# Patient Record
Sex: FEMALE | Race: WHITE | NOT HISPANIC OR LATINO | Employment: OTHER | URBAN - METROPOLITAN AREA
[De-identification: names, ages, dates, MRNs, and addresses within clinical notes are randomized per-mention and may not be internally consistent; named-entity substitution may affect disease eponyms.]

---

## 2017-10-15 ENCOUNTER — HOSPITAL ENCOUNTER (EMERGENCY)
Facility: HOSPITAL | Age: 77
Discharge: HOME | End: 2017-10-15
Attending: EMERGENCY MEDICINE | Admitting: EMERGENCY MEDICINE

## 2017-10-15 VITALS
WEIGHT: 140 LBS | BODY MASS INDEX: 25.76 KG/M2 | DIASTOLIC BLOOD PRESSURE: 65 MMHG | OXYGEN SATURATION: 91 % | HEIGHT: 62 IN | TEMPERATURE: 97.9 F | SYSTOLIC BLOOD PRESSURE: 97 MMHG | HEART RATE: 108 BPM | RESPIRATION RATE: 16 BRPM

## 2017-10-15 DIAGNOSIS — N39.0 URINARY TRACT INFECTION IN FEMALE: Primary | ICD-10-CM

## 2017-10-15 LAB
ALBUMIN SERPL-MCNC: 4.5 G/DL (ref 3.5–5.2)
ALBUMIN/GLOB SERPL: 1.5 G/DL
ALP SERPL-CCNC: 101 U/L (ref 39–117)
ALT SERPL W P-5'-P-CCNC: 16 U/L (ref 1–33)
ANION GAP SERPL CALCULATED.3IONS-SCNC: 17.4 MMOL/L
APTT PPP: 27.3 SECONDS (ref 22.7–35.4)
AST SERPL-CCNC: 18 U/L (ref 1–32)
BACTERIA UR QL AUTO: ABNORMAL /HPF
BASOPHILS # BLD AUTO: 0.04 10*3/MM3 (ref 0–0.2)
BASOPHILS NFR BLD AUTO: 0.3 % (ref 0–1.5)
BILIRUB SERPL-MCNC: <0.2 MG/DL (ref 0.1–1.2)
BILIRUB UR QL STRIP: NEGATIVE
BUN BLD-MCNC: 15 MG/DL (ref 8–23)
BUN/CREAT SERPL: 20.8 (ref 7–25)
CALCIUM SPEC-SCNC: 10 MG/DL (ref 8.6–10.5)
CHLORIDE SERPL-SCNC: 100 MMOL/L (ref 98–107)
CLARITY UR: ABNORMAL
CO2 SERPL-SCNC: 22.6 MMOL/L (ref 22–29)
COLOR UR: ABNORMAL
CREAT BLD-MCNC: 0.72 MG/DL (ref 0.57–1)
DEPRECATED RDW RBC AUTO: 44.7 FL (ref 37–54)
EOSINOPHIL # BLD AUTO: 0.07 10*3/MM3 (ref 0–0.7)
EOSINOPHIL NFR BLD AUTO: 0.6 % (ref 0.3–6.2)
ERYTHROCYTE [DISTWIDTH] IN BLOOD BY AUTOMATED COUNT: 13 % (ref 11.7–13)
GFR SERPL CREATININE-BSD FRML MDRD: 78 ML/MIN/1.73
GLOBULIN UR ELPH-MCNC: 3 GM/DL
GLUCOSE BLD-MCNC: 183 MG/DL (ref 65–99)
GLUCOSE UR STRIP-MCNC: ABNORMAL MG/DL
HCT VFR BLD AUTO: 44.8 % (ref 35.6–45.5)
HGB BLD-MCNC: 14.1 G/DL (ref 11.9–15.5)
HGB UR QL STRIP.AUTO: ABNORMAL
HYALINE CASTS UR QL AUTO: ABNORMAL /LPF
IMM GRANULOCYTES # BLD: 0.05 10*3/MM3 (ref 0–0.03)
IMM GRANULOCYTES NFR BLD: 0.4 % (ref 0–0.5)
INR PPP: 1 (ref 0.9–1.1)
KETONES UR QL STRIP: ABNORMAL
LEUKOCYTE ESTERASE UR QL STRIP.AUTO: ABNORMAL
LYMPHOCYTES # BLD AUTO: 2.6 10*3/MM3 (ref 0.9–4.8)
LYMPHOCYTES NFR BLD AUTO: 21.8 % (ref 19.6–45.3)
MCH RBC QN AUTO: 29.6 PG (ref 26.9–32)
MCHC RBC AUTO-ENTMCNC: 31.5 G/DL (ref 32.4–36.3)
MCV RBC AUTO: 94.1 FL (ref 80.5–98.2)
MONOCYTES # BLD AUTO: 1.08 10*3/MM3 (ref 0.2–1.2)
MONOCYTES NFR BLD AUTO: 9.1 % (ref 5–12)
NEUTROPHILS # BLD AUTO: 8.09 10*3/MM3 (ref 1.9–8.1)
NEUTROPHILS NFR BLD AUTO: 67.8 % (ref 42.7–76)
NITRITE UR QL STRIP: POSITIVE
PH UR STRIP.AUTO: 5.5 [PH] (ref 5–8)
PLATELET # BLD AUTO: 335 10*3/MM3 (ref 140–500)
PMV BLD AUTO: 10.9 FL (ref 6–12)
POTASSIUM BLD-SCNC: 3.8 MMOL/L (ref 3.5–5.2)
PROT SERPL-MCNC: 7.5 G/DL (ref 6–8.5)
PROT UR QL STRIP: ABNORMAL
PROTHROMBIN TIME: 12.8 SECONDS (ref 11.7–14.2)
RBC # BLD AUTO: 4.76 10*6/MM3 (ref 3.9–5.2)
RBC # UR: ABNORMAL /HPF
REF LAB TEST METHOD: ABNORMAL
SODIUM BLD-SCNC: 140 MMOL/L (ref 136–145)
SP GR UR STRIP: 1.01 (ref 1–1.03)
SQUAMOUS #/AREA URNS HPF: ABNORMAL /HPF
UROBILINOGEN UR QL STRIP: ABNORMAL
WBC NRBC COR # BLD: 11.93 10*3/MM3 (ref 4.5–10.7)
WBC UR QL AUTO: ABNORMAL /HPF

## 2017-10-15 PROCEDURE — 87186 SC STD MICRODIL/AGAR DIL: CPT | Performed by: EMERGENCY MEDICINE

## 2017-10-15 PROCEDURE — 85730 THROMBOPLASTIN TIME PARTIAL: CPT | Performed by: EMERGENCY MEDICINE

## 2017-10-15 PROCEDURE — 81001 URINALYSIS AUTO W/SCOPE: CPT | Performed by: EMERGENCY MEDICINE

## 2017-10-15 PROCEDURE — 96365 THER/PROPH/DIAG IV INF INIT: CPT

## 2017-10-15 PROCEDURE — 25010000002 CEFTRIAXONE PER 250 MG: Performed by: EMERGENCY MEDICINE

## 2017-10-15 PROCEDURE — 99284 EMERGENCY DEPT VISIT MOD MDM: CPT

## 2017-10-15 PROCEDURE — 87086 URINE CULTURE/COLONY COUNT: CPT | Performed by: EMERGENCY MEDICINE

## 2017-10-15 PROCEDURE — 85610 PROTHROMBIN TIME: CPT | Performed by: EMERGENCY MEDICINE

## 2017-10-15 PROCEDURE — 80053 COMPREHEN METABOLIC PANEL: CPT | Performed by: EMERGENCY MEDICINE

## 2017-10-15 PROCEDURE — 85025 COMPLETE CBC W/AUTO DIFF WBC: CPT | Performed by: EMERGENCY MEDICINE

## 2017-10-15 RX ORDER — SULFAMETHOXAZOLE AND TRIMETHOPRIM 800; 160 MG/1; MG/1
1 TABLET ORAL 2 TIMES DAILY
Qty: 10 TABLET | Refills: 0 | Status: SHIPPED | OUTPATIENT
Start: 2017-10-15 | End: 2018-05-04 | Stop reason: ALTCHOICE

## 2017-10-15 RX ORDER — SODIUM CHLORIDE 0.9 % (FLUSH) 0.9 %
10 SYRINGE (ML) INJECTION AS NEEDED
Status: DISCONTINUED | OUTPATIENT
Start: 2017-10-15 | End: 2017-10-15 | Stop reason: HOSPADM

## 2017-10-15 RX ORDER — ASPIRIN 81 MG/1
81 TABLET, CHEWABLE ORAL DAILY
COMMUNITY

## 2017-10-15 RX ORDER — PAROXETINE HYDROCHLORIDE 40 MG/1
40 TABLET, FILM COATED ORAL EVERY MORNING
COMMUNITY
End: 2020-02-25 | Stop reason: SDUPTHER

## 2017-10-15 RX ORDER — CEFTRIAXONE SODIUM 1 G/50ML
1 INJECTION, SOLUTION INTRAVENOUS ONCE
Status: COMPLETED | OUTPATIENT
Start: 2017-10-15 | End: 2017-10-15

## 2017-10-15 RX ORDER — CLONIDINE HYDROCHLORIDE 0.2 MG/1
0.2 TABLET ORAL 2 TIMES DAILY
COMMUNITY
End: 2020-02-25 | Stop reason: SDUPTHER

## 2017-10-15 RX ORDER — CELECOXIB 200 MG/1
200 CAPSULE ORAL DAILY
COMMUNITY
End: 2019-05-13

## 2017-10-15 RX ADMIN — SODIUM CHLORIDE 1000 ML: 9 INJECTION, SOLUTION INTRAVENOUS at 02:33

## 2017-10-15 RX ADMIN — CEFTRIAXONE SODIUM 1 G: 1 INJECTION, SOLUTION INTRAVENOUS at 02:36

## 2017-10-15 NOTE — ED NOTES
"Patient states \"When I urinate it's bright red. It started around 4pm.\"     Marya Taylor RN  10/15/17 0019    "

## 2017-10-15 NOTE — ED PROVIDER NOTES
EMERGENCY DEPARTMENT ENCOUNTER    CHIEF COMPLAINT  Chief Complaint: Hematuria  History given by: Patient  History limited by:   Room Number: NATHAN/NATHAN  PMD: Brendan Hagen MD      HPI:  Pt is a 77 y.o. female who presents complaining of hematuria that onset yesterday at 1600. Pt reports after taking a shower, she urinated and noted that her urine was bright red. She states that she has had urinary urgency for the past few days. Pt has a hx of urinary incontinence. Pt reports some back pain, dizziness, and suprapubic abd pain.     Duration:  8 hours  Onset: sudden  Timing: intermittent  Quality: bright red urine  Intensity/Severity: moderate  Progression: unchanged  Associated Symptoms: back pain, dizziness, abd pain  Aggravating Factors: none  Alleviating Factors: none  Previous Episodes: none  Treatment before arrival: none    PAST MEDICAL HISTORY  Active Ambulatory Problems     Diagnosis Date Noted   • No Active Ambulatory Problems     Resolved Ambulatory Problems     Diagnosis Date Noted   • No Resolved Ambulatory Problems     Past Medical History:   Diagnosis Date   • Anxiety    • Arthritis    • Hypertension    • IBS (irritable bowel syndrome)        PAST SURGICAL HISTORY  Past Surgical History:   Procedure Laterality Date   • APPENDECTOMY     • HYSTERECTOMY     • TONSILLECTOMY         FAMILY HISTORY  History reviewed. No pertinent family history.    SOCIAL HISTORY  Social History     Social History   • Marital status:      Spouse name: N/A   • Number of children: N/A   • Years of education: N/A     Occupational History   • Not on file.     Social History Main Topics   • Smoking status: Never Smoker   • Smokeless tobacco: Not on file   • Alcohol use No   • Drug use: No   • Sexual activity: Not on file     Other Topics Concern   • Not on file     Social History Narrative   • No narrative on file       ALLERGIES  Penicillins and Statins    REVIEW OF SYSTEMS  Review of Systems   Constitutional:  Negative for fever.   HENT: Negative for sore throat.    Eyes: Negative.    Respiratory: Negative for cough and shortness of breath.    Cardiovascular: Negative for chest pain.   Gastrointestinal: Positive for abdominal pain. Negative for diarrhea and vomiting.   Genitourinary: Positive for hematuria. Negative for dysuria.   Musculoskeletal: Positive for back pain. Negative for neck pain.   Skin: Negative for rash.   Allergic/Immunologic: Negative.    Neurological: Positive for dizziness. Negative for weakness, numbness and headaches.   Hematological: Negative.    Psychiatric/Behavioral: Negative.    All other systems reviewed and are negative.      PHYSICAL EXAM  ED Triage Vitals   Temp Heart Rate Resp BP SpO2   10/15/17 0020 10/15/17 0020 10/15/17 0020 10/15/17 0026 10/15/17 0020   97.9 °F (36.6 °C) 108 16 170/106 96 %      Temp src Heart Rate Source Patient Position BP Location FiO2 (%)   -- -- 10/15/17 0026 10/15/17 0026 --     Sitting Right arm        Physical Exam   Constitutional: She is oriented to person, place, and time and well-developed, well-nourished, and in no distress. No distress.   HENT:   Head: Normocephalic and atraumatic.   Eyes: EOM are normal. Pupils are equal, round, and reactive to light.   Neck: Normal range of motion. Neck supple.   Cardiovascular: Normal rate, regular rhythm and normal heart sounds.    Pulmonary/Chest: Effort normal and breath sounds normal. No respiratory distress.   Abdominal: Soft. There is no tenderness. There is no rebound and no guarding.   Genitourinary:   Genitourinary Comments: Tea-colored urine at bedside   Musculoskeletal: Normal range of motion. She exhibits no edema.   Neurological: She is alert and oriented to person, place, and time. She has normal sensation and normal strength.   Skin: Skin is warm and dry. No rash noted.   Psychiatric: Mood and affect normal.   Nursing note and vitals reviewed.      LAB RESULTS  Lab Results (last 24 hours)     Procedure  Component Value Units Date/Time    Urinalysis With / Culture If Indicated - Urine, Clean Catch [[id number]]  (Abnormal) Collected:  10/15/17 0054    Specimen:  Urine from Urine, Clean Catch Updated:  10/15/17 0133     Color, UA Dark Yellow (A)     Appearance, UA Cloudy (A)     pH, UA 5.5     Specific Gravity, UA 1.014     Glucose,  mg/dL (Trace) (A)     Ketones, UA Trace (A)     Bilirubin, UA Negative     Blood, UA Large (3+) (A)     Protein,  mg/dL (2+) (A)     Leuk Esterase, UA Large (3+) (A)     Nitrite, UA Positive (A)     Urobilinogen, UA 1.0 E.U./dL    Urinalysis, Microscopic Only - Urine, Clean Catch [[id number]]  (Abnormal) Collected:  10/15/17 0054    Specimen:  Urine from Urine, Clean Catch Updated:  10/15/17 0133     RBC, UA Too Numerous to Count (A) /HPF      WBC, UA Too Numerous to Count (A) /HPF      Bacteria, UA 4+ (A) /HPF      Squamous Epithelial Cells, UA 0-2 /HPF      Hyaline Casts, UA 0-2 /LPF      Methodology Automated Microscopy    Urine Culture - Urine, Urine, Clean Catch [[id number]]  (Normal) Collected:  10/15/17 0054    Specimen:  Urine from Urine, Clean Catch Updated:  10/15/17 0651     Urine Culture Culture in progress    CBC & Differential [[id number]] Collected:  10/15/17 0103    Specimen:  Blood Updated:  10/15/17 0148    Narrative:       The following orders were created for panel order CBC & Differential.  Procedure                               Abnormality         Status                     ---------                               -----------         ------                     CBC Auto Differential[[id number]]        Abnormal            Final result                 Please view results for these tests on the individual orders.    Comprehensive Metabolic Panel [[id number]]  (Abnormal) Collected:  10/15/17 0103    Specimen:  Blood Updated:  10/15/17 0217     Glucose 183 (H) mg/dL      BUN 15 mg/dL      Creatinine 0.72 mg/dL      Sodium 140 mmol/L      Potassium 3.8 mmol/L       Chloride 100 mmol/L      CO2 22.6 mmol/L      Calcium 10.0 mg/dL      Total Protein 7.5 g/dL      Albumin 4.50 g/dL      ALT (SGPT) 16 U/L      AST (SGOT) 18 U/L      Alkaline Phosphatase 101 U/L      Total Bilirubin <0.2 mg/dL      eGFR Non African Amer 78 mL/min/1.73      Globulin 3.0 gm/dL      A/G Ratio 1.5 g/dL      BUN/Creatinine Ratio 20.8     Anion Gap 17.4 mmol/L     Narrative:       The MDRD GFR formula is only valid for adults with stable renal function between ages 18 and 70.    Protime-INR [[id number]]  (Normal) Collected:  10/15/17 0103    Specimen:  Blood Updated:  10/15/17 0156     Protime 12.8 Seconds      INR 1.00    aPTT [[id number]]  (Normal) Collected:  10/15/17 0103    Specimen:  Blood Updated:  10/15/17 0156     PTT 27.3 seconds     CBC Auto Differential [[id number]]  (Abnormal) Collected:  10/15/17 0103    Specimen:  Blood Updated:  10/15/17 0148     WBC 11.93 (H) 10*3/mm3      RBC 4.76 10*6/mm3      Hemoglobin 14.1 g/dL      Hematocrit 44.8 %      MCV 94.1 fL      MCH 29.6 pg      MCHC 31.5 (L) g/dL      RDW 13.0 %      RDW-SD 44.7 fl      MPV 10.9 fL      Platelets 335 10*3/mm3      Neutrophil % 67.8 %      Lymphocyte % 21.8 %      Monocyte % 9.1 %      Eosinophil % 0.6 %      Basophil % 0.3 %      Immature Grans % 0.4 %      Neutrophils, Absolute 8.09 10*3/mm3      Lymphocytes, Absolute 2.60 10*3/mm3      Monocytes, Absolute 1.08 10*3/mm3      Eosinophils, Absolute 0.07 10*3/mm3      Basophils, Absolute 0.04 10*3/mm3      Immature Grans, Absolute 0.05 (H) 10*3/mm3           I ordered the above labs and reviewed the results    PROCEDURES  Procedures      PROGRESS AND CONSULTS  ED Course   1:00 AM  Ordered blood work and UA for further evaluation.   2:47 AM  Rechecked with pt. Informed pt of her UA result showing UTI and plan to give IV Rocephin. Will discharge with abx. Pt understands and agrees with plan. All concerns addressed.         MEDICAL DECISION MAKING    MDM  Number of Diagnoses or  Management Options  Urinary tract infection in female:      Amount and/or Complexity of Data Reviewed  Clinical lab tests: ordered and reviewed (UA: 4+ Bacteria)           DIAGNOSIS  Final diagnoses:   Urinary tract infection in female       DISPOSITION  DISCHARGE    Patient discharged in stable condition.    Reviewed implications of results, diagnosis, meds, responsibility to follow up, warning signs and symptoms of possible worsening, potential complications and reasons to return to ER.    Patient/Family voiced understanding of above instructions.    Discussed plan for discharge, as there is no emergent indication for admission.  Pt/family is agreeable and understands need for follow up and repeat testing.  Pt is aware that discharge does not mean that nothing is wrong but it indicates no emergency is present that requires admission and they must continue care with follow-up as given below or physician of their choice.     FOLLOW-UP  Brendan Hagen MD  [address] [address]  [phone]    Call in 1 week  If symptoms worsen, For Primary Physician follow-up         Medication List      New Prescriptions          sulfamethoxazole-trimethoprim 800-160 MG per tablet   Commonly known as:  BACTRIM DS   Take 1 tablet by mouth 2 (Two) Times a Day.               Latest Documented Vital Signs:  As of 7:02 AM  BP- 97/65 HR- 108 Temp- 97.9 °F (36.6 °C) O2 sat- 91%    --  Documentation assistance provided by jorge a Benoit for Dr. Flynn.  Information recorded by the scribe was done at my direction and has been verified and validated by me.     Freddie Benoit  10/15/17 0311       Freddie Benoit  10/15/17 0338       Ryan Flynn MD  10/15/17 0702

## 2017-10-17 LAB
BACTERIA SPEC AEROBE CULT: ABNORMAL
BACTERIA SPEC AEROBE CULT: ABNORMAL

## 2018-05-04 ENCOUNTER — OFFICE VISIT (OUTPATIENT)
Dept: CARDIOLOGY | Facility: CLINIC | Age: 78
End: 2018-05-04

## 2018-05-04 VITALS — DIASTOLIC BLOOD PRESSURE: 90 MMHG | SYSTOLIC BLOOD PRESSURE: 140 MMHG | HEART RATE: 68 BPM

## 2018-05-04 DIAGNOSIS — I10 ESSENTIAL HYPERTENSION: ICD-10-CM

## 2018-05-04 DIAGNOSIS — E78.2 MIXED HYPERLIPIDEMIA: ICD-10-CM

## 2018-05-04 DIAGNOSIS — I48.0 PAROXYSMAL ATRIAL FIBRILLATION (HCC): Primary | ICD-10-CM

## 2018-05-04 DIAGNOSIS — G47.33 OBSTRUCTIVE SLEEP APNEA SYNDROME: ICD-10-CM

## 2018-05-04 PROCEDURE — 93000 ELECTROCARDIOGRAM COMPLETE: CPT | Performed by: INTERNAL MEDICINE

## 2018-05-04 PROCEDURE — 99204 OFFICE O/P NEW MOD 45 MIN: CPT | Performed by: INTERNAL MEDICINE

## 2018-05-04 RX ORDER — ERGOCALCIFEROL 1.25 MG/1
CAPSULE ORAL
Refills: 2 | COMMUNITY
Start: 2018-04-15 | End: 2020-02-25 | Stop reason: SDUPTHER

## 2018-05-04 RX ORDER — HYDROCHLOROTHIAZIDE 12.5 MG/1
CAPSULE, GELATIN COATED ORAL
Refills: 0 | COMMUNITY
Start: 2018-04-15 | End: 2020-02-25 | Stop reason: SDUPTHER

## 2018-05-04 NOTE — PROGRESS NOTES
Date of Office Visit: 18  Encounter Provider: Jayson Pinto MD  Place of Service: [facility]  Patient Name: Kandace Andrade  :1940  Referral Provider:Jayson Pinto MD      No chief complaint on file.    History of Present Illness   The patient is a pleasant 75-year-old white female who presented to the hospital in 2010 and had back surgery.  She had a lumbar laminectomy.  Postoperatively, she became very hypoxemic with aspiration pneumonia and went into atrial fibrillation.  She converted back to sinus rhythm with IV amiodarone.  We then saw her in 2011 with her blood pressure being elevated and labile.  Her medication had to be adjusted.   In , she was having shortness of breath.  We did a perfusion stress test on her which was  negative.  She had an echocardiogram which was unremarkable.     When she was last here in , she was having problems with the clonidine. It just made her fatigued, tired, and sleepy. We tried her on lisinopril and apparently that did not work and she felt worse on that than on the clonidine so that was discontinued. She was put back on the clonidine. She comes in for follow up. It has been 2 1/2 years since she was last seen.  She comes in now because she's had some fluttering lasting a few seconds 3-4 times in the past 6 months no associated near-syncope or syncope or chest pain or dyspnea.  She has chronic dizziness she says is from her blood pressure medicine which she's had forever.  She fell about 6-8 weeks ago broke a bone her foot thinks she may have lost consciousness but doesn't know for sure no symptoms prior to that.  She's also had a dual with his chronic diarrhea but apparently that's been worked up also.  She denies any orthopnea or PND denies any chest pain and pressure.  Overall she feels like she's doing reasonably well.  She lives with her  they take care of each other she has 2  daughters in town in case he needed help.          Past Medical History:   Diagnosis Date   • Anxiety    • Arthritis    • Bronchitis    • CKD (chronic kidney disease)    • Depression    • Herpes zoster    • Hyperglycemia    • Hypertension    • IBS (irritable bowel syndrome)          Past Surgical History:   Procedure Laterality Date   • APPENDECTOMY     • HYSTERECTOMY     • TONSILLECTOMY           Current Outpatient Prescriptions on File Prior to Visit   Medication Sig Dispense Refill   • aspirin 81 MG chewable tablet Chew 81 mg Daily.     • celecoxib (CeleBREX) 200 MG capsule Take 200 mg by mouth Daily.     • CloNIDine (CATAPRES) 0.2 MG tablet Take 0.2 mg by mouth 2 (Two) Times a Day.     • MethylPREDNISolone (MEDROL, RAF,) 4 MG tablet Take as directed on package instructions. 21 tablet 0   • PARoxetine (PAXIL) 20 MG tablet Take 40 mg by mouth Every Morning.     • sulfamethoxazole-trimethoprim (BACTRIM DS) 800-160 MG per tablet Take 1 tablet by mouth 2 (Two) Times a Day. 10 tablet 0     No current facility-administered medications on file prior to visit.          Social History     Social History   • Marital status:      Spouse name: N/A   • Number of children: N/A   • Years of education: N/A     Occupational History   • Not on file.     Social History Main Topics   • Smoking status: Never Smoker   • Smokeless tobacco: Not on file   • Alcohol use No   • Drug use: No   • Sexual activity: Not on file     Other Topics Concern   • Not on file     Social History Narrative   • No narrative on file       History reviewed. No pertinent family history.      Review of Systems   Constitution: Negative for decreased appetite, diaphoresis, fever, weakness, malaise/fatigue, weight gain and weight loss.   HENT: Negative for congestion, hearing loss, nosebleeds and tinnitus.    Eyes: Negative for blurred vision, double vision, vision loss in left eye, vision loss in right eye and visual disturbance.   Cardiovascular:         As noted in HPI   Respiratory:        As noted HPI   Endocrine: Negative for cold intolerance and heat intolerance.   Hematologic/Lymphatic: Negative for bleeding problem. Does not bruise/bleed easily.   Skin: Negative for color change, flushing, itching and rash.   Musculoskeletal: Positive for back pain and joint pain. Negative for arthritis, joint swelling, muscle weakness and myalgias.   Gastrointestinal: Positive for diarrhea. Negative for bloating, abdominal pain, constipation, dysphagia, heartburn, hematemesis, hematochezia, melena, nausea and vomiting.   Genitourinary: Negative for bladder incontinence, dysuria, frequency, nocturia and urgency.   Neurological: Positive for dizziness and light-headedness. Negative for focal weakness, headaches, loss of balance, numbness, paresthesias and vertigo.   Psychiatric/Behavioral: Negative for depression, memory loss and substance abuse.       Procedures      ECG 12 Lead  Date/Time: 5/4/2018 2:39 PM  Performed by: MICHAEL GLYNN  Authorized by: MICHAEL GLYNN   Comparison: compared with previous ECG   Similar to previous ECG  Rhythm: sinus rhythm  Rate: normal  QRS axis: normal                  Objective:    There were no vitals taken for this visit.       Physical Exam  Physical Exam   Constitutional: She is oriented to person, place, and time. She appears well-developed and well-nourished. No distress.   HENT:   Head: Normocephalic.   Eyes: Conjunctivae are normal. Pupils are equal, round, and reactive to light. No scleral icterus.   Neck: Normal carotid pulses, no hepatojugular reflux and no JVD present. Carotid bruit is not present. No tracheal deviation, no edema and no erythema present. No thyromegaly present.   Cardiovascular: Normal rate, regular rhythm, S1 normal, S2 normal, normal heart sounds and intact distal pulses.   No extrasystoles are present. PMI is not displaced.  Exam reveals no gallop, no distant heart sounds and no friction rub.    No  murmur heard.  Pulses:       Carotid pulses are 2+ on the right side, and 2+ on the left side.       Radial pulses are 2+ on the right side, and 2+ on the left side.        Femoral pulses are 2+ on the right side, and 2+ on the left side.       Dorsalis pedis pulses are 2+ on the right side, and 2+ on the left side.        Posterior tibial pulses are 2+ on the right side, and 2+ on the left side.   Pulmonary/Chest: Effort normal and breath sounds normal. No respiratory distress. She has no decreased breath sounds. She has no wheezes. She has no rhonchi. She has no rales. She exhibits no tenderness.   Abdominal: Soft. Bowel sounds are normal. She exhibits no distension and no mass. There is no hepatosplenomegaly. There is no tenderness. There is no rebound and no guarding.   Musculoskeletal: She exhibits no edema, tenderness or deformity.   Neurological: She is alert and oriented to person, place, and time.   Skin: Skin is warm and dry. No rash noted. She is not diaphoretic. No cyanosis or erythema. No pallor. Nails show no clubbing.   Psychiatric: She has a normal mood and affect. Her speech is normal and behavior is normal. Judgment and thought content normal.           Assessment:    1.  78-year-old female with paroxysmal atrial fibrillation.  Atrial Fibrillation and Atrial Flutter  Assessment  • The patient has paroxysmal atrial fibrillation  • This is non-valvular in etiology  • The patient's CHADS2-VASc score is 4  • A [id number] score of 2 or more is considered a high risk for a thromboembolic event  • Aspirin prescribed    Plan  • Attempt to maintain sinus rhythm  • Continue aspirin for antithrombotic therapy, bleeding issues discussed  She is having brief episodes of fluttering lasting just a few seconds 3-4 episodes and 6 months she doesn't want to do anything more about these I think that that certainly reasonable fleeting and not sure that we've catch any of them.    2.  Hypertension pressure borderline  today would continue though her clonidine.  3.  History of hyperlipidemia currently on no therapy.  Per PCP.  4.  History obstructive sleep apnea but cannot tolerate the mask.  5.  Chronic diarrhea.         Plan:

## 2019-05-13 ENCOUNTER — OFFICE VISIT (OUTPATIENT)
Dept: CARDIOLOGY | Facility: CLINIC | Age: 79
End: 2019-05-13

## 2019-05-13 VITALS
DIASTOLIC BLOOD PRESSURE: 110 MMHG | HEART RATE: 59 BPM | SYSTOLIC BLOOD PRESSURE: 190 MMHG | BODY MASS INDEX: 22.36 KG/M2 | WEIGHT: 131 LBS | HEIGHT: 64 IN

## 2019-05-13 DIAGNOSIS — I48.0 PAROXYSMAL ATRIAL FIBRILLATION (HCC): Primary | ICD-10-CM

## 2019-05-13 DIAGNOSIS — E78.2 MIXED HYPERLIPIDEMIA: ICD-10-CM

## 2019-05-13 DIAGNOSIS — G47.33 OBSTRUCTIVE SLEEP APNEA SYNDROME: ICD-10-CM

## 2019-05-13 DIAGNOSIS — I10 ESSENTIAL HYPERTENSION: ICD-10-CM

## 2019-05-13 PROCEDURE — 93000 ELECTROCARDIOGRAM COMPLETE: CPT | Performed by: NURSE PRACTITIONER

## 2019-05-13 PROCEDURE — 99214 OFFICE O/P EST MOD 30 MIN: CPT | Performed by: NURSE PRACTITIONER

## 2019-05-13 NOTE — PROGRESS NOTES
Date of Office Visit: 2019  Encounter Provider: ANTOINETTE Ni  Place of Service: [facility] CARDIOLOGY  Patient Name: Kandace Andrade  :1940    Chief Complaint   Patient presents with   • Atrial Fibrillation   • Follow-up   • Hypertension   :     HPI: Kandace Andrade is a 79 y.o. female is a patient of Dr. Pinto. I will be seeing her for the first time today and have reviewed her record.     Her past medical history is significant of paroxysmal atrial fibrillation, hypertension, hyperlipidemia, and obstructive sleep apnea.    In 2012 she had a lumbar laminectomy.  Postoperatively, she became very hypoxemic aspiration pneumonia and went into atrial fibrillation.  She converted back to sinus rhythm with IV amiodarone.  She had issues with labile blood pressure in 2011 and her medications were adjusted.  In , she had shortness of breath.  Perfusion stress test was negative for ischemia.  Echocardiogram was unremarkable.  In , she had issues with clonidine with fatigue and sleepiness.  We tried on lisinopril but she felt worse on that than she did with clonidine.  Lisinopril was discontinued and we put her back on clonidine.  May 2018 she complained of some fluttering which lasted a few seconds.  This occurred 3-4 times in the last 6 months.  We decided not to do anything more but to follow these clinically.    Patient presents today for annual reassessment.  She denies palpitations and does not recall the last time she had any fluttering sensation.  She denies shortness of breath, chest pain tightness pressure.  She has chronic edema in the left ankle and foot which is unchanged.  She has intermittent lightheadedness secondary to her antihypertensive.  She denies near syncope, or syncope.  Her  has been in and out of the hospital with recurrent stroke and she has been ambulating more frequently than normal.  She pulled a muscle in her right  "leg but was checked for bilateral lower extremity DVT which was negative.  She has not checked her blood pressure in a while but reports it is normally better than it is today.  She has not yet had her morning medication.      Allergies   Allergen Reactions   • Penicillins Hives and Other (See Comments)     Elevated BP   • Statins Other (See Comments)     Headache, elevated liver enzymes, blurred vision       Past Medical History:   Diagnosis Date   • Anxiety    • Arthritis    • Atrial fibrillation (CMS/HCC)    • Bronchitis    • CKD (chronic kidney disease)    • Depression    • Herpes zoster    • Hyperglycemia    • Hyperlipidemia    • Hypertension    • IBS (irritable bowel syndrome)    • Sleep apnea     not treated, unable to tolerate mask       Past Surgical History:   Procedure Laterality Date   • APPENDECTOMY     • BACK SURGERY      lower x2   • HYSTERECTOMY     • SHOULDER SURGERY Right    • TONSILLECTOMY           Family and social history reviewed.     Review of Systems   Constitution: Positive for malaise/fatigue.   Cardiovascular: Positive for leg swelling.   Neurological: Positive for light-headedness.     All other systems were reviewed and are negative          Objective:     Vitals:    05/13/19 1034   BP: (!) 190/110   BP Location: Left arm   Patient Position: Sitting   Pulse: 59   Weight: 59.4 kg (131 lb)   Height: 162.6 cm (64\")     Body mass index is 22.49 kg/m².    PHYSICAL EXAM:  Physical Exam   Constitutional: She is oriented to person, place, and time. She appears well-developed and well-nourished. No distress.   HENT:   Head: Normocephalic.   Eyes: Conjunctivae are normal.   Neck: Normal range of motion. No JVD present.   Cardiovascular: Normal rate, regular rhythm, normal heart sounds and intact distal pulses.   No murmur heard.  Pulses:       Carotid pulses are 2+ on the right side, and 2+ on the left side.       Radial pulses are 2+ on the right side, and 2+ on the left side.        Posterior " tibial pulses are 2+ on the right side, and 2+ on the left side.   Pulmonary/Chest: Effort normal and breath sounds normal. No respiratory distress. She has no wheezes. She has no rhonchi. She has no rales. She exhibits no tenderness.   Abdominal: Soft. Bowel sounds are normal. She exhibits no distension.   Musculoskeletal: Normal range of motion. She exhibits no edema.   Neurological: She is alert and oriented to person, place, and time.   Skin: Skin is warm, dry and intact. No rash noted. She is not diaphoretic. No cyanosis.   Psychiatric: She has a normal mood and affect. Her behavior is normal. Judgment and thought content normal.         ECG 12 Lead  Date/Time: 5/13/2019 10:44 AM  Performed by: Laura Dsouza APRN  Authorized by: Laura Dsouza APRN   Comparison: compared with previous ECG from 5/4/2018  Similar to previous ECG  Rhythm: sinus rhythm  Rate: normal  QRS axis: normal    Clinical impression: normal ECG            Current Outpatient Medications   Medication Sig Dispense Refill   • hydrochlorothiazide (MICROZIDE) 12.5 MG capsule TK ONE C PO QD PRN  0   • MELOXICAM PO Take  by mouth Daily.     • aspirin 81 MG chewable tablet Chew 81 mg Daily.     • CloNIDine (CATAPRES) 0.2 MG tablet Take 0.2 mg by mouth 2 (Two) Times a Day.     • PARoxetine (PAXIL) 40 MG tablet Take 40 mg by mouth Every Morning.     • vitamin D (ERGOCALCIFEROL) 71272 units capsule capsule TK ONE C PO Q WEEK  2     No current facility-administered medications for this visit.      Assessment:       Diagnosis Plan   1. Paroxysmal atrial fibrillation (CMS/HCC)  ECG 12 Lead   2. Mixed hyperlipidemia     3. Obstructive sleep apnea syndrome     4. Essential hypertension          Orders Placed This Encounter   Procedures   • ECG 12 Lead     This order was created via procedure documentation         Plan:     1.  Paroxysmal atrial fibrillation in the postoperative setting with aspiration pneumonia converted with IV amiodarone maintained on  aspirin with no recurrent a fib.  No issues with palpitations.  We discussed if these recur or become more frequent to notify our office and we can consider Zio patch or event monitor.  Atrial Fibrillation and Atrial Flutter  Assessment  • The patient has paroxysmal atrial fibrillation  • This is non-valvular in etiology  • The patient's CHADS2-VASc score is 4  • A [id number] score of 2 or more is considered a high risk for a thromboembolic event  • Aspirin prescribed    Plan  • Attempt to maintain sinus rhythm  • Continue aspirin for antithrombotic therapy, bleeding issues discussed        2.  Hypertension blood pressure is markedly elevated today.  Patient has not checked in a while at home but reports it is normally 130 or less systolic.  She did not have a medication this morning.  I have given her a log so that she can start to follow more frequently at home and call our office if she has issues.  3.  History of hyperlipidemia currently on no therapy.  Per PCP.  4.  History obstructive sleep apnea but cannot tolerate the mask.  5.  Anxiety on therapy      Follow up in one year call with issues.       It has been a pleasure to participate in this patient's care.      Thank you,  ANTOINETTE Ni      **Gia Disclaimer:**  Much of this encounter note is an electronic transcription/translation of spoken language to printed text. The electronic translation of spoken language may permit erroneous, or at times, nonsensical words or phrases to be inadvertently transcribed. Although I have reviewed the note for such errors, some may still exist.

## 2019-08-30 ENCOUNTER — OFFICE VISIT (OUTPATIENT)
Dept: FAMILY MEDICINE CLINIC | Facility: CLINIC | Age: 79
End: 2019-08-30

## 2019-08-30 VITALS
BODY MASS INDEX: 23.11 KG/M2 | HEIGHT: 63 IN | OXYGEN SATURATION: 91 % | TEMPERATURE: 97.4 F | DIASTOLIC BLOOD PRESSURE: 82 MMHG | HEART RATE: 76 BPM | WEIGHT: 130.4 LBS | SYSTOLIC BLOOD PRESSURE: 130 MMHG

## 2019-08-30 DIAGNOSIS — Z23 NEED FOR PNEUMOCOCCAL VACCINE: ICD-10-CM

## 2019-08-30 DIAGNOSIS — R73.9 HYPERGLYCEMIA: ICD-10-CM

## 2019-08-30 DIAGNOSIS — J30.1 ACUTE SEASONAL ALLERGIC RHINITIS DUE TO POLLEN: ICD-10-CM

## 2019-08-30 DIAGNOSIS — E55.9 VITAMIN D DEFICIENCY: ICD-10-CM

## 2019-08-30 DIAGNOSIS — J43.8 OTHER EMPHYSEMA (HCC): ICD-10-CM

## 2019-08-30 DIAGNOSIS — E78.2 MIXED HYPERLIPIDEMIA: ICD-10-CM

## 2019-08-30 DIAGNOSIS — I10 ESSENTIAL HYPERTENSION: Primary | ICD-10-CM

## 2019-08-30 DIAGNOSIS — M19.90 ARTHRITIS: ICD-10-CM

## 2019-08-30 DIAGNOSIS — I48.0 PAROXYSMAL ATRIAL FIBRILLATION (HCC): ICD-10-CM

## 2019-08-30 PROCEDURE — 90670 PCV13 VACCINE IM: CPT | Performed by: FAMILY MEDICINE

## 2019-08-30 PROCEDURE — G0009 ADMIN PNEUMOCOCCAL VACCINE: HCPCS | Performed by: FAMILY MEDICINE

## 2019-08-30 PROCEDURE — 99214 OFFICE O/P EST MOD 30 MIN: CPT | Performed by: FAMILY MEDICINE

## 2019-08-30 RX ORDER — BUDESONIDE AND FORMOTEROL FUMARATE DIHYDRATE 80; 4.5 UG/1; UG/1
2 AEROSOL RESPIRATORY (INHALATION)
Qty: 1 INHALER | Refills: 12 | Status: SHIPPED | OUTPATIENT
Start: 2019-08-30 | End: 2019-09-16 | Stop reason: HOSPADM

## 2019-08-30 NOTE — PROGRESS NOTES
Chief Complaint   Patient presents with   • Med Refill     Pt needs refills on her hypertension medications    • Dizziness     C/o feeling light headed, dizzy and fatigue. Unsure if it's blood pressure related        Subjective   Kandace Andrade is a 79 y.o. female.     History of Present Illness   PT is here to get reestablished and was seeing Dr Holden Hagen prior to here.    Hypertension-no chest pain, blurry vision, headaches or palpitations. Some lightheadedness and dizziness and fatigue  Hyperlipidemia- No myalgia.  No Complaints.  Stable. She is not on any meds bc allergic to statins.  She is trying to eat healthy.  Arthritis - multiple joints.  mobic not really helping. She may switch to aleve.  Vit d defic- stable and needs labs  A fib- she sees Dr. Frias   allergies- cough, congestion and sinus pain;  She does not really use nose spray too much for this. Some drainage and ear fullness  Hyperglycemia- need blood work for this.  COPD- needs a refill on maintenance inhaler      The following portions of the patient's history were reviewed and updated as appropriate: allergies, current medications, past family history, past medical history, past social history, past surgical history and problem list.    Review of Systems   Constitutional: Negative for fatigue.   Eyes: Negative for blurred vision.   Respiratory: Negative for cough, chest tightness and shortness of breath.    Cardiovascular: Negative for chest pain, palpitations and leg swelling.   Neurological: Positive for dizziness and light-headedness. Negative for headache.       Patient Active Problem List   Diagnosis   • Vitamin D deficiency   • KINDRA (obstructive sleep apnea)   • IBS (irritable bowel syndrome)   • Hypertension   • Hyperglycemia   • Hyperlipidemia   • Herpes zoster   • CKD (chronic kidney disease)   • Atrial fibrillation (CMS/HCC)   • Arthritis   • Anxiety and depression   • Other emphysema (CMS/[facility])   • Acute seasonal allergic rhinitis  "due to pollen       Allergies   Allergen Reactions   • Penicillins Hives and Other (See Comments)     Elevated BP   • Statins Other (See Comments)     Headache, elevated liver enzymes, blurred vision         Current Outpatient Medications:   •  aspirin 81 MG chewable tablet, Chew 81 mg Daily., Disp: , Rfl:   •  CloNIDine (CATAPRES) 0.2 MG tablet, Take 0.2 mg by mouth 2 (Two) Times a Day., Disp: , Rfl:   •  hydrochlorothiazide (MICROZIDE) 12.5 MG capsule, TK ONE C PO QD PRN, Disp: , Rfl: 0  •  MELOXICAM PO, Take 15 mg by mouth Daily., Disp: , Rfl:   •  PARoxetine (PAXIL) 40 MG tablet, Take 40 mg by mouth Every Morning., Disp: , Rfl:   •  vitamin D (ERGOCALCIFEROL) 91440 units capsule capsule, TK ONE C PO Q WEEK, Disp: , Rfl: 2    Past Medical History:   Diagnosis Date   • Anxiety and depression    • Arthritis    • Atrial fibrillation (CMS/HCC)    • CKD (chronic kidney disease)    • Herpes zoster    • Hyperglycemia    • Hyperlipidemia    • Hypertension    • IBS (irritable bowel syndrome)    • KINDRA (obstructive sleep apnea)    • Vitamin D deficiency        Past Surgical History:   Procedure Laterality Date   • APPENDECTOMY     • BACK SURGERY      lower x2   • HYSTERECTOMY     • SHOULDER SURGERY Right    • TONSILLECTOMY         Family History   Problem Relation Age of Onset   • Heart disease Mother    • Lung cancer Mother    • Diabetes Father    • Heart disease Paternal Uncle        Social History     Tobacco Use   • Smoking status: Former Smoker     Packs/day: 1.00     Years: 25.00     Pack years: 25.00     Last attempt to quit: 2008     Years since quittin.3   • Smokeless tobacco: Never Used   Substance Use Topics   • Alcohol use: No            Objective     Visit Vitals  /82   Pulse 76   Temp 97.4 °F (36.3 °C)   Ht 160 cm (63\")   Wt 59.1 kg (130 lb 6.4 oz)   SpO2 91%   BMI 23.10 kg/m²       Physical Exam   Constitutional: She appears well-developed. No distress.   HENT:   Effusion both ears.    Post " nasal drip   Eyes: Conjunctivae and lids are normal.   Neck: Trachea normal. Carotid bruit is not present. No thyroid mass and no thyromegaly present.   Cardiovascular: Normal rate, regular rhythm and normal heart sounds.   Pulmonary/Chest: Effort normal and breath sounds normal.   Lymphadenopathy:     She has no cervical adenopathy.   Neurological: She is alert. She is not disoriented.   Skin: Skin is warm and dry.   Psychiatric: She has a normal mood and affect. Her speech is normal and behavior is normal. She is attentive.       Lab Results   Component Value Date    GLUCOSE 183 (H) 10/15/2017    BUN 15 10/15/2017    CREATININE 0.72 10/15/2017    EGFRIFNONA 78 10/15/2017    BCR 20.8 10/15/2017    K 3.8 10/15/2017    CO2 22.6 10/15/2017    CALCIUM 10.0 10/15/2017    ALBUMIN 4.50 10/15/2017    AST 18 10/15/2017    ALT 16 10/15/2017       WBC   Date Value Ref Range Status   10/15/2017 11.93 (H) 4.50 - 10.70 10*3/mm3 Final     RBC   Date Value Ref Range Status   10/15/2017 4.76 3.90 - 5.20 10*6/mm3 Final     Hemoglobin   Date Value Ref Range Status   10/15/2017 14.1 11.9 - 15.5 g/dL Final     Hematocrit   Date Value Ref Range Status   10/15/2017 44.8 35.6 - 45.5 % Final     MCV   Date Value Ref Range Status   10/15/2017 94.1 80.5 - 98.2 fL Final     MCH   Date Value Ref Range Status   10/15/2017 29.6 26.9 - 32.0 pg Final     MCHC   Date Value Ref Range Status   10/15/2017 31.5 (L) 32.4 - 36.3 g/dL Final     RDW   Date Value Ref Range Status   10/15/2017 13.0 11.7 - 13.0 % Final     RDW-SD   Date Value Ref Range Status   10/15/2017 44.7 37.0 - 54.0 fl Final     MPV   Date Value Ref Range Status   10/15/2017 10.9 6.0 - 12.0 fL Final     Platelets   Date Value Ref Range Status   10/15/2017 335 140 - 500 10*3/mm3 Final     Neutrophil %   Date Value Ref Range Status   10/15/2017 67.8 42.7 - 76.0 % Final     Lymphocyte %   Date Value Ref Range Status   10/15/2017 21.8 19.6 - 45.3 % Final     Monocyte %   Date Value Ref  Range Status   10/15/2017 9.1 5.0 - 12.0 % Final     Eosinophil %   Date Value Ref Range Status   10/15/2017 0.6 0.3 - 6.2 % Final     Basophil %   Date Value Ref Range Status   10/15/2017 0.3 0.0 - 1.5 % Final     Immature Grans %   Date Value Ref Range Status   10/15/2017 0.4 0.0 - 0.5 % Final     Neutrophils, Absolute   Date Value Ref Range Status   10/15/2017 8.09 1.90 - 8.10 10*3/mm3 Final     Lymphocytes, Absolute   Date Value Ref Range Status   10/15/2017 2.60 0.90 - 4.80 10*3/mm3 Final     Monocytes, Absolute   Date Value Ref Range Status   10/15/2017 1.08 0.20 - 1.20 10*3/mm3 Final     Eosinophils, Absolute   Date Value Ref Range Status   10/15/2017 0.07 0.00 - 0.70 10*3/mm3 Final     Basophils, Absolute   Date Value Ref Range Status   10/15/2017 0.04 0.00 - 0.20 10*3/mm3 Final     Immature Grans, Absolute   Date Value Ref Range Status   10/15/2017 0.05 (H) 0.00 - 0.03 10*3/mm3 Final       No results found for: HGBA1C    No results found for: HKHJJMCP59    No results found for: TSH    No results found for: CHOL  No results found for: TRIG  No results found for: HDL  No results found for: LDL  No results found for: VLDL  No results found for: LDLHDL      Procedures    Assessment/Plan   Problems Addressed this Visit        Cardiovascular and Mediastinum    Hypertension - Primary    Hyperlipidemia    Atrial fibrillation (CMS/HCC)       Respiratory    Other emphysema (CMS/HCC)    Acute seasonal allergic rhinitis due to pollen       Digestive    Vitamin D deficiency       Musculoskeletal and Integument    Arthritis       Other    Hyperglycemia          No orders of the defined types were placed in this encounter.      Current Outpatient Medications   Medication Sig Dispense Refill   • aspirin 81 MG chewable tablet Chew 81 mg Daily.     • CloNIDine (CATAPRES) 0.2 MG tablet Take 0.2 mg by mouth 2 (Two) Times a Day.     • hydrochlorothiazide (MICROZIDE) 12.5 MG capsule TK ONE C PO QD PRN  0   • MELOXICAM PO Take 15  mg by mouth Daily.     • PARoxetine (PAXIL) 40 MG tablet Take 40 mg by mouth Every Morning.     • vitamin D (ERGOCALCIFEROL) 98090 units capsule capsule TK ONE C PO Q WEEK  2     No current facility-administered medications for this visit.        No Follow-up on file.    There are no Patient Instructions on file for this visit.

## 2019-08-31 LAB
25(OH)D3+25(OH)D2 SERPL-MCNC: 26.4 NG/ML (ref 30–100)
ALBUMIN SERPL-MCNC: 4.3 G/DL (ref 3.5–5.2)
ALBUMIN/GLOB SERPL: 1.8 G/DL
ALP SERPL-CCNC: 107 U/L (ref 39–117)
ALT SERPL-CCNC: 14 U/L (ref 1–33)
AST SERPL-CCNC: 22 U/L (ref 1–32)
BILIRUB SERPL-MCNC: 0.5 MG/DL (ref 0.2–1.2)
BUN SERPL-MCNC: 18 MG/DL (ref 8–23)
BUN/CREAT SERPL: 23.7 (ref 7–25)
CALCIUM SERPL-MCNC: 9.8 MG/DL (ref 8.6–10.5)
CHLORIDE SERPL-SCNC: 99 MMOL/L (ref 98–107)
CHOLEST SERPL-MCNC: 264 MG/DL (ref 0–200)
CO2 SERPL-SCNC: 25.5 MMOL/L (ref 22–29)
CREAT SERPL-MCNC: 0.76 MG/DL (ref 0.57–1)
GLOBULIN SER CALC-MCNC: 2.4 GM/DL
GLUCOSE SERPL-MCNC: 103 MG/DL (ref 65–99)
HDLC SERPL-MCNC: 66 MG/DL (ref 40–60)
LDLC SERPL CALC-MCNC: 175 MG/DL (ref 0–100)
POTASSIUM SERPL-SCNC: 4.7 MMOL/L (ref 3.5–5.2)
PROT SERPL-MCNC: 6.7 G/DL (ref 6–8.5)
SODIUM SERPL-SCNC: 137 MMOL/L (ref 136–145)
TRIGL SERPL-MCNC: 116 MG/DL (ref 0–150)
VLDLC SERPL CALC-MCNC: 23.2 MG/DL

## 2019-09-16 ENCOUNTER — OFFICE VISIT (OUTPATIENT)
Dept: CARDIOLOGY | Facility: CLINIC | Age: 79
End: 2019-09-16

## 2019-09-16 VITALS
HEIGHT: 63 IN | SYSTOLIC BLOOD PRESSURE: 134 MMHG | DIASTOLIC BLOOD PRESSURE: 80 MMHG | HEART RATE: 77 BPM | BODY MASS INDEX: 23.25 KG/M2 | WEIGHT: 131.2 LBS

## 2019-09-16 DIAGNOSIS — I10 ESSENTIAL HYPERTENSION: ICD-10-CM

## 2019-09-16 DIAGNOSIS — E78.2 MIXED HYPERLIPIDEMIA: ICD-10-CM

## 2019-09-16 DIAGNOSIS — I48.0 PAROXYSMAL ATRIAL FIBRILLATION (HCC): Primary | ICD-10-CM

## 2019-09-16 DIAGNOSIS — G47.33 OBSTRUCTIVE SLEEP APNEA SYNDROME: ICD-10-CM

## 2019-09-16 PROCEDURE — 99214 OFFICE O/P EST MOD 30 MIN: CPT | Performed by: INTERNAL MEDICINE

## 2019-09-16 PROCEDURE — 93000 ELECTROCARDIOGRAM COMPLETE: CPT | Performed by: INTERNAL MEDICINE

## 2019-09-16 NOTE — PROGRESS NOTES
Date of Office Visit: 19  Encounter Provider: Jayson Pinto MD  Place of Service: [facility] CARDIOLOGY  Patient Name: Kandace Andrade  :1940  Referral Provider:No ref. provider found      Chief Complaint   Patient presents with   • Shortness of Breath   • Dizziness     History of Present Illness   The patient is a pleasant 79-year-old white female who presented to the hospital in 2010 and had back surgery.  She had a lumbar laminectomy.  Postoperatively, she became very hypoxemic with aspiration pneumonia and went into atrial fibrillation.  She converted back to sinus rhythm with IV amiodarone.  We then saw her in 2011 with her blood pressure being elevated and labile.  Her medication had to be adjusted.   In , she was having shortness of breath.  We did a perfusion stress test on her which was  negative.  She had an echocardiogram which was unremarkable.     When she was last here in , she was having problems with the clonidine. It just made her fatigued, tired, and sleepy. We tried her on lisinopril and apparently that did not work and she felt worse on that than on the clonidine so that was discontinued. She was put back on the clonidine. She comes in for follow up.  She is been doing reasonably well her biggest complaint is her osteoarthritis and just hurts and aches in her joints.  She denies chest pain or pressure.  Has some shortness of breath with increased activity but no orthopnea or PND she has some mild lower extremity edema.  No palpitations near syncope or syncope.  Things at home are little stressful her  has had strokes and she has to care for him but fortunately she does have 2 daughters that help.          Past Medical History:   Diagnosis Date   • Anxiety and depression    • Arthritis    • Atrial fibrillation (CMS/HCC)    • CKD (chronic kidney disease)    • Herpes zoster    • Hyperglycemia    • Hyperlipidemia    •  Hypertension    • IBS (irritable bowel syndrome)    • KINDRA (obstructive sleep apnea)    • Vitamin D deficiency          Past Surgical History:   Procedure Laterality Date   • APPENDECTOMY     • BACK SURGERY      lower x2   • HYSTERECTOMY     • SHOULDER SURGERY Right    • TONSILLECTOMY           Current Outpatient Medications on File Prior to Visit   Medication Sig Dispense Refill   • aspirin 81 MG chewable tablet Chew 81 mg Daily.     • CloNIDine (CATAPRES) 0.2 MG tablet Take 0.2 mg by mouth 2 (Two) Times a Day.     • hydrochlorothiazide (MICROZIDE) 12.5 MG capsule TK ONE C PO QD PRN  0   • PARoxetine (PAXIL) 40 MG tablet Take 40 mg by mouth Every Morning.     • vitamin D (ERGOCALCIFEROL) 32195 units capsule capsule TK ONE C PO Q WEEK  2   • [DISCONTINUED] budesonide-formoterol (SYMBICORT) 80-4.5 MCG/ACT inhaler Inhale 2 puffs 2 (Two) Times a Day. 1 inhaler 12   • [DISCONTINUED] MELOXICAM PO Take 15 mg by mouth Daily.       No current facility-administered medications on file prior to visit.          Social History     Socioeconomic History   • Marital status:      Spouse name: Not on file   • Number of children: Not on file   • Years of education: Not on file   • Highest education level: Not on file   Tobacco Use   • Smoking status: Former Smoker     Packs/day: 1.00     Years: 25.00     Pack years: 25.00     Last attempt to quit: 2008     Years since quittin.3   • Smokeless tobacco: Never Used   Substance and Sexual Activity   • Alcohol use: No   • Drug use: No   Lifestyle   • Physical activity:     Days per week: Patient refused     Minutes per session: Patient refused   • Stress: Not on file       Family History   Problem Relation Age of Onset   • Heart disease Mother    • Lung cancer Mother    • Diabetes Father    • Heart disease Paternal Uncle          Review of Systems   Constitution: Negative for decreased appetite, diaphoresis, fever, weakness, malaise/fatigue, weight gain and weight loss.  "  HENT: Negative for congestion, hearing loss, nosebleeds and tinnitus.    Eyes: Negative for blurred vision, double vision, vision loss in left eye, vision loss in right eye and visual disturbance.   Cardiovascular:        As noted in HPI   Respiratory:        As noted HPI   Endocrine: Negative for cold intolerance and heat intolerance.   Hematologic/Lymphatic: Negative for bleeding problem. Does not bruise/bleed easily.   Skin: Negative for color change, flushing, itching and rash.   Musculoskeletal: Positive for joint pain and myalgias. Negative for arthritis, back pain, joint swelling and muscle weakness.   Gastrointestinal: Negative for bloating, abdominal pain, constipation, diarrhea, dysphagia, heartburn, hematemesis, hematochezia, melena, nausea and vomiting.   Genitourinary: Negative for bladder incontinence, dysuria, frequency, nocturia and urgency.   Neurological: Negative for dizziness, focal weakness, headaches, light-headedness, loss of balance, numbness, paresthesias and vertigo.   Psychiatric/Behavioral: Negative for depression, memory loss and substance abuse. The patient is nervous/anxious.        Procedures      ECG 12 Lead  Date/Time: 9/16/2019 10:56 AM  Performed by: Jayson Pinto MD  Authorized by: Jayson Pinto MD   Comparison: compared with previous ECG   Similar to previous ECG  Rhythm: sinus rhythm  Rate: normal  QRS axis: normal  Other findings: non-specific ST-T wave changes                Objective:    /80 (BP Location: Right arm)   Pulse 77   Ht 160 cm (63\")   Wt 59.5 kg (131 lb 3.2 oz)   BMI 23.24 kg/m²        Physical Exam  Physical Exam   Constitutional: She is oriented to person, place, and time. She appears well-developed and well-nourished. No distress.   HENT:   Head: Normocephalic.   Eyes: Conjunctivae are normal. Pupils are equal, round, and reactive to light. No scleral icterus.   Neck: Normal carotid pulses, no hepatojugular reflux and no JVD present. " Carotid bruit is not present. No tracheal deviation, no edema and no erythema present. No thyromegaly present.   Cardiovascular: Normal rate, regular rhythm, S1 normal, S2 normal, normal heart sounds and intact distal pulses.  No extrasystoles are present. PMI is not displaced. Exam reveals no gallop, no distant heart sounds and no friction rub.   No murmur heard.  Pulses:       Carotid pulses are 2+ on the right side, and 2+ on the left side.       Radial pulses are 2+ on the right side, and 2+ on the left side.        Femoral pulses are 2+ on the right side, and 2+ on the left side.       Dorsalis pedis pulses are 2+ on the right side, and 2+ on the left side.        Posterior tibial pulses are 2+ on the right side, and 2+ on the left side.   Pulmonary/Chest: Effort normal and breath sounds normal. No respiratory distress. She has no decreased breath sounds. She has no wheezes. She has no rhonchi. She has no rales. She exhibits no tenderness.   Abdominal: Soft. Bowel sounds are normal. She exhibits no distension and no mass. There is no hepatosplenomegaly. There is no tenderness. There is no rebound and no guarding.   Musculoskeletal: She exhibits no edema, tenderness or deformity.   Neurological: She is alert and oriented to person, place, and time.   Skin: Skin is warm and dry. No rash noted. She is not diaphoretic. No cyanosis or erythema. No pallor. Nails show no clubbing.   Psychiatric: She has a normal mood and affect. Her speech is normal and behavior is normal. Judgment and thought content normal.           Assessment:     1.  80 yo female with Paroxysmal atrial fibrillation in the postoperative setting with aspiration pneumonia converted with IV amiodarone.  Atrial Fibrillation and Atrial Flutter  Assessment  • The patient has paroxysmal atrial fibrillation  • This is non-valvular in etiology  • The patient's CHADS2-VASc score is 4  • A [id number]-ARJi score of 2 or more is considered a high risk for a  thromboembolic event  • Aspirin prescribed     Plan  • Attempt to maintain sinus rhythm  • Continue aspirin for antithrombotic therapy, bleeding issues discussed  No recurrence we will continue the same see us again in follow-up in a year.      2.  Hypertension.  Blood pressure at goal.  She did run out of her HCTZ she will touch base with her PCP about that.  3.  History of hyperlipidemia currently on no therapy.  She is concerned about her cholesterol but there is no evidence that treating that has primary prevention at her age is beneficial.  4.  History obstructive sleep apnea but cannot tolerate the mask.  5.  Anxiety on therapy          Plan:

## 2019-11-22 ENCOUNTER — OFFICE VISIT (OUTPATIENT)
Dept: FAMILY MEDICINE CLINIC | Facility: CLINIC | Age: 79
End: 2019-11-22

## 2019-11-22 VITALS
TEMPERATURE: 98.3 F | DIASTOLIC BLOOD PRESSURE: 78 MMHG | SYSTOLIC BLOOD PRESSURE: 110 MMHG | WEIGHT: 133.6 LBS | HEART RATE: 80 BPM | HEIGHT: 63 IN | BODY MASS INDEX: 23.67 KG/M2 | OXYGEN SATURATION: 91 %

## 2019-11-22 DIAGNOSIS — J44.9 CHRONIC OBSTRUCTIVE PULMONARY DISEASE, UNSPECIFIED COPD TYPE (HCC): ICD-10-CM

## 2019-11-22 DIAGNOSIS — J01.00 ACUTE NON-RECURRENT MAXILLARY SINUSITIS: ICD-10-CM

## 2019-11-22 DIAGNOSIS — I10 ESSENTIAL HYPERTENSION: ICD-10-CM

## 2019-11-22 DIAGNOSIS — Z00.00 ENCOUNTER FOR MEDICARE ANNUAL WELLNESS EXAM: Primary | ICD-10-CM

## 2019-11-22 PROCEDURE — 96160 PT-FOCUSED HLTH RISK ASSMT: CPT | Performed by: FAMILY MEDICINE

## 2019-11-22 PROCEDURE — G0439 PPPS, SUBSEQ VISIT: HCPCS | Performed by: FAMILY MEDICINE

## 2019-11-22 PROCEDURE — 99214 OFFICE O/P EST MOD 30 MIN: CPT | Performed by: FAMILY MEDICINE

## 2019-11-22 RX ORDER — AZITHROMYCIN 250 MG/1
TABLET, FILM COATED ORAL
Qty: 6 TABLET | Refills: 0 | Status: SHIPPED | OUTPATIENT
Start: 2019-11-22 | End: 2020-10-19 | Stop reason: HOSPADM

## 2019-11-22 NOTE — PROGRESS NOTES
The ABCs of the Annual Wellness Visit  Subsequent Medicare Wellness Visit    Chief Complaint   Patient presents with   • Med Refill     Pt needs refills on all meds   CC; fatigue and MWE    Subjective   History of Present Illness:  Kandace Andrade is a 79 y.o. female who presents for a Subsequent Medicare Wellness Visit and she has been having issues with her COPD.    She feels worn out as well.  Some congestion and sinus pain.  No fever or chills.  She does have some sinus issues  And cough and some runny nose.  Occasional wheezing.  Former smoker.  She has been on inhaler before but was a while ago.  No chest pains or palpitations.  Hypertension-no chest pain, blurry vision, headaches or palpitations..      HEALTH RISK ASSESSMENT    Recent Hospitalizations:  No hospitalization(s) within the last year.    Current Medical Providers:  Patient Care Team:  Cassi Bella MD as PCP - General (Family Medicine)    Smoking Status:  Social History     Tobacco Use   Smoking Status Former Smoker   • Packs/day: 1.00   • Years: 25.00   • Pack years: 25.00   • Last attempt to quit: 2008   • Years since quittin.5   Smokeless Tobacco Never Used       Alcohol Consumption:  Social History     Substance and Sexual Activity   Alcohol Use No       Depression Screen:   No flowsheet data found.    Fall Risk Screen:  GIOVANNI Fall Risk Assessment has not been completed.    Health Habits and Functional and Cognitive Screening:  Functional & Cognitive Status 2019   Do you have difficulty preparing food and eating? No   Do you have difficulty bathing yourself, getting dressed or grooming yourself? No   Do you have difficulty using the toilet? No   Do you have difficulty moving around from place to place? No   Do you have trouble with steps or getting out of a bed or a chair? No   Current Diet Well Balanced Diet   Dental Exam Up to date   Eye Exam Not up to date   Exercise (times per week) 0 times per week   Current Exercise  Activities Include None   Do you need help using the phone?  No   Are you deaf or do you have serious difficulty hearing?  No   Do you need help with transportation? No   Do you need help shopping? No   Do you need help preparing meals?  No   Do you need help with housework?  No   Do you need help with laundry? No   Do you need help taking your medications? No   Do you need help managing money? No   Do you ever drive or ride in a car without wearing a seat belt? No   Have you felt unusual stress, anger or loneliness in the last month? No   Who do you live with? Spouse   If you need help, do you have trouble finding someone available to you? No   Have you been bothered in the last four weeks by sexual problems? No   Do you have difficulty concentrating, remembering or making decisions? No         Does the patient have evidence of cognitive impairment? No    Asprin use counseling:Taking ASA appropriately as indicated    Age-appropriate Screening Schedule:  Refer to the list below for future screening recommendations based on patient's age, sex and/or medical conditions. Orders for these recommended tests are listed in the plan section. The patient has been provided with a written plan.    Health Maintenance   Topic Date Due   • TDAP/TD VACCINES (1 - Tdap) [birth year]/27/1958   • ZOSTER VACCINE (1 of 2) 03/27/1989   • INFLUENZA VACCINE  08/01/2019   • DXA SCAN  11/18/2019   • PNEUMOCOCCAL VACCINES (65+ LOW/MEDIUM RISK) (2 of 2 - PPSV23) 08/30/2020   • LIPID PANEL  08/30/2020          The following portions of the patient's history were reviewed and updated as appropriate: allergies, current medications, past family history, past medical history, past social history, past surgical history and problem list.    Outpatient Medications Prior to Visit   Medication Sig Dispense Refill   • aspirin 81 MG chewable tablet Chew 81 mg Daily.     • CloNIDine (CATAPRES) 0.2 MG tablet Take 0.2 mg by mouth 2 (Two) Times a Day.     •  "hydrochlorothiazide (MICROZIDE) 12.5 MG capsule TK ONE C PO QD PRN  0   • PARoxetine (PAXIL) 40 MG tablet Take 40 mg by mouth Every Morning.     • vitamin D (ERGOCALCIFEROL) 14754 units capsule capsule TK ONE C PO Q WEEK  2     No facility-administered medications prior to visit.        Patient Active Problem List   Diagnosis   • Vitamin D deficiency   • KINDRA (obstructive sleep apnea)   • IBS (irritable bowel syndrome)   • Essential hypertension   • Hyperglycemia   • Herpes zoster   • Stage 3 chronic kidney disease (CMS/[facility])   • Arthritis   • Anxiety and depression   • Other emphysema (CMS/[facility])   • Acute seasonal allergic rhinitis due to pollen   • Paroxysmal atrial fibrillation (CMS/[facility])   • Osteoporosis   • Mixed hyperlipidemia   • Encounter for Medicare annual wellness exam   • Acute non-recurrent maxillary sinusitis   • COPD (chronic obstructive pulmonary disease) (CMS/[facility])       Advanced Care Planning:  Patient has an advance directive - a copy has not been provided. Have asked the patient to send this to us to add to record    Review of Systems   All other systems reviewed and are negative.      Compared to one year ago, the patient feels her physical health is the same.  Compared to one year ago, the patient feels her mental health is the same.    Reviewed chart for potential of high risk medication in the elderly: yes  Reviewed chart for potential of harmful drug interactions in the elderly:yes    Objective         Vitals:    11/22/19 1301   BP: 110/78   Pulse: 80   Temp: 98.3 °F (36.8 °C)   SpO2: 91%   Weight: 60.6 kg (133 lb 9.6 oz)   Height: 160 cm (63\")       Body mass index is 23.67 kg/m².  Discussed the patient's BMI with her. The BMI is above average; BMI management plan is completed.    Physical Exam   Constitutional: She is oriented to person, place, and time. She appears well-developed and well-nourished.   HENT:   Head: Normocephalic.   Right Ear: External ear normal.   Mouth/Throat: No " oropharyngeal exudate.   Sinus pain   Neck: Normal range of motion. Neck supple. No JVD present. No tracheal deviation present. Thyromegaly present.   Cardiovascular: Normal rate, regular rhythm and normal heart sounds. Exam reveals no friction rub.   No murmur heard.  Pulmonary/Chest: Effort normal and breath sounds normal. No respiratory distress.   Neurological: She is alert and oriented to person, place, and time.   Nursing note and vitals reviewed.      Lab Results   Component Value Date     (H) 08/30/2019    CHLPL 264 (H) 08/30/2019    TRIG 116 08/30/2019    HDL 66 (H) 08/30/2019     (H) 08/30/2019    VLDL 23.2 08/30/2019        Assessment/Plan   Medicare Risks and Personalized Health Plan  CMS Preventative Services Quick Reference  Cardiovascular risk    The above risks/problems have been discussed with the patient.  Pertinent information has been shared with the patient in the After Visit Summary.  Follow up plans and orders are seen below in the Assessment/Plan Section.    Diagnoses and all orders for this visit:    1. Encounter for Medicare annual wellness exam (Primary)    2. Acute non-recurrent maxillary sinusitis  -     azithromycin (ZITHROMAX) 250 MG tablet; Take 2 tablets the first day, then 1 tablet daily for 4 days.  Dispense: 6 tablet; Refill: 0    3. Chronic obstructive pulmonary disease, unspecified COPD type (CMS/[facility])    4. Essential hypertension      Follow Up:  No Follow-up on file.   MWE done and will work on exercise.  Refills and   An After Visit Summary and PPPS were given to the patient.  stialto samples given.  Sx tx and start meds and fluids and rest.

## 2020-01-27 ENCOUNTER — TELEPHONE (OUTPATIENT)
Dept: FAMILY MEDICINE CLINIC | Facility: CLINIC | Age: 80
End: 2020-01-27

## 2020-02-25 ENCOUNTER — OFFICE VISIT (OUTPATIENT)
Dept: FAMILY MEDICINE CLINIC | Facility: CLINIC | Age: 80
End: 2020-02-25

## 2020-02-25 VITALS
SYSTOLIC BLOOD PRESSURE: 138 MMHG | DIASTOLIC BLOOD PRESSURE: 88 MMHG | OXYGEN SATURATION: 87 % | HEIGHT: 63 IN | HEART RATE: 92 BPM | TEMPERATURE: 98.6 F | WEIGHT: 128.2 LBS | BODY MASS INDEX: 22.71 KG/M2

## 2020-02-25 DIAGNOSIS — N18.30 STAGE 3 CHRONIC KIDNEY DISEASE (HCC): ICD-10-CM

## 2020-02-25 DIAGNOSIS — I10 ESSENTIAL HYPERTENSION: Primary | ICD-10-CM

## 2020-02-25 DIAGNOSIS — E55.9 VITAMIN D DEFICIENCY: ICD-10-CM

## 2020-02-25 DIAGNOSIS — E78.2 MIXED HYPERLIPIDEMIA: ICD-10-CM

## 2020-02-25 DIAGNOSIS — N39.46 MIXED STRESS AND URGE URINARY INCONTINENCE: ICD-10-CM

## 2020-02-25 DIAGNOSIS — R73.9 HYPERGLYCEMIA: ICD-10-CM

## 2020-02-25 DIAGNOSIS — M25.552 PAIN OF BOTH HIP JOINTS: ICD-10-CM

## 2020-02-25 DIAGNOSIS — M25.551 PAIN OF BOTH HIP JOINTS: ICD-10-CM

## 2020-02-25 DIAGNOSIS — F32.1 CURRENT MODERATE EPISODE OF MAJOR DEPRESSIVE DISORDER WITHOUT PRIOR EPISODE (HCC): ICD-10-CM

## 2020-02-25 PROCEDURE — 99214 OFFICE O/P EST MOD 30 MIN: CPT | Performed by: FAMILY MEDICINE

## 2020-02-25 PROCEDURE — 73502 X-RAY EXAM HIP UNI 2-3 VIEWS: CPT | Performed by: FAMILY MEDICINE

## 2020-02-25 RX ORDER — CELECOXIB 200 MG/1
200 CAPSULE ORAL DAILY
Qty: 90 CAPSULE | Refills: 1 | Status: SHIPPED | OUTPATIENT
Start: 2020-02-25 | End: 2020-08-14

## 2020-02-25 RX ORDER — CELECOXIB 200 MG/1
200 CAPSULE ORAL DAILY
Qty: 30 CAPSULE | Refills: 1 | Status: SHIPPED | OUTPATIENT
Start: 2020-02-25 | End: 2020-02-25

## 2020-02-25 RX ORDER — ERGOCALCIFEROL 1.25 MG/1
50000 CAPSULE ORAL
Qty: 5 CAPSULE | Refills: 5 | Status: SHIPPED | OUTPATIENT
Start: 2020-02-25 | End: 2020-10-14

## 2020-02-25 RX ORDER — PAROXETINE HYDROCHLORIDE 40 MG/1
40 TABLET, FILM COATED ORAL EVERY MORNING
Qty: 30 TABLET | Refills: 5 | Status: SHIPPED | OUTPATIENT
Start: 2020-02-25 | End: 2020-12-03 | Stop reason: SDUPTHER

## 2020-02-25 RX ORDER — HYDROCHLOROTHIAZIDE 12.5 MG/1
12.5 CAPSULE, GELATIN COATED ORAL EVERY MORNING
Qty: 30 CAPSULE | Refills: 5 | Status: SHIPPED | OUTPATIENT
Start: 2020-02-25 | End: 2020-10-19 | Stop reason: HOSPADM

## 2020-02-25 RX ORDER — CLONIDINE HYDROCHLORIDE 0.2 MG/1
0.2 TABLET ORAL 2 TIMES DAILY
Qty: 60 TABLET | Refills: 5 | Status: SHIPPED | OUTPATIENT
Start: 2020-02-25 | End: 2020-08-14

## 2020-02-25 NOTE — PROGRESS NOTES
Chief Complaint   Patient presents with   • Med Refill     Pt is needing refills on blood pressure, vitamine d, and anxiety meds.        Subjective   Kandace Andrade is a 79 y.o. female.     History of Present Illness   PT is here for refills and labs and   Hypertension-no chest pain, slight blurry vision, headaches or palpitations.  Vit d defic- need labs.  Hyperlipidemia- No myalgia.  No Complaints.  Stable and no meds.  Renal insuff - no edema  Very thirsty all the time lately  Hip pain bilaterally  Wants to see urologist bc of voiding issues and incontinence.  The following portions of the patient's history were reviewed and updated as appropriate: allergies, current medications, past family history, past medical history, past social history, past surgical history and problem list.    Review of Systems   Constitutional: Negative for fatigue.   Respiratory: Negative for cough, chest tightness and shortness of breath.    Cardiovascular: Negative for chest pain, palpitations and leg swelling.   Neurological: Negative for dizziness, light-headedness and headache.       Patient Active Problem List   Diagnosis   • Vitamin D deficiency   • KINDRA (obstructive sleep apnea)   • IBS (irritable bowel syndrome)   • Essential hypertension   • Hyperglycemia   • Herpes zoster   • Stage 3 chronic kidney disease (CMS/HCC)   • Arthritis   • Anxiety and depression   • Other emphysema (CMS/[facility])   • Acute seasonal allergic rhinitis due to pollen   • Paroxysmal atrial fibrillation (CMS/[facility])   • Osteoporosis   • Mixed hyperlipidemia   • Encounter for Medicare annual wellness exam   • COPD (chronic obstructive pulmonary disease) (CMS/[facility])   • Mixed stress and urge urinary incontinence   • Type 2 diabetes mellitus without complication, without long-term current use of insulin (CMS/[facility])   • Arthritis of both hips       Allergies   Allergen Reactions   • Penicillins Hives and Other (See Comments)     Elevated BP   • Statins Other (See  Comments)     Headache, elevated liver enzymes, blurred vision         Current Outpatient Medications:   •  aspirin 81 MG chewable tablet, Chew 81 mg Daily., Disp: , Rfl:   •  cloNIDine (CATAPRES) 0.2 MG tablet, Take 1 tablet by mouth 2 (Two) Times a Day., Disp: 60 tablet, Rfl: 5  •  hydroCHLOROthiazide (MICROZIDE) 12.5 MG capsule, Take 1 capsule by mouth Every Morning., Disp: 30 capsule, Rfl: 5  •  PARoxetine (PAXIL) 40 MG tablet, Take 1 tablet by mouth Every Morning., Disp: 30 tablet, Rfl: 5  •  vitamin D (ERGOCALCIFEROL) 1.25 MG (32519 UT) capsule capsule, Take 1 capsule by mouth Every 7 (Seven) Days., Disp: 5 capsule, Rfl: 5  •  azithromycin (ZITHROMAX) 250 MG tablet, Take 2 tablets the first day, then 1 tablet daily for 4 days., Disp: 6 tablet, Rfl: 0  •  celecoxib (CeleBREX) 200 MG capsule, TAKE 1 CAPSULE BY MOUTH DAILY, Disp: 90 capsule, Rfl: 1    Past Medical History:   Diagnosis Date   • Acute seasonal allergic rhinitis due to pollen    • Anxiety    • Anxiety and depression    • Arthritis    • Asthma    • Cerumen impaction    • Claudication, intermittent (CMS/HCC)    • COPD (chronic obstructive pulmonary disease) (CMS/HCC)    • Edema    • Fall, accidental    • Falls frequently    • Fatigue    • Fibula fracture    • Herpes zoster    • History of cataract    • History of lumbosacral spine surgery    • History of migraine headaches    • Hyperglycemia    • Hypersomnia with sleep apnea    • IBS (irritable bowel syndrome)    • Mixed hyperlipidemia    • KINDRA (obstructive sleep apnea)    • Osteoporosis    • Paroxysmal atrial fibrillation (CMS/HCC)    • Right ankle injury    • Right hip pain    • Right knee pain    • Right shoulder pain    • Vitamin D deficiency        Past Surgical History:   Procedure Laterality Date   • APPENDECTOMY     • BACK SURGERY      lower x2   • CATARACT EXTRACTION     • HYSTERECTOMY      partial   • SHOULDER SURGERY Right    • TONSILLECTOMY         Family History   Problem Relation Age of  "Onset   • Heart disease Mother    • Lung cancer Mother    • Diabetes Father    • Heart disease Paternal Uncle        Social History     Tobacco Use   • Smoking status: Former Smoker     Packs/day: 1.00     Years: 25.00     Pack years: 25.00     Last attempt to quit: 2008     Years since quittin.8   • Smokeless tobacco: Never Used   Substance Use Topics   • Alcohol use: No            Objective     Visit Vitals  /88   Pulse 92   Temp 98.6 °F (37 °C)   Ht 160 cm (63\")   Wt 58.2 kg (128 lb 3.2 oz)   SpO2 (!) 87%   BMI 22.71 kg/m²       Physical Exam   Constitutional: She appears well-developed. No distress.   Eyes: Conjunctivae and lids are normal.   Neck: Trachea normal. Carotid bruit is not present. No thyroid mass and no thyromegaly present.   Cardiovascular: Normal rate, regular rhythm and normal heart sounds. Exam reveals no friction rub.   No murmur heard.  Pulmonary/Chest: Effort normal and breath sounds normal. No stridor. No respiratory distress. She has no wheezes. She has no rales.   Lymphadenopathy:     She has no cervical adenopathy.   Neurological: She is alert. She is not disoriented.   Skin: Skin is warm and dry.   Psychiatric: She has a normal mood and affect. Her speech is normal and behavior is normal. She is attentive.   Nursing note and vitals reviewed.      Lab Results   Component Value Date    GLUCOSE 183 (H) 10/15/2017    BUN 12 2020    CREATININE 0.66 2020    EGFRIFNONA 86 2020    EGFRIFAFRI 104 2020    BCR 18.2 2020    K 4.7 2020    CO2 27.8 2020    CALCIUM 9.8 2020    PROTENTOTREF 6.9 2020    ALBUMIN 4.50 2020    LABIL2 1.9 2020    AST 14 2020    ALT 9 2020       WBC   Date Value Ref Range Status   10/15/2017 11.93 (H) 4.50 - 10.70 10*3/mm3 Final     RBC   Date Value Ref Range Status   10/15/2017 4.76 3.90 - 5.20 10*6/mm3 Final     Hemoglobin   Date Value Ref Range Status   10/15/2017 14.1 11.9 - 15.5 " g/dL Final     Hematocrit   Date Value Ref Range Status   10/15/2017 44.8 35.6 - 45.5 % Final     MCV   Date Value Ref Range Status   10/15/2017 94.1 80.5 - 98.2 fL Final     MCH   Date Value Ref Range Status   10/15/2017 29.6 26.9 - 32.0 pg Final     MCHC   Date Value Ref Range Status   10/15/2017 31.5 (L) 32.4 - 36.3 g/dL Final     RDW   Date Value Ref Range Status   10/15/2017 13.0 11.7 - 13.0 % Final     RDW-SD   Date Value Ref Range Status   10/15/2017 44.7 37.0 - 54.0 fl Final     MPV   Date Value Ref Range Status   10/15/2017 10.9 6.0 - 12.0 fL Final     Platelets   Date Value Ref Range Status   10/15/2017 335 140 - 500 10*3/mm3 Final     Neutrophil %   Date Value Ref Range Status   10/15/2017 67.8 42.7 - 76.0 % Final     Lymphocyte %   Date Value Ref Range Status   10/15/2017 21.8 19.6 - 45.3 % Final     Monocyte %   Date Value Ref Range Status   10/15/2017 9.1 5.0 - 12.0 % Final     Eosinophil %   Date Value Ref Range Status   10/15/2017 0.6 0.3 - 6.2 % Final     Basophil %   Date Value Ref Range Status   10/15/2017 0.3 0.0 - 1.5 % Final     Immature Grans %   Date Value Ref Range Status   10/15/2017 0.4 0.0 - 0.5 % Final     Neutrophils, Absolute   Date Value Ref Range Status   10/15/2017 8.09 1.90 - 8.10 10*3/mm3 Final     Lymphocytes, Absolute   Date Value Ref Range Status   10/15/2017 2.60 0.90 - 4.80 10*3/mm3 Final     Monocytes, Absolute   Date Value Ref Range Status   10/15/2017 1.08 0.20 - 1.20 10*3/mm3 Final     Eosinophils, Absolute   Date Value Ref Range Status   10/15/2017 0.07 0.00 - 0.70 10*3/mm3 Final     Basophils, Absolute   Date Value Ref Range Status   10/15/2017 0.04 0.00 - 0.20 10*3/mm3 Final     Immature Grans, Absolute   Date Value Ref Range Status   10/15/2017 0.05 (H) 0.00 - 0.03 10*3/mm3 Final       Lab Results   Component Value Date    HGBA1C 6.50 (H) 02/25/2020       No results found for: DAGOHOVV61    No results found for: TSH    No results found for: CHOL  Lab Results    Component Value Date    TRIG 174 (H) 02/25/2020     Lab Results   Component Value Date    HDL 63 (H) 02/25/2020     Lab Results   Component Value Date     (H) 02/25/2020     Lab Results   Component Value Date    VLDL 34.8 02/25/2020     No results found for: LDLHDL      Procedures    Assessment/Plan   Problems Addressed this Visit        Cardiovascular and Mediastinum    Essential hypertension - Primary    Relevant Medications    cloNIDine (CATAPRES) 0.2 MG tablet    hydroCHLOROthiazide (MICROZIDE) 12.5 MG capsule    Other Relevant Orders    Comprehensive Metabolic Panel (Completed)    Mixed hyperlipidemia    Relevant Orders    Lipid Panel (Completed)       Digestive    Vitamin D deficiency    Relevant Medications    vitamin D (ERGOCALCIFEROL) 1.25 MG (67178 UT) capsule capsule    Other Relevant Orders    Vitamin D 25 Hydroxy (Completed)       Genitourinary    Stage 3 chronic kidney disease (CMS/HCC)    Relevant Medications    hydroCHLOROthiazide (MICROZIDE) 12.5 MG capsule    Mixed stress and urge urinary incontinence    Relevant Orders    Ambulatory Referral to Urology       Other    Hyperglycemia    Relevant Orders    Hemoglobin A1c (Completed)      Other Visit Diagnoses     Pain of both hip joints        Relevant Orders    XR Hip With or Without Pelvis 2 - 3 View Right (Completed)    Current moderate episode of major depressive disorder without prior episode (CMS/HCC)        Relevant Medications    PARoxetine (PAXIL) 40 MG tablet          Orders Placed This Encounter   Procedures   • XR Hip With or Without Pelvis 2 - 3 View Right     Scheduling Instructions:      Chronic pain     Order Specific Question:   Reason for Exam:     Answer:   pain   • Lipid Panel     Order Specific Question:   [facility] Has the patient fasted?     Answer:   No   • Comprehensive Metabolic Panel     Order Specific Question:   [facility] Has the patient fasted?     Answer:   No   • Vitamin D 25 Hydroxy     Order Specific Question:    [facility] Has the patient fasted?     Answer:   No   • Hemoglobin A1c     Order Specific Question:   [facility] Has the patient fasted?     Answer:   No   • Ambulatory Referral to Urology     Referral Priority:   Routine     Referral Type:   Consultation     Referral Reason:   Specialty Services Required     Requested Specialty:   Urology     Number of Visits Requested:   1       Current Outpatient Medications   Medication Sig Dispense Refill   • aspirin 81 MG chewable tablet Chew 81 mg Daily.     • cloNIDine (CATAPRES) 0.2 MG tablet Take 1 tablet by mouth 2 (Two) Times a Day. 60 tablet 5   • hydroCHLOROthiazide (MICROZIDE) 12.5 MG capsule Take 1 capsule by mouth Every Morning. 30 capsule 5   • PARoxetine (PAXIL) 40 MG tablet Take 1 tablet by mouth Every Morning. 30 tablet 5   • vitamin D (ERGOCALCIFEROL) 1.25 MG (34260 UT) capsule capsule Take 1 capsule by mouth Every 7 (Seven) Days. 5 capsule 5   • azithromycin (ZITHROMAX) 250 MG tablet Take 2 tablets the first day, then 1 tablet daily for 4 days. 6 tablet 0   • celecoxib (CeleBREX) 200 MG capsule TAKE 1 CAPSULE BY MOUTH DAILY 90 capsule 1     No current facility-administered medications for this visit.      Refills and labs today and will get xray.    Possible arthritis on xray.  Will be over read by a radiologist for final and official reading.    Return in about 3 months (around 5/25/2020).    There are no Patient Instructions on file for this visit.

## 2020-02-26 PROBLEM — E11.9 TYPE 2 DIABETES MELLITUS WITHOUT COMPLICATION, WITHOUT LONG-TERM CURRENT USE OF INSULIN: Status: ACTIVE | Noted: 2020-02-26

## 2020-02-26 LAB
25(OH)D3+25(OH)D2 SERPL-MCNC: 34.7 NG/ML (ref 30–100)
ALBUMIN SERPL-MCNC: 4.5 G/DL (ref 3.5–5.2)
ALBUMIN/GLOB SERPL: 1.9 G/DL
ALP SERPL-CCNC: 89 U/L (ref 39–117)
ALT SERPL-CCNC: 9 U/L (ref 1–33)
AST SERPL-CCNC: 14 U/L (ref 1–32)
BILIRUB SERPL-MCNC: 0.4 MG/DL (ref 0.2–1.2)
BUN SERPL-MCNC: 12 MG/DL (ref 8–23)
BUN/CREAT SERPL: 18.2 (ref 7–25)
CALCIUM SERPL-MCNC: 9.8 MG/DL (ref 8.6–10.5)
CHLORIDE SERPL-SCNC: 100 MMOL/L (ref 98–107)
CHOLEST SERPL-MCNC: 292 MG/DL (ref 0–200)
CO2 SERPL-SCNC: 27.8 MMOL/L (ref 22–29)
CREAT SERPL-MCNC: 0.66 MG/DL (ref 0.57–1)
GLOBULIN SER CALC-MCNC: 2.4 GM/DL
GLUCOSE SERPL-MCNC: 89 MG/DL (ref 65–99)
HBA1C MFR BLD: 6.5 % (ref 4.8–5.6)
HDLC SERPL-MCNC: 63 MG/DL (ref 40–60)
LDLC SERPL CALC-MCNC: 194 MG/DL (ref 0–100)
POTASSIUM SERPL-SCNC: 4.7 MMOL/L (ref 3.5–5.2)
PROT SERPL-MCNC: 6.9 G/DL (ref 6–8.5)
SODIUM SERPL-SCNC: 137 MMOL/L (ref 136–145)
TRIGL SERPL-MCNC: 174 MG/DL (ref 0–150)
VLDLC SERPL CALC-MCNC: 34.8 MG/DL

## 2020-02-27 ENCOUNTER — TELEPHONE (OUTPATIENT)
Dept: FAMILY MEDICINE CLINIC | Facility: CLINIC | Age: 80
End: 2020-02-27

## 2020-02-27 PROBLEM — M16.0 ARTHRITIS OF BOTH HIPS: Status: ACTIVE | Noted: 2020-02-27

## 2020-02-27 NOTE — PROGRESS NOTES
Let pt know that she has arthritis in both hips.  Use tylenol arthritis and if that doesn't work then use ibuprofen.

## 2020-07-17 ENCOUNTER — OFFICE VISIT (OUTPATIENT)
Dept: FAMILY MEDICINE CLINIC | Facility: CLINIC | Age: 80
End: 2020-07-17

## 2020-07-17 VITALS
WEIGHT: 123 LBS | TEMPERATURE: 96.9 F | HEIGHT: 63 IN | OXYGEN SATURATION: 94 % | HEART RATE: 101 BPM | SYSTOLIC BLOOD PRESSURE: 112 MMHG | BODY MASS INDEX: 21.79 KG/M2 | DIASTOLIC BLOOD PRESSURE: 74 MMHG

## 2020-07-17 DIAGNOSIS — E78.2 MIXED HYPERLIPIDEMIA: ICD-10-CM

## 2020-07-17 DIAGNOSIS — I10 ESSENTIAL HYPERTENSION: ICD-10-CM

## 2020-07-17 DIAGNOSIS — N18.30 STAGE 3 CHRONIC KIDNEY DISEASE (HCC): ICD-10-CM

## 2020-07-17 DIAGNOSIS — M19.90 ARTHRITIS: ICD-10-CM

## 2020-07-17 DIAGNOSIS — E11.9 TYPE 2 DIABETES MELLITUS WITHOUT COMPLICATION, WITHOUT LONG-TERM CURRENT USE OF INSULIN (HCC): Primary | ICD-10-CM

## 2020-07-17 DIAGNOSIS — E55.9 VITAMIN D DEFICIENCY: ICD-10-CM

## 2020-07-17 PROCEDURE — 99214 OFFICE O/P EST MOD 30 MIN: CPT | Performed by: FAMILY MEDICINE

## 2020-07-17 NOTE — PROGRESS NOTES
Chief Complaint   Patient presents with   • Diabetes     Pt is here to follow up on diabetes        Subjective   Kandace Andrade is a 80 y.o. female.     History of Present Illness   Pt is here for refills, labs and   Diabetes mellitus- stable no neuropathy, no polydipsia, no polyuria, no foot ulcers, no blurry vision.  Blood sugars - not checking.  She is diet controlled.  Hypertension-no chest pain, blurry vision, headaches or palpitations.  RI- no edema.  Vit d deficiency  Arthritis - had to stop the celebrex bc of bleeding issues.  The following portions of the patient's history were reviewed and updated as appropriate: allergies, current medications, past family history, past medical history, past social history, past surgical history and problem list.    Review of Systems    Patient Active Problem List   Diagnosis   • Vitamin D deficiency   • KINDRA (obstructive sleep apnea)   • IBS (irritable bowel syndrome)   • Essential hypertension   • Herpes zoster   • Stage 3 chronic kidney disease (CMS/[facility])   • Arthritis   • Anxiety and depression   • Other emphysema (CMS/[facility])   • Acute seasonal allergic rhinitis due to pollen   • Paroxysmal atrial fibrillation (CMS/[facility])   • Osteoporosis   • Mixed hyperlipidemia   • Encounter for Medicare annual wellness exam   • COPD (chronic obstructive pulmonary disease) (CMS/[facility])   • Mixed stress and urge urinary incontinence   • Type 2 diabetes mellitus without complication, without long-term current use of insulin (CMS/[facility])   • Arthritis of both hips       Allergies   Allergen Reactions   • Penicillins Hives and Other (See Comments)     Elevated BP   • Statins Other (See Comments)     Headache, elevated liver enzymes, blurred vision         Current Outpatient Medications:   •  aspirin 81 MG chewable tablet, Chew 81 mg Daily., Disp: , Rfl:   •  cloNIDine (CATAPRES) 0.2 MG tablet, Take 1 tablet by mouth 2 (Two) Times a Day., Disp: 60 tablet, Rfl: 5  •  hydroCHLOROthiazide (MICROZIDE)  12.5 MG capsule, Take 1 capsule by mouth Every Morning., Disp: 30 capsule, Rfl: 5  •  PARoxetine (PAXIL) 40 MG tablet, Take 1 tablet by mouth Every Morning., Disp: 30 tablet, Rfl: 5  •  vitamin D (ERGOCALCIFEROL) 1.25 MG (53055 UT) capsule capsule, Take 1 capsule by mouth Every 7 (Seven) Days., Disp: 5 capsule, Rfl: 5  •  azithromycin (ZITHROMAX) 250 MG tablet, Take 2 tablets the first day, then 1 tablet daily for 4 days., Disp: 6 tablet, Rfl: 0  •  celecoxib (CeleBREX) 200 MG capsule, TAKE 1 CAPSULE BY MOUTH DAILY, Disp: 90 capsule, Rfl: 1    Past Medical History:   Diagnosis Date   • Acute seasonal allergic rhinitis due to pollen    • Anxiety    • Anxiety and depression    • Arthritis    • Asthma    • Claudication, intermittent (CMS/HCC)    • COPD (chronic obstructive pulmonary disease) (CMS/HCC)    • Edema    • Fall, accidental    • Falls frequently    • Fatigue    • Fibula fracture    • Herpes zoster    • History of cataract    • History of lumbosacral spine surgery    • History of migraine headaches    • Hyperglycemia    • Hypersomnia with sleep apnea    • IBS (irritable bowel syndrome)    • Mixed hyperlipidemia    • KINDRA (obstructive sleep apnea)    • Osteoporosis    • Paroxysmal atrial fibrillation (CMS/HCC)    • Right ankle injury    • Right hip pain    • Right knee pain    • Right shoulder pain    • Vitamin D deficiency        Past Surgical History:   Procedure Laterality Date   • APPENDECTOMY     • BACK SURGERY      lower x2   • CATARACT EXTRACTION     • HYSTERECTOMY      partial   • SHOULDER SURGERY Right    • TONSILLECTOMY         Family History   Problem Relation Age of Onset   • Heart disease Mother    • Lung cancer Mother    • Diabetes Father    • Heart disease Paternal Uncle        Social History     Tobacco Use   • Smoking status: Former Smoker     Packs/day: 1.00     Years: 25.00     Pack years: 25.00     Last attempt to quit: 2008     Years since quittin.2   • Smokeless tobacco: Never Used  "  Substance Use Topics   • Alcohol use: No            Objective     Visit Vitals  /74   Pulse 101   Temp 96.9 °F (36.1 °C)   Ht 160 cm (63\")   Wt 55.8 kg (123 lb)   SpO2 94%   BMI 21.79 kg/m²       Physical Exam    Lab Results   Component Value Date    GLUCOSE 183 (H) 10/15/2017    BUN 12 02/25/2020    CREATININE 0.66 02/25/2020    EGFRIFNONA 86 02/25/2020    EGFRIFAFRI 104 02/25/2020    BCR 18.2 02/25/2020    K 4.7 02/25/2020    CO2 27.8 02/25/2020    CALCIUM 9.8 02/25/2020    PROTENTOTREF 6.9 02/25/2020    ALBUMIN 4.50 02/25/2020    LABIL2 1.9 02/25/2020    AST 14 02/25/2020    ALT 9 02/25/2020       WBC   Date Value Ref Range Status   10/15/2017 11.93 (H) 4.50 - 10.70 10*3/mm3 Final     RBC   Date Value Ref Range Status   10/15/2017 4.76 3.90 - 5.20 10*6/mm3 Final     Hemoglobin   Date Value Ref Range Status   10/15/2017 14.1 11.9 - 15.5 g/dL Final     Hematocrit   Date Value Ref Range Status   10/15/2017 44.8 35.6 - 45.5 % Final     MCV   Date Value Ref Range Status   10/15/2017 94.1 80.5 - 98.2 fL Final     MCH   Date Value Ref Range Status   10/15/2017 29.6 26.9 - 32.0 pg Final     MCHC   Date Value Ref Range Status   10/15/2017 31.5 (L) 32.4 - 36.3 g/dL Final     RDW   Date Value Ref Range Status   10/15/2017 13.0 11.7 - 13.0 % Final     RDW-SD   Date Value Ref Range Status   10/15/2017 44.7 37.0 - 54.0 fl Final     MPV   Date Value Ref Range Status   10/15/2017 10.9 6.0 - 12.0 fL Final     Platelets   Date Value Ref Range Status   10/15/2017 335 140 - 500 10*3/mm3 Final     Neutrophil %   Date Value Ref Range Status   10/15/2017 67.8 42.7 - 76.0 % Final     Lymphocyte %   Date Value Ref Range Status   10/15/2017 21.8 19.6 - 45.3 % Final     Monocyte %   Date Value Ref Range Status   10/15/2017 9.1 5.0 - 12.0 % Final     Eosinophil %   Date Value Ref Range Status   10/15/2017 0.6 0.3 - 6.2 % Final     Basophil %   Date Value Ref Range Status   10/15/2017 0.3 0.0 - 1.5 % Final     Immature Grans %   Date " Value Ref Range Status   10/15/2017 0.4 0.0 - 0.5 % Final     Neutrophils, Absolute   Date Value Ref Range Status   10/15/2017 8.09 1.90 - 8.10 10*3/mm3 Final     Lymphocytes, Absolute   Date Value Ref Range Status   10/15/2017 2.60 0.90 - 4.80 10*3/mm3 Final     Monocytes, Absolute   Date Value Ref Range Status   10/15/2017 1.08 0.20 - 1.20 10*3/mm3 Final     Eosinophils, Absolute   Date Value Ref Range Status   10/15/2017 0.07 0.00 - 0.70 10*3/mm3 Final     Basophils, Absolute   Date Value Ref Range Status   10/15/2017 0.04 0.00 - 0.20 10*3/mm3 Final     Immature Grans, Absolute   Date Value Ref Range Status   10/15/2017 0.05 (H) 0.00 - 0.03 10*3/mm3 Final       Lab Results   Component Value Date    HGBA1C 6.50 (H) 02/25/2020       No results found for: [id number]    No results found for: TSH    No results found for: CHOL  Lab Results   Component Value Date    TRIG 174 (H) 02/25/2020     Lab Results   Component Value Date    HDL 63 (H) 02/25/2020     Lab Results   Component Value Date     (H) 02/25/2020     Lab Results   Component Value Date    VLDL 34.8 02/25/2020     No results found for: LDLHDL      Procedures    Assessment/Plan   Problems Addressed this Visit        Cardiovascular and Mediastinum    Essential hypertension    Relevant Orders    Comprehensive Metabolic Panel    Mixed hyperlipidemia    Relevant Orders    Lipid Panel       Digestive    Vitamin D deficiency       Endocrine    Type 2 diabetes mellitus without complication, without long-term current use of insulin (CMS/[facility]) - Primary    Relevant Orders    Hemoglobin A1c       Musculoskeletal and Integument    Arthritis       Genitourinary    Stage 3 chronic kidney disease (CMS/[facility])    Relevant Orders    Comprehensive Metabolic Panel          Orders Placed This Encounter   Procedures   • Hemoglobin A1c   • Comprehensive Metabolic Panel   • Lipid Panel       Current Outpatient Medications   Medication Sig Dispense Refill   • aspirin 81 MG  chewable tablet Chew 81 mg Daily.     • cloNIDine (CATAPRES) 0.2 MG tablet Take 1 tablet by mouth 2 (Two) Times a Day. 60 tablet 5   • hydroCHLOROthiazide (MICROZIDE) 12.5 MG capsule Take 1 capsule by mouth Every Morning. 30 capsule 5   • PARoxetine (PAXIL) 40 MG tablet Take 1 tablet by mouth Every Morning. 30 tablet 5   • vitamin D (ERGOCALCIFEROL) 1.25 MG (31504 UT) capsule capsule Take 1 capsule by mouth Every 7 (Seven) Days. 5 capsule 5   • azithromycin (ZITHROMAX) 250 MG tablet Take 2 tablets the first day, then 1 tablet daily for 4 days. 6 tablet 0   • celecoxib (CeleBREX) 200 MG capsule TAKE 1 CAPSULE BY MOUTH DAILY 90 capsule 1     No current facility-administered medications for this visit.      Refills, labs and start compound cream.  Return in about 3 months (around 10/17/2020) for diabetes, hypertension, hyperlipidema.    Patient Instructions   Work on diet and exercise.

## 2020-07-18 LAB
ALBUMIN SERPL-MCNC: 4.7 G/DL (ref 3.5–5.2)
ALBUMIN/GLOB SERPL: 2.1 G/DL
ALP SERPL-CCNC: 95 U/L (ref 39–117)
ALT SERPL-CCNC: 31 U/L (ref 1–33)
AST SERPL-CCNC: 21 U/L (ref 1–32)
BILIRUB SERPL-MCNC: 0.4 MG/DL (ref 0–1.2)
BUN SERPL-MCNC: 19 MG/DL (ref 8–23)
BUN/CREAT SERPL: 26.8 (ref 7–25)
CALCIUM SERPL-MCNC: 9.7 MG/DL (ref 8.6–10.5)
CHLORIDE SERPL-SCNC: 101 MMOL/L (ref 98–107)
CHOLEST SERPL-MCNC: 255 MG/DL (ref 0–200)
CO2 SERPL-SCNC: 26.5 MMOL/L (ref 22–29)
CREAT SERPL-MCNC: 0.71 MG/DL (ref 0.57–1)
GLOBULIN SER CALC-MCNC: 2.2 GM/DL
GLUCOSE SERPL-MCNC: 93 MG/DL (ref 65–99)
HBA1C MFR BLD: 5.8 % (ref 4.8–5.6)
HDLC SERPL-MCNC: 58 MG/DL (ref 40–60)
LDLC SERPL CALC-MCNC: 166 MG/DL (ref 0–100)
POTASSIUM SERPL-SCNC: 4.4 MMOL/L (ref 3.5–5.2)
PROT SERPL-MCNC: 6.9 G/DL (ref 6–8.5)
SODIUM SERPL-SCNC: 138 MMOL/L (ref 136–145)
TRIGL SERPL-MCNC: 156 MG/DL (ref 0–150)
VLDLC SERPL CALC-MCNC: 31.2 MG/DL

## 2020-07-22 ENCOUNTER — TELEPHONE (OUTPATIENT)
Dept: FAMILY MEDICINE CLINIC | Facility: CLINIC | Age: 80
End: 2020-07-22

## 2020-07-22 NOTE — TELEPHONE ENCOUNTER
This was faxed over on 7/17/20. I called and left phone number with  Don. Patient can call to follow up on rx.

## 2020-07-23 NOTE — TELEPHONE ENCOUNTER
Dr Bella pt calling back regarding previous message on compound cream, said pharmacy still has not received anything from our office. Also pt wants to get her blood test results from [facility] regarding diabetes. Please call pt  to advise

## 2020-08-13 DIAGNOSIS — I10 ESSENTIAL HYPERTENSION: ICD-10-CM

## 2020-08-14 RX ORDER — CELECOXIB 200 MG/1
200 CAPSULE ORAL DAILY
Qty: 90 CAPSULE | Refills: 1 | Status: SHIPPED | OUTPATIENT
Start: 2020-08-14 | End: 2020-10-19 | Stop reason: HOSPADM

## 2020-08-14 RX ORDER — CLONIDINE HYDROCHLORIDE 0.2 MG/1
TABLET ORAL
Qty: 60 TABLET | Refills: 5 | Status: SHIPPED | OUTPATIENT
Start: 2020-08-14 | End: 2020-09-21

## 2020-09-20 DIAGNOSIS — I10 ESSENTIAL HYPERTENSION: ICD-10-CM

## 2020-09-21 ENCOUNTER — TELEPHONE (OUTPATIENT)
Dept: FAMILY MEDICINE CLINIC | Facility: CLINIC | Age: 80
End: 2020-09-21

## 2020-09-21 RX ORDER — CLONIDINE HYDROCHLORIDE 0.2 MG/1
TABLET ORAL
Qty: 60 TABLET | Refills: 5 | Status: SHIPPED | OUTPATIENT
Start: 2020-09-21 | End: 2020-10-19 | Stop reason: HOSPADM

## 2020-09-21 NOTE — TELEPHONE ENCOUNTER
Patient is requesting a refill on her clonidine please.  Pharmacy says they have sent a request over, but hasn't heard anything.  Pharmacy is [facility] on file.  Her phone number is [phone].

## 2020-09-23 ENCOUNTER — TELEPHONE (OUTPATIENT)
Dept: FAMILY MEDICINE CLINIC | Facility: CLINIC | Age: 80
End: 2020-09-23

## 2020-09-23 NOTE — TELEPHONE ENCOUNTER
Patient's daughter (Tracey) called in stating that the patient has a boil on her hind end that is around 2 inches in diameter. She would like something called into the pharmacy for this issue. Please advise    Daughter's phone # [phone]    Pharmacy:[id number] [place], [place] - [address] AT [facility] [address] [facility] - [phone]  - [phone]   [phone]

## 2020-09-24 RX ORDER — SULFAMETHOXAZOLE AND TRIMETHOPRIM 800; 160 MG/1; MG/1
1 TABLET ORAL 2 TIMES DAILY
Qty: 20 TABLET | Refills: 0 | Status: ON HOLD | OUTPATIENT
Start: 2020-09-24 | End: 2020-10-19

## 2020-10-13 DIAGNOSIS — E55.9 VITAMIN D DEFICIENCY: ICD-10-CM

## 2020-10-14 RX ORDER — ERGOCALCIFEROL 1.25 MG/1
CAPSULE ORAL
Qty: 5 CAPSULE | Refills: 5 | Status: SHIPPED | OUTPATIENT
Start: 2020-10-14 | End: 2021-05-17

## 2020-10-19 ENCOUNTER — HOSPITAL ENCOUNTER (OUTPATIENT)
Facility: HOSPITAL | Age: 80
Setting detail: OBSERVATION
Discharge: HOME | End: 2020-10-21
Attending: EMERGENCY MEDICINE | Admitting: INTERNAL MEDICINE

## 2020-10-19 ENCOUNTER — TELEPHONE (OUTPATIENT)
Dept: CARDIOLOGY | Facility: CLINIC | Age: 80
End: 2020-10-19

## 2020-10-19 ENCOUNTER — TELEPHONE (OUTPATIENT)
Dept: FAMILY MEDICINE CLINIC | Facility: CLINIC | Age: 80
End: 2020-10-19

## 2020-10-19 ENCOUNTER — APPOINTMENT (OUTPATIENT)
Dept: GENERAL RADIOLOGY | Facility: HOSPITAL | Age: 80
End: 2020-10-19

## 2020-10-19 DIAGNOSIS — I95.9 HYPOTENSION, UNSPECIFIED HYPOTENSION TYPE: ICD-10-CM

## 2020-10-19 DIAGNOSIS — I95.9 SYMPTOMATIC HYPOTENSION: ICD-10-CM

## 2020-10-19 DIAGNOSIS — E11.9 TYPE 2 DIABETES MELLITUS WITHOUT COMPLICATION, WITHOUT LONG-TERM CURRENT USE OF INSULIN (HCC): ICD-10-CM

## 2020-10-19 DIAGNOSIS — N39.0 ACUTE UTI: Primary | ICD-10-CM

## 2020-10-19 PROBLEM — G90.9 AUTONOMIC NEUROPATHY: Status: ACTIVE | Noted: 2020-10-19

## 2020-10-19 LAB
ALBUMIN SERPL-MCNC: 4.5 G/DL (ref 3.5–5.2)
ALBUMIN/GLOB SERPL: 1.8 G/DL
ALP SERPL-CCNC: 96 U/L (ref 39–117)
ALT SERPL W P-5'-P-CCNC: 23 U/L (ref 1–33)
ANION GAP SERPL CALCULATED.3IONS-SCNC: 7.9 MMOL/L (ref 5–15)
AST SERPL-CCNC: 21 U/L (ref 1–32)
B PARAPERT DNA SPEC QL NAA+PROBE: NOT DETECTED
B PERT DNA SPEC QL NAA+PROBE: NOT DETECTED
BACTERIA UR QL AUTO: ABNORMAL /HPF
BASOPHILS # BLD AUTO: 0.08 10*3/MM3 (ref 0–0.2)
BASOPHILS NFR BLD AUTO: 0.8 % (ref 0–1.5)
BILIRUB SERPL-MCNC: 0.2 MG/DL (ref 0–1.2)
BILIRUB UR QL STRIP: NEGATIVE
BUN SERPL-MCNC: 16 MG/DL (ref 8–23)
BUN/CREAT SERPL: 22.2 (ref 7–25)
C PNEUM DNA NPH QL NAA+NON-PROBE: NOT DETECTED
CALCIUM SPEC-SCNC: 9.6 MG/DL (ref 8.6–10.5)
CHLORIDE SERPL-SCNC: 101 MMOL/L (ref 98–107)
CLARITY UR: ABNORMAL
CO2 SERPL-SCNC: 27.1 MMOL/L (ref 22–29)
COLOR UR: YELLOW
CREAT SERPL-MCNC: 0.72 MG/DL (ref 0.57–1)
D-LACTATE SERPL-SCNC: 1.2 MMOL/L (ref 0.5–2)
DEPRECATED RDW RBC AUTO: 40 FL (ref 37–54)
EOSINOPHIL # BLD AUTO: 0.13 10*3/MM3 (ref 0–0.4)
EOSINOPHIL NFR BLD AUTO: 1.4 % (ref 0.3–6.2)
ERYTHROCYTE [DISTWIDTH] IN BLOOD BY AUTOMATED COUNT: 12.1 % (ref 12.3–15.4)
FLUAV H1 2009 PAND RNA NPH QL NAA+PROBE: NOT DETECTED
FLUAV H1 HA GENE NPH QL NAA+PROBE: NOT DETECTED
FLUAV H3 RNA NPH QL NAA+PROBE: NOT DETECTED
FLUAV SUBTYP SPEC NAA+PROBE: NOT DETECTED
FLUBV RNA ISLT QL NAA+PROBE: NOT DETECTED
GFR SERPL CREATININE-BSD FRML MDRD: 78 ML/MIN/1.73
GLOBULIN UR ELPH-MCNC: 2.5 GM/DL
GLUCOSE BLDC GLUCOMTR-MCNC: 158 MG/DL (ref 70–130)
GLUCOSE SERPL-MCNC: 82 MG/DL (ref 65–99)
GLUCOSE UR STRIP-MCNC: NEGATIVE MG/DL
HADV DNA SPEC NAA+PROBE: NOT DETECTED
HCOV 229E RNA SPEC QL NAA+PROBE: NOT DETECTED
HCOV HKU1 RNA SPEC QL NAA+PROBE: NOT DETECTED
HCOV NL63 RNA SPEC QL NAA+PROBE: NOT DETECTED
HCOV OC43 RNA SPEC QL NAA+PROBE: NOT DETECTED
HCT VFR BLD AUTO: 42.2 % (ref 34–46.6)
HGB BLD-MCNC: 14.4 G/DL (ref 12–15.9)
HGB UR QL STRIP.AUTO: ABNORMAL
HMPV RNA NPH QL NAA+NON-PROBE: NOT DETECTED
HPIV1 RNA SPEC QL NAA+PROBE: NOT DETECTED
HPIV2 RNA SPEC QL NAA+PROBE: NOT DETECTED
HPIV3 RNA NPH QL NAA+PROBE: NOT DETECTED
HPIV4 P GENE NPH QL NAA+PROBE: NOT DETECTED
HYALINE CASTS UR QL AUTO: ABNORMAL /LPF
IMM GRANULOCYTES # BLD AUTO: 0.07 10*3/MM3 (ref 0–0.05)
IMM GRANULOCYTES NFR BLD AUTO: 0.7 % (ref 0–0.5)
KETONES UR QL STRIP: NEGATIVE
LEUKOCYTE ESTERASE UR QL STRIP.AUTO: ABNORMAL
LYMPHOCYTES # BLD AUTO: 2.85 10*3/MM3 (ref 0.7–3.1)
LYMPHOCYTES NFR BLD AUTO: 29.9 % (ref 19.6–45.3)
M PNEUMO IGG SER IA-ACNC: NOT DETECTED
MCH RBC QN AUTO: 31.1 PG (ref 26.6–33)
MCHC RBC AUTO-ENTMCNC: 34.1 G/DL (ref 31.5–35.7)
MCV RBC AUTO: 91.1 FL (ref 79–97)
MONOCYTES # BLD AUTO: 0.8 10*3/MM3 (ref 0.1–0.9)
MONOCYTES NFR BLD AUTO: 8.4 % (ref 5–12)
NEUTROPHILS NFR BLD AUTO: 5.6 10*3/MM3 (ref 1.7–7)
NEUTROPHILS NFR BLD AUTO: 58.8 % (ref 42.7–76)
NITRITE UR QL STRIP: POSITIVE
NRBC BLD AUTO-RTO: 0 /100 WBC (ref 0–0.2)
PH UR STRIP.AUTO: 7.5 [PH] (ref 5–8)
PLATELET # BLD AUTO: 266 10*3/MM3 (ref 140–450)
PMV BLD AUTO: 10 FL (ref 6–12)
POTASSIUM SERPL-SCNC: 4.3 MMOL/L (ref 3.5–5.2)
PROCALCITONIN SERPL-MCNC: 0.04 NG/ML (ref 0–0.25)
PROT SERPL-MCNC: 7 G/DL (ref 6–8.5)
PROT UR QL STRIP: NEGATIVE
RBC # BLD AUTO: 4.63 10*6/MM3 (ref 3.77–5.28)
RBC # UR: ABNORMAL /HPF
REF LAB TEST METHOD: ABNORMAL
RHINOVIRUS RNA SPEC NAA+PROBE: NOT DETECTED
RSV RNA NPH QL NAA+NON-PROBE: NOT DETECTED
SARS-COV-2 RNA NPH QL NAA+NON-PROBE: NOT DETECTED
SODIUM SERPL-SCNC: 136 MMOL/L (ref 136–145)
SP GR UR STRIP: 1.01 (ref 1–1.03)
SQUAMOUS #/AREA URNS HPF: ABNORMAL /HPF
TROPONIN T SERPL-MCNC: <0.01 NG/ML (ref 0–0.03)
UROBILINOGEN UR QL STRIP: ABNORMAL
WBC # BLD AUTO: 9.53 10*3/MM3 (ref 3.4–10.8)
WBC UR QL AUTO: ABNORMAL /HPF

## 2020-10-19 PROCEDURE — 80053 COMPREHEN METABOLIC PANEL: CPT | Performed by: PHYSICIAN ASSISTANT

## 2020-10-19 PROCEDURE — 81001 URINALYSIS AUTO W/SCOPE: CPT | Performed by: PHYSICIAN ASSISTANT

## 2020-10-19 PROCEDURE — 99285 EMERGENCY DEPT VISIT HI MDM: CPT

## 2020-10-19 PROCEDURE — G0378 HOSPITAL OBSERVATION PER HR: HCPCS

## 2020-10-19 PROCEDURE — 93010 ELECTROCARDIOGRAM REPORT: CPT | Performed by: INTERNAL MEDICINE

## 2020-10-19 PROCEDURE — 25010000003 CEFTRIAXONE PER 250 MG: Performed by: PHYSICIAN ASSISTANT

## 2020-10-19 PROCEDURE — 83605 ASSAY OF LACTIC ACID: CPT | Performed by: PHYSICIAN ASSISTANT

## 2020-10-19 PROCEDURE — 87186 SC STD MICRODIL/AGAR DIL: CPT | Performed by: PHYSICIAN ASSISTANT

## 2020-10-19 PROCEDURE — 71045 X-RAY EXAM CHEST 1 VIEW: CPT

## 2020-10-19 PROCEDURE — 87086 URINE CULTURE/COLONY COUNT: CPT | Performed by: PHYSICIAN ASSISTANT

## 2020-10-19 PROCEDURE — P9612 CATHETERIZE FOR URINE SPEC: HCPCS

## 2020-10-19 PROCEDURE — 82962 GLUCOSE BLOOD TEST: CPT

## 2020-10-19 PROCEDURE — 96365 THER/PROPH/DIAG IV INF INIT: CPT

## 2020-10-19 PROCEDURE — 96361 HYDRATE IV INFUSION ADD-ON: CPT

## 2020-10-19 PROCEDURE — 85025 COMPLETE CBC W/AUTO DIFF WBC: CPT | Performed by: PHYSICIAN ASSISTANT

## 2020-10-19 PROCEDURE — 0202U NFCT DS 22 TRGT SARS-COV-2: CPT | Performed by: PHYSICIAN ASSISTANT

## 2020-10-19 PROCEDURE — 87040 BLOOD CULTURE FOR BACTERIA: CPT | Performed by: PHYSICIAN ASSISTANT

## 2020-10-19 PROCEDURE — 84484 ASSAY OF TROPONIN QUANT: CPT | Performed by: PHYSICIAN ASSISTANT

## 2020-10-19 PROCEDURE — 87077 CULTURE AEROBIC IDENTIFY: CPT | Performed by: PHYSICIAN ASSISTANT

## 2020-10-19 PROCEDURE — 84145 PROCALCITONIN (PCT): CPT | Performed by: PHYSICIAN ASSISTANT

## 2020-10-19 PROCEDURE — 93005 ELECTROCARDIOGRAM TRACING: CPT | Performed by: PHYSICIAN ASSISTANT

## 2020-10-19 RX ORDER — NICOTINE POLACRILEX 4 MG
15 LOZENGE BUCCAL
Status: DISCONTINUED | OUTPATIENT
Start: 2020-10-19 | End: 2020-10-21 | Stop reason: HOSPADM

## 2020-10-19 RX ORDER — NITROGLYCERIN 0.4 MG/1
0.4 TABLET SUBLINGUAL
Status: DISCONTINUED | OUTPATIENT
Start: 2020-10-19 | End: 2020-10-21 | Stop reason: HOSPADM

## 2020-10-19 RX ORDER — SODIUM CHLORIDE 9 MG/ML
125 INJECTION, SOLUTION INTRAVENOUS CONTINUOUS
Status: DISCONTINUED | OUTPATIENT
Start: 2020-10-19 | End: 2020-10-19

## 2020-10-19 RX ORDER — SODIUM CHLORIDE 0.9 % (FLUSH) 0.9 %
10 SYRINGE (ML) INJECTION AS NEEDED
Status: DISCONTINUED | OUTPATIENT
Start: 2020-10-19 | End: 2020-10-21 | Stop reason: HOSPADM

## 2020-10-19 RX ORDER — ACETAMINOPHEN 500 MG
1000 TABLET ORAL EVERY 6 HOURS PRN
Status: DISCONTINUED | OUTPATIENT
Start: 2020-10-19 | End: 2020-10-21 | Stop reason: HOSPADM

## 2020-10-19 RX ORDER — BUDESONIDE AND FORMOTEROL FUMARATE DIHYDRATE 80; 4.5 UG/1; UG/1
2 AEROSOL RESPIRATORY (INHALATION) 2 TIMES DAILY PRN
Status: ON HOLD | COMMUNITY
End: 2020-11-24

## 2020-10-19 RX ORDER — FLUTICASONE PROPIONATE 50 MCG
2 SPRAY, SUSPENSION (ML) NASAL DAILY PRN
COMMUNITY
End: 2023-01-10 | Stop reason: SDUPTHER

## 2020-10-19 RX ORDER — ACETAMINOPHEN 500 MG
1000 TABLET ORAL EVERY 6 HOURS PRN
COMMUNITY

## 2020-10-19 RX ORDER — SODIUM CHLORIDE 9 MG/ML
125 INJECTION, SOLUTION INTRAVENOUS CONTINUOUS
Status: DISCONTINUED | OUTPATIENT
Start: 2020-10-19 | End: 2020-10-20

## 2020-10-19 RX ORDER — DEXTROSE MONOHYDRATE 25 G/50ML
25 INJECTION, SOLUTION INTRAVENOUS
Status: DISCONTINUED | OUTPATIENT
Start: 2020-10-19 | End: 2020-10-21 | Stop reason: HOSPADM

## 2020-10-19 RX ORDER — CEPHALEXIN 500 MG/1
500 CAPSULE ORAL 3 TIMES DAILY
Qty: 21 CAPSULE | Refills: 0 | Status: SHIPPED | OUTPATIENT
Start: 2020-10-19 | End: 2020-10-21 | Stop reason: HOSPADM

## 2020-10-19 RX ORDER — CEFTRIAXONE SODIUM 1 G/50ML
1 INJECTION, SOLUTION INTRAVENOUS EVERY 24 HOURS
Status: DISCONTINUED | OUTPATIENT
Start: 2020-10-20 | End: 2020-10-21 | Stop reason: HOSPADM

## 2020-10-19 RX ORDER — ASPIRIN 81 MG/1
81 TABLET, CHEWABLE ORAL DAILY
Status: DISCONTINUED | OUTPATIENT
Start: 2020-10-20 | End: 2020-10-21 | Stop reason: HOSPADM

## 2020-10-19 RX ORDER — CLONIDINE HYDROCHLORIDE 0.2 MG/1
0.2 TABLET ORAL 2 TIMES DAILY
COMMUNITY
End: 2020-10-21 | Stop reason: HOSPADM

## 2020-10-19 RX ORDER — CEFTRIAXONE SODIUM 2 G/50ML
2 INJECTION, SOLUTION INTRAVENOUS ONCE
Status: COMPLETED | OUTPATIENT
Start: 2020-10-19 | End: 2020-10-19

## 2020-10-19 RX ORDER — SODIUM CHLORIDE 0.9 % (FLUSH) 0.9 %
10 SYRINGE (ML) INJECTION EVERY 12 HOURS SCHEDULED
Status: DISCONTINUED | OUTPATIENT
Start: 2020-10-19 | End: 2020-10-21 | Stop reason: HOSPADM

## 2020-10-19 RX ORDER — ONDANSETRON 2 MG/ML
4 INJECTION INTRAMUSCULAR; INTRAVENOUS EVERY 6 HOURS PRN
Status: DISCONTINUED | OUTPATIENT
Start: 2020-10-19 | End: 2020-10-21 | Stop reason: HOSPADM

## 2020-10-19 RX ADMIN — SODIUM CHLORIDE 125 ML/HR: 9 INJECTION, SOLUTION INTRAVENOUS at 19:33

## 2020-10-19 RX ADMIN — SODIUM CHLORIDE 1000 ML: 9 INJECTION, SOLUTION INTRAVENOUS at 14:23

## 2020-10-19 RX ADMIN — SODIUM CHLORIDE, PRESERVATIVE FREE 10 ML: 5 INJECTION INTRAVENOUS at 21:31

## 2020-10-19 RX ADMIN — CEFTRIAXONE SODIUM 2 G: 2 INJECTION, SOLUTION INTRAVENOUS at 16:19

## 2020-10-19 NOTE — ED PROVIDER NOTES
EMERGENCY DEPARTMENT ENCOUNTER    Room Number:  06/06  Date of encounter:  10/19/2020  PCP: Cassi Bella MD  Historian: Patient      I used full protective equipment while examining this patient.  This includes face mask, gloves and protective eyewear.  I washed my hands before entering the room and immediately upon leaving the room      HPI:  Chief Complaint: Weakness, hypotension  A complete HPI/ROS/PMH/PSH/SH/FH are unobtainable due to: Nothing    Context: Kandace Andrade is a 80 y.o. female who presents to the ED c/o 3-day history of gradual onset, progressive generalized weakness, orthostatic lightheadedness, and hypotension.  Patient denies any fever, cough, chest pain, shortness of breath.  Patient checked her blood pressure today and found to be 80s/50s.  Patient states she becomes lightheaded when she stands.  She denies any recent vomiting, diarrhea.  She does not take any regular diuretics.  Patient does states she has had some mild dysuria over the past couple of days.  Patient denies any previous history of renal disease.    Review of Medical Records  I reviewed office visit with her family doctor from 7/17/2020.  Patient being treated for diabetes, hypertension, hyperlipidemia.    PAST MEDICAL HISTORY  Active Ambulatory Problems     Diagnosis Date Noted   • Vitamin D deficiency    • KINDRA (obstructive sleep apnea)    • IBS (irritable bowel syndrome)    • Essential hypertension    • Herpes zoster    • Stage 3 chronic kidney disease    • Arthritis    • Anxiety and depression    • Other emphysema (CMS/[facility]) 08/30/2019   • Acute seasonal allergic rhinitis due to pollen 08/30/2019   • Paroxysmal atrial fibrillation (CMS/[facility])    • Osteoporosis    • Mixed hyperlipidemia    • Encounter for Medicare annual wellness exam 11/22/2019   • COPD (chronic obstructive pulmonary disease) (CMS/[facility]) 11/22/2019   • Mixed stress and urge urinary incontinence 02/25/2020   • Type 2 diabetes mellitus without complication,  without long-term current use of insulin (CMS/[facility]) 2020   • Arthritis of both hips 2020     Resolved Ambulatory Problems     Diagnosis Date Noted   • No Resolved Ambulatory Problems     Past Medical History:   Diagnosis Date   • Anxiety    • Asthma    • Claudication, intermittent (CMS/[facility])    • Edema    • Fall, accidental    • Falls frequently    • Fatigue    • Fibula fracture    • History of cataract    • History of lumbosacral spine surgery    • History of migraine headaches    • Hyperglycemia    • Hypersomnia with sleep apnea    • Right ankle injury    • Right hip pain    • Right knee pain    • Right shoulder pain          PAST SURGICAL HISTORY  Past Surgical History:   Procedure Laterality Date   • APPENDECTOMY     • BACK SURGERY      lower x2   • CATARACT EXTRACTION     • HYSTERECTOMY      partial   • SHOULDER SURGERY Right    • TONSILLECTOMY           FAMILY HISTORY  Family History   Problem Relation Age of Onset   • Heart disease Mother    • Lung cancer Mother    • Diabetes Father    • Heart disease Paternal Uncle          SOCIAL HISTORY  Social History     Socioeconomic History   • Marital status:      Spouse name: Not on file   • Number of children: Not on file   • Years of education: Not on file   • Highest education level: Not on file   Tobacco Use   • Smoking status: Former Smoker     Packs/day: 1.00     Years: 25.00     Pack years: 25.00     Quit date: 2008     Years since quittin.4   • Smokeless tobacco: Never Used   Substance and Sexual Activity   • Alcohol use: No   • Drug use: No   Lifestyle   • Physical activity     Days per week: Patient refused     Minutes per session: Patient refused   • Stress: Not on file         ALLERGIES  Penicillins and Statins        REVIEW OF SYSTEMS  All systems reviewed and negative except for those discussed in HPI.       PHYSICAL EXAM    I have reviewed the triage vital signs and nursing notes.    ED Triage Vitals   Temp Heart Rate Resp BP  SpO2   10/19/20 1249 10/19/20 1249 10/19/20 1249 10/19/20 1257 10/19/20 1249   97.9 °F (36.6 °C) 84 18 (!) 86/61 95 %      Temp src Heart Rate Source Patient Position BP Location FiO2 (%)   10/19/20 1249 10/19/20 1249 10/19/20 1257 10/19/20 1257 --   Tympanic Monitor Sitting Left arm        Physical Exam  GENERAL: Alert, oriented, not distressed  HENT: head atraumatic, no nuchal rigidity  EYES: no scleral icterus, EOMI  CV: regular rhythm, regular rate, no murmur  RESPIRATORY: normal effort, CTA  ABDOMEN: soft, nontender  MUSCULOSKELETAL: no deformity, FROM, no calf swelling or tenderness  NEURO: alert, moves all extremities, follows commands  SKIN: warm, dry        LAB RESULTS  Recent Results (from the past 24 hour(s))   Comprehensive Metabolic Panel    Collection Time: 10/19/20  2:22 PM    Specimen: Blood   Result Value Ref Range    Glucose 82 65 - 99 mg/dL    BUN 16 8 - 23 mg/dL    Creatinine 0.72 0.57 - 1.00 mg/dL    Sodium 136 136 - 145 mmol/L    Potassium 4.3 3.5 - 5.2 mmol/L    Chloride 101 98 - 107 mmol/L    CO2 27.1 22.0 - 29.0 mmol/L    Calcium 9.6 8.6 - 10.5 mg/dL    Total Protein 7.0 6.0 - 8.5 g/dL    Albumin 4.50 3.50 - 5.20 g/dL    ALT (SGPT) 23 1 - 33 U/L    AST (SGOT) 21 1 - 32 U/L    Alkaline Phosphatase 96 39 - 117 U/L    Total Bilirubin 0.2 0.0 - 1.2 mg/dL    eGFR Non African Amer 78 >60 mL/min/1.73    Globulin 2.5 gm/dL    A/G Ratio 1.8 g/dL    BUN/Creatinine Ratio 22.2 7.0 - 25.0    Anion Gap 7.9 5.0 - 15.0 mmol/L   Troponin    Collection Time: 10/19/20  2:22 PM    Specimen: Blood   Result Value Ref Range    Troponin T <0.010 0.000 - 0.030 ng/mL   Lactic Acid, Plasma    Collection Time: 10/19/20  2:22 PM    Specimen: Blood   Result Value Ref Range    Lactate 1.2 0.5 - 2.0 mmol/L   Procalcitonin    Collection Time: 10/19/20  2:22 PM    Specimen: Blood   Result Value Ref Range    Procalcitonin 0.04 0.00 - 0.25 ng/mL   CBC Auto Differential    Collection Time: 10/19/20  2:22 PM    Specimen: Blood    Result Value Ref Range    WBC 9.53 3.40 - 10.80 10*3/mm3    RBC 4.63 3.77 - 5.28 10*6/mm3    Hemoglobin 14.4 12.0 - 15.9 g/dL    Hematocrit 42.2 34.0 - 46.6 %    MCV 91.1 79.0 - 97.0 fL    MCH 31.1 26.6 - 33.0 pg    MCHC 34.1 31.5 - 35.7 g/dL    RDW 12.1 (L) 12.3 - 15.4 %    RDW-SD 40.0 37.0 - 54.0 fl    MPV 10.0 6.0 - 12.0 fL    Platelets 266 140 - 450 10*3/mm3    Neutrophil % 58.8 42.7 - 76.0 %    Lymphocyte % 29.9 19.6 - 45.3 %    Monocyte % 8.4 5.0 - 12.0 %    Eosinophil % 1.4 0.3 - 6.2 %    Basophil % 0.8 0.0 - 1.5 %    Immature Grans % 0.7 (H) 0.0 - 0.5 %    Neutrophils, Absolute 5.60 1.70 - 7.00 10*3/mm3    Lymphocytes, Absolute 2.85 0.70 - 3.10 10*3/mm3    Monocytes, Absolute 0.80 0.10 - 0.90 10*3/mm3    Eosinophils, Absolute 0.13 0.00 - 0.40 10*3/mm3    Basophils, Absolute 0.08 0.00 - 0.20 10*3/mm3    Immature Grans, Absolute 0.07 (H) 0.00 - 0.05 10*3/mm3    nRBC 0.0 0.0 - 0.2 /100 WBC   Urinalysis With Microscopic If Indicated (No Culture) - Urine, Catheter    Collection Time: 10/19/20  2:39 PM    Specimen: Urine, Catheter   Result Value Ref Range    Color, UA Yellow Yellow, Straw    Appearance, UA Turbid (A) Clear    pH, UA 7.5 5.0 - 8.0    Specific Gravity, UA 1.012 1.005 - 1.030    Glucose, UA Negative Negative    Ketones, UA Negative Negative    Bilirubin, UA Negative Negative    Blood, UA Small (1+) (A) Negative    Protein, UA Negative Negative    Leuk Esterase, UA Large (3+) (A) Negative    Nitrite, UA Positive (A) Negative    Urobilinogen, UA 0.2 E.U./dL 0.2 - 1.0 E.U./dL   Urinalysis, Microscopic Only - Urine, Catheter    Collection Time: 10/19/20  2:39 PM    Specimen: Urine, Catheter   Result Value Ref Range    RBC, UA 0-2 None Seen, 0-2 /HPF    WBC, UA Too Numerous to Count (A) None Seen, 0-2 /HPF    Bacteria, UA 4+ (A) None Seen /HPF    Squamous Epithelial Cells, UA 0-2 None Seen, 0-2 /HPF    Hyaline Casts, UA 3-6 None Seen /LPF    Methodology Automated Microscopy        Ordered the above labs  and independently reviewed the results.        RADIOLOGY  Xr Chest 1 View    Result Date: 10/19/2020  XR CHEST 1 VW-  HISTORY: Female who is 80 years-old,  hypotension  TECHNIQUE: Frontal view of the chest  COMPARISON: None available  FINDINGS: The heart size is normal. Aorta is tortuous, calcified. Pulmonary vasculature is unremarkable. Minimal likely atelectasis at the bases. No pleural effusion, or pneumothorax. No acute osseous process.      Minimal likely atelectasis at the bases. Tortuous aorta  This report was finalized on 10/19/2020 2:35 PM by Dr. Matthew Her M.D.        I ordered the above noted radiological studies. Reviewed by me and discussed with radiologist.  See dictation for official radiology interpretation.      MEDICATIONS GIVEN IN ER    Medications   sodium chloride 0.9 % flush 10 mL (has no administration in time range)   sodium chloride 0.9 % infusion (has no administration in time range)   sodium chloride 0.9 % bolus 1,000 mL (0 mL Intravenous Stopped 10/19/20 1624)   cefTRIAXone (ROCEPHIN) IVPB 2 g (0 g Intravenous Stopped 10/19/20 1708)         PROGRESS, DATA ANALYSIS, CONSULTS, AND MEDICAL DECISION MAKING    All labs have been independently reviewed by me.  All radiology studies have been reviewed by me and discussed with radiologist dictating the report.   EKG's independently viewed and interpreted by me.  Discussion below represents my analysis of pertinent findings related to patient's condition, differential diagnosis, treatment plan and final disposition.    I have discussed case with Dr. Mendieta, emergency room physician.  He has performed his own bedside examination and agrees with treatment plan.    ED Course as of Oct 27 1913   Mon Oct 19, 2020   1431 Patient presents with 3-day history of generalized weakness and hypotension.  Differential diagnoses include but not limited to renal failure, dehydration, arrhythmia.    [EE]   1437 Chest x-ray interpreted by myself shows no  acute effusion or infiltrate.  I will await final radiologist interpretation.    [EE]   1438 EKG interpreted by myself.  Time 1419.  Sinus bradycardia, rate 56 bpm with multiple PAC's.  Normal P/CHINTAN.  QRS normal with normal axis.  No significant ST abnormalities.  No previous for comparison    [EE]   1458 WBC: 9.53 [EE]   1458 Hemoglobin: 14.4 [EE]   1500 Bacteria, UA(!): 4+ [EE]   1618 Patient is no longer hypotensive.  She is not orthostatic.  I had shared decision-making with patient and daughter.  She has no evidence of sepsis, renal failure, pyelonephritis.  Patient states she has lost some weight recently and I believe she may be overtreated for her hypertension.  She understands to stop her clonidine and HCTZ.  She understands to start checking her blood pressure at home and to follow-up with her family doctor.    [EE]   1707 Recheck of patient.  She has been 80s over 40s the past 2 readings.  Patient is uncomfortable going home.  Plan to admit for further evaluation.    [EE]   1725 I discussed evaluation of this patient with Dr. Bullock who will admit for further evaluation treatment.    [DB]      ED Course User Index  [DB] Jerardo Mendieta MD  [EE] Ruben Miller PA       AS OF 17:09 EDT VITALS:    BP - (!) 88/48  HR - (!) 49  TEMP - 97.9 °F (36.6 °C) (Tympanic)  O2 SATS - 91%        DIAGNOSIS  Final diagnoses:   Acute UTI   Hypotension, unspecified hypotension type         DISPOSITION  Admitted           Ruben Miller PA  10/19/20 1640       Ruben Miller PA  10/19/20 1709       Ruben Miller PA  10/27/20 1913

## 2020-10-19 NOTE — ED NOTES
Tracey Holland, [phone], daughter can be contacted for questions or concerns.      Waqas Kimball, RN  10/19/20 9834

## 2020-10-19 NOTE — ED NOTES
Pt presents to ED with complains of hypotension by home health. Home Health states pt had a blood pressure of 90's/60's. Pt states she is dizzy, tried, headache, and UTI symptoms for the last 2-3 days.      Waqas Kimball, RN  10/19/20 4562

## 2020-10-19 NOTE — TELEPHONE ENCOUNTER
[facility] Home health nurse called to report,   Kandace Andrade doesn't feel well & the home health nurse is really there 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, took her b/p & the reading was 90'/40's & then it was lower when she stood up. Patient has headache, feels weak & trouble catching her breath along with fatigue. I advised home health that patient should be seen in ER.

## 2020-10-19 NOTE — DISCHARGE INSTRUCTIONS
Stop taking your clonidine and HCTZ.  Check your blood pressure twice a day at home.  Only start your clonidine if blood pressure is greater than 160/90.      Drink plenty of fluids at home.  Return to ER if any worsening symptoms.

## 2020-10-19 NOTE — TELEPHONE ENCOUNTER
JUST FYI: Pt called and could not remember who she saw here but wanted to let you know she was going to the ER after talking with Dr Bella.  Hypotension, weak, SOA.  Home Health was there to help.  Advised her I would send you a FYI.  (done)

## 2020-10-19 NOTE — ED PROVIDER NOTES
MD ATTESTATION NOTE    The KAROLINA and I have discussed this patient's history, physical exam, and treatment plan.  I have reviewed the documentation and personally had a face to face interaction with the patient. I affirm the documentation and agree with the treatment and plan.  The attached note describes my personal findings.      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  Patient reports some dizziness and generalized fatigue.  She has had some urinary symptoms as well.    Physical Exam  Vital Signs reviewed  Alert, Well Appearing in NAD  Respirations unlabored  Heart regular    Disposition  I discussed evaluation and treatment of this patient with MICHAEL Miller.  Patient had initial hypotension but is improved after IV fluids with the last systolic of 120.  Lab review is unremarkable without apparent serious infection and no evidence of acute renal failure.  Patient does have apparent urinary tract infection which may be able to be treated as an outpatient.  We will get this patient up and stand her and see how her pressure does upon standing.  If significantly hypotensive may admit for further evaluation but of her pressure holds while standing will DC home on oral antibiotics for     Jerardo Mendieta MD  10/19/20 1545    ED Course as of Oct 19 1725   Mon Oct 19, 2020   1431 Patient presents with 3-day history of generalized weakness and hypotension.  Differential diagnoses include but not limited to renal failure, dehydration, arrhythmia.    [EE]   1437 Chest x-ray interpreted by myself shows no acute effusion or infiltrate.  I will await final radiologist interpretation.    [EE]   1438 EKG interpreted by myself.  Time 1419.  Sinus bradycardia, rate 56 bpm with multiple PAC's.  Normal P/CHINTAN.  QRS normal with normal axis.  No significant ST abnormalities.  No previous for comparison    [EE]   1458 WBC: 9.53 [EE]   1458 Hemoglobin: 14.4 [EE]   1500 Bacteria, UA(!): 4+  [EE]   1618 Patient is no longer hypotensive.  She is not orthostatic.  I had shared decision-making with patient and daughter.  She has no evidence of sepsis, renal failure, pyelonephritis.  Patient states she has lost some weight recently and I believe she may be overtreated for her hypertension.  She understands to stop her clonidine and HCTZ.  She understands to start checking her blood pressure at home and to follow-up with her family doctor.    [EE]   1707 Recheck of patient.  She has been 80s over 40s the past 2 readings.  Patient is uncomfortable going home.  Plan to admit for further evaluation.    [EE]   1725 I discussed evaluation of this patient with Dr. Bullock who will admit for further evaluation treatment.    [DB]      ED Course User Index  [DB] Jerardo Mendieta MD  [EE] Ruben Miller PA       Final diagnoses:   Acute UTI   Hypotension, unspecified hypotension type       ADMISSION       Jerardo Mendieta MD  10/19/20 1726

## 2020-10-20 LAB
ALBUMIN SERPL-MCNC: 3.4 G/DL (ref 3.5–5.2)
ALBUMIN/GLOB SERPL: 1.4 G/DL
ALP SERPL-CCNC: 81 U/L (ref 39–117)
ALT SERPL W P-5'-P-CCNC: 17 U/L (ref 1–33)
ANION GAP SERPL CALCULATED.3IONS-SCNC: 6.8 MMOL/L (ref 5–15)
AST SERPL-CCNC: 16 U/L (ref 1–32)
BASOPHILS # BLD AUTO: 0.06 10*3/MM3 (ref 0–0.2)
BASOPHILS NFR BLD AUTO: 0.7 % (ref 0–1.5)
BILIRUB SERPL-MCNC: 0.2 MG/DL (ref 0–1.2)
BUN SERPL-MCNC: 13 MG/DL (ref 8–23)
BUN/CREAT SERPL: 17.1 (ref 7–25)
CALCIUM SPEC-SCNC: 8.7 MG/DL (ref 8.6–10.5)
CHLORIDE SERPL-SCNC: 108 MMOL/L (ref 98–107)
CO2 SERPL-SCNC: 24.2 MMOL/L (ref 22–29)
CREAT SERPL-MCNC: 0.76 MG/DL (ref 0.57–1)
DEPRECATED RDW RBC AUTO: 39.5 FL (ref 37–54)
EOSINOPHIL # BLD AUTO: 0.19 10*3/MM3 (ref 0–0.4)
EOSINOPHIL NFR BLD AUTO: 2.2 % (ref 0.3–6.2)
ERYTHROCYTE [DISTWIDTH] IN BLOOD BY AUTOMATED COUNT: 11.9 % (ref 12.3–15.4)
GFR SERPL CREATININE-BSD FRML MDRD: 73 ML/MIN/1.73
GLOBULIN UR ELPH-MCNC: 2.5 GM/DL
GLUCOSE BLDC GLUCOMTR-MCNC: 83 MG/DL (ref 70–130)
GLUCOSE BLDC GLUCOMTR-MCNC: 87 MG/DL (ref 70–130)
GLUCOSE BLDC GLUCOMTR-MCNC: 93 MG/DL (ref 70–130)
GLUCOSE SERPL-MCNC: 90 MG/DL (ref 65–99)
HBA1C MFR BLD: 6.25 % (ref 4.8–5.6)
HCT VFR BLD AUTO: 36.5 % (ref 34–46.6)
HGB BLD-MCNC: 12.6 G/DL (ref 12–15.9)
IMM GRANULOCYTES # BLD AUTO: 0.07 10*3/MM3 (ref 0–0.05)
IMM GRANULOCYTES NFR BLD AUTO: 0.8 % (ref 0–0.5)
LYMPHOCYTES # BLD AUTO: 2.16 10*3/MM3 (ref 0.7–3.1)
LYMPHOCYTES NFR BLD AUTO: 24.9 % (ref 19.6–45.3)
MCH RBC QN AUTO: 31.4 PG (ref 26.6–33)
MCHC RBC AUTO-ENTMCNC: 34.5 G/DL (ref 31.5–35.7)
MCV RBC AUTO: 91 FL (ref 79–97)
MONOCYTES # BLD AUTO: 0.83 10*3/MM3 (ref 0.1–0.9)
MONOCYTES NFR BLD AUTO: 9.6 % (ref 5–12)
NEUTROPHILS NFR BLD AUTO: 5.36 10*3/MM3 (ref 1.7–7)
NEUTROPHILS NFR BLD AUTO: 61.8 % (ref 42.7–76)
NRBC BLD AUTO-RTO: 0 /100 WBC (ref 0–0.2)
PLATELET # BLD AUTO: 227 10*3/MM3 (ref 140–450)
PMV BLD AUTO: 9.9 FL (ref 6–12)
POTASSIUM SERPL-SCNC: 4.8 MMOL/L (ref 3.5–5.2)
PROT SERPL-MCNC: 5.9 G/DL (ref 6–8.5)
RBC # BLD AUTO: 4.01 10*6/MM3 (ref 3.77–5.28)
SODIUM SERPL-SCNC: 139 MMOL/L (ref 136–145)
WBC # BLD AUTO: 8.67 10*3/MM3 (ref 3.4–10.8)

## 2020-10-20 PROCEDURE — 85025 COMPLETE CBC W/AUTO DIFF WBC: CPT | Performed by: INTERNAL MEDICINE

## 2020-10-20 PROCEDURE — 96361 HYDRATE IV INFUSION ADD-ON: CPT

## 2020-10-20 PROCEDURE — 83036 HEMOGLOBIN GLYCOSYLATED A1C: CPT | Performed by: INTERNAL MEDICINE

## 2020-10-20 PROCEDURE — 25010000002 CEFTRIAXONE PER 250 MG: Performed by: INTERNAL MEDICINE

## 2020-10-20 PROCEDURE — 25010000002 ENOXAPARIN PER 10 MG: Performed by: INTERNAL MEDICINE

## 2020-10-20 PROCEDURE — G0378 HOSPITAL OBSERVATION PER HR: HCPCS

## 2020-10-20 PROCEDURE — 96366 THER/PROPH/DIAG IV INF ADDON: CPT

## 2020-10-20 PROCEDURE — 99213 OFFICE O/P EST LOW 20 MIN: CPT | Performed by: NURSE PRACTITIONER

## 2020-10-20 PROCEDURE — 97162 PT EVAL MOD COMPLEX 30 MIN: CPT

## 2020-10-20 PROCEDURE — 82962 GLUCOSE BLOOD TEST: CPT

## 2020-10-20 PROCEDURE — 97110 THERAPEUTIC EXERCISES: CPT

## 2020-10-20 PROCEDURE — 80053 COMPREHEN METABOLIC PANEL: CPT | Performed by: INTERNAL MEDICINE

## 2020-10-20 RX ADMIN — SODIUM CHLORIDE 125 ML/HR: 9 INJECTION, SOLUTION INTRAVENOUS at 00:56

## 2020-10-20 RX ADMIN — ACETAMINOPHEN 1000 MG: 500 TABLET, FILM COATED ORAL at 12:09

## 2020-10-20 RX ADMIN — ASPIRIN 81 MG: 81 TABLET, CHEWABLE ORAL at 08:48

## 2020-10-20 RX ADMIN — SODIUM CHLORIDE 125 ML/HR: 9 INJECTION, SOLUTION INTRAVENOUS at 08:48

## 2020-10-20 RX ADMIN — CEFTRIAXONE SODIUM 1 G: 1 INJECTION, SOLUTION INTRAVENOUS at 15:54

## 2020-10-20 RX ADMIN — ACETAMINOPHEN 1000 MG: 500 TABLET, FILM COATED ORAL at 06:30

## 2020-10-20 RX ADMIN — SODIUM CHLORIDE, PRESERVATIVE FREE 10 ML: 5 INJECTION INTRAVENOUS at 20:51

## 2020-10-20 RX ADMIN — ACETAMINOPHEN 1000 MG: 500 TABLET, FILM COATED ORAL at 20:51

## 2020-10-20 RX ADMIN — SODIUM CHLORIDE, PRESERVATIVE FREE 10 ML: 5 INJECTION INTRAVENOUS at 08:48

## 2020-10-20 RX ADMIN — ENOXAPARIN SODIUM 40 MG: 40 INJECTION SUBCUTANEOUS at 08:47

## 2020-10-20 NOTE — PLAN OF CARE
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 pt did fairly well this afternoon, able to walk 150 ft with CGA, mild unsteadiness, mild SOA, anticipate that pt will be able to return home with spouse at discharge   Patient was intermittently wearing a face mask during this therapy encounter. Therapist used appropriate personal protective equipment including eye protection, mask, and gloves.  Mask used was standard procedure mask. Appropriate PPE was worn during the entire therapy session. Hand hygiene was completed before and after therapy session. Patient is not in enhanced droplet precautions.   PT eunice Azevedo was present

## 2020-10-20 NOTE — THERAPY EVALUATION
Patient Name: Kandace Andrade  : 1940    MRN: [id number]                              Today's Date: 10/20/2020       Admit Date: 10/19/2020    Visit Dx:     ICD-10-CM ICD-9-CM   1. Acute UTI  N39.0 599.0   2. Hypotension, unspecified hypotension type  I95.9 458.9     Patient Active Problem List   Diagnosis   • Vitamin D deficiency   • KINDRA (obstructive sleep apnea)   • IBS (irritable bowel syndrome)   • Essential hypertension   • Herpes zoster   • Stage 3 chronic kidney disease   • Arthritis   • Anxiety and depression   • Other emphysema (CMS/[facility])   • Acute seasonal allergic rhinitis due to pollen   • Paroxysmal atrial fibrillation (CMS/[facility])   • Osteoporosis   • Mixed hyperlipidemia   • Encounter for Medicare annual wellness exam   • COPD (chronic obstructive pulmonary disease) (CMS/[facility])   • Mixed stress and urge urinary incontinence   • Type 2 diabetes mellitus without complication, without long-term current use of insulin (CMS/[facility])   • Arthritis of both hips   • Acute UTI   • Symptomatic hypotension   • Autonomic neuropathy     Past Medical History:   Diagnosis Date   • A-fib (CMS/[facility])    • Acute seasonal allergic rhinitis due to pollen    • Anxiety    • Anxiety and depression    • Arthritis    • Asthma    • Claudication, intermittent (CMS/[facility])    • COPD (chronic obstructive pulmonary disease) (CMS/[facility])    • Edema    • Fall, accidental    • Falls frequently    • Fatigue    • Fibula fracture    • Herpes zoster    • History of cataract    • History of lumbosacral spine surgery    • History of migraine headaches    • Hyperglycemia    • Hypersomnia with sleep apnea    • IBS (irritable bowel syndrome)    • Mixed hyperlipidemia    • KINDRA (obstructive sleep apnea)    • Osteoporosis    • Paroxysmal atrial fibrillation (CMS/[facility])    • Right ankle injury    • Right hip pain    • Right knee pain    • Right shoulder pain    • Vitamin D deficiency      Past Surgical History:   Procedure Laterality Date   • APPENDECTOMY     •  BACK SURGERY      lower x2   • CATARACT EXTRACTION     • HYSTERECTOMY      partial   • SHOULDER SURGERY Right    • SHOULDER SURGERY     • TONSILLECTOMY       General Information     Row Name 10/20/20 1541          Physical Therapy Time and Intention    Document Type  evaluation  -PC     Mode of Treatment  physical therapy  -PC     Row Name 10/20/20 1541          General Information    Patient Profile Reviewed  yes  -PC     Prior Level of Function  independent:  -PC     Existing Precautions/Restrictions  fall  -PC     Barriers to Rehab  none identified  -PC     Row Name 10/20/20 1541          Living Environment    Lives With  spouse pt is a caregiver 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  -PC     Row Name 10/20/20 1541          Home Main Entrance    Number of Stairs, Main Entrance  four  -PC     Row Name 10/20/20 1541          Cognition    Orientation Status (Cognition)  oriented x 4  -PC       User Key  (r) = Recorded By, (t) = Taken By, (c) = Cosigned By    Initials Name Provider Type    PC Mari Taylor PT Physical Therapist        Mobility     Row Name 10/20/20 1542          Bed Mobility    Bed Mobility  supine-sit;sit-supine  -PC     Supine-Sit [place] (Bed Mobility)  supervision  -PC     Row Name 10/20/20 1542          Sit-Stand Transfer    Sit-Stand [place] (Transfers)  contact guard  -PC     Row Name 10/20/20 1542          Gait/Stairs (Locomotion)    [place] Level (Gait)  contact guard  -PC     Distance in Feet (Gait)  150 ft, initially used HHA, but then able to be just CGA with gait belt  -PC     Deviations/Abnormal Patterns (Gait)  mikala decreased;stride length decreased  -PC     Comment (Gait/Stairs)  mild unsteadiness  -PC       User Key  (r) = Recorded By, (t) = Taken By, (c) = Cosigned By    Initials Name Provider Type    PC Mari Taylor PT Physical Therapist        Obj/Interventions     Row Name 10/20/20 1545          Range of Motion Comprehensive    General Range of Motion  no range of motion  deficits identified  -PC     Row Name 10/20/20 1549          Strength Comprehensive (MMT)    Comment, General Manual Muscle Testing (MMT) Assessment  no focal deficits, general weakness present  -PC     Row Name 10/20/20 154          Motor Skills    Therapeutic Exercise  -- seated AP, LAQ, 10 reps  -PC     Row Name 10/20/20 1542          Balance    Balance Assessment  sitting static balance;sitting dynamic balance;standing static balance;standing dynamic balance  -PC     Static Sitting Balance  WNL  -PC     Dynamic Sitting Balance  WNL  -PC     Static Standing Balance  WFL  -PC     Dynamic Standing Balance  mild impairment  -PC       User Key  (r) = Recorded By, (t) = Taken By, (c) = Cosigned By    Initials Name Provider Type    PC Mari Taylor, PT Physical Therapist        Goals/Plan     Row Name 10/20/20 1540          Gait Training Goal 1 (PT)    Activity/Assistive Device (Gait Training Goal 1, PT)  gait (walking locomotion)  -PC     [place] Level (Gait Training Goal 1, PT)  standby assist  -PC     Distance (Gait Training Goal 1, PT)  200 ft  -PC     Time Frame (Gait Training Goal 1, PT)  1 week  -PC       User Key  (r) = Recorded By, (t) = Taken By, (c) = Cosigned By    Initials Name Provider Type    PC Mari Taylor, PT Physical Therapist        Clinical Impression     Row Name 10/20/20 9141          Pain    Additional Documentation  Pain Scale: Numbers Pre/Post-Treatment (Group)  -     Row Name 10/20/20 5001          Pain Scale: Numbers Pre/Post-Treatment    Pretreatment Pain Rating  0/10 - no pain  -PC     Row Name 10/20/20 8699          Plan of Care Review    Plan of Care Reviewed With  patient  -PC     Progress  improving  -PC     Outcome Summary  pt presents with impaired functional mobility from baseline with general weakness from illness, inactivity, mild unsteadiness with gait, she will benefit from PT to address, pt lives at home with spouse, 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 pt  did fairly well this afternoon, able to walk 150 ft with CGA, mild unsteadiness, mild SOA, anticipate that pt will be able to return home with spouse at discharge  -PC     Row Name 10/20/20 1543          Therapy Assessment/Plan (PT)    Rehab Potential (PT)  good, to achieve stated therapy goals  -PC     Criteria for Skilled Interventions Met (PT)  yes;meets criteria  -PC     Row Name 10/20/20 1546          Positioning and Restraints    Pre-Treatment Position  in bed  -PC     Post Treatment Position  chair  -PC     In Chair  sitting;call light within reach;encouraged to call for assist;exit alarm on  -PC       User Key  (r) = Recorded By, (t) = Taken By, (c) = Cosigned By    Initials Name Provider Type    PC Mari Taylor, PT Physical Therapist        Outcome Measures     Row Name 10/20/20 1544          How much help from another person do you currently need...    Turning from your back to your side while in flat bed without using bedrails?  3  -PC     Moving from lying on back to sitting on the side of a flat bed without bedrails?  3  -PC     Moving to and from a bed to a chair (including a wheelchair)?  3  -PC     Standing up from a chair using your arms (e.g., wheelchair, bedside chair)?  3  -PC     Climbing 3-5 steps with a railing?  3  -PC     To walk in hospital room?  3  -PC     AM-PAC 6 Clicks Score (PT)  18  -PC     Row Name 10/20/20 1545          Functional Assessment    Outcome Measure Options  AM-PAC 6 Clicks Basic Mobility (PT)  -PC       User Key  (r) = Recorded By, (t) = Taken By, (c) = Cosigned By    Initials Name Provider Type    PC Mari Taylor, PT Physical Therapist        Physical Therapy Education                 Title: PT OT SLP Therapies (Done)     Topic: Physical Therapy (Done)     Point: Mobility training (Done)     Learning Progress Summary           Patient Acceptance, E,D, DU by PC at 10/20/2020 6109                   Point: Home exercise program (Done)     Learning Progress Summary            Patient Acceptance, E,D, DU by PC at 10/20/2020 1549                   Point: Body mechanics (Done)     Learning Progress Summary           Patient Acceptance, E,D, DU by PC at 10/20/2020 1549                   Point: Precautions (Done)     Learning Progress Summary           Patient Acceptance, E,D, DU by PC at 10/20/2020 1549                               User Key     Initials Effective Dates Name Provider Type Discipline     04/03/18 -  Mari Taylor PT Physical Therapist PT              PT Recommendation and Plan  Planned Therapy Interventions (PT): gait training, strengthening  Plan of Care Reviewed With: patient  Progress: improving  Outcome Summary: pt presents with impaired functional mobility from baseline with general weakness from illness, inactivity, mild unsteadiness with gait, she will benefit from PT to address, pt lives at home with spouse, 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 pt did fairly well this afternoon, able to walk 150 ft with CGA, mild unsteadiness, mild SOA, anticipate that pt will be able to return home with spouse at discharge     Time Calculation:   PT Charges     Row Name 10/20/20 1549             Time Calculation    Start Time  1516  -PC      Stop Time  1540  -PC      Time Calculation (min)  24 min  -PC      PT Received On  10/20/20  -PC      PT - Next Appointment  10/21/20  -      PT Goal Re-Cert Due Date  10/27/20  -PC         Time Calculation- PT    Total Timed Code Minutes- PT  15 minute(s)  -PC        User Key  (r) = Recorded By, (t) = Taken By, (c) = Cosigned By    Initials Name Provider Type    PC Mari Taylor, PT Physical Therapist        Therapy Charges for Today     Code Description Service Date Service Provider Modifiers Qty    28916947719 HC PT EVAL MOD COMPLEXITY 2 10/20/2020 Mari Taylor, PT GP 1    99336642005 HC PT THER PROC EA 15 MIN 10/20/2020 Mari Taylor, PT GP 1    83478830363 HC PT THER SUPP EA 15 MIN 10/20/2020 Mari Taylor, PT  GP 1          PT G-Codes  Outcome Measure Options: AM-PAC 6 Clicks Basic Mobility (PT)  AM-PAC 6 Clicks Score (PT): 18    Mari Taylor, PT  10/20/2020

## 2020-10-20 NOTE — PROGRESS NOTES
Name: Kandace Andrade ADMIT: 10/19/2020   : 1940  PCP: Cassi Bella MD    MRN: [id number] LOS: 1 days   AGE/SEX: 80 y.o. female  ROOM: [facility]     Subjective   Subjective   Saw the patient this afternoon.  She was feeling better.  She asked about having her cardiologist come by.  Dysuria improving    Review of Systems     Objective   Objective   Vital Signs  Temp:  [98 °F (36.7 °C)-98.6 °F (37 °C)] 98 °F (36.7 °C)  Heart Rate:  [49-72] 65  Resp:  [16] 16  BP: (103-143)/(63-89) 134/75  SpO2:  [90 %-96 %] 90 %  on   ;   Device (Oxygen Therapy): room air  Body mass index is 21.79 kg/m².  Physical Exam  Vitals signs and nursing note reviewed.   Constitutional:       General: She is not in acute distress.     Appearance: She is not ill-appearing, toxic-appearing or diaphoretic.      Comments: Thin.  Pleasant and cooperative   HENT:      Head: Normocephalic.   Neck:      Musculoskeletal: Neck supple.      Comments: No JVD  Cardiovascular:      Rate and Rhythm: Normal rate and regular rhythm.      Heart sounds: No murmur.   Pulmonary:      Effort: No respiratory distress.      Breath sounds: No stridor. No wheezing, rhonchi or rales.      Comments: Diminished but clear  Abdominal:      General: Bowel sounds are normal. There is no distension.      Palpations: Abdomen is soft.      Tenderness: There is no abdominal tenderness.      Comments: No hepatosplenomegaly   Musculoskeletal:      Right lower leg: No edema.      Left lower leg: No edema.   Skin:     General: Skin is warm and dry.      Findings: No rash.   Neurological:      Mental Status: She is alert.         Results Review:       I reviewed the patient's new clinical results.  Results from last 7 days   Lab Units 10/20/20  0513 10/19/20  1422   WBC 10*3/mm3 8.67 9.53   HEMOGLOBIN g/dL 12.6 14.4   PLATELETS 10*3/mm3 227 266     Results from last 7 days   Lab Units 10/20/20  0513 10/19/20  1422   SODIUM mmol/L 139 136   POTASSIUM mmol/L 4.8 4.3    CHLORIDE mmol/L 108* 101   CO2 mmol/L 24.2 27.1   BUN mg/dL 13 16   CREATININE mg/dL 0.76 0.72   GLUCOSE mg/dL 90 82   Estimated Creatinine Clearance: 48.6 mL/min (by C-G formula based on SCr of 0.76 mg/dL).  Results from last 7 days   Lab Units 10/20/20  0513 10/19/20  1422   ALBUMIN g/dL 3.40* 4.50   BILIRUBIN mg/dL 0.2 0.2   ALK PHOS U/L 81 96   AST (SGOT) U/L 16 21   ALT (SGPT) U/L 17 23     Results from last 7 days   Lab Units 10/20/20  0513 10/19/20  1422   CALCIUM mg/dL 8.7 9.6   ALBUMIN g/dL 3.40* 4.50     Results from last 7 days   Lab Units 10/19/20  1422   PROCALCITONIN ng/mL 0.04   LACTATE mmol/L 1.2     Hemoglobin A1C   Date/Time Value Ref Range Status   10/20/2020 0513 6.25 (H) 4.80 - 5.60 % Final     Glucose   Date/Time Value Ref Range Status   10/20/2020 1722 87 70 - 130 mg/dL Final   10/20/2020 1113 93 70 - 130 mg/dL Final   10/20/2020 0609 83 70 - 130 mg/dL Final   10/19/2020 2120 158 (H) 70 - 130 mg/dL Final       aspirin, 81 mg, Oral, Daily  cefTRIAXone, 1 g, Intravenous, Q24H  enoxaparin, 40 mg, Subcutaneous, Q24H  influenza vaccine, 0.5 mL, Intramuscular, Once  insulin lispro, 0-9 Units, Subcutaneous, TID AC  sodium chloride, 10 mL, Intravenous, Q12H       Diet Regular; Consistent Carbohydrate       Assessment/Plan     Active Hospital Problems    Diagnosis  POA   • **Acute UTI [N39.0]  Yes   • Symptomatic hypotension [I95.9]  Unknown   • Autonomic neuropathy [G90.9]  Unknown   • Type 2 diabetes mellitus without complication, without long-term current use of insulin (CMS/[facility]) [E11.9]  Yes   • COPD (chronic obstructive pulmonary disease) (CMS/[facility]) [J44.9]  Yes   • Paroxysmal atrial fibrillation (CMS/[facility]) [I48.0]  Yes   • KINDRA (obstructive sleep apnea) [G47.33]  Yes   • Essential hypertension [I10]  Yes      Resolved Hospital Problems   No resolved problems to display.       · Hypotension secondary to clonidine.  That has been discontinued.  No rebound.  Continue to follow  · UTI, probable  cystitis.  Continue Rocephin.  Await culture and sensitivity  · Diabetes mellitus type 2.  A1c is low.  Sugars here are low.  Not on medicine at home.  DC sliding scale insulin.  Decrease frequency of Accu-Cheks  · PAF.  Patient due for her cardiology appointment.  Cardiology consulted per her request.  Plans for echocardiogram noted  · Anticipate discharge home tomorrow.      Oriana Downs MD  [facility]  10/20/20  18:06 EDT

## 2020-10-20 NOTE — PLAN OF CARE
Problem: Adult Inpatient Plan of Care  Goal: Plan of Care Review  Outcome: Ongoing, Progressing   Goal Outcome Evaluation: Pt admitted with hypotension and bradycardia. Pt has no complaints of pain. Vitals stable. HR in 50's. Blood pressure WDL. Pure wick in use. Pt urine output diminished. NS at 125/hr. Falls risk. Pt safety maintained. Continue to monitor.

## 2020-10-20 NOTE — DISCHARGE PLACEMENT REQUEST
"Kandace Andrade (80 y.o. Female)     Date of Birth Social Security Number Address Home Phone MRN    1940  [address] [id number] [phone] [id number]    [place] Marital Status          [place]        Admission Date Admission Type Admitting Provider Attending Provider Department, Room/Bed    10/19/20 Emergency Gino Thompson MD Hayden, Juliana, MD [address], [address]    Discharge Date Discharge Disposition Discharge Destination                       Attending Provider: Oriana Downs MD    Allergies: Penicillins, Statins    Isolation: None   Infection: None   Code Status: CPR    Ht: 160 cm (63\")   Wt: 55.8 kg (123 lb)    Admission Cmt: None   Principal Problem: Acute UTI [N39.0]                 Active Insurance as of 10/19/2020     Primary Coverage     Payor Plan Insurance Group Employer/Plan Group    HUMANA MEDICARE REPLACEMENT HU[employer]NA MEDICARE REPLACEMENT [id number]     Payor Plan Address Payor Plan Phone Number Payor Plan Fax Number Effective Dates    [address] [address] [phone]  1/1/2018 - None Entered    [facility] [address]       Subscriber Name Subscriber Birth Date Member ID       KANDACE ANDRADE 1940 [id number]                 Emergency Contacts      (Rel.) Home Phone Work Phone Mobile Phone    Lupe,Don (Spouse) -- -- [phone]    millicent hernandez -- -- [phone]               "

## 2020-10-20 NOTE — PROGRESS NOTES
Continued Stay Note  [facility]     Patient Name: Kandace Andrade  MRN: [id number]  Today's Date: 10/20/2020    Admit Date: 10/19/2020    Discharge Plan     Row Name 10/20/20 1251       Plan    Plan  Return home with spouse and assist of daughters. [facility]  to follow up for nursing at D/C.Family to transport at D/C    Patient/Family in Agreement with Plan  yes    Plan Comments  Met with pt. at bedside. Explained roll of . Face sheet and pharmacy verified. Pt lives with her spouse. She is the primary caregiver for her  since his CVA. There are four steps to enter home.  Pt denies the use of any DME.  Pt is independent with ADLs. Pt has never been to Rehab or used HH. [facility]  sees her  on Mondays and Thursday. She requests referral for [facility]  to follow up with her after D/C for vital signs check.  Pt’s PCP is Dr. Bella. Uses [facility] on [address]. Pt states she still drives but does not like too. At discharge, family will transport. Explained that CCP would follow to assess for discharge needs.  Tip Villegas RN-BC        Discharge Codes    No documentation.             Tip Villegas, RN

## 2020-10-20 NOTE — PLAN OF CARE
Goal Outcome Evaluation:  Plan of Care Reviewed With: patient  Progress: improving  Outcome Summary: Pt c/o back pain and headache, relieved with PRN tylenol. Ambulated and up to chair with PT, pt states she did have some dizziness, but no drop in BP. Cardiology consulted. Continues on IV rocephin. IVf discontinued.

## 2020-10-20 NOTE — CONSULTS
"[facility]        Patient Name: Kandace Andrade  Age/Sex: 80 y.o. female  : 1940  MRN: [id number]    Date of Admission: 10/19/2020  Date of Encounter Visit: 10/20/20  Encounter Provider: Vivi Downs, VERO, APRN  Referring Provider: Sammy Bullock MD  Place of Service: [facility] CARDIOLOG[facility]  Patient Care Team:  Cassi Bella MD as PCP - General (Family Medicine)    Subjective:     Chief Complaint: Hypotension, dizziness    Reason for consult: History of atrial fibrillation       History of Present Illness:  Kandace Andrade is a 80 y.o. femalewho follows Dr. Pinto in our office.  Patient was admitted with hypotension, dizziness, UTI, \"early sepsis\".  We have been asked to be seen for history of a fib.     Patient has a past medical history significant for paroxysmal atrial fibrillation, COPD/asthma, hypertension, hyperlipidemia, obstructive sleep apnea intolerant of CPAP, anxiety, arthritis.      In  she had back surgery and postoperatively became hypoxic with aspiration pneumonia and went into atrial fibrillation.  She converted to sinus rhythm with IV amiodarone.  2011 blood pressure is labile and medications were adjusted.  In  she was having shortness of breath and stress perfusion study was negative.  Echo was unremarkable.  She was last seen in the office  by Dr. Pinto at which point she had some shortness of breath with heavier exertion but denied any chest pain or pressure.  EKG at that point showed sinus rhythm with heart rate of 77 bpm and nonspecific ST-T wave changes.  She canceled her 2020 office follow-up.  She called the office 2020 reporting that she was going to the ER due to hypotension, shortness of breath, weakness.      She presented to the ER at which point she reported a 3-day history of gradual onset of progressive weakness, orthostatic lightheadedness, and hypotension.  She denied cough, fever, " chest pain, or shortness of breath.  Blood pressure was found to be 80s over 50s.  She was lightheaded with standing.  She denied any recent vomiting or diarrhea.  She also had some dysuria.  Blood pressure upon presentation was 88/61.  Chest x-ray showed normal heart size, tortuous and calcified aorta, unremarkable pulmonary vasculature, minimal atelectasis at the bases, no pleural effusion or pneumothorax.  EKG showed sinus bradycardia with heart rate of 56 bpm and multiple PACs.  No significant ST-T wave changes were noted.  CBC was unremarkable.  CMP was normal with creatinine of 0.72 and GFR of 78.  Troponin was negative.  Serum lactate and pro calcitonin were within normal limits.  Respiratory panel is negative, including negative for COVID-19.  Patient was found to have urinary tract infection.  Patient was to be discharged however continued to have low blood pressure readings with significant lightheadedness and was not comfortable going home so she was admitted for further evaluation.  Orthostatics did not show significant drop in blood pressure or rise in heart rate with postural stress.  Blood cultures to this point have shown no growth.    Patient reports that she requested this consult as she had cancelled a recent office visit.  She has not had atrial fibrillation since she has been here, staying in sinus.  She has a history of mild BROWN which is chronic, not new or worsening, and she attributes this to COPD.  She denies chest pain or discomfort, palpitations, syncope, falls, abnormal bleeding.  She reports that a few days prior to admission she had some brief ankle swelling, now resolved, no recurrence.  She reports that usually (prior to around the time of ER visit when it was found to be low) BP would run around 120s-140s about 30 minutes after meds but does not sit before checking.           Prior Cardiac Testin. EKG 2020        2. EKG 2019        Past Medical History:  Past Medical  History:   Diagnosis Date   • A-fib (CMS/[facility])    • Acute seasonal allergic rhinitis due to pollen    • Anxiety    • Anxiety and depression    • Arthritis    • Asthma    • Claudication, intermittent (CMS/[facility])    • COPD (chronic obstructive pulmonary disease) (CMS/[facility])    • Edema    • Fall, accidental    • Falls frequently    • Fatigue    • Fibula fracture    • Herpes zoster    • History of cataract    • History of lumbosacral spine surgery    • History of migraine headaches    • Hyperglycemia    • Hypersomnia with sleep apnea    • IBS (irritable bowel syndrome)    • Mixed hyperlipidemia    • KINDRA (obstructive sleep apnea)    • Osteoporosis    • Paroxysmal atrial fibrillation (CMS/[facility])    • Right ankle injury    • Right hip pain    • Right knee pain    • Right shoulder pain    • Vitamin D deficiency        Past Surgical History:   Procedure Laterality Date   • APPENDECTOMY     • BACK SURGERY      lower x2   • CATARACT EXTRACTION     • HYSTERECTOMY      partial   • SHOULDER SURGERY Right    • SHOULDER SURGERY     • TONSILLECTOMY         Home Medications:   Medications Prior to Admission   Medication Sig Dispense Refill Last Dose   • acetaminophen (TYLENOL) 500 MG tablet Take 1,000 mg by mouth Every 6 (Six) Hours As Needed for Mild Pain .   10/19/2020 at Unknown time   • aspirin 81 MG chewable tablet Chew 81 mg Daily.   10/19/2020 at Unknown time   • budesonide-formoterol (SYMBICORT) 80-4.5 MCG/ACT inhaler Inhale 2 puffs 2 (Two) Times a Day As Needed.      • cloNIDine (CATAPRES) 0.2 MG tablet Take 0.2 mg by mouth 2 (Two) Times a Day.   10/19/2020 at Unknown time   • fluticasone (FLONASE) 50 MCG/ACT nasal spray 2 sprays into the nostril(s) as directed by provider Daily As Needed for Rhinitis.      • PARoxetine (PAXIL) 40 MG tablet Take 1 tablet by mouth Every Morning. 30 tablet 5 10/19/2020 at Unknown time   • vitamin D (ERGOCALCIFEROL) 1.25 MG (00223 UT) capsule capsule TAKE 1 CAPSULE BY MOUTH EVERY 7 DAYS 5 capsule 5  10/19/2020 at Unknown time       Allergies:  Allergies   Allergen Reactions   • Penicillins Hives and Other (See Comments)     Elevated BP   • Statins Other (See Comments)     Headache, elevated liver enzymes, blurred vision       Past Social History:  Social History     Socioeconomic History   • Marital status:      Spouse name: Not on file   • Number of children: Not on file   • Years of education: Not on file   • Highest education level: Not on file   Tobacco Use   • Smoking status: Former Smoker     Packs/day: 1.00     Years: 25.00     Pack years: 25.00     Quit date: 2008     Years since quittin.4   • Smokeless tobacco: Never Used   Substance and Sexual Activity   • Alcohol use: No   • Drug use: No   Lifestyle   • Physical activity     Days per week: Patient refused     Minutes per session: Patient refused   • Stress: Not on file       Past Family History:  Family History   Problem Relation Age of Onset   • Heart disease Mother    • Lung cancer Mother    • Diabetes Father    • Heart disease Paternal Uncle        Review of Systems   Constitution: Negative for malaise/fatigue.   Eyes: Positive for blurred vision.   Cardiovascular: Positive for dyspnea on exertion (Chronic, not new or worsening, attributes to hx COPD). Negative for chest pain, leg swelling, palpitations and syncope.        SEE HPI   Respiratory:        COPD   Hematologic/Lymphatic: Negative for bleeding problem.   Musculoskeletal: Negative for falls.   Gastrointestinal: Negative for melena.   Genitourinary: Positive for dysuria.        UTI   Neurological: Positive for dizziness (Improved ).   Psychiatric/Behavioral: Negative for altered mental status.         Objective:   Temp:  [98 °F (36.7 °C)-98.6 °F (37 °C)] 98 °F (36.7 °C)  Heart Rate:  [46-84] 57  Resp:  [16-18] 16  BP: ()/(48-89) 135/75     Intake/Output Summary (Last 24 hours) at 10/20/2020 1512  Last data filed at 10/20/2020 1321  Gross per 24 hour   Intake 2830.5 ml    Output 1100 ml   Net 1730.5 ml     Body mass index is 21.79 kg/m².      10/19/20  1748   Weight: 55.8 kg (123 lb)       Constitutional:       General: Not in acute distress.     Appearance: Well-developed. Not diaphoretic.   Eyes:      Pupils: Pupils are equal, round, and reactive to light.   HENT:      Head: Normocephalic and atraumatic.   Neck:      Musculoskeletal: Neck supple.      Vascular: No carotid bruit or JVD.   Pulmonary:      Effort: Pulmonary effort is normal. No respiratory distress.      Breath sounds: Normal breath sounds. No wheezing. No rales.   Cardiovascular:      Normal rate. Regular rhythm.      Murmurs: There is no murmur.      No gallop. No click. No rub.   Pulses:     Intact distal pulses.   Edema:     Peripheral edema absent.   Abdominal:      General: Bowel sounds are normal. There is no distension.      Palpations: Abdomen is soft.   Musculoskeletal:         General: No tenderness or deformity.   Skin:     General: Skin is warm and dry.      Findings: No erythema or rash.   Neurological:      Mental Status: Alert and oriented to person, place, and time.   Psychiatric:         Behavior: Behavior normal.         Judgment: Judgment normal.           Lab Review:   Results from last 7 days   Lab Units 10/20/20  0513 10/19/20  1422   SODIUM mmol/L 139 136   POTASSIUM mmol/L 4.8 4.3   CHLORIDE mmol/L 108* 101   CO2 mmol/L 24.2 27.1   BUN mg/dL 13 16   CREATININE mg/dL 0.76 0.72   GLUCOSE mg/dL 90 82   CALCIUM mg/dL 8.7 9.6   AST (SGOT) U/L 16 21   ALT (SGPT) U/L 17 23     Results from last 7 days   Lab Units 10/19/20  1422   TROPONIN T ng/mL <0.010     Results from last 7 days   Lab Units 10/20/20  0513 10/19/20  1422   WBC 10*3/mm3 8.67 9.53   HEMOGLOBIN g/dL 12.6 14.4   HEMATOCRIT % 36.5 42.2   PLATELETS 10*3/mm3 227 266                   Invalid input(s): LDLCALC            Echo EF Estimated  No results found for: ECHOEFEST    EKG:   I personally viewed and interpreted the patient's  EKG    Imaging:  Imaging Results (Most Recent)     Procedure Component Value Units Date/Time    XR Chest 1 View [[id number]] Collected: 10/19/20 1434     Updated: 10/19/20 1438    Narrative:      XR CHEST 1 VW-     HISTORY: Female who is 80 years-old,  hypotension     TECHNIQUE: Frontal view of the chest     COMPARISON: None available     FINDINGS: The heart size is normal. Aorta is tortuous, calcified.  Pulmonary vasculature is unremarkable. Minimal likely atelectasis at the  bases. No pleural effusion, or pneumothorax. No acute osseous process.       Impression:      Minimal likely atelectasis at the bases. Tortuous aorta     This report was finalized on 10/19/2020 2:35 PM by Dr. Matthew Her M.D.                 Assessment:       Acute UTI    KINDRA (obstructive sleep apnea)    Essential hypertension    Stage 3 chronic kidney disease    Paroxysmal atrial fibrillation (CMS/[facility])    COPD (chronic obstructive pulmonary disease) (CMS/[facility])    Type 2 diabetes mellitus without complication, without long-term current use of insulin (CMS/[facility])    Symptomatic hypotension    Autonomic neuropathy        Plan:     1. Dizziness: Felt to possibly have underlying autonomic dysfunction. Not exertional.  Dizziness improved with improvement in BP but still present. Will check echo, also with some recent lower extremity edema.   2. History of hypertension with recent hypotension: in the setting of UTI and low-grade sepsis.  Continue to hold BP medications for now.  May need to restart or consider alternate therapy if BP elevates. She feels clonidine may have been contributing to lightheadedness though also reports that she failed therapy with multiple other agents in the past.   3. Acute UTI with low-grade sepsis: Admitting team managing, on antibiotics  4. History of atrial fibrillation: maintaining sinus since admission.  Continue to monitor.   5. Tortuous aorta: noted on chest x-ray.   6. Prediabetes  7. History of kidney  disease: With normal renal function this admission  8. History of COPD and obstructive sleep apnea        Thank you for allowing me to participate in the care of Kandace Andrade. Feel free to contact me directly with any further questions or concerns.    Vivi Downs DNP, ANTOINETTE  [facility]  10/20/20  15:12 EDT      EMR Dragon/Transcription disclaimer:   Much of this encounter note is an electronic transcription/translation of spoken language to printed text. The electronic translation of spoken language may permit erroneous, or at times, nonsensical words or phrases to be inadvertently transcribed; Although I have reviewed the note for such errors, some may still exist.

## 2020-10-20 NOTE — H&P
Internal medicine history and physical  INTERNAL MEDICINE   [facility]       Patient Identification:  Name: Kandace Andrade  Age: 80 y.o.  Sex: female  :  1940  MRN: [id number]                   Primary Care Physician: Cassi Bella MD                               Date of admission:10/19/2020    Chief Complaint: Sent to the emergency room by the home health nurse for low blood pressure.    History of Present Illness:   Patient is 80-year-old female who has history of atrial fibrillation, COPD/asthma and high blood pressure and has been on Catapres for quite some period of time has been noticing that for the last couple years she becomes very dizzy when she stands up and walk after taking her blood pressure medications in the morning.  Until last week the symptoms of dizziness upon standing up and walking around after taking her blood pressure medicines usually improved by the middle of the day and she could carry on.  Since last week or so she has been dizzy and the symptoms are not getting any better as the used to and on the top of it she has been having some frequency and burning in urination.  Patient lives with her  who has multiple medical problems and both of them require help from visiting nurse who checks on them on  and .  For some reason last Thursday visit by the visiting nurse did not happen and since then patient has been getting very dizzy and weak.  Today home health nurse found her to be hypotensive and dizzy along with weakness and sent her to the ER for evaluation in the emergency room patient was noted to be bradycardic with systolic blood pressure in 80s which improved slightly with IV fluids.  She was also noted to have abnormal urinalysis consistent with urinary tract infection.  After fluid resuscitation it was decided that patient was stable and could be discharged but in doing so patient developed significant dizziness and resulting in  request for admission for further care.  Patient orthostatics did show significant drop in blood pressure but at the same time did not show any rise in heart rate due to postural stress.  Patient recalls being diagnosed with diabetes and elevated blood sugar for the last couple of months.      Past Medical History:  Past Medical History:   Diagnosis Date   • A-fib (CMS/[facility])    • Acute seasonal allergic rhinitis due to pollen    • Anxiety    • Anxiety and depression    • Arthritis    • Asthma    • Claudication, intermittent (CMS/[facility])    • COPD (chronic obstructive pulmonary disease) (CMS/[facility])    • Edema    • Fall, accidental    • Falls frequently    • Fatigue    • Fibula fracture    • Herpes zoster    • History of cataract    • History of lumbosacral spine surgery    • History of migraine headaches    • Hyperglycemia    • Hypersomnia with sleep apnea    • IBS (irritable bowel syndrome)    • Mixed hyperlipidemia    • KINDRA (obstructive sleep apnea)    • Osteoporosis    • Paroxysmal atrial fibrillation (CMS/[facility])    • Right ankle injury    • Right hip pain    • Right knee pain    • Right shoulder pain    • Vitamin D deficiency      Past Surgical History:  Past Surgical History:   Procedure Laterality Date   • APPENDECTOMY     • BACK SURGERY      lower x2   • CATARACT EXTRACTION     • HYSTERECTOMY      partial   • SHOULDER SURGERY Right    • SHOULDER SURGERY     • TONSILLECTOMY        Home Meds:  Medications Prior to Admission   Medication Sig Dispense Refill Last Dose   • acetaminophen (TYLENOL) 500 MG tablet Take 1,000 mg by mouth Every 6 (Six) Hours As Needed for Mild Pain .      • aspirin 81 MG chewable tablet Chew 81 mg Daily.      • cloNIDine (CATAPRES) 0.2 MG tablet Take 0.2 mg by mouth 2 (Two) Times a Day.      • PARoxetine (PAXIL) 40 MG tablet Take 1 tablet by mouth Every Morning. 30 tablet 5    • vitamin D (ERGOCALCIFEROL) 1.25 MG (68426 UT) capsule capsule TAKE 1 CAPSULE BY MOUTH EVERY 7 DAYS 5 capsule 5    •  sulfamethoxazole-trimethoprim (Bactrim DS) 800-160 MG per tablet Take 1 tablet by mouth 2 (Two) Times a Day. 20 tablet 0      Current Meds:     Current Facility-Administered Medications:   •  [START ON 10/20/2020] influenza vac split quad (FLUZONE,FLUARIX,AFLURIA,FLULAVAL) injection 0.5 mL, 0.5 mL, Intramuscular, Once, Sammy Bullock MD  •  [COMPLETED] Insert peripheral IV, , , Once **AND** sodium chloride 0.9 % flush 10 mL, 10 mL, Intravenous, PRN, Ruben Miller PA  •  sodium chloride 0.9 % infusion, 125 mL/hr, Intravenous, Continuous, Sammy Bullock MD, Last Rate: 125 mL/hr at 10/19/20 1933, 125 mL/hr at 10/19/20 1933  Allergies:  Allergies   Allergen Reactions   • Penicillins Hives and Other (See Comments)     Elevated BP   • Statins Other (See Comments)     Headache, elevated liver enzymes, blurred vision     Social History:   Social History     Tobacco Use   • Smoking status: Former Smoker     Packs/day: 1.00     Years: 25.00     Pack years: 25.00     Quit date: 2008     Years since quittin.4   • Smokeless tobacco: Never Used   Substance Use Topics   • Alcohol use: No      Family History:  Family History   Problem Relation Age of Onset   • Heart disease Mother    • Lung cancer Mother    • Diabetes Father    • Heart disease Paternal Uncle           Review of Systems  See history of present illness and past medical history.    Constitutional: Remarkable for no fever or chills but generalized weakness  Cardiovascular: Remarkable for no chest pain or shortness of breath or palpitation  Respiratory: Remarkable for no nausea vomiting or diarrhea but does have decreased appetite  : Remarkable for frequency and burning in urination but no flank pain  Musculoskeletal: Remarkable for no new joint aches and pain  Neurological: Remarkable for dizziness when she gets up and stands up but denies any focal weakness of arm or legs or confusion.      Vitals:   /63 (BP Location: Right arm, Patient Position:  "Lying)   Pulse (!) 49   Temp 98 °F (36.7 °C) (Oral)   Resp 16   Ht 160 cm (63\")   Wt 55.8 kg (123 lb)   SpO2 96%   BMI 21.79 kg/m²   I/O:     Intake/Output Summary (Last 24 hours) at 10/19/2020 2003  Last data filed at 10/19/2020 1933  Gross per 24 hour   Intake 240 ml   Output --   Net 240 ml     Exam:  Patient is examined using the personal protective equipment as per guidelines from infection control for this particular patient as enacted.  Hand washing was performed before and after patient interaction.  General Appearance:    Alert, cooperative, no distress, appears stated age   Head:    Normocephalic, without obvious abnormality, atraumatic   Eyes:    PERRL, conjunctiva/corneas clear, EOM's intact, both eyes   Ears:    Normal external ear canals, both ears   Nose:   Nares normal, septum midline, mucosa normal, no drainage    or sinus tenderness   Throat:   Lips, tongue, gums normal; oral mucosa pink and moist   Neck:   Supple, symmetrical, trachea midline, no adenopathy;     thyroid:  no enlargement/tenderness/nodules; no carotid    bruit or JVD   Back:     Symmetric, no curvature, ROM normal, no CVA tenderness   Lungs:     Clear to auscultation bilaterally, respirations unlabored   Chest Wall:    No tenderness or deformity    Heart:   S1-S2 bradycardia   Abdomen:    Soft nontender   Extremities:   Extremities normal, atraumatic, no cyanosis or edema   Pulses:   Pulses palpable in all extremities; symmetric all extremities   Skin:   Skin color normal, Skin is warm and dry,  no rashes or palpable lesions   Neurologic:  Grossly nonfocal       Data Review:      I reviewed the patient's new clinical results.  Results from last 7 days   Lab Units 10/19/20  1422   WBC 10*3/mm3 9.53   HEMOGLOBIN g/dL 14.4   PLATELETS 10*3/mm3 266     Results from last 7 days   Lab Units 10/19/20  1422   SODIUM mmol/L 136   POTASSIUM mmol/L 4.3   CHLORIDE mmol/L 101   CO2 mmol/L 27.1   BUN mg/dL 16   CREATININE mg/dL 0.72 "   CALCIUM mg/dL 9.6   GLUCOSE mg/dL 82     Brief Urine Lab Results  (Last result in the past 365 days)      Color   Clarity   Blood   Leuk Est   Nitrite   Protein   CREAT   Urine HCG        10/19/20 1439 Yellow Turbid Small (1+) Large (3+) Positive Negative             ECG 12 Lead   Final Result   HEART RATE= 56  bpm   RR Interval= 1204  ms   ID Interval= 164  ms   P Horizontal Axis= 0  deg   P Front Axis= -3  deg   QRSD Interval= 74  ms   QT Interval= 413  ms   QRS Axis= 27  deg   T Wave Axis= 31  deg   - ABNORMAL ECG -   Sinus rhythm   Atrial premature complexes   Low voltage, precordial leads   NO PRIOR TRACING AVAILABLE FOR COMPARISON   Electronically Signed By: Kala Lynn ([facility]) 19-Oct-2020 16:50:02   Date and Time of Study: 2020-10-19 14:19:04        Xr Chest 1 View    Result Date: 10/19/2020  Minimal likely atelectasis at the bases. Tortuous aorta  This report was finalized on 10/19/2020 2:35 PM by Dr. Matthew Her M.D.      Microbiology Results (last 10 days)     Procedure Component Value - Date/Time    Respiratory Panel PCR w/COVID-19(SARS-CoV-2) NANI/NICK/ESMER/PAD/COR/MAD/SANTINO In-House, NP Swab in UTM/VTM, 3-4 HR TAT - Swab, Nasopharynx [[id number]]  (Normal) Collected: 10/19/20 1748    Lab Status: Final result Specimen: Swab from Nasopharynx Updated: 10/19/20 1908     ADENOVIRUS, PCR Not Detected     Coronavirus 229E Not Detected     Coronavirus HKU1 Not Detected     Coronavirus NL63 Not Detected     Coronavirus OC43 Not Detected     COVID19 Not Detected     Human Metapneumovirus Not Detected     Human Rhinovirus/Enterovirus Not Detected     Influenza A PCR Not Detected     Influenza A H1 Not Detected     Influenza A H1 2009 PCR Not Detected     Influenza A H3 Not Detected     Influenza B PCR Not Detected     Parainfluenza Virus 1 Not Detected     Parainfluenza Virus 2 Not Detected     Parainfluenza Virus 3 Not Detected     Parainfluenza Virus 4 Not Detected     RSV, PCR Not Detected     Bordetella  pertussis pcr Not Detected     Bordetella parapertussis PCR Not Detected     Chlamydophila pneumoniae PCR Not Detected     Mycoplasma pneumo by PCR Not Detected    Narrative:      Fact sheet for providers: https://docs.[employer]/wp-content/uploads/FYO9263-4025-UH5.1-EUA-Provider-Fact-Sheet-3.pdf    Fact sheet for patients: https://docs.[employer]/wp-content/uploads/MMQ5457-8407-SL6.1-EUA-Patient-Fact-Sheet-1.pdf              Assessment:  Active Hospital Problems    Diagnosis POA   • **Acute UTI [N39.0] Yes   • Symptomatic hypotension [I95.9] Unknown   • Autonomic neuropathy [G90.9] Unknown   • Type 2 diabetes mellitus without complication, without long-term current use of insulin (CMS/[facility]) [E11.9] Yes   • COPD (chronic obstructive pulmonary disease) (CMS/[facility]) [J44.9] Yes   • Paroxysmal atrial fibrillation (CMS/[facility]) [I48.0] Yes   • KINDRA (obstructive sleep apnea) [G47.33] Yes   • Stage 3 chronic kidney disease [N18.30] Yes   • Essential hypertension [I10] Yes       Medical decision making:  Orthostatic dizziness with generalized weakness with inappropriate chronotropic response to postural stress in the context of ongoing use of Catapres and underlying diabetes.  This likely represent underlying autonomic neuropathy versus muted autonomic response to postural stress due to ongoing use of Catapres.  Superimposed low-grade sepsis due to UTI and associated dehydration further compounding the issue.  Plan is to admit the patient provided with IV fluids discontinue her Catapres and aggressively treat her urinary tract infection.  Check orthostatics and consider using compression stockings or pharmacological agents such as midodrine or Florinef.  Acute UTI-started on Rocephin follow-up on urine culture results and adjust antibiotics accordingly.  Chronic kidney disease with normal creatinine plan is to monitor renal function and avoid hypotensive episodes and nephrotoxic agents.  Diabetes mellitus-continue with  Accu-Cheks and sliding scale coverage and watch for hypoglycemia.  COPD with obstructive sleep apnea-provided with pulse ox monitoring and monitor mental status and level of wakefulness.  History of paroxysmal atrial fibrillation-monitor.  Sammy Bullock MD   10/19/2020  20:03 EDT  Much of this encounter note is an electronic transcription/translation of spoken language to printed text. The electronic translation of spoken language may permit erroneous, or at times, nonsensical words or phrases to be inadvertently transcribed; Although I have reviewed the note for such errors, some may still exist

## 2020-10-20 NOTE — PROGRESS NOTES
Discharge Planning Assessment  [facility]     Patient Name: Kandace Andrade  MRN: [id number]  Today's Date: 10/20/2020    Admit Date: 10/19/2020    Discharge Needs Assessment     Row Name 10/20/20 1248       Living Environment    Lives With  spouse    Name(s) of Who Lives With Patient  Pt lives with her spouse and is the primary caregiver for him.    Current Living Arrangements  home/apartment/condo    Primary Care Provided by  self    Provides Primary Care For  spouse    Family Caregiver if Needed  child(chriss), adult    Family Caregiver Names  2 daughters    Quality of Family Relationships  involved    Able to Return to Prior Arrangements  yes       Resource/Environmental Concerns    Resource/Environmental Concerns  home accessibility    Home Accessibility Concerns  stairs to enter home 4 steps to enter home       Transition Planning    Patient/Family Anticipates Transition to  home with family    Patient/Family Anticipated Services at Transition  home health care    Transportation Anticipated  family or friend will provide       Discharge Needs Assessment    Readmission Within the Last 30 Days  no previous admission in last 30 days    Equipment Currently Used at Home  none    Concerns to be Addressed  care coordination/care conferences;discharge planning    Anticipated Changes Related to Illness  none    Equipment Needed After Discharge  none    Discharge Facility/Level of Care Needs  home with home health    Provided Post Acute Provider List?  Refused    Refused Provider List Comment  Nahun NAVARRO sees her spouse twice a week. She would like Nahun NAVARRO to follow up on her after D/C.    Provided Post Acute Provider Quality & Resource List?  Refused    Current Discharge Risk  lack of support system/caregiver        Discharge Plan     Row Name 10/20/20 1258       Plan    Plan  Return home with spouse and assist of daughters. Nahun NAVARRO to follow up for nursing at D/C.Family to transport at D/C    Patient/Family in  Agreement with Plan  yes    Plan Comments  Met with pt. at bedside. Explained roll of . Face sheet and pharmacy verified. Pt lives with her spouse. She is the primary caregiver for her  since his CVA. There are four steps to enter home.  Pt denies the use of any DME.  Pt is independent with ADLs. Pt has never been to Rehab or used HH. [facility]  sees her  on Mondays and Thursday. She requests referral for [facility]  to follow up with her after D/C for vital signs check.  Pt’s PCP is Dr. Bella. Uses [facility] [address] Pt states she still drives but does not like too. At discharge, family will transport. Explained that CCP would follow to assess for discharge needs.  Tip Villegas RN-BC        Continued Care and Services - Admitted Since 10/19/2020     Home Medical Care     Service Provider Request Status Selected Services Address Phone Fax    [facility]  Pending - Request Sent N/A [address] [address] [phone]       Internal Comment last updated by Tip Villegas RN 10/20/2020 1201    Current pt                           Demographic Summary     Row Name 10/20/20 1248       General Information    Admission Type  inpatient    Arrived From  emergency department    Required Notices Provided  Important Message from Medicare    Reason for Consult  discharge planning;care coordination/care conference;decision-making    Preferred Language  English     Used During This Interaction  no        Functional Status     Row Name 10/20/20 1248       Functional Status    Usual Activity Tolerance  good    Current Activity Tolerance  moderate       Functional Status, IADL    Medications  independent    Meal Preparation  independent    Housekeeping  independent    Laundry  independent    Shopping  independent       Mental Status    General Appearance WDL  WDL       Mental Status Summary    Recent  Changes in Mental Status/Cognitive Functioning  no changes                Tip Villegas, RN

## 2020-10-21 ENCOUNTER — READMISSION MANAGEMENT (OUTPATIENT)
Dept: CALL CENTER | Facility: HOSPITAL | Age: 80
End: 2020-10-21

## 2020-10-21 VITALS
HEIGHT: 63 IN | TEMPERATURE: 97.7 F | HEART RATE: 101 BPM | OXYGEN SATURATION: 94 % | WEIGHT: 123 LBS | DIASTOLIC BLOOD PRESSURE: 95 MMHG | SYSTOLIC BLOOD PRESSURE: 150 MMHG | BODY MASS INDEX: 21.79 KG/M2 | RESPIRATION RATE: 16 BRPM

## 2020-10-21 PROBLEM — I95.9 SYMPTOMATIC HYPOTENSION: Status: RESOLVED | Noted: 2020-10-19 | Resolved: 2020-10-21

## 2020-10-21 LAB
BACTERIA SPEC AEROBE CULT: ABNORMAL
GLUCOSE BLDC GLUCOMTR-MCNC: 105 MG/DL (ref 70–130)
GLUCOSE BLDC GLUCOMTR-MCNC: 99 MG/DL (ref 70–130)

## 2020-10-21 PROCEDURE — G0378 HOSPITAL OBSERVATION PER HR: HCPCS

## 2020-10-21 PROCEDURE — 25010000002 CEFTRIAXONE PER 250 MG: Performed by: INTERNAL MEDICINE

## 2020-10-21 PROCEDURE — 25010000002 ENOXAPARIN PER 10 MG: Performed by: INTERNAL MEDICINE

## 2020-10-21 PROCEDURE — 99213 OFFICE O/P EST LOW 20 MIN: CPT | Performed by: INTERNAL MEDICINE

## 2020-10-21 PROCEDURE — 82962 GLUCOSE BLOOD TEST: CPT

## 2020-10-21 RX ORDER — CEPHALEXIN 500 MG/1
500 CAPSULE ORAL 3 TIMES DAILY
Qty: 6 CAPSULE | Refills: 0 | Status: ON HOLD | OUTPATIENT
Start: 2020-10-22 | End: 2020-11-24

## 2020-10-21 RX ADMIN — ACETAMINOPHEN 1000 MG: 500 TABLET, FILM COATED ORAL at 12:20

## 2020-10-21 RX ADMIN — CEFTRIAXONE SODIUM 1 G: 1 INJECTION, SOLUTION INTRAVENOUS at 12:20

## 2020-10-21 RX ADMIN — ASPIRIN 81 MG: 81 TABLET, CHEWABLE ORAL at 08:58

## 2020-10-21 RX ADMIN — ENOXAPARIN SODIUM 40 MG: 40 INJECTION SUBCUTANEOUS at 08:57

## 2020-10-21 RX ADMIN — ACETAMINOPHEN 1000 MG: 500 TABLET, FILM COATED ORAL at 03:59

## 2020-10-21 RX ADMIN — SODIUM CHLORIDE, PRESERVATIVE FREE 10 ML: 5 INJECTION INTRAVENOUS at 08:57

## 2020-10-21 NOTE — DISCHARGE SUMMARY
Patient Name: Kandace Andrade  : 1940  MRN: [id number]    Date of Admission: 10/19/2020  Date of Discharge:  10/21/2020  Primary Care Physician: Cassi Bella MD      Chief Complaint:   Hypotension and Shortness of Breath      Discharge Diagnoses     Active Hospital Problems    Diagnosis  POA   • **Acute UTI [N39.0]  Yes   • Autonomic neuropathy [G90.9]  Unknown   • Type 2 diabetes mellitus without complication, without long-term current use of insulin (CMS/[facility]) [E11.9]  Yes   • COPD (chronic obstructive pulmonary disease) (CMS/[facility]) [J44.9]  Yes   • Paroxysmal atrial fibrillation (CMS/[facility]) [I48.0]  Yes   • KINDRA (obstructive sleep apnea) [G47.33]  Yes   • Essential hypertension [I10]  Yes      Resolved Hospital Problems    Diagnosis Date Resolved POA   • Symptomatic hypotension [I95.9] 10/21/2020 Unknown        Hospital Course     Ms. Andrade is a 80 y.o. woman admitted for symptomatic hypotension.  She was very weak and dizzy.  Systolic blood pressure was only in the 80s.  She was also found to have a urinary tract infection.  Clonidine has been discontinued.  Blood pressures are improved.  She is feeling better.  Rocephin will be changed to Keflex, and she will complete antibiotic course at home.  Physical therapy has worked with her and they do believe she will be able to manage at home with her .  Home health will follow her.  She is improved and ready for discharge home.     Patient was due to see her cardiologist and requested she be seen while here.  This was done.  She will follow-up as recommended.    Stable condition; good prognosis    Day of Discharge     Feels better but has not gotten much sleep while here.    Physical Exam:  Temp:  [97.7 °F (36.5 °C)-98.4 °F (36.9 °C)] 97.7 °F (36.5 °C)  Heart Rate:  [] 101  Resp:  [16] 16  BP: (133-161)/(75-95) 150/95  Body mass index is 21.79 kg/m².  Physical Exam  Vitals signs and nursing note reviewed.   Neck:      Musculoskeletal: Neck  supple.   Cardiovascular:      Rate and Rhythm: Normal rate and regular rhythm.      Heart sounds: No murmur.   Pulmonary:      Effort: No respiratory distress.      Breath sounds: No stridor. No wheezing, rhonchi or rales.      Comments: Diminished but clear  Musculoskeletal:      Right lower leg: No edema.      Left lower leg: No edema.   Neurological:      Mental Status: She is alert.         Consultants     Consult Orders (all) (From admission, onward)     Start     Ordered    10/20/20 1354  Inpatient Cardiology Consult  Once     Specialty:  Cardiology  Provider:  Jayson Pinto MD    10/20/20 1353    10/19/20 1708  [facility] (on-call MD unless specified) Details  Once     Specialty:  Hospitalist  Provider:  (Not yet assigned)    10/19/20 1707              Procedures     * Surgery not found *      Imaging Results (All)     Procedure Component Value Units Date/Time    XR Chest 1 View [[id number]] Collected: 10/19/20 1434     Updated: 10/19/20 1438    Narrative:      XR CHEST 1 VW-     HISTORY: Female who is 80 years-old,  hypotension     TECHNIQUE: Frontal view of the chest     COMPARISON: None available     FINDINGS: The heart size is normal. Aorta is tortuous, calcified.  Pulmonary vasculature is unremarkable. Minimal likely atelectasis at the  bases. No pleural effusion, or pneumothorax. No acute osseous process.       Impression:      Minimal likely atelectasis at the bases. Tortuous aorta     This report was finalized on 10/19/2020 2:35 PM by Dr. Matthew Her M.D.                 Pertinent Labs     Results from last 7 days   Lab Units 10/20/20  0513 10/19/20  1422   WBC 10*3/mm3 8.67 9.53   HEMOGLOBIN g/dL 12.6 14.4   PLATELETS 10*3/mm3 227 266     Results from last 7 days   Lab Units 10/20/20  0513 10/19/20  1422   SODIUM mmol/L 139 136   POTASSIUM mmol/L 4.8 4.3   CHLORIDE mmol/L 108* 101   CO2 mmol/L 24.2 27.1   BUN mg/dL 13 16   CREATININE mg/dL 0.76 0.72   GLUCOSE mg/dL 90 82   Estimated Creatinine  Clearance: 48.6 mL/min (by C-G formula based on SCr of 0.76 mg/dL).  Results from last 7 days   Lab Units 10/20/20  0513 10/19/20  1422   ALBUMIN g/dL 3.40* 4.50   BILIRUBIN mg/dL 0.2 0.2   ALK PHOS U/L 81 96   AST (SGOT) U/L 16 21   ALT (SGPT) U/L 17 23     Results from last 7 days   Lab Units 10/20/20  0513 10/19/20  1422   CALCIUM mg/dL 8.7 9.6   ALBUMIN g/dL 3.40* 4.50       Results from last 7 days   Lab Units 10/19/20  1422   TROPONIN T ng/mL <0.010           Invalid input(s): LDLCALC     A1c 6.3  Respiratory viral panel including COVID-19 negative on 10/19/2020    Results from last 7 days   Lab Units 10/19/20  1439 10/19/20  1422   BLOODCX   --  No growth at 24 hours   URINECX  >100,000 CFU/mL Proteus mirabilis*  --    Resistant to nitrofurantoin, tetracycline and Bactrim    Test Results Pending at Discharge     Pending Labs     Order Current Status    Blood Culture - Blood, Arm, Right Preliminary result          Discharge Details        Discharge Medications      New Medications      Instructions Start Date   cephalexin 500 MG capsule  Commonly known as: Keflex   500 mg, Oral, 3 Times Daily   Start Date: October 22, 2020        Continue These Medications      Instructions Start Date   acetaminophen 500 MG tablet  Commonly known as: TYLENOL   1,000 mg, Oral, Every 6 Hours PRN      aspirin 81 MG chewable tablet   81 mg, Oral, Daily      budesonide-formoterol 80-4.5 MCG/ACT inhaler  Commonly known as: SYMBICORT   2 puffs, Inhalation, 2 Times Daily PRN      fluticasone 50 MCG/ACT nasal spray  Commonly known as: FLONASE   2 sprays, Nasal, Daily PRN      PARoxetine 40 MG tablet  Commonly known as: PAXIL   40 mg, Oral, Every Morning      vitamin D 1.25 MG (56647 UT) capsule capsule  Commonly known as: ERGOCALCIFEROL   TAKE 1 CAPSULE BY MOUTH EVERY 7 DAYS         Stop These Medications    azithromycin 250 MG tablet  Commonly known as: Zithromax     celecoxib 200 MG capsule  Commonly known as: CeleBREX     cloNIDine  0.2 MG tablet  Commonly known as: CATAPRES     hydroCHLOROthiazide 12.5 MG capsule  Commonly known as: MICROZIDE            Allergies   Allergen Reactions   • Penicillins Hives and Other (See Comments)     Elevated BP... tolerated ceftriaxone 10/2020 admission   • Statins Other (See Comments)     Headache, elevated liver enzymes, blurred vision         Discharge Disposition:  Home or Self Care    Discharge Diet:  Diet Order   Procedures   • Diet Regular; Consistent Carbohydrate       Discharge Activity:   Activity Instructions     Activity as Tolerated            CODE STATUS:    Code Status and Medical Interventions:   Ordered at: 10/19/20 2007     Code Status:    CPR     Medical Interventions (Level of Support Prior to Arrest):    Full       No future appointments.  Additional Instructions for the Follow-ups that You Need to Schedule     Referral to Home Health   As directed      Follow blood pressures and discuss with PCP    Order Comments: Follow blood pressures and discuss with PCP     Face to Face Visit Date: 10/21/2020    Follow-up provider for Plan of Care?: I treated the patient in an acute care facility and will not continue treatment after discharge.    Follow-up provider: LAKSHMI PEDERSEN [[id number]]    Reason/Clinical Findings: uti; hypotension    Describe mobility limitations that make leaving home difficult: elderly, frail with UTI and recent hypotension    Nursing/Therapeutic Services Requested: Physical Therapy Skilled Nursing    Skilled nursing orders: Cardiopulmonary assessments    PT orders: Strengthening    Frequency: 1 Week 1            Contact information for follow-up providers     Lakshmi Pedersen MD. Schedule an appointment as soon as possible for a visit in 2 week(s).    Specialties: Family Medicine, Emergency Medicine, Urgent Care  Why: Follow-up BP, UTI and diabetes   Contact information:  [address]  [facility] [address]  [phone]             Jayson Pinto MD  Follow up.    Specialty: Cardiology  Why: Follow-up as he recommends  Contact information:  [address]  [facility] [id number] [phone]                   Contact information for after-discharge care     Home Medical Care     [facility] .    Service: Home Health Services  Contact information:  [address]  [facility] [address] [phone]                             Additional Instructions for the Follow-ups that You Need to Schedule     Referral to Home Health   As directed      Follow blood pressures and discuss with PCP    Order Comments: Follow blood pressures and discuss with PCP     Face to Face Visit Date: 10/21/2020    Follow-up provider for Plan of Care?: I treated the patient in an acute care facility and will not continue treatment after discharge.    Follow-up provider: LAKSHMI PEDERSEN [[id number]]    Reason/Clinical Findings: uti; hypotension    Describe mobility limitations that make leaving home difficult: elderly, frail with UTI and recent hypotension    Nursing/Therapeutic Services Requested: Physical Therapy Skilled Nursing    Skilled nursing orders: Cardiopulmonary assessments    PT orders: Strengthening    Frequency: 1 Week 1           Time Spent on Discharge:  Greater than 35 minutes      Oriana Downs MD  [facility]  10/21/20  11:47 EDT

## 2020-10-21 NOTE — PLAN OF CARE
Goal Outcome Evaluation:  Plan of Care Reviewed With: patient  Progress: improving  Outcome Summary: complaints of back pain somewhat controlled with tylenol. purewich remains in place. possible discharge today if transitions to oral antibiotics

## 2020-10-21 NOTE — PROGRESS NOTES
"CC: Paroxysmal atrial fibrillation    Interval History: No acute events overnight and patient reports feeling better      Vital Signs  Temp:  [98 °F (36.7 °C)-98.6 °F (37 °C)] 98 °F (36.7 °C)  Heart Rate:  [57-72] 58  Resp:  [16] 16  BP: (133-161)/(75-94) 161/94    Intake/Output Summary (Last 24 hours) at 10/21/2020 0743  Last data filed at 10/21/2020 0529  Gross per 24 hour   Intake 1088 ml   Output 1700 ml   Net -612 ml     Flowsheet Rows      First Filed Value   Admission Height  160 cm (63\") Documented at 10/19/2020 1748   Admission Weight  55.8 kg (123 lb) Documented at 10/19/2020 1748          PHYSICAL EXAM:  General: No acute distress  Resp:NL Rate, symmetric chest expansion,unlabored, clear  CV:NL rate and rhythm, NL PMI, NL S1 and S2, no Murmur, no gallop, no rub, No JVD.   ABD:Nl sounds, no masses or tenderness, nondistended, no guarding or rebound  Neuro: alert,cooperative and oriented  Extr:Normal pedal pulses, No edema or cyanosis, moves all extremities      Results Review:    Results from last 7 days   Lab Units 10/20/20  0513   SODIUM mmol/L 139   POTASSIUM mmol/L 4.8   CHLORIDE mmol/L 108*   CO2 mmol/L 24.2   BUN mg/dL 13   CREATININE mg/dL 0.76   GLUCOSE mg/dL 90   CALCIUM mg/dL 8.7     Results from last 7 days   Lab Units 10/19/20  1422   TROPONIN T ng/mL <0.010     Results from last 7 days   Lab Units 10/20/20  0513   WBC 10*3/mm3 8.67   HEMOGLOBIN g/dL 12.6   HEMATOCRIT % 36.5   PLATELETS 10*3/mm3 227                     I reviewed the patient's new clinical results.  I personally viewed and interpreted the patient's EKG/Telemetry data        Medication Review:   Meds reviewed         Assessment/Plan    Paroxysmal atrial fibrillation  Urosepsis  Type 2 diabetes  Hypotension with past history of hypertension    Patient is currently hemodynamically stable.  No recurrence of A. Fib.  Blood pressure running on the higher side    Home blood pressure regimen shows clonidine, hydrochlorothiazide that " may  need to be resumed as needed( prefer starting bb/coreg instead of clonidine if needed)     Cardiology will sign off for now.  Please call with any questions.  Thank you for consulting with cardiology.    Alexander Hammer MD  10/21/20  07:43 EDT

## 2020-10-21 NOTE — OUTREACH NOTE
Prep Survey      Responses   [facility] patient discharged from?  [place]   Is LACE score < 7 ?  Yes   Eligibility  [facility]   Date of Admission  10/19/20   Date of Discharge  10/21/20   Discharge diagnosis  Acute UTI Hypotension and Shortness of Breath   Does the patient have one of the following disease processes/diagnoses(primary or secondary)?  Other   Does the patient have Home health ordered?  Yes   What is the Home health agency?   [facility]   Is there a DME ordered?  No   Prep survey completed?  Yes          Linh Ham RN

## 2020-10-21 NOTE — PROGRESS NOTES
Case Management Discharge Note      Final Note: Return home with spouse and assist of daughters. Nahun NAVARRO to follow up for nursing at D/C.Family to transport  Notified Brandie/Nahun NAVARRO of D/C.    Provided Post Acute Provider List?: Refused  Refused Provider List Comment: [facility] sees her spouse twice a week. She would like [facility] to follow up on her after D/C.  Provided Post Acute Provider Quality & Resource List?: Refused    Selected Continued Care - Admitted Since 10/19/2020     Destination    No services have been selected for the patient.              Durable Medical Equipment    No services have been selected for the patient.              Dialysis/Infusion    No services have been selected for the patient.              Home Medical Care Coordination complete    Service Provider Selected Services Address Phone Fax    [facility]  Home Health Services [address] [address] [phone]       Internal Comment last updated by Tip Villegas RN 10/20/2020 1206    Current pt                     Therapy    No services have been selected for the patient.              Community Resources    No services have been selected for the patient.                  Transportation Services  Private: Car    Final Discharge Disposition Code: 06 - home with home health care

## 2020-10-22 ENCOUNTER — TRANSITIONAL CARE MANAGEMENT TELEPHONE ENCOUNTER (OUTPATIENT)
Dept: CALL CENTER | Facility: HOSPITAL | Age: 80
End: 2020-10-22

## 2020-10-22 NOTE — OUTREACH NOTE
"Call Center TCM Note      Responses   [facility] patient discharged from?  [place]   Does the patient have one of the following disease processes/diagnoses(primary or secondary)?  Other   TCM attempt successful?  Yes   Call start time  1205   Call end time  1207   Discharge diagnosis  Acute UTI Hypotension and Shortness of Breath   Meds reviewed with patient/caregiver?  Yes   Is the patient having any side effects they believe may be caused by any medication additions or changes?  No   Does the patient have all medications ordered at discharge?  Yes   Is the patient taking all medications as directed (includes completed medication regime)?  Yes   Does the patient have a primary care provider?   Yes   Does the patient have an appointment with their PCP within 7 days of discharge?  No   What is preventing the patient from scheduling follow up appointments within 7 days of discharge?  -- [Pt reports her  has \"taken a fall\" so she needs to see how that goes. She will call the office at a later time to schedule hospital d/c f/u appt. ]   Nursing Interventions  Advised patient to make appointment [Routed to pcp office]   Has the patient kept scheduled appointments due by today?  N/A   What is the Home health agency?   [facility]   Has home health visited the patient within 72 hours of discharge?  Yes   Psychosocial issues?  No   Did the patient receive a copy of their discharge instructions?  Yes   Nursing interventions  Reviewed instructions with patient   What is the patient's perception of their health status since discharge?  Same   Is the patient/caregiver able to teach back signs and symptoms related to disease process for when to call PCP?  Yes   Is the patient/caregiver able to teach back signs and symptoms related to disease process for when to call 911?  Yes   Is the patient/caregiver able to teach back the hierarchy of who to call/visit for symptoms/problems? PCP, Specialist, Home health nurse, Urgent " [facility], ED, 911  Yes   If the patient is a current smoker, are they able to teach back resources for cessation?  Not a smoker   TCM call completed?  Yes          Loretta Looney RN    10/22/2020, 12:07 EDT

## 2020-10-24 ENCOUNTER — DOCUMENTATION (OUTPATIENT)
Dept: FAMILY MEDICINE CLINIC | Facility: CLINIC | Age: 80
End: 2020-10-24

## 2020-10-24 DIAGNOSIS — R11.0 NAUSEA: Primary | ICD-10-CM

## 2020-10-24 LAB — BACTERIA SPEC AEROBE CULT: NORMAL

## 2020-10-24 RX ORDER — ONDANSETRON 4 MG/1
4 TABLET, ORALLY DISINTEGRATING ORAL EVERY 8 HOURS PRN
Qty: 20 TABLET | Refills: 0 | Status: SHIPPED | OUTPATIENT
Start: 2020-10-24 | End: 2021-01-12

## 2020-10-26 ENCOUNTER — LAB REQUISITION (OUTPATIENT)
Dept: LAB | Facility: HOSPITAL | Age: 80
End: 2020-10-26

## 2020-10-26 DIAGNOSIS — R35.0 FREQUENCY OF MICTURITION: ICD-10-CM

## 2020-10-26 LAB
BACTERIA UR QL AUTO: NORMAL /HPF
BILIRUB UR QL STRIP: NEGATIVE
CLARITY UR: CLEAR
COLOR UR: YELLOW
GLUCOSE UR STRIP-MCNC: NEGATIVE MG/DL
HGB UR QL STRIP.AUTO: ABNORMAL
HYALINE CASTS UR QL AUTO: NORMAL /LPF
KETONES UR QL STRIP: NEGATIVE
LEUKOCYTE ESTERASE UR QL STRIP.AUTO: NEGATIVE
NITRITE UR QL STRIP: NEGATIVE
PH UR STRIP.AUTO: 6 [PH] (ref 5–8)
PROT UR QL STRIP: ABNORMAL
RBC # UR: NORMAL /HPF
REF LAB TEST METHOD: NORMAL
SP GR UR STRIP: 1.02 (ref 1–1.03)
SQUAMOUS #/AREA URNS HPF: NORMAL /HPF
UROBILINOGEN UR QL STRIP: ABNORMAL
WBC UR QL AUTO: NORMAL /HPF

## 2020-10-26 PROCEDURE — 87186 SC STD MICRODIL/AGAR DIL: CPT | Performed by: FAMILY MEDICINE

## 2020-10-26 PROCEDURE — 81001 URINALYSIS AUTO W/SCOPE: CPT | Performed by: FAMILY MEDICINE

## 2020-10-26 PROCEDURE — 87077 CULTURE AEROBIC IDENTIFY: CPT | Performed by: FAMILY MEDICINE

## 2020-10-26 PROCEDURE — 87086 URINE CULTURE/COLONY COUNT: CPT | Performed by: FAMILY MEDICINE

## 2020-10-28 ENCOUNTER — TELEPHONE (OUTPATIENT)
Dept: FAMILY MEDICINE CLINIC | Facility: CLINIC | Age: 80
End: 2020-10-28

## 2020-10-28 LAB — BACTERIA SPEC AEROBE CULT: ABNORMAL

## 2020-10-28 NOTE — TELEPHONE ENCOUNTER
Rosamaria from [facility] called and states they did a urine culture on Monday and today it's grown VRE.  Her phone number is [phone]

## 2020-10-29 NOTE — TELEPHONE ENCOUNTER
I left message for nurse stating that this is more than likely just colonization and not a UTI since [place] [phone].

## 2020-11-03 PROCEDURE — G0180 MD CERTIFICATION HHA PATIENT: HCPCS | Performed by: FAMILY MEDICINE

## 2020-11-05 ENCOUNTER — LAB REQUISITION (OUTPATIENT)
Dept: LAB | Facility: HOSPITAL | Age: 80
End: 2020-11-05

## 2020-11-05 DIAGNOSIS — N39.0 URINARY TRACT INFECTION, SITE NOT SPECIFIED: ICD-10-CM

## 2020-11-05 LAB
BACTERIA UR QL AUTO: ABNORMAL /HPF
BILIRUB UR QL STRIP: NEGATIVE
CLARITY UR: CLEAR
COLOR UR: ABNORMAL
GLUCOSE UR STRIP-MCNC: ABNORMAL MG/DL
HGB UR QL STRIP.AUTO: ABNORMAL
HYALINE CASTS UR QL AUTO: ABNORMAL /LPF
KETONES UR QL STRIP: NEGATIVE
LEUKOCYTE ESTERASE UR QL STRIP.AUTO: ABNORMAL
NITRITE UR QL STRIP: NEGATIVE
PH UR STRIP.AUTO: 5.5 [PH] (ref 5–8)
PROT UR QL STRIP: ABNORMAL
RBC # UR: ABNORMAL /HPF
REF LAB TEST METHOD: ABNORMAL
SP GR UR STRIP: 1.02 (ref 1–1.03)
SQUAMOUS #/AREA URNS HPF: ABNORMAL /HPF
UROBILINOGEN UR QL STRIP: ABNORMAL
WBC UR QL AUTO: ABNORMAL /HPF

## 2020-11-05 PROCEDURE — 81001 URINALYSIS AUTO W/SCOPE: CPT | Performed by: FAMILY MEDICINE

## 2020-11-05 PROCEDURE — 87086 URINE CULTURE/COLONY COUNT: CPT | Performed by: FAMILY MEDICINE

## 2020-11-07 LAB — BACTERIA SPEC AEROBE CULT: NORMAL

## 2020-11-09 ENCOUNTER — TELEPHONE (OUTPATIENT)
Dept: FAMILY MEDICINE CLINIC | Facility: CLINIC | Age: 80
End: 2020-11-09

## 2020-11-09 DIAGNOSIS — N30.01 ACUTE CYSTITIS WITH HEMATURIA: Primary | ICD-10-CM

## 2020-11-09 RX ORDER — SULFAMETHOXAZOLE AND TRIMETHOPRIM 800; 160 MG/1; MG/1
1 TABLET ORAL 2 TIMES DAILY
Qty: 8 TABLET | Refills: 0 | Status: ON HOLD | OUTPATIENT
Start: 2020-11-09 | End: 2020-11-24

## 2020-11-10 ENCOUNTER — OFFICE VISIT (OUTPATIENT)
Dept: FAMILY MEDICINE CLINIC | Facility: CLINIC | Age: 80
End: 2020-11-10

## 2020-11-10 VITALS
HEART RATE: 75 BPM | BODY MASS INDEX: 22.47 KG/M2 | DIASTOLIC BLOOD PRESSURE: 82 MMHG | WEIGHT: 126.8 LBS | HEIGHT: 63 IN | TEMPERATURE: 97.5 F | OXYGEN SATURATION: 97 % | SYSTOLIC BLOOD PRESSURE: 126 MMHG

## 2020-11-10 DIAGNOSIS — K21.9 GERD WITHOUT ESOPHAGITIS: ICD-10-CM

## 2020-11-10 DIAGNOSIS — Z23 IMMUNIZATION DUE: ICD-10-CM

## 2020-11-10 DIAGNOSIS — I10 ESSENTIAL HYPERTENSION: Primary | ICD-10-CM

## 2020-11-10 DIAGNOSIS — H61.22 IMPACTED CERUMEN OF LEFT EAR: ICD-10-CM

## 2020-11-10 PROCEDURE — 90732 PPSV23 VACC 2 YRS+ SUBQ/IM: CPT | Performed by: FAMILY MEDICINE

## 2020-11-10 PROCEDURE — 90694 VACC AIIV4 NO PRSRV 0.5ML IM: CPT | Performed by: FAMILY MEDICINE

## 2020-11-10 PROCEDURE — 69210 REMOVE IMPACTED EAR WAX UNI: CPT | Performed by: FAMILY MEDICINE

## 2020-11-10 PROCEDURE — 99214 OFFICE O/P EST MOD 30 MIN: CPT | Performed by: FAMILY MEDICINE

## 2020-11-10 PROCEDURE — G0009 ADMIN PNEUMOCOCCAL VACCINE: HCPCS | Performed by: FAMILY MEDICINE

## 2020-11-10 PROCEDURE — G0008 ADMIN INFLUENZA VIRUS VAC: HCPCS | Performed by: FAMILY MEDICINE

## 2020-11-10 RX ORDER — OMEPRAZOLE 40 MG/1
40 CAPSULE, DELAYED RELEASE ORAL DAILY
Qty: 30 CAPSULE | Refills: 2 | Status: SHIPPED | OUTPATIENT
Start: 2020-11-10 | End: 2021-01-28

## 2020-11-10 NOTE — PROGRESS NOTES
Chief Complaint   Patient presents with   • Urinary Tract Infection     Urine frequency, incontinence, burning and itching    • Hearing Problem     Trouble heading out of left ear.    • GI Problem     Burning in her stomach.    • Hypertension     Blood pressure readings have been all over the place.        Subjective   Kandace Andrade is a 80 y.o. female.     History of Present Illness   PT is here for for follow up and   UTI for few days; she has dysuria, urgency and fx.  I sent in abx yesterday but she has not picked it up yet.   She has been having trouble with hearing out of her rt ear for several months.  She has some issues with heartburn and stomach burning.  No dysphagia  Her bp has been all over the place :  100-150/60-90  The following portions of the patient's history were reviewed and updated as appropriate: allergies, current medications, past family history, past medical history, past social history, past surgical history and problem list.    Review of Systems    Patient Active Problem List   Diagnosis   • Vitamin D deficiency   • KINDRA (obstructive sleep apnea)   • IBS (irritable bowel syndrome)   • Essential hypertension   • Herpes zoster   • Arthritis   • Anxiety and depression   • Other emphysema (CMS/[facility])   • Acute seasonal allergic rhinitis due to pollen   • Paroxysmal atrial fibrillation (CMS/[facility])   • Osteoporosis   • Mixed hyperlipidemia   • Encounter for Medicare annual wellness exam   • COPD (chronic obstructive pulmonary disease) (CMS/[facility])   • Mixed stress and urge urinary incontinence   • Type 2 diabetes mellitus without complication, without long-term current use of insulin (CMS/[facility])   • Arthritis of both hips   • Acute UTI   • Autonomic neuropathy   • GERD without esophagitis   • Impacted cerumen of left ear       Allergies   Allergen Reactions   • Penicillins Hives and Other (See Comments)     Elevated BP... tolerated ceftriaxone 10/2020 admission   • Statins Other (See Comments)      Headache, elevated liver enzymes, blurred vision         Current Outpatient Medications:   •  acetaminophen (TYLENOL) 500 MG tablet, Take 1,000 mg by mouth Every 6 (Six) Hours As Needed for Mild Pain ., Disp: , Rfl:   •  aspirin 81 MG chewable tablet, Chew 81 mg Daily., Disp: , Rfl:   •  budesonide-formoterol (SYMBICORT) 80-4.5 MCG/ACT inhaler, Inhale 2 puffs 2 (Two) Times a Day As Needed., Disp: , Rfl:   •  fluticasone (FLONASE) 50 MCG/ACT nasal spray, 2 sprays into the nostril(s) as directed by provider Daily As Needed for Rhinitis., Disp: , Rfl:   •  ondansetron ODT (ZOFRAN-ODT) 4 MG disintegrating tablet, Place 1 tablet on the tongue Every 8 (Eight) Hours As Needed for Nausea or Vomiting., Disp: 20 tablet, Rfl: 0  •  PARoxetine (PAXIL) 40 MG tablet, Take 1 tablet by mouth Every Morning., Disp: 30 tablet, Rfl: 5  •  vitamin D (ERGOCALCIFEROL) 1.25 MG (04077 UT) capsule capsule, TAKE 1 CAPSULE BY MOUTH EVERY 7 DAYS, Disp: 5 capsule, Rfl: 5  •  cephalexin (Keflex) 500 MG capsule, Take 1 capsule by mouth 3 (Three) Times a Day., Disp: 6 capsule, Rfl: 0  •  sulfamethoxazole-trimethoprim (Bactrim DS) 800-160 MG per tablet, Take 1 tablet by mouth 2 (Two) Times a Day., Disp: 8 tablet, Rfl: 0    Past Medical History:   Diagnosis Date   • A-fib (CMS/[facility])    • Acute seasonal allergic rhinitis due to pollen    • Anxiety    • Anxiety and depression    • Arthritis    • Asthma    • Claudication, intermittent (CMS/[facility])    • COPD (chronic obstructive pulmonary disease) (CMS/[facility])    • Edema    • Fall, accidental    • Falls frequently    • Fatigue    • Fibula fracture    • Herpes zoster    • History of cataract    • History of lumbosacral spine surgery    • History of migraine headaches    • Hyperglycemia    • Hypersomnia with sleep apnea    • IBS (irritable bowel syndrome)    • Mixed hyperlipidemia    • KINDRA (obstructive sleep apnea)    • Osteoporosis    • Paroxysmal atrial fibrillation (CMS/[facility])    • Right ankle injury    • Right  "hip pain    • Right knee pain    • Right shoulder pain    • Vitamin D deficiency        Past Surgical History:   Procedure Laterality Date   • APPENDECTOMY     • BACK SURGERY      lower x2   • CATARACT EXTRACTION     • HYSTERECTOMY      partial   • SHOULDER SURGERY Right    • SHOULDER SURGERY     • TONSILLECTOMY         Family History   Problem Relation Age of Onset   • Heart disease Mother    • Lung cancer Mother    • Diabetes Father    • Heart disease Paternal Uncle        Social History     Tobacco Use   • Smoking status: Former Smoker     Packs/day: 1.00     Years: 25.00     Pack years: 25.00     Quit date: 2008     Years since quittin.5   • Smokeless tobacco: Never Used   Substance Use Topics   • Alcohol use: No            Objective     Visit Vitals  /82   Pulse 75   Temp 97.5 °F (36.4 °C)   Ht 160 cm (63\")   Wt 57.5 kg (126 lb 12.8 oz)   SpO2 97%   BMI 22.46 kg/m²       Physical Exam  Constitutional:       Appearance: Normal appearance.   HENT:      Right Ear: Tympanic membrane, ear canal and external ear normal. There is no impacted cerumen.      Left Ear: External ear normal. There is impacted cerumen.   Cardiovascular:      Rate and Rhythm: Normal rate and regular rhythm.      Heart sounds: Normal heart sounds. No murmur. No friction rub.   Neurological:      Mental Status: She is alert.         Lab Results   Component Value Date    GLUCOSE 90 10/20/2020    BUN 13 10/20/2020    CREATININE 0.76 10/20/2020    EGFRIFNONA 73 10/20/2020    EGFRIFAFRI 96 2020    BCR 17.1 10/20/2020    K 4.8 10/20/2020    CO2 24.2 10/20/2020    CALCIUM 8.7 10/20/2020    PROTENTOTREF 6.9 2020    ALBUMIN 3.40 (L) 10/20/2020    LABIL2 2.1 2020    AST 16 10/20/2020    ALT 17 10/20/2020       WBC   Date Value Ref Range Status   10/20/2020 8.67 3.40 - 10.80 10*3/mm3 Final     RBC   Date Value Ref Range Status   10/20/2020 4.01 3.77 - 5.28 10*6/mm3 Final     Hemoglobin   Date Value Ref Range Status "   10/20/2020 12.6 12.0 - 15.9 g/dL Final     Hematocrit   Date Value Ref Range Status   10/20/2020 36.5 34.0 - 46.6 % Final     MCV   Date Value Ref Range Status   10/20/2020 91.0 79.0 - 97.0 fL Final     MCH   Date Value Ref Range Status   10/20/2020 31.4 26.6 - 33.0 pg Final     MCHC   Date Value Ref Range Status   10/20/2020 34.5 31.5 - 35.7 g/dL Final     RDW   Date Value Ref Range Status   10/20/2020 11.9 (L) 12.3 - 15.4 % Final     RDW-SD   Date Value Ref Range Status   10/20/2020 39.5 37.0 - 54.0 fl Final     MPV   Date Value Ref Range Status   10/20/2020 9.9 6.0 - 12.0 fL Final     Platelets   Date Value Ref Range Status   10/20/2020 227 140 - 450 10*3/mm3 Final     Neutrophil %   Date Value Ref Range Status   10/20/2020 61.8 42.7 - 76.0 % Final     Lymphocyte %   Date Value Ref Range Status   10/20/2020 24.9 19.6 - 45.3 % Final     Monocyte %   Date Value Ref Range Status   10/20/2020 9.6 5.0 - 12.0 % Final     Eosinophil %   Date Value Ref Range Status   10/20/2020 2.2 0.3 - 6.2 % Final     Basophil %   Date Value Ref Range Status   10/20/2020 0.7 0.0 - 1.5 % Final     Immature Grans %   Date Value Ref Range Status   10/20/2020 0.8 (H) 0.0 - 0.5 % Final     Neutrophils, Absolute   Date Value Ref Range Status   10/20/2020 5.36 1.70 - 7.00 10*3/mm3 Final     Lymphocytes, Absolute   Date Value Ref Range Status   10/20/2020 2.16 0.70 - 3.10 10*3/mm3 Final     Monocytes, Absolute   Date Value Ref Range Status   10/20/2020 0.83 0.10 - 0.90 10*3/mm3 Final     Eosinophils, Absolute   Date Value Ref Range Status   10/20/2020 0.19 0.00 - 0.40 10*3/mm3 Final     Basophils, Absolute   Date Value Ref Range Status   10/20/2020 0.06 0.00 - 0.20 10*3/mm3 Final     Immature Grans, Absolute   Date Value Ref Range Status   10/20/2020 0.07 (H) 0.00 - 0.05 10*3/mm3 Final     nRBC   Date Value Ref Range Status   10/20/2020 0.0 0.0 - 0.2 /100 WBC Final       Lab Results   Component Value Date    HGBA1C 6.25 (H) 10/20/2020        No results found for: BUGIHUEZ92    No results found for: TSH    No results found for: CHOL  Lab Results   Component Value Date    TRIG 156 (H) 07/17/2020     Lab Results   Component Value Date    HDL 58 07/17/2020     Lab Results   Component Value Date     (H) 07/17/2020     Lab Results   Component Value Date    VLDL 31.2 07/17/2020     No results found for: LDLHDL      Ear Cerumen Removal    Date/Time: 11/10/2020 2:13 PM  Performed by: Cassi Bella MD  Authorized by: Cassi Bella MD     Anesthesia:  Local Anesthetic: none  Location details: left ear  Patient tolerance: patient tolerated the procedure well with no immediate complications  Procedure type: instrumentation and irrigation   Sedation:  Patient sedated: no            Assessment/Plan   Problems Addressed this Visit        Cardiovascular and Mediastinum    Essential hypertension - Primary       Digestive    GERD without esophagitis       Nervous and Auditory    Impacted cerumen of left ear      Other Visit Diagnoses     Immunization due        Relevant Orders    Pneumococcal Polysaccharide Vaccine 23-Valent Greater Than or Equal To 1yo Subcutaneous / IM      Diagnoses       Codes Comments    Essential hypertension    -  Primary ICD-10-CM: I10  ICD-9-CM: 401.9     Immunization due     ICD-10-CM: Z23  ICD-9-CM: V05.9     GERD without esophagitis     ICD-10-CM: K21.9  ICD-9-CM: 530.81     Impacted cerumen of left ear     ICD-10-CM: H61.22  ICD-9-CM: 380.4           Orders Placed This Encounter   Procedures   • Ear Cerumen Removal     This order was created via procedure documentation   • Pneumococcal Polysaccharide Vaccine 23-Valent Greater Than or Equal To 1yo Subcutaneous / IM       Current Outpatient Medications   Medication Sig Dispense Refill   • acetaminophen (TYLENOL) 500 MG tablet Take 1,000 mg by mouth Every 6 (Six) Hours As Needed for Mild Pain .     • aspirin 81 MG chewable tablet Chew 81 mg Daily.     •  budesonide-formoterol (SYMBICORT) 80-4.5 MCG/ACT inhaler Inhale 2 puffs 2 (Two) Times a Day As Needed.     • fluticasone (FLONASE) 50 MCG/ACT nasal spray 2 sprays into the nostril(s) as directed by provider Daily As Needed for Rhinitis.     • ondansetron ODT (ZOFRAN-ODT) 4 MG disintegrating tablet Place 1 tablet on the tongue Every 8 (Eight) Hours As Needed for Nausea or Vomiting. 20 tablet 0   • PARoxetine (PAXIL) 40 MG tablet Take 1 tablet by mouth Every Morning. 30 tablet 5   • vitamin D (ERGOCALCIFEROL) 1.25 MG (54291 UT) capsule capsule TAKE 1 CAPSULE BY MOUTH EVERY 7 DAYS 5 capsule 5   • cephalexin (Keflex) 500 MG capsule Take 1 capsule by mouth 3 (Three) Times a Day. 6 capsule 0   • sulfamethoxazole-trimethoprim (Bactrim DS) 800-160 MG per tablet Take 1 tablet by mouth 2 (Two) Times a Day. 8 tablet 0     No current facility-administered medications for this visit.      Push fluids and start abx.  Follow up if no better.  Will continue to monitor bp since most of the time it is in normal range.    Start reflux med- SE discussed.  Ear wash    No follow-ups on file.    There are no Patient Instructions on file for this visit.

## 2020-11-11 ENCOUNTER — TELEPHONE (OUTPATIENT)
Dept: FAMILY MEDICINE CLINIC | Facility: CLINIC | Age: 80
End: 2020-11-11

## 2020-11-11 NOTE — TELEPHONE ENCOUNTER
ZHENG . . . Patient's daughter called and states she was having Tremors, nausa, vomiting, diathermia, and was very lethargic.  She was worried it might be an allergic reaction to the medication given or to the flu shot or pneumonia shot given and was wondering if she should take her to the hospital.    I advised her to go ahead and take her.

## 2020-11-17 ENCOUNTER — TELEPHONE (OUTPATIENT)
Dept: CARDIOLOGY | Facility: CLINIC | Age: 80
End: 2020-11-17

## 2020-11-17 NOTE — TELEPHONE ENCOUNTER
Called Rosamaria with [facility] @ [phone].      BP has been running all over the place.  10/23 104/62 68 (sitting)  10/26 100/60 90 (sitting)  11/5 104/68 86 (sitting) 102/62 (standing)  11/12 124/66 67 (sitting)  11/16 128/72 90 (sitting) 100/64 (standing)    She is going back on Thursday and double check to home BP machine.  Info also to the pt's daughter.      JUST ZHENG LUZ and Saskia/Misa.  I will be out of town and back Monday.

## 2020-11-17 NOTE — TELEPHONE ENCOUNTER
Pt's daughter (Tracey) at [phone] re: Hypotension.    BP is still running low.  Pt is drinking plenty of fluids.    Current Meds:  No current BP meds.    Pt has been dealing with nausea and diarrhea since getting out of the hospital.      BP was 85/65 (standing) this AM.  She c/o feeling dizzy and fell twice.      [facility] has been coming along with [employer] to her house.       I can call [facility] if you need me too.

## 2020-11-17 NOTE — TELEPHONE ENCOUNTER
Clonidine and HCTZ were stopped at discharge. Please ensure she is not taking those and drinking more than 40 oz of water daily. Ideally more than 60 ounces. Have them keep twice daily BP log including heart rate.  I agree with getting more info from home health and verifying the next time she will be seen at home.

## 2020-11-19 ENCOUNTER — TELEPHONE (OUTPATIENT)
Dept: FAMILY MEDICINE CLINIC | Facility: CLINIC | Age: 80
End: 2020-11-19

## 2020-11-19 NOTE — TELEPHONE ENCOUNTER
Caller: Kandace Andrade    Relationship: Self    Best call back number: [phone]    What medication are you requesting: SOMETHING FOR THE FOLLOWING SYMPTOMS    What are your current symptoms: DIARRHEA, SORE PELVIC AREA     How long have you been experiencing symptoms: ABOUT A WEEK        If a prescription is needed, what is your preferred pharmacy and phone number: [facility][id number] [address] AT [facility] [place] [place] OF [facility] & [facility] - [phone] [facility] [phone] FX

## 2020-11-23 ENCOUNTER — TELEPHONE (OUTPATIENT)
Dept: FAMILY MEDICINE CLINIC | Facility: CLINIC | Age: 80
End: 2020-11-23

## 2020-11-23 NOTE — TELEPHONE ENCOUNTER
NURSE REPORTED THAT THE PATIENT HAS HAD DIRRHEA FOR OVER A WEEK.  PATIENT IS WEAK, NO FEVERS\. THREW UP ONCE THIS MORNING,  POOR APPETITE.      DO YOU NEED NURSE TO COLLECT STOOL SPECIMEN?  PLEASE ADVISE    CALL BACK #: [phone]    PHARMACY: [facility] #[id number] [place], [place] - [address] AT [facility] OF [facility] & [facility] - [phone] [facility] [phone] FX

## 2020-11-24 ENCOUNTER — TELEPHONE (OUTPATIENT)
Dept: FAMILY MEDICINE CLINIC | Facility: CLINIC | Age: 80
End: 2020-11-24

## 2020-11-24 ENCOUNTER — APPOINTMENT (OUTPATIENT)
Dept: CT IMAGING | Facility: HOSPITAL | Age: 80
End: 2020-11-24

## 2020-11-24 ENCOUNTER — HOSPITAL ENCOUNTER (INPATIENT)
Facility: HOSPITAL | Age: 80
LOS: 2 days | Discharge: HOME HEALTH | End: 2020-11-26
Attending: EMERGENCY MEDICINE | Admitting: INTERNAL MEDICINE

## 2020-11-24 ENCOUNTER — APPOINTMENT (OUTPATIENT)
Dept: GENERAL RADIOLOGY | Facility: HOSPITAL | Age: 80
End: 2020-11-24

## 2020-11-24 DIAGNOSIS — E87.6 ACUTE HYPOKALEMIA: ICD-10-CM

## 2020-11-24 DIAGNOSIS — K51.00 PANCOLITIS (HCC): Primary | ICD-10-CM

## 2020-11-24 DIAGNOSIS — R19.7 DIARRHEA, UNSPECIFIED TYPE: Primary | ICD-10-CM

## 2020-11-24 DIAGNOSIS — S20.212A CONTUSION OF LEFT CHEST WALL, INITIAL ENCOUNTER: ICD-10-CM

## 2020-11-24 LAB
ALBUMIN SERPL-MCNC: 3.8 G/DL (ref 3.5–5.2)
ALBUMIN/GLOB SERPL: 1.4 G/DL
ALP SERPL-CCNC: 87 U/L (ref 39–117)
ALT SERPL W P-5'-P-CCNC: 7 U/L (ref 1–33)
ANION GAP SERPL CALCULATED.3IONS-SCNC: 10.3 MMOL/L (ref 5–15)
AST SERPL-CCNC: 9 U/L (ref 1–32)
B PARAPERT DNA SPEC QL NAA+PROBE: NOT DETECTED
B PERT DNA SPEC QL NAA+PROBE: NOT DETECTED
BACTERIA UR QL AUTO: ABNORMAL /HPF
BASOPHILS # BLD AUTO: 0.07 10*3/MM3 (ref 0–0.2)
BASOPHILS NFR BLD AUTO: 0.7 % (ref 0–1.5)
BILIRUB SERPL-MCNC: 0.2 MG/DL (ref 0–1.2)
BILIRUB UR QL STRIP: NEGATIVE
BUN SERPL-MCNC: 12 MG/DL (ref 8–23)
BUN/CREAT SERPL: 21.4 (ref 7–25)
C PNEUM DNA NPH QL NAA+NON-PROBE: NOT DETECTED
CALCIUM SPEC-SCNC: 9.3 MG/DL (ref 8.6–10.5)
CHLORIDE SERPL-SCNC: 99 MMOL/L (ref 98–107)
CLARITY UR: CLEAR
CO2 SERPL-SCNC: 29.7 MMOL/L (ref 22–29)
COLOR UR: YELLOW
CREAT SERPL-MCNC: 0.56 MG/DL (ref 0.57–1)
DEPRECATED RDW RBC AUTO: 38.3 FL (ref 37–54)
EOSINOPHIL # BLD AUTO: 0.04 10*3/MM3 (ref 0–0.4)
EOSINOPHIL NFR BLD AUTO: 0.4 % (ref 0.3–6.2)
ERYTHROCYTE [DISTWIDTH] IN BLOOD BY AUTOMATED COUNT: 12.1 % (ref 12.3–15.4)
FLUAV SUBTYP SPEC NAA+PROBE: NOT DETECTED
FLUBV RNA ISLT QL NAA+PROBE: NOT DETECTED
GFR SERPL CREATININE-BSD FRML MDRD: 104 ML/MIN/1.73
GLOBULIN UR ELPH-MCNC: 2.8 GM/DL
GLUCOSE SERPL-MCNC: 91 MG/DL (ref 65–99)
GLUCOSE UR STRIP-MCNC: NEGATIVE MG/DL
HADV DNA SPEC NAA+PROBE: NOT DETECTED
HCOV 229E RNA SPEC QL NAA+PROBE: NOT DETECTED
HCOV HKU1 RNA SPEC QL NAA+PROBE: NOT DETECTED
HCOV NL63 RNA SPEC QL NAA+PROBE: NOT DETECTED
HCOV OC43 RNA SPEC QL NAA+PROBE: NOT DETECTED
HCT VFR BLD AUTO: 36.8 % (ref 34–46.6)
HGB BLD-MCNC: 12.6 G/DL (ref 12–15.9)
HGB UR QL STRIP.AUTO: ABNORMAL
HMPV RNA NPH QL NAA+NON-PROBE: NOT DETECTED
HPIV1 RNA SPEC QL NAA+PROBE: NOT DETECTED
HPIV2 RNA SPEC QL NAA+PROBE: NOT DETECTED
HPIV3 RNA NPH QL NAA+PROBE: NOT DETECTED
HPIV4 P GENE NPH QL NAA+PROBE: NOT DETECTED
HYALINE CASTS UR QL AUTO: ABNORMAL /LPF
IMM GRANULOCYTES # BLD AUTO: 0.05 10*3/MM3 (ref 0–0.05)
IMM GRANULOCYTES NFR BLD AUTO: 0.5 % (ref 0–0.5)
KETONES UR QL STRIP: NEGATIVE
LEUKOCYTE ESTERASE UR QL STRIP.AUTO: ABNORMAL
LIPASE SERPL-CCNC: 20 U/L (ref 13–60)
LYMPHOCYTES # BLD AUTO: 2.3 10*3/MM3 (ref 0.7–3.1)
LYMPHOCYTES NFR BLD AUTO: 23 % (ref 19.6–45.3)
M PNEUMO IGG SER IA-ACNC: NOT DETECTED
MCH RBC QN AUTO: 29.9 PG (ref 26.6–33)
MCHC RBC AUTO-ENTMCNC: 34.2 G/DL (ref 31.5–35.7)
MCV RBC AUTO: 87.2 FL (ref 79–97)
MONOCYTES # BLD AUTO: 0.75 10*3/MM3 (ref 0.1–0.9)
MONOCYTES NFR BLD AUTO: 7.5 % (ref 5–12)
NEUTROPHILS NFR BLD AUTO: 6.78 10*3/MM3 (ref 1.7–7)
NEUTROPHILS NFR BLD AUTO: 67.9 % (ref 42.7–76)
NITRITE UR QL STRIP: NEGATIVE
NRBC BLD AUTO-RTO: 0 /100 WBC (ref 0–0.2)
PH UR STRIP.AUTO: 6 [PH] (ref 5–8)
PLATELET # BLD AUTO: 350 10*3/MM3 (ref 140–450)
PMV BLD AUTO: 10.1 FL (ref 6–12)
POTASSIUM SERPL-SCNC: 2.9 MMOL/L (ref 3.5–5.2)
PROT SERPL-MCNC: 6.6 G/DL (ref 6–8.5)
PROT UR QL STRIP: NEGATIVE
RBC # BLD AUTO: 4.22 10*6/MM3 (ref 3.77–5.28)
RBC # UR: ABNORMAL /HPF
REF LAB TEST METHOD: ABNORMAL
RHINOVIRUS RNA SPEC NAA+PROBE: NOT DETECTED
RSV RNA NPH QL NAA+NON-PROBE: NOT DETECTED
SARS-COV-2 RNA NPH QL NAA+NON-PROBE: NOT DETECTED
SODIUM SERPL-SCNC: 139 MMOL/L (ref 136–145)
SP GR UR STRIP: 1.01 (ref 1–1.03)
SQUAMOUS #/AREA URNS HPF: ABNORMAL /HPF
UROBILINOGEN UR QL STRIP: ABNORMAL
WBC # BLD AUTO: 9.99 10*3/MM3 (ref 3.4–10.8)
WBC UR QL AUTO: ABNORMAL /HPF

## 2020-11-24 PROCEDURE — 74177 CT ABD & PELVIS W/CONTRAST: CPT

## 2020-11-24 PROCEDURE — 25810000003 SODIUM CHLORIDE 0.9 % WITH KCL 20 MEQ 20-0.9 MEQ/L-% SOLUTION: Performed by: INTERNAL MEDICINE

## 2020-11-24 PROCEDURE — 99284 EMERGENCY DEPT VISIT MOD MDM: CPT

## 2020-11-24 PROCEDURE — 94799 UNLISTED PULMONARY SVC/PX: CPT

## 2020-11-24 PROCEDURE — 0202U NFCT DS 22 TRGT SARS-COV-2: CPT | Performed by: EMERGENCY MEDICINE

## 2020-11-24 PROCEDURE — 87493 C DIFF AMPLIFIED PROBE: CPT | Performed by: EMERGENCY MEDICINE

## 2020-11-24 PROCEDURE — 85025 COMPLETE CBC W/AUTO DIFF WBC: CPT | Performed by: EMERGENCY MEDICINE

## 2020-11-24 PROCEDURE — 83690 ASSAY OF LIPASE: CPT | Performed by: EMERGENCY MEDICINE

## 2020-11-24 PROCEDURE — 71101 X-RAY EXAM UNILAT RIBS/CHEST: CPT

## 2020-11-24 PROCEDURE — 80053 COMPREHEN METABOLIC PANEL: CPT | Performed by: EMERGENCY MEDICINE

## 2020-11-24 PROCEDURE — 25010000002 IOPAMIDOL 61 % SOLUTION: Performed by: EMERGENCY MEDICINE

## 2020-11-24 PROCEDURE — 81001 URINALYSIS AUTO W/SCOPE: CPT | Performed by: EMERGENCY MEDICINE

## 2020-11-24 RX ORDER — PAROXETINE HYDROCHLORIDE 20 MG/1
40 TABLET, FILM COATED ORAL EVERY MORNING
Status: DISCONTINUED | OUTPATIENT
Start: 2020-11-25 | End: 2020-11-26 | Stop reason: HOSPADM

## 2020-11-24 RX ORDER — SODIUM CHLORIDE 0.9 % (FLUSH) 0.9 %
10 SYRINGE (ML) INJECTION AS NEEDED
Status: DISCONTINUED | OUTPATIENT
Start: 2020-11-24 | End: 2020-11-26 | Stop reason: HOSPADM

## 2020-11-24 RX ORDER — BUDESONIDE AND FORMOTEROL FUMARATE DIHYDRATE 80; 4.5 UG/1; UG/1
2 AEROSOL RESPIRATORY (INHALATION) 2 TIMES DAILY PRN
COMMUNITY
End: 2021-05-19 | Stop reason: SDUPTHER

## 2020-11-24 RX ORDER — ACETAMINOPHEN 160 MG/5ML
650 SOLUTION ORAL EVERY 4 HOURS PRN
Status: DISCONTINUED | OUTPATIENT
Start: 2020-11-24 | End: 2020-11-26 | Stop reason: HOSPADM

## 2020-11-24 RX ORDER — POTASSIUM CHLORIDE 750 MG/1
40 TABLET, FILM COATED, EXTENDED RELEASE ORAL AS NEEDED
Status: DISCONTINUED | OUTPATIENT
Start: 2020-11-24 | End: 2020-11-26 | Stop reason: HOSPADM

## 2020-11-24 RX ORDER — POTASSIUM CHLORIDE 750 MG/1
40 TABLET, FILM COATED, EXTENDED RELEASE ORAL ONCE
Status: COMPLETED | OUTPATIENT
Start: 2020-11-24 | End: 2020-11-24

## 2020-11-24 RX ORDER — ONDANSETRON 2 MG/ML
4 INJECTION INTRAMUSCULAR; INTRAVENOUS EVERY 6 HOURS PRN
Status: DISCONTINUED | OUTPATIENT
Start: 2020-11-24 | End: 2020-11-26 | Stop reason: HOSPADM

## 2020-11-24 RX ORDER — POTASSIUM CHLORIDE 7.45 MG/ML
10 INJECTION INTRAVENOUS
Status: DISCONTINUED | OUTPATIENT
Start: 2020-11-24 | End: 2020-11-26 | Stop reason: HOSPADM

## 2020-11-24 RX ORDER — CEFTRIAXONE SODIUM 1 G/50ML
1 INJECTION, SOLUTION INTRAVENOUS ONCE
Status: DISCONTINUED | OUTPATIENT
Start: 2020-11-24 | End: 2020-11-24

## 2020-11-24 RX ORDER — POTASSIUM CHLORIDE 1.5 G/1.77G
40 POWDER, FOR SOLUTION ORAL AS NEEDED
Status: DISCONTINUED | OUTPATIENT
Start: 2020-11-24 | End: 2020-11-26 | Stop reason: HOSPADM

## 2020-11-24 RX ORDER — FLUTICASONE PROPIONATE 50 MCG
2 SPRAY, SUSPENSION (ML) NASAL DAILY PRN
Status: DISCONTINUED | OUTPATIENT
Start: 2020-11-24 | End: 2020-11-26 | Stop reason: HOSPADM

## 2020-11-24 RX ORDER — BUDESONIDE AND FORMOTEROL FUMARATE DIHYDRATE 80; 4.5 UG/1; UG/1
2 AEROSOL RESPIRATORY (INHALATION)
Status: DISCONTINUED | OUTPATIENT
Start: 2020-11-24 | End: 2020-11-26 | Stop reason: HOSPADM

## 2020-11-24 RX ORDER — SODIUM CHLORIDE 0.9 % (FLUSH) 0.9 %
10 SYRINGE (ML) INJECTION EVERY 12 HOURS SCHEDULED
Status: DISCONTINUED | OUTPATIENT
Start: 2020-11-24 | End: 2020-11-26 | Stop reason: HOSPADM

## 2020-11-24 RX ORDER — ASPIRIN 81 MG/1
81 TABLET, CHEWABLE ORAL DAILY
Status: DISCONTINUED | OUTPATIENT
Start: 2020-11-25 | End: 2020-11-26 | Stop reason: HOSPADM

## 2020-11-24 RX ORDER — ACETAMINOPHEN 650 MG/1
650 SUPPOSITORY RECTAL EVERY 4 HOURS PRN
Status: DISCONTINUED | OUTPATIENT
Start: 2020-11-24 | End: 2020-11-26 | Stop reason: HOSPADM

## 2020-11-24 RX ORDER — ACETAMINOPHEN 325 MG/1
650 TABLET ORAL EVERY 4 HOURS PRN
Status: DISCONTINUED | OUTPATIENT
Start: 2020-11-24 | End: 2020-11-26 | Stop reason: HOSPADM

## 2020-11-24 RX ORDER — SODIUM CHLORIDE AND POTASSIUM CHLORIDE 150; 900 MG/100ML; MG/100ML
100 INJECTION, SOLUTION INTRAVENOUS CONTINUOUS
Status: DISCONTINUED | OUTPATIENT
Start: 2020-11-24 | End: 2020-11-25

## 2020-11-24 RX ADMIN — POTASSIUM CHLORIDE 40 MEQ: 750 TABLET, EXTENDED RELEASE ORAL at 15:52

## 2020-11-24 RX ADMIN — IOPAMIDOL 85 ML: 612 INJECTION, SOLUTION INTRAVENOUS at 14:48

## 2020-11-24 RX ADMIN — SODIUM CHLORIDE, PRESERVATIVE FREE 10 ML: 5 INJECTION INTRAVENOUS at 22:49

## 2020-11-24 RX ADMIN — POTASSIUM CHLORIDE AND SODIUM CHLORIDE 100 ML/HR: 900; 150 INJECTION, SOLUTION INTRAVENOUS at 22:48

## 2020-11-24 RX ADMIN — METRONIDAZOLE 500 MG: 500 INJECTION, SOLUTION INTRAVENOUS at 15:52

## 2020-11-24 RX ADMIN — SODIUM CHLORIDE 1000 ML: 9 INJECTION, SOLUTION INTRAVENOUS at 13:40

## 2020-11-24 NOTE — TELEPHONE ENCOUNTER
Irregular heart beat, Low blood pressure, Fatigue, Weakness, Sleeping, Diarrhea  Pt instructed to go to ER for higher level of care.

## 2020-11-24 NOTE — TELEPHONE ENCOUNTER
We spoke to the patient's daughter and since she has other symptoms which could be due to dehydration, we recommended she go to the ER for fluids.

## 2020-11-25 ENCOUNTER — TELEPHONE (OUTPATIENT)
Dept: FAMILY MEDICINE CLINIC | Facility: CLINIC | Age: 80
End: 2020-11-25

## 2020-11-25 PROBLEM — A04.72 C. DIFFICILE COLITIS: Status: ACTIVE | Noted: 2020-11-25

## 2020-11-25 PROBLEM — E87.6 HYPOKALEMIA: Status: ACTIVE | Noted: 2020-11-25

## 2020-11-25 LAB
ANION GAP SERPL CALCULATED.3IONS-SCNC: 8.1 MMOL/L (ref 5–15)
BASOPHILS # BLD AUTO: 0.05 10*3/MM3 (ref 0–0.2)
BASOPHILS NFR BLD AUTO: 0.6 % (ref 0–1.5)
BUN SERPL-MCNC: 7 MG/DL (ref 8–23)
BUN/CREAT SERPL: 14 (ref 7–25)
C DIFF TOX GENS STL QL NAA+PROBE: POSITIVE
CALCIUM SPEC-SCNC: 8.3 MG/DL (ref 8.6–10.5)
CHLORIDE SERPL-SCNC: 104 MMOL/L (ref 98–107)
CO2 SERPL-SCNC: 25.9 MMOL/L (ref 22–29)
CREAT SERPL-MCNC: 0.5 MG/DL (ref 0.57–1)
DEPRECATED RDW RBC AUTO: 40 FL (ref 37–54)
EOSINOPHIL # BLD AUTO: 0.13 10*3/MM3 (ref 0–0.4)
EOSINOPHIL NFR BLD AUTO: 1.5 % (ref 0.3–6.2)
ERYTHROCYTE [DISTWIDTH] IN BLOOD BY AUTOMATED COUNT: 12.3 % (ref 12.3–15.4)
GFR SERPL CREATININE-BSD FRML MDRD: 118 ML/MIN/1.73
GLUCOSE SERPL-MCNC: 80 MG/DL (ref 65–99)
HCT VFR BLD AUTO: 35.3 % (ref 34–46.6)
HGB BLD-MCNC: 12 G/DL (ref 12–15.9)
IMM GRANULOCYTES # BLD AUTO: 0.05 10*3/MM3 (ref 0–0.05)
IMM GRANULOCYTES NFR BLD AUTO: 0.6 % (ref 0–0.5)
LYMPHOCYTES # BLD AUTO: 2.47 10*3/MM3 (ref 0.7–3.1)
LYMPHOCYTES NFR BLD AUTO: 27.7 % (ref 19.6–45.3)
MAGNESIUM SERPL-MCNC: 1.8 MG/DL (ref 1.6–2.4)
MCH RBC QN AUTO: 30.5 PG (ref 26.6–33)
MCHC RBC AUTO-ENTMCNC: 34 G/DL (ref 31.5–35.7)
MCV RBC AUTO: 89.8 FL (ref 79–97)
MONOCYTES # BLD AUTO: 0.87 10*3/MM3 (ref 0.1–0.9)
MONOCYTES NFR BLD AUTO: 9.8 % (ref 5–12)
NEUTROPHILS NFR BLD AUTO: 5.34 10*3/MM3 (ref 1.7–7)
NEUTROPHILS NFR BLD AUTO: 59.8 % (ref 42.7–76)
NRBC BLD AUTO-RTO: 0 /100 WBC (ref 0–0.2)
PLATELET # BLD AUTO: 307 10*3/MM3 (ref 140–450)
PMV BLD AUTO: 10 FL (ref 6–12)
POTASSIUM SERPL-SCNC: 2.8 MMOL/L (ref 3.5–5.2)
RBC # BLD AUTO: 3.93 10*6/MM3 (ref 3.77–5.28)
SODIUM SERPL-SCNC: 138 MMOL/L (ref 136–145)
WBC # BLD AUTO: 8.91 10*3/MM3 (ref 3.4–10.8)

## 2020-11-25 PROCEDURE — 85025 COMPLETE CBC W/AUTO DIFF WBC: CPT | Performed by: INTERNAL MEDICINE

## 2020-11-25 PROCEDURE — 97162 PT EVAL MOD COMPLEX 30 MIN: CPT

## 2020-11-25 PROCEDURE — 83735 ASSAY OF MAGNESIUM: CPT | Performed by: INTERNAL MEDICINE

## 2020-11-25 PROCEDURE — 97166 OT EVAL MOD COMPLEX 45 MIN: CPT

## 2020-11-25 PROCEDURE — 97110 THERAPEUTIC EXERCISES: CPT

## 2020-11-25 PROCEDURE — 80048 BASIC METABOLIC PNL TOTAL CA: CPT | Performed by: INTERNAL MEDICINE

## 2020-11-25 PROCEDURE — 25010000002 ENOXAPARIN PER 10 MG: Performed by: INTERNAL MEDICINE

## 2020-11-25 PROCEDURE — 25810000003 SODIUM CHLORIDE 0.9 % WITH KCL 20 MEQ 20-0.9 MEQ/L-% SOLUTION: Performed by: INTERNAL MEDICINE

## 2020-11-25 PROCEDURE — 97535 SELF CARE MNGMENT TRAINING: CPT

## 2020-11-25 PROCEDURE — 94799 UNLISTED PULMONARY SVC/PX: CPT

## 2020-11-25 RX ADMIN — POTASSIUM CHLORIDE AND SODIUM CHLORIDE 100 ML/HR: 900; 150 INJECTION, SOLUTION INTRAVENOUS at 11:12

## 2020-11-25 RX ADMIN — ACETAMINOPHEN 650 MG: 325 TABLET, FILM COATED ORAL at 23:23

## 2020-11-25 RX ADMIN — POTASSIUM CHLORIDE 40 MEQ: 750 TABLET, EXTENDED RELEASE ORAL at 20:54

## 2020-11-25 RX ADMIN — ASPIRIN 81 MG: 81 TABLET, CHEWABLE ORAL at 09:16

## 2020-11-25 RX ADMIN — ENOXAPARIN SODIUM 40 MG: 40 INJECTION SUBCUTANEOUS at 17:10

## 2020-11-25 RX ADMIN — VANCOMYCIN 125 MG: KIT at 01:48

## 2020-11-25 RX ADMIN — POTASSIUM CHLORIDE 40 MEQ: 750 TABLET, EXTENDED RELEASE ORAL at 06:23

## 2020-11-25 RX ADMIN — SODIUM CHLORIDE, PRESERVATIVE FREE 10 ML: 5 INJECTION INTRAVENOUS at 09:16

## 2020-11-25 RX ADMIN — VANCOMYCIN 125 MG: KIT at 17:10

## 2020-11-25 RX ADMIN — POTASSIUM CHLORIDE 40 MEQ: 750 TABLET, EXTENDED RELEASE ORAL at 11:12

## 2020-11-25 RX ADMIN — PAROXETINE HYDROCHLORIDE HEMIHYDRATE 40 MG: 20 TABLET, FILM COATED ORAL at 06:24

## 2020-11-25 RX ADMIN — VANCOMYCIN 125 MG: KIT at 11:12

## 2020-11-25 RX ADMIN — VANCOMYCIN 125 MG: KIT at 23:23

## 2020-11-25 RX ADMIN — SODIUM CHLORIDE, PRESERVATIVE FREE 10 ML: 5 INJECTION INTRAVENOUS at 20:54

## 2020-11-25 RX ADMIN — VANCOMYCIN 125 MG: KIT at 06:23

## 2020-11-25 NOTE — TELEPHONE ENCOUNTER
Caity warren/ [facility] called and was given auth to resume NSG & PT. They will fax order to be signed.

## 2020-11-26 ENCOUNTER — READMISSION MANAGEMENT (OUTPATIENT)
Dept: CALL CENTER | Facility: HOSPITAL | Age: 80
End: 2020-11-26

## 2020-11-26 VITALS
OXYGEN SATURATION: 90 % | DIASTOLIC BLOOD PRESSURE: 87 MMHG | WEIGHT: 125 LBS | SYSTOLIC BLOOD PRESSURE: 144 MMHG | HEART RATE: 60 BPM | TEMPERATURE: 98.4 F | RESPIRATION RATE: 18 BRPM | HEIGHT: 63 IN | BODY MASS INDEX: 22.15 KG/M2

## 2020-11-26 LAB
ANION GAP SERPL CALCULATED.3IONS-SCNC: 9.2 MMOL/L (ref 5–15)
BUN SERPL-MCNC: 7 MG/DL (ref 8–23)
BUN/CREAT SERPL: 14.6 (ref 7–25)
CALCIUM SPEC-SCNC: 9 MG/DL (ref 8.6–10.5)
CHLORIDE SERPL-SCNC: 105 MMOL/L (ref 98–107)
CO2 SERPL-SCNC: 24.8 MMOL/L (ref 22–29)
CREAT SERPL-MCNC: 0.48 MG/DL (ref 0.57–1)
DEPRECATED RDW RBC AUTO: 39.3 FL (ref 37–54)
ERYTHROCYTE [DISTWIDTH] IN BLOOD BY AUTOMATED COUNT: 12.2 % (ref 12.3–15.4)
GFR SERPL CREATININE-BSD FRML MDRD: 124 ML/MIN/1.73
GLUCOSE SERPL-MCNC: 92 MG/DL (ref 65–99)
HCT VFR BLD AUTO: 39.2 % (ref 34–46.6)
HGB BLD-MCNC: 13.3 G/DL (ref 12–15.9)
MAGNESIUM SERPL-MCNC: 2 MG/DL (ref 1.6–2.4)
MCH RBC QN AUTO: 29.8 PG (ref 26.6–33)
MCHC RBC AUTO-ENTMCNC: 33.9 G/DL (ref 31.5–35.7)
MCV RBC AUTO: 87.9 FL (ref 79–97)
PLATELET # BLD AUTO: 362 10*3/MM3 (ref 140–450)
PMV BLD AUTO: 10 FL (ref 6–12)
POTASSIUM SERPL-SCNC: 3.7 MMOL/L (ref 3.5–5.2)
RBC # BLD AUTO: 4.46 10*6/MM3 (ref 3.77–5.28)
SODIUM SERPL-SCNC: 139 MMOL/L (ref 136–145)
WBC # BLD AUTO: 7.46 10*3/MM3 (ref 3.4–10.8)

## 2020-11-26 PROCEDURE — 85027 COMPLETE CBC AUTOMATED: CPT | Performed by: INTERNAL MEDICINE

## 2020-11-26 PROCEDURE — 80048 BASIC METABOLIC PNL TOTAL CA: CPT | Performed by: INTERNAL MEDICINE

## 2020-11-26 PROCEDURE — 83735 ASSAY OF MAGNESIUM: CPT | Performed by: INTERNAL MEDICINE

## 2020-11-26 RX ADMIN — SODIUM CHLORIDE, PRESERVATIVE FREE 10 ML: 5 INJECTION INTRAVENOUS at 09:04

## 2020-11-26 RX ADMIN — PAROXETINE HYDROCHLORIDE HEMIHYDRATE 40 MG: 20 TABLET, FILM COATED ORAL at 06:06

## 2020-11-26 RX ADMIN — VANCOMYCIN 125 MG: KIT at 11:10

## 2020-11-26 RX ADMIN — VANCOMYCIN 125 MG: KIT at 06:06

## 2020-11-26 RX ADMIN — ASPIRIN 81 MG: 81 TABLET, CHEWABLE ORAL at 09:04

## 2020-11-26 NOTE — OUTREACH NOTE
Prep Survey      Responses   [facility] facility patient discharged from?  [place]   Is LACE score < 7 ?  No   Eligibility  [facility]   Date of Admission  11/24/20   Date of Discharge  11/26/20   Discharge Disposition  Home or Self Care   Discharge diagnosis  C. difficile colitis   Does the patient have one of the following disease processes/diagnoses(primary or secondary)?  Other   Does the patient have Home health ordered?  Yes   What is the Home health agency?   [facility]   Is there a DME ordered?  No   Prep survey completed?  Yes          Viviana Corrigan RN

## 2020-11-30 ENCOUNTER — TRANSITIONAL CARE MANAGEMENT TELEPHONE ENCOUNTER (OUTPATIENT)
Dept: CALL CENTER | Facility: HOSPITAL | Age: 80
End: 2020-11-30

## 2020-11-30 NOTE — OUTREACH NOTE
Call Center TCM Note      Responses   [facility] patient discharged from?  [place]   Does the patient have one of the following disease processes/diagnoses(primary or secondary)?  Other   TCM attempt successful?  Yes   Call start time  1613   Call end time  1618   Discharge diagnosis  C. difficile colitis   Meds reviewed with patient/caregiver?  Yes   Is the patient having any side effects they believe may be caused by any medication additions or changes?  No   Does the patient have all medications ordered at discharge?  Yes   Is the patient taking all medications as directed (includes completed medication regime)?  Yes   Does the patient have a primary care provider?   Yes   Does the patient have an appointment with their PCP within 7 days of discharge?  No   Nursing Interventions  Advised patient to make appointment   Has the patient kept scheduled appointments due by today?  N/A   Comments  States she will call later to make one because they have to work around her 's appts.   What is the Home health agency?   [facility]   Has home health visited the patient within 72 hours of discharge?  Yes   Psychosocial issues?  No   Did the patient receive a copy of their discharge instructions?  Yes   Nursing interventions  Reviewed instructions with patient   What is the patient's perception of their health status since discharge?  Same   Is the patient/caregiver able to teach back signs and symptoms related to disease process for when to call PCP?  Yes   Is the patient/caregiver able to teach back signs and symptoms related to disease process for when to call 911?  Yes   Is the patient/caregiver able to teach back the hierarchy of who to call/visit for symptoms/problems? PCP, Specialist, Home health nurse, Urgent Care, ED, 911  Yes   If the patient is a current smoker, are they able to teach back resources for cessation?  Not a smoker   Additional teach back comments  States she is doing better.  States the  diarrhea has improved but has not gone away.  She is drinking plenty of fluids and eating.     TCM call completed?  Yes   Wrap up additional comments  Patient to call PCP to make appt after she sees what days her 's are.          Freda Dalton LPN    11/30/2020, 16:18 EST

## 2020-11-30 NOTE — OUTREACH NOTE
Call Center TCM Note      Responses   [facility] patient discharged from?  [place]   Does the patient have one of the following disease processes/diagnoses(primary or secondary)?  Other   TCM attempt successful?  No   Unsuccessful attempts  Attempt 1          Freda Dalton LPN    11/30/2020, 11:25 EST

## 2020-12-03 DIAGNOSIS — F32.1 CURRENT MODERATE EPISODE OF MAJOR DEPRESSIVE DISORDER WITHOUT PRIOR EPISODE (HCC): ICD-10-CM

## 2020-12-03 RX ORDER — PAROXETINE HYDROCHLORIDE 40 MG/1
40 TABLET, FILM COATED ORAL EVERY MORNING
Qty: 30 TABLET | Refills: 5 | Status: SHIPPED | OUTPATIENT
Start: 2020-12-03 | End: 2020-12-07 | Stop reason: SDUPTHER

## 2020-12-07 ENCOUNTER — OFFICE VISIT (OUTPATIENT)
Dept: FAMILY MEDICINE CLINIC | Facility: CLINIC | Age: 80
End: 2020-12-07

## 2020-12-07 VITALS
SYSTOLIC BLOOD PRESSURE: 140 MMHG | HEART RATE: 80 BPM | WEIGHT: 125.6 LBS | OXYGEN SATURATION: 95 % | HEIGHT: 63 IN | DIASTOLIC BLOOD PRESSURE: 90 MMHG | BODY MASS INDEX: 22.25 KG/M2 | TEMPERATURE: 97.5 F

## 2020-12-07 DIAGNOSIS — E87.6 HYPOKALEMIA: ICD-10-CM

## 2020-12-07 DIAGNOSIS — A04.72 C. DIFFICILE COLITIS: ICD-10-CM

## 2020-12-07 DIAGNOSIS — Z09 HOSPITAL DISCHARGE FOLLOW-UP: Primary | ICD-10-CM

## 2020-12-07 DIAGNOSIS — F32.1 CURRENT MODERATE EPISODE OF MAJOR DEPRESSIVE DISORDER WITHOUT PRIOR EPISODE (HCC): ICD-10-CM

## 2020-12-07 DIAGNOSIS — K51.00 PANCOLITIS (HCC): ICD-10-CM

## 2020-12-07 PROCEDURE — 99495 TRANSJ CARE MGMT MOD F2F 14D: CPT | Performed by: FAMILY MEDICINE

## 2020-12-07 RX ORDER — PAROXETINE HYDROCHLORIDE 40 MG/1
40 TABLET, FILM COATED ORAL EVERY MORNING
Qty: 30 TABLET | Refills: 5 | Status: SHIPPED | OUTPATIENT
Start: 2020-12-07 | End: 2021-06-07

## 2020-12-07 NOTE — PROGRESS NOTES
Transitional Care Follow Up Visit  Subjective     Kandace Andrade is a 80 y.o. female who presents for a transitional care management visit.    Within 48 business hours after discharge our office contacted her via telephone to coordinate her care and needs.      I reviewed and discussed the details of that call along with the discharge summary, hospital problems, inpatient lab results, inpatient diagnostic studies, and consultation reports with Kandace.     Current outpatient and discharge medications have been reconciled for the patient.  Reviewed by: Cassi Bella MD      Date of TCM Phone Call 11/26/2020   [facility]   Date of Admission 11/24/2020   Date of Discharge 11/26/2020   Discharge Disposition Home or Self Care     Risk for Readmission (LACE) Score: 9 (11/26/2020  6:00 AM)      Discharge Dx:  **C. difficile colitis [A04.72]   Yes    • Hypokalemia [E87.6]   Unknown   • Pancolitis (CMS/[facility]) [K51.00]   Yes   • Type 2 diabetes mellitus without complication, without long-term current use of insulin (CMS/[facility]) [E11.9]   Yes   • COPD (chronic obstructive pulmonary disease) (CMS/[facility]) [J44.9]   Yes   • Paroxysmal atrial fibrillation (CMS/[facility]) [I48.0]   Yes   • Other emphysema (CMS/[facility]) [J43.8]   Yes   • KINDRA (obstructive sleep apnea) [G47.33]   Yes   • Essential hypertension [I10]           History of Present Illness   PT is here for hospital follow up.  She states she still feels really weak and stool in now formed but soft.  She has no diarrhea.  She does feel a little woozy.  She has no chest pains or palpitations or HA or blurred vision or SOB.  Course During Hospital Stay:  Ms. Andrade is a 80 y.o. female with a history of DM 2, PAF, KINDRA, hypertension and COPD who presented to [facility] initially complaining of diarrhea.  Please see the admitting history and physical for further details.  She was found to have c difficile colitis and was admitted to the hospital for further evaluation  and treatment.  She was initiated on oral vancomycin and will need to complete a 10 day course. She was also found to be hypokalemic likely secondary to GI losses and potassium was replaced. Her diarrhea has greatly improved, labs are stable and she has remained afebrile. She will be discharged home today in stable condition and should follow up with her PCP in 1-2 weeks.        The following portions of the patient's history were reviewed and updated as appropriate: allergies, current medications, past family history, past medical history, past social history, past surgical history and problem list.    Review of Systems   All other systems reviewed and are negative.      Objective   Physical Exam  Constitutional:       Appearance: She is normal weight.   HENT:      Head: Normocephalic and atraumatic.   Neck:      Musculoskeletal: Normal range of motion and neck supple.   Cardiovascular:      Rate and Rhythm: Normal rate and regular rhythm.      Heart sounds: Normal heart sounds.   Pulmonary:      Effort: Pulmonary effort is normal. No respiratory distress.      Breath sounds: Normal breath sounds. No stridor. No wheezing or rhonchi.   Abdominal:      General: Abdomen is flat. Bowel sounds are normal. There is no distension.      Palpations: Abdomen is soft.   Neurological:      Mental Status: She is alert.         Assessment/Plan   Diagnoses and all orders for this visit:    1. Hospital discharge follow-up (Primary)    2. C. difficile colitis    3. Pancolitis (CMS/HCC)    4. Current moderate episode of major depressive disorder without prior episode (CMS/HCC)  -     PARoxetine (PAXIL) 40 MG tablet; Take 1 tablet by mouth Every Morning.  Dispense: 30 tablet; Refill: 5    5. Hypokalemia  -     Basic Metabolic Panel            I requested and reviewed all records, labs, and diagnostics from the hospital with patient and family.  Patient is to follow-up with specialists as discussed and directed.  Refills and follow up  with cardio as scheduled.    Continue to monitor BP.

## 2020-12-08 LAB
BUN SERPL-MCNC: 14 MG/DL (ref 8–23)
BUN/CREAT SERPL: 20.6 (ref 7–25)
CALCIUM SERPL-MCNC: 10 MG/DL (ref 8.6–10.5)
CHLORIDE SERPL-SCNC: 100 MMOL/L (ref 98–107)
CO2 SERPL-SCNC: 29.9 MMOL/L (ref 22–29)
CREAT SERPL-MCNC: 0.68 MG/DL (ref 0.57–1)
GLUCOSE SERPL-MCNC: 90 MG/DL (ref 65–99)
POTASSIUM SERPL-SCNC: 4.6 MMOL/L (ref 3.5–5.2)
SODIUM SERPL-SCNC: 140 MMOL/L (ref 136–145)

## 2020-12-10 ENCOUNTER — OFFICE VISIT (OUTPATIENT)
Dept: FAMILY MEDICINE CLINIC | Facility: CLINIC | Age: 80
End: 2020-12-10

## 2020-12-10 DIAGNOSIS — Z00.00 ENCOUNTER FOR MEDICARE ANNUAL WELLNESS EXAM: Primary | ICD-10-CM

## 2020-12-10 PROCEDURE — 96160 PT-FOCUSED HLTH RISK ASSMT: CPT | Performed by: FAMILY MEDICINE

## 2020-12-10 PROCEDURE — 1159F MED LIST DOCD IN RCRD: CPT | Performed by: FAMILY MEDICINE

## 2020-12-10 PROCEDURE — G0439 PPPS, SUBSEQ VISIT: HCPCS | Performed by: FAMILY MEDICINE

## 2020-12-10 PROCEDURE — 1170F FXNL STATUS ASSESSED: CPT | Performed by: FAMILY MEDICINE

## 2020-12-10 PROCEDURE — 1126F AMNT PAIN NOTED NONE PRSNT: CPT | Performed by: FAMILY MEDICINE

## 2020-12-10 NOTE — PROGRESS NOTES
The ABCs of the Annual Wellness Visit  Subsequent Medicare Wellness Visit  Patient consented to a televisit due to Covid    CC: BEATRICE    Subjective   History of Present Illness:  Kandace Andrade is a 80 y.o. female who presents for a Subsequent Medicare Wellness Visit.    HEALTH RISK ASSESSMENT    Recent Hospitalizations:  Recently treated at the following:  [facility]    Current Medical Providers:  Patient Care Team:  Cassi Bella MD as PCP - General (Family Medicine)    Smoking Status:  Social History     Tobacco Use   Smoking Status Former Smoker   • Packs/day: 1.00   • Years: 25.00   • Pack years: 25.00   • Quit date: 2008   • Years since quittin.6   Smokeless Tobacco Never Used       Alcohol Consumption:  Social History     Substance and Sexual Activity   Alcohol Use No       Depression Screen:   PHQ-2/PHQ-9 Depression Screening 2020   Little interest or pleasure in doing things 0   Feeling down, depressed, or hopeless 0   Total Score 0       Fall Risk Screen:  GIOVANNI Fall Risk Assessment was completed, and patient is at LOW risk for falls.Assessment completed on:2020    Health Habits and Functional and Cognitive Screening:  Functional & Cognitive Status 12/10/2020   Do you have difficulty preparing food and eating? No   Do you have difficulty bathing yourself, getting dressed or grooming yourself? No   Do you have difficulty using the toilet? No   Do you have difficulty moving around from place to place? No   Do you have trouble with steps or getting out of a bed or a chair? No   Current Diet Well Balanced Diet   Dental Exam Not up to date   Eye Exam Not up to date   Exercise (times per week) 2 times per week   Current Exercises Include Walking   Current Exercise Activities Include -   Do you need help using the phone?  No   Are you deaf or do you have serious difficulty hearing?  No   Do you need help with transportation? Yes   Do you need help shopping? No   Do you need  help preparing meals?  Yes   Do you need help with housework?  Yes   Do you need help with laundry? Yes   Do you need help taking your medications? No   Do you need help managing money? No   Do you ever drive or ride in a car without wearing a seat belt? No   Have you felt unusual stress, anger or loneliness in the last month? Yes   Who do you live with? Spouse   If you need help, do you have trouble finding someone available to you? No   Have you been bothered in the last four weeks by sexual problems? No   Do you have difficulty concentrating, remembering or making decisions? No         Does the patient have evidence of cognitive impairment? No    Asprin use counseling:Taking ASA appropriately as indicated    Age-appropriate Screening Schedule:  Refer to the list below for future screening recommendations based on patient's age, sex and/or medical conditions. Orders for these recommended tests are listed in the plan section. The patient has been provided with a written plan.    Health Maintenance   Topic Date Due   • URINE MICROALBUMIN  1940   • TDAP/TD VACCINES (1 - Tdap) 03/27/1958   • ZOSTER VACCINE (1 of 2) 03/27/1989   • DIABETIC EYE EXAM  05/03/2018   • DXA SCAN  07/17/2020   • HEMOGLOBIN A1C  04/20/2021   • LIPID PANEL  07/17/2021   • INFLUENZA VACCINE  Completed          The following portions of the patient's history were reviewed and updated as appropriate: allergies, current medications, past family history, past medical history, past social history, past surgical history and problem list.    Outpatient Medications Prior to Visit   Medication Sig Dispense Refill   • acetaminophen (TYLENOL) 500 MG tablet Take 1,000 mg by mouth Every 6 (Six) Hours As Needed for Mild Pain .     • aspirin 81 MG chewable tablet Chew 81 mg Daily.     • budesonide-formoterol (SYMBICORT) 80-4.5 MCG/ACT inhaler Inhale 2 puffs 2 (Two) Times a Day As Needed.     • fluticasone (FLONASE) 50 MCG/ACT nasal spray 2 sprays into  the nostril(s) as directed by provider Daily As Needed for Rhinitis.     • omeprazole (priLOSEC) 40 MG capsule Take 1 capsule by mouth Daily. 30 capsule 2   • ondansetron ODT (ZOFRAN-ODT) 4 MG disintegrating tablet Place 1 tablet on the tongue Every 8 (Eight) Hours As Needed for Nausea or Vomiting. 20 tablet 0   • PARoxetine (PAXIL) 40 MG tablet Take 1 tablet by mouth Every Morning. 30 tablet 5   • vitamin D (ERGOCALCIFEROL) 1.25 MG (75394 UT) capsule capsule TAKE 1 CAPSULE BY MOUTH EVERY 7 DAYS 5 capsule 5     No facility-administered medications prior to visit.        Patient Active Problem List   Diagnosis   • Vitamin D deficiency   • KINDRA (obstructive sleep apnea)   • IBS (irritable bowel syndrome)   • Essential hypertension   • Herpes zoster   • Arthritis   • Anxiety and depression   • Other emphysema (CMS/[facility])   • Acute seasonal allergic rhinitis due to pollen   • Paroxysmal atrial fibrillation (CMS/[facility])   • Osteoporosis   • Mixed hyperlipidemia   • Encounter for Medicare annual wellness exam   • COPD (chronic obstructive pulmonary disease) (CMS/[facility])   • Mixed stress and urge urinary incontinence   • Type 2 diabetes mellitus without complication, without long-term current use of insulin (CMS/[facility])   • Arthritis of both hips   • Autonomic neuropathy   • GERD without esophagitis   • Impacted cerumen of left ear   • Pancolitis (CMS/[facility])   • C. difficile colitis   • Hypokalemia   • Hospital discharge follow-up       Advanced Care Planning:  ACP discussion was held with the patient during this visit. Patient has an advance directive (not in EMR), copy requested.    Review of Systems   All other systems reviewed and are negative.      Compared to one year ago, the patient feels her physical health is the same.  Compared to one year ago, the patient feels her mental health is the same.    Reviewed chart for potential of high risk medication in the elderly: yes  Reviewed chart for potential of harmful drug interactions  in the elderly:yes    Objective       There were no vitals filed for this visit.    There is no height or weight on file to calculate BMI.  Discussed the patient's BMI with her. The BMI is above average; BMI management plan is completed.    Physical Exam  NAD  AAOx3  No labored breathing.    Lab Results   Component Value Date    GLU 90 12/07/2020    HGBA1C 6.25 (H) 10/20/2020        Assessment/Plan   Medicare Risks and Personalized Health Plan  CMS Preventative Services Quick Reference  Cardiovascular risk    The above risks/problems have been discussed with the patient.  Pertinent information has been shared with the patient in the After Visit Summary.  Follow up plans and orders are seen below in the Assessment/Plan Section.    Diagnoses and all orders for this visit:    1. Encounter for Medicare annual wellness exam (Primary)      Follow Up:  Return in about 3 months (around 3/10/2021).     An After Visit Summary and PPPS were given to the patient.    MWE done and patient to work on diet and exercise for Preventive Counseling.     Continue PT for strength and balance.  She uses a walker.  She has kids help with ADLs  20 min spent in this visit.

## 2020-12-15 ENCOUNTER — READMISSION MANAGEMENT (OUTPATIENT)
Dept: CALL CENTER | Facility: HOSPITAL | Age: 80
End: 2020-12-15

## 2020-12-15 NOTE — OUTREACH NOTE
Medical Week 3 Survey      Responses   [facility] patient discharged from?  [facility]   Does the patient have one of the following disease processes/diagnoses(primary or secondary)?  Other   Week 3 attempt successful?  Yes   Call start time  1405   Call end time  1407   Medication alerts for this patient  no meds are changed   Meds reviewed with patient/caregiver?  Yes   Is the patient taking all medications as directed (includes completed medication regime)?  Yes   Comments regarding appointments  has kept appointment as schedule   Has the patient kept scheduled appointments due by today?  Yes   What is the patient's perception of their health status since discharge?  Same [has some weakness and dizziness]   Week 3 Call Completed?  Yes   Wrap up additional comments  weakness and dizziness, has bm about 3-4 times a day and no diarrrhea, having a bad  day  at the time          Alva Johnson RN

## 2020-12-16 ENCOUNTER — TELEPHONE (OUTPATIENT)
Dept: FAMILY MEDICINE CLINIC | Facility: CLINIC | Age: 80
End: 2020-12-16

## 2020-12-16 DIAGNOSIS — E11.9 TYPE 2 DIABETES MELLITUS WITHOUT COMPLICATION, WITHOUT LONG-TERM CURRENT USE OF INSULIN (HCC): Primary | ICD-10-CM

## 2020-12-16 NOTE — TELEPHONE ENCOUNTER
CHAIM, A HOME TODD NURSE AT [facility] IS CALLING ON BEHALF OF THE PATIENT.  SHE STATES THAT THE PATIENT IS A DIABETIC THAT WAS RECENTLY RELEASED FROM THE HOSPITAL WITH C DIFF.  SHE STATES THAT THE PATIENT ISNT EATING WELL AND SHE WANTS HER TO START MONITORING HER BLOOD SUGAR.    CHAIM IS REQUESTING THAT A BLOOS SUGAR MONITOR BE SENT IN TO HER PHARMACY.    CHAIM CAN BE REACHED AT  [phone]    PATIENTS PHARMACY  [facility] #[id number] [facility] [address] AT [facility] [address] - [phone]  - [phone]   [phone]

## 2020-12-17 RX ORDER — LANCETS
EACH MISCELLANEOUS
Qty: 200 EACH | Refills: 11 | Status: CANCELLED | OUTPATIENT
Start: 2020-12-17

## 2020-12-17 RX ORDER — DIPHENHYDRAMINE HYDROCHLORIDE 25 MG/1
CAPSULE, LIQUID FILLED ORAL
Qty: 1 EACH | Refills: 0 | Status: CANCELLED | OUTPATIENT
Start: 2020-12-17

## 2020-12-28 ENCOUNTER — TELEPHONE (OUTPATIENT)
Dept: FAMILY MEDICINE CLINIC | Facility: CLINIC | Age: 80
End: 2020-12-28

## 2020-12-28 NOTE — TELEPHONE ENCOUNTER
Patient needs a script for liquid  Vanco 2.5ml Q 6 hours for 36 doses    Rosamaria [phone] with home health called & states the C-Dif has returned

## 2020-12-29 DIAGNOSIS — A04.72 C. DIFFICILE COLITIS: Primary | ICD-10-CM

## 2020-12-30 ENCOUNTER — TELEPHONE (OUTPATIENT)
Dept: FAMILY MEDICINE CLINIC | Facility: CLINIC | Age: 80
End: 2020-12-30

## 2020-12-30 NOTE — TELEPHONE ENCOUNTER
PT'S DAUGHTER LAZARO CALLED TO REQUEST A CALL FROM [facility] REGARDING A PA THAT IS NEEDED FOR PT'S C-DIFF MEDICATION.    SHE ALSO STATES THEY CAN SAVE MONEY IF THEY GET IT THROUGH THE [facility] [place].    SHE CAN BE REACHED [phone]

## 2020-12-30 NOTE — TELEPHONE ENCOUNTER
Spoke to daughter Sindy. She said the rx is being switched to [facility] because it is cheaper. She will notify us if it still needs a rx.

## 2021-01-12 ENCOUNTER — OFFICE VISIT (OUTPATIENT)
Dept: CARDIOLOGY | Facility: CLINIC | Age: 81
End: 2021-01-12

## 2021-01-12 VITALS
HEIGHT: 63 IN | SYSTOLIC BLOOD PRESSURE: 120 MMHG | BODY MASS INDEX: 22.5 KG/M2 | DIASTOLIC BLOOD PRESSURE: 70 MMHG | WEIGHT: 127 LBS | HEART RATE: 66 BPM

## 2021-01-12 DIAGNOSIS — I10 ESSENTIAL HYPERTENSION: ICD-10-CM

## 2021-01-12 DIAGNOSIS — I48.0 PAROXYSMAL ATRIAL FIBRILLATION (HCC): Primary | ICD-10-CM

## 2021-01-12 DIAGNOSIS — E78.2 MIXED HYPERLIPIDEMIA: ICD-10-CM

## 2021-01-12 DIAGNOSIS — R09.89 LABILE HYPERTENSION: ICD-10-CM

## 2021-01-12 DIAGNOSIS — G47.33 OBSTRUCTIVE SLEEP APNEA SYNDROME: ICD-10-CM

## 2021-01-12 PROCEDURE — 93000 ELECTROCARDIOGRAM COMPLETE: CPT | Performed by: NURSE PRACTITIONER

## 2021-01-12 PROCEDURE — 99214 OFFICE O/P EST MOD 30 MIN: CPT | Performed by: NURSE PRACTITIONER

## 2021-01-12 NOTE — PROGRESS NOTES
"Date of Office Visit: 21  Encounter Provider: ANTOINETTE Ni  Place of Service: [facility] CARDIOLOGY  Patient Name: Kandace Andrade  :1940    Chief Complaint   Patient presents with   • Paroxysmal atrial fibrillation (   • Shortness of Breath   • Palpitations   • Follow-up   :     HPI: Kandace Andrade is a 80 y.o. female  with history of paroxysmal atrial fibrillation, anxiety/depression, COPD, obstructive sleep apnea, arthritis, and C. difficile.      In late 2020 she was hospitalized with pancolitis and C. difficile colitis.  She presented with diarrhea and was initiated on oral vancomycin.  She was hypokalemic and potassium was placed. She reported having a second infection and second round of antibiotics.  She had some low blood pressure and HCTZ and clonidine was stopped.   She visits today in follow up. SBP at home has ranged . She has some intermittent lightheadedness but no near syncope or syncope. She's had balance issues and is now ambulating with a walker. She has had chest heaviness intermittently for 3 weeks lasting less than 3 minutes resolving spontaneously and occurring at sporadic times. She had renal function and electrolytes checked post discharged, I reviewed BMP 2020. Cr 0.68 K 4.6.    Allergies   Allergen Reactions   • Penicillins Hives and Other (See Comments)     Elevated BP... tolerated ceftriaxone 10/2020 admission   • Statins Other (See Comments)     Headache, elevated liver enzymes, blurred vision           Family and social history reviewed.     ROS  All other systems were reviewed and are negative          Objective:     Vitals:    21 1154   BP: 120/70   BP Location: Left arm   Patient Position: Sitting   Pulse: 66   Weight: 57.6 kg (127 lb)   Height: 160 cm (63\")     Body mass index is 22.5 kg/m².    PHYSICAL EXAM:  Constitutional:       General: Not in acute distress.     Appearance: Well-developed. Not diaphoretic. "   HENT:      Head: Normocephalic.   Pulmonary:      Effort: Pulmonary effort is normal. No respiratory distress.      Breath sounds: Normal breath sounds. No wheezing. No rhonchi. No rales.   Cardiovascular:      Normal rate. Regular rhythm.   Pulses:     Radial: 2+ bilaterally.  Skin:     General: Skin is warm and dry. There is no cyanosis.      Findings: No rash.   Neurological:      Mental Status: Alert and oriented to person, place, and time.   Psychiatric:         Behavior: Behavior normal.         Thought Content: Thought content normal.         Judgment: Judgment normal.           ECG 12 Lead    Date/Time: 1/12/2021 2:57 PM  Performed by: Laura Dsouza APRN  Authorized by: Laura Dsouza APRN   Comparison: compared with previous ECG   Similar to previous ECG  Rhythm: sinus rhythm  Rate: normal  Other findings: non-specific ST-T wave changes  Comments: No ischemic changes              Current Outpatient Medications   Medication Sig Dispense Refill   • acetaminophen (TYLENOL) 500 MG tablet Take 1,000 mg by mouth Every 6 (Six) Hours As Needed for Mild Pain .     • aspirin 81 MG chewable tablet Chew 81 mg Daily.     • budesonide-formoterol (SYMBICORT) 80-4.5 MCG/ACT inhaler Inhale 2 puffs 2 (Two) Times a Day As Needed.     • fluticasone (FLONASE) 50 MCG/ACT nasal spray 2 sprays into the nostril(s) as directed by provider Daily As Needed for Rhinitis.     • omeprazole (priLOSEC) 40 MG capsule Take 1 capsule by mouth Daily. 30 capsule 2   • PARoxetine (PAXIL) 40 MG tablet Take 1 tablet by mouth Every Morning. 30 tablet 5   • vancomycin 250 mg/5 mL, 50 mg/mL, 50 MG/ML reconstituted solution oral solution reconstituted Take 2.5 mL by mouth Every 6 (Six) Hours FOR 34 DOSES. 150 mL 0   • vitamin D (ERGOCALCIFEROL) 1.25 MG (66390 UT) capsule capsule TAKE 1 CAPSULE BY MOUTH EVERY 7 DAYS 5 capsule 5     No current facility-administered medications for this visit.      Assessment:       Diagnosis Plan   1. Paroxysmal  atrial fibrillation (CMS/HCC)     2. Mixed hyperlipidemia     3. Obstructive sleep apnea syndrome     4. Essential hypertension          Orders Placed This Encounter   Procedures   • ECG 12 Lead     This order was created via procedure documentation         Plan:       1.  80-year-old female with history of paroxysmal atrial fibrillation. In NSR today. On aspirin  2.  COPD  3.  Obstructive sleep apnea  4.  C. difficile pancolitis November 2020-she had some diarrhea with GI loss and potassium needed replacement. Last potassium was normal  5. Labile blood pressure her systolic loki is  she is not on antihypertensive agents. Discussed continued water intake of at least three 16 ounce bottles of water daily. She is to keep following BP and call with any concerns.   6. Atypical chest pain, ECG without ischemic changes. She is to call if this worsens.     Follow up in 3 months with Dr. Hammer.           It has been a pleasure to participate in this patient's care.      Thank you,  ANTOINETTE Ni      **I used Dragon to dictate this note:**

## 2021-01-19 ENCOUNTER — TELEPHONE (OUTPATIENT)
Dept: FAMILY MEDICINE CLINIC | Facility: CLINIC | Age: 81
End: 2021-01-19

## 2021-01-19 DIAGNOSIS — A04.72 C. DIFFICILE COLITIS: Primary | ICD-10-CM

## 2021-01-19 NOTE — TELEPHONE ENCOUNTER
Caller: jesus    Relationship to patient: Other    Best call back number: [phone]    Patient is needing: Jesus from [facility] called in advising that patient may possibly still have c-diff. Stool is still runny like water. Stated that if another stool specimen was needed, she could collect it and bring it in, or wanted to know if Dr. Bella just wanted to call in another round of antibiotics. Please advise.

## 2021-01-26 ENCOUNTER — TELEPHONE (OUTPATIENT)
Dept: FAMILY MEDICINE CLINIC | Facility: CLINIC | Age: 81
End: 2021-01-26

## 2021-01-26 NOTE — TELEPHONE ENCOUNTER
I put in an order for this on 1- when Rosamaria(Home health Nurse?) asked me to put order in.    Thanks.

## 2021-01-26 NOTE — TELEPHONE ENCOUNTER
Caller: Kandace Andrade    Relationship to patient: Self    Best call back number: [phone]    Patient is needing: Patient called in and states she believes she has Cdiff again and would like a test order. She said they have a home health nurse that helps them. She said she can not eat without going to the restroom directly afterwards. Please call patient and advise.

## 2021-01-27 DIAGNOSIS — K21.9 GERD WITHOUT ESOPHAGITIS: ICD-10-CM

## 2021-01-28 RX ORDER — OMEPRAZOLE 40 MG/1
40 CAPSULE, DELAYED RELEASE ORAL DAILY
Qty: 30 CAPSULE | Refills: 2 | Status: SHIPPED | OUTPATIENT
Start: 2021-01-28 | End: 2023-01-10

## 2021-01-29 DIAGNOSIS — A04.72 C. DIFFICILE COLITIS: Primary | ICD-10-CM

## 2021-01-29 LAB — C DIFF TOX GENS STL QL NAA+PROBE: POSITIVE

## 2021-01-29 RX ORDER — METRONIDAZOLE 500 MG/1
500 TABLET ORAL 3 TIMES DAILY
Qty: 30 TABLET | Refills: 0 | Status: SHIPPED | OUTPATIENT
Start: 2021-01-29 | End: 2021-04-01

## 2021-02-01 ENCOUNTER — TELEPHONE (OUTPATIENT)
Dept: FAMILY MEDICINE CLINIC | Facility: CLINIC | Age: 81
End: 2021-02-01

## 2021-02-01 ENCOUNTER — TELEPHONE (OUTPATIENT)
Dept: CARDIOLOGY | Facility: CLINIC | Age: 81
End: 2021-02-01

## 2021-02-01 DIAGNOSIS — A04.72 C. DIFFICILE COLITIS: Primary | ICD-10-CM

## 2021-02-01 NOTE — TELEPHONE ENCOUNTER
PATIENT STATES THAT HER metroNIDAZOLE (Flagyl) 500 MG tablet    IS NOT AGREEING WITH HER.  STATES THAT SHE CANT SLEEP, HAS BEEN DIZZY AND FELL (INTO HUSBANDS LAP). PATIENT STATES THAT SHE HAS BEEN EXPERIENCING LOW BLOOD PRESSURE 135/100.    PLEASE ADVISE    PATIENT CAN BE REACHED AT  [phone]    [facility]  [facility] #[id number] - [place], [place] - [address] AT [facility] [address] - [phone]  - [phone]   [phone]

## 2021-02-01 NOTE — TELEPHONE ENCOUNTER
NURSE FROM [facility] CALLED.  PATIENT ON THE LINE.  PATIENT IS HAVING SIDE AFFECTS TAKING THE:   metroNIDAZOLE (Flagyl) 500 MG   REQUESTING ANOTHER MEDICATION.    PATIENT CALL BACK: [phone]    PHARMACY:  [facility] #[id number] [place], [place] - [address] AT [facility] - [phone] [facility] [phone] FX

## 2021-02-01 NOTE — TELEPHONE ENCOUNTER
Alyssa called to report her BP has been running around 105-109/ 81-87,  It was 84/69 last night and this am 140/100.  Patient states she has been taking Flagyl for Cdiff and has not slept for two nights. She had also eaten salty pretzels but has drank a lot of water.  Since then her cuff has broken and she will ask her son to see if he can pick one up tonight.  She will call tomorrow with new reading if able to get a cuff. / TAVO     Patient's phone number is [phone]

## 2021-02-01 NOTE — TELEPHONE ENCOUNTER
PATIENT CALLED IN AND STATED SHE WOULD LIKE A CALL BACK TODAY AND DOESN'T WANT TO END UP THE HOSPITAL. SHE IS AFRAID OF HOW THIS MEDICATION IS MAKING HER FEEL.    SEE PREVIOUS ENCOUNTER    CALL BACK  [phone]

## 2021-02-01 NOTE — TELEPHONE ENCOUNTER
I spoke to pt and had her stop the metronidazole and start vanc.  SE discussed.    She is going to get hold of her cardiologist for her BP.

## 2021-02-02 NOTE — TELEPHONE ENCOUNTER
I called and spoke with patient's daughter.  I informed her of the systolic BP above 100 is considered stable.  She asked what range to be concerned with diastolic pressure.  I informed her 60- 70 is OK if below 60 then they should call.  Is this correct? / TAVO

## 2021-02-02 NOTE — TELEPHONE ENCOUNTER
If she is not dizzy or lightheaded then systolic above 100 is considered stable. Continue to drink plenty water as c diff infection could have her dehydrated.

## 2021-02-02 NOTE — TELEPHONE ENCOUNTER
Im ok with diastolic 50-80 with her.  Providing diastolic does not stay 90s or above then no major concern.

## 2021-02-08 RX ORDER — CYCLOBENZAPRINE HCL 10 MG
TABLET ORAL
Qty: 20 TABLET | Refills: 0 | Status: SHIPPED | OUTPATIENT
Start: 2021-02-08 | End: 2021-04-12 | Stop reason: SDUPTHER

## 2021-02-08 NOTE — TELEPHONE ENCOUNTER
Caller:     Relationship:     Best call back number:     Medication needed: [phone]       When do you need the refill by: 02/08/21    What details did the patient provide when requesting the medication: Patient's daughter is calling to request a muscle relaxing medication.  She states the patient is having low back spasms really bad.  She states Dr. Bella has prescribed a muscle relaxer in the past.    Does the patient have less than a 3 day supply:  [x] Yes  [] No    What is the patient's preferred pharmacy:    [facility] #[id number] [facility] [address] AT [facility] OF [facility] - [phone]  - [phone]   [phone]      Please advise.  
Please advise   
Rx sent in. Daughter is aware  
Send in flexaril 10 mg  1/2 to one po bid #20  Let her know to be careful bc it can make her drowsy and increase risk of falls.  
Hide Additional Anticipated Plan?: No
Size Of Lesion In Cm: 0
Electrodesiccation Text: The wound bed was treated with electrodesiccation after the biopsy was performed.
Notification Instructions: Patient will be notified of biopsy results. However, patient instructed to call the office if not contacted within 2 weeks.
Depth Of Biopsy: dermis
Consent: Written consent was obtained and risks were reviewed including but not limited to scarring, infection, bleeding, scabbing, incomplete removal, nerve damage and allergy to anesthesia.
Type Of Destruction Used: Curettage
Biopsy Type: H and E
Electrodesiccation And Curettage Text: The wound bed was treated with electrodesiccation and curettage after the biopsy was performed.
Anesthesia Volume In Cc (Will Not Render If 0): 0.5
Dressing: bandage
Render Post-Care Instructions In Note?: yes
Silver Nitrate Text: The wound bed was treated with silver nitrate after the biopsy was performed.
Hemostasis: Aluminum Chloride
Curettage Text: The wound bed was treated with curettage after the biopsy was performed.
Detail Level: Detailed
Billing Type: Third-Party Bill
Biopsy Method: double edge Personna blade
Lab: 6
Post-Care Instructions: I reviewed with the patient in detail post-care instructions. Patient is to keep the biopsy site dry overnight, and then apply vasoline twice daily until healed.
Cryotherapy Text: The wound bed was treated with cryotherapy after the biopsy was performed.
Anesthesia Type: 1% lidocaine with epinephrine
Wound Care: Petrolatum
Lab Facility: 3

## 2021-03-03 ENCOUNTER — OFFICE VISIT (OUTPATIENT)
Dept: GASTROENTEROLOGY | Facility: CLINIC | Age: 81
End: 2021-03-03

## 2021-03-03 VITALS
WEIGHT: 129.8 LBS | DIASTOLIC BLOOD PRESSURE: 72 MMHG | TEMPERATURE: 97.5 F | BODY MASS INDEX: 23 KG/M2 | HEIGHT: 63 IN | SYSTOLIC BLOOD PRESSURE: 118 MMHG

## 2021-03-03 DIAGNOSIS — A04.72 COLITIS, CLOSTRIDIUM DIFFICILE: ICD-10-CM

## 2021-03-03 DIAGNOSIS — R19.7 DIARRHEA, UNSPECIFIED TYPE: Primary | ICD-10-CM

## 2021-03-03 DIAGNOSIS — Z23 IMMUNIZATION DUE: ICD-10-CM

## 2021-03-03 PROCEDURE — 99204 OFFICE O/P NEW MOD 45 MIN: CPT | Performed by: INTERNAL MEDICINE

## 2021-03-03 NOTE — PROGRESS NOTES
Chief Complaint   Patient presents with   • Diarrhea   • Nausea       History of Present Illness:   80 y.o. female with a history of DM 2, PAF, KINDRA, hypertension and COPD who is here for recurrent clostridium dificile diarrhea. She had a UTI 10/20 and her first dose of clostridium dificile in 11/20 when she was treated with a ten day course of Vancomycin 125 mg q 6 hrs. Dr. Bella has treated her for recurrent c. Dif. Each time she finishes the vanco 3 days later she will get diarrhea. She has been treated 3 times each time wiith Vanco x 10 days. The last two times she was treated her stool was not tested. Her last stool for c. Dif that was + was 1/27/21. She is having diarrhea now. Now having 5-6 BM/day. No recctal bleeding or melena. No abdominal pain. Some diarrhea. No fevers, chills. She has lost a few pounds. I am prediabetic. Allergic to flagyl. Off the vanco for 2 weeks. Last c/s. She saw me 20 yrs ago, done for diarrhea. She has had diarrhea for years. Her daughter has microscopic colitis. Nonsmoker. No ETOH. Takes yogurt.    Past Medical History:   Diagnosis Date   • A-fib (CMS/[facility])    • Acute seasonal allergic rhinitis due to pollen    • Anxiety and depression    • Arthritis    • Asthma    • C. difficile colitis    • Claudication, intermittent (CMS/HCC)    • COPD (chronic obstructive pulmonary disease) (CMS/[facility])    • Fibula fracture    • Herpes zoster    • History of cataract    • History of lumbosacral spine surgery    • History of migraine headaches    • IBS (irritable bowel syndrome)    • Mixed hyperlipidemia    • KINDRA (obstructive sleep apnea)    • Osteoporosis    • Paroxysmal atrial fibrillation (CMS/[facility])    • Urinary tract infection    • Vitamin D deficiency        Past Surgical History:   Procedure Laterality Date   • APPENDECTOMY     • BACK SURGERY      lower x2   • CATARACT EXTRACTION     • COLONOSCOPY      patient doesn't recall    • HYSTERECTOMY      partial   • SHOULDER SURGERY Right    •  SHOULDER SURGERY     • TONSILLECTOMY     • UPPER GASTROINTESTINAL ENDOSCOPY      patient doesn't recall          Current Outpatient Medications:   •  acetaminophen (TYLENOL) 500 MG tablet, Take 1,000 mg by mouth Every 6 (Six) Hours As Needed for Mild Pain ., Disp: , Rfl:   •  aspirin 81 MG chewable tablet, Chew 81 mg Daily., Disp: , Rfl:   •  budesonide-formoterol (SYMBICORT) 80-4.5 MCG/ACT inhaler, Inhale 2 puffs 2 (Two) Times a Day As Needed., Disp: , Rfl:   •  cyclobenzaprine (FLEXERIL) 10 MG tablet, Take 1/2 to one tablet by mouth twice daily, Disp: 20 tablet, Rfl: 0  •  fluticasone (FLONASE) 50 MCG/ACT nasal spray, 2 sprays into the nostril(s) as directed by provider Daily As Needed for Rhinitis., Disp: , Rfl:   •  PARoxetine (PAXIL) 40 MG tablet, Take 1 tablet by mouth Every Morning., Disp: 30 tablet, Rfl: 5  •  vitamin D (ERGOCALCIFEROL) 1.25 MG (17605 UT) capsule capsule, TAKE 1 CAPSULE BY MOUTH EVERY 7 DAYS, Disp: 5 capsule, Rfl: 5  •  metroNIDAZOLE (Flagyl) 500 MG tablet, Take 1 tablet by mouth 3 (Three) Times a Day., Disp: 30 tablet, Rfl: 0  •  omeprazole (priLOSEC) 40 MG capsule, TAKE 1 CAPSULE BY MOUTH DAILY, Disp: 30 capsule, Rfl: 2  •  vancomycin 250 mg/5 mL, 50 mg/mL, 50 MG/ML reconstituted solution oral solution reconstituted, Take 2.5 mL by mouth Every 6 (Six) Hours. For 10 days, Disp: 105 mL, Rfl: 0    Allergies   Allergen Reactions   • Penicillins Hives and Other (See Comments)     Elevated BP... tolerated ceftriaxone 10/2020 admission   • Statins Other (See Comments)     Headache, elevated liver enzymes, blurred vision       Family History   Problem Relation Age of Onset   • Heart disease Mother    • Lung cancer Mother    • Diabetes Father    • Liver disease Father    • Heart disease Paternal Uncle        Social History     Socioeconomic History   • Marital status:      Spouse name: Not on file   • Number of children: Not on file   • Years of education: Not on file   • Highest education  level: Not on file   Tobacco Use   • Smoking status: Former Smoker     Packs/day: 1.00     Years: 25.00     Pack years: 25.00     Quit date: 2008     Years since quittin.8   • Smokeless tobacco: Never Used   • Tobacco comment: CAFFEINE USE - 1 CUP COFFEE/ DIET COKE   Substance and Sexual Activity   • Alcohol use: No   • Drug use: No   Lifestyle   • Physical activity     Days per week: Patient refused     Minutes per session: Patient refused   • Stress: Not on file       Review of Systems   Gastrointestinal: Positive for diarrhea. Negative for abdominal pain.     Pertinent positives and negatives documented in the HPI and all other systems reviewed and were found to be negative.  Vitals:    21 1301   BP: 118/72   Temp: 97.5 °F (36.4 °C)       Physical Exam  Vitals signs reviewed.   Constitutional:       General: She is not in acute distress.     Appearance: Normal appearance. She is well-developed. She is not diaphoretic.   HENT:      Head: Normocephalic and atraumatic. Hair is normal.      Right Ear: Hearing, tympanic membrane, ear canal and external ear normal. No decreased hearing noted. No drainage.      Left Ear: Hearing, tympanic membrane, ear canal and external ear normal. No decreased hearing noted.      Nose: Nose normal. No nasal deformity.      Mouth/Throat:      Mouth: Mucous membranes are moist.   Eyes:      General: Lids are normal.         Right eye: No discharge.         Left eye: No discharge.      Extraocular Movements: Extraocular movements intact.      Conjunctiva/sclera: Conjunctivae normal.      Pupils: Pupils are equal, round, and reactive to light.   Neck:      Musculoskeletal: Normal range of motion and neck supple.      Thyroid: No thyromegaly.      Vascular: No JVD.      Trachea: No tracheal deviation.   Cardiovascular:      Rate and Rhythm: Normal rate and regular rhythm.      Pulses: Normal pulses.      Heart sounds: Normal heart sounds. No murmur. No friction rub. No  gallop.    Pulmonary:      Effort: Pulmonary effort is normal. No respiratory distress.      Breath sounds: Normal breath sounds. No wheezing or rales.   Chest:      Chest wall: No tenderness.   Abdominal:      General: Bowel sounds are normal. There is no distension.      Palpations: Abdomen is soft. There is no mass.      Tenderness: There is no abdominal tenderness. There is no guarding or rebound.      Hernia: No hernia is present.   Genitourinary:     Rectum: Normal. Guaiac result negative.   Musculoskeletal: Normal range of motion.         General: No tenderness or deformity.   Lymphadenopathy:      Cervical: No cervical adenopathy.   Skin:     General: Skin is warm and dry.      Findings: No erythema or rash.   Neurological:      Mental Status: She is alert and oriented to person, place, and time.      Cranial Nerves: No cranial nerve deficit.      Motor: No abnormal muscle tone.      Coordination: Coordination normal.      Deep Tendon Reflexes: Reflexes are normal and symmetric. Reflexes normal.   Psychiatric:         Mood and Affect: Mood normal.         Behavior: Behavior normal.         Thought Content: Thought content normal.         Judgment: Judgment normal.         Diagnoses and all orders for this visit:    1. Diarrhea, unspecified type (Primary)  -     CBC & Differential  -     Celiac Ab tTG DGP TIgA  -     Comprehensive Metabolic Panel  -     TSH  -     Clostridium Difficile EIA - Stool, Per Rectum  -     Fecal Fat, Qualitative - Stool, Per Rectum  -     Fecal Leukocytes - Stool, Per Rectum  -     Giardia Antigen - Stool, Per Rectum  -     Stool Culture (Reference Lab) - Stool, Per Rectum  -     Ova & Parasite Examination - Stool, Per Rectum    2. Colitis, Clostridium difficile  -     CBC & Differential  -     Celiac Ab tTG DGP TIgA  -     Comprehensive Metabolic Panel  -     TSH  -     Clostridium Difficile EIA - Stool, Per Rectum  -     Fecal Fat, Qualitative - Stool, Per Rectum  -     Fecal  Leukocytes - Stool, Per Rectum  -     Giardia Antigen - Stool, Per Rectum  -     Stool Culture (Reference Lab) - Stool, Per Rectum  -     Ova & Parasite Examination - Stool, Per Rectum      Assessment:  1. Diarrhea  2. H/o clostridium dificile  3.     Recommendations:  1. Stool studies  2. Labs:   3. F/u 4 weeks.   4. Trial of lactose free diet.   5. Take fiber daily.     No follow-ups on file.    Jarrod Duggan MD  3/3/2021

## 2021-03-04 LAB
ALBUMIN SERPL-MCNC: 4.3 G/DL (ref 3.5–5.2)
ALBUMIN/GLOB SERPL: 1.8 G/DL
ALP SERPL-CCNC: 95 U/L (ref 39–117)
ALT SERPL-CCNC: 17 U/L (ref 1–33)
AST SERPL-CCNC: 19 U/L (ref 1–32)
BASOPHILS # BLD AUTO: 0.07 10*3/MM3 (ref 0–0.2)
BASOPHILS NFR BLD AUTO: 0.8 % (ref 0–1.5)
BILIRUB SERPL-MCNC: 0.2 MG/DL (ref 0–1.2)
BUN SERPL-MCNC: 19 MG/DL (ref 8–23)
BUN/CREAT SERPL: 33.3 (ref 7–25)
CALCIUM SERPL-MCNC: 10 MG/DL (ref 8.6–10.5)
CHLORIDE SERPL-SCNC: 104 MMOL/L (ref 98–107)
CO2 SERPL-SCNC: 25 MMOL/L (ref 22–29)
CREAT SERPL-MCNC: 0.57 MG/DL (ref 0.57–1)
EOSINOPHIL # BLD AUTO: 0.16 10*3/MM3 (ref 0–0.4)
EOSINOPHIL NFR BLD AUTO: 1.9 % (ref 0.3–6.2)
ERYTHROCYTE [DISTWIDTH] IN BLOOD BY AUTOMATED COUNT: 12.3 % (ref 12.3–15.4)
GLIADIN PEPTIDE IGA SER-ACNC: 5 UNITS (ref 0–19)
GLIADIN PEPTIDE IGG SER-ACNC: 2 UNITS (ref 0–19)
GLOBULIN SER CALC-MCNC: 2.4 GM/DL
GLUCOSE SERPL-MCNC: 111 MG/DL (ref 65–99)
HCT VFR BLD AUTO: 41.8 % (ref 34–46.6)
HGB BLD-MCNC: 14 G/DL (ref 12–15.9)
IGA SERPL-MCNC: 118 MG/DL (ref 64–422)
IMM GRANULOCYTES # BLD AUTO: 0.03 10*3/MM3 (ref 0–0.05)
IMM GRANULOCYTES NFR BLD AUTO: 0.4 % (ref 0–0.5)
LYMPHOCYTES # BLD AUTO: 2.3 10*3/MM3 (ref 0.7–3.1)
LYMPHOCYTES NFR BLD AUTO: 27.1 % (ref 19.6–45.3)
MCH RBC QN AUTO: 30.8 PG (ref 26.6–33)
MCHC RBC AUTO-ENTMCNC: 33.5 G/DL (ref 31.5–35.7)
MCV RBC AUTO: 91.9 FL (ref 79–97)
MONOCYTES # BLD AUTO: 0.61 10*3/MM3 (ref 0.1–0.9)
MONOCYTES NFR BLD AUTO: 7.2 % (ref 5–12)
NEUTROPHILS # BLD AUTO: 5.33 10*3/MM3 (ref 1.7–7)
NEUTROPHILS NFR BLD AUTO: 62.6 % (ref 42.7–76)
NRBC BLD AUTO-RTO: 0.1 /100 WBC (ref 0–0.2)
PLATELET # BLD AUTO: 331 10*3/MM3 (ref 140–450)
POTASSIUM SERPL-SCNC: 4 MMOL/L (ref 3.5–5.2)
PROT SERPL-MCNC: 6.7 G/DL (ref 6–8.5)
RBC # BLD AUTO: 4.55 10*6/MM3 (ref 3.77–5.28)
SODIUM SERPL-SCNC: 140 MMOL/L (ref 136–145)
TSH SERPL DL<=0.005 MIU/L-ACNC: 1.58 UIU/ML (ref 0.27–4.2)
TTG IGA SER-ACNC: <2 U/ML (ref 0–3)
TTG IGG SER-ACNC: 7 U/ML (ref 0–5)
WBC # BLD AUTO: 8.5 10*3/MM3 (ref 3.4–10.8)

## 2021-03-08 ENCOUNTER — TELEPHONE (OUTPATIENT)
Dept: GASTROENTEROLOGY | Facility: CLINIC | Age: 81
End: 2021-03-08

## 2021-03-08 NOTE — TELEPHONE ENCOUNTER
----- Message from Rodney Allred Rep sent at 3/8/2021 11:54 AM EST -----  Regarding: Christine Ricardo from [employer] ([phone]) is needing order for the stool sample for pt ([phone] fax)

## 2021-03-08 NOTE — PROGRESS NOTES
03/08/21       Tell her that the celiac sprue antibody panel, CBC, thyroid-stimulating hormone, and most of the comprehensive metabolic profile came back normal.  Her serum glucose is 111 which is minimally elevated.       We will see what the stool studies show.       Please fax a copy of this report to her PCP.  Dominique lee

## 2021-03-09 LAB
G LAMBLIA AG STL QL IA: NEGATIVE
SPECIMEN STATUS: NORMAL

## 2021-03-10 NOTE — PROGRESS NOTES
03/10/21  Tell her that the stool study for C. difficile toxin came back negative, which is good.  We will see what the other stool studies show?  Please fax a copy of this report to her PCP.  Dominique lee

## 2021-03-11 ENCOUNTER — TELEPHONE (OUTPATIENT)
Dept: GASTROENTEROLOGY | Facility: CLINIC | Age: 81
End: 2021-03-11

## 2021-03-11 LAB
O+P SPEC MICRO: NORMAL
O+P STL CONC: NORMAL
WBC STL QL MICRO: NORMAL
WBC STL QL MICRO: NORMAL

## 2021-03-11 NOTE — TELEPHONE ENCOUNTER
----- Message from Rodney Mcmanus sent at 3/11/2021  8:10 AM EST -----  Regarding: diarrhea lethargic  Contact: [phone]  Pt is having diarrhea and is lethargic. Please call to advise. Thank you

## 2021-03-11 NOTE — TELEPHONE ENCOUNTER
----- Message from Jarrod Duggan MD sent at 3/8/2021  7:51 AM EST -----  03/08/21       Tell her that the celiac sprue antibody panel, CBC, thyroid-stimulating hormone, and most of the comprehensive metabolic profile came back normal.  Her serum glucose is 111 which is minimally elevated.       We will see what the stool studies show.       Please fax a copy of this report to her PCP.  Isaias. [email or web address]

## 2021-03-11 NOTE — TELEPHONE ENCOUNTER
----- Message from Jarrod Duggan MD sent at 3/10/2021  7:38 AM EST -----  03/10/21  Tell her that the stool study for C. difficile toxin came back negative, which is good.  We will see what the other stool studies show?  Please fax a copy of this report to her PCP.  Rupalx. kjh

## 2021-03-11 NOTE — TELEPHONE ENCOUNTER
Called pt and spoke with pt's daughter.  Advised of DR Duggan's notes.     Pt's daughter verb understanding. She states that her mother is not any better. She is still having 6-8 watery stools with urgency per day.  She does not have a fever and chills.  She states that her mother is fatigued and has no energy.  Her mother is not eating due to everything runs thru her and her mother is only drinking water.  He mother is up all night with diarrhea and is sleeping during the day and does not have the strength to cook.. Pt is urinating but daughter did not know if urine dark.  Pt's daughter states we told all of this to Dr Duggan at her visit.       Advised will send message to Dr Duggan and in the meantime advised to get gatorade , pedialyte or powerade for her mother to drink.  Also advised if symptoms worsen to go to ER.  Verb understanding.

## 2021-03-12 LAB
BACTERIA SPEC CULT: NORMAL
BACTERIA SPEC CULT: NORMAL
C DIFF TOX A+B STL QL IA: NEGATIVE
CAMPYLOBACTER STL CULT: NORMAL
E COLI SXT STL QL IA: NEGATIVE
FAT STL QL: NORMAL
NEUTRAL FAT STL QL: NORMAL
SALM + SHIG STL CULT: NORMAL

## 2021-03-12 NOTE — TELEPHONE ENCOUNTER
Please see if her stool for qualitative fecal fat is completed by [facility] yet? That is the last of the stool studies that I ordered. Rubin

## 2021-03-14 NOTE — TELEPHONE ENCOUNTER
Tell the daughter that all of her stool studies have come back normal. So the lactose free diet and the fiber supplement have not helped her diarrhea? If that is true then we could do another colonoscopy to help define why she has persistent diarrhea. Is the daughter interested in us doing a colonoscopy given that her mom is 80 yrs of age? If so then please schedule it. Make sure that the patient is not on any anticoagulants (warfarin, Xarelto, Plavix, etc). If the daughter wants us to do another colonsocopy then please schedule it in the next 2 weeks. Thx.kjh

## 2021-03-15 NOTE — TELEPHONE ENCOUNTER
Called pt's daughter and advised of Dr Duggan's note. She verb understanding. And requested we call her mother.     Called pt and advised of Dr Duggan'd note.  Pt verb understanding and reports that her diarrhea has improved and for now she wants to hold off on having a c/s. Advised will update Dr Duggan.

## 2021-03-24 ENCOUNTER — TELEPHONE (OUTPATIENT)
Dept: GASTROENTEROLOGY | Facility: CLINIC | Age: 81
End: 2021-03-24

## 2021-03-24 NOTE — TELEPHONE ENCOUNTER
----- Message from Rodney Allred Rep sent at 3/24/2021  1:40 PM EDT -----  Regarding: Orders for Colonoscopy  Contact: [phone]  Pt daughter Tracey ([phone]) is calling , mother has decided to get procedure, please put in a order and then contact daughter

## 2021-03-25 NOTE — TELEPHONE ENCOUNTER
Isatu Kimball RegSched Rep  [facility] [address]  Phone Number: [phone]   PT daughter millicent is upset that Scope order has not been submitted. She does not want to wait until appt on 03/31 to speak with aric she wants to have scope done immediately. Please advise     **Message to DR Duggan.  Marielos Johnson RN.

## 2021-03-25 NOTE — TELEPHONE ENCOUNTER
Please call the daughter. The patient is to f/u with Ms. Fraser in one week. Have her mom think about what she wants, is she better. She and the daughter can then discuss with MsNeyda Bria in one week. Rubin

## 2021-03-26 NOTE — TELEPHONE ENCOUNTER
[facility] [address]  Phone Number: [phone]   Patient daughter asking for an call back today..     **Message to Dr Duggan.  Marielos Zavala RN.

## 2021-03-27 ENCOUNTER — PREP FOR SURGERY (OUTPATIENT)
Dept: OTHER | Facility: HOSPITAL | Age: 81
End: 2021-03-27

## 2021-03-27 DIAGNOSIS — R19.7 DIARRHEA: Primary | ICD-10-CM

## 2021-03-27 NOTE — TELEPHONE ENCOUNTER
3/27/21 -I called the patient and discussed her diarrhea with her.  Her lab work and stool studies did not show anything revealing.  The lactose-free diet did not help.  Taking a fiber supplement daily also did not help.  She had 3 nonbloody bowel movements yesterday.  She does have nocturnal diarrhea.  Discussed the pros and cons of doing a colonoscopy on an 81-year-old.  The patient understands the potential complications and wants to proceed with a colonoscopy ASAP.  I tried to call the daughter, Tracey, but could only leave a message.    J[facility] -please schedule this patient for a colonoscopy to be done in the next 2 weeks.    MT/SA -please call the daughter, Tracey, to let her know that we are going to schedule her mom for a colonoscopy.  We can cancel the appointment with Ms. Fraser that was to be done this coming week. Thx.kjh

## 2021-03-29 ENCOUNTER — TRANSCRIBE ORDERS (OUTPATIENT)
Dept: SLEEP MEDICINE | Facility: HOSPITAL | Age: 81
End: 2021-03-29

## 2021-03-29 DIAGNOSIS — Z01.818 OTHER SPECIFIED PRE-OPERATIVE EXAMINATION: Primary | ICD-10-CM

## 2021-03-29 PROBLEM — R19.7 DIARRHEA: Status: ACTIVE | Noted: 2021-03-29

## 2021-03-29 NOTE — TELEPHONE ENCOUNTER
Called pt's daughter and advised of Dr Duggan's note. She verb understanding and wanted Dr Duggan to know how much they appreciated him reaching out to them on Saturday. Advised I will let Dr Duggan know.

## 2021-03-31 ENCOUNTER — LAB (OUTPATIENT)
Dept: LAB | Facility: HOSPITAL | Age: 81
End: 2021-03-31

## 2021-03-31 DIAGNOSIS — Z01.818 OTHER SPECIFIED PRE-OPERATIVE EXAMINATION: ICD-10-CM

## 2021-03-31 PROCEDURE — U0004 COV-19 TEST NON-CDC HGH THRU: HCPCS

## 2021-03-31 PROCEDURE — C9803 HOPD COVID-19 SPEC COLLECT: HCPCS

## 2021-04-01 LAB — SARS-COV-2 RNA RESP QL NAA+PROBE: NOT DETECTED

## 2021-04-02 ENCOUNTER — ANESTHESIA EVENT (OUTPATIENT)
Dept: GASTROENTEROLOGY | Facility: HOSPITAL | Age: 81
End: 2021-04-02

## 2021-04-02 ENCOUNTER — ANESTHESIA (OUTPATIENT)
Dept: GASTROENTEROLOGY | Facility: HOSPITAL | Age: 81
End: 2021-04-02

## 2021-04-02 ENCOUNTER — HOSPITAL ENCOUNTER (OUTPATIENT)
Facility: HOSPITAL | Age: 81
Setting detail: HOSPITAL OUTPATIENT SURGERY
Discharge: HOME | End: 2021-04-02
Attending: INTERNAL MEDICINE | Admitting: INTERNAL MEDICINE

## 2021-04-02 VITALS
RESPIRATION RATE: 16 BRPM | HEART RATE: 66 BPM | SYSTOLIC BLOOD PRESSURE: 104 MMHG | OXYGEN SATURATION: 94 % | DIASTOLIC BLOOD PRESSURE: 76 MMHG | TEMPERATURE: 97.9 F

## 2021-04-02 DIAGNOSIS — R19.7 DIARRHEA: ICD-10-CM

## 2021-04-02 PROCEDURE — 25010000002 PROPOFOL 10 MG/ML EMULSION: Performed by: NURSE ANESTHETIST, CERTIFIED REGISTERED

## 2021-04-02 PROCEDURE — 88305 TISSUE EXAM BY PATHOLOGIST: CPT | Performed by: INTERNAL MEDICINE

## 2021-04-02 PROCEDURE — 45380 COLONOSCOPY AND BIOPSY: CPT | Performed by: INTERNAL MEDICINE

## 2021-04-02 RX ORDER — SODIUM CHLORIDE, SODIUM LACTATE, POTASSIUM CHLORIDE, CALCIUM CHLORIDE 600; 310; 30; 20 MG/100ML; MG/100ML; MG/100ML; MG/100ML
1000 INJECTION, SOLUTION INTRAVENOUS CONTINUOUS
Status: DISCONTINUED | OUTPATIENT
Start: 2021-04-02 | End: 2021-04-02 | Stop reason: HOSPADM

## 2021-04-02 RX ORDER — PROPOFOL 10 MG/ML
VIAL (ML) INTRAVENOUS AS NEEDED
Status: DISCONTINUED | OUTPATIENT
Start: 2021-04-02 | End: 2021-04-02 | Stop reason: SURG

## 2021-04-02 RX ORDER — LIDOCAINE HYDROCHLORIDE 20 MG/ML
INJECTION, SOLUTION INFILTRATION; PERINEURAL AS NEEDED
Status: DISCONTINUED | OUTPATIENT
Start: 2021-04-02 | End: 2021-04-02 | Stop reason: SURG

## 2021-04-02 RX ADMIN — SODIUM CHLORIDE, POTASSIUM CHLORIDE, SODIUM LACTATE AND CALCIUM CHLORIDE 1000 ML: 600; 310; 30; 20 INJECTION, SOLUTION INTRAVENOUS at 08:25

## 2021-04-02 RX ADMIN — PROPOFOL 50 MG: 10 INJECTION, EMULSION INTRAVENOUS at 09:14

## 2021-04-02 RX ADMIN — PROPOFOL 140 MCG/KG/MIN: 10 INJECTION, EMULSION INTRAVENOUS at 09:13

## 2021-04-02 RX ADMIN — LIDOCAINE HYDROCHLORIDE 60 MG: 20 INJECTION, SOLUTION INFILTRATION; PERINEURAL at 09:13

## 2021-04-02 NOTE — H&P
Chief Complaint   Patient presents with   • Diarrhea   • Nausea         History of Present Illness:   80 y.o. female with a history of DM 2, PAF, KINDRA, hypertension and COPD who is here for recurrent clostridium dificile diarrhea. She had a UTI 10/20 and her first dose of clostridium dificile in 11/20 when she was treated with a ten day course of Vancomycin 125 mg q 6 hrs. Dr. Bella has treated her for recurrent c. Dif. Each time she finishes the vanco 3 days later she will get diarrhea. She has been treated 3 times each time wiith Vanco x 10 days. The last two times she was treated her stool was not tested. Her last stool for c. Dif that was + was 1/27/21. She is having diarrhea now. Now having 5-6 BM/day. No recctal bleeding or melena. No abdominal pain. Some diarrhea. No fevers, chills. She has lost a few pounds. I am prediabetic. Allergic to flagyl. Off the vanco for 2 weeks. Last c/s. She saw me 20 yrs ago, done for diarrhea. She has had diarrhea for years. Her daughter has microscopic colitis. Nonsmoker. No ETOH. Takes yogurt.     Medical History        Past Medical History:   Diagnosis Date   • A-fib (CMS/[facility])     • Acute seasonal allergic rhinitis due to pollen     • Anxiety and depression     • Arthritis     • Asthma     • C. difficile colitis     • Claudication, intermittent (CMS/HCC)     • COPD (chronic obstructive pulmonary disease) (CMS/[facility])     • Fibula fracture     • Herpes zoster     • History of cataract     • History of lumbosacral spine surgery     • History of migraine headaches     • IBS (irritable bowel syndrome)     • Mixed hyperlipidemia     • KINDRA (obstructive sleep apnea)     • Osteoporosis     • Paroxysmal atrial fibrillation (CMS/[facility])     • Urinary tract infection     • Vitamin D deficiency              Surgical History         Past Surgical History:   Procedure Laterality Date   • APPENDECTOMY       • BACK SURGERY         lower x2   • CATARACT EXTRACTION       • COLONOSCOPY         patient  doesn't recall    • HYSTERECTOMY         partial   • SHOULDER SURGERY Right     • SHOULDER SURGERY       • TONSILLECTOMY       • UPPER GASTROINTESTINAL ENDOSCOPY         patient doesn't recall                Current Outpatient Medications:   •  acetaminophen (TYLENOL) 500 MG tablet, Take 1,000 mg by mouth Every 6 (Six) Hours As Needed for Mild Pain ., Disp: , Rfl:   •  aspirin 81 MG chewable tablet, Chew 81 mg Daily., Disp: , Rfl:   •  budesonide-formoterol (SYMBICORT) 80-4.5 MCG/ACT inhaler, Inhale 2 puffs 2 (Two) Times a Day As Needed., Disp: , Rfl:   •  cyclobenzaprine (FLEXERIL) 10 MG tablet, Take 1/2 to one tablet by mouth twice daily, Disp: 20 tablet, Rfl: 0  •  fluticasone (FLONASE) 50 MCG/ACT nasal spray, 2 sprays into the nostril(s) as directed by provider Daily As Needed for Rhinitis., Disp: , Rfl:   •  PARoxetine (PAXIL) 40 MG tablet, Take 1 tablet by mouth Every Morning., Disp: 30 tablet, Rfl: 5  •  vitamin D (ERGOCALCIFEROL) 1.25 MG (90588 UT) capsule capsule, TAKE 1 CAPSULE BY MOUTH EVERY 7 DAYS, Disp: 5 capsule, Rfl: 5  •  metroNIDAZOLE (Flagyl) 500 MG tablet, Take 1 tablet by mouth 3 (Three) Times a Day., Disp: 30 tablet, Rfl: 0  •  omeprazole (priLOSEC) 40 MG capsule, TAKE 1 CAPSULE BY MOUTH DAILY, Disp: 30 capsule, Rfl: 2  •  vancomycin 250 mg/5 mL, 50 mg/mL, 50 MG/ML reconstituted solution oral solution reconstituted, Take 2.5 mL by mouth Every 6 (Six) Hours. For 10 days, Disp: 105 mL, Rfl: 0           Allergies   Allergen Reactions   • Penicillins Hives and Other (See Comments)       Elevated BP... tolerated ceftriaxone 10/2020 admission   • Statins Other (See Comments)       Headache, elevated liver enzymes, blurred vision         Family History   Problem Relation Age of Onset   • Heart disease Mother     • Lung cancer Mother     • Diabetes Father     • Liver disease Father     • Heart disease Paternal Uncle           Social History   Social History            Socioeconomic History   • Marital  status:        Spouse name: Not on file   • Number of children: Not on file   • Years of education: Not on file   • Highest education level: Not on file   Tobacco Use   • Smoking status: Former Smoker       Packs/day: 1.00       Years: 25.00       Pack years: 25.00       Quit date: 2008       Years since quittin.8   • Smokeless tobacco: Never Used   • Tobacco comment: CAFFEINE USE - 1 CUP COFFEE/ DIET COKE   Substance and Sexual Activity   • Alcohol use: No   • Drug use: No   Lifestyle   • Physical activity       Days per week: Patient refused       Minutes per session: Patient refused   • Stress: Not on file            Review of Systems   Gastrointestinal: Positive for diarrhea. Negative for abdominal pain.      Pertinent positives and negatives documented in the HPI and all other systems reviewed and were found to be negative.      Vitals:     21 1301   BP: 118/72   Temp: 97.5 °F (36.4 °C)         Physical Exam  Vitals signs reviewed.   Constitutional:       General: She is not in acute distress.     Appearance: Normal appearance. She is well-developed. She is not diaphoretic.   HENT:      Head: Normocephalic and atraumatic. Hair is normal.      Right Ear: Hearing, tympanic membrane, ear canal and external ear normal. No decreased hearing noted. No drainage.      Left Ear: Hearing, tympanic membrane, ear canal and external ear normal. No decreased hearing noted.      Nose: Nose normal. No nasal deformity.      Mouth/Throat:      Mouth: Mucous membranes are moist.   Eyes:      General: Lids are normal.         Right eye: No discharge.         Left eye: No discharge.      Extraocular Movements: Extraocular movements intact.      Conjunctiva/sclera: Conjunctivae normal.      Pupils: Pupils are equal, round, and reactive to light.   Neck:      Musculoskeletal: Normal range of motion and neck supple.      Thyroid: No thyromegaly.      Vascular: No JVD.      Trachea: No tracheal deviation.    Cardiovascular:      Rate and Rhythm: Normal rate and regular rhythm.      Pulses: Normal pulses.      Heart sounds: Normal heart sounds. No murmur. No friction rub. No gallop.    Pulmonary:      Effort: Pulmonary effort is normal. No respiratory distress.      Breath sounds: Normal breath sounds. No wheezing or rales.   Chest:      Chest wall: No tenderness.   Abdominal:      General: Bowel sounds are normal. There is no distension.      Palpations: Abdomen is soft. There is no mass.      Tenderness: There is no abdominal tenderness. There is no guarding or rebound.      Hernia: No hernia is present.   Genitourinary:     Rectum: Normal. Guaiac result negative.   Musculoskeletal: Normal range of motion.         General: No tenderness or deformity.   Lymphadenopathy:      Cervical: No cervical adenopathy.   Skin:     General: Skin is warm and dry.      Findings: No erythema or rash.   Neurological:      Mental Status: She is alert and oriented to person, place, and time.      Cranial Nerves: No cranial nerve deficit.      Motor: No abnormal muscle tone.      Coordination: Coordination normal.      Deep Tendon Reflexes: Reflexes are normal and symmetric. Reflexes normal.   Psychiatric:         Mood and Affect: Mood normal.         Behavior: Behavior normal.         Thought Content: Thought content normal.         Judgment: Judgment normal.            Diagnoses and all orders for this visit:     1. Diarrhea, unspecified type (Primary)  -     CBC & Differential  -     Celiac Ab tTG [facility] TIgA  -     Comprehensive Metabolic Panel  -     TSH  -     Clostridium Difficile EIA - Stool, Per Rectum  -     Fecal Fat, Qualitative - Stool, Per Rectum  -     Fecal Leukocytes - Stool, Per Rectum  -     Giardia Antigen - Stool, Per Rectum  -     Stool Culture (Reference Lab) - Stool, Per Rectum  -     Ova & Parasite Examination - Stool, Per Rectum     2. Colitis, Clostridium difficile  -     CBC & Differential  -     Celiac Ab tTG  DGP TIgA  -     Comprehensive Metabolic Panel  -     TSH  -     Clostridium Difficile EIA - Stool, Per Rectum  -     Fecal Fat, Qualitative - Stool, Per Rectum  -     Fecal Leukocytes - Stool, Per Rectum  -     Giardia Antigen - Stool, Per Rectum  -     Stool Culture (Reference Lab) - Stool, Per Rectum  -     Ova & Parasite Examination - Stool, Per Rectum        Assessment:  1. Diarrhea  2. H/o clostridium dificile though her most recent stool studies were normal.      Recommendations:  1. Stool studies  2. Labs:   3. F/u 4 weeks.   4. Trial of lactose free diet.   5. Take fiber daily.      No follow-ups on file.     4/2/21 - No change from the above H and P.   Jarrod Duggan MD

## 2021-04-02 NOTE — ANESTHESIA PREPROCEDURE EVALUATION
Anesthesia Evaluation                  Airway   Mallampati: II  TM distance: >3 FB  Neck ROM: full  No difficulty expected  Dental      Comment: Gold posts only  Dentures out    Pulmonary - normal exam   (+) COPD, asthma,sleep apnea,   Cardiovascular - normal exam    ECG reviewed    (+) hypertension, dysrhythmias Paroxysmal Atrial Fib, hyperlipidemia,       Neuro/Psych  (+) psychiatric history,     GI/Hepatic/Renal/Endo    (+)  GERD,  diabetes mellitus type 2,     Musculoskeletal     Abdominal    Substance History      OB/GYN          Other   arthritis,                      Anesthesia Plan    ASA 3     MAC     intravenous induction     Anesthetic plan, all risks, benefits, and alternatives have been provided, discussed and informed consent has been obtained with: patient.

## 2021-04-02 NOTE — DISCHARGE INSTRUCTIONS
For the next 24 hours patient needs to be with a responsible adult.    For 24 hours DO NOT drive, operate machinery, appliances, drink alcohol, make important decisions or sign legal documents.    Start with a light or bland diet if you are feeling sick to your stomach otherwise advance to regular diet as tolerated.    Follow recommendations on procedure report if provided by your doctor.    Call Dr Duggan for problems [phone]    Problems may include but not limited to: large amounts of bleeding, trouble breathing, repeated vomiting, severe unrelieved pain, fever or chills.                                                                                              WHAT ARE DIVERTICULOSIS AND DIVERTICULITIS?  Many people have small pouches in their colons that bulge outward through weak spots, like an inner tube that pokes through weak places in a tire.  Each pouch is called a diverticulum.  The condition of having diverticula is DIVERTICULOSIS.  The condition becomes more common as people age.  About half of all people over the age of 60 have diverticulosis    When pouches become infected or inflamed, the condition is called DIVERTICULITIS.  This happens in 10% to 25% of people with diverticulosis.  Diverticulosis and diverticulitis are also called DIVERTICULAR DISEASE.     WHAT ARE THE SYMPTOMS?  Diverticulosis - Most people do not have any discomfort or symptoms.  However, symptoms may include mild cramps, bloating, and constipation.  Other diseases such as irritable bowel syndrome (IBS) and stomach ulcers cause similar problems, so these symptoms do not always mean a person has diverticulosis.  You should visit your doctor if you have these troubling symptoms.    Diverticulitis - The most common symptom is abdominal pain.  The most common sign is tenderness around the left side of the lower abdomen.  If infection is the cause, fever, nausea, vomiting, chills, cramping, and constipation may occur as well.   The severity depends on the extent of the infection and complications.    WHAT ARE THE COMPLICATIONS?  Diverticulitis can lead to bleeding, infections,perforations or tears, or blockages.  These complications always require treatment to prevent them from proggressing and causing serous illness.    Bleeding from a diverticula is a rare complication.  When this occurs, blood may appear in the toilet or in your stool.  Bleeding can be severe, but it may stop by itself and not require treatment.  Doctors believe bleeding diverticula are caused by a small blood vessel in a diverticulum that weakens and finally bursts.  If you have bleeding from the rectum, you should see your doctor.  If the bleeding does not stop you may need surgery.    Abscess, Perforation, and Peritonitis - The infection causing diverticulitis often clears up after a few days of treatment with antibiotics.  If the condition gets worse, an abscess may form in the colon.  An abscess is an infected area with pus that may cause swelling and destroy tissue.  Sometimes the infected diverticula may develop small holes, called perforations.  These perforations allow pus to leak out of the colon into the abdominal area.  If the abscess is small and remains in the colon, it may clear up after treatment with antibiotics.  If not, the doctor may need to drain it.  A large abscess can become a serious problem if the infection leaks out and contaminates areas outside the colon.  Infection that spreads into the abdominal cavity is called peritonitis.  Peritonitis requires immediate surgery toclean the abdominal cavity and remove the damaged part of the colon.  Without surgery, peritonitis can be fatal.    FISTULA  A fistula is an abnormal connection of tissue between two organs or between an organ and the skin.  When damaged tissues come into contact with each other during infection, they sometimes stick together.  If they heal that way, a fistula forms.  When  diverticulitis-related infection spreads through out the colon, the colon's tissue may stick to nearby tissues.  The organs usually involved are the bladder, small intestine, and skin.  The problem can be corrected with surgery to remove the fistula and affected part of the colon.    INTESTINAL OBSTRUCTION  The scarring caused by infection may cause partial or total blockage of the large intestine.  When this happens, the colon is unable to move bowel contents normally.  When the obstruction totally blocks the intestine, emergency surgery is necessary.  Partial blockage is not an emergency, so the surgery to correct it can be planned.    WHAT CAUSES DIVERTICULAR DISEASE  Although not proven, the dominant theory is that a low-fiber diet is the main cause of diverticular disease.  The disease was first noticed in the [place] in the early 1900s.  At about the same time, processed foods were introduced into the American diet.  Many processed foods contain refined, low-fiber flour.  Unlike whole-wheat flour, refined flour has no wheat bran.    Diverticular disease is common in developed or industrialized countries-particularly the [place], [place], and [place]-where low-fiber diets are common.  The disease is rare in countries of [place] and [place], where people eat high-fiber vegetable diets.    Fiber is the part of fruits, vegetables, and whole grains that the body cannot digest.  Some fiber dissolves easily in water (soluble fiber).  It takes on a soft, jelly-like texture in the intestines.  Some fiber passes almost unchanged through the intestines (insoluble fiber).  Both kinds of fiber help make stools soft and easy to pass.  Fiber also prevents constipation.    Constipation makes the muscles strain to move stool that is too hard.  It is the main cause of increased pressure in the colon.  This excess pressure might cause the weak spots in the colon to bulge out and become diverticula.  Diverticulitis  occurs when diverticula become infected or inflamed.  Doctors are not certain what causes the infection.  It may begin when stool or bacteria are caught in the diverticula.  An attack of diverticulitis can develop suddenly and without warning.    HOW DOES THE DOCTOR DIAGNOSE DIVERTICULAR DISEASE  The doctor asks about medical history, does a physical exam, and may perform one or more diagnostic tests.  Because most people do not have symptoms, diverticulosis is often found through tests ordered for another ailment.    When taking a medical history, the doctor may ask about bowel habits, symptoms, pain, diet, and medications.  The physical exam usually involves a digital rectal exam.  To preform this test. The doctor inserts a gloved, lubricated finger into the rectum to detect tenderness, blockage, or blood.  The doctor may check stool for signs of bleeding and test blood for signs of infection.  The doctor may also order x-rays or other tests.    WHAT IS THE TREATMENT FOR DIVERTICULAR DISEASE  Increasing the amount of fiber in the diet may reduce symptoms of diverticulosis and prevent complications such as diverticulitis.  Fiber keeps stool soft and lowers pressure inside the colon so that bowel contents can move through easily.  The American Dietetic Association. Recommends 20 to 35 grams of fiber each day.  The doctor may also recommend taking a fiber product such as Citrucel or Metamucil once a dya.  These products are mixed water and provide about 2 to 3.5 grams of fiber per  Tablespoon, mixed with 8 ounces of water.    Avoidance of nuts, popcorn, and sunflower, pumpkin, leeann, and sesame seeds has been recommended by physicians out of fear that food particles could enter, block, or irritate the diverticula.  However, no scientific data support this treatment measure.  Eating a high-fiber diet is the only requirement highly emphasized across the medical literature.  Eliminating specific foods is not  necessary.  The seeds in tomatoes, zucchini, cucumbers, strawberries, and raspberries, as well as poppy seeds, are generally considered harmless.  People differ in amounts and types of foods the can eat.  Decisions about diet should be made based on what works best for each person.  Keeping a food diary may help identify what foods may cause symptoms.    If cramps, bloating, and constipation are problems, the doctor may prescribe  Short course of pain medication.  However, many medications affect emptying of the colon, an undesirable side effect for people with diverticulosis.    DIVERTICULITIS  Treatment focuses on clearing up the infection and inflammation, resting the colon, and preventing or minimizing complications.  An attack of diverticulitis without complications may respond to antibiotics within a few days if treated early.  To help the colon rest, the doctor may recommend bed rest and a liquid diet, along with a pain reliever.    An acute attack with severe pain or sever infection may require a hospital stay.  Most acute cases of diverticulitis are treated with antibiotics and a liquid diet.  The antibiotics are given by injection into a vein.  In some cases, however, surgery may be necessary.    WHEN IS SURGERY NECESSARY  If attacks are severe or frequent, the doctor may advise surgery.  The surgeon removes the affected part of the colon and joins the remaining sections.  This typed of surgery, called colon resection, aims to keep attacks from coming back and to prevent complications.  The doctor may also recommend surgery for complications of a fistula or intestinal obstruction.    If antibiotics do not correct an attack, emergency surgery may be required.  Other reasons for emergency surgery include a large abscess, perforation, peritonitis, or continued bleeding.    Emergency surgery usually involves 2 operations.  The first will clear the infected abdominal cavity and remove part of the colon.   Because infection and sometimes obstruction, it is not safe to rejoin the colon during the first operation.  Instead, the surgeon creates a temporary hole, or stoma, in the abdomen.  The end of the colon is connected to the hole, a procedure called a colostomy, to allow normal eating and bowel movements.  The stool goes into a bag attached to the opening in the abdomen.  In the second operation, the surgeon rejoins the ends of the colon.

## 2021-04-02 NOTE — ANESTHESIA POSTPROCEDURE EVALUATION
Patient: Kandace Andrade    Procedure Summary     Date: 04/02/21 Room / Location: [facility] ENDOSCOPY 6 /  [facility]    Anesthesia Start: 0908 Anesthesia Stop: 0940    Procedure: COLONOSCOPY to cecum with cecal biopsies (N/A ) Diagnosis:       Diarrhea      (Diarrhea [R19.7])    Surgeons: Jarrod Duggan MD Provider: Mine Wolfe MD    Anesthesia Type: MAC ASA Status: 3          Anesthesia Type: MAC    Vitals  Vitals Value Taken Time   /76 04/02/21 0958   Temp     Pulse 66 04/02/21 0958   Resp 16 04/02/21 0958   SpO2 94 % 04/02/21 0958           Post Anesthesia Care and Evaluation    Patient location during evaluation: bedside  Patient participation: complete - patient participated  Level of consciousness: awake  Pain management: adequate  Airway patency: patent  Anesthetic complications: No anesthetic complications    Cardiovascular status: acceptable  Respiratory status: acceptable  Hydration status: acceptable

## 2021-04-05 LAB
CYTO UR: NORMAL
LAB AP CASE REPORT: NORMAL
LAB AP DIAGNOSIS COMMENT: NORMAL
PATH REPORT.FINAL DX SPEC: NORMAL
PATH REPORT.GROSS SPEC: NORMAL

## 2021-04-06 ENCOUNTER — TELEPHONE (OUTPATIENT)
Dept: GASTROENTEROLOGY | Facility: CLINIC | Age: 81
End: 2021-04-06

## 2021-04-06 NOTE — TELEPHONE ENCOUNTER
----- Message from Jade Rascon sent at 4/6/2021  9:53 AM EDT -----  Regarding: not feeeling well  Contact: [phone]  Patient daughter called stated she's having a lot dizziness and n/v since scope..

## 2021-04-06 NOTE — TELEPHONE ENCOUNTER
VM to Tracey (see hipaa) with request to contact office.     Call to pt. States every time eats, feels nauseated and dizzy.  Having diarrhea 2-3x/day.  Denies blood, fever.      Advise pt will send message to DR Duggan, and in the meantime - if symptoms persist/worsent - go to ER.  Verb understanding.

## 2021-04-07 NOTE — PROGRESS NOTES
04/07/21  I called the patient's daughter and discussed the pathology report from the colonoscopy.  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  The daughter is complaining that the mom has nausea since the colonoscopy was done (on 4/2/21).  She has no abdominal pain and no fever.  I had recommended that the patient go to the emergency room but the daughter says the mom is reluctant to do that.  She could also follow-up with her PCP.  I had told the daughter for mom to try taking Pepto-Bismol chewable tablets 2 p.o. twice daily to see if that would get her diarrhea to resolve?  The mom is not on any nonsteroidal anti-inflammatory drugs.  I told the daughter to call me next week to tell me how the Pepto-Bismol works as far as her chronic diarrhea is concerned.  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  I told the daughter that we could try budesonide but I would prefer trying these other medicines that I think would be tolerated better by an 81-year-old.       Please fax a copy of this report to her PCP.  Thx. kjh

## 2021-04-09 ENCOUNTER — TELEPHONE (OUTPATIENT)
Dept: GASTROENTEROLOGY | Facility: CLINIC | Age: 81
End: 2021-04-09

## 2021-04-09 NOTE — TELEPHONE ENCOUNTER
----- Message from Jarrod Duggan MD sent at 4/7/2021  8:38 AM EDT -----  04/07/21  I called the patient's daughter and discussed the pathology report from the colonoscopy.  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  The daughter is complaining that the mom has nausea since the colonoscopy was done (on 4/2/21).  She has no abdominal pain and no fever.  I had recommended that the patient go to the emergency room but the daughter says the mom is reluctant to do that.  She could also follow-up with her PCP.  I had told the daughter for mom to try taking Pepto-Bismol chewable tablets 2 p.o. twice daily to see if that would get her diarrhea to resolve?  The mom is not on any nonsteroidal anti-inflammatory drugs.  I told the daughter to call me next week to tell me how the Pepto-Bismol works as far as her chronic diarrhea is concerned.  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  I told the daughter that we could try budesonide but I would prefer trying these other medicines that I think would be tolerated better by an 81-year-old.       Please fax a copy of this report to her PCP.  Thx. kjh

## 2021-04-12 ENCOUNTER — OFFICE VISIT (OUTPATIENT)
Dept: FAMILY MEDICINE CLINIC | Facility: CLINIC | Age: 81
End: 2021-04-12

## 2021-04-12 VITALS
HEIGHT: 63 IN | DIASTOLIC BLOOD PRESSURE: 78 MMHG | SYSTOLIC BLOOD PRESSURE: 130 MMHG | HEART RATE: 75 BPM | TEMPERATURE: 97.8 F | WEIGHT: 124 LBS | BODY MASS INDEX: 21.97 KG/M2 | OXYGEN SATURATION: 98 %

## 2021-04-12 DIAGNOSIS — M62.830 BACK SPASM: ICD-10-CM

## 2021-04-12 DIAGNOSIS — R11.0 NAUSEA: ICD-10-CM

## 2021-04-12 DIAGNOSIS — H91.93 DECREASED HEARING OF BOTH EARS: Primary | ICD-10-CM

## 2021-04-12 DIAGNOSIS — R19.7 DIARRHEA, UNSPECIFIED TYPE: ICD-10-CM

## 2021-04-12 DIAGNOSIS — R42 DIZZINESS: ICD-10-CM

## 2021-04-12 PROCEDURE — 99214 OFFICE O/P EST MOD 30 MIN: CPT | Performed by: FAMILY MEDICINE

## 2021-04-12 RX ORDER — BISMUTH SUBSALICYLATE 262 MG/1
524 TABLET, CHEWABLE ORAL 2 TIMES DAILY
COMMUNITY
End: 2021-05-19

## 2021-04-12 RX ORDER — CYCLOBENZAPRINE HCL 10 MG
TABLET ORAL
Qty: 20 TABLET | Refills: 0 | Status: SHIPPED | OUTPATIENT
Start: 2021-04-12 | End: 2022-07-22 | Stop reason: HOSPADM

## 2021-04-12 NOTE — PROGRESS NOTES
"Chief Complaint  Hospital Follow Up Visit    Subjective          Kandace Andrade presents to [facility]  History of Present Illness  Pt had a cscope 2 weeks ago and was told she may have microscopic colitis.  She has been having some diarrhea and has been taking Pepto bid and helped some for a while and then came back.  She is waiting on GI phone call.  She has been having nausea and dizziness since the scope and not getting much better.  She needs some blood work done.    Objective   Vital Signs:   /78 (BP Location: Left arm, Patient Position: Sitting)   Pulse 75   Temp 97.8 °F (36.6 °C) (Temporal)   Ht 160 cm (63\")   Wt 56.2 kg (124 lb)   SpO2 98%   BMI 21.97 kg/m²     Physical Exam  Vitals and nursing note reviewed.   Constitutional:       Appearance: Normal appearance.   HENT:      Head: Normocephalic and atraumatic.   Cardiovascular:      Rate and Rhythm: Normal rate and regular rhythm.      Heart sounds: Normal heart sounds. No murmur heard.   No friction rub.   Pulmonary:      Effort: Pulmonary effort is normal. No respiratory distress.      Breath sounds: Normal breath sounds. No stridor.   Neurological:      Mental Status: She is alert.        Result Review :                 Assessment and Plan    Diagnoses and all orders for this visit:    1. Decreased hearing of both ears (Primary)  -     Ambulatory Referral to ENT (Otolaryngology)    2. Dizziness  -     Comprehensive Metabolic Panel    3. Nausea  -     CBC & Differential    4. Back spasm  -     cyclobenzaprine (FLEXERIL) 10 MG tablet; Take 1/2 to one tablet by mouth twice daily  Dispense: 20 tablet; Refill: 0    5. Diarrhea, unspecified type  -     Comprehensive Metabolic Panel        Follow Up   Return in about 3 months (around 7/12/2021).  Patient was given instructions and counseling regarding her condition or for health maintenance advice. Please see specific information pulled into the AVS if appropriate. "

## 2021-04-13 LAB
ALBUMIN SERPL-MCNC: 4.7 G/DL (ref 3.5–5.2)
ALBUMIN/GLOB SERPL: 2 G/DL
ALP SERPL-CCNC: 88 U/L (ref 39–117)
ALT SERPL-CCNC: 13 U/L (ref 1–33)
AST SERPL-CCNC: 21 U/L (ref 1–32)
BASOPHILS # BLD AUTO: 0.08 10*3/MM3 (ref 0–0.2)
BASOPHILS NFR BLD AUTO: 0.9 % (ref 0–1.5)
BILIRUB SERPL-MCNC: 0.3 MG/DL (ref 0–1.2)
BUN SERPL-MCNC: 12 MG/DL (ref 8–23)
BUN/CREAT SERPL: 20.7 (ref 7–25)
CALCIUM SERPL-MCNC: 10.6 MG/DL (ref 8.6–10.5)
CHLORIDE SERPL-SCNC: 100 MMOL/L (ref 98–107)
CO2 SERPL-SCNC: 26.9 MMOL/L (ref 22–29)
CREAT SERPL-MCNC: 0.58 MG/DL (ref 0.57–1)
EOSINOPHIL # BLD AUTO: 0.24 10*3/MM3 (ref 0–0.4)
EOSINOPHIL NFR BLD AUTO: 2.7 % (ref 0.3–6.2)
ERYTHROCYTE [DISTWIDTH] IN BLOOD BY AUTOMATED COUNT: 12.2 % (ref 12.3–15.4)
GLOBULIN SER CALC-MCNC: 2.3 GM/DL
GLUCOSE SERPL-MCNC: 85 MG/DL (ref 65–99)
HCT VFR BLD AUTO: 43.2 % (ref 34–46.6)
HGB BLD-MCNC: 15.1 G/DL (ref 12–15.9)
IMM GRANULOCYTES # BLD AUTO: 0.03 10*3/MM3 (ref 0–0.05)
IMM GRANULOCYTES NFR BLD AUTO: 0.3 % (ref 0–0.5)
LYMPHOCYTES # BLD AUTO: 2.99 10*3/MM3 (ref 0.7–3.1)
LYMPHOCYTES NFR BLD AUTO: 33.8 % (ref 19.6–45.3)
MCH RBC QN AUTO: 31.7 PG (ref 26.6–33)
MCHC RBC AUTO-ENTMCNC: 35 G/DL (ref 31.5–35.7)
MCV RBC AUTO: 90.6 FL (ref 79–97)
MONOCYTES # BLD AUTO: 0.56 10*3/MM3 (ref 0.1–0.9)
MONOCYTES NFR BLD AUTO: 6.3 % (ref 5–12)
NEUTROPHILS # BLD AUTO: 4.95 10*3/MM3 (ref 1.7–7)
NEUTROPHILS NFR BLD AUTO: 56 % (ref 42.7–76)
NRBC BLD AUTO-RTO: 0 /100 WBC (ref 0–0.2)
PLATELET # BLD AUTO: 343 10*3/MM3 (ref 140–450)
POTASSIUM SERPL-SCNC: 4.4 MMOL/L (ref 3.5–5.2)
PROT SERPL-MCNC: 7 G/DL (ref 6–8.5)
RBC # BLD AUTO: 4.77 10*6/MM3 (ref 3.77–5.28)
SODIUM SERPL-SCNC: 137 MMOL/L (ref 136–145)
WBC # BLD AUTO: 8.85 10*3/MM3 (ref 3.4–10.8)

## 2021-04-15 ENCOUNTER — TELEPHONE (OUTPATIENT)
Dept: GASTROENTEROLOGY | Facility: CLINIC | Age: 81
End: 2021-04-15

## 2021-04-15 NOTE — TELEPHONE ENCOUNTER
----- Message from Rodney Joyner sent at 4/15/2021 10:58 AM EDT -----  Contact: [phone]  Pt called back several days ago per Dr. Duggan's instructions and has not received a callback yet.

## 2021-04-15 NOTE — TELEPHONE ENCOUNTER
Called pt and spoke with pt's daughter . She reports that her mother was diagnosed with microscopic colitis and has been taking pepto bismol 2 tabs bid. She reports that this really has not helped. Her mother is still having 4 very loose stools per day and the diarrhea is waking her up at night.   She is asking what should they do. Advised will send message to Dr Duggan.   Vivian understanding.

## 2021-04-16 ENCOUNTER — TELEPHONE (OUTPATIENT)
Dept: GASTROENTEROLOGY | Facility: CLINIC | Age: 81
End: 2021-04-16

## 2021-04-16 RX ORDER — BUDESONIDE 3 MG/1
9 CAPSULE, COATED PELLETS ORAL DAILY
Qty: 90 CAPSULE | Refills: 6 | Status: SHIPPED | OUTPATIENT
Start: 2021-04-16 | End: 2021-07-15

## 2021-04-16 NOTE — TELEPHONE ENCOUNTER
Called patients daughter, left voicemail to call the office back to make a 4-6 week follow up with Luann Fraser.

## 2021-04-16 NOTE — TELEPHONE ENCOUNTER
I called the patient's daughter.  The patient feels bad the Pepto-Bismol works some but just is not working enough.  She still has nocturnal diarrhea.  I sent in a prescription for Entocort 3 mg 3 p.o. daily.  I told the daughter to call me if this is not helping in 10 days.  I also discussed the potential complications of the Entocort.  I would like for the patient to be seen back in the office in 4 to 6 weeks.  She could follow-up with Ms. Fraser.     -please schedule her to see Ms. Fraser in the office in 4 to 6 weeks.  You might schedule this with the patient's daughter.  The daughter seems to do lots of the talking for her mom.       Please fax a copy of this note to her PCP.  Dominique lee

## 2021-04-16 NOTE — TELEPHONE ENCOUNTER
Call to daughter, Tracey (see hipaa).  F/u appt scheduled with ROCKY Fraser for 5/18 @ 11am.     Tracey states PA required for Budesonide prescribed today.  Message to Rossana COPELAND

## 2021-04-16 NOTE — TELEPHONE ENCOUNTER
PA for Budesonide approved through [employer]. Patient daughter Tracey on HIPAA has been notified.     Approved today  PA Case: [id number], Status: Approved, Coverage Starts on: 1/1/2021 12:00:00 AM, Coverage Ends on: 12/31/2021 12:00:00 AM. Questions? Contact [phone].

## 2021-04-29 ENCOUNTER — TELEPHONE (OUTPATIENT)
Dept: GASTROENTEROLOGY | Facility: CLINIC | Age: 81
End: 2021-04-29

## 2021-04-29 NOTE — TELEPHONE ENCOUNTER
----- Message from Rodney Mcmanus sent at 4/29/2021 10:39 AM EDT -----  Regarding: busesonide  Contact: [phone]  Pt update with med. Pt diarrhea has stopped, continued with fiber, experiencing some dizziness. Please call pt to advise.    Budesonide (ENTOCORT EC) 3 MG 24 hr capsule 90 capsule 6 4/16/2021 7/15/2021

## 2021-04-29 NOTE — TELEPHONE ENCOUNTER
I called and discussed this with the patient's daughter.  The patient's daughter feels that the dizziness was going on prior to starting the Entocort.  I told the daughter to have her mom decrease the Entocort from 3 pills a day to 2 pills a day and to have her go see her PCP to have her checked to make sure that the dizziness is not something else such as high blood pressure or low blood pressure, maybe check lab work, etc.  Please fax a copy of this report to her PCP.       Dr. TARIQ CALZADA. thx.kjh

## 2021-04-29 NOTE — TELEPHONE ENCOUNTER
Called pt and spoke with pt's daughter on hipaa . She reports that her mother has been taking 3 budesonide a day since 04/16/2021.  She states that her mother has also been taking her fiber but has not been taking pepto.  Pt is no longer having diarrhea, but does report some constipation. Stools are normal.  She does reports dizziness . Pt states she had this prior to starting budesonide but feels like the budesonide is making this worse.  Her daughter states she is not so sure of this.    She is asking does her mother continue with this dose of budesonide. Advised will send message to Dr Duggan.

## 2021-05-10 ENCOUNTER — TELEPHONE (OUTPATIENT)
Dept: FAMILY MEDICINE CLINIC | Facility: CLINIC | Age: 81
End: 2021-05-10

## 2021-05-10 DIAGNOSIS — R42 DIZZINESS: Primary | ICD-10-CM

## 2021-05-10 RX ORDER — MECLIZINE HYDROCHLORIDE 25 MG/1
25 TABLET ORAL 3 TIMES DAILY PRN
Qty: 30 TABLET | Refills: 0 | Status: SHIPPED | OUTPATIENT
Start: 2021-05-10 | End: 2021-09-02

## 2021-05-10 NOTE — TELEPHONE ENCOUNTER
Caller: mary ann    Relationship: Emergency Contact    Best call back number: [phone]    What is the best time to reach you: ANY    Who are you requesting to speak with (clinical staff, provider,  specific staff member): DR. PEDERSEN    Do you require a callback: PATIENT'S DAUGHTER ANN CALLED AND STATES THAT HER MOTHER CALLED HER AND IS HAVING A DIZZY SPELL AGAIN, SHE WOULD LIKE TO KNOW IF SHE SHOULD BRING HER IN OR GO TO THE EMERGENCY ROOM?      PLEASE ADVISE

## 2021-05-10 NOTE — TELEPHONE ENCOUNTER
Did they mention the dizziness to the ENT?  Send in meclizine 25 mg q8 prn  #30 for the dizziness.

## 2021-05-16 DIAGNOSIS — E55.9 VITAMIN D DEFICIENCY: ICD-10-CM

## 2021-05-17 RX ORDER — ERGOCALCIFEROL 1.25 MG/1
CAPSULE ORAL
Qty: 5 CAPSULE | Refills: 5 | Status: SHIPPED | OUTPATIENT
Start: 2021-05-17 | End: 2021-09-02 | Stop reason: SDUPTHER

## 2021-05-19 ENCOUNTER — OFFICE VISIT (OUTPATIENT)
Dept: GASTROENTEROLOGY | Facility: CLINIC | Age: 81
End: 2021-05-19

## 2021-05-19 VITALS — BODY MASS INDEX: 23.14 KG/M2 | HEIGHT: 63 IN | TEMPERATURE: 98 F | WEIGHT: 130.6 LBS

## 2021-05-19 DIAGNOSIS — R42 DIZZINESS: ICD-10-CM

## 2021-05-19 DIAGNOSIS — K52.839 MICROSCOPIC COLITIS, UNSPECIFIED MICROSCOPIC COLITIS TYPE: ICD-10-CM

## 2021-05-19 DIAGNOSIS — R19.7 DIARRHEA, UNSPECIFIED TYPE: Primary | ICD-10-CM

## 2021-05-19 PROCEDURE — 99214 OFFICE O/P EST MOD 30 MIN: CPT | Performed by: NURSE PRACTITIONER

## 2021-05-19 RX ORDER — PHENOL 1.4 %
AEROSOL, SPRAY (ML) MUCOUS MEMBRANE NIGHTLY
COMMUNITY

## 2021-05-19 NOTE — PROGRESS NOTES
"Chief Complaint   Patient presents with   • Follow-up       Kandace Andrade is a  81 y.o. female here for a follow up visit for diarrhea.    HPI  81-year-old female presents today accompanied by her daughter for follow-up visit for diarrhea.  She is a patient of Dr. Duggan.  She was last seen in the office on 3/3/2021.  She is new to me today.  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  She had a colonoscopy on 4/2/2021 which was positive for \"microscopic colitis.  Patient has been doing really well on budesonide she originally started out with 3 tabs a day and is down to 2 tabs a day.  Unfortunately she is really been struggling this whole time and even before she started the budesonide with dizziness.  She does feel like the dizziness has been worse since she started the budesonide.  She does feel like the medicine has helped her GI symptoms and she is no longer having any diarrhea now.  She is also taking daily fiber supplementation.  But she would like to get off the budesonide ASAP as she does feel like it is making her dizziness worse.  Her daughter tells me she did go to an ear nose and throat doctor and had her ears cleaned out but that did not seem to make the dizziness any better or any worse.  The patient does have a consult with neurology coming up for the dizziness.  Patient denies any diarrhea, dysphagia, reflux, abdominal pain, nausea and vomiting, constipation, rectal bleeding or melena.  She was appetite is okay and her weight is up 6 pounds since April.  She does have a previous history of C. difficile in the past.  Past Medical History:   Diagnosis Date   • A-fib (CMS/[facility])     h/o   • Acute seasonal allergic rhinitis due to pollen    • Anesthesia     went into a-fib post-op 7 years ago   • Anxiety and depression    • Arthritis    • Asthma    • C. difficile colitis    • Claudication, intermittent (CMS/[facility])    • COPD (chronic obstructive pulmonary disease) (CMS/[facility])    • Diarrhea  "   • Diverticulosis    • Fibula fracture    • Herpes zoster    • History of cataract    • History of lumbosacral spine surgery    • History of migraine headaches    • IBS (irritable bowel syndrome)    • Mixed hyperlipidemia    • KINDRA (obstructive sleep apnea)     NO MACHINE AT THIS TIME   • Osteoporosis    • Paroxysmal atrial fibrillation (CMS/[facility])    • Urinary incontinence    • Urinary tract infection    • Vitamin D deficiency        Past Surgical History:   Procedure Laterality Date   • APPENDECTOMY     • BACK SURGERY      lower x2   • CATARACT EXTRACTION     • COLONOSCOPY      patient doesn't recall    • COLONOSCOPY N/A 2021    Procedure: COLONOSCOPY to cecum with cecal biopsies;  Surgeon: Jarrod Duggan MD;  Location: [facility] ENDOSCOPY;  Service: Gastroenterology;  Laterality: N/A;  pre: diarhhea  post: diverticulosis, hemorrhoids   • HYSTERECTOMY      partial   • SHOULDER SURGERY Right    • SHOULDER SURGERY     • TONSILLECTOMY     • UPPER GASTROINTESTINAL ENDOSCOPY      patient doesn't recall        Scheduled Meds:    Continuous Infusions:No current facility-administered medications for this visit.      PRN Meds:.    Allergies   Allergen Reactions   • Penicillins Hives and Other (See Comments)     Elevated BP... tolerated ceftriaxone 10/2020 admission   • Flagyl [Metronidazole] Other (See Comments)     HYPERACTIVITY.. INSOMNIA    • Statins Other (See Comments)     Headache, elevated liver enzymes, blurred vision       Social History     Socioeconomic History   • Marital status:      Spouse name: Not on file   • Number of children: Not on file   • Years of education: Not on file   • Highest education level: Not on file   Tobacco Use   • Smoking status: Former Smoker     Packs/day: 1.00     Years: 25.00     Pack years: 25.00     Quit date: 2008     Years since quittin.0   • Smokeless tobacco: Never Used   • Tobacco comment: CAFFEINE USE - 1 CUP COFFEE/ DIET COKE   Vaping Use   • Vaping Use: Never  used   Substance and Sexual Activity   • Alcohol use: No   • Drug use: No       Family History   Problem Relation Age of Onset   • Heart disease Mother    • Lung cancer Mother    • Diabetes Father    • Liver disease Father    • Heart disease Paternal Uncle    • Malig Hyperthermia Neg Hx        Review of Systems   Constitutional: Negative for appetite change, chills, diaphoresis, fatigue, fever and unexpected weight change.   HENT: Negative for nosebleeds, postnasal drip, sore throat, trouble swallowing and voice change.    Respiratory: Negative for cough, choking, chest tightness, shortness of breath and wheezing.    Cardiovascular: Negative for chest pain, palpitations and leg swelling.   Gastrointestinal: Negative for abdominal distention, abdominal pain, anal bleeding, blood in stool, constipation, diarrhea, nausea, rectal pain and vomiting.   Endocrine: Negative for polydipsia, polyphagia and polyuria.   Musculoskeletal: Negative for gait problem.   Skin: Negative for rash and wound.   Allergic/Immunologic: Negative for food allergies.   Neurological: Positive for dizziness. Negative for speech difficulty and light-headedness.   Psychiatric/Behavioral: Negative for confusion, self-injury, sleep disturbance and suicidal ideas.       Vitals:    05/19/21 1106   Temp: 98 °F (36.7 °C)       Physical Exam  Constitutional:       General: She is not in acute distress.     Appearance: She is well-developed. She is not ill-appearing.   HENT:      Head: Normocephalic.   Eyes:      Pupils: Pupils are equal, round, and reactive to light.   Cardiovascular:      Rate and Rhythm: Normal rate and regular rhythm.      Heart sounds: Normal heart sounds.   Pulmonary:      Effort: Pulmonary effort is normal.      Breath sounds: Normal breath sounds.   Abdominal:      General: Bowel sounds are normal. There is no distension.      Palpations: Abdomen is soft. There is no mass.      Tenderness: There is no abdominal tenderness. There  is no guarding or rebound.      Hernia: No hernia is present.   Musculoskeletal:         General: Normal range of motion.   Skin:     General: Skin is warm and dry.   Neurological:      Mental Status: She is alert and oriented to person, place, and time.   Psychiatric:         Speech: Speech normal.         Behavior: Behavior normal.         Judgment: Judgment normal.         No radiology results for the last 7 days     Assessment and plan    1. Diarrhea, unspecified type    2. Microscopic colitis, unspecified microscopic colitis type    3. Dizziness    Reviewed colonoscopy results with the patient and her daughter today.  Diarrhea has completely resolved on the budesonide 2 tabs daily.  I think given her worsening dizziness we will go ahead and taper her down this week to 1 budesonide a day and after this week she can stop.  Continue daily fiber supplementation.  Patient to call the office next week with an update.  Patient to follow-up with me in 1 month.  Patient to follow-up with neurology as planned.  Patient to go ahead make follow-up with Dr. Duggan in 3 to 4 months.  Patient and daughter are agreeable to the plan.

## 2021-05-19 NOTE — TELEPHONE ENCOUNTER
Caller: millicent hernandez    Relationship: Emergency Contact    Best call back number: [phone]    Medication needed:   Requested Prescriptions     Pending Prescriptions Disp Refills   • budesonide-formoterol (SYMBICORT) 80-4.5 MCG/ACT inhaler       Sig: Inhale 2 puffs 2 (Two) Times a Day As Needed.       When do you need the refill by: ASAP        Does the patient have less than a 3 day supply:  [x] Yes  [] No    What is the patient's preferred pharmacy: [id number] [id number] [facility] - [phone] [facility] [phone] FX

## 2021-05-20 RX ORDER — BUDESONIDE AND FORMOTEROL FUMARATE DIHYDRATE 80; 4.5 UG/1; UG/1
2 AEROSOL RESPIRATORY (INHALATION) 2 TIMES DAILY PRN
Qty: 10.2 G | Refills: 11 | Status: SHIPPED | OUTPATIENT
Start: 2021-05-20 | End: 2022-06-15

## 2021-06-06 DIAGNOSIS — F32.1 CURRENT MODERATE EPISODE OF MAJOR DEPRESSIVE DISORDER WITHOUT PRIOR EPISODE (HCC): ICD-10-CM

## 2021-06-07 RX ORDER — CELECOXIB 200 MG/1
CAPSULE ORAL
COMMUNITY
Start: 2021-02-28 | End: 2021-08-19

## 2021-06-07 RX ORDER — PAROXETINE HYDROCHLORIDE 40 MG/1
40 TABLET, FILM COATED ORAL EVERY MORNING
Qty: 30 TABLET | Refills: 5 | Status: SHIPPED | OUTPATIENT
Start: 2021-06-07 | End: 2021-09-02 | Stop reason: SDUPTHER

## 2021-06-21 ENCOUNTER — TELEPHONE (OUTPATIENT)
Dept: GASTROENTEROLOGY | Facility: CLINIC | Age: 81
End: 2021-06-21

## 2021-06-21 NOTE — TELEPHONE ENCOUNTER
Pt left message advising she cancelled her appt due diarrhea.  She reports that 3 days ago she started with diarrhea.  She is having 3-4 very loose to watery stools a day. Does report urgency. Pt reports that she had a refill on her budesonide and she started taking 3 per day . She began this yesterday. Also doing metamucil and 2 fiber capsules.  Pt's  is currently in hospital very ill.  Pt denies a fever and chills. Pt states that this feels like her other microscopic colitis flares.  Advised will send message to Dr Duggan and in the meantime if symptoms worsen advised to go to ER.  Also advised that in the meantime to make sure she is drinking plenty of fluids.  Pt verb understanding.

## 2021-06-22 NOTE — TELEPHONE ENCOUNTER
Please call her and see how her diarrhea is doing? If she is still having diarrhea then have her come by the office to have the following stool studies done:  - stool for clostridium dificile EIA, O and P, culture, fecal leukocytes, giardia antigen, fecal fat.   Isaias. kj

## 2021-06-22 NOTE — TELEPHONE ENCOUNTER
Called pt 's daughter and she reports that her mother just started the budesonide yesterday and they feel like it is a flare of the of the microscopic colitis .  She states that they would like to give the budesonide a couple of days and if this does not help they will call us and will do the stool studies.  Advised will update Dr Duggan. Verb understanding.

## 2021-07-26 ENCOUNTER — TELEPHONE (OUTPATIENT)
Dept: CARDIOLOGY | Facility: CLINIC | Age: 81
End: 2021-07-26

## 2021-07-26 NOTE — TELEPHONE ENCOUNTER
Have her send a list of twice daily BP values by the end of the week. However, Not sure there is much else to add since this is chronic and intermittent unless we find BP is low most times.

## 2021-07-26 NOTE — TELEPHONE ENCOUNTER
I spoke with patient and she will keep a twice daily BP log this week and call them in to me on Friday morning./ TAVO

## 2021-07-26 NOTE — TELEPHONE ENCOUNTER
Patient called to report she is dizzy and hears a swishing noise in her ears.  Her BP readings are   Today 134/92 HR 76   7/25/21 102/75 - pt states she eat watermelon with a lot of salt to bring it up.  7/24/21  131/96     She is not taking any BP medication.  She has a history of dizziness and I see she has Meclizine listed on the medication list. She states she is too sleepy with this medication.      Please advise. / TAVO     Patient can be reached at [phone]

## 2021-08-19 ENCOUNTER — OFFICE VISIT (OUTPATIENT)
Dept: GASTROENTEROLOGY | Facility: CLINIC | Age: 81
End: 2021-08-19

## 2021-08-19 VITALS
DIASTOLIC BLOOD PRESSURE: 70 MMHG | SYSTOLIC BLOOD PRESSURE: 138 MMHG | HEIGHT: 63 IN | WEIGHT: 133.4 LBS | BODY MASS INDEX: 23.64 KG/M2 | TEMPERATURE: 98.2 F

## 2021-08-19 DIAGNOSIS — R10.84 GENERALIZED ABDOMINAL PAIN: ICD-10-CM

## 2021-08-19 DIAGNOSIS — R93.5 ABNORMAL CT OF THE ABDOMEN: ICD-10-CM

## 2021-08-19 DIAGNOSIS — R19.7 DIARRHEA, UNSPECIFIED TYPE: ICD-10-CM

## 2021-08-19 DIAGNOSIS — K52.839 MICROSCOPIC COLITIS, UNSPECIFIED MICROSCOPIC COLITIS TYPE: Primary | ICD-10-CM

## 2021-08-19 PROCEDURE — 99214 OFFICE O/P EST MOD 30 MIN: CPT | Performed by: INTERNAL MEDICINE

## 2021-08-19 NOTE — PROGRESS NOTES
"Chief Complaint   Patient presents with   • Diarrhea   • Abdominal Pain   • Dizziness       History of Present Illness:   81 y.o. female NO diarrhea, on Budesonide 3 mg 2/day. She is on a probiotic and 2 metamucil/day. On Tums or PeptoBismol tablets. She has about 2 BM/day, she had no BM yesterday. C/o RLQ abdominal pain x few mos.        She had a colonoscopy on 4/2/2021 which was positive for \"microscopic colitis\".  Patient has been doing really well on budesonide.     Past Medical History:   Diagnosis Date   • A-fib (CMS/[facility])     h/o   • Acute seasonal allergic rhinitis due to pollen    • Anesthesia     went into a-fib post-op 7 years ago   • Anxiety and depression    • Arthritis    • Asthma    • C. difficile colitis    • Claudication, intermittent (CMS/[facility])    • COPD (chronic obstructive pulmonary disease) (CMS/[facility])    • Diarrhea    • Diverticulosis    • Fibula fracture    • Herpes zoster    • History of cataract    • History of lumbosacral spine surgery    • History of migraine headaches    • IBS (irritable bowel syndrome)    • Mixed hyperlipidemia    • KINDRA (obstructive sleep apnea)     NO MACHINE AT THIS TIME   • Osteoporosis    • Paroxysmal atrial fibrillation (CMS/[facility])    • Urinary incontinence    • Urinary tract infection    • Vitamin D deficiency        Past Surgical History:   Procedure Laterality Date   • APPENDECTOMY     • BACK SURGERY      lower x2   • CATARACT EXTRACTION     • COLONOSCOPY      patient doesn't recall    • COLONOSCOPY N/A 4/2/2021    Procedure: COLONOSCOPY to cecum with cecal biopsies;  Surgeon: Jarrod Duggan MD;  Location: [facility] ENDOSCOPY;  Service: Gastroenterology;  Laterality: N/A;  pre: diarhhea  post: diverticulosis, hemorrhoids   • HYSTERECTOMY      partial   • SHOULDER SURGERY Right    • SHOULDER SURGERY     • TONSILLECTOMY     • UPPER GASTROINTESTINAL ENDOSCOPY      patient doesn't recall          Current Outpatient Medications:   •  acetaminophen (TYLENOL) 500 MG tablet, Take " 1,000 mg by mouth Every 6 (Six) Hours As Needed for Mild Pain ., Disp: , Rfl:   •  aspirin 81 MG chewable tablet, Chew 81 mg Daily., Disp: , Rfl:   •  budesonide-formoterol (SYMBICORT) 80-4.5 MCG/ACT inhaler, Inhale 2 puffs 2 (Two) Times a Day As Needed (For Wheezing)., Disp: 10.2 g, Rfl: 11  •  FIBER COMPLETE PO, Take 500 mg by mouth Daily., Disp: , Rfl:   •  fluticasone (FLONASE) 50 MCG/ACT nasal spray, 2 sprays into the nostril(s) as directed by provider Daily As Needed for Rhinitis., Disp: , Rfl:   •  Melatonin 10 MG tablet, Take  by mouth Every Night., Disp: , Rfl:   •  PARoxetine (PAXIL) 40 MG tablet, TAKE 1 TABLET BY MOUTH EVERY MORNING, Disp: 30 tablet, Rfl: 5  •  vitamin D (ERGOCALCIFEROL) 1.25 MG (25098 UT) capsule capsule, TAKE 1 CAPSULE BY MOUTH EVERY 7 DAYS, Disp: 5 capsule, Rfl: 5  •  cyclobenzaprine (FLEXERIL) 10 MG tablet, Take 1/2 to one tablet by mouth twice daily, Disp: 20 tablet, Rfl: 0  •  meclizine (ANTIVERT) 25 MG tablet, Take 1 tablet by mouth 3 (Three) Times a Day As Needed for Dizziness., Disp: 30 tablet, Rfl: 0  •  omeprazole (priLOSEC) 40 MG capsule, TAKE 1 CAPSULE BY MOUTH DAILY, Disp: 30 capsule, Rfl: 2    Allergies   Allergen Reactions   • Penicillins Hives and Other (See Comments)     Elevated BP... tolerated ceftriaxone 10/2020 admission   • Flagyl [Metronidazole] Other (See Comments)     HYPERACTIVITY.. INSOMNIA    • Statins Other (See Comments)     Headache, elevated liver enzymes, blurred vision       Family History   Problem Relation Age of Onset   • Heart disease Mother    • Lung cancer Mother    • Diabetes Father    • Liver disease Father    • Heart disease Paternal Uncle    • Malig Hyperthermia Neg Hx        Social History     Socioeconomic History   • Marital status:      Spouse name: Not on file   • Number of children: Not on file   • Years of education: Not on file   • Highest education level: Not on file   Tobacco Use   • Smoking status: Former Smoker     Packs/day:  1.00     Years: 25.00     Pack years: 25.00     Quit date: 2008     Years since quittin.3   • Smokeless tobacco: Never Used   • Tobacco comment: CAFFEINE USE - 1 CUP COFFEE/ DIET COKE   Vaping Use   • Vaping Use: Never used   Substance and Sexual Activity   • Alcohol use: No   • Drug use: No       Review of Systems   Gastrointestinal: Positive for abdominal pain.   All other systems reviewed and are negative.    Pertinent positives and negatives documented in the HPI and all other systems reviewed and were found to be negative.  Vitals:    21 1145   BP: 138/70   Temp: 98.2 °F (36.8 °C)       Physical Exam  Vitals reviewed.   Constitutional:       General: She is not in acute distress.     Appearance: Normal appearance. She is well-developed. She is not diaphoretic.   HENT:      Head: Normocephalic and atraumatic. Hair is normal.      Right Ear: Hearing, tympanic membrane, ear canal and external ear normal. No decreased hearing noted. No drainage.      Left Ear: Hearing, tympanic membrane, ear canal and external ear normal. No decreased hearing noted.      Nose: Nose normal. No nasal deformity.      Mouth/Throat:      Mouth: Mucous membranes are moist.   Eyes:      General: Lids are normal.         Right eye: No discharge.         Left eye: No discharge.      Extraocular Movements: Extraocular movements intact.      Conjunctiva/sclera: Conjunctivae normal.      Pupils: Pupils are equal, round, and reactive to light.   Neck:      Thyroid: No thyromegaly.      Vascular: No JVD.      Trachea: No tracheal deviation.   Cardiovascular:      Rate and Rhythm: Normal rate and regular rhythm.      Pulses: Normal pulses.      Heart sounds: Normal heart sounds. No murmur heard.   No friction rub. No gallop.    Pulmonary:      Effort: Pulmonary effort is normal. No respiratory distress.      Breath sounds: Normal breath sounds. No wheezing or rales.   Chest:      Chest wall: No tenderness.   Abdominal:       General: Bowel sounds are normal. There is no distension.      Palpations: Abdomen is soft. There is no mass.      Tenderness: There is no guarding or rebound.      Hernia: No hernia is present.   Musculoskeletal:         General: No tenderness or deformity. Normal range of motion.      Cervical back: Normal range of motion and neck supple.   Lymphadenopathy:      Cervical: No cervical adenopathy.   Skin:     General: Skin is warm and dry.      Findings: No erythema or rash.   Neurological:      Mental Status: She is alert and oriented to person, place, and time.      Cranial Nerves: No cranial nerve deficit.      Motor: No abnormal muscle tone.      Coordination: Coordination normal.      Deep Tendon Reflexes: Reflexes are normal and symmetric. Reflexes normal.   Psychiatric:         Mood and Affect: Mood normal.         Behavior: Behavior normal.         Thought Content: Thought content normal.         Judgment: Judgment normal.         Diagnoses and all orders for this visit:    1. Microscopic colitis, unspecified microscopic colitis type (Primary)  -     Amylase  -     Lipase  -     CBC & Differential  -     Comprehensive Metabolic Panel  -     Celiac Ab tTG DGP TIgA  -     TSH    2. Diarrhea, unspecified type  -     Amylase  -     Lipase  -     CBC & Differential  -     Comprehensive Metabolic Panel  -     Celiac Ab tTG DGP TIgA  -     TSH    3. Generalized abdominal pain  -     CT Abdomen Pelvis With Contrast; Future    4. Abnormal CT of the abdomen  -     Ambulatory Referral to Vascular Surgery      Assessment:  1.  History of microscopic colitis. The budesonide works.   2. Abdominal pain.   3.     Recommendations:  1. I think she should see a Vascular Surgeon because of the abnormal CT abd showing desc thoracic aortic aneurysm with ulcerated plaque in the aorta.  2. Stop pepto Bismol,  Probiotics,   3. Continue the Budesonide 2/day.  4. F/u 4 weeks.   5. Labs:  6. CT abd/Pelvis    No follow-ups on  file.    Jarrod Duggan MD  8/19/2021

## 2021-08-23 ENCOUNTER — LAB (OUTPATIENT)
Dept: GASTROENTEROLOGY | Facility: CLINIC | Age: 81
End: 2021-08-23

## 2021-08-24 ENCOUNTER — TELEPHONE (OUTPATIENT)
Dept: GASTROENTEROLOGY | Facility: CLINIC | Age: 81
End: 2021-08-24

## 2021-08-24 LAB
ALBUMIN SERPL-MCNC: 4.5 G/DL (ref 3.5–5.2)
ALBUMIN/GLOB SERPL: 1.9 G/DL
ALP SERPL-CCNC: 86 U/L (ref 39–117)
ALT SERPL-CCNC: 17 U/L (ref 1–33)
AMYLASE SERPL-CCNC: 67 U/L (ref 28–100)
AST SERPL-CCNC: 20 U/L (ref 1–32)
BASOPHILS # BLD AUTO: 0.09 10*3/MM3 (ref 0–0.2)
BASOPHILS NFR BLD AUTO: 0.7 % (ref 0–1.5)
BILIRUB SERPL-MCNC: 0.2 MG/DL (ref 0–1.2)
BUN SERPL-MCNC: 17 MG/DL (ref 8–23)
BUN/CREAT SERPL: 25.8 (ref 7–25)
CALCIUM SERPL-MCNC: 10.5 MG/DL (ref 8.6–10.5)
CHLORIDE SERPL-SCNC: 102 MMOL/L (ref 98–107)
CO2 SERPL-SCNC: 25.3 MMOL/L (ref 22–29)
CREAT SERPL-MCNC: 0.66 MG/DL (ref 0.57–1)
EOSINOPHIL # BLD AUTO: 0.34 10*3/MM3 (ref 0–0.4)
EOSINOPHIL NFR BLD AUTO: 2.6 % (ref 0.3–6.2)
ERYTHROCYTE [DISTWIDTH] IN BLOOD BY AUTOMATED COUNT: 12.7 % (ref 12.3–15.4)
GLIADIN PEPTIDE IGA SER-ACNC: 9 UNITS (ref 0–19)
GLIADIN PEPTIDE IGG SER-ACNC: 2 UNITS (ref 0–19)
GLOBULIN SER CALC-MCNC: 2.4 GM/DL
GLUCOSE SERPL-MCNC: 103 MG/DL (ref 65–99)
HCT VFR BLD AUTO: 44.9 % (ref 34–46.6)
HGB BLD-MCNC: 14.3 G/DL (ref 12–15.9)
IGA SERPL-MCNC: 112 MG/DL (ref 64–422)
IMM GRANULOCYTES # BLD AUTO: 0.15 10*3/MM3 (ref 0–0.05)
IMM GRANULOCYTES NFR BLD AUTO: 1.2 % (ref 0–0.5)
LIPASE SERPL-CCNC: 37 U/L (ref 13–60)
LYMPHOCYTES # BLD AUTO: 3.21 10*3/MM3 (ref 0.7–3.1)
LYMPHOCYTES NFR BLD AUTO: 24.9 % (ref 19.6–45.3)
MCH RBC QN AUTO: 30.5 PG (ref 26.6–33)
MCHC RBC AUTO-ENTMCNC: 31.8 G/DL (ref 31.5–35.7)
MCV RBC AUTO: 95.7 FL (ref 79–97)
MONOCYTES # BLD AUTO: 0.88 10*3/MM3 (ref 0.1–0.9)
MONOCYTES NFR BLD AUTO: 6.8 % (ref 5–12)
NEUTROPHILS # BLD AUTO: 8.22 10*3/MM3 (ref 1.7–7)
NEUTROPHILS NFR BLD AUTO: 63.8 % (ref 42.7–76)
NRBC BLD AUTO-RTO: 0 /100 WBC (ref 0–0.2)
PLATELET # BLD AUTO: 365 10*3/MM3 (ref 140–450)
POTASSIUM SERPL-SCNC: 5 MMOL/L (ref 3.5–5.2)
PROT SERPL-MCNC: 6.9 G/DL (ref 6–8.5)
RBC # BLD AUTO: 4.69 10*6/MM3 (ref 3.77–5.28)
SODIUM SERPL-SCNC: 139 MMOL/L (ref 136–145)
TSH SERPL DL<=0.005 MIU/L-ACNC: 1.89 UIU/ML (ref 0.27–4.2)
TTG IGA SER-ACNC: <2 U/ML (ref 0–3)
TTG IGG SER-ACNC: 3 U/ML (ref 0–5)
WBC # BLD AUTO: 12.89 10*3/MM3 (ref 3.4–10.8)

## 2021-08-24 NOTE — TELEPHONE ENCOUNTER
----- Message from Jarrod Duggan MD sent at 8/24/2021  2:56 PM EDT -----  08/24/21       Tell her that the following lab tests were normal: Amylase, lipase, TSH, and her CBC was normal except her white count was mildly elevated at 12.89.  Her glucose level is minimally elevated at 103.       We will see what the CAT scan of the abdomen and pelvis shows?       Has she been scheduled to see a vascular surgeon yet?  If not could we try to facilitate getting her into see one of the vascular surgeons.  See my note from her most recent office visit.       Please fax a copy of this report to her PCP.  Isaias. kjzenobia

## 2021-08-24 NOTE — PROGRESS NOTES
08/24/21       Tell her that the following lab tests were normal: Amylase, lipase, TSH, and her CBC was normal except her white count was mildly elevated at 12.89.  Her glucose level is minimally elevated at 103.       We will see what the CAT scan of the abdomen and pelvis shows?       Has she been scheduled to see a vascular surgeon yet?  If not could we try to facilitate getting her into see one of the vascular surgeons.  See my note from her most recent office visit.       Please fax a copy of this report to her PCP.  Dominique lee

## 2021-08-24 NOTE — TELEPHONE ENCOUNTER
Called pt and spoke with pt's daughter ( with pt's permission) and advised of Dr Duggan's note.  Verb understanding but reports that she has not heard from vascular yet. Advised will send message to Scheduling to send referral.      Results sent to Dr Bella thru [facility].

## 2021-08-25 ENCOUNTER — TELEPHONE (OUTPATIENT)
Dept: GASTROENTEROLOGY | Facility: CLINIC | Age: 81
End: 2021-08-25

## 2021-08-25 NOTE — TELEPHONE ENCOUNTER
----- Message from Jarrod Duggan MD sent at 8/25/2021  7:31 AM EDT -----  08/25/21  Tell her that the celiac sprue antibody panel came back normal.  Has the CT of the abdomen and pelvis been done yet?  Has she been referred to a vascular surgeon yet?  Please fax a copy of this report to her PCP.  Thx. kjh

## 2021-08-25 NOTE — PROGRESS NOTES
08/25/21  Tell her that the celiac sprue antibody panel came back normal.  Has the CT of the abdomen and pelvis been done yet?  Has she been referred to a vascular surgeon yet?  Please fax a copy of this report to her PCP.  Dominique lee

## 2021-08-25 NOTE — TELEPHONE ENCOUNTER
CT scheduled for 9/16.  Referral has been placed to DR Alex Field.     Call to pt.  Advise per DR Duggan note.  Verb understandng.     Update to DR Duggan.     Lab result faxed via [employer] to DR Shayne Ye.

## 2021-08-30 NOTE — TELEPHONE ENCOUNTER
PT is schedule with Dr. Johnson at Vascular Surgery on 09/20 @930am.     Called Pt to advise of WOLF melendez PT daughter verb understand.

## 2021-09-02 ENCOUNTER — OFFICE VISIT (OUTPATIENT)
Dept: FAMILY MEDICINE CLINIC | Facility: CLINIC | Age: 81
End: 2021-09-02

## 2021-09-02 VITALS
DIASTOLIC BLOOD PRESSURE: 86 MMHG | TEMPERATURE: 97.3 F | HEIGHT: 63 IN | WEIGHT: 133 LBS | HEART RATE: 75 BPM | OXYGEN SATURATION: 95 % | SYSTOLIC BLOOD PRESSURE: 130 MMHG | BODY MASS INDEX: 23.57 KG/M2

## 2021-09-02 DIAGNOSIS — E55.9 VITAMIN D DEFICIENCY: ICD-10-CM

## 2021-09-02 DIAGNOSIS — R42 DIZZINESS: ICD-10-CM

## 2021-09-02 DIAGNOSIS — F32.1 CURRENT MODERATE EPISODE OF MAJOR DEPRESSIVE DISORDER WITHOUT PRIOR EPISODE (HCC): ICD-10-CM

## 2021-09-02 DIAGNOSIS — E11.9 TYPE 2 DIABETES MELLITUS WITHOUT COMPLICATION, WITHOUT LONG-TERM CURRENT USE OF INSULIN (HCC): Primary | ICD-10-CM

## 2021-09-02 DIAGNOSIS — E78.2 MIXED HYPERLIPIDEMIA: ICD-10-CM

## 2021-09-02 DIAGNOSIS — I10 ESSENTIAL HYPERTENSION: ICD-10-CM

## 2021-09-02 PROCEDURE — 99214 OFFICE O/P EST MOD 30 MIN: CPT | Performed by: FAMILY MEDICINE

## 2021-09-02 RX ORDER — CALCIUM CARBONATE 200(500)MG
1 TABLET,CHEWABLE ORAL DAILY
COMMUNITY
End: 2022-09-12 | Stop reason: ALTCHOICE

## 2021-09-02 RX ORDER — ERGOCALCIFEROL 1.25 MG/1
50000 CAPSULE ORAL
Qty: 5 CAPSULE | Refills: 5 | Status: SHIPPED | OUTPATIENT
Start: 2021-09-02 | End: 2021-11-09

## 2021-09-02 RX ORDER — PAROXETINE HYDROCHLORIDE 40 MG/1
40 TABLET, FILM COATED ORAL EVERY MORNING
Qty: 30 TABLET | Refills: 5 | Status: SHIPPED | OUTPATIENT
Start: 2021-09-02 | End: 2022-03-10

## 2021-09-02 RX ORDER — BUDESONIDE 9 MG/1
9 TABLET, FILM COATED, EXTENDED RELEASE ORAL 2 TIMES DAILY
COMMUNITY
End: 2021-09-02

## 2021-09-02 RX ORDER — BUDESONIDE 3 MG/1
CAPSULE, COATED PELLETS ORAL
COMMUNITY
Start: 2021-08-14 | End: 2022-02-03 | Stop reason: SDUPTHER

## 2021-09-02 RX ORDER — BUDESONIDE 3 MG/1
CAPSULE, COATED PELLETS ORAL
COMMUNITY
Start: 2021-07-28 | End: 2021-09-02

## 2021-09-02 NOTE — PROGRESS NOTES
"Chief Complaint  Dizziness (neuro appt in Oct) and Follow-up (3 month - not sure why she's here)    Subjective          Kandace Andrade presents to [facility]  History of Present Illness  Pt is here for dizziness and doesn't have an appointment till October.  She gets dizzy when sitting or standing or in any position.  She is unable to tolerate meclizine so cant use it.    HTN- no chest pains  She will be going to vascular for plaque and she will be getting GB scanned soon as well.    Objective   Vital Signs:   /86   Pulse 75   Temp 97.3 °F (36.3 °C) (Infrared)   Ht 160 cm (63\")   Wt 60.3 kg (133 lb)   SpO2 95%   BMI 23.56 kg/m²     Physical Exam  Vitals and nursing note reviewed.   Constitutional:       Appearance: Normal appearance.   Neck:      Vascular: No carotid bruit.   Cardiovascular:      Rate and Rhythm: Normal rate and regular rhythm.      Heart sounds: Normal heart sounds. No murmur heard.     Pulmonary:      Effort: Pulmonary effort is normal. No respiratory distress.      Breath sounds: Normal breath sounds. No stridor. No wheezing or rhonchi.   Neurological:      General: No focal deficit present.      Mental Status: She is alert and oriented to person, place, and time.   Psychiatric:         Mood and Affect: Mood normal.         Behavior: Behavior normal.        Result Review :                 Assessment and Plan    Diagnoses and all orders for this visit:    1. Type 2 diabetes mellitus without complication, without long-term current use of insulin (CMS/[facility]) (Primary)  -     Hemoglobin A1c    2. Mixed hyperlipidemia  -     Lipid Panel    3. Essential hypertension    4. Vitamin D deficiency  -     Vitamin D 25 Hydroxy  -     vitamin D (ERGOCALCIFEROL) 1.25 MG (81123 UT) capsule capsule; Take 1 capsule by mouth Every 7 (Seven) Days.  Dispense: 5 capsule; Refill: 5    5. Dizziness    6. Current moderate episode of major depressive disorder without prior episode " (CMS/[facility])  -     PARoxetine (PAXIL) 40 MG tablet; Take 1 tablet by mouth Every Morning.  Dispense: 30 tablet; Refill: 5        Follow Up   Return on or after Dec 10th for MWE and check up.  pt prefers televisit for this..  Patient was given instructions and counseling regarding her condition or for health maintenance advice. Please see specific information pulled into the AVS if appropriate.     Refills and labs and will follow up with cardiology and neurology.

## 2021-09-03 LAB
25(OH)D3+25(OH)D2 SERPL-MCNC: 44.2 NG/ML (ref 30–100)
CHOLEST SERPL-MCNC: 326 MG/DL (ref 100–199)
HBA1C MFR BLD: 6.1 % (ref 4.8–5.6)
HDLC SERPL-MCNC: 97 MG/DL
LDLC SERPL CALC-MCNC: 199 MG/DL (ref 0–99)
TRIGL SERPL-MCNC: 168 MG/DL (ref 0–149)
VLDLC SERPL CALC-MCNC: 30 MG/DL (ref 5–40)

## 2021-10-05 ENCOUNTER — CLINICAL SUPPORT (OUTPATIENT)
Dept: FAMILY MEDICINE CLINIC | Facility: CLINIC | Age: 81
End: 2021-10-05

## 2021-10-05 PROCEDURE — G0008 ADMIN INFLUENZA VIRUS VAC: HCPCS | Performed by: FAMILY MEDICINE

## 2021-10-05 PROCEDURE — 90662 IIV NO PRSV INCREASED AG IM: CPT | Performed by: FAMILY MEDICINE

## 2021-11-02 DIAGNOSIS — E55.9 VITAMIN D DEFICIENCY: ICD-10-CM

## 2021-11-02 RX ORDER — ERGOCALCIFEROL 1.25 MG/1
CAPSULE ORAL
Qty: 5 CAPSULE | Refills: 5 | Status: CANCELLED | OUTPATIENT
Start: 2021-11-02

## 2021-11-09 ENCOUNTER — TELEPHONE (OUTPATIENT)
Dept: NEUROLOGY | Facility: CLINIC | Age: 81
End: 2021-11-09

## 2021-11-09 RX ORDER — ERGOCALCIFEROL 1.25 MG/1
CAPSULE ORAL
Qty: 5 CAPSULE | Refills: 5 | Status: SHIPPED | OUTPATIENT
Start: 2021-11-09 | End: 2022-05-06

## 2021-11-09 NOTE — TELEPHONE ENCOUNTER
Rx Refill Note  Requested Prescriptions     Pending Prescriptions Disp Refills   • vitamin D (ERGOCALCIFEROL) 1.25 MG (18735 UT) capsule capsule [Pharmacy Med Name: VITAMIN D2 50,000IU (ERGO) CAP RX] 5 capsule 5     Sig: TAKE 1 CAPSULE BY MOUTH EVERY 7 DAYS      Last office visit with prescribing clinician: 9/2/2021      Next office visit with prescribing clinician: 12/14/2021            Scotty Mathews  11/09/21, 16:55 EST

## 2021-11-09 NOTE — TELEPHONE ENCOUNTER
PT'S DAUGHTER ANN CALLED BACK. PER MOHIT, SCHEDULED FOR NEXT AVAILABLE THAT I COULD SEE WHICH WAS 1-10. APPT STILL ON CANCELLATION LIST. WANTED TO MAKE OFFICE AWARE THAT DIZZINESS HAS GOTTEN WORSE AND ALSO NOW HAS tremor in both legs that comes and goes

## 2021-11-09 NOTE — TELEPHONE ENCOUNTER
LVM FOR PT TO CALL OFFICE. DR. WOOD WILL BE OUT THE OFFICE ON 12/30/2021 SO WE NEED TO GET HER RESCHEDULED.

## 2021-11-11 ENCOUNTER — OFFICE VISIT (OUTPATIENT)
Dept: NEUROLOGY | Facility: CLINIC | Age: 81
End: 2021-11-11

## 2021-11-11 VITALS
HEART RATE: 77 BPM | SYSTOLIC BLOOD PRESSURE: 136 MMHG | HEIGHT: 63 IN | WEIGHT: 132.6 LBS | BODY MASS INDEX: 23.5 KG/M2 | DIASTOLIC BLOOD PRESSURE: 77 MMHG | OXYGEN SATURATION: 98 %

## 2021-11-11 DIAGNOSIS — G44.89 OTHER HEADACHE SYNDROME: ICD-10-CM

## 2021-11-11 DIAGNOSIS — R42 DIZZINESS: Primary | ICD-10-CM

## 2021-11-11 PROCEDURE — 99205 OFFICE O/P NEW HI 60 MIN: CPT | Performed by: PSYCHIATRY & NEUROLOGY

## 2021-11-11 NOTE — PROGRESS NOTES
Subjective:     Patient ID: Kandace Andrade is a 81 y.o. female.    The patient is an 81-year-old right-handed female with a history of anxiety, osteoarthritis, cervical and lumbar degenerative disc disease status post surgery, colitis, COPD, depression, diverticulitis, headaches, left greater than right hearing loss, and untreated sleep apnea who presents to the neurology clinic today as a new patient for evaluation of dizziness.  The patient was seen by ENT on April 20 of 2021.  Reports that the patient was having dizziness in the upright position with walking.  It tends to not present when she lays down.  She had negative [place]-Hallpike bilaterally.    Dizziness started a year ago.  They are getting worse.  Walks into walls and almost fall and furniture hops to get through the house.  Has lightheadedness and spinning.  Has it when she gets up.  Usually upon standing.  Symptoms last a few minutes.  Not sure if sitting down makes the symptoms better.  Nothing really helps the symptoms.  Meclizine was too sedating.  Patient is the caretaker for her .  Gets the symptoms several times per day.  Only with position changes.  Nothing makes the symptoms worse.  No associated syncope.  Will have some diplopia, not often will get nausea.  No associated numbness/tingling.  Not sure of associated weakness.  No associated dysarthria.  Has been experiencing headaches for the past 3-4 months.  Getting them daily.  They last several hours.  Notice them mainly when she gets up in the morning.  On the right side.  Is dull.  Rates it a 7-8/10.  Takes tylenol.  Helps and makes it tolerable, but does not resolve.  Had migraines starting when she was a teenager.        The following portions of the patient's history were reviewed and updated as appropriate: allergies, current medications, past family history, past medical history, past social history, past surgical history and problem list.    Review of Systems      Objective:    Neurologic Exam    Physical Exam   **The patient is wearing a mask**  Constitutional:  Vital signs reviewed.  No apparent distress.  Well groomed.  Eyes:  No injection, no icterus.    Respiratory:  Normal effort.  Clear to auscultation bilaterally.  Cardiovascular:  Regular rate and rhythm.  No murmurs.  No carotid bruits. Symmetric radial pulses.  Musculoskeletal: The patient utilizes a walker for ambulation.  Muscle tone and bulk normal in the bilateral upper and lower extremities.  Strength is 5/5 in the bilateral upper and lower extremities proximally and distally unless otherwise specified in the neurological exam.  Skin:  No rashes.  Warm, dry, and intact.  Psychiatric:  Good mood.  Normal affect.    Neurologic:  Mental status-  The patient is alert and oriented to person, place and time. Attention/concentration is within normal limits.  Speech is fluent without dysarthria.  The patient is able to name, repeat and follow complex commands without difficulty.  Immediate memory intact.  The patient recalled 2 out of 3 words after 4 minutes.  Fund of knowledge normal.  Cranial nerves- Pupils equally round and reactive to light with intact accomodation.  Visual fields intact.  Extraocular movements intact.  Facial sensation intact.   Hearing decreased to finger rub on the left.  SCM and trapezius are 5/5 bilaterally.    Motor-  See musculoskeletal above.  No tremor.  Reflexes- 2+ in the bilateral biceps, brachioradialis, patellar and achilles.  Toes down-going bilaterally.  Sensation- Intact to pinprick and vibration in bilateral upper and lower extremities symmetrically.  Coordination- Intact to finger tapping and heel knee shin bilaterally.   Gait- See musculoskeletal exam above.     The patient had positive orthostatics today.  Lying down her blood pressure was 128/78 with a pulse of 80 and after standing for 3 minutes it dropped to 122/68 with a pulse of 98.  The patient became symptomatic  during testing.      Assessment/Plan:    The patient is an 81-year-old right-handed female with history of anxiety, osteoarthritis, cervical lumbar degenerative disc disease status post surgery, colitis, COPD, depression, diverticulitis, headaches, hearing loss, and untreated sleep apnea who presents today for the valuation of dizziness.    1.  Dizziness-the patient has a significant positional component to her dizziness upon history.  Orthostatic hypotension seems the most likely cause.  Her orthostatics were positive with a decrease in the diastolic pressure by 10.  I recommend following up with her primary care physician and cardiologist.  She may want a consider compression stockings, increasing her water intake, salt intake, and lying with her head of bed over 30 degrees.  She may also need some medication adjustments.    2. Headaches-the etiology is unknown. The rest of her neurological exam is essentially normal. Temporal arteritis is on the differential. I would like to check an ESR and CRP.    A total of 60 minutes of time was spent on this encounter today. This includes reviewing the patient's records, face-to-face time, and documentation.       Problems Addressed this Visit        Symptoms and Signs    Dizziness - Primary      Other Visit Diagnoses     Other headache syndrome        Relevant Orders    Sedimentation Rate    C-reactive Protein      Diagnoses       Codes Comments    Dizziness    -  Primary ICD-10-CM: R42  ICD-9-CM: 780.4     Other headache syndrome     ICD-10-CM: G44.89  ICD-9-CM: 339.89

## 2021-11-11 NOTE — PATIENT INSTRUCTIONS
**Avoid triggers    **Increase fluid intake to at least 2 liters (64 oz) per day.  Drinking a water bolus of 12-16 oz of very cold plain water quickly and prior to getting up may help even more.  Consider this up to three times per day.  Liquids that do not have caffeine rerecommended.      **Recognize prodrome and sit/lay down    **Increase salt to 10 grams daily.  High sodium foods include pickles, soy sauce, canned soups, deli meats, salted nuts or pumpkin seeds.  Caution is advised if the patient has high blood pressure or heart failure.      **Avoid medications causing low blood pressure    **Shift weight from side to side, leg crossing, heel raises, marching in place, squatting, bending over, sitting with head between the knees, lay down with legs raised    **Compression stockings (thigh high or waist high).  Should have at least 30-40 mmgHg of strength.  Garments that cover the abdomen and thighs are recommended.    **Cardiovascular exercise is recommended of at least 30 minutes on most days of the week.  Activities such as recumbent bicycling, rowing, or swimming are recommended.  Weight training should also be done at least 2-3 times per week and should concentrate on lower extremity exercises such as leg presses, toe presses and other exercises to improve leg strength.  Yoga and Pilates may also help.      **Sleep with head of bed at 30 degrees.

## 2021-11-16 ENCOUNTER — LAB (OUTPATIENT)
Dept: LAB | Facility: HOSPITAL | Age: 81
End: 2021-11-16

## 2021-11-16 LAB
CRP SERPL-MCNC: <0.3 MG/DL (ref 0–0.5)
ERYTHROCYTE [SEDIMENTATION RATE] IN BLOOD: 14 MM/HR (ref 0–30)

## 2021-11-16 PROCEDURE — 86140 C-REACTIVE PROTEIN: CPT | Performed by: PSYCHIATRY & NEUROLOGY

## 2021-11-16 PROCEDURE — 36415 COLL VENOUS BLD VENIPUNCTURE: CPT | Performed by: PSYCHIATRY & NEUROLOGY

## 2021-11-16 PROCEDURE — 85652 RBC SED RATE AUTOMATED: CPT | Performed by: PSYCHIATRY & NEUROLOGY

## 2021-11-23 ENCOUNTER — OFFICE VISIT (OUTPATIENT)
Dept: CARDIOLOGY | Facility: CLINIC | Age: 81
End: 2021-11-23

## 2021-11-23 VITALS
SYSTOLIC BLOOD PRESSURE: 114 MMHG | BODY MASS INDEX: 24.1 KG/M2 | WEIGHT: 136 LBS | OXYGEN SATURATION: 96 % | HEIGHT: 63 IN | DIASTOLIC BLOOD PRESSURE: 82 MMHG | HEART RATE: 102 BPM

## 2021-11-23 DIAGNOSIS — R06.02 EXERTIONAL SHORTNESS OF BREATH: ICD-10-CM

## 2021-11-23 DIAGNOSIS — R42 DIZZINESS: ICD-10-CM

## 2021-11-23 DIAGNOSIS — E78.00 HYPERCHOLESTEROLEMIA: ICD-10-CM

## 2021-11-23 DIAGNOSIS — I48.0 PAROXYSMAL ATRIAL FIBRILLATION (HCC): Primary | ICD-10-CM

## 2021-11-23 PROCEDURE — 99214 OFFICE O/P EST MOD 30 MIN: CPT | Performed by: INTERNAL MEDICINE

## 2021-11-23 PROCEDURE — 93000 ELECTROCARDIOGRAM COMPLETE: CPT | Performed by: INTERNAL MEDICINE

## 2021-11-23 RX ORDER — DILTIAZEM HYDROCHLORIDE 60 MG/1
TABLET, FILM COATED ORAL
COMMUNITY
Start: 2021-11-04 | End: 2022-12-21 | Stop reason: HOSPADM

## 2021-11-23 NOTE — PROGRESS NOTES
PATIENTINFORMATION    Date of Office Visit: 2021  Encounter Provider: Alexander Hammer MD  Place of Service: [facility] CARDIOLOGY  Patient Name: Kandace Andrade  : 1940    Subjective:     Encounter Date:2021      Patient ID: Kandace Andrade is a 81 y.o. female.    Chief Complaint   Patient presents with   • Atrial Fibrillation     Follow-up,     • Dizziness   • Edema   • Shortness of Breath     HPI  Ms. Andrade is a pleasant 81  years old female patient with past medical history of paroxysmal atrial fibrillation, hypertension, hypercholesterolemia came to cardiology clinic for follow-up visit.  Her daughter was with her today.  Normally she ambulates using a walker.  She reports being under increased stress taking care of her  who is currently hospitalized with pneumonia.  She reports this intermittent dizziness that she describes as room spinning associated with balance problems and almost gets it every day.  She has seen a neurologist and ENT without any definite diagnosis.  She was tried on meclizine she could not tolerate treatment as it made her very sleepy and tired.  She denies any other focal neurologic deficits.  She had symptomatic atrial fibrillation when she was acutely sick with hypotension several years ago without any recurrence.  She has some vague chest discomfort from time to time with some exertional shortness of breath.  She was tried on different types of statin with poor tolerance including liver enzyme elevation and severe muscle pain.  She was diagnosed with microscopic colitis and was started on budesonide about 6 months ago since when diarrhea seems to have improved    ROS   All systems reviewed and negative except as noted in HPI.    Past Medical History:   Diagnosis Date   • A-fib ([facility])     h/o   • Acute seasonal allergic rhinitis due to pollen    • Anesthesia     went into a-fib post-op 7 years ago   • Anxiety and depression    • Arthritis     • Asthma    • C. difficile colitis    • Claudication, intermittent (HCC)    • COPD (chronic obstructive pulmonary disease) (HCC)    • Diarrhea    • Diverticulosis    • Fibula fracture    • Herpes zoster    • History of cataract    • History of lumbosacral spine surgery    • History of migraine headaches    • IBS (irritable bowel syndrome)    • Mixed hyperlipidemia    • KINDRA (obstructive sleep apnea)     NO MACHINE AT THIS TIME   • Osteoporosis    • Paroxysmal atrial fibrillation (HCC)    • Urinary incontinence    • Urinary tract infection    • Vitamin D deficiency        Past Surgical History:   Procedure Laterality Date   • APPENDECTOMY     • BACK SURGERY      lower x2   • CATARACT EXTRACTION     • COLONOSCOPY      patient doesn't recall    • COLONOSCOPY N/A 2021    Procedure: COLONOSCOPY to cecum with cecal biopsies;  Surgeon: Jarrod Duggan MD;  Location: [facility] ENDOSCOPY;  Service: Gastroenterology;  Laterality: N/A;  pre: diarhhea  post: diverticulosis, hemorrhoids   • HYSTERECTOMY      partial   • SHOULDER SURGERY Right    • SHOULDER SURGERY     • TONSILLECTOMY     • UPPER GASTROINTESTINAL ENDOSCOPY      patient doesn't recall        Social History     Socioeconomic History   • Marital status:    Tobacco Use   • Smoking status: Former Smoker     Packs/day: 1.00     Years: 25.00     Pack years: 25.00     Quit date: 2008     Years since quittin.5   • Smokeless tobacco: Never Used   • Tobacco comment: CAFFEINE USE - 1 CUP COFFEE/ DIET COKE   Vaping Use   • Vaping Use: Never used   Substance and Sexual Activity   • Alcohol use: No   • Drug use: No       Family History   Problem Relation Age of Onset   • Heart disease Mother    • Lung cancer Mother    • Diabetes Father    • Liver disease Father    • Heart disease Paternal Uncle    • Malig Hyperthermia Neg Hx            ECG 12 Lead    Date/Time: 2021 12:35 PM  Performed by: Alexander Hammer MD  Authorized by: Alexander Hammer MD  "  Comparison: compared with previous ECG from 1/12/2021  Similar to previous ECG  Comparison to previous ECG: Sinus tachycardia replaces sinus rhythm on this tracing  Rhythm: sinus rhythm  Rate: normal  Conduction: conduction normal  ST Segments: ST segments normal  T Waves: T waves normal  QRS axis: normal  Other findings: non-specific ST-T wave changes    Clinical impression: abnormal EKG               Objective:     /82 (BP Location: Right arm)   Pulse 102   Ht 160 cm (62.99\")   Wt 61.7 kg (136 lb)   SpO2 96%   BMI 24.10 kg/m²  Body mass index is 24.1 kg/m².     Constitutional:       General: Not in acute distress.     Appearance: Well-developed. Not diaphoretic.   Eyes:      Pupils: Pupils are equal, round, and reactive to light.   HENT:      Head: Normocephalic and atraumatic.   Neck:      Thyroid: No thyromegaly.   Pulmonary:      Effort: Pulmonary effort is normal. No respiratory distress.      Breath sounds: Normal breath sounds. No wheezing. No rales.   Chest:      Chest wall: Not tender to palpatation.   Cardiovascular:      Normal rate. Regular rhythm.      No gallop.   Pulses:     Intact distal pulses.   Edema:     Peripheral edema absent.   Abdominal:      General: Bowel sounds are normal. There is no distension.      Palpations: Abdomen is soft.      Tenderness: There is no guarding.   Musculoskeletal: Normal range of motion.         General: No deformity.      Cervical back: Normal range of motion and neck supple. Skin:     General: Skin is warm and dry.      Findings: No rash.   Neurological:      Mental Status: Alert and oriented to person, place, and time.      Cranial Nerves: No cranial nerve deficit.      Deep Tendon Reflexes: Reflexes are normal and symmetric.   Psychiatric:         Judgment: Judgment normal.         Review Of Data:  Results for GLORIA BAY (MRN [id number]) as of 11/23/2021 12:36   Ref. Range 9/2/2021 11:44   Total Cholesterol Latest Ref Range: 100 - 199 mg/dL 326 " (H)   HDL Cholesterol Latest Ref Range: >39 mg/dL 97   LDL Cholesterol  Latest Ref Range: 0 - 99 mg/dL 199 (H)   Triglycerides Latest Ref Range: 0 - 149 mg/dL 168 (H)   VLDL Cholesterol Todd Latest Ref Range: 5 - 40 mg/dL 30         Assessment/Plan:       1.  Paroxysmal atrial fibrillation diagnosed when she was being treated for hypertension-no recurrence  2.  Atypical chest pain with risk factors for CAD including untreated hypercholesterolemia  -Get myocardial perfusion study  3.  Chronic intermittent vertigo-get carotid Doppler  4.  Labile hypertension-I have checked orthostatic vital signs as well today: Supine blood pressure is 150/90, standing blood pressure is 140/80 mmHg   5.  Hypercholesterolemia with significant intolerance to statins-May consider PCSK9 inhibitor if she has any significant coronary artery disease or carotid artery disease.      Diagnosis and plan of care discussed with patient and verbalized understanding.           Alexander Hammer MD  11/23/21  13:10 EST

## 2021-12-14 ENCOUNTER — TELEMEDICINE (OUTPATIENT)
Dept: FAMILY MEDICINE CLINIC | Facility: CLINIC | Age: 81
End: 2021-12-14

## 2021-12-14 ENCOUNTER — TELEPHONE (OUTPATIENT)
Dept: FAMILY MEDICINE CLINIC | Facility: CLINIC | Age: 81
End: 2021-12-14

## 2021-12-14 DIAGNOSIS — Z00.00 ENCOUNTER FOR MEDICARE ANNUAL WELLNESS EXAM: Primary | ICD-10-CM

## 2021-12-14 DIAGNOSIS — E11.9 TYPE 2 DIABETES MELLITUS WITHOUT COMPLICATION, WITHOUT LONG-TERM CURRENT USE OF INSULIN (HCC): ICD-10-CM

## 2021-12-14 DIAGNOSIS — I10 ESSENTIAL HYPERTENSION: ICD-10-CM

## 2021-12-14 PROCEDURE — 99214 OFFICE O/P EST MOD 30 MIN: CPT | Performed by: FAMILY MEDICINE

## 2021-12-14 PROCEDURE — 1159F MED LIST DOCD IN RCRD: CPT | Performed by: FAMILY MEDICINE

## 2021-12-14 PROCEDURE — 96160 PT-FOCUSED HLTH RISK ASSMT: CPT | Performed by: FAMILY MEDICINE

## 2021-12-14 PROCEDURE — G0439 PPPS, SUBSEQ VISIT: HCPCS | Performed by: FAMILY MEDICINE

## 2021-12-14 PROCEDURE — 1170F FXNL STATUS ASSESSED: CPT | Performed by: FAMILY MEDICINE

## 2021-12-14 NOTE — PROGRESS NOTES
The ABCs of the Annual Wellness Visit  Subsequent Medicare Wellness Visit    CC: Medicare wellness exam  Subjective    History of Present Illness: Patient consented to a televisit due to COVID-19.  Kandace Andrade is a 81 y.o. female who presents for a Subsequent Medicare Wellness Visit.  HTN- no CP or HA  DM- not checking sugars.  She recently lost her .  The following portions of the patient's history were reviewed and   updated as appropriate: allergies, current medications, past family history, past medical history, past social history, past surgical history and problem list.    Compared to one year ago, the patient feels her physical   health is the same.    Compared to one year ago, the patient feels her mental   health is the same.    Recent Hospitalizations:  She was not admitted to the hospital during the last year.       Current Medical Providers:  Patient Care Team:  Cassi Bella MD as PCP - General (Family Medicine)    Outpatient Medications Prior to Visit   Medication Sig Dispense Refill   • acetaminophen (TYLENOL) 500 MG tablet Take 1,000 mg by mouth Every 6 (Six) Hours As Needed for Mild Pain .     • aspirin 81 MG chewable tablet Chew 81 mg Daily.     • Budesonide (ENTOCORT EC) 3 MG 24 hr capsule      • budesonide-formoterol (SYMBICORT) 80-4.5 MCG/ACT inhaler Inhale 2 puffs 2 (Two) Times a Day As Needed (For Wheezing). 10.2 g 11   • calcium carbonate (TUMS) 500 MG chewable tablet Chew 1 tablet Daily.     • cyclobenzaprine (FLEXERIL) 10 MG tablet Take 1/2 to one tablet by mouth twice daily 20 tablet 0   • FIBER COMPLETE PO Take 500 mg by mouth Daily.     • fluticasone (FLONASE) 50 MCG/ACT nasal spray 2 sprays into the nostril(s) as directed by provider Daily As Needed for Rhinitis.     • Melatonin 10 MG tablet Take  by mouth Every Night.     • omeprazole (priLOSEC) 40 MG capsule TAKE 1 CAPSULE BY MOUTH DAILY 30 capsule 2   • PARoxetine (PAXIL) 40 MG tablet Take 1 tablet by mouth Every  Morning. 30 tablet 5   • Symbicort 80-4.5 MCG/ACT inhaler      • vitamin D (ERGOCALCIFEROL) 1.25 MG (64073 UT) capsule capsule TAKE 1 CAPSULE BY MOUTH EVERY 7 DAYS 5 capsule 5     No facility-administered medications prior to visit.       No opioid medication identified on active medication list. I have reviewed chart for other potential  high risk medication/s and harmful drug interactions in the elderly.          Aspirin is on active medication list. Aspirin use is indicated based on review of current medical condition/s. Pros and cons of this therapy have been discussed today. Benefits of this medication outweigh potential harm.  Patient has been encouraged to continue taking this medication.  .      Patient Active Problem List   Diagnosis   • Vitamin D deficiency   • KINDRA (obstructive sleep apnea)   • IBS (irritable bowel syndrome)   • Essential hypertension   • Herpes zoster   • Arthritis   • Anxiety and depression   • Other emphysema ([facility])   • Acute seasonal allergic rhinitis due to pollen   • Paroxysmal atrial fibrillation ([facility])   • Osteoporosis   • Mixed hyperlipidemia   • Encounter for Medicare annual wellness exam   • COPD (chronic obstructive pulmonary disease) ([facility])   • Mixed stress and urge urinary incontinence   • Type 2 diabetes mellitus without complication, without long-term current use of insulin ([facility])   • Arthritis of both hips   • Autonomic neuropathy   • GERD without esophagitis   • C. difficile colitis   • Hypokalemia   • Hospital discharge follow-up   • Diarrhea   • Decreased hearing of both ears   • Dizziness   • Nausea   • Hypercholesterolemia     Advance Care Planning  Advance Directive is not on file.  ACP discussion was held with the patient during this visit. Patient has an advance directive (not in EMR), copy requested.          Objective    There were no vitals filed for this visit.  BMI Readings from Last 1 Encounters:   11/23/21 24.10 kg/m²   BMI is within normal parameters. No  follow-up required.    Does the patient have evidence of cognitive impairment? No    Physical Exam   Awake alert and oriented x3  No labored breathing  No acute distress         HEALTH RISK ASSESSMENT    Smoking Status:  Social History     Tobacco Use   Smoking Status Former Smoker   • Packs/day: 1.00   • Years: 25.00   • Pack years: 25.00   • Quit date: 2008   • Years since quittin.6   Smokeless Tobacco Never Used   Tobacco Comment    CAFFEINE USE - 1 CUP COFFEE/ DIET COKE     Alcohol Consumption:  Social History     Substance and Sexual Activity   Alcohol Use No     Fall Risk Screen:    STEADI Fall Risk Assessment has not been completed.    Depression Screening:  PHQ-2/PHQ-9 Depression Screening 2020   Little interest or pleasure in doing things 0   Feeling down, depressed, or hopeless 0   Total Score 0       Health Habits and Functional and Cognitive Screening:  Functional & Cognitive Status 2021   Do you have difficulty preparing food and eating? No   Do you have difficulty bathing yourself, getting dressed or grooming yourself? No   Do you have difficulty using the toilet? No   Do you have difficulty moving around from place to place? No   Do you have trouble with steps or getting out of a bed or a chair? No   Current Diet Well Balanced Diet   Dental Exam Not up to date   Eye Exam Not up to date   Exercise (times per week) 0 times per week   Current Exercises Include No Regular Exercise   Current Exercise Activities Include -   Do you need help using the phone?  No   Are you deaf or do you have serious difficulty hearing?  No   Do you need help with transportation? Yes   Do you need help shopping? No   Do you need help preparing meals?  Yes   Do you need help with housework?  No   Do you need help with laundry? Yes   Do you need help taking your medications? No   Do you need help managing money? No   Do you ever drive or ride in a car without wearing a seat belt? No   Have you felt unusual  stress, anger or loneliness in the last month? Yes   Who do you live with? Alone   If you need help, do you have trouble finding someone available to you? No   Have you been bothered in the last four weeks by sexual problems? No   Do you have difficulty concentrating, remembering or making decisions? No       Age-appropriate Screening Schedule:  Refer to the list below for future screening recommendations based on patient's age, sex and/or medical conditions. Orders for these recommended tests are listed in the plan section. The patient has been provided with a written plan.    Health Maintenance   Topic Date Due   • URINE MICROALBUMIN  Never done   • ZOSTER VACCINE (1 of 2) Never done   • DIABETIC EYE EXAM  Never done   • DXA SCAN  07/17/2020   • HEMOGLOBIN A1C  03/02/2022   • LIPID PANEL  09/02/2022   • TDAP/TD VACCINES (2 - Td or Tdap) 02/08/2030   • INFLUENZA VACCINE  Completed              Assessment/Plan   CMS Preventative Services Quick Reference  Risk Factors Identified During Encounter  Cardiovascular Disease  The above risks/problems have been discussed with the patient.  Follow up actions/plans if indicated are seen below in the Assessment/Plan Section.  Pertinent information has been shared with the patient in the After Visit Summary.    Diagnoses and all orders for this visit:    1. Encounter for Medicare annual wellness exam (Primary)    2. Essential hypertension    3. Type 2 diabetes mellitus without complication, without long-term current use of insulin (HCC)        Follow Up:   Return in about 3 months (around 3/14/2022) for diabetes.     An After Visit Summary and PPPS were made available to the patient.              BEATRICE done and patient to work on diet and exercise for Preventive Counseling.    Refill on med and continue to work on diet and exercise   Patient states she will be making an eye appointment at the dental appointment in the near future.  We will get her blood work next time in 3  months.  15 minutes spent on this note and visit.

## 2021-12-14 NOTE — TELEPHONE ENCOUNTER
PATIENT'S DAUGHTER WANTED TO KNOW IF YOU COULD SWITCH THE UPCOMING APPT TO A PHONE CALL INSTEAD OF VIDEO      PATIENT HAS LOST HER  LAST WEEK AND IS BUSY WITH A LOT OF ARRANGEMENTS/ ETC.       ADVISED THAT INSURANCE MAY NOT COVER IT, AND SHE ASKED IF YOU COULD VERIFY THAT FOR HER.     IF IT'S NOT COVERED, THEN THEY WILL HAVE TO RESCHEDULE APPT             PLEASE CALL   millicent hernandez () [phone] (H)     CAN LEAVE MESSAGE

## 2021-12-28 ENCOUNTER — TELEPHONE (OUTPATIENT)
Dept: FAMILY MEDICINE CLINIC | Facility: CLINIC | Age: 81
End: 2021-12-28

## 2021-12-28 DIAGNOSIS — F51.01 PRIMARY INSOMNIA: Primary | ICD-10-CM

## 2021-12-28 RX ORDER — TRAZODONE HYDROCHLORIDE 50 MG/1
50 TABLET ORAL NIGHTLY
Qty: 30 TABLET | Refills: 1 | Status: SHIPPED | OUTPATIENT
Start: 2021-12-28 | End: 2022-05-19 | Stop reason: SDUPTHER

## 2021-12-28 NOTE — TELEPHONE ENCOUNTER
Let patient's daughter know I sent in a medicine called trazodone 50 mg.  She will take a half a pill about an hour before bedtime and see how she does with this.  Letter no side effects include dizziness and confusion.  These are rare but if they occur they usually occur in elderly.  Unfortunately, all sleep aids may have this issue.  This medication is not addicting and it is not a controlled substance.

## 2021-12-28 NOTE — TELEPHONE ENCOUNTER
Caller: ann hernandez    Relationship: Emergency Contact    Best call back number: [phone]    What medication are you requesting: SOMETHING TO HELP SLEEP    What are your current symptoms: NOT SLEEPING AT ALL    How long have you been experiencing symptoms: DEC 7    Have you had these symptoms before:    [] Yes  [x] No    Have you been treated for these symptoms before:   [] Yes  [x] No    If a prescription is needed, what is your preferred pharmacy and phone number: [id number] [place], [place] - [address] AT [facility] - [phone] [facility] [phone] FX     Additional notes: PATIENT IS UNABLE TO FALL ASLEEP OR STAY ASLEEP SINCE THE PASSING OF HER  ON DEC 7.    PATIENTS DAUGHTER ANN HAS CALLED TO REQUEST TO SEE IF SOMETHING CAN BE GIVEN TO HELP PATIENT FALL ASLEEP AND STAY ASLEEP.

## 2022-02-02 ENCOUNTER — TELEPHONE (OUTPATIENT)
Dept: GASTROENTEROLOGY | Facility: CLINIC | Age: 82
End: 2022-02-02

## 2022-02-02 NOTE — TELEPHONE ENCOUNTER
Called pt and pt reports that she take 3 budesonide a day and she needs a refill. Advised will send message to Dr Duggan. Verb understanding.

## 2022-02-02 NOTE — TELEPHONE ENCOUNTER
----- Message from Rodney Prajapati sent at 2/2/2022 11:02 AM EST -----  Regarding: Budesonide (ENTOCORT EC) 3 MG 24 hr capsule  Contact: [phone]  Pt would like a prescription called in.     [id number] - [place], [place] - [address] AT [facility] - [phone]  - [phone]    [address], [facility] [address]   Phone:  [phone]  Fax:  [phone]

## 2022-02-03 RX ORDER — BUDESONIDE 3 MG/1
9 CAPSULE, COATED PELLETS ORAL EVERY MORNING
Qty: 90 CAPSULE | Refills: 1 | Status: SHIPPED | OUTPATIENT
Start: 2022-02-03 | End: 2022-03-15

## 2022-02-03 NOTE — TELEPHONE ENCOUNTER
Tell her that we will give her enough of a prescription for Budesonide 3 mg 3 po daily, till she follows up in our office with me or Ms. Fraser. She hasn't been seen since 8/21 and was supposed to follow up 4 weeks later. Rubin

## 2022-02-03 NOTE — TELEPHONE ENCOUNTER
Call to pt.  Advise per Dr Duggan note.  Verb understanding.     F/u appt scheduled with MURRAY Mir on 3/15 @ 11am.     Sukhi completed for budesonide 3 mg - take 3 tabs by mouth daily, #90, R1.

## 2022-02-18 ENCOUNTER — OFFICE VISIT (OUTPATIENT)
Dept: FAMILY MEDICINE CLINIC | Facility: CLINIC | Age: 82
End: 2022-02-18

## 2022-02-18 VITALS
OXYGEN SATURATION: 94 % | BODY MASS INDEX: 24.27 KG/M2 | RESPIRATION RATE: 16 BRPM | DIASTOLIC BLOOD PRESSURE: 108 MMHG | TEMPERATURE: 98 F | SYSTOLIC BLOOD PRESSURE: 162 MMHG | WEIGHT: 137 LBS | HEART RATE: 76 BPM | HEIGHT: 63 IN

## 2022-02-18 DIAGNOSIS — R10.9 FLANK PAIN: ICD-10-CM

## 2022-02-18 DIAGNOSIS — R31.29 OTHER MICROSCOPIC HEMATURIA: ICD-10-CM

## 2022-02-18 DIAGNOSIS — R53.83 OTHER FATIGUE: ICD-10-CM

## 2022-02-18 DIAGNOSIS — E11.9 TYPE 2 DIABETES MELLITUS WITHOUT COMPLICATION, WITHOUT LONG-TERM CURRENT USE OF INSULIN: Primary | ICD-10-CM

## 2022-02-18 DIAGNOSIS — R42 DIZZINESS: ICD-10-CM

## 2022-02-18 DIAGNOSIS — H91.93 DECREASED HEARING OF BOTH EARS: ICD-10-CM

## 2022-02-18 DIAGNOSIS — E78.2 MIXED HYPERLIPIDEMIA: ICD-10-CM

## 2022-02-18 DIAGNOSIS — I10 ESSENTIAL HYPERTENSION: ICD-10-CM

## 2022-02-18 LAB
BILIRUB BLD-MCNC: NEGATIVE MG/DL
CLARITY, POC: CLEAR
COLOR UR: YELLOW
EXPIRATION DATE: ABNORMAL
GLUCOSE UR STRIP-MCNC: NEGATIVE MG/DL
KETONES UR QL: NEGATIVE
LEUKOCYTE EST, POC: NEGATIVE
Lab: ABNORMAL
NITRITE UR-MCNC: NEGATIVE MG/ML
PH UR: 6 [PH] (ref 5–8)
PROT UR STRIP-MCNC: NEGATIVE MG/DL
RBC # UR STRIP: ABNORMAL /UL
SP GR UR: 1.01 (ref 1–1.03)
UROBILINOGEN UR QL: NORMAL

## 2022-02-18 PROCEDURE — 81003 URINALYSIS AUTO W/O SCOPE: CPT | Performed by: FAMILY MEDICINE

## 2022-02-18 PROCEDURE — 99214 OFFICE O/P EST MOD 30 MIN: CPT | Performed by: FAMILY MEDICINE

## 2022-02-18 RX ORDER — LISINOPRIL 10 MG/1
10 TABLET ORAL DAILY
Qty: 30 TABLET | Refills: 3 | Status: SHIPPED | OUTPATIENT
Start: 2022-02-18 | End: 2022-05-19 | Stop reason: SDUPTHER

## 2022-02-18 NOTE — PROGRESS NOTES
"Chief Complaint  Diabetes    Subjective          Kandace Andrade presents to [facility]  History of Present Illness pt presents with daughter Tracey    Pt is here for refills, labs and  DM- not checking sugars.    HTN- her bp has been a little high.  And causing some dizziness;  No meds  She is having some rt flank pain but no dysuria and it is clear colored.  Difficulty hearing and wants to see a diff ent doctor.    Objective   Vital Signs:   BP (!) 162/108 (BP Location: Left arm, Patient Position: Sitting, Cuff Size: Adult)   Pulse 76   Temp 98 °F (36.7 °C) (Temporal)   Resp 16   Ht 160 cm (62.99\")   Wt 62.1 kg (137 lb)   SpO2 94%   BMI 24.28 kg/m²     Physical Exam  Vitals and nursing note reviewed.   Constitutional:       Appearance: Normal appearance. She is well-developed.   HENT:      Head: Normocephalic and atraumatic.   Cardiovascular:      Rate and Rhythm: Normal rate and regular rhythm.      Heart sounds: Normal heart sounds. No murmur heard.      Pulmonary:      Effort: Pulmonary effort is normal. No respiratory distress.      Breath sounds: Normal breath sounds. No stridor. No wheezing or rhonchi.   Abdominal:      Tenderness: There is no right CVA tenderness.   Neurological:      Mental Status: She is alert and oriented to person, place, and time.        Result Review :                 Assessment and Plan    Diagnoses and all orders for this visit:    1. Type 2 diabetes mellitus without complication, without long-term current use of insulin (HCC) (Primary)  -     Microalbumin / Creatinine Urine Ratio - Urine, Clean Catch  -     Hemoglobin A1c    2. Mixed hyperlipidemia  -     Lipid Panel    3. Essential hypertension  -     Comprehensive Metabolic Panel  -     lisinopril (PRINIVIL,ZESTRIL) 10 MG tablet; Take 1 tablet by mouth Daily.  Dispense: 30 tablet; Refill: 3    4. Flank pain  -     POC Urinalysis Dipstick, Automated  -     Urine Culture - Urine, Urine, Clean " Catch    5. Other fatigue  -     CBC & Differential    6. Dizziness  -     Ambulatory Referral to ENT (Otolaryngology)    7. Decreased hearing of both ears  -     Ambulatory Referral to ENT (Otolaryngology)    8. Other microscopic hematuria  -     Urine Culture - Urine, Urine, Clean Catch        Follow Up   Return in about 3 months (around 5/18/2022) for diabetes, hypertension, hyperlipidema.  Patient was given instructions and counseling regarding her condition or for health maintenance advice. Please see specific information pulled into the AVS if appropriate.     Labs, refills and Given warning signs for stroke and MI.    Patient to monitor BP over next week and call me with readings at that time and we can make adjustments accordingly if needed.  Pt not interested in PT at this point.    Will get culture since there is blood in urine.    Start lisinopril 10 mg  Work on exercise

## 2022-02-19 LAB
ALBUMIN SERPL-MCNC: 4.5 G/DL (ref 3.6–4.6)
ALBUMIN/CREAT UR: 29 MG/G CREAT (ref 0–29)
ALBUMIN/GLOB SERPL: 1.9 {RATIO} (ref 1.2–2.2)
ALP SERPL-CCNC: 77 IU/L (ref 44–121)
ALT SERPL-CCNC: 19 IU/L (ref 0–32)
AST SERPL-CCNC: 21 IU/L (ref 0–40)
BASOPHILS # BLD AUTO: 0.1 X10E3/UL (ref 0–0.2)
BASOPHILS NFR BLD AUTO: 1 %
BILIRUB SERPL-MCNC: 0.3 MG/DL (ref 0–1.2)
BUN SERPL-MCNC: 18 MG/DL (ref 8–27)
BUN/CREAT SERPL: 32 (ref 12–28)
CALCIUM SERPL-MCNC: 10.2 MG/DL (ref 8.7–10.3)
CHLORIDE SERPL-SCNC: 100 MMOL/L (ref 96–106)
CHOLEST SERPL-MCNC: 315 MG/DL (ref 100–199)
CO2 SERPL-SCNC: 23 MMOL/L (ref 20–29)
CREAT SERPL-MCNC: 0.57 MG/DL (ref 0.57–1)
CREAT UR-MCNC: 31.7 MG/DL
EOSINOPHIL # BLD AUTO: 0.1 X10E3/UL (ref 0–0.4)
EOSINOPHIL NFR BLD AUTO: 1 %
ERYTHROCYTE [DISTWIDTH] IN BLOOD BY AUTOMATED COUNT: 12.8 % (ref 11.7–15.4)
GLOBULIN SER CALC-MCNC: 2.4 G/DL (ref 1.5–4.5)
GLUCOSE SERPL-MCNC: 94 MG/DL (ref 65–99)
HBA1C MFR BLD: 6.3 % (ref 4.8–5.6)
HCT VFR BLD AUTO: 42.5 % (ref 34–46.6)
HDLC SERPL-MCNC: 87 MG/DL
HGB BLD-MCNC: 14.9 G/DL (ref 11.1–15.9)
IMM GRANULOCYTES # BLD AUTO: 0.1 X10E3/UL (ref 0–0.1)
IMM GRANULOCYTES NFR BLD AUTO: 1 %
LDLC SERPL CALC-MCNC: 182 MG/DL (ref 0–99)
LYMPHOCYTES # BLD AUTO: 3.1 X10E3/UL (ref 0.7–3.1)
LYMPHOCYTES NFR BLD AUTO: 19 %
MCH RBC QN AUTO: 32 PG (ref 26.6–33)
MCHC RBC AUTO-ENTMCNC: 35.1 G/DL (ref 31.5–35.7)
MCV RBC AUTO: 91 FL (ref 79–97)
MICROALBUMIN UR-MCNC: 9.3 UG/ML
MONOCYTES # BLD AUTO: 1 X10E3/UL (ref 0.1–0.9)
MONOCYTES NFR BLD AUTO: 7 %
NEUTROPHILS # BLD AUTO: 11.3 X10E3/UL (ref 1.4–7)
NEUTROPHILS NFR BLD AUTO: 71 %
PLATELET # BLD AUTO: 301 X10E3/UL (ref 150–450)
POTASSIUM SERPL-SCNC: 4.5 MMOL/L (ref 3.5–5.2)
PROT SERPL-MCNC: 6.9 G/DL (ref 6–8.5)
RBC # BLD AUTO: 4.66 X10E6/UL (ref 3.77–5.28)
SODIUM SERPL-SCNC: 140 MMOL/L (ref 134–144)
TRIGL SERPL-MCNC: 245 MG/DL (ref 0–149)
VLDLC SERPL CALC-MCNC: 46 MG/DL (ref 5–40)
WBC # BLD AUTO: 15.7 X10E3/UL (ref 3.4–10.8)

## 2022-02-23 ENCOUNTER — TELEPHONE (OUTPATIENT)
Dept: FAMILY MEDICINE CLINIC | Facility: CLINIC | Age: 82
End: 2022-02-23

## 2022-02-23 DIAGNOSIS — N30.00 ACUTE CYSTITIS WITHOUT HEMATURIA: Primary | ICD-10-CM

## 2022-02-23 RX ORDER — CIPROFLOXACIN 500 MG/1
500 TABLET, FILM COATED ORAL 2 TIMES DAILY
Qty: 10 TABLET | Refills: 0 | Status: SHIPPED | OUTPATIENT
Start: 2022-02-23 | End: 2022-03-15

## 2022-02-23 NOTE — TELEPHONE ENCOUNTER
-Pts daughter Tracey called asking for results of urine test    -Advised results of dipstick test are immediate but the results of the culture have not returned.    Pt asked that we call [facility] for results.    -I explained results are available in chart once test results are available.    -I also recommended the ER for contusion and previously recommended by Dr. Bella    -Pts mother would like a call back    [phone]

## 2022-02-23 NOTE — TELEPHONE ENCOUNTER
Lab ladjose ramon printed the lab report that was final.  I had Dr. Dixon review and it showed the Cipro was a good medication for her to take.  I informed Tracey that she may get another phone call when the results come through as final.      She was not happen that someone informed her mom that she needed to call and get her own lab results.

## 2022-02-24 LAB
BACTERIA UR CULT: ABNORMAL
OTHER ANTIBIOTIC SUSC ISLT: ABNORMAL

## 2022-03-03 ENCOUNTER — HOSPITAL ENCOUNTER (OUTPATIENT)
Dept: CARDIOLOGY | Facility: HOSPITAL | Age: 82
Discharge: HOME | End: 2022-03-03

## 2022-03-03 VITALS — BODY MASS INDEX: 23.92 KG/M2 | WEIGHT: 135 LBS | HEIGHT: 63 IN

## 2022-03-03 DIAGNOSIS — R06.02 EXERTIONAL SHORTNESS OF BREATH: ICD-10-CM

## 2022-03-03 DIAGNOSIS — R42 DIZZINESS: ICD-10-CM

## 2022-03-03 LAB
BH CV NUCLEAR PRIOR STUDY: 3
BH CV REST NUCLEAR ISOTOPE DOSE: 11 MCI
BH CV STRESS BP STAGE 1: NORMAL
BH CV STRESS COMMENTS STAGE 1: NORMAL
BH CV STRESS DOSE REGADENOSON STAGE 1: 0.4
BH CV STRESS DURATION MIN STAGE 1: 0
BH CV STRESS DURATION SEC STAGE 1: 10
BH CV STRESS HR STAGE 1: 98
BH CV STRESS NUCLEAR ISOTOPE DOSE: 34.8 MCI
BH CV STRESS PROTOCOL 1: NORMAL
BH CV STRESS RECOVERY BP: NORMAL MMHG
BH CV STRESS RECOVERY HR: 83 BPM
BH CV STRESS STAGE 1: 1
LV EF NUC BP: 67 %
MAXIMAL PREDICTED HEART RATE: 138 BPM
PERCENT MAX PREDICTED HR: 71.01 %
STRESS BASELINE BP: NORMAL MMHG
STRESS BASELINE HR: 72 BPM
STRESS PERCENT HR: 84 %
STRESS POST EXERCISE DUR SEC: 10 SEC
STRESS POST PEAK BP: NORMAL MMHG
STRESS POST PEAK HR: 98 BPM
STRESS TARGET HR: 117 BPM

## 2022-03-03 PROCEDURE — 93016 CV STRESS TEST SUPVJ ONLY: CPT | Performed by: INTERNAL MEDICINE

## 2022-03-03 PROCEDURE — 93018 CV STRESS TEST I&R ONLY: CPT | Performed by: INTERNAL MEDICINE

## 2022-03-03 PROCEDURE — 25010000002 REGADENOSON 0.4 MG/5ML SOLUTION: Performed by: INTERNAL MEDICINE

## 2022-03-03 PROCEDURE — 78452 HT MUSCLE IMAGE SPECT MULT: CPT | Performed by: INTERNAL MEDICINE

## 2022-03-03 PROCEDURE — 93880 EXTRACRANIAL BILAT STUDY: CPT | Performed by: INTERNAL MEDICINE

## 2022-03-03 PROCEDURE — 78452 HT MUSCLE IMAGE SPECT MULT: CPT

## 2022-03-03 PROCEDURE — A9502 TC99M TETROFOSMIN: HCPCS | Performed by: INTERNAL MEDICINE

## 2022-03-03 PROCEDURE — 93017 CV STRESS TEST TRACING ONLY: CPT

## 2022-03-03 PROCEDURE — 0 TECHNETIUM TETROFOSMIN KIT: Performed by: INTERNAL MEDICINE

## 2022-03-03 PROCEDURE — 93880 EXTRACRANIAL BILAT STUDY: CPT

## 2022-03-03 RX ADMIN — TETROFOSMIN 1 DOSE: 1.38 INJECTION, POWDER, LYOPHILIZED, FOR SOLUTION INTRAVENOUS at 09:55

## 2022-03-03 RX ADMIN — TETROFOSMIN 1 DOSE: 1.38 INJECTION, POWDER, LYOPHILIZED, FOR SOLUTION INTRAVENOUS at 10:53

## 2022-03-03 RX ADMIN — REGADENOSON 0.4 MG: 0.08 INJECTION, SOLUTION INTRAVENOUS at 10:53

## 2022-03-06 LAB
BH CV XLRA MEAS LEFT CAROTID BULB EDV: 21 CM/SEC
BH CV XLRA MEAS LEFT CAROTID BULB PSV: 67 CM/SEC
BH CV XLRA MEAS LEFT DIST CCA EDV: 22 CM/SEC
BH CV XLRA MEAS LEFT DIST CCA PSV: 66.6 CM/SEC
BH CV XLRA MEAS LEFT DIST ICA EDV: -51.7 CM/SEC
BH CV XLRA MEAS LEFT DIST ICA PSV: -123.1 CM/SEC
BH CV XLRA MEAS LEFT ICA/CCA RATIO: 1.8
BH CV XLRA MEAS LEFT MID CCA EDV: 21.2 CM/SEC
BH CV XLRA MEAS LEFT MID CCA PSV: 65.1 CM/SEC
BH CV XLRA MEAS LEFT MID ICA EDV: -38.4 CM/SEC
BH CV XLRA MEAS LEFT MID ICA PSV: -96.4 CM/SEC
BH CV XLRA MEAS LEFT PROX CCA EDV: 16.5 CM/SEC
BH CV XLRA MEAS LEFT PROX CCA PSV: 52.5 CM/SEC
BH CV XLRA MEAS LEFT PROX ECA PSV: -53.6 CM/SEC
BH CV XLRA MEAS LEFT PROX ICA EDV: 23.1 CM/SEC
BH CV XLRA MEAS LEFT PROX ICA PSV: 71.3 CM/SEC
BH CV XLRA MEAS LEFT PROX SCLA PSV: 124 CM/SEC
BH CV XLRA MEAS LEFT VERTEBRAL A PSV: 55.7 CM/SEC
BH CV XLRA MEAS RIGHT CAROTID BULB EDV: 13 CM/SEC
BH CV XLRA MEAS RIGHT CAROTID BULB PSV: 63 CM/SEC
BH CV XLRA MEAS RIGHT DIST CCA EDV: 18.6 CM/SEC
BH CV XLRA MEAS RIGHT DIST CCA PSV: 65.9 CM/SEC
BH CV XLRA MEAS RIGHT DIST ICA EDV: -28.6 CM/SEC
BH CV XLRA MEAS RIGHT DIST ICA PSV: -80.8 CM/SEC
BH CV XLRA MEAS RIGHT ICA/CCA RATIO: 1.3
BH CV XLRA MEAS RIGHT MID CCA EDV: 15.5 CM/SEC
BH CV XLRA MEAS RIGHT MID CCA PSV: 65.9 CM/SEC
BH CV XLRA MEAS RIGHT MID ICA EDV: -23 CM/SEC
BH CV XLRA MEAS RIGHT MID ICA PSV: -69 CM/SEC
BH CV XLRA MEAS RIGHT PROX CCA EDV: 31.1 CM/SEC
BH CV XLRA MEAS RIGHT PROX CCA PSV: 78.9 CM/SEC
BH CV XLRA MEAS RIGHT PROX ECA PSV: -136.5 CM/SEC
BH CV XLRA MEAS RIGHT PROX ICA EDV: -22.4 CM/SEC
BH CV XLRA MEAS RIGHT PROX ICA PSV: -67.7 CM/SEC
BH CV XLRA MEAS RIGHT PROX SCLA PSV: 113 CM/SEC
BH CV XLRA MEAS RIGHT VERTEBRAL A PSV: 71.7 CM/SEC
MAXIMAL PREDICTED HEART RATE: 138 BPM
STRESS TARGET HR: 117 BPM

## 2022-03-07 ENCOUNTER — TELEPHONE (OUTPATIENT)
Dept: CARDIOLOGY | Facility: CLINIC | Age: 82
End: 2022-03-07

## 2022-03-07 RX ORDER — EZETIMIBE 10 MG/1
10 TABLET ORAL DAILY
Qty: 30 TABLET | Refills: 3 | Status: SHIPPED | OUTPATIENT
Start: 2022-03-07 | End: 2022-07-11

## 2022-03-07 NOTE — TELEPHONE ENCOUNTER
Tracey (daughter) called to inform you that the pt would like to start Zetia. Can you please send the RX in?    Yue: can you please call the pt and make her a 6 month appointment with Romy?

## 2022-03-07 NOTE — PROGRESS NOTES
Please notify patient that stress test is normal(no significant coronary artery disease), carotid Doppler shows mild disease on both sides.  Continue baby aspirin every day.  If she is okay I can start her on Zetia which is not a statin for high cholesterol.  Please let me know.  Otherwise she can follow-up with Romy Downs in 6 months.    Thank you

## 2022-03-10 DIAGNOSIS — F32.1 CURRENT MODERATE EPISODE OF MAJOR DEPRESSIVE DISORDER WITHOUT PRIOR EPISODE: ICD-10-CM

## 2022-03-10 RX ORDER — PAROXETINE HYDROCHLORIDE 40 MG/1
40 TABLET, FILM COATED ORAL EVERY MORNING
Qty: 30 TABLET | Refills: 5 | Status: SHIPPED | OUTPATIENT
Start: 2022-03-10 | End: 2022-05-19

## 2022-03-10 NOTE — TELEPHONE ENCOUNTER
Rx Refill Note  Requested Prescriptions     Pending Prescriptions Disp Refills   • PARoxetine (PAXIL) 40 MG tablet [Pharmacy Med Name: PAROXETINE 40MG TABLETS] 30 tablet 5     Sig: TAKE 1 TABLET BY MOUTH EVERY MORNING      Last office visit with prescribing clinician: 2/18/2022      Next office visit with prescribing clinician: 5/19/2022            Ruperto Saldana MA  03/10/22, 10:45 EST

## 2022-03-15 ENCOUNTER — OFFICE VISIT (OUTPATIENT)
Dept: GASTROENTEROLOGY | Facility: CLINIC | Age: 82
End: 2022-03-15

## 2022-03-15 VITALS — BODY MASS INDEX: 24.27 KG/M2 | WEIGHT: 137 LBS | TEMPERATURE: 96.9 F | HEIGHT: 63 IN

## 2022-03-15 DIAGNOSIS — R15.2 INCONTINENCE OF FECES WITH FECAL URGENCY: ICD-10-CM

## 2022-03-15 DIAGNOSIS — R19.7 DIARRHEA, UNSPECIFIED TYPE: Primary | ICD-10-CM

## 2022-03-15 DIAGNOSIS — R15.9 INCONTINENCE OF FECES WITH FECAL URGENCY: ICD-10-CM

## 2022-03-15 DIAGNOSIS — Z86.19 HISTORY OF CLOSTRIDIUM DIFFICILE INFECTION: ICD-10-CM

## 2022-03-15 PROCEDURE — 99214 OFFICE O/P EST MOD 30 MIN: CPT | Performed by: NURSE PRACTITIONER

## 2022-03-15 RX ORDER — VANCOMYCIN HYDROCHLORIDE 125 MG/1
125 CAPSULE ORAL 4 TIMES DAILY
Qty: 40 CAPSULE | Refills: 0 | Status: SHIPPED | OUTPATIENT
Start: 2022-03-15 | End: 2022-03-25

## 2022-03-15 NOTE — PROGRESS NOTES
Chief Complaint   Patient presents with   • Microscopic Colitis   • Diarrhea   • Abdominal Pain       Kandace Andrade is a  81 y.o. female here for a follow up visit for diarrhea.    HPI  81-year-old female presents today company by family member for follow-up visit for diarrhea.  She is a patient of Dr. Duggan.  She was last seen in the office by Dr. Duggan on 8/19/2021.  She has a history of microscopic colitis and did really well on a budesonide taper.  Since then she tells me she is done really well until just recently.  She tells me she was diagnosed with a urinary tract infection from E. coli and did a course of Cipro.  She tells me immediately after finishing the Cipro she developed nasty, stinky urgent diarrhea.  She tells me it smells like C. difficile.  She was treated for C. difficile in 2021.  She tells me she has been trying to take extra Metamucil and it is helping a little bit but not much.  She still having a lot of gas, bloating and fecal urgency with even some episodes of fecal incontinence.  Last colonoscopy was on 4/2/2021.  She denies any dysphagia, reflux, nausea and vomiting, constipation, rectal bleeding or melena.  She admits her appetite is okay and her weight appears stable.  Past Medical History:   Diagnosis Date   • Acute seasonal allergic rhinitis due to pollen    • Anxiety and depression    • Arthritis    • Asthma    • C. difficile colitis    • COPD (chronic obstructive pulmonary disease) (HCC)    • Diarrhea    • Diverticulosis    • Fibula fracture    • Herpes zoster    • History of lumbosacral spine surgery    • IBS (irritable bowel syndrome)    • Mixed hyperlipidemia    • KINDRA (obstructive sleep apnea)     NO MACHINE AT THIS TIME   • Osteoporosis    • Paroxysmal atrial fibrillation (HCC)    • Vitamin D deficiency        Past Surgical History:   Procedure Laterality Date   • APPENDECTOMY     • BACK SURGERY      lower x2   • CATARACT EXTRACTION     • COLONOSCOPY      patient doesn't recall     • COLONOSCOPY N/A 2021    Procedure: COLONOSCOPY to cecum with cecal biopsies;  Surgeon: Jarrod Duggan MD;  Location: [facility] ENDOSCOPY;  Service: Gastroenterology;  Laterality: N/A;  pre: diarhhea  post: diverticulosis, hemorrhoids   • HYSTERECTOMY      partial   • SHOULDER SURGERY Right    • SHOULDER SURGERY     • TONSILLECTOMY     • UPPER GASTROINTESTINAL ENDOSCOPY      patient doesn't recall        Scheduled Meds:    Continuous Infusions:No current facility-administered medications for this visit.      PRN Meds:.    Allergies   Allergen Reactions   • Penicillins Hives and Other (See Comments)     Elevated BP... tolerated ceftriaxone 10/2020 admission   • Meclizine Hcl Dizziness   • Flagyl [Metronidazole] Other (See Comments)     HYPERACTIVITY.. INSOMNIA    • Statins Other (See Comments)     Headache, elevated liver enzymes, blurred vision       Social History     Socioeconomic History   • Marital status:    Tobacco Use   • Smoking status: Former Smoker     Packs/day: 1.00     Years: 25.00     Pack years: 25.00     Quit date: 2008     Years since quittin.8   • Smokeless tobacco: Never Used   • Tobacco comment: CAFFEINE USE - 1 CUP COFFEE/ DIET COKE   Vaping Use   • Vaping Use: Never used   Substance and Sexual Activity   • Alcohol use: No   • Drug use: No       Family History   Problem Relation Age of Onset   • Heart disease Mother    • Lung cancer Mother    • Diabetes Father    • Liver disease Father    • Heart disease Paternal Uncle    • Malig Hyperthermia Neg Hx        Review of Systems   Constitutional: Negative for appetite change, chills, diaphoresis, fatigue, fever and unexpected weight change.   HENT: Negative for nosebleeds, postnasal drip, sore throat, trouble swallowing and voice change.    Respiratory: Negative for cough, choking, chest tightness, shortness of breath, wheezing and stridor.    Cardiovascular: Negative for chest pain, palpitations and leg swelling.    Gastrointestinal: Positive for abdominal distention and diarrhea. Negative for abdominal pain, anal bleeding, blood in stool, constipation, nausea, rectal pain and vomiting.   Endocrine: Negative for polydipsia, polyphagia and polyuria.   Musculoskeletal: Negative for gait problem.   Skin: Negative for rash and wound.   Allergic/Immunologic: Negative for food allergies.   Neurological: Negative for dizziness, speech difficulty and light-headedness.   Psychiatric/Behavioral: Negative for confusion, self-injury, sleep disturbance and suicidal ideas.       Vitals:    03/15/22 1249   Temp: 96.9 °F (36.1 °C)       Physical Exam  Constitutional:       General: She is not in acute distress.     Appearance: She is well-developed. She is not ill-appearing.   HENT:      Head: Normocephalic.   Eyes:      Pupils: Pupils are equal, round, and reactive to light.   Cardiovascular:      Rate and Rhythm: Normal rate and regular rhythm.      Heart sounds: Normal heart sounds.   Pulmonary:      Effort: Pulmonary effort is normal.      Breath sounds: Normal breath sounds.   Abdominal:      General: Bowel sounds are normal. There is no distension.      Palpations: Abdomen is soft. There is no mass.      Tenderness: There is no abdominal tenderness. There is no guarding or rebound.      Hernia: No hernia is present.   Musculoskeletal:         General: Normal range of motion.   Skin:     General: Skin is warm and dry.   Neurological:      Mental Status: She is alert and oriented to person, place, and time.   Psychiatric:         Speech: Speech normal.         Behavior: Behavior normal.         Judgment: Judgment normal.         No radiology results for the last 7 days     Diagnoses and all orders for this visit:    1. Diarrhea, unspecified type (Primary)  Overview:  Added automatically from request for surgery [id number]    Orders:  -     Clostridium Difficile EIA - Stool, Per Rectum  -     Fecal Lactoferrin Qual. - Stool, Per Rectum    2.  History of Clostridium difficile infection  -     Clostridium Difficile EIA - Stool, Per Rectum  -     Fecal Lactoferrin Qual. - Stool, Per Rectum    3. Incontinence of feces with fecal urgency     Reviewed most recent labs and culture results with her today.  Since she does have a history of C. difficile and feels pretty sure that this is what it is we will go ahead and proceed with giving her vancomycin.  We will go ahead and check stool studies today.  She can take Imodium over-the-counter as needed.  Patient to call the office later this week with an update.  Patient follow-up with me in 2 weeks.  Patient is agreeable to the plan.

## 2022-03-17 LAB — C DIFF TOX A+B STL QL IA: NEGATIVE

## 2022-03-18 ENCOUNTER — TELEPHONE (OUTPATIENT)
Dept: GASTROENTEROLOGY | Facility: CLINIC | Age: 82
End: 2022-03-18

## 2022-03-18 RX ORDER — BUDESONIDE 3 MG/1
CAPSULE, COATED PELLETS ORAL
Qty: 154 CAPSULE | Refills: 0 | Status: SHIPPED | OUTPATIENT
Start: 2022-03-18 | End: 2022-07-22 | Stop reason: HOSPADM

## 2022-03-18 NOTE — TELEPHONE ENCOUNTER
Call to pt.  Advise per ROCKY Fraser note.  Verb understanding     States continues to have diarrhea 3-4x/day after eating.  Denies pain, blood, fever.

## 2022-03-18 NOTE — TELEPHONE ENCOUNTER
Since the C. difficile was negative I would not take the vancomycin.  I would say we can wait on the fecal lactoferrin testing results to come back.  But we can go ahead and start a round of the budesonide.  I can send in the taper if she is agreeable.  For now would also continue Imodium up to 4 a day to control the diarrhea.  Continue a bland diet.  Continue to increase fluids.

## 2022-03-18 NOTE — TELEPHONE ENCOUNTER
Call to pt.  Advise per ROCKY Fraser note.  Verb understanding.     States would like to try budesonide if can be approved by insurance.  Request to ROCKY Fraser.

## 2022-03-18 NOTE — TELEPHONE ENCOUNTER
----- Message from ANTOINETTE Piper sent at 3/18/2022 10:48 AM EDT -----  Please call the patient and let her know the C diff was negative. How is she doing?

## 2022-03-21 LAB — LACTOFERRIN STL-MCNC: 8.7 UG/ML(G) (ref 0–7.24)

## 2022-03-22 ENCOUNTER — TELEPHONE (OUTPATIENT)
Dept: GASTROENTEROLOGY | Facility: CLINIC | Age: 82
End: 2022-03-22

## 2022-03-22 NOTE — TELEPHONE ENCOUNTER
"Called pt and advised of Luann's note. Verb understanding.     Pt reports that she does not have the budesonide due to it needs a pa.  Advised pt we will send message to MA\"s for pa.      Also pt reports that she took 2 imodium and is no longer having diarrhea , but she is still having the abd pain. Advised will update Luann NP.   Verb understanding.   "

## 2022-03-22 NOTE — TELEPHONE ENCOUNTER
I would have her try some Pepto-Bismol or milk of magnesia for the abdominal pain given her age I really do not want to try bentyl.  She can also use a heating pad for the abdominal cramping as well.

## 2022-03-22 NOTE — TELEPHONE ENCOUNTER
----- Message from ANTOINETTE Piper sent at 3/22/2022  8:12 AM EDT -----  Please call the patient and let her know her fecal lactoferrin was elevated which means inflammation.  Did she stop the vancomycin and start the budesonide?  How is she doing?

## 2022-04-08 RX ORDER — BUDESONIDE 3 MG/1
CAPSULE, COATED PELLETS ORAL
Qty: 154 CAPSULE | Refills: 0 | OUTPATIENT
Start: 2022-04-08

## 2022-04-08 NOTE — TELEPHONE ENCOUNTER
Escribe request for budesonide to ROCKY Mccormack  (It is noted that same rx escribed on 3/18/22).

## 2022-05-06 DIAGNOSIS — E55.9 VITAMIN D DEFICIENCY: ICD-10-CM

## 2022-05-06 RX ORDER — ERGOCALCIFEROL 1.25 MG/1
CAPSULE ORAL
Qty: 5 CAPSULE | Refills: 5 | Status: SHIPPED | OUTPATIENT
Start: 2022-05-06 | End: 2023-03-24

## 2022-05-06 NOTE — TELEPHONE ENCOUNTER
Rx Refill Note  Requested Prescriptions     Pending Prescriptions Disp Refills   • vitamin D (ERGOCALCIFEROL) 1.25 MG (53137 UT) capsule capsule [Pharmacy Med Name: VITAMIN D2 50,000IU (ERGO) CAP RX] 5 capsule 5     Sig: TAKE 1 CAPSULE BY MOUTH EVERY 7 DAYS      Last office visit with prescribing clinician: 2/18/2022      Next office visit with prescribing clinician: 5/19/2022           Last filled 11/9/2021           Yue Conner MA  05/06/22, 07:10 EDT

## 2022-05-19 ENCOUNTER — OFFICE VISIT (OUTPATIENT)
Dept: FAMILY MEDICINE CLINIC | Facility: CLINIC | Age: 82
End: 2022-05-19

## 2022-05-19 VITALS
HEART RATE: 72 BPM | BODY MASS INDEX: 23.88 KG/M2 | HEIGHT: 63 IN | OXYGEN SATURATION: 98 % | TEMPERATURE: 98.4 F | DIASTOLIC BLOOD PRESSURE: 82 MMHG | SYSTOLIC BLOOD PRESSURE: 144 MMHG | WEIGHT: 134.8 LBS

## 2022-05-19 DIAGNOSIS — F51.01 PRIMARY INSOMNIA: ICD-10-CM

## 2022-05-19 DIAGNOSIS — F32.A ANXIETY AND DEPRESSION: Primary | ICD-10-CM

## 2022-05-19 DIAGNOSIS — I10 ESSENTIAL HYPERTENSION: ICD-10-CM

## 2022-05-19 DIAGNOSIS — Z23 IMMUNIZATION DUE: ICD-10-CM

## 2022-05-19 DIAGNOSIS — I48.0 PAROXYSMAL ATRIAL FIBRILLATION: ICD-10-CM

## 2022-05-19 DIAGNOSIS — E11.9 TYPE 2 DIABETES MELLITUS WITHOUT COMPLICATION, WITHOUT LONG-TERM CURRENT USE OF INSULIN: ICD-10-CM

## 2022-05-19 DIAGNOSIS — E78.00 HYPERCHOLESTEROLEMIA: ICD-10-CM

## 2022-05-19 DIAGNOSIS — F41.9 ANXIETY AND DEPRESSION: Primary | ICD-10-CM

## 2022-05-19 PROCEDURE — 0051A COVID-19 (PFIZER) 12+ YRS: CPT | Performed by: FAMILY MEDICINE

## 2022-05-19 PROCEDURE — 91305 COVID-19 (PFIZER) 12+ YRS: CPT | Performed by: FAMILY MEDICINE

## 2022-05-19 PROCEDURE — 99214 OFFICE O/P EST MOD 30 MIN: CPT | Performed by: FAMILY MEDICINE

## 2022-05-19 PROCEDURE — 93000 ELECTROCARDIOGRAM COMPLETE: CPT | Performed by: FAMILY MEDICINE

## 2022-05-19 RX ORDER — LISINOPRIL 10 MG/1
10 TABLET ORAL DAILY
Qty: 30 TABLET | Refills: 3 | Status: SHIPPED | OUTPATIENT
Start: 2022-05-19 | End: 2022-05-19

## 2022-05-19 RX ORDER — LISINOPRIL 20 MG/1
20 TABLET ORAL DAILY
Qty: 30 TABLET | Refills: 2 | Status: SHIPPED | OUTPATIENT
Start: 2022-05-19 | End: 2022-08-15

## 2022-05-19 RX ORDER — TRAZODONE HYDROCHLORIDE 50 MG/1
50 TABLET ORAL NIGHTLY
Qty: 30 TABLET | Refills: 1 | Status: SHIPPED | OUTPATIENT
Start: 2022-05-19 | End: 2022-07-13

## 2022-05-19 RX ORDER — ESCITALOPRAM OXALATE 20 MG/1
20 TABLET ORAL DAILY
Qty: 30 TABLET | Refills: 3 | Status: SHIPPED | OUTPATIENT
Start: 2022-05-19 | End: 2022-09-12

## 2022-05-19 NOTE — PROGRESS NOTES
"Chief Complaint  Hypertension and Back Pain    Subjective          Kandace Andrade presents to [facility]  History of Present Illness  PT is here for refills, labs and  HTN- her bp has been a little high lately.  She has brought in readings.  139-155/83-89.  She has no chest pains or palpitations  She has been having anxiety and some depression but no HI or SI.  She is on paroxetine 40 mg  DM- need labs  Objective   Vital Signs:  /82 (BP Location: Right arm, Patient Position: Sitting)   Pulse 72   Temp 98.4 °F (36.9 °C)   Ht 160 cm (62.99\")   Wt 61.1 kg (134 lb 12.8 oz)   SpO2 98%   BMI 23.88 kg/m²   BMI is within normal parameters. No other follow-up for BMI required.      Physical Exam  Vitals and nursing note reviewed.   Constitutional:       Appearance: Normal appearance. She is well-developed.   Cardiovascular:      Rate and Rhythm: Normal rate and regular rhythm.      Heart sounds: Normal heart sounds. No murmur heard.  Pulmonary:      Effort: Pulmonary effort is normal. No respiratory distress.      Breath sounds: Normal breath sounds. No stridor. No wheezing or rhonchi.   Neurological:      General: No focal deficit present.      Mental Status: She is alert and oriented to person, place, and time. She is not disoriented.   Psychiatric:         Mood and Affect: Mood normal.         Behavior: Behavior normal.        Result Review :            ECG 12 Lead    Date/Time: 5/19/2022 1:50 PM  Performed by: Cassi Bella MD  Authorized by: Cassi Bella MD   Comparison: not compared with previous ECG   Rhythm: sinus arrhythmia  Rate: normal  Conduction: conduction normal  ST Segments: ST segments normal  T Waves: T waves normal  QRS axis: normal  Other: no other findings    Clinical impression: normal ECG              Assessment and Plan    Diagnoses and all orders for this visit:    1. Anxiety and depression (Primary)  -     escitalopram (Lexapro) 20 MG tablet; Take " 1 tablet by mouth Daily.  Dispense: 30 tablet; Refill: 3    2. Essential hypertension  -     Discontinue: lisinopril (PRINIVIL,ZESTRIL) 10 MG tablet; Take 1 tablet by mouth Daily.  Dispense: 30 tablet; Refill: 3  -     Comprehensive Metabolic Panel  -     lisinopril (PRINIVIL,ZESTRIL) 20 MG tablet; Take 1 tablet by mouth Daily.  Dispense: 30 tablet; Refill: 2    3. Primary insomnia  -     traZODone (DESYREL) 50 MG tablet; Take 1 tablet by mouth Every Night. Take one half to one tablet about one hour before bedtime.  Dispense: 30 tablet; Refill: 1    4. Type 2 diabetes mellitus without complication, without long-term current use of insulin (HCC)  -     Hemoglobin A1c    5. Hypercholesterolemia  -     Lipid Panel    6. Paroxysmal atrial fibrillation (HCC)  -     ECG 12 Lead    7. Immunization due  -     COVID-19 Vaccine (Pfizer) Gray Cap             Follow Up   Return in about 4 months (around 9/19/2022).  Patient was given instructions and counseling regarding her condition or for health maintenance advice. Please see specific information pulled into the AVS if appropriate.     Stop paroxatine and start lexapro.  SE discussed.    Increase lisin from 10 to 20 mg  Patient to monitor BP over next week and call me with readings at that time and we can make adjustments accordingly if needed.  Given warning signs for stroke and MI.    covid 19 booster #2 today.

## 2022-05-20 LAB
ALBUMIN SERPL-MCNC: 4.6 G/DL (ref 3.6–4.6)
ALBUMIN/GLOB SERPL: 2 {RATIO} (ref 1.2–2.2)
ALP SERPL-CCNC: 81 IU/L (ref 44–121)
ALT SERPL-CCNC: 23 IU/L (ref 0–32)
AST SERPL-CCNC: 25 IU/L (ref 0–40)
BILIRUB SERPL-MCNC: 0.4 MG/DL (ref 0–1.2)
BUN SERPL-MCNC: 20 MG/DL (ref 8–27)
BUN/CREAT SERPL: 29 (ref 12–28)
CALCIUM SERPL-MCNC: 10 MG/DL (ref 8.7–10.3)
CHLORIDE SERPL-SCNC: 99 MMOL/L (ref 96–106)
CHOLEST SERPL-MCNC: 279 MG/DL (ref 100–199)
CO2 SERPL-SCNC: 20 MMOL/L (ref 20–29)
CREAT SERPL-MCNC: 0.68 MG/DL (ref 0.57–1)
EGFRCR SERPLBLD CKD-EPI 2021: 86 ML/MIN/1.73
GLOBULIN SER CALC-MCNC: 2.3 G/DL (ref 1.5–4.5)
GLUCOSE SERPL-MCNC: 101 MG/DL (ref 65–99)
HBA1C MFR BLD: 6.2 % (ref 4.8–5.6)
HDLC SERPL-MCNC: 76 MG/DL
LDLC SERPL CALC-MCNC: 174 MG/DL (ref 0–99)
POTASSIUM SERPL-SCNC: 4.3 MMOL/L (ref 3.5–5.2)
PROT SERPL-MCNC: 6.9 G/DL (ref 6–8.5)
SODIUM SERPL-SCNC: 140 MMOL/L (ref 134–144)
TRIGL SERPL-MCNC: 162 MG/DL (ref 0–149)
VLDLC SERPL CALC-MCNC: 29 MG/DL (ref 5–40)

## 2022-06-15 RX ORDER — DILTIAZEM HYDROCHLORIDE 60 MG/1
TABLET, FILM COATED ORAL
Qty: 10.2 G | Refills: 11 | Status: ON HOLD | OUTPATIENT
Start: 2022-06-15 | End: 2022-07-19

## 2022-06-15 NOTE — TELEPHONE ENCOUNTER
Rx Refill Note  Requested Prescriptions     Pending Prescriptions Disp Refills   • Symbicort 80-4.5 MCG/ACT inhaler [Pharmacy Med Name: SYMBICORT 80/4.5MCG (120  ORAL INH)] 10.2 g 11     Sig: INHALE 2 PUFFS BY MOUTH TWICE DAILY AS NEEDED FOR WHEEZING      Last office visit with prescribing clinician: 5/19/2022      Next office visit with prescribing clinician: 8/18/2022            Ruperto Saldana MA  06/15/22, 08:38 EDT

## 2022-07-07 ENCOUNTER — APPOINTMENT (OUTPATIENT)
Dept: GENERAL RADIOLOGY | Facility: HOSPITAL | Age: 82
End: 2022-07-07

## 2022-07-07 ENCOUNTER — HOSPITAL ENCOUNTER (EMERGENCY)
Facility: HOSPITAL | Age: 82
Discharge: HOME | End: 2022-07-07
Attending: EMERGENCY MEDICINE | Admitting: EMERGENCY MEDICINE

## 2022-07-07 ENCOUNTER — OFFICE VISIT (OUTPATIENT)
Dept: GASTROENTEROLOGY | Facility: CLINIC | Age: 82
End: 2022-07-07

## 2022-07-07 VITALS
HEIGHT: 63 IN | TEMPERATURE: 97.5 F | BODY MASS INDEX: 23.04 KG/M2 | WEIGHT: 130 LBS | SYSTOLIC BLOOD PRESSURE: 132 MMHG | DIASTOLIC BLOOD PRESSURE: 77 MMHG

## 2022-07-07 VITALS
OXYGEN SATURATION: 98 % | HEART RATE: 86 BPM | TEMPERATURE: 97.7 F | DIASTOLIC BLOOD PRESSURE: 75 MMHG | SYSTOLIC BLOOD PRESSURE: 131 MMHG | RESPIRATION RATE: 17 BRPM

## 2022-07-07 DIAGNOSIS — R19.7 DIARRHEA, UNSPECIFIED TYPE: Primary | ICD-10-CM

## 2022-07-07 DIAGNOSIS — R07.2 PRECORDIAL PAIN: ICD-10-CM

## 2022-07-07 DIAGNOSIS — K52.839 MICROSCOPIC COLITIS, UNSPECIFIED MICROSCOPIC COLITIS TYPE: ICD-10-CM

## 2022-07-07 DIAGNOSIS — R07.89 CHEST DISCOMFORT: ICD-10-CM

## 2022-07-07 DIAGNOSIS — R06.02 SHORTNESS OF BREATH: ICD-10-CM

## 2022-07-07 DIAGNOSIS — K52.9 CHRONIC DIARRHEA: Primary | ICD-10-CM

## 2022-07-07 LAB
ALBUMIN SERPL-MCNC: 3.8 G/DL (ref 3.5–5.2)
ALBUMIN/GLOB SERPL: 1.7 G/DL
ALP SERPL-CCNC: 62 U/L (ref 39–117)
ALT SERPL W P-5'-P-CCNC: 7 U/L (ref 1–33)
ANION GAP SERPL CALCULATED.3IONS-SCNC: 11.5 MMOL/L (ref 5–15)
AST SERPL-CCNC: 11 U/L (ref 1–32)
BACTERIA UR QL AUTO: ABNORMAL /HPF
BASOPHILS # BLD AUTO: 0.06 10*3/MM3 (ref 0–0.2)
BASOPHILS NFR BLD AUTO: 0.6 % (ref 0–1.5)
BILIRUB SERPL-MCNC: 0.2 MG/DL (ref 0–1.2)
BILIRUB UR QL STRIP: NEGATIVE
BUN SERPL-MCNC: 13 MG/DL (ref 8–23)
BUN/CREAT SERPL: 21 (ref 7–25)
CALCIUM SPEC-SCNC: 8.8 MG/DL (ref 8.6–10.5)
CHLORIDE SERPL-SCNC: 107 MMOL/L (ref 98–107)
CLARITY UR: CLEAR
CO2 SERPL-SCNC: 23.5 MMOL/L (ref 22–29)
COLOR UR: YELLOW
CREAT SERPL-MCNC: 0.62 MG/DL (ref 0.57–1)
DEPRECATED RDW RBC AUTO: 44.3 FL (ref 37–54)
EGFRCR SERPLBLD CKD-EPI 2021: 88.5 ML/MIN/1.73
EOSINOPHIL # BLD AUTO: 0.09 10*3/MM3 (ref 0–0.4)
EOSINOPHIL NFR BLD AUTO: 0.9 % (ref 0.3–6.2)
ERYTHROCYTE [DISTWIDTH] IN BLOOD BY AUTOMATED COUNT: 13.1 % (ref 12.3–15.4)
GLOBULIN UR ELPH-MCNC: 2.2 GM/DL
GLUCOSE SERPL-MCNC: 81 MG/DL (ref 65–99)
GLUCOSE UR STRIP-MCNC: NEGATIVE MG/DL
HCT VFR BLD AUTO: 38.9 % (ref 34–46.6)
HGB BLD-MCNC: 13.2 G/DL (ref 12–15.9)
HGB UR QL STRIP.AUTO: NEGATIVE
HOLD SPECIMEN: NORMAL
HOLD SPECIMEN: NORMAL
HYALINE CASTS UR QL AUTO: ABNORMAL /LPF
IMM GRANULOCYTES # BLD AUTO: 0.03 10*3/MM3 (ref 0–0.05)
IMM GRANULOCYTES NFR BLD AUTO: 0.3 % (ref 0–0.5)
KETONES UR QL STRIP: ABNORMAL
LEUKOCYTE ESTERASE UR QL STRIP.AUTO: ABNORMAL
LYMPHOCYTES # BLD AUTO: 2.44 10*3/MM3 (ref 0.7–3.1)
LYMPHOCYTES NFR BLD AUTO: 24.5 % (ref 19.6–45.3)
MCH RBC QN AUTO: 31.7 PG (ref 26.6–33)
MCHC RBC AUTO-ENTMCNC: 33.9 G/DL (ref 31.5–35.7)
MCV RBC AUTO: 93.5 FL (ref 79–97)
MONOCYTES # BLD AUTO: 0.71 10*3/MM3 (ref 0.1–0.9)
MONOCYTES NFR BLD AUTO: 7.1 % (ref 5–12)
NEUTROPHILS NFR BLD AUTO: 6.62 10*3/MM3 (ref 1.7–7)
NEUTROPHILS NFR BLD AUTO: 66.6 % (ref 42.7–76)
NITRITE UR QL STRIP: NEGATIVE
NRBC BLD AUTO-RTO: 0 /100 WBC (ref 0–0.2)
PH UR STRIP.AUTO: 5.5 [PH] (ref 5–8)
PLATELET # BLD AUTO: 336 10*3/MM3 (ref 140–450)
PMV BLD AUTO: 10.7 FL (ref 6–12)
POTASSIUM SERPL-SCNC: 3.7 MMOL/L (ref 3.5–5.2)
PROT SERPL-MCNC: 6 G/DL (ref 6–8.5)
PROT UR QL STRIP: NEGATIVE
QT INTERVAL: 450 MS
RBC # BLD AUTO: 4.16 10*6/MM3 (ref 3.77–5.28)
RBC # UR STRIP: ABNORMAL /HPF
REF LAB TEST METHOD: ABNORMAL
SODIUM SERPL-SCNC: 142 MMOL/L (ref 136–145)
SP GR UR STRIP: 1.02 (ref 1–1.03)
SQUAMOUS #/AREA URNS HPF: ABNORMAL /HPF
TROPONIN T SERPL-MCNC: <0.01 NG/ML (ref 0–0.03)
UROBILINOGEN UR QL STRIP: ABNORMAL
WBC # UR STRIP: ABNORMAL /HPF
WBC NRBC COR # BLD: 9.95 10*3/MM3 (ref 3.4–10.8)
WHOLE BLOOD HOLD COAG: NORMAL
WHOLE BLOOD HOLD SPECIMEN: NORMAL

## 2022-07-07 PROCEDURE — 93010 ELECTROCARDIOGRAM REPORT: CPT | Performed by: INTERNAL MEDICINE

## 2022-07-07 PROCEDURE — 81001 URINALYSIS AUTO W/SCOPE: CPT | Performed by: EMERGENCY MEDICINE

## 2022-07-07 PROCEDURE — 93005 ELECTROCARDIOGRAM TRACING: CPT

## 2022-07-07 PROCEDURE — 99214 OFFICE O/P EST MOD 30 MIN: CPT | Performed by: INTERNAL MEDICINE

## 2022-07-07 PROCEDURE — 71046 X-RAY EXAM CHEST 2 VIEWS: CPT

## 2022-07-07 PROCEDURE — 99283 EMERGENCY DEPT VISIT LOW MDM: CPT

## 2022-07-07 PROCEDURE — 84484 ASSAY OF TROPONIN QUANT: CPT | Performed by: EMERGENCY MEDICINE

## 2022-07-07 PROCEDURE — 80053 COMPREHEN METABOLIC PANEL: CPT | Performed by: EMERGENCY MEDICINE

## 2022-07-07 PROCEDURE — 99284 EMERGENCY DEPT VISIT MOD MDM: CPT

## 2022-07-07 PROCEDURE — 85025 COMPLETE CBC W/AUTO DIFF WBC: CPT | Performed by: EMERGENCY MEDICINE

## 2022-07-07 PROCEDURE — 93005 ELECTROCARDIOGRAM TRACING: CPT | Performed by: EMERGENCY MEDICINE

## 2022-07-07 RX ORDER — ASPIRIN 325 MG
325 TABLET ORAL ONCE
Status: DISCONTINUED | OUTPATIENT
Start: 2022-07-07 | End: 2022-07-07 | Stop reason: HOSPADM

## 2022-07-07 RX ORDER — SODIUM CHLORIDE 0.9 % (FLUSH) 0.9 %
10 SYRINGE (ML) INJECTION AS NEEDED
Status: DISCONTINUED | OUTPATIENT
Start: 2022-07-07 | End: 2022-07-07 | Stop reason: HOSPADM

## 2022-07-07 NOTE — PROGRESS NOTES
Chief Complaint   Patient presents with   • Diarrhea       History of Present Illness:   82 y.o. female        She has a history of microscopic colitis and did really well on a budesonide taper. Last colonoscopy was on 4/2/2021.       She has trouble urinating. Some dysuria.       C/o diarrhea x 4-5 mos. 5-6 BM/day. NO rectal bleeding or melena. She was put on Budesonide 3 mg 3/day did not work. She c/o RLQ abd pain x 4-5 mos. She doesn't know what makes the RLQ abd pain worse. It is better after having a BM. No nausea or vomiting. She has lost a few pounds. She c/o some chest discomfort to the right of mid chest with SOA, and light headedness.    Past Medical History:   Diagnosis Date   • Acute seasonal allergic rhinitis due to pollen    • Anxiety and depression    • Arthritis    • Asthma    • C. difficile colitis    • COPD (chronic obstructive pulmonary disease) ([facility])    • Diarrhea    • Diverticulosis    • Fibula fracture    • Herpes zoster    • History of lumbosacral spine surgery    • IBS (irritable bowel syndrome)    • Mixed hyperlipidemia    • KINDRA (obstructive sleep apnea)     NO MACHINE AT THIS TIME   • Osteoporosis    • Paroxysmal atrial fibrillation ([facility])    • Vitamin D deficiency        Past Surgical History:   Procedure Laterality Date   • APPENDECTOMY     • BACK SURGERY      lower x2   • CATARACT EXTRACTION     • COLONOSCOPY      patient doesn't recall    • COLONOSCOPY N/A 4/2/2021    Procedure: COLONOSCOPY to cecum with cecal biopsies;  Surgeon: Jarrod Duggan MD;  Location: [facility];  Service: Gastroenterology;  Laterality: N/A;  pre: diarhhea  post: diverticulosis, hemorrhoids   • HYSTERECTOMY      partial   • SHOULDER SURGERY Right    • SHOULDER SURGERY     • TONSILLECTOMY     • UPPER GASTROINTESTINAL ENDOSCOPY      patient doesn't recall          Current Outpatient Medications:   •  acetaminophen (TYLENOL) 500 MG tablet, Take 1,000 mg by mouth Every 6 (Six) Hours As Needed for Mild Pain .,  Disp: , Rfl:   •  aspirin 81 MG chewable tablet, Chew 81 mg Daily., Disp: , Rfl:   •  Budesonide (Entocort EC) 3 MG 24 hr capsule, Take 9 mg po daily x 1 month, take 6 mg po daily x 1 month then 3 mg po daily x 1 month then stop., Disp: 154 capsule, Rfl: 0  •  calcium carbonate (TUMS) 500 MG chewable tablet, Chew 1 tablet Daily., Disp: , Rfl:   •  cyclobenzaprine (FLEXERIL) 10 MG tablet, Take 1/2 to one tablet by mouth twice daily, Disp: 20 tablet, Rfl: 0  •  escitalopram (Lexapro) 20 MG tablet, Take 1 tablet by mouth Daily., Disp: 30 tablet, Rfl: 3  •  ezetimibe (ZETIA) 10 MG tablet, Take 1 tablet by mouth Daily., Disp: 30 tablet, Rfl: 3  •  FIBER COMPLETE PO, Take 500 mg by mouth Daily., Disp: , Rfl:   •  fluticasone (FLONASE) 50 MCG/ACT nasal spray, 2 sprays into the nostril(s) as directed by provider Daily As Needed for Rhinitis., Disp: , Rfl:   •  lisinopril (PRINIVIL,ZESTRIL) 20 MG tablet, Take 1 tablet by mouth Daily., Disp: 30 tablet, Rfl: 2  •  Melatonin 10 MG tablet, Take  by mouth Every Night., Disp: , Rfl:   •  omeprazole (priLOSEC) 40 MG capsule, TAKE 1 CAPSULE BY MOUTH DAILY, Disp: 30 capsule, Rfl: 2  •  Symbicort 80-4.5 MCG/ACT inhaler, , Disp: , Rfl:   •  Symbicort 80-4.5 MCG/ACT inhaler, INHALE 2 PUFFS BY MOUTH TWICE DAILY AS NEEDED FOR WHEEZING, Disp: 10.2 g, Rfl: 11  •  traZODone (DESYREL) 50 MG tablet, Take 1 tablet by mouth Every Night. Take one half to one tablet about one hour before bedtime., Disp: 30 tablet, Rfl: 1  •  vitamin D (ERGOCALCIFEROL) 1.25 MG (01415 UT) capsule capsule, TAKE 1 CAPSULE BY MOUTH EVERY 7 DAYS, Disp: 5 capsule, Rfl: 5    Allergies   Allergen Reactions   • Penicillins Hives and Other (See Comments)     Elevated BP... tolerated ceftriaxone 10/2020 admission   • Meclizine Hcl Dizziness   • Flagyl [Metronidazole] Other (See Comments)     HYPERACTIVITY.. INSOMNIA    • Statins Other (See Comments)     Headache, elevated liver enzymes, blurred vision       Family History    Problem Relation Age of Onset   • Heart disease Mother    • Lung cancer Mother    • Diabetes Father    • Liver disease Father    • Heart disease Paternal Uncle    • Malig Hyperthermia Neg Hx        Social History     Socioeconomic History   • Marital status:    Tobacco Use   • Smoking status: Former Smoker     Packs/day: 1.00     Years: 25.00     Pack years: 25.00     Quit date: 2008     Years since quittin.1   • Smokeless tobacco: Never Used   • Tobacco comment: CAFFEINE USE - 1 CUP COFFEE/ DIET COKE   Vaping Use   • Vaping Use: Never used   Substance and Sexual Activity   • Alcohol use: No   • Drug use: No       Review of Systems   Cardiovascular: Positive for chest pain.   Gastrointestinal: Negative for abdominal pain.   All other systems reviewed and are negative.    Pertinent positives and negatives documented in the HPI and all other systems reviewed and were found to be negative.  Vitals:    22 1101   BP: 132/77   Temp: 97.5 °F (36.4 °C)       Physical Exam  Vitals reviewed.   Constitutional:       General: She is not in acute distress.     Appearance: Normal appearance. She is well-developed. She is not diaphoretic.   HENT:      Head: Normocephalic and atraumatic. Hair is normal.      Right Ear: Hearing, tympanic membrane, ear canal and external ear normal. No decreased hearing noted. No drainage.      Left Ear: Hearing, tympanic membrane, ear canal and external ear normal. No decreased hearing noted.      Nose: Nose normal. No nasal deformity.      Mouth/Throat:      Mouth: Mucous membranes are moist.   Eyes:      General: Lids are normal.         Right eye: No discharge.         Left eye: No discharge.      Extraocular Movements: Extraocular movements intact.      Conjunctiva/sclera: Conjunctivae normal.      Pupils: Pupils are equal, round, and reactive to light.   Neck:      Thyroid: No thyromegaly.      Vascular: No JVD.      Trachea: No tracheal deviation.   Cardiovascular:       Rate and Rhythm: Normal rate and regular rhythm.      Pulses: Normal pulses.      Heart sounds: Normal heart sounds. No murmur heard.    No friction rub. No gallop.   Pulmonary:      Effort: Pulmonary effort is normal. No respiratory distress.      Breath sounds: Normal breath sounds. No wheezing or rales.   Chest:      Chest wall: No tenderness.   Abdominal:      General: Bowel sounds are normal. There is no distension.      Palpations: Abdomen is soft. There is no mass.      Tenderness: There is no abdominal tenderness. There is no guarding or rebound.      Hernia: No hernia is present.   Genitourinary:     Comments: She does not want a rectal exam.   Musculoskeletal:         General: No tenderness or deformity. Normal range of motion.      Cervical back: Normal range of motion and neck supple.   Lymphadenopathy:      Cervical: No cervical adenopathy.   Skin:     General: Skin is warm and dry.      Findings: No erythema or rash.   Neurological:      Mental Status: She is alert and oriented to person, place, and time.      Cranial Nerves: No cranial nerve deficit.      Motor: No abnormal muscle tone.      Coordination: Coordination normal.      Deep Tendon Reflexes: Reflexes are normal and symmetric. Reflexes normal.   Psychiatric:         Mood and Affect: Mood normal.         Behavior: Behavior normal.         Thought Content: Thought content normal.         Judgment: Judgment normal.         Diagnoses and all orders for this visit:    1. Diarrhea, unspecified type (Primary)  -     Clostridium Difficile EIA - Stool, Per Rectum  -     Fecal Fat, Qualitative - Stool, Per Rectum  -     Fecal Leukocytes - Stool, Per Rectum  -     Giardia Antigen - Stool, Per Rectum  -     Stool Culture (Reference Lab) - Stool, Per Rectum  -     Ova & Parasite Examination - Stool, Per Rectum    2. Microscopic colitis, unspecified microscopic colitis type  -     Clostridium Difficile EIA - Stool, Per Rectum  -     Fecal Fat, Qualitative  - Stool, Per Rectum  -     Fecal Leukocytes - Stool, Per Rectum  -     Giardia Antigen - Stool, Per Rectum  -     Stool Culture (Reference Lab) - Stool, Per Rectum  -     Ova & Parasite Examination - Stool, Per Rectum    3. Precordial pain    4. Shortness of breath      Assessment:  1. Trouble urinating  2. Diarrhea.  3. H/o microscopic colitis  4. RLQ abdomin pain  5. Some weight loss.  6. Chest pain, SOA and lightheadedness.    Recommendations:  1.   2. Stool studies. If stool studies are negative get a CT abd/pelvis  3. Go to ER to evaluate chest pain, SOA, and light headedness. I called the ER to let them know she is coming.   4. F/u 4 weeks.     Return in about 4 weeks (around 8/4/2022).    Jarrod Duggan MD  7/7/2022

## 2022-07-07 NOTE — DISCHARGE INSTRUCTIONS
Drink plenty of fluids.  Take Imodium as needed.  Take a stool specimen to Dr. Duggan's office.  Return to the emergency department for worsening symptoms, fever, vomiting, blood in stool, or other concern.

## 2022-07-07 NOTE — ED PROVIDER NOTES
" EMERGENCY DEPARTMENT ENCOUNTER    Room Number:  35/35  Date of encounter:  7/7/2022  PCP: Cassi Bella MD  Historian: Patient, daughter    I used full protective equipment while examining this patient.  This includes face mask, gloves and protective eyewear.  I washed my hands before entering the room and immediately upon leaving the room.  Patient was wearing a surgical mask.      HPI:  Chief Complaint: Diarrhea  A complete HPI/ROS/PMH/PSH/SH/FH are unobtainable due to: None    Context: Kandace Andrade is a 82 y.o. female who presents to the ED by private vehicle from home c/o diarrhea for the past few months.  Patient reports that anytime she eats, she immediately has diarrhea.  She also complains of soreness in her right lower quadrant for the past few months.  Denies nausea or vomiting.  She also complains of a \"sore spot\" in her right anterior chest for the past month or so.  Denies recent injury.  Nothing makes it better or worse.  Its not related to exertion.  Pain does not radiate.  Denies palpitations, syncope, dizziness, leg pain, or increased leg swelling.  Patient has chronic shortness of breath secondary to COPD.  This is unchanged.  Denies cough, fever, sore throat, or runny nose.  Patient also complains of dysuria and decreased urination for the past several days.      PAST MEDICAL HISTORY  Active Ambulatory Problems     Diagnosis Date Noted   • Vitamin D deficiency    • KINDRA (obstructive sleep apnea)    • IBS (irritable bowel syndrome)    • Essential hypertension    • Herpes zoster    • Arthritis    • Anxiety and depression    • Other emphysema ([facility]) 08/30/2019   • Acute seasonal allergic rhinitis due to pollen 08/30/2019   • Paroxysmal atrial fibrillation ([facility])    • Osteoporosis    • Mixed hyperlipidemia    • Encounter for Medicare annual wellness exam 11/22/2019   • COPD (chronic obstructive pulmonary disease) ([facility]) 11/22/2019   • Mixed stress and urge urinary incontinence 02/25/2020   • Type 2 " diabetes mellitus without complication, without long-term current use of insulin (HCC) 2020   • Arthritis of both hips 2020   • Autonomic neuropathy 10/19/2020   • GERD without esophagitis 11/10/2020   • C. difficile colitis 2020   • Hypokalemia 2020   • Hospital discharge follow-up 2020   • Diarrhea 2021   • Decreased hearing of both ears 2021   • Dizziness 2021   • Nausea 2021   • Hypercholesterolemia 2021   • Primary insomnia 2021   • Other fatigue 2022   • Other microscopic hematuria 2022     Resolved Ambulatory Problems     Diagnosis Date Noted   • No Resolved Ambulatory Problems     Past Medical History:   Diagnosis Date   • Asthma    • Diverticulosis    • Fibula fracture    • History of lumbosacral spine surgery          PAST SURGICAL HISTORY  Past Surgical History:   Procedure Laterality Date   • APPENDECTOMY     • BACK SURGERY      lower x2   • CATARACT EXTRACTION     • COLONOSCOPY      patient doesn't recall    • COLONOSCOPY N/A 2021    Procedure: COLONOSCOPY to cecum with cecal biopsies;  Surgeon: Jarrod Duggan MD;  Location: [facility];  Service: Gastroenterology;  Laterality: N/A;  pre: diarhhea  post: diverticulosis, hemorrhoids   • HYSTERECTOMY      partial   • SHOULDER SURGERY Right    • SHOULDER SURGERY     • TONSILLECTOMY     • UPPER GASTROINTESTINAL ENDOSCOPY      patient doesn't recall          FAMILY HISTORY  Family History   Problem Relation Age of Onset   • Heart disease Mother    • Lung cancer Mother    • Diabetes Father    • Liver disease Father    • Heart disease Paternal Uncle    • Malig Hyperthermia Neg Hx          SOCIAL HISTORY  Social History     Socioeconomic History   • Marital status:    Tobacco Use   • Smoking status: Former Smoker     Packs/day: 1.00     Years: 25.00     Pack years: 25.00     Quit date: 2008     Years since quittin.1   • Smokeless tobacco: Never Used   • Tobacco  comment: CAFFEINE USE - 1 CUP COFFEE/ DIET COKE   Vaping Use   • Vaping Use: Never used   Substance and Sexual Activity   • Alcohol use: No   • Drug use: No         ALLERGIES  Penicillins, Meclizine hcl, Flagyl [metronidazole], and Statins       REVIEW OF SYSTEMS  Review of Systems      All systems have been reviewed and are negative except as as discussed in the HPI    PHYSICAL EXAM    I have reviewed the triage vital signs and nursing notes.    ED Triage Vitals   Temp Heart Rate Resp BP SpO2   07/07/22 1220 07/07/22 1220 07/07/22 1220 07/07/22 1222 07/07/22 1220   97.8 °F (36.6 °C) 60 16 138/67 96 %      Temp src Heart Rate Source Patient Position BP Location FiO2 (%)   07/07/22 1220 -- -- -- --   Tympanic           Physical Exam  GENERAL: Awake, alert, oriented x3.  Well-developed, well-nourished elderly female.  Resting comfortably in no acute distress  HENT: NCAT, nares patent, moist mucous membranes  EYES: no scleral icterus  CV: regular rhythm, regular rate, equal radial pulses bilaterally  RESPIRATORY: normal effort, clear to auscultation bilaterally  ABDOMEN: soft, nontender  MUSCULOSKELETAL: Extremities are nontender and without obvious deformity.  There is 1+ pedal edema both lower legs.  There is a small focal area of tenderness along the right sternal border.  No crepitus.  NEURO: Speech is normal.  No facial droop.  Follows commands.  SKIN: warm, dry, no rash  PSYCH: Normal mood and affect      LAB RESULTS  Recent Results (from the past 24 hour(s))   ECG 12 Lead    Collection Time: 07/07/22 12:25 PM   Result Value Ref Range    QT Interval 450 ms   Lavender Top    Collection Time: 07/07/22  1:54 PM   Result Value Ref Range    Extra Tube hold for add-on    Gold Top - SST    Collection Time: 07/07/22  1:54 PM   Result Value Ref Range    Extra Tube Hold for add-ons.    Light Blue Top    Collection Time: 07/07/22  1:54 PM   Result Value Ref Range    Extra Tube Hold for add-ons.    CBC Auto Differential     Collection Time: 07/07/22  1:54 PM    Specimen: Blood   Result Value Ref Range    WBC 9.95 3.40 - 10.80 10*3/mm3    RBC 4.16 3.77 - 5.28 10*6/mm3    Hemoglobin 13.2 12.0 - 15.9 g/dL    Hematocrit 38.9 34.0 - 46.6 %    MCV 93.5 79.0 - 97.0 fL    MCH 31.7 26.6 - 33.0 pg    MCHC 33.9 31.5 - 35.7 g/dL    RDW 13.1 12.3 - 15.4 %    RDW-SD 44.3 37.0 - 54.0 fl    MPV 10.7 6.0 - 12.0 fL    Platelets 336 140 - 450 10*3/mm3    Neutrophil % 66.6 42.7 - 76.0 %    Lymphocyte % 24.5 19.6 - 45.3 %    Monocyte % 7.1 5.0 - 12.0 %    Eosinophil % 0.9 0.3 - 6.2 %    Basophil % 0.6 0.0 - 1.5 %    Immature Grans % 0.3 0.0 - 0.5 %    Neutrophils, Absolute 6.62 1.70 - 7.00 10*3/mm3    Lymphocytes, Absolute 2.44 0.70 - 3.10 10*3/mm3    Monocytes, Absolute 0.71 0.10 - 0.90 10*3/mm3    Eosinophils, Absolute 0.09 0.00 - 0.40 10*3/mm3    Basophils, Absolute 0.06 0.00 - 0.20 10*3/mm3    Immature Grans, Absolute 0.03 0.00 - 0.05 10*3/mm3    nRBC 0.0 0.0 - 0.2 /100 WBC   Urinalysis With Microscopic If Indicated (No Culture) - Urine, Clean Catch    Collection Time: 07/07/22  2:10 PM    Specimen: Urine, Clean Catch   Result Value Ref Range    Color, UA Yellow Yellow, Straw    Appearance, UA Clear Clear    pH, UA 5.5 5.0 - 8.0    Specific Gravity, UA 1.020 1.005 - 1.030    Glucose, UA Negative Negative    Ketones, UA Trace (A) Negative    Bilirubin, UA Negative Negative    Blood, UA Negative Negative    Protein, UA Negative Negative    Leuk Esterase, UA Moderate (2+) (A) Negative    Nitrite, UA Negative Negative    Urobilinogen, UA 0.2 E.U./dL 0.2 - 1.0 E.U./dL   Urinalysis, Microscopic Only - Urine, Clean Catch    Collection Time: 07/07/22  2:10 PM    Specimen: Urine, Clean Catch   Result Value Ref Range    RBC, UA None Seen None Seen, 0-2 /HPF    WBC, UA 6-12 (A) None Seen, 0-2 /HPF    Bacteria, UA None Seen None Seen /HPF    Squamous Epithelial Cells, UA 3-6 (A) None Seen, 0-2 /HPF    Hyaline Casts, UA None Seen None Seen /LPF    Methodology  Manual Light Microscopy    Comprehensive Metabolic Panel    Collection Time: 07/07/22  3:19 PM    Specimen: Blood   Result Value Ref Range    Glucose 81 65 - 99 mg/dL    BUN 13 8 - 23 mg/dL    Creatinine 0.62 0.57 - 1.00 mg/dL    Sodium 142 136 - 145 mmol/L    Potassium 3.7 3.5 - 5.2 mmol/L    Chloride 107 98 - 107 mmol/L    CO2 23.5 22.0 - 29.0 mmol/L    Calcium 8.8 8.6 - 10.5 mg/dL    Total Protein 6.0 6.0 - 8.5 g/dL    Albumin 3.80 3.50 - 5.20 g/dL    ALT (SGPT) 7 1 - 33 U/L    AST (SGOT) 11 1 - 32 U/L    Alkaline Phosphatase 62 39 - 117 U/L    Total Bilirubin 0.2 0.0 - 1.2 mg/dL    Globulin 2.2 gm/dL    A/G Ratio 1.7 g/dL    BUN/Creatinine Ratio 21.0 7.0 - 25.0    Anion Gap 11.5 5.0 - 15.0 mmol/L    eGFR 88.5 >60.0 mL/min/1.73   Troponin    Collection Time: 07/07/22  3:19 PM    Specimen: Blood   Result Value Ref Range    Troponin T <0.010 0.000 - 0.030 ng/mL       Ordered the above labs and independently reviewed the results.      RADIOLOGY  XR Chest 2 View    Result Date: 7/7/2022  CHEST TWO VIEWS  HISTORY: Chest pain.  COMPARISON: 11/24/2020.  FINDINGS: The heart is within normal limits in size. There is mild elevation of the right hemidiaphragm, present previously. There is no evidence of consolidation, effusion or congestive failure. Atelectasis is noted at the right lung base, slightly more prominent as compared to the prior examination.          I ordered the above noted radiological studies. Reviewed by me and discussed with radiologist.  See dictation for official radiology interpretation.      PROCEDURES  Procedures      MEDICATIONS GIVEN IN ER    Medications   sodium chloride 0.9 % flush 10 mL (has no administration in time range)   aspirin tablet 325 mg (has no administration in time range)   lactated ringers bolus 500 mL (has no administration in time range)         PROGRESS, DATA ANALYSIS, CONSULTS, AND MEDICAL DECISION MAKING    All labs have been independently reviewed by me.  All radiology  "studies have been reviewed by me and discussed with radiologist dictating the report.   EKG's independently viewed and interpreted by me.  I have reviewed the nurse's notes, vital signs, past medical history, and medication list.  Discussion below represents my analysis of pertinent findings related to patient's condition, differential diagnosis, treatment plan and final disposition.      ED Course as of 07/07/22 1632   Thu Jul 07, 2022   1342 Old records reviewed.  Patient had a stress test done in March 2022.  Findings were consistent with low risk study.  He last saw Dr. Hammer, her cardiologist, in November 2021 for follow-up for atrial fibrillation, hypertension, and hypercholesterolemia. [WH]   1342 Chest x-ray shows atelectasis of the right lung base. [WH]   1348 EKG          EKG time: 12:25 PM  Rhythm/Rate: Sinus bradycardia, rate 55  P waves and RI: Normal  QRS, axis: Normal  ST and T waves: Nonspecific ST/T wave changes in the inferior leads    Interpreted Contemporaneously by me, independently viewed  EKG is not significantly changed compared to prior EKG done on 10/19/2020   [WH]   1612 Troponin T: <0.010 [WH]   1620 Test results were discussed with the patient and her daughter.  Patient has not had any diarrhea while in the ED.      Additional history was obtained from the patient's daughter, who is now at bedside.  Daughter reports that the patient has a long history of \"cyclical diarrhea\".  She recently completed a 2-month course of budesonide without improvement.  She has taken Imodium in the past with minimal relief.  Patient went to see Dr. Duggan, her GI doctor, earlier today.  While she was in his office, she felt mildly lightheaded when she stood up.  When she told Dr. Duggan that she was having soreness in her chest, she was sent to the ED for further evaluation.  Patient denies taking antibiotics for the past several months.    Patient's work-up is unremarkable.  She is not dehydrated.  " Electrolytes are normal.  Abdominal exam is benign.  Patient was advised to continue taking Imodium.  Given her age, I would be hesitant to prescribe Lomotil. [WH]   1624 I reviewed Dr. Duggan's office note from earlier today.  He ordered stool studies.  If these come back negative, he is planning on ordering a CT abdomen/pelvis. [WH]   1631 Patient presented the ED complaining of chronic diarrhea.  She was afebrile.  Abdominal exam was benign.  Labs were unremarkable.  She did not have any diarrhea while in the ED.  Patient did not appear dehydrated.  She saw Dr. Duggan earlier today and he ordered stool studies.  Her chest pain was atypical and reproducible.  Troponin was negative.  EKG was unchanged. [WH]      ED Course User Index  [WH] Rene Bennett MD       AS OF 16:32 EDT VITALS:    BP - 136/78  HR - 57  TEMP - 97.8 °F (36.6 °C) (Tympanic)  O2 SATS - 93%      DIAGNOSIS  Final diagnoses:   Chronic diarrhea   Chest discomfort         DISPOSITION  DISCHARGE    Patient discharged in stable condition.    Reviewed implications of results, diagnosis, meds, responsibility to follow up, warning signs and symptoms of possible worsening, potential complications and reasons to return to ER, including worsening or persistent symptoms, fever, shortness of breath, blood in stool, or other concern..    Patient/Family voiced understanding of above instructions.    Discussed plan for discharge, as there is no emergent indication for admission. Patient referred to primary care provider for BP management due to today's BP. Pt/family is agreeable and understands need for follow up and repeat testing.  Pt is aware that discharge does not mean that nothing is wrong but it indicates no emergency is present that requires admission and they must continue care with follow-up as given below or physician of their choice.     FOLLOW-UP  Cassi Bella MD  [id number] [id number] [phone]    Schedule  an appointment as soon as possible for a visit       Jarrod Duggan MD  [address]  [place] [id number]  [phone]    Schedule an appointment as soon as possible for a visit            Medication List      No changes were made to your prescriptions during this visit.           Dictated utilizing Dragon dictation     Rene Bennett MD  07/07/22 8031

## 2022-07-11 RX ORDER — EZETIMIBE 10 MG/1
10 TABLET ORAL DAILY
Qty: 30 TABLET | Refills: 3 | Status: SHIPPED | OUTPATIENT
Start: 2022-07-11 | End: 2022-11-08

## 2022-07-12 ENCOUNTER — TELEPHONE (OUTPATIENT)
Dept: FAMILY MEDICINE CLINIC | Facility: CLINIC | Age: 82
End: 2022-07-12

## 2022-07-12 LAB
C DIFF TOX A+B STL QL IA: NEGATIVE
G LAMBLIA AG STL QL IA: NEGATIVE

## 2022-07-12 RX ORDER — SULFAMETHOXAZOLE AND TRIMETHOPRIM 800; 160 MG/1; MG/1
1 TABLET ORAL 2 TIMES DAILY
Qty: 8 TABLET | Refills: 0 | Status: SHIPPED | OUTPATIENT
Start: 2022-07-12 | End: 2022-07-12

## 2022-07-12 RX ORDER — NITROFURANTOIN 25; 75 MG/1; MG/1
100 CAPSULE ORAL 2 TIMES DAILY
Qty: 10 CAPSULE | Refills: 0 | Status: SHIPPED | OUTPATIENT
Start: 2022-07-12 | End: 2022-07-17

## 2022-07-12 NOTE — TELEPHONE ENCOUNTER
No one has called her about the labs she had done at the ER on 7/7/22 & Tracey is calling about the UTI & getting her started on treatment

## 2022-07-12 NOTE — PROGRESS NOTES
07/12/22       Tell her that the stool studies that are back so far are normal.       I would recommend that she have a CT of the abdomen and pelvis to further evaluate her right lower quadrant abdominal pain and weight loss.  Please order a CT of the abdomen and pelvis with pancreatic protocol because of right lower quadrant abdominal pain and weight loss.  Thx. kjh

## 2022-07-13 ENCOUNTER — TELEPHONE (OUTPATIENT)
Dept: GASTROENTEROLOGY | Facility: CLINIC | Age: 82
End: 2022-07-13

## 2022-07-13 DIAGNOSIS — F51.01 PRIMARY INSOMNIA: ICD-10-CM

## 2022-07-13 DIAGNOSIS — R10.84 GENERALIZED ABDOMINAL PAIN: Primary | ICD-10-CM

## 2022-07-13 RX ORDER — TRAZODONE HYDROCHLORIDE 50 MG/1
TABLET ORAL
Qty: 30 TABLET | Refills: 1 | Status: SHIPPED | OUTPATIENT
Start: 2022-07-13 | End: 2022-09-26

## 2022-07-13 NOTE — TELEPHONE ENCOUNTER
Caller: millicent hernandez    Relationship to patient: Emergency Contact    Best call back number: [phone]    Chief complaint: PTS DAUGHTER CALLED AND SAID DR. CASEY WANTED TO GGET THE PT SCHEDULED WITH A CT SCAN. PT WAS GIVEN THE CENTRAL SCHEDULING NUMBER ([phone]) SHE SAID IT WAS NOT A WORKING NUMBER. PTS DAUGHTER WOULD JUST LIKE A CALL BACK WITH A CORRECT NUMBER TO GET SCHEDULED.     Type of visit: CT SCAN

## 2022-07-13 NOTE — TELEPHONE ENCOUNTER
Patient's daughter called to say that patient was having an allergic reaction to 1 dose of Bactrim for UTI treatment. Also was concerned that she had a possible interaction with taking Bactrim and Lisinopril together. Instructed patient's daughter that patient can take benadryl for allergic reaction relief, if patient should start to develop hives, swelling to face and lips, tongue swelling, or difficulty swallowing then she should notify EMS or be seen in ER for further evaluation. Instructed her to tell patient to stop taking Bactrim and will send in different medication for UTI treatment.

## 2022-07-13 NOTE — TELEPHONE ENCOUNTER
Call to pt.  Advise per DR Duggan note.  Verb understanding.     Pt declines CT at this time.  States will call back.  Update to DR Duggan.

## 2022-07-13 NOTE — TELEPHONE ENCOUNTER
----- Message from Jarrod Duggan MD sent at 7/12/2022  2:15 PM EDT -----  07/12/22       Tell her that the stool studies that are back so far are normal.       I would recommend that she have a CT of the abdomen and pelvis to further evaluate her right lower quadrant abdominal pain and weight loss.  Please order a CT of the abdomen and pelvis with pancreatic protocol because of right lower quadrant abdominal pain and weight loss.  Thx. kjh

## 2022-07-13 NOTE — TELEPHONE ENCOUNTER
Rx Refill Note  Requested Prescriptions     Pending Prescriptions Disp Refills   • traZODone (DESYREL) 50 MG tablet [Pharmacy Med Name: TRAZODONE 50MG TABLETS] 30 tablet 1     Sig: TAKE 1/2 TO 1 TABLET BY MOUTH EVERY NIGHT 1 HOUR BEFORE BEDTIME      Last office visit with prescribing clinician: 5/19/2022      Next office visit with prescribing clinician: 8/18/2022     Last refill 05/19/2022           Tatum Fernando MA  07/13/22, 14:52 EDT

## 2022-07-14 ENCOUNTER — TELEPHONE (OUTPATIENT)
Dept: FAMILY MEDICINE CLINIC | Facility: CLINIC | Age: 82
End: 2022-07-14

## 2022-07-14 NOTE — TELEPHONE ENCOUNTER
Caller: [email or web address]    Relationship: Emergency Contact    Best call back number:     What is the best time to reach you:     Who are you requesting to speak with (clinical staff, provider,  specific staff member):    Do you know the name of the person who called:     What was the call regarding: PATIENTS DAUGHTER ANN IS CALLING IN STATING THAT THE PATIENT TAKES traZODone (DESYREL) 50 MG tablet  AND MELATONIN EACH NIGHT.  SHE TOOK THOSE MEDICATIONS THIS MORNING AND ANN WAS THERE WITH HER WHEN SHE TOOK THEM.  SHE SAYS THAT THE PATIENT ALSO TOOK ASPRIN AND LISINOPRIL THIS MORNING.  WHEN THE PATIENT WORK UP FROM HER NAP SHE TOOK HER MORNING MEDICATIONS AND ANN FEELS THAT SHE HAS DOUBLED UP ON HER MEDICATIONS.  ANN IS REQUESTING A CALL BACK TO DISCUSS THIS TO MAKE SURE THAT THE PATIENT WILL BE OK.      Do you require a callback: YES

## 2022-07-14 NOTE — TELEPHONE ENCOUNTER
See notes of 7/13 - pt declined CT at that time.      Call to daughter, Tracey (see hipaa).  States would like to proceed with CT.  Advise [facility] will contact to arrange.     Order placed - message to DR Duggan.

## 2022-07-14 NOTE — TELEPHONE ENCOUNTER
[facility] called this morning trying to schedule CT scan for patient but there is not an order in. Can you please put the order in. Thanks.

## 2022-07-15 LAB
O+P SPEC MICRO: NORMAL
O+P STL CONC: NORMAL
WBC STL QL MICRO: NORMAL
WBC STL QL MICRO: NORMAL

## 2022-07-15 NOTE — TELEPHONE ENCOUNTER
Even if she did take them this morning it should be okay.  She has not going to overdose on it.  Just keep a close eye out on its that she does not do it again in the future.  If she did take the trazodone this morning it will probably make her drowsy for the rest of the day.

## 2022-07-16 LAB
BACTERIA SPEC CULT: NORMAL
BACTERIA SPEC CULT: NORMAL
CAMPYLOBACTER STL CULT: NORMAL
E COLI SXT STL QL IA: NEGATIVE
SALM + SHIG STL CULT: NORMAL

## 2022-07-18 ENCOUNTER — OFFICE VISIT (OUTPATIENT)
Dept: FAMILY MEDICINE CLINIC | Facility: CLINIC | Age: 82
End: 2022-07-18

## 2022-07-18 VITALS
BODY MASS INDEX: 23.03 KG/M2 | RESPIRATION RATE: 20 BRPM | OXYGEN SATURATION: 96 % | SYSTOLIC BLOOD PRESSURE: 130 MMHG | HEART RATE: 75 BPM | HEIGHT: 63 IN | DIASTOLIC BLOOD PRESSURE: 84 MMHG

## 2022-07-18 DIAGNOSIS — R19.7 DIARRHEA, UNSPECIFIED TYPE: ICD-10-CM

## 2022-07-18 DIAGNOSIS — R11.0 NAUSEA: ICD-10-CM

## 2022-07-18 DIAGNOSIS — R42 DIZZINESS: ICD-10-CM

## 2022-07-18 DIAGNOSIS — R53.83 OTHER FATIGUE: ICD-10-CM

## 2022-07-18 DIAGNOSIS — R41.0 CONFUSION: ICD-10-CM

## 2022-07-18 DIAGNOSIS — N30.01 ACUTE CYSTITIS WITH HEMATURIA: Primary | ICD-10-CM

## 2022-07-18 LAB
FA STL QL: NORMAL
NEUTRAL FAT STL QL: NORMAL

## 2022-07-18 PROCEDURE — 99214 OFFICE O/P EST MOD 30 MIN: CPT | Performed by: FAMILY MEDICINE

## 2022-07-18 NOTE — PROGRESS NOTES
" Chief Complaint  Fatigue, Dizziness, and Nausea    Subjective        Kandace Andrade presents to [facility]  History of Present Illness  Pt has been having fatigue, dizziness, nausea and has not been able to urinate.  She is on macrobid and has a few more days of it left.  She has no fever or chills.  She does have some confusion at times per pt and daughter.  She has been feeling very weak as well.  No fever or chills.   She has also been having diarrhea but now it is pudding consistency.  She has no appetitie.    Objective   Vital Signs:  /84 (BP Location: Left arm, Patient Position: Sitting, Cuff Size: Adult)   Pulse 75   Resp 20   Ht 160 cm (63\")   SpO2 96%   BMI 23.03 kg/m²   Estimated body mass index is 23.03 kg/m² as calculated from the following:    Height as of this encounter: 160 cm (63\").    Weight as of 7/7/22: 59 kg (130 lb).    BMI is within normal parameters. No other follow-up for BMI required.      Physical Exam  Vitals and nursing note reviewed.   Constitutional:       Appearance: Normal appearance. She is well-developed.   HENT:      Head: Normocephalic and atraumatic.   Cardiovascular:      Rate and Rhythm: Normal rate and regular rhythm.      Heart sounds: Normal heart sounds. No murmur heard.  Pulmonary:      Effort: Pulmonary effort is normal. No respiratory distress.      Breath sounds: Normal breath sounds. No stridor. No wheezing or rhonchi.   Abdominal:      General: There is no distension.      Tenderness: There is no abdominal tenderness. There is no right CVA tenderness, left CVA tenderness or guarding.   Neurological:      General: No focal deficit present.      Mental Status: She is alert and oriented to person, place, and time. She is not disoriented.   Psychiatric:         Mood and Affect: Mood normal.         Behavior: Behavior normal.        Result Review :                Assessment and Plan   Diagnoses and all orders for this visit:    1. " Acute cystitis with hematuria (Primary)    2. Diarrhea, unspecified type    3. Nausea    4. Dizziness    5. Confusion    6. Other fatigue             Follow Up   No follow-ups on file.  Patient was given instructions and counseling regarding her condition or for health maintenance advice. Please see specific information pulled into the AVS if appropriate.     I discussed everything with patient and daughter.  We all agree it is a good idea to get the patient admitted to the hospital.  I spoke with Dr. Keven Mancini, the hospitalist, and he agreed to take her as a direct admit.  Patient and daughter are going home and getting stuff together and then when bedboard calls we will get her into the hospital.  I feel she is stable enough to go home right now and wait on bedboard to call.  In the meantime, she will continue medications and pushing fluids.  ER warning signs discussed.

## 2022-07-19 ENCOUNTER — HOSPITAL ENCOUNTER (OUTPATIENT)
Facility: HOSPITAL | Age: 82
Setting detail: OBSERVATION
Discharge: HOME | End: 2022-07-22
Attending: INTERNAL MEDICINE | Admitting: INTERNAL MEDICINE

## 2022-07-19 PROBLEM — K52.9 CHRONIC DIARRHEA: Status: ACTIVE | Noted: 2022-07-19

## 2022-07-19 PROBLEM — R53.1 GENERALIZED WEAKNESS: Status: ACTIVE | Noted: 2022-02-18

## 2022-07-19 PROBLEM — N39.0 ACUTE UTI (URINARY TRACT INFECTION): Status: ACTIVE | Noted: 2022-07-19

## 2022-07-19 PROBLEM — E86.0 DEHYDRATION: Status: ACTIVE | Noted: 2022-07-19

## 2022-07-19 PROBLEM — R10.9 ABDOMINAL PAIN: Status: ACTIVE | Noted: 2022-07-19

## 2022-07-19 LAB
ALBUMIN SERPL-MCNC: 3.6 G/DL (ref 3.5–5.2)
ALBUMIN SERPL-MCNC: 3.6 G/DL (ref 3.5–5.2)
ALBUMIN/GLOB SERPL: 1.7 G/DL
ALBUMIN/GLOB SERPL: 1.7 G/DL
ALP SERPL-CCNC: 65 U/L (ref 39–117)
ALP SERPL-CCNC: 68 U/L (ref 39–117)
ALT SERPL W P-5'-P-CCNC: 11 U/L (ref 1–33)
ALT SERPL W P-5'-P-CCNC: 8 U/L (ref 1–33)
ANION GAP SERPL CALCULATED.3IONS-SCNC: 10.1 MMOL/L (ref 5–15)
ANION GAP SERPL CALCULATED.3IONS-SCNC: 8.3 MMOL/L (ref 5–15)
APTT PPP: 28.7 SECONDS (ref 22.7–35.4)
AST SERPL-CCNC: 12 U/L (ref 1–32)
AST SERPL-CCNC: 16 U/L (ref 1–32)
BASOPHILS # BLD AUTO: 0.09 10*3/MM3 (ref 0–0.2)
BASOPHILS NFR BLD AUTO: 0.9 % (ref 0–1.5)
BILIRUB SERPL-MCNC: 0.2 MG/DL (ref 0–1.2)
BILIRUB SERPL-MCNC: <0.2 MG/DL (ref 0–1.2)
BILIRUB UR QL STRIP: NEGATIVE
BUN SERPL-MCNC: 11 MG/DL (ref 8–23)
BUN SERPL-MCNC: 12 MG/DL (ref 8–23)
BUN/CREAT SERPL: 18.5 (ref 7–25)
BUN/CREAT SERPL: 18.6 (ref 7–25)
CALCIUM SPEC-SCNC: 8.4 MG/DL (ref 8.6–10.5)
CALCIUM SPEC-SCNC: 8.8 MG/DL (ref 8.6–10.5)
CHLORIDE SERPL-SCNC: 102 MMOL/L (ref 98–107)
CHLORIDE SERPL-SCNC: 104 MMOL/L (ref 98–107)
CLARITY UR: CLEAR
CO2 SERPL-SCNC: 23.7 MMOL/L (ref 22–29)
CO2 SERPL-SCNC: 23.9 MMOL/L (ref 22–29)
COLOR UR: YELLOW
CREAT SERPL-MCNC: 0.59 MG/DL (ref 0.57–1)
CREAT SERPL-MCNC: 0.65 MG/DL (ref 0.57–1)
D-LACTATE SERPL-SCNC: 1 MMOL/L (ref 0.5–2)
DEPRECATED RDW RBC AUTO: 41.6 FL (ref 37–54)
EGFRCR SERPLBLD CKD-EPI 2021: 87.5 ML/MIN/1.73
EGFRCR SERPLBLD CKD-EPI 2021: 89.6 ML/MIN/1.73
EOSINOPHIL # BLD AUTO: 0.13 10*3/MM3 (ref 0–0.4)
EOSINOPHIL NFR BLD AUTO: 1.3 % (ref 0.3–6.2)
ERYTHROCYTE [DISTWIDTH] IN BLOOD BY AUTOMATED COUNT: 12.1 % (ref 12.3–15.4)
GLOBULIN UR ELPH-MCNC: 2.1 GM/DL
GLOBULIN UR ELPH-MCNC: 2.1 GM/DL
GLUCOSE SERPL-MCNC: 80 MG/DL (ref 65–99)
GLUCOSE SERPL-MCNC: 83 MG/DL (ref 65–99)
GLUCOSE UR STRIP-MCNC: NEGATIVE MG/DL
HBA1C MFR BLD: 5.7 % (ref 4.8–5.6)
HCT VFR BLD AUTO: 39 % (ref 34–46.6)
HGB BLD-MCNC: 13 G/DL (ref 12–15.9)
HGB UR QL STRIP.AUTO: NEGATIVE
IMM GRANULOCYTES # BLD AUTO: 0.04 10*3/MM3 (ref 0–0.05)
IMM GRANULOCYTES NFR BLD AUTO: 0.4 % (ref 0–0.5)
INR PPP: 1.07 (ref 0.9–1.1)
KETONES UR QL STRIP: NEGATIVE
LEUKOCYTE ESTERASE UR QL STRIP.AUTO: NEGATIVE
LYMPHOCYTES # BLD AUTO: 2.59 10*3/MM3 (ref 0.7–3.1)
LYMPHOCYTES NFR BLD AUTO: 26.2 % (ref 19.6–45.3)
MCH RBC QN AUTO: 30.9 PG (ref 26.6–33)
MCHC RBC AUTO-ENTMCNC: 33.3 G/DL (ref 31.5–35.7)
MCV RBC AUTO: 92.6 FL (ref 79–97)
MONOCYTES # BLD AUTO: 0.66 10*3/MM3 (ref 0.1–0.9)
MONOCYTES NFR BLD AUTO: 6.7 % (ref 5–12)
NEUTROPHILS NFR BLD AUTO: 6.39 10*3/MM3 (ref 1.7–7)
NEUTROPHILS NFR BLD AUTO: 64.5 % (ref 42.7–76)
NITRITE UR QL STRIP: NEGATIVE
NRBC BLD AUTO-RTO: 0 /100 WBC (ref 0–0.2)
PH UR STRIP.AUTO: 7.5 [PH] (ref 5–8)
PLATELET # BLD AUTO: 283 10*3/MM3 (ref 140–450)
PMV BLD AUTO: 10.2 FL (ref 6–12)
POTASSIUM SERPL-SCNC: 4.1 MMOL/L (ref 3.5–5.2)
POTASSIUM SERPL-SCNC: 4.3 MMOL/L (ref 3.5–5.2)
PROCALCITONIN SERPL-MCNC: 0.04 NG/ML (ref 0–0.25)
PROT SERPL-MCNC: 5.7 G/DL (ref 6–8.5)
PROT SERPL-MCNC: 5.7 G/DL (ref 6–8.5)
PROT UR QL STRIP: NEGATIVE
PROTHROMBIN TIME: 13.8 SECONDS (ref 11.7–14.2)
RBC # BLD AUTO: 4.21 10*6/MM3 (ref 3.77–5.28)
SARS-COV-2 ORF1AB RESP QL NAA+PROBE: NOT DETECTED
SODIUM SERPL-SCNC: 136 MMOL/L (ref 136–145)
SODIUM SERPL-SCNC: 136 MMOL/L (ref 136–145)
SP GR UR STRIP: 1.01 (ref 1–1.03)
UROBILINOGEN UR QL STRIP: NORMAL
WBC NRBC COR # BLD: 9.9 10*3/MM3 (ref 3.4–10.8)

## 2022-07-19 PROCEDURE — 85610 PROTHROMBIN TIME: CPT | Performed by: INTERNAL MEDICINE

## 2022-07-19 PROCEDURE — 80053 COMPREHEN METABOLIC PANEL: CPT | Performed by: INTERNAL MEDICINE

## 2022-07-19 PROCEDURE — C9803 HOPD COVID-19 SPEC COLLECT: HCPCS

## 2022-07-19 PROCEDURE — 96372 THER/PROPH/DIAG INJ SC/IM: CPT

## 2022-07-19 PROCEDURE — 25010000002 ENOXAPARIN PER 10 MG: Performed by: NURSE PRACTITIONER

## 2022-07-19 PROCEDURE — 85025 COMPLETE CBC W/AUTO DIFF WBC: CPT | Performed by: INTERNAL MEDICINE

## 2022-07-19 PROCEDURE — 96361 HYDRATE IV INFUSION ADD-ON: CPT

## 2022-07-19 PROCEDURE — G0378 HOSPITAL OBSERVATION PER HR: HCPCS

## 2022-07-19 PROCEDURE — 83036 HEMOGLOBIN GLYCOSYLATED A1C: CPT | Performed by: NURSE PRACTITIONER

## 2022-07-19 PROCEDURE — 94664 DEMO&/EVAL PT USE INHALER: CPT

## 2022-07-19 PROCEDURE — 85730 THROMBOPLASTIN TIME PARTIAL: CPT | Performed by: INTERNAL MEDICINE

## 2022-07-19 PROCEDURE — 81003 URINALYSIS AUTO W/O SCOPE: CPT | Performed by: NURSE PRACTITIONER

## 2022-07-19 PROCEDURE — U0004 COV-19 TEST NON-CDC HGH THRU: HCPCS | Performed by: STUDENT IN AN ORGANIZED HEALTH CARE EDUCATION/TRAINING PROGRAM

## 2022-07-19 PROCEDURE — 25010000002 SODIUM CHLORIDE 0.9 % WITH KCL 20 MEQ 20-0.9 MEQ/L-% SOLUTION: Performed by: NURSE PRACTITIONER

## 2022-07-19 PROCEDURE — 84145 PROCALCITONIN (PCT): CPT | Performed by: NURSE PRACTITIONER

## 2022-07-19 PROCEDURE — 80053 COMPREHEN METABOLIC PANEL: CPT | Performed by: NURSE PRACTITIONER

## 2022-07-19 PROCEDURE — 94799 UNLISTED PULMONARY SVC/PX: CPT

## 2022-07-19 PROCEDURE — 83605 ASSAY OF LACTIC ACID: CPT | Performed by: NURSE PRACTITIONER

## 2022-07-19 PROCEDURE — 94640 AIRWAY INHALATION TREATMENT: CPT

## 2022-07-19 PROCEDURE — 96360 HYDRATION IV INFUSION INIT: CPT

## 2022-07-19 RX ORDER — ONDANSETRON 2 MG/ML
4 INJECTION INTRAMUSCULAR; INTRAVENOUS EVERY 6 HOURS PRN
Status: DISCONTINUED | OUTPATIENT
Start: 2022-07-19 | End: 2022-07-22 | Stop reason: HOSPADM

## 2022-07-19 RX ORDER — TRAZODONE HYDROCHLORIDE 50 MG/1
75 TABLET ORAL NIGHTLY
Status: DISCONTINUED | OUTPATIENT
Start: 2022-07-19 | End: 2022-07-22 | Stop reason: HOSPADM

## 2022-07-19 RX ORDER — NITROGLYCERIN 0.4 MG/1
0.4 TABLET SUBLINGUAL
Status: DISCONTINUED | OUTPATIENT
Start: 2022-07-19 | End: 2022-07-22 | Stop reason: HOSPADM

## 2022-07-19 RX ORDER — BUDESONIDE AND FORMOTEROL FUMARATE DIHYDRATE 80; 4.5 UG/1; UG/1
2 AEROSOL RESPIRATORY (INHALATION)
Status: DISCONTINUED | OUTPATIENT
Start: 2022-07-19 | End: 2022-07-22 | Stop reason: HOSPADM

## 2022-07-19 RX ORDER — FLUTICASONE PROPIONATE 50 MCG
2 SPRAY, SUSPENSION (ML) NASAL DAILY PRN
Status: DISCONTINUED | OUTPATIENT
Start: 2022-07-19 | End: 2022-07-22 | Stop reason: HOSPADM

## 2022-07-19 RX ORDER — ASPIRIN 81 MG/1
81 TABLET, CHEWABLE ORAL DAILY
Status: DISCONTINUED | OUTPATIENT
Start: 2022-07-19 | End: 2022-07-22 | Stop reason: HOSPADM

## 2022-07-19 RX ORDER — PANTOPRAZOLE SODIUM 40 MG/1
40 TABLET, DELAYED RELEASE ORAL EVERY MORNING
Refills: 2 | Status: DISCONTINUED | OUTPATIENT
Start: 2022-07-20 | End: 2022-07-22 | Stop reason: HOSPADM

## 2022-07-19 RX ORDER — LISINOPRIL 20 MG/1
20 TABLET ORAL DAILY
Status: DISCONTINUED | OUTPATIENT
Start: 2022-07-19 | End: 2022-07-22 | Stop reason: HOSPADM

## 2022-07-19 RX ORDER — ACETAMINOPHEN 500 MG
1000 TABLET ORAL EVERY 6 HOURS PRN
Status: DISCONTINUED | OUTPATIENT
Start: 2022-07-19 | End: 2022-07-22 | Stop reason: HOSPADM

## 2022-07-19 RX ORDER — ESCITALOPRAM OXALATE 20 MG/1
20 TABLET ORAL DAILY
Status: DISCONTINUED | OUTPATIENT
Start: 2022-07-19 | End: 2022-07-22 | Stop reason: HOSPADM

## 2022-07-19 RX ORDER — CALCIUM CARBONATE 200(500)MG
1 TABLET,CHEWABLE ORAL 3 TIMES DAILY PRN
Status: DISCONTINUED | OUTPATIENT
Start: 2022-07-19 | End: 2022-07-22 | Stop reason: HOSPADM

## 2022-07-19 RX ORDER — CHOLECALCIFEROL (VITAMIN D3) 125 MCG
10 CAPSULE ORAL NIGHTLY
Status: DISCONTINUED | OUTPATIENT
Start: 2022-07-19 | End: 2022-07-22 | Stop reason: HOSPADM

## 2022-07-19 RX ORDER — SODIUM CHLORIDE AND POTASSIUM CHLORIDE 150; 900 MG/100ML; MG/100ML
100 INJECTION, SOLUTION INTRAVENOUS CONTINUOUS
Status: DISCONTINUED | OUTPATIENT
Start: 2022-07-19 | End: 2022-07-21

## 2022-07-19 RX ORDER — NITROGLYCERIN 0.4 MG/1
0.4 TABLET SUBLINGUAL
Status: DISCONTINUED | OUTPATIENT
Start: 2022-07-19 | End: 2022-07-19

## 2022-07-19 RX ORDER — ENOXAPARIN SODIUM 100 MG/ML
40 INJECTION SUBCUTANEOUS NIGHTLY
Status: DISCONTINUED | OUTPATIENT
Start: 2022-07-19 | End: 2022-07-22 | Stop reason: HOSPADM

## 2022-07-19 RX ADMIN — POTASSIUM CHLORIDE AND SODIUM CHLORIDE 100 ML/HR: 900; 150 INJECTION, SOLUTION INTRAVENOUS at 15:48

## 2022-07-19 RX ADMIN — ENOXAPARIN SODIUM 40 MG: 100 INJECTION SUBCUTANEOUS at 20:07

## 2022-07-19 RX ADMIN — BUDESONIDE AND FORMOTEROL FUMARATE DIHYDRATE 2 PUFF: 80; 4.5 AEROSOL RESPIRATORY (INHALATION) at 20:51

## 2022-07-19 RX ADMIN — Medication 10 MG: at 20:07

## 2022-07-19 RX ADMIN — TRAZODONE HYDROCHLORIDE 75 MG: 50 TABLET ORAL at 20:07

## 2022-07-19 NOTE — H&P
Patient Name:  Kandace Andrade  YOB: 1940  MRN:  [id number]  Admit Date:  7/19/2022  Patient Care Team:  Cassi Bella MD as PCP - General (Family Medicine)      Subjective   History Present Illness      Ms. Andrade is a 82 y.o. former smoker with a history of COPD, chronic diarrhea, KINDRA, PA, HTN  that presents to [facility] complaining of UTI, acute on chronic diarrhea, generalized weakness.  She has been on Macrobid x 5 days. She was seen was seen by her PCP yesterday who recommended she be admitted to the hospital.  She is continue to have lightheadedness, nausea, anorexia with decreased painful urination.  Patient is also been followed by gastroenterology for chronic diarrhea ongoing for several months.  She has a history of microscopic colitis and was started on budesonide.  Stool studies completed after office visit this month were unremarkable.  CT abdomen pelvis is recommended but has yet to be completed for diarrhea, right lower abdominal pain and weight loss.  She denies overt GI bleeding, vomiting, fever, chest pain, shortness of breath, congestion.        History of Present Illness  Review of Systems   Constitutional: Positive for activity change, appetite change, fatigue and unexpected weight change. Negative for fever.   HENT: Negative for congestion, trouble swallowing and voice change.    Respiratory: Negative for cough, choking and shortness of breath.    Cardiovascular: Negative for chest pain.   Gastrointestinal: Positive for abdominal pain, diarrhea and nausea. Negative for abdominal distention, anal bleeding, blood in stool, constipation, rectal pain and vomiting.   Endocrine: Negative for polydipsia, polyphagia and polyuria.   Genitourinary: Positive for decreased urine volume, difficulty urinating and dysuria.   Musculoskeletal: Negative for back pain.   Skin: Negative for color change.   Neurological: Negative for dizziness, syncope, weakness and  light-headedness.   Hematological: Does not bruise/bleed easily.   Psychiatric/Behavioral: Negative.  Negative for confusion.        Personal History     Past Medical History:   Diagnosis Date   • Acute seasonal allergic rhinitis due to pollen    • Anxiety and depression    • Arthritis    • Asthma    • C. difficile colitis    • COPD (chronic obstructive pulmonary disease) (HCC)    • Diarrhea    • Diverticulosis    • Fibula fracture    • Herpes zoster    • History of lumbosacral spine surgery    • IBS (irritable bowel syndrome)    • Mixed hyperlipidemia    • KINDRA (obstructive sleep apnea)     NO MACHINE AT THIS TIME   • Osteoporosis    • Paroxysmal atrial fibrillation (HCC)    • Vitamin D deficiency      Past Surgical History:   Procedure Laterality Date   • APPENDECTOMY     • BACK SURGERY      lower x2   • CATARACT EXTRACTION     • COLONOSCOPY      patient doesn't recall    • COLONOSCOPY N/A 2021    Procedure: COLONOSCOPY to cecum with cecal biopsies;  Surgeon: Jarrod Duggan MD;  Location: [facility];  Service: Gastroenterology;  Laterality: N/A;  pre: diarhhea  post: diverticulosis, hemorrhoids   • HYSTERECTOMY      partial   • SHOULDER SURGERY Right    • SHOULDER SURGERY     • TONSILLECTOMY     • UPPER GASTROINTESTINAL ENDOSCOPY      patient doesn't recall      Family History   Problem Relation Age of Onset   • Heart disease Mother    • Lung cancer Mother    • Diabetes Father    • Liver disease Father    • Heart disease Paternal Uncle    • Malig Hyperthermia Neg Hx      Social History     Tobacco Use   • Smoking status: Former Smoker     Packs/day: 1.00     Years: 25.00     Pack years: 25.00     Quit date: 2008     Years since quittin.2   • Smokeless tobacco: Never Used   • Tobacco comment: CAFFEINE USE - 1 CUP COFFEE/ DIET COKE   Vaping Use   • Vaping Use: Never used   Substance Use Topics   • Alcohol use: No   • Drug use: No     No current facility-administered medications on file prior to  encounter.     Current Outpatient Medications on File Prior to Encounter   Medication Sig Dispense Refill   • acetaminophen (TYLENOL) 500 MG tablet Take 1,000 mg by mouth Every 6 (Six) Hours As Needed for Mild Pain .     • aspirin 81 MG chewable tablet Chew 81 mg Daily.     • calcium carbonate (TUMS) 500 MG chewable tablet Chew 1 tablet Daily.     • escitalopram (Lexapro) 20 MG tablet Take 1 tablet by mouth Daily. 30 tablet 3   • ezetimibe (ZETIA) 10 MG tablet TAKE 1 TABLET BY MOUTH DAILY 30 tablet 3   • fluticasone (FLONASE) 50 MCG/ACT nasal spray 2 sprays into the nostril(s) as directed by provider Daily As Needed for Rhinitis.     • lisinopril (PRINIVIL,ZESTRIL) 20 MG tablet Take 1 tablet by mouth Daily. 30 tablet 2   • Melatonin 10 MG tablet Take  by mouth Every Night.     • omeprazole (priLOSEC) 40 MG capsule TAKE 1 CAPSULE BY MOUTH DAILY 30 capsule 2   • Symbicort 80-4.5 MCG/ACT inhaler      • traZODone (DESYREL) 50 MG tablet TAKE 1/2 TO 1 TABLET BY MOUTH EVERY NIGHT 1 HOUR BEFORE BEDTIME 30 tablet 1   • vitamin D (ERGOCALCIFEROL) 1.25 MG (80814 UT) capsule capsule TAKE 1 CAPSULE BY MOUTH EVERY 7 DAYS 5 capsule 5   • Budesonide (Entocort EC) 3 MG 24 hr capsule Take 9 mg po daily x 1 month, take 6 mg po daily x 1 month then 3 mg po daily x 1 month then stop. (Patient not taking: Reported on 7/19/2022) 154 capsule 0   • cyclobenzaprine (FLEXERIL) 10 MG tablet Take 1/2 to one tablet by mouth twice daily 20 tablet 0   • FIBER COMPLETE PO Take 500 mg by mouth Daily. (Patient not taking: Reported on 7/19/2022)     • [DISCONTINUED] Symbicort 80-4.5 MCG/ACT inhaler INHALE 2 PUFFS BY MOUTH TWICE DAILY AS NEEDED FOR WHEEZING 10.2 g 11     Allergies   Allergen Reactions   • Penicillins Hives and Other (See Comments)     Elevated BP... tolerated ceftriaxone 10/2020 admission   • Meclizine Hcl Dizziness   • Bactrim [Sulfamethoxazole-Trimethoprim] Hives   • Flagyl [Metronidazole] Other (See Comments)     HYPERACTIVITY..  INSOMNIA    • Statins Other (See Comments)     Headache, elevated liver enzymes, blurred vision       Objective    Objective     Vital Signs  Temp:  [98.1 °F (36.7 °C)] 98.1 °F (36.7 °C)  Heart Rate:  [69] 69  BP: (128)/(81) 128/81  SpO2:  [93 %] 93 %  on   ;   Device (Oxygen Therapy): room air  Body mass index is 23.2 kg/m².    Physical Exam  Vitals and nursing note reviewed.   Constitutional:       Appearance: She is well-developed. She is obese. She is ill-appearing.   HENT:      Head: Normocephalic and atraumatic.   Eyes:      Pupils: Pupils are equal, round, and reactive to light.   Cardiovascular:      Rate and Rhythm: Normal rate and regular rhythm.      Heart sounds: Normal heart sounds.   Pulmonary:      Effort: Pulmonary effort is normal. No respiratory distress.      Breath sounds: No wheezing.   Abdominal:      General: Bowel sounds are normal. There is no distension or abdominal bruit.      Palpations: Abdomen is soft. Abdomen is not rigid. There is no shifting dullness, fluid wave, mass or pulsatile mass.      Tenderness: There is abdominal tenderness (RLQ/ RUQ). There is no guarding.      Hernia: No hernia is present.   Musculoskeletal:         General: Normal range of motion.   Skin:     General: Skin is warm and dry.   Neurological:      General: No focal deficit present.      Mental Status: She is alert and oriented to person, place, and time.   Psychiatric:         Mood and Affect: Mood normal.         Behavior: Behavior normal.         Thought Content: Thought content normal.         Results Review:  I reviewed the patient's new clinical results.  I reviewed the patient's new imaging results and agree with the interpretation.  I reviewed the patient's other test results and agree with the interpretation  I personally viewed and interpreted the patient's EKG/Telemetry data  Discussed with ED provider.    Lab Results (last 24 hours)     Procedure Component Value Units Date/Time    aPTT [[id number]]   (Normal) Collected: 07/19/22 1321    Specimen: Blood Updated: 07/19/22 1355     PTT 28.7 seconds     Protime-INR [[id number]]  (Normal) Collected: 07/19/22 1321    Specimen: Blood Updated: 07/19/22 1355     Protime 13.8 Seconds      INR 1.07    CBC & Differential [[id number]]  (Abnormal) Collected: 07/19/22 1321    Specimen: Blood Updated: 07/19/22 1341    Narrative:      The following orders were created for panel order CBC & Differential.  Procedure                               Abnormality         Status                     ---------                               -----------         ------                     CBC Auto Differential[[id number]]        Abnormal            Final result                 Please view results for these tests on the individual orders.    CBC Auto Differential [[id number]]  (Abnormal) Collected: 07/19/22 1321    Specimen: Blood Updated: 07/19/22 1341     WBC 9.90 10*3/mm3      RBC 4.21 10*6/mm3      Hemoglobin 13.0 g/dL      Hematocrit 39.0 %      MCV 92.6 fL      MCH 30.9 pg      MCHC 33.3 g/dL      RDW 12.1 %      RDW-SD 41.6 fl      MPV 10.2 fL      Platelets 283 10*3/mm3      Neutrophil % 64.5 %      Lymphocyte % 26.2 %      Monocyte % 6.7 %      Eosinophil % 1.3 %      Basophil % 0.9 %      Immature Grans % 0.4 %      Neutrophils, Absolute 6.39 10*3/mm3      Lymphocytes, Absolute 2.59 10*3/mm3      Monocytes, Absolute 0.66 10*3/mm3      Eosinophils, Absolute 0.13 10*3/mm3      Basophils, Absolute 0.09 10*3/mm3      Immature Grans, Absolute 0.04 10*3/mm3      nRBC 0.0 /100 WBC     Urinalysis With Culture If Indicated - Urine, Clean Catch [[id number]]  (Normal) Collected: 07/19/22 1357    Specimen: Urine, Clean Catch Updated: 07/19/22 1410     Color, UA Yellow     Appearance, UA Clear     pH, UA 7.5     Specific Gravity, UA 1.014     Glucose, UA Negative     Ketones, UA Negative     Bilirubin, UA Negative     Blood, UA Negative     Protein, UA Negative     Leuk Esterase, UA Negative      Nitrite, UA Negative     Urobilinogen, UA 0.2 E.U./dL    Narrative:      In absence of clinical symptoms, the presence of pyuria, bacteria, and/or nitrites on the urinalysis result does not correlate with infection.  Urine microscopic not indicated.    Comprehensive Metabolic Panel [[id number]]  (Abnormal) Collected: 07/19/22 1411    Specimen: Blood Updated: 07/19/22 1449     Glucose 83 mg/dL      BUN 11 mg/dL      Creatinine 0.59 mg/dL      Sodium 136 mmol/L      Potassium 4.1 mmol/L      Chloride 104 mmol/L      CO2 23.7 mmol/L      Calcium 8.4 mg/dL      Total Protein 5.7 g/dL      Albumin 3.60 g/dL      ALT (SGPT) 8 U/L      AST (SGOT) 12 U/L      Alkaline Phosphatase 65 U/L      Total Bilirubin <0.2 mg/dL      Globulin 2.1 gm/dL      A/G Ratio 1.7 g/dL      BUN/Creatinine Ratio 18.6     Anion Gap 8.3 mmol/L      eGFR 89.6 mL/min/1.73      Comment: National Kidney Foundation and American Society of Nephrology (ASN) Task Force recommended calculation based on the Chronic Kidney Disease Epidemiology Collaboration (CKD-EPI) equation refit without adjustment for race.       Narrative:      GFR Normal >60  Chronic Kidney Disease <60  Kidney Failure <15            Imaging Results (Last 24 Hours)     ** No results found for the last 24 hours. **              No orders to display        Assessment/Plan     Active Hospital Problems    Diagnosis  POA   • **Acute UTI (urinary tract infection) [N39.0]  Yes   • Dehydration [E86.0]  Yes   • Abdominal pain [R10.9]  Yes   • Chronic diarrhea [K52.9]  Yes   • Generalized weakness [R53.1]  Yes   • COPD (chronic obstructive pulmonary disease) (HCC) [J44.9]  Yes   • Paroxysmal atrial fibrillation (HCC) [I48.0]  Yes   • KINDRA (obstructive sleep apnea) [G47.33]  Yes   • Essential hypertension [I10]  Yes      Resolved Hospital Problems   No resolved problems to display.       Ms. Andrade is a 82 y.o. admitted with lightheadedness, fatigue in the setting of acute UTI treated with Macrobid  x5 days and chronic diarrhea followed by GI with work-up in progress.   · She was a direct admitted from her PCPs office.  · Vital signs are stable.  CBC, CMP unremarkable.  Procalcitonin pending.  Urinalysis is normal.  · She was post to have a CT abdomen pelvis that had been recommended by her GI physician.  We will go ahead and obtain.  · Continue IV fluids.  Symptomatic care.  Add antidiarrheals.  · Monitor patient and consider discharge tomorrow for further work-up with specialist and PCP.    · DM2 listed but no home meds. Check A1c  · PT consult for complaint of impaired mobility     · I discussed the patient's findings and my recommendations with patient, family, nursing staff and Dr Warren.    VTE Prophylaxis - SCDs.  Code Status - Full code.       ANTOINETTE Samuels  [facility]  07/19/22  15:03 EDT

## 2022-07-19 NOTE — PROGRESS NOTES
"[facility] Clinical Pharmacy Services: Enoxaparin Consult    Kandace Andrade has a pharmacy consult to dose prophylactic enoxaparin per Lesly Flynn's request.     Indication: VTE prophylaxis  Home Anticoagulation: None     Relevant clinical data and objective history reviewed:  82 y.o. female 160 cm (63\") 59.4 kg (130 lb 15.3 oz)   Body mass index is 23.2 kg/m².   Results from last 7 days   Lab Units 07/19/22  1321   PLATELETS 10*3/mm3 283     Estimated Creatinine Clearance: 64.5 mL/min (by C-G formula based on SCr of 0.62 mg/dL).    Assessment/Plan    Will start patient on 40mg subcutaneous every 24 hours, adjusted for renal function. Consult order will be discontinued but pharmacy will continue to follow.     Eusebio Leonard III, PharmD  Clinical Pharmacist    "

## 2022-07-20 ENCOUNTER — APPOINTMENT (OUTPATIENT)
Dept: CT IMAGING | Facility: HOSPITAL | Age: 82
End: 2022-07-20

## 2022-07-20 PROBLEM — N39.0 UTI (URINARY TRACT INFECTION): Status: ACTIVE | Noted: 2022-07-20

## 2022-07-20 PROBLEM — G93.41 METABOLIC ENCEPHALOPATHY: Status: ACTIVE | Noted: 2022-07-20

## 2022-07-20 LAB
ANION GAP SERPL CALCULATED.3IONS-SCNC: 7.7 MMOL/L (ref 5–15)
BUN SERPL-MCNC: 11 MG/DL (ref 8–23)
BUN/CREAT SERPL: 15.9 (ref 7–25)
CALCIUM SPEC-SCNC: 8.6 MG/DL (ref 8.6–10.5)
CHLORIDE SERPL-SCNC: 109 MMOL/L (ref 98–107)
CO2 SERPL-SCNC: 25.3 MMOL/L (ref 22–29)
CREAT SERPL-MCNC: 0.69 MG/DL (ref 0.57–1)
CRP SERPL-MCNC: <0.3 MG/DL (ref 0–0.5)
DEPRECATED RDW RBC AUTO: 39.6 FL (ref 37–54)
EGFRCR SERPLBLD CKD-EPI 2021: 86.2 ML/MIN/1.73
ERYTHROCYTE [DISTWIDTH] IN BLOOD BY AUTOMATED COUNT: 11.9 % (ref 12.3–15.4)
GLUCOSE SERPL-MCNC: 88 MG/DL (ref 65–99)
HCT VFR BLD AUTO: 34.7 % (ref 34–46.6)
HGB BLD-MCNC: 11.6 G/DL (ref 12–15.9)
MCH RBC QN AUTO: 30.9 PG (ref 26.6–33)
MCHC RBC AUTO-ENTMCNC: 33.4 G/DL (ref 31.5–35.7)
MCV RBC AUTO: 92.5 FL (ref 79–97)
PHOSPHATE SERPL-MCNC: 4.5 MG/DL (ref 2.5–4.5)
PLATELET # BLD AUTO: 289 10*3/MM3 (ref 140–450)
PMV BLD AUTO: 10 FL (ref 6–12)
POTASSIUM SERPL-SCNC: 4.6 MMOL/L (ref 3.5–5.2)
RBC # BLD AUTO: 3.75 10*6/MM3 (ref 3.77–5.28)
SODIUM SERPL-SCNC: 142 MMOL/L (ref 136–145)
WBC NRBC COR # BLD: 7.56 10*3/MM3 (ref 3.4–10.8)

## 2022-07-20 PROCEDURE — 97161 PT EVAL LOW COMPLEX 20 MIN: CPT

## 2022-07-20 PROCEDURE — 97166 OT EVAL MOD COMPLEX 45 MIN: CPT

## 2022-07-20 PROCEDURE — 94664 DEMO&/EVAL PT USE INHALER: CPT

## 2022-07-20 PROCEDURE — 96361 HYDRATE IV INFUSION ADD-ON: CPT

## 2022-07-20 PROCEDURE — 85027 COMPLETE CBC AUTOMATED: CPT | Performed by: NURSE PRACTITIONER

## 2022-07-20 PROCEDURE — 97530 THERAPEUTIC ACTIVITIES: CPT

## 2022-07-20 PROCEDURE — 99214 OFFICE O/P EST MOD 30 MIN: CPT | Performed by: INTERNAL MEDICINE

## 2022-07-20 PROCEDURE — 96372 THER/PROPH/DIAG INJ SC/IM: CPT

## 2022-07-20 PROCEDURE — G0378 HOSPITAL OBSERVATION PER HR: HCPCS

## 2022-07-20 PROCEDURE — 94799 UNLISTED PULMONARY SVC/PX: CPT

## 2022-07-20 PROCEDURE — 25010000002 ENOXAPARIN PER 10 MG: Performed by: NURSE PRACTITIONER

## 2022-07-20 PROCEDURE — 25010000002 SODIUM CHLORIDE 0.9 % WITH KCL 20 MEQ 20-0.9 MEQ/L-% SOLUTION: Performed by: NURSE PRACTITIONER

## 2022-07-20 PROCEDURE — 25010000002 IOPAMIDOL 61 % SOLUTION: Performed by: HOSPITALIST

## 2022-07-20 PROCEDURE — 80048 BASIC METABOLIC PNL TOTAL CA: CPT | Performed by: NURSE PRACTITIONER

## 2022-07-20 PROCEDURE — 86140 C-REACTIVE PROTEIN: CPT | Performed by: HOSPITALIST

## 2022-07-20 PROCEDURE — 84100 ASSAY OF PHOSPHORUS: CPT | Performed by: HOSPITALIST

## 2022-07-20 PROCEDURE — 97535 SELF CARE MNGMENT TRAINING: CPT

## 2022-07-20 PROCEDURE — 74177 CT ABD & PELVIS W/CONTRAST: CPT

## 2022-07-20 RX ADMIN — ACETAMINOPHEN 1000 MG: 500 TABLET ORAL at 20:08

## 2022-07-20 RX ADMIN — POTASSIUM CHLORIDE AND SODIUM CHLORIDE 100 ML/HR: 900; 150 INJECTION, SOLUTION INTRAVENOUS at 11:05

## 2022-07-20 RX ADMIN — POTASSIUM CHLORIDE AND SODIUM CHLORIDE 100 ML/HR: 900; 150 INJECTION, SOLUTION INTRAVENOUS at 21:01

## 2022-07-20 RX ADMIN — LISINOPRIL 20 MG: 20 TABLET ORAL at 08:38

## 2022-07-20 RX ADMIN — Medication 10 MG: at 20:08

## 2022-07-20 RX ADMIN — POTASSIUM CHLORIDE AND SODIUM CHLORIDE 100 ML/HR: 900; 150 INJECTION, SOLUTION INTRAVENOUS at 01:04

## 2022-07-20 RX ADMIN — TRAZODONE HYDROCHLORIDE 75 MG: 50 TABLET ORAL at 20:08

## 2022-07-20 RX ADMIN — ENOXAPARIN SODIUM 40 MG: 100 INJECTION SUBCUTANEOUS at 20:08

## 2022-07-20 RX ADMIN — BUDESONIDE AND FORMOTEROL FUMARATE DIHYDRATE 2 PUFF: 80; 4.5 AEROSOL RESPIRATORY (INHALATION) at 07:20

## 2022-07-20 RX ADMIN — ASPIRIN 81 MG: 81 TABLET, CHEWABLE ORAL at 08:38

## 2022-07-20 RX ADMIN — PANTOPRAZOLE SODIUM 40 MG: 40 TABLET, DELAYED RELEASE ORAL at 06:17

## 2022-07-20 RX ADMIN — ESCITALOPRAM 20 MG: 20 TABLET, FILM COATED ORAL at 08:38

## 2022-07-20 RX ADMIN — BUDESONIDE AND FORMOTEROL FUMARATE DIHYDRATE 2 PUFF: 80; 4.5 AEROSOL RESPIRATORY (INHALATION) at 19:46

## 2022-07-20 RX ADMIN — IOPAMIDOL 85 ML: 612 INJECTION, SOLUTION INTRAVENOUS at 16:11

## 2022-07-20 NOTE — THERAPY EVALUATION
Patient Name: Kandace Andrade  : 1940    MRN: [id number]                              Today's Date: 2022       Admit Date: 2022    Visit Dx: No diagnosis found.  Patient Active Problem List   Diagnosis   • Vitamin D deficiency   • KINDRA (obstructive sleep apnea)   • IBS (irritable bowel syndrome)   • Essential hypertension   • Herpes zoster   • Arthritis   • Anxiety and depression   • Other emphysema ([facility])   • Acute seasonal allergic rhinitis due to pollen   • Paroxysmal atrial fibrillation ([facility])   • Osteoporosis   • Mixed hyperlipidemia   • Encounter for Medicare annual wellness exam   • COPD (chronic obstructive pulmonary disease) ([facility])   • Mixed stress and urge urinary incontinence   • Type 2 diabetes mellitus without complication, without long-term current use of insulin ([facility])   • Arthritis of both hips   • Autonomic neuropathy   • GERD without esophagitis   • C. difficile colitis   • Hypokalemia   • Hospital discharge follow-up   • Decreased hearing of both ears   • Dizziness   • Nausea   • Hypercholesterolemia   • Primary insomnia   • Generalized weakness   • Other microscopic hematuria   • Acute cystitis with hematuria   • Confusion   • Dehydration   • Abdominal pain   • Chronic diarrhea   • Metabolic encephalopathy     Past Medical History:   Diagnosis Date   • Acute seasonal allergic rhinitis due to pollen    • Anxiety and depression    • Arthritis    • Asthma    • C. difficile colitis    • COPD (chronic obstructive pulmonary disease) ([facility])    • Diarrhea    • Diverticulosis    • Fibula fracture    • Herpes zoster    • History of lumbosacral spine surgery    • IBS (irritable bowel syndrome)    • Mixed hyperlipidemia    • KINDRA (obstructive sleep apnea)     NO MACHINE AT THIS TIME   • Osteoporosis    • Paroxysmal atrial fibrillation ([facility])    • Vitamin D deficiency      Past Surgical History:   Procedure Laterality Date   • APPENDECTOMY     • BACK SURGERY      lower x2   • CATARACT EXTRACTION      • COLONOSCOPY      patient doesn't recall    • COLONOSCOPY N/A 4/2/2021    Procedure: COLONOSCOPY to cecum with cecal biopsies;  Surgeon: Jarrod Duggan MD;  Location: [facility] ENDOSCOPY;  Service: Gastroenterology;  Laterality: N/A;  pre: diarhhea  post: diverticulosis, hemorrhoids   • HYSTERECTOMY      partial   • SHOULDER SURGERY Right    • SHOULDER SURGERY     • TONSILLECTOMY     • UPPER GASTROINTESTINAL ENDOSCOPY      patient doesn't recall       General Information     Row Name 07/20/22 1552          OT Time and Intention    Document Type evaluation  -RB     Mode of Treatment occupational therapy;co-treatment;physical therapy  -RB     Row Name 07/20/22 1552          General Information    Patient Profile Reviewed yes  -RB     Prior Level of Function independent:;ADL's;transfer  -RB     Existing Precautions/Restrictions fall;oxygen therapy device and L/min  -RB     Barriers to Rehab none identified  -RB     Row Name 07/20/22 1552          Living Environment    People in Home alone;other (see comments)  daughter assists as needed since her  passed away x6 months ago  -RB     Row Name 07/20/22 1552          Home Main Entrance    Number of Stairs, Main Entrance none  -RB     Row Name 07/20/22 1552          Cognition    Orientation Status (Cognition) oriented x 3  -RB     Row Name 07/20/22 1552          Safety Issues, Functional Mobility    Safety Issues Affecting Function (Mobility) safety precautions follow-through/compliance;safety precaution awareness  -RB     Impairments Affecting Function (Mobility) balance;endurance/activity tolerance;strength;shortness of breath  -RB           User Key  (r) = Recorded By, (t) = Taken By, (c) = Cosigned By    Initials Name Provider Type    RB Ruby Fernandez OT Occupational Therapist                 Mobility/ADL's     Row Name 07/20/22 1555          Bed Mobility    Bed Mobility supine-sit  -RB     Supine-Sit [place] (Bed Mobility) minimum assist (75% patient  effort);verbal cues  -RB     Assistive Device (Bed Mobility) bed rails  -RB     Row Name 07/20/22 1553          Transfers    Transfers sit-stand transfer;stand-sit transfer;toilet transfer  -RB     Sit-Stand [place] (Transfers) contact guard;verbal cues  -RB     Stand-Sit [place] (Transfers) contact guard;verbal cues  -RB     [place] Level (Toilet Transfer) contact guard;verbal cues  -RB     Assistive Device (Toilet Transfer) commode, 3-in-1;walker, front-wheeled  -RB     Row Name 07/20/22 1553          Sit-Stand Transfer    Assistive Device (Sit-Stand Transfers) walker, front-wheeled  -RB     Row Name 07/20/22 1553          Stand-Sit Transfer    Assistive Device (Stand-Sit Transfers) walker, front-wheeled  -RB     Row Name 07/20/22 1553          Toilet Transfer    Type (Toilet Transfer) stand-sit;sit-stand  -RB     Row Name 07/20/22 1553          Functional Mobility    Functional Mobility- Ind. Level contact guard assist;1 person;verbal cues required  -RB     Functional Mobility- Device walker, front-wheeled  -RB     Functional Mobility- Safety Issues balance decreased during turns  -RB     Row Name 07/20/22 1553          Activities of Daily Living    BADL Assessment/Intervention grooming;toileting  -RB     Row Name 07/20/22 1553          Grooming Assessment/Training    [place] Level (Grooming) grooming skills;set up  -RB     Position (Grooming) sink side  -RB     Row Name 07/20/22 1553          Toileting Assessment/Training    [place] Level (Toileting) toileting skills;verbal cues;contact guard assist  -RB     Position (Toileting) supported standing;supported sitting  -RB     Comment, (Toileting) soiled brief, able to doff/betsy at EOB with CGA. Then completed toileting in the bathroom with CGA  -RB           User Key  (r) = Recorded By, (t) = Taken By, (c) = Cosigned By    Initials Name Provider Type    Ruby Gonzalez OT Occupational Therapist               Obj/Interventions     Row  Name 07/20/22 1554          Sensory Assessment (Somatosensory)    Sensory Assessment (Somatosensory) sensation intact  -RB     Row Name 07/20/22 1554          Vision Assessment/Intervention    Visual Impairment/Limitations WFL  -RB     Row Name 07/20/22 1554          Range of Motion Comprehensive    General Range of Motion no range of motion deficits identified  -RB     Row Name 07/20/22 1554          Strength Comprehensive (MMT)    General Manual Muscle Testing (MMT) Assessment no strength deficits identified  -RB     Row Name 07/20/22 1554          Balance    Comment, Balance CGA for OOB mobility using a walker.  -RB           User Key  (r) = Recorded By, (t) = Taken By, (c) = Cosigned By    Initials Name Provider Type    RB Ruby Fernandez OT Occupational Therapist               Goals/Plan     [facility] Name 07/20/22 1555          Bed Mobility Goal 1 (OT)    Activity/Assistive Device (Bed Mobility Goal 1, OT) bed mobility activities, all  -RB     [place] Level/Cues Needed (Bed Mobility Goal 1, OT) supervision required  -RB     Time Frame (Bed Mobility Goal 1, OT) short term goal (STG);2 weeks  -RB     Progress/Outcomes (Bed Mobility Goal 1, OT) continuing progress toward goal  -RB     Row Name 07/20/22 1555          Transfer Goal 1 (OT)    Activity/Assistive Device (Transfer Goal 1, OT) transfers, all  -RB     [place] Level/Cues Needed (Transfer Goal 1, OT) supervision required  -RB     Time Frame (Transfer Goal 1, OT) short term goal (STG);2 weeks  -RB     Progress/Outcome (Transfer Goal 1, OT) continuing progress toward goal  -RB     Row Name 07/20/22 1555          Dressing Goal 1 (OT)    Activity/Device (Dressing Goal 1, OT) dressing skills, all  -RB     [place]/Cues Needed (Dressing Goal 1, OT) supervision required  -RB     Time Frame (Dressing Goal 1, OT) short term goal (STG);2 weeks  -RB     Progress/Outcome (Dressing Goal 1, OT) continuing progress toward goal  -RB     [facility] Name 07/20/22 1555           Toileting Goal 1 (OT)    Activity/Device (Toileting Goal 1, OT) toileting skills, all  -RB     [place] Level/Cues Needed (Toileting Goal 1, OT) supervision required  -RB     Time Frame (Toileting Goal 1, OT) short term goal (STG);2 weeks  -RB     Progress/Outcome (Toileting Goal 1, OT) continuing progress toward goal  -RB     Row Name 07/20/22 1558          Grooming Goal 1 (OT)    Activity/Device (Grooming Goal 1, OT) grooming skills, all  -RB     [place] (Grooming Goal 1, OT) supervision required  -RB     Time Frame (Grooming Goal 1, OT) short term goal (STG);2 weeks  -RB     Progress/Outcome (Grooming Goal 1, OT) continuing progress toward goal  -RB     Row Name 07/20/22 7020          Therapy Assessment/Plan (OT)    Planned Therapy Interventions (OT) transfer/mobility retraining;strengthening exercise;ROM/therapeutic exercise;activity tolerance training;adaptive equipment training;BADL retraining;functional balance retraining;occupation/activity based interventions;patient/caregiver education/training  -RB           User Key  (r) = Recorded By, (t) = Taken By, (c) = Cosigned By    Initials Name Provider Type    RB Ruby Fernandez, OT Occupational Therapist               Clinical Impression     Row Name 07/20/22 9519          Pain Assessment    Pretreatment Pain Rating 0/10 - no pain  -RB     Posttreatment Pain Rating 0/10 - no pain  -RB     Row Name 07/20/22 4887          Plan of Care Review    Plan of Care Reviewed With patient  -RB     Progress no change  -RB     Row Name 07/20/22 0431          Therapy Assessment/Plan (OT)    Rehab Potential (OT) good, to achieve stated therapy goals  -RB     Criteria for Skilled Therapeutic Interventions Met (OT) yes;skilled treatment is necessary  -RB     Therapy Frequency (OT) 5 times/wk  -RB     Row Name 07/20/22 6100          Therapy Plan Review/Discharge Plan (OT)    Anticipated Discharge Disposition (OT) home with 24/7 care;home with home health  -RB      Row Name 07/20/22 1554          Vital Signs    Pre SpO2 (%) 95  -RB     O2 Delivery Pre Treatment supplemental O2  -RB     Intra SpO2 (%) 93  -RB     O2 Delivery Intra Treatment supplemental O2  -RB     Post SpO2 (%) 94  -RB     O2 Delivery Post Treatment supplemental O2  -RB     Pre Patient Position Supine  -RB     Intra Patient Position Standing  -RB     Post Patient Position Supine  -RB     Row Name 07/20/22 1554          Positioning and Restraints    Pre-Treatment Position in bed  -RB     Post Treatment Position bed  -RB     In Bed notified nsg;supine;call light within reach;encouraged to call for assist;exit alarm on;legs elevated  -RB           User Key  (r) = Recorded By, (t) = Taken By, (c) = Cosigned By    Initials Name Provider Type    Ruby Gonzalez OT Occupational Therapist               Outcome Measures     Row Name 07/20/22 1555          How much help from another is currently needed...    Putting on and taking off regular lower body clothing? 3  -RB     Bathing (including washing, rinsing, and drying) 3  -RB     Toileting (which includes using toilet bed pan or urinal) 3  -RB     Putting on and taking off regular upper body clothing 3  -RB     Taking care of personal grooming (such as brushing teeth) 3  -RB     Eating meals 3  -RB     AM-PAC 6 Clicks Score (OT) 18  -RB     Row Name 07/20/22 1000 07/20/22 0834       How much help from another person do you currently need...    Turning from your back to your side while in flat bed without using bedrails? 4  -CF 4  -JL    Moving from lying on back to sitting on the side of a flat bed without bedrails? 3  -CF 4  -JL    Moving to and from a bed to a chair (including a wheelchair)? 3  -CF 3  -JL    Standing up from a chair using your arms (e.g., wheelchair, bedside chair)? 3  -CF 3  -JL    Climbing 3-5 steps with a railing? 2  -CF 3  -JL    To walk in hospital room? 3  -CF 3  -JL    AM-PAC 6 Clicks Score (PT) 18  -CF 20  -JL    Highest level of  mobility 6 --> Walked 10 steps or more  -CF 6 --> Walked 10 steps or more  -JL    Row Name 07/20/22 1555          Modified [place] Scale    Modified Maria T Scale 4 - Moderately severe disability.  Unable to walk without assistance, and unable to attend to own bodily needs without assistance.  -RB     Row Name 07/20/22 1555 07/20/22 1000       Functional Assessment    Outcome Measure Options AM-PAC 6 Clicks Daily Activity (OT);Modified [place]  -RB AM-PAC 6 Clicks Basic Mobility (PT)  -CF          User Key  (r) = Recorded By, (t) = Taken By, (c) = Cosigned By    Initials Name Provider Type    Virgil Mccollum, RN Registered Nurse    RB Ruby Fernandez, OT Occupational Therapist    CF Sandra Cedillo, PT Physical Therapist                Occupational Therapy Education                 Title: PT OT SLP Therapies (In Progress)     Topic: Occupational Therapy (Not Started)     Point: ADL training (Not Started)     Description:   Instruct learner(s) on proper safety adaptation and remediation techniques during self care or transfers.   Instruct in proper use of assistive devices.              Learner Progress:  Not documented in this visit.          Point: Home exercise program (Not Started)     Description:   Instruct learner(s) on appropriate technique for monitoring, assisting and/or progressing therapeutic exercises/activities.              Learner Progress:  Not documented in this visit.          Point: Precautions (Not Started)     Description:   Instruct learner(s) on prescribed precautions during self-care and functional transfers.              Learner Progress:  Not documented in this visit.          Point: Body mechanics (Not Started)     Description:   Instruct learner(s) on proper positioning and spine alignment during self-care, functional mobility activities and/or exercises.              Learner Progress:  Not documented in this visit.                          OT Recommendation and Plan  Planned Therapy  Interventions (OT): transfer/mobility retraining, strengthening exercise, ROM/therapeutic exercise, activity tolerance training, adaptive equipment training, BADL retraining, functional balance retraining, occupation/activity based interventions, patient/caregiver education/training  Therapy Frequency (OT): 5 times/wk  Plan of Care Review  Plan of Care Reviewed With: patient  Progress: no change     Time Calculation:    Time Calculation- OT     Row Name 07/20/22 1551             Time Calculation- OT    OT Start Time 0851  -RB      OT Stop Time 0917  -RB      OT Time Calculation (min) 26 min  -RB      Total Timed Code Minutes- OT 16 minute(s)  -RB      OT Received On 07/20/22  -RB      OT - Next Appointment 07/21/22  -RB      OT Goal Re-Cert Due Date 08/03/22  -RB              Timed Charges    43678 - OT Self Care/Mgmt Minutes 16  -RB              Untimed Charges    OT Eval/Re-eval Minutes 10  -RB              Total Minutes    Timed Charges Total Minutes 16  -RB      Untimed Charges Total Minutes 10  -RB       Total Minutes 26  -RB            User Key  (r) = Recorded By, (t) = Taken By, (c) = Cosigned By    Initials Name Provider Type    RB Ruby Fernandez OT Occupational Therapist              Therapy Charges for Today     Code Description Service Date Service Provider Modifiers Qty    17978393295 HC OT EVAL MOD COMPLEXITY 2 7/20/2022 Ruby Fernandez OT GO 1    27332888077 HC OT SELF CARE/MGMT/TRAIN EA 15 MIN 7/20/2022 Ruby Fernandez OT GO 1               Ruby Fernandez OT  7/20/2022

## 2022-07-20 NOTE — PROGRESS NOTES
Discharge Planning Assessment  [facility]     Patient Name: Kandace Andrade  MRN: [id number]  Today's Date: 7/20/2022    Admit Date: 7/19/2022     Discharge Needs Assessment     Row Name 07/20/22 9560       Living Environment    People in Home alone    Current Living Arrangements home    Primary Care Provided by self    Family Caregiver if Needed child(chriss), adult    Family Caregiver Names jay, Constance and Tracey    Quality of Family Relationships helpful;involved;supportive    Able to Return to Prior Arrangements yes       Resource/Environmental Concerns    Resource/Environmental Concerns none       Transition Planning    Patient/Family Anticipates Transition to home    Patient/Family Anticipated Services at Transition home health care    Transportation Anticipated family or friend will provide       Discharge Needs Assessment    Readmission Within the Last 30 Days no previous admission in last 30 days    Equipment Currently Used at Home walker, standard    Concerns to be Addressed adjustment to diagnosis/illness    Equipment Needed After Discharge other (see comments)  tbd, home o2?    Discharge Facility/Level of Care Needs home with home health    Provided Post Acute Provider List? N/A    Provided Post Acute Provider Quality & Resource List? N/A               Discharge Plan     Row Name 07/20/22 1712       Plan    Plan Comments Met with patient at the bedside, face sheet verified. Patient lives alone, her spouse passed away approx 7 months ago. She has two adult daughters that assist as needed. Pt no longer drives, daughters provide transportation to appointments, grocery etc. Pt lives in a single story home, she uses a walker, no other DME.  She is planning to return home with the help of her daughters. Patient requested [facility] if HH is ordered. Current o2 requirements are 2 lpm n/c. Patient is agreeable to use [facility] for any DME/o2 needs. PCP is Dr. Bella and preferred pharmacy is [facility]. Daughters will provide transportation home. CCP will follow progress.    Row Name 07/20/22 1711       Plan    Plan Home              Continued Care and Services - Admitted Since 7/19/2022     Home Medical Care     Service Provider Request Status Selected Services Address Phone Fax Patient Preferred    [facility]  Pending - Request Sent N/A [phone] [address] [phone] --              Expected Discharge Date and Time     Expected Discharge Date Expected Discharge Time    Jul 21, 2022          Demographic Summary    No documentation.                Functional Status     Row Name 07/20/22 1711       Functional Status    Usual Activity Tolerance moderate       Functional Status, IADL    Medications independent    Meal Preparation independent    Housekeeping assistive equipment    Laundry independent    Shopping assistive equipment and person       Mental Status    General Appearance WDL WDL       Mental Status Summary    Recent Changes in Mental Status/Cognitive Functioning no changes               Psychosocial    No documentation.                Abuse/Neglect    No documentation.                Legal    No documentation.                Substance Abuse    No documentation.                Patient Forms    No documentation.                   Rosie Tinajero RN

## 2022-07-20 NOTE — PLAN OF CARE
Goal Outcome Evaluation:  Plan of Care Reviewed With: patient           Outcome Evaluation: Pt is an 81 yo female admitted with lightheadedness, fatigue and acute UTI. Pt lives alone but her daughter is available to assist daily as needed. She reports owning a walker but typically furniture crawls within the home. Pt has PMH including DM2, COPD, and A fib. Upon exam, she demonstrates generalized weakness, mild balance impairments and limited endurance. Pt completed bed mobility with min A, transfers with cga and ambulated 140' with cga using a walker. She may benefit from skilled PT services while inpatient to address mobility deficits. Recommending home with assist and HH at this time. OK to mobilize with nursing staff.

## 2022-07-20 NOTE — PLAN OF CARE
Goal Outcome Evaluation:  Plan of Care Reviewed With: patient           Outcome Evaluation: VSS on 2L while sleeping. No c/o pain. Ast x 1 to BSC. Continues IVF. Resting well at this time. Will contiinue to monitor.

## 2022-07-20 NOTE — PLAN OF CARE
"The pt was admitted to [facility] 2/2 to lightheadedness, fatigue and acute UTI. The pt lives alone and has her daughter assist in the home as needed. She reports owning a walker but \"furniture surfs\" primarily. Rarely leaves her home, and if she does she uses a wheelchair pushed by family. Today, the pt completed bed mobility with Min A. CGA for OOB mobility and transfers during ADL's. She uses a rolling walker for OOB activity and O2 >90% on 2L nc. She plans to d/c home with  pending progress.    Patient was wearing a face mask during this therapy encounter. Therapist used appropriate personal protective equipment including mask and gloves. Mask used was standard procedure mask. Appropriate PPE was worn during the entire therapy session. Hand hygiene was completed before and after therapy session. Patient is not in enhanced droplet precautions.        "

## 2022-07-20 NOTE — CONSULTS
[facility]  Initial Inpatient Consult Note    Referring Provider: A    Reason for Consultation: management of microscopic colitis    Subjective     History of present illness:      Thank you for requesting my opinion.    This is an 82-year-old woman, patient of Dr. Duggan's that he is followed for diarrhea admitted yesterday for symptoms.  GI is consulted to help with management of microscopic colitis.  She had a colonoscopy April 2021 indicating some changes concerning for early microscopic colitis.  However, she has been on budesonide without relief.  She was last seen by Dr. Duggan in the office on July 7 with plans for outpatient CT scan.  She came to the ER yesterday with generalized weakness, UTI symptoms and worsening diarrhea.    She says that her diarrhea has been quite severe over the past few weeks, up to 10 loose stools daily.  Mostly after eating.  She has been using low-dose Imodium which mostly has helped her symptoms.  She has had nocturnal stools.    Her last colonoscopy was from April 2021 with findings consistent with concerns for developing microscopic colitis.  She did have a fecal lactoferrin from March of this year that was positive.    She is awaiting CT imaging    Interestingly, this morning she says for the first time in a number of weeks, she has had a formed stool.  She is not having the excessive diarrhea that she had prior to admission.  Review of her medications indicates she has not really gotten any new antidiarrheals here nor has her diet changed that much    She is having some mild lower abdominal tenderness but no significant pain.  She is eating today without issue.        Past Medical History:  Past Medical History:   Diagnosis Date   • Acute seasonal allergic rhinitis due to pollen    • Anxiety and depression    • Arthritis    • Asthma    • C. difficile colitis    • COPD (chronic obstructive pulmonary disease) ([facility])    • Diarrhea    • Diverticulosis     • Fibula fracture    • Herpes zoster    • History of lumbosacral spine surgery    • IBS (irritable bowel syndrome)    • Mixed hyperlipidemia    • KINDRA (obstructive sleep apnea)     NO MACHINE AT THIS TIME   • Osteoporosis    • Paroxysmal atrial fibrillation (HCC)    • Vitamin D deficiency        Past Surgical History:  Past Surgical History:   Procedure Laterality Date   • APPENDECTOMY     • BACK SURGERY      lower x2   • CATARACT EXTRACTION     • COLONOSCOPY      patient doesn't recall    • COLONOSCOPY N/A 2021    Procedure: COLONOSCOPY to cecum with cecal biopsies;  Surgeon: Jarrod Duggan MD;  Location: [facility] ENDOSCOPY;  Service: Gastroenterology;  Laterality: N/A;  pre: diarhhea  post: diverticulosis, hemorrhoids   • HYSTERECTOMY      partial   • SHOULDER SURGERY Right    • SHOULDER SURGERY     • TONSILLECTOMY     • UPPER GASTROINTESTINAL ENDOSCOPY      patient doesn't recall         Social History:   Social History     Tobacco Use   • Smoking status: Former Smoker     Packs/day: 1.00     Years: 25.00     Pack years: 25.00     Quit date: 2008     Years since quittin.2   • Smokeless tobacco: Never Used   • Tobacco comment: CAFFEINE USE - 1 CUP COFFEE/ DIET COKE   Substance Use Topics   • Alcohol use: No        Family History:  Family History   Problem Relation Age of Onset   • Heart disease Mother    • Lung cancer Mother    • Diabetes Father    • Liver disease Father    • Heart disease Paternal Uncle    • Malig Hyperthermia Neg Hx        Home Meds:  Medications Prior to Admission   Medication Sig Dispense Refill Last Dose   • acetaminophen (TYLENOL) 500 MG tablet Take 1,000 mg by mouth Every 6 (Six) Hours As Needed for Mild Pain .   Past Week at Unknown time   • aspirin 81 MG chewable tablet Chew 81 mg Daily.   2022 at Unknown time   • calcium carbonate (TUMS) 500 MG chewable tablet Chew 1 tablet Daily.   2022 at Unknown time   • escitalopram (Lexapro) 20 MG tablet Take 1 tablet by mouth  Daily. 30 tablet 3 7/19/2022 at Unknown time   • ezetimibe (ZETIA) 10 MG tablet TAKE 1 TABLET BY MOUTH DAILY 30 tablet 3 7/19/2022 at Unknown time   • fluticasone (FLONASE) 50 MCG/ACT nasal spray 2 sprays into the nostril(s) as directed by provider Daily As Needed for Rhinitis.   Past Week at Unknown time   • lisinopril (PRINIVIL,ZESTRIL) 20 MG tablet Take 1 tablet by mouth Daily. 30 tablet 2 7/19/2022 at Unknown time   • Melatonin 10 MG tablet Take  by mouth Every Night.   7/18/2022 at Unknown time   • omeprazole (priLOSEC) 40 MG capsule TAKE 1 CAPSULE BY MOUTH DAILY 30 capsule 2 7/19/2022 at Unknown time   • Symbicort 80-4.5 MCG/ACT inhaler    7/18/2022 at Unknown time   • traZODone (DESYREL) 50 MG tablet TAKE 1/2 TO 1 TABLET BY MOUTH EVERY NIGHT 1 HOUR BEFORE BEDTIME 30 tablet 1 7/18/2022 at Unknown time   • vitamin D (ERGOCALCIFEROL) 1.25 MG (76724 UT) capsule capsule TAKE 1 CAPSULE BY MOUTH EVERY 7 DAYS 5 capsule 5 7/19/2022 at Unknown time   • Budesonide (Entocort EC) 3 MG 24 hr capsule Take 9 mg po daily x 1 month, take 6 mg po daily x 1 month then 3 mg po daily x 1 month then stop. (Patient not taking: Reported on 7/19/2022) 154 capsule 0 Not Taking at Unknown time   • cyclobenzaprine (FLEXERIL) 10 MG tablet Take 1/2 to one tablet by mouth twice daily 20 tablet 0    • FIBER COMPLETE PO Take 500 mg by mouth Daily. (Patient not taking: Reported on 7/19/2022)   Not Taking at Unknown time       Current Meds:   aspirin, 81 mg, Oral, Daily  budesonide-formoterol, 2 puff, Inhalation, BID - RT  enoxaparin, 40 mg, Subcutaneous, Nightly  escitalopram, 20 mg, Oral, Daily  lisinopril, 20 mg, Oral, Daily  melatonin, 10 mg, Oral, Nightly  pantoprazole, 40 mg, Oral, QAM  traZODone, 75 mg, Oral, Nightly        Allergies:  Allergies   Allergen Reactions   • Penicillins Hives and Other (See Comments)     Elevated BP... tolerated ceftriaxone 10/2020 admission   • Meclizine Hcl Dizziness   • Bactrim  [Sulfamethoxazole-Trimethoprim] Hives   • Flagyl [Metronidazole] Other (See Comments)     HYPERACTIVITY.. INSOMNIA    • Statins Other (See Comments)     Headache, elevated liver enzymes, blurred vision       Review of Systems  All systems were reviewed and negative except for:  Gastrointestinal: positive for  diarrhea and pain     Objective     Vital Signs  Temp:  [97.5 °F (36.4 °C)-98 °F (36.7 °C)] 97.5 °F (36.4 °C)  Heart Rate:  [65-69] 69  Resp:  [16-20] 16  BP: (101-132)/(60-84) 101/63    Physical Exam:  Constitutional:   Alert, cooperative, in no acute distress, appears stated age   Eyes:           Lids and lashes normal, conjunctivae and sclerae normal,   no icterus   Ears, nose, mouth and throat:  Normal appearance of external ears and nose, no oral l  lesions, no thrush, oral mucosa moist   Respiratory:    Clear to auscultation, respirations regular, even and             unlabored    Cardiovascular:   Regular rhythm and normal rate, normal S1 and S2, no        murmur, no gallop, palpable distal pulses, no lower extremity edema   Gastrointestinal:    Soft, nondistended, nontender to palpation, no guarding, no rebound tenderness, normal bowel sounds, no palpable masses or organomegaly  Rectal exam: deferred   Musculoskeletal:  Normal station, no atrophy, no tenderness to palpation, normal digits and nails   Skin:  Normal color, no bleeding, bruising, rashes or lesions   Lymphatics:  No palpable cervical or supraclavicular adenopathy   Psychiatric:  Judgement and insight: normal   Orientation to person, place and time: normal   Mood and affect: normal       Results Review:   I reviewed the patient's new clinical results.    Results from last 7 days   Lab Units 07/20/22  0346 07/19/22  1321   WBC 10*3/mm3 7.56 9.90   HEMOGLOBIN g/dL 11.6* 13.0   HEMATOCRIT % 34.7 39.0   PLATELETS 10*3/mm3 289 283       Results from last 7 days   Lab Units 07/20/22  0346 07/19/22  1513 07/19/22  1411   SODIUM mmol/L 142 136 136    POTASSIUM mmol/L 4.6 4.3 4.1   CHLORIDE mmol/L 109* 102 104   CO2 mmol/L 25.3 23.9 23.7   BUN mg/dL 11 12 11   CREATININE mg/dL 0.69 0.65 0.59   CALCIUM mg/dL 8.6 8.8 8.4*   BILIRUBIN mg/dL  --  0.2 <0.2   ALK PHOS U/L  --  68 65   ALT (SGPT) U/L  --  11 8   AST (SGOT) U/L  --  16 12   GLUCOSE mg/dL 88 80 83       Results from last 7 days   Lab Units 07/19/22  1321   INR  1.07       Lab Results   Lab Value Date/Time    LIPASE 37 08/23/2021 1320    LIPASE 20 11/24/2020 1320       Radiology:  Imaging Results (Last 72 Hours)     ** No results found for the last 72 hours. **          Assessment & Plan       Acute UTI (urinary tract infection)    KINDRA (obstructive sleep apnea)    Essential hypertension    Paroxysmal atrial fibrillation (HCC)    COPD (chronic obstructive pulmonary disease) (HCC)    Generalized weakness    Dehydration    Abdominal pain    Chronic diarrhea    UTI (urinary tract infection)      Impression  1.  Acute worsening of chronic diarrhea: Biopsies from her last colonoscopy April 2021 concerning for some inflammation and developing microscopic colitis though she has not improved to steroid    2.  Generalized abdominal pain: Mildly tender to palpation    3.  Recent UTI: She has just completed antibiotics recent C. difficile testing has been negative    Plan  Follow-up CT scan  Consideration of flexible sigmoidoscopy given continued diarrhea, elevated fecal lactoferrin and concerns for inflammation on scope from a year and half ago  Continue diet as tolerated  Monitor stools; for what ever reason it seems that she is doing better here and having formed stools.  Possibly we are dealing with a food intolerance or something else but I think it is reasonable to use cholestyramine or low-dose Imodium if she recurs with her explosive watery stools    I discussed the patients findings and my recommendations with patient and family    All necessary PPE, including face mask and eye protection, were worn during  this encounter.  Hand sanitization was performed both before and after the patient interaction.    Tonja Tanner MD  [facility]      Dictated utilizing Dragon dictation

## 2022-07-20 NOTE — THERAPY EVALUATION
Patient Name: Kandace Andrade  : 1940    MRN: [id number]                              Today's Date: 2022       Admit Date: 2022    Visit Dx: No diagnosis found.  Patient Active Problem List   Diagnosis   • Vitamin D deficiency   • KINDRA (obstructive sleep apnea)   • IBS (irritable bowel syndrome)   • Essential hypertension   • Herpes zoster   • Arthritis   • Anxiety and depression   • Other emphysema ([facility])   • Acute seasonal allergic rhinitis due to pollen   • Paroxysmal atrial fibrillation ([facility])   • Osteoporosis   • Mixed hyperlipidemia   • Encounter for Medicare annual wellness exam   • COPD (chronic obstructive pulmonary disease) ([facility])   • Mixed stress and urge urinary incontinence   • Type 2 diabetes mellitus without complication, without long-term current use of insulin ([facility])   • Arthritis of both hips   • Autonomic neuropathy   • GERD without esophagitis   • C. difficile colitis   • Hypokalemia   • Hospital discharge follow-up   • Decreased hearing of both ears   • Dizziness   • Nausea   • Hypercholesterolemia   • Primary insomnia   • Generalized weakness   • Other microscopic hematuria   • Acute cystitis with hematuria   • Confusion   • Dehydration   • Acute UTI (urinary tract infection)   • Abdominal pain   • Chronic diarrhea   • UTI (urinary tract infection)     Past Medical History:   Diagnosis Date   • Acute seasonal allergic rhinitis due to pollen    • Anxiety and depression    • Arthritis    • Asthma    • C. difficile colitis    • COPD (chronic obstructive pulmonary disease) ([facility])    • Diarrhea    • Diverticulosis    • Fibula fracture    • Herpes zoster    • History of lumbosacral spine surgery    • IBS (irritable bowel syndrome)    • Mixed hyperlipidemia    • KINDRA (obstructive sleep apnea)     NO MACHINE AT THIS TIME   • Osteoporosis    • Paroxysmal atrial fibrillation ([facility])    • Vitamin D deficiency      Past Surgical History:   Procedure Laterality Date   • APPENDECTOMY     • BACK  SURGERY      lower x2   • CATARACT EXTRACTION     • COLONOSCOPY      patient doesn't recall    • COLONOSCOPY N/A 4/2/2021    Procedure: COLONOSCOPY to cecum with cecal biopsies;  Surgeon: Jarrod Duggan MD;  Location: [facility] ENDOSCOPY;  Service: Gastroenterology;  Laterality: N/A;  pre: diarhhea  post: diverticulosis, hemorrhoids   • HYSTERECTOMY      partial   • SHOULDER SURGERY Right    • SHOULDER SURGERY     • TONSILLECTOMY     • UPPER GASTROINTESTINAL ENDOSCOPY      patient doesn't recall       General Information     Row Name 07/20/22 0957          Physical Therapy Time and Intention    Document Type evaluation  -CF     Mode of Treatment physical therapy  -CF     Row Name 07/20/22 0957          General Information    Patient Profile Reviewed yes  -CF     Prior Level of Function independent:;transfer;all household mobility  furniture crawls per pt report, states she has had difficulty with bed mobility  -CF     Existing Precautions/Restrictions fall  -CF     Barriers to Rehab none identified  -CF     Row Name 07/20/22 0957          Living Environment    People in Home spouse  -CF     Row Name 07/20/22 0957          Home Main Entrance    Number of Stairs, Main Entrance none  -CF     Row Name 07/20/22 0957          Stairs Within Home, Primary    Number of Stairs, Within Home, Primary none  -CF     Row Name 07/20/22 0957          Cognition    Orientation Status (Cognition) oriented x 4  -CF     Row Name 07/20/22 0957          Safety Issues, Functional Mobility    Impairments Affecting Function (Mobility) balance;endurance/activity tolerance;strength  -CF           User Key  (r) = Recorded By, (t) = Taken By, (c) = Cosigned By    Initials Name Provider Type    CF Sandra Cedillo, PT Physical Therapist               Mobility     Row Name 07/20/22 0958          Bed Mobility    Bed Mobility supine-sit  -CF     Supine-Sit [place] (Bed Mobility) minimum assist (75% patient effort);verbal cues  -CF     Row Name  07/20/22 0958          Sit-Stand Transfer    Sit-Stand [place] (Transfers) contact guard  -CF     Assistive Device (Sit-Stand Transfers) walker, front-wheeled  -CF     Comment, (Sit-Stand Transfer) 140'  -CF     Row Name 07/20/22 0958          Gait/Stairs (Locomotion)    [place] Level (Gait) contact guard;verbal cues  -CF     Assistive Device (Gait) walker, front-wheeled  -CF     Deviations/Abnormal Patterns (Gait) mikala decreased;gait speed decreased  -CF     Bilateral Gait Deviations forward flexed posture  -     Comment, (Gait/Stairs) No overt loss of balance, denies SOA but amb on 2L O2. RA is baseline  -           User Key  (r) = Recorded By, (t) = Taken By, (c) = Cosigned By    Initials Name Provider Type     Sandra Cedillo PT Physical Therapist               Obj/Interventions     Row Name 07/20/22 0959          Range of Motion Comprehensive    General Range of Motion bilateral lower extremity ROM WFL  -CF     Row Name 07/20/22 0959          Strength Comprehensive (MMT)    General Manual Muscle Testing (MMT) Assessment no strength deficits identified  -     Row Name 07/20/22 0959          Balance    Balance Assessment sitting static balance;sitting dynamic balance;standing static balance;standing dynamic balance  -CF     Static Sitting Balance standby assist  -CF     Dynamic Sitting Balance standby assist  -CF     Position, Sitting Balance sitting edge of bed  -CF     Static Standing Balance contact guard  -CF     Dynamic Standing Balance contact guard  -CF     Position/Device Used, Standing Balance walker, front-wheeled  -CF     Row Name 07/20/22 0959          Sensory Assessment (Somatosensory)    Sensory Assessment (Somatosensory) sensation intact  -           User Key  (r) = Recorded By, (t) = Taken By, (c) = Cosigned By    Initials Name Provider Type    Sandra Bergman PT Physical Therapist               Goals/Plan     Row Name 07/20/22 1000          Bed Mobility Goal 1 (PT)     Activity/Assistive Device (Bed Mobility Goal 1, PT) bed mobility activities, all  -CF     [place] Level/Cues Needed (Bed Mobility Goal 1, PT) modified independence  -CF     Time Frame (Bed Mobility Goal 1, PT) 2 weeks  -CF     Row Name 07/20/22 1000          Transfer Goal 1 (PT)    Activity/Assistive Device (Transfer Goal 1, PT) sit-to-stand/stand-to-sit;walker, rolling;bed-to-chair/chair-to-bed  -CF     Time Frame (Transfer Goal 1, PT) 2 weeks  -CF     Row Name 07/20/22 1000          Gait Training Goal 1 (PT)    Activity/Assistive Device (Gait Training Goal 1, PT) gait (walking locomotion);walker, rolling  -CF     Distance (Gait Training Goal 1, PT) 200'  -CF     Time Frame (Gait Training Goal 1, PT) 2 weeks  -CF     Row Name 07/20/22 1000          Therapy Assessment/Plan (PT)    Planned Therapy Interventions (PT) balance training;bed mobility training;gait training;transfer training;ROM (range of motion);strengthening  -CF           User Key  (r) = Recorded By, (t) = Taken By, (c) = Cosigned By    Initials Name Provider Type    CF Sandra Cedillo, PT Physical Therapist               Clinical Impression     Row Name 07/20/22 1128          Pain    Pretreatment Pain Rating 0/10 - no pain  -CF     Posttreatment Pain Rating 0/10 - no pain  -CF     Row Name 07/20/22 1128          Plan of Care Review    Plan of Care Reviewed With patient  -CF     Outcome Evaluation Pt is an 83 yo female admitted with lightheadedness, fatigue and acute UTI. Pt lives alone but her daughter is available to assist daily as needed. She reports owning a walker but typically furniture crawls within the home. Pt has PMH including DM2, COPD, and A fib. Upon exam, she demonstrates generalized weakness, mild balance impairments and limited endurance. Pt completed bed mobility with min A, transfers with cga and ambulated 140' with cga using a walker. She may benefit from skilled PT services while inpatient to address mobility deficits.  Recommending home with assist and HH at this time.  -CF     Row Name 07/20/22 1128          Therapy Assessment/Plan (PT)    Rehab Potential (PT) good, to achieve stated therapy goals  -CF     Criteria for Skilled Interventions Met (PT) yes  -CF     Therapy Frequency (PT) 3 times/wk  -CF     Row Name 07/20/22 1128          Vital Signs    Pre SpO2 (%) 95  -CF     O2 Delivery Pre Treatment nasal cannula  2L  -CF     Intra SpO2 (%) 90  -CF     O2 Delivery Intra Treatment nasal cannula  -CF     O2 Delivery Post Treatment nasal cannula  -CF     Row Name 07/20/22 1128          Positioning and Restraints    Pre-Treatment Position in bed  -CF     Post Treatment Position bed  -CF     In Bed notified nsg;call light within reach;encouraged to call for assist;exit alarm on;fowlers  -CF           User Key  (r) = Recorded By, (t) = Taken By, (c) = Cosigned By    Initials Name Provider Type    CF Sandra Cedillo, PT Physical Therapist               Outcome Measures     Row Name 07/20/22 1000 07/20/22 0834       How much help from another person do you currently need...    Turning from your back to your side while in flat bed without using bedrails? 4  -CF 4  -JL    Moving from lying on back to sitting on the side of a flat bed without bedrails? 3  -CF 4  -JL    Moving to and from a bed to a chair (including a wheelchair)? 3  -CF 3  -JL    Standing up from a chair using your arms (e.g., wheelchair, bedside chair)? 3  -CF 3  -JL    Climbing 3-5 steps with a railing? 2  -CF 3  -JL    To walk in hospital room? 3  -CF 3  -JL    AM-PAC 6 Clicks Score (PT) 18  -CF 20  -JL    Highest level of mobility 6 --> Walked 10 steps or more  -CF 6 --> Walked 10 steps or more  -    Row Name 07/20/22 1000          Functional Assessment    Outcome Measure Options AM-PAC 6 Clicks Basic Mobility (PT)  -CF           User Key  (r) = Recorded By, (t) = Taken By, (c) = Cosigned By    Initials Name Provider Type    JL Virgil Pepper, RN Registered Nurse      Sandra Cedillo, [place] Physical Therapist                             Physical Therapy Education                 Title: PT OT SLP Therapies (Done)     Topic: Physical Therapy (Done)     Point: Mobility training (Done)     Learning Progress Summary           Patient Acceptance, E, VU,NR by  at 7/20/2022 1000                   Point: Home exercise program (Done)     Learning Progress Summary           Patient Acceptance, E, VU,NR by CF at 7/20/2022 1000                   Point: Body mechanics (Done)     Learning Progress Summary           Patient Acceptance, E, VU,NR by  at 7/20/2022 1000                   Point: Precautions (Done)     Learning Progress Summary           Patient Acceptance, E, VU,NR by  at 7/20/2022 1000                               User Key     Initials Effective Dates Name Provider Type Discipline     06/16/21 -  Sandra Cedillo PT Physical Therapist PT              PT Recommendation and Plan  Planned Therapy Interventions (PT): balance training, bed mobility training, gait training, transfer training, ROM (range of motion), strengthening  Plan of Care Reviewed With: patient  Outcome Evaluation: Pt is an 81 yo female admitted with lightheadedness, fatigue and acute UTI. Pt lives alone but her daughter is available to assist daily as needed. She reports owning a walker but typically furniture crawls within the home. Pt has PMH including DM2, COPD, and A fib. Upon exam, she demonstrates generalized weakness, mild balance impairments and limited endurance. Pt completed bed mobility with min A, transfers with cga and ambulated 140' with cga using a walker. She may benefit from skilled PT services while inpatient to address mobility deficits. Recommending home with assist and HH at this time.     Time Calculation:    PT Charges     Row Name 07/20/22 0955             Time Calculation    Start Time 0850  -      Stop Time 0915  -      Time Calculation (min) 25 min  -      PT Received On  07/20/22  -CF      PT - Next Appointment 07/22/22  -CF      PT Goal Re-Cert Due Date 07/27/22  -CF              Time Calculation- PT    Total Timed Code Minutes- PT 16 minute(s)  -CF              Timed Charges    77252 - PT Therapeutic Activity Minutes 16  -CF              Total Minutes    Timed Charges Total Minutes 16  -CF       Total Minutes 16  -CF            User Key  (r) = Recorded By, (t) = Taken By, (c) = Cosigned By    Initials Name Provider Type    CF Sandra Cedillo, PT Physical Therapist              Therapy Charges for Today     Code Description Service Date Service Provider Modifiers Qty    91619098772 HC PT EVAL LOW COMPLEXITY 2 7/20/2022 Sandra Cedillo, PT GP 1    68828643052 HC PT THERAPEUTIC ACT EA 15 MIN 7/20/2022 Sandra Cedillo, PT GP 1          PT G-Codes  Outcome Measure Options: AM-PAC 6 Clicks Basic Mobility (PT)  AM-PAC 6 Clicks Score (PT): 18    Sandra Cedillo PT  7/20/2022

## 2022-07-20 NOTE — PROGRESS NOTES
Dedicated to Hospital Care    [phone]   LOS: 0 days     Name: Kandace Andrade  Age/Sex: 82 y.o. female  :  1940        PCP: Cassi Bella MD  No chief complaint on file.     Subjective   She does feel little better today continues to have diarrhea but confusion does seem to have improved.  General: No Fever or Chills, Cardiac: No Chest Pain or Palpitations, Resp: No Cough or SOA, GI: No Nausea, Vomiting, or Diarrhea and Other: No bleeding    aspirin, 81 mg, Oral, Daily  budesonide-formoterol, 2 puff, Inhalation, BID - RT  enoxaparin, 40 mg, Subcutaneous, Nightly  escitalopram, 20 mg, Oral, Daily  lisinopril, 20 mg, Oral, Daily  melatonin, 10 mg, Oral, Nightly  pantoprazole, 40 mg, Oral, QAM  traZODone, 75 mg, Oral, Nightly      sodium chloride 0.9 % with KCl 20 mEq, 100 mL/hr, Last Rate: 100 mL/hr (22 0104)        Objective   Vital Signs  Temp:  [97.5 °F (36.4 °C)-98.1 °F (36.7 °C)] 97.5 °F (36.4 °C)  Heart Rate:  [65-69] 65  Resp:  [16-20] 16  BP: (103-132)/(60-84) 103/60  Body mass index is 23.2 kg/m².    Intake/Output Summary (Last 24 hours) at 2022 0543  Last data filed at 2022 1844  Gross per 24 hour   Intake 293.33 ml   Output --   Net 293.33 ml       Physical Exam  Vitals and nursing note reviewed.   Constitutional:       Appearance: Normal appearance.      Comments: Frail elderly   Cardiovascular:      Rate and Rhythm: Normal rate and regular rhythm.   Pulmonary:      Effort: No respiratory distress.      Breath sounds: Normal breath sounds.   Abdominal:      General: Bowel sounds are normal. There is no distension.      Palpations: Abdomen is soft.   Neurological:      Mental Status: She is alert.           Results Review:       I reviewed the patient's new clinical results.  Results from last 7 days   Lab Units 22  0346 22  1321   WBC 10*3/mm3 7.56 9.90   HEMOGLOBIN g/dL 11.6* 13.0   PLATELETS 10*3/mm3 289 283     Results from last 7 days   Lab Units  07/20/22  0346 07/19/22  1513 07/19/22  1411   SODIUM mmol/L 142 136 136   POTASSIUM mmol/L 4.6 4.3 4.1   CHLORIDE mmol/L 109* 102 104   CO2 mmol/L 25.3 23.9 23.7   BUN mg/dL 11 12 11   CREATININE mg/dL 0.69 0.65 0.59   CALCIUM mg/dL 8.6 8.8 8.4*   Estimated Creatinine Clearance: 57.9 mL/min (by C-G formula based on SCr of 0.69 mg/dL).      Assessment & Plan   Active Hospital Problems    Diagnosis  POA   • **Acute UTI (urinary tract infection) [N39.0]  Yes   • Dehydration [E86.0]  Yes   • Abdominal pain [R10.9]  Yes   • Chronic diarrhea [K52.9]  Yes   • Generalized weakness [R53.1]  Yes   • COPD (chronic obstructive pulmonary disease) (HCC) [J44.9]  Yes   • Paroxysmal atrial fibrillation (HCC) [I48.0]  Yes   • KINDRA (obstructive sleep apnea) [G47.33]  Yes   • Essential hypertension [I10]  Yes      Resolved Hospital Problems   No resolved problems to display.       PLAN  This is an 82-year-old female with a history of microscopic colitis COPD paroxysmal A. fib and obstructive sleep apnea that presents to the hospital with weakness fatigue and altered mental status concerning for metabolic encephalopathy and recurrent UTI  1.  Metabolic encephalopathy  -Likely related to dehydration things are improving with IV hydration.  Can discontinue IV fluids this evening and continue to monitor closely.  -I do not feel she has an underlying infectious process.  She has a normal white blood cell count urinalysis is within normal limits and no evidence of other active ongoing infection  2.  Chronic diarrhea and microscopic colitis  -Continue antidiarrheal medications.  Consider addition of Questran.  -CT scan abdomen and pelvis is pending  -We will ask GI for additional recommendations for discharge.  3.  Paroxysmal A. Fib:  -Appears normal sinus rhythm for now  -Continue home medications    Disposition  Awaiting CT scan once we have those results could consider discharge home if okay with gastroenterology await their input as  well.      Wang Alvarez MD  [facility]  07/20/22  05:43 EDT

## 2022-07-20 NOTE — DISCHARGE PLACEMENT REQUEST
"Kandace Andrade (82 y.o. Female)             Date of Birth   1940    Social Security Number       Address   [address] [place] [address]    Home Phone   [phone]    MRN   [id number]       [facility]    Marital Status                               Admission Date   7/19/22    Admission Type   Urgent    Admitting Provider   Deni Warren MD    Attending Provider   Wang Alvarez MD    Department, Room/Bed   [address], [address]       Discharge Date       Discharge Disposition       Discharge Destination                               Attending Provider: Wang Alvarez MD    Allergies: Penicillins, Meclizine Hcl, Bactrim [Sulfamethoxazole-trimethoprim], Flagyl [Metronidazole], Statins    Isolation: None   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)   Code Status: Prior   Advance Care Planning Activity    Ht: 160 cm (63\")   Wt: 59.6 kg (131 lb 4.8 oz)    Admission Cmt: None   Principal Problem: None                Active Insurance as of 7/19/2022     Primary Coverage     Payor Plan Insurance Group Employer/Plan Group    HUMANA MEDICARE REPLACEMENT HUMANA MEDICARE REPLACEMENT [id number]     Payor Plan Address Payor Plan Phone Number Payor Plan Fax Number Effective Dates    [address] [address] [phone]  1/1/2018 - None Entered    [facility] [address]       Subscriber Name Subscriber Birth Date Member ID       KANDACE ANDRADE 1940 [id number]                 Emergency Contacts      (Rel.) Home Phone Work Phone Mobile Phone    millicent hernandez (Daughter) [phone] -- [phone]    TAYE LEE (Daughter) [phone] -- [phone]    Brendan Andrade (Son) -- -- [phone]              "

## 2022-07-21 ENCOUNTER — HOME HEALTH ADMISSION (OUTPATIENT)
Dept: HOME HEALTH SERVICES | Facility: HOME HEALTHCARE | Age: 82
End: 2022-07-21

## 2022-07-21 ENCOUNTER — TELEPHONE (OUTPATIENT)
Dept: GASTROENTEROLOGY | Facility: CLINIC | Age: 82
End: 2022-07-21

## 2022-07-21 LAB
ALBUMIN SERPL-MCNC: 3.1 G/DL (ref 3.5–5.2)
ANION GAP SERPL CALCULATED.3IONS-SCNC: 8.2 MMOL/L (ref 5–15)
BASOPHILS # BLD AUTO: 0.08 10*3/MM3 (ref 0–0.2)
BASOPHILS NFR BLD AUTO: 1 % (ref 0–1.5)
BUN SERPL-MCNC: 9 MG/DL (ref 8–23)
BUN/CREAT SERPL: 15.8 (ref 7–25)
CALCIUM SPEC-SCNC: 8.1 MG/DL (ref 8.6–10.5)
CHLORIDE SERPL-SCNC: 111 MMOL/L (ref 98–107)
CO2 SERPL-SCNC: 22.8 MMOL/L (ref 22–29)
CREAT SERPL-MCNC: 0.57 MG/DL (ref 0.57–1)
DEPRECATED RDW RBC AUTO: 41.4 FL (ref 37–54)
EGFRCR SERPLBLD CKD-EPI 2021: 90.3 ML/MIN/1.73
EOSINOPHIL # BLD AUTO: 0.25 10*3/MM3 (ref 0–0.4)
EOSINOPHIL NFR BLD AUTO: 3 % (ref 0.3–6.2)
ERYTHROCYTE [DISTWIDTH] IN BLOOD BY AUTOMATED COUNT: 11.8 % (ref 12.3–15.4)
GLUCOSE SERPL-MCNC: 86 MG/DL (ref 65–99)
HCT VFR BLD AUTO: 35.4 % (ref 34–46.6)
HGB BLD-MCNC: 11.4 G/DL (ref 12–15.9)
IMM GRANULOCYTES # BLD AUTO: 0.05 10*3/MM3 (ref 0–0.05)
IMM GRANULOCYTES NFR BLD AUTO: 0.6 % (ref 0–0.5)
LYMPHOCYTES # BLD AUTO: 2.42 10*3/MM3 (ref 0.7–3.1)
LYMPHOCYTES NFR BLD AUTO: 29.3 % (ref 19.6–45.3)
MCH RBC QN AUTO: 30.7 PG (ref 26.6–33)
MCHC RBC AUTO-ENTMCNC: 32.2 G/DL (ref 31.5–35.7)
MCV RBC AUTO: 95.4 FL (ref 79–97)
MONOCYTES # BLD AUTO: 0.64 10*3/MM3 (ref 0.1–0.9)
MONOCYTES NFR BLD AUTO: 7.8 % (ref 5–12)
NEUTROPHILS NFR BLD AUTO: 4.81 10*3/MM3 (ref 1.7–7)
NEUTROPHILS NFR BLD AUTO: 58.3 % (ref 42.7–76)
NRBC BLD AUTO-RTO: 0 /100 WBC (ref 0–0.2)
PHOSPHATE SERPL-MCNC: 3.7 MG/DL (ref 2.5–4.5)
PLATELET # BLD AUTO: 272 10*3/MM3 (ref 140–450)
PMV BLD AUTO: 10.4 FL (ref 6–12)
POTASSIUM SERPL-SCNC: 4.4 MMOL/L (ref 3.5–5.2)
RBC # BLD AUTO: 3.71 10*6/MM3 (ref 3.77–5.28)
SODIUM SERPL-SCNC: 142 MMOL/L (ref 136–145)
WBC NRBC COR # BLD: 8.25 10*3/MM3 (ref 3.4–10.8)

## 2022-07-21 PROCEDURE — 80069 RENAL FUNCTION PANEL: CPT | Performed by: HOSPITALIST

## 2022-07-21 PROCEDURE — G0378 HOSPITAL OBSERVATION PER HR: HCPCS

## 2022-07-21 PROCEDURE — 96372 THER/PROPH/DIAG INJ SC/IM: CPT

## 2022-07-21 PROCEDURE — 96361 HYDRATE IV INFUSION ADD-ON: CPT

## 2022-07-21 PROCEDURE — 99214 OFFICE O/P EST MOD 30 MIN: CPT | Performed by: INTERNAL MEDICINE

## 2022-07-21 PROCEDURE — 25010000002 ENOXAPARIN PER 10 MG: Performed by: NURSE PRACTITIONER

## 2022-07-21 PROCEDURE — 94664 DEMO&/EVAL PT USE INHALER: CPT

## 2022-07-21 PROCEDURE — 25010000002 SODIUM CHLORIDE 0.9 % WITH KCL 20 MEQ 20-0.9 MEQ/L-% SOLUTION: Performed by: NURSE PRACTITIONER

## 2022-07-21 PROCEDURE — 85025 COMPLETE CBC W/AUTO DIFF WBC: CPT | Performed by: HOSPITALIST

## 2022-07-21 PROCEDURE — 94799 UNLISTED PULMONARY SVC/PX: CPT

## 2022-07-21 PROCEDURE — 94761 N-INVAS EAR/PLS OXIMETRY MLT: CPT

## 2022-07-21 RX ORDER — CALCIUM POLYCARBOPHIL 625 MG
1250 TABLET ORAL DAILY
Status: DISCONTINUED | OUTPATIENT
Start: 2022-07-21 | End: 2022-07-22 | Stop reason: HOSPADM

## 2022-07-21 RX ADMIN — ACETAMINOPHEN 1000 MG: 500 TABLET ORAL at 12:24

## 2022-07-21 RX ADMIN — TRAZODONE HYDROCHLORIDE 75 MG: 50 TABLET ORAL at 20:24

## 2022-07-21 RX ADMIN — ESCITALOPRAM 20 MG: 20 TABLET, FILM COATED ORAL at 09:14

## 2022-07-21 RX ADMIN — BUDESONIDE AND FORMOTEROL FUMARATE DIHYDRATE 2 PUFF: 80; 4.5 AEROSOL RESPIRATORY (INHALATION) at 20:08

## 2022-07-21 RX ADMIN — BUDESONIDE AND FORMOTEROL FUMARATE DIHYDRATE 2 PUFF: 80; 4.5 AEROSOL RESPIRATORY (INHALATION) at 07:15

## 2022-07-21 RX ADMIN — ASPIRIN 81 MG: 81 TABLET, CHEWABLE ORAL at 09:14

## 2022-07-21 RX ADMIN — PANTOPRAZOLE SODIUM 40 MG: 40 TABLET, DELAYED RELEASE ORAL at 06:06

## 2022-07-21 RX ADMIN — ENOXAPARIN SODIUM 40 MG: 100 INJECTION SUBCUTANEOUS at 20:24

## 2022-07-21 RX ADMIN — Medication 10 MG: at 20:24

## 2022-07-21 RX ADMIN — POTASSIUM CHLORIDE AND SODIUM CHLORIDE 100 ML/HR: 900; 150 INJECTION, SOLUTION INTRAVENOUS at 06:31

## 2022-07-21 NOTE — TELEPHONE ENCOUNTER
----- Message from Kandace Andrade sent at 7/21/2022 10:53 AM EDT -----  Regarding: Cancel CT  Kandace Andrade had CT scan yesterday at [facility] where she is currently admitted. Please cancel her CT scheduled for July 27.  Thanks  Tracey Holland

## 2022-07-21 NOTE — PROGRESS NOTES
[facility]  Inpatient Progress Note    Reason for Follow Up: Diarrhea, history of colitis    Subjective     Interval History:   CT scan yesterday with evidence of diverticulosis, no diverticulitis.  Still having formed stools.  No diarrhea like she was having prior to admission.  Says she is feeling better.    Current Facility-Administered Medications:   •  acetaminophen (TYLENOL) tablet 1,000 mg, 1,000 mg, Oral, Q6H PRN, Lesly Flynn APRN, 1,000 mg at 07/20/22 2008  •  aspirin chewable tablet 81 mg, 81 mg, Oral, Daily, Lesly Flynn APRN, 81 mg at 07/21/22 0914  •  budesonide-formoterol (SYMBICORT) 80-4.5 MCG/ACT inhaler 2 puff, 2 puff, Inhalation, BID - RT, Lesly Flynn APRN, 2 puff at 07/21/22 0715  •  calcium carbonate (TUMS) chewable tablet 500 mg (200 mg elemental), 1 tablet, Oral, TID PRN, Lesly Flynn APRN  •  Enoxaparin Sodium (LOVENOX) syringe 40 mg, 40 mg, Subcutaneous, Nightly, Lesly Flynn APRN, 40 mg at 07/20/22 2008  •  escitalopram (LEXAPRO) tablet 20 mg, 20 mg, Oral, Daily, Lesly Flynn APRN, 20 mg at 07/21/22 0914  •  fluticasone (FLONASE) 50 MCG/ACT nasal spray 2 spray, 2 spray, Nasal, Daily PRN, Lesly Flynn APRN  •  lisinopril (PRINIVIL,ZESTRIL) tablet 20 mg, 20 mg, Oral, Daily, Lesly Flynn APRN, 20 mg at 07/20/22 0838  •  melatonin tablet 10 mg, 10 mg, Oral, Nightly, Lesly Flynn APRN, 10 mg at 07/20/22 2008  •  nitroglycerin (NITROSTAT) SL tablet 0.4 mg, 0.4 mg, Sublingual, Q5 Min PRN, Lesly Flynn APRN  •  ondansetron (ZOFRAN) injection 4 mg, 4 mg, Intravenous, Q6H PRN, Lesly Flynn, APRROQUE  •  ondansetron (ZOFRAN) injection 4 mg, 4 mg, Intravenous, Q6H PRN, Lesly Flynn, ANTOINETTE  •  pantoprazole (PROTONIX) EC tablet 40 mg, 40 mg, Oral, QAM, Lesly Flynn, ANTOINETTE, 40 mg at 07/21/22 0606  •  sodium chloride 0.9 % with KCl 20 mEq/L infusion, 100 mL/hr, Intravenous, Continuous,  Lesly Flynn APRN, Last Rate: 100 mL/hr at 07/21/22 0631, 100 mL/hr at 07/21/22 0631  •  traZODone (DESYREL) tablet 75 mg, 75 mg, Oral, Nightly, Lesly Flynn APRN, 75 mg at 07/20/22 2008  Review of Systems:   All systems reviewed and negative except for: Constitution: Malaise      Objective     Vital Signs  Temp:  [97.6 °F (36.4 °C)-98 °F (36.7 °C)] 98 °F (36.7 °C)  Heart Rate:  [61-76] 61  Resp:  [16-18] 16  BP: (102-152)/(43-99) 102/43  Body mass index is 24.29 kg/m².    Intake/Output Summary (Last 24 hours) at 7/21/2022 0916  Last data filed at 7/20/2022 1749  Gross per 24 hour   Intake 1000 ml   Output 300 ml   Net 700 ml     No intake/output data recorded.     Physical Exam:   General: patient awake, alert and cooperative   Eyes: Normal lids and lashes, no scleral icterus   Neck: supple, normal ROM   Skin: warm and dry, not jaundiced   Cardiovascular: regular rhythm and rate, no murmurs auscultated   Pulm: clear to auscultation bilaterally, regular and unlabored   Abdomen: soft, nontender, nondistended; normal bowel sounds   Rectal: deferred   Extremities: no rash or edema   Psychiatric: Normal mood and behavior; memory intact     Results Review:     I reviewed the patient's new clinical results.    Results from last 7 days   Lab Units 07/21/22  0338 07/20/22  0346 07/19/22  1321   WBC 10*3/mm3 8.25 7.56 9.90   HEMOGLOBIN g/dL 11.4* 11.6* 13.0   HEMATOCRIT % 35.4 34.7 39.0   PLATELETS 10*3/mm3 272 289 283     Results from last 7 days   Lab Units 07/21/22  0338 07/20/22  0346 07/19/22  1513 07/19/22  1411   SODIUM mmol/L 142 142 136 136   POTASSIUM mmol/L 4.4 4.6 4.3 4.1   CHLORIDE mmol/L 111* 109* 102 104   CO2 mmol/L 22.8 25.3 23.9 23.7   BUN mg/dL 9 11 12 11   CREATININE mg/dL 0.57 0.69 0.65 0.59   CALCIUM mg/dL 8.1* 8.6 8.8 8.4*   BILIRUBIN mg/dL  --   --  0.2 <0.2   ALK PHOS U/L  --   --  68 65   ALT (SGPT) U/L  --   --  11 8   AST (SGOT) U/L  --   --  16 12   GLUCOSE mg/dL 86 88 80 83      Results from last 7 days   Lab Units 07/19/22  1321   INR  1.07     Lab Results   Lab Value Date/Time    LIPASE 37 08/23/2021 1320    LIPASE 20 11/24/2020 1320       Radiology:  CT Abdomen Pelvis With Contrast   Final Result       1.  Colonic diverticulosis without CT evidence of acute diverticulitis.   No bowel obstruction.   2.  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       This report was finalized on 7/20/2022 6:16 PM by Dr. Kamla Weston M.D.              Assessment & Plan     Patient Active Problem List   Diagnosis   • Vitamin D deficiency   • KINDRA (obstructive sleep apnea)   • IBS (irritable bowel syndrome)   • Essential hypertension   • Herpes zoster   • Arthritis   • Anxiety and depression   • Other emphysema ([facility])   • Acute seasonal allergic rhinitis due to pollen   • Paroxysmal atrial fibrillation ([facility])   • Osteoporosis   • Mixed hyperlipidemia   • Encounter for Medicare annual wellness exam   • COPD (chronic obstructive pulmonary disease) ([facility])   • Mixed stress and urge urinary incontinence   • Type 2 diabetes mellitus without complication, without long-term current use of insulin ([facility])   • Arthritis of both hips   • Autonomic neuropathy   • GERD without esophagitis   • C. difficile colitis   • Hypokalemia   • Hospital discharge follow-up   • Decreased hearing of both ears   • Dizziness   • Nausea   • Hypercholesterolemia   • Primary insomnia   • Generalized weakness   • Other microscopic hematuria   • Acute cystitis with hematuria   • Confusion   • Dehydration   • Abdominal pain   • Chronic diarrhea   • Metabolic encephalopathy       Impression  1.  Acute on chronic diarrhea: Interestingly, overall improved since she has been in the hospital    2.  Generalized abdominal pain: Improving    3.  Recent UTI    Plan  Continue to monitor stools  Start daily fiber supplementation  Okay for as needed Imodium to control bowel movements  No indication for repeat  endoscopy at present    I discussed the patients findings and my recommendations with patient and nursing staff.    All necessary PPE, including face mask and eye protection, were worn during this encounter.  Hand sanitization was performed both before and after the patient interaction.    Over 25 minutes was spent reviewing the patient's chart and records, in discussion with the patient, in examination of the patient, and in discussion with members of the patient's medical team.    Tonja Tanner MD

## 2022-07-21 NOTE — TELEPHONE ENCOUNTER
Called [facility] scheduling and spoke with Tracey and cancelled pt's ct scan for 07/27.    Update sent to Dr Duggan.

## 2022-07-21 NOTE — PROGRESS NOTES
" LOS: 0 days     Name: Kandace Andrade  Age: 82 y.o.  Sex: female  :  1940  MRN: [id number]         Primary Care Physician: Cassi Bella MD    Subjective   Subjective  Diarrhea has resolved.  Denies any pain.  Oral intake adequate.  She is hopeful for discharge home soon.    Objective   Vital Signs  Temp:  [97.6 °F (36.4 °C)-98 °F (36.7 °C)] 98 °F (36.7 °C)  Heart Rate:  [61-76] 61  Resp:  [16-18] 16  BP: (102-152)/(43-99) 102/43  Body mass index is 24.29 kg/m².    Objective:  General Appearance:  Comfortable and in no acute distress.    Vital signs: (most recent): Blood pressure 102/43, pulse 61, temperature 98 °F (36.7 °C), temperature source Oral, resp. rate 16, height 160 cm (63\"), weight 62.2 kg (137 lb 2 oz), SpO2 95 %.    Lungs:  Normal effort and normal respiratory rate.    Heart: Normal rate.  Regular rhythm.    Abdomen: Abdomen is soft.  Bowel sounds are normal.   There is no abdominal tenderness.     Extremities: There is no dependent edema or local swelling.    Neurological: Patient is alert and oriented to person, place and time.    Skin:  Warm and dry.              Results Review:       I reviewed the patient's new clinical results.    Results from last 7 days   Lab Units 22  0338 22  0346 22  1321   WBC 10*3/mm3 8.25 7.56 9.90   HEMOGLOBIN g/dL 11.4* 11.6* 13.0   PLATELETS 10*3/mm3 272 289 283     Results from last 7 days   Lab Units 22  0338 22  0346 22  1513 22  1411   SODIUM mmol/L 142 142 136 136   POTASSIUM mmol/L 4.4 4.6 4.3 4.1   CHLORIDE mmol/L 111* 109* 102 104   CO2 mmol/L 22.8 25.3 23.9 23.7   BUN mg/dL 9 11 12 11   CREATININE mg/dL 0.57 0.69 0.65 0.59   CALCIUM mg/dL 8.1* 8.6 8.8 8.4*   GLUCOSE mg/dL 86 88 80 83     Results from last 7 days   Lab Units 22  1321   INR  1.07             Scheduled Meds:   aspirin, 81 mg, Oral, Daily  budesonide-formoterol, 2 puff, Inhalation, BID - RT  enoxaparin, 40 mg, Subcutaneous, " Nightly  escitalopram, 20 mg, Oral, Daily  lisinopril, 20 mg, Oral, Daily  melatonin, 10 mg, Oral, Nightly  pantoprazole, 40 mg, Oral, QAM  traZODone, 75 mg, Oral, Nightly      PRN Meds:   •  acetaminophen  •  calcium carbonate  •  fluticasone  •  nitroglycerin  •  ondansetron  •  ondansetron  Continuous Infusions:       Assessment & Plan   Active Hospital Problems    Diagnosis  POA   • **Abdominal pain [R10.9]  Yes   • Metabolic encephalopathy [G93.41]  Unknown   • Dehydration [E86.0]  Yes   • Chronic diarrhea [K52.9]  Yes   • Generalized weakness [R53.1]  Yes   • COPD (chronic obstructive pulmonary disease) (HCC) [J44.9]  Yes   • Paroxysmal atrial fibrillation (HCC) [I48.0]  Yes   • KINDRA (obstructive sleep apnea) [G47.33]  Yes   • Essential hypertension [I10]  Yes      Resolved Hospital Problems   No resolved problems to display.       Assessment & Plan    This is an 82-year-old female with a history of microscopic colitis COPD paroxysmal A. fib and obstructive sleep apnea that presents to the hospital with weakness fatigue and altered mental status concerning for metabolic encephalopathy and recurrent UTI  1.  Metabolic encephalopathy  -Likely related to dehydration things are improving with IV hydration.  Stop IV fluids  -No evidence of active infectious process  2.  Chronic diarrhea and microscopic colitis  -Continue antidiarrheal medications.  Diarrhea appears to have resolved at this point  -CT scan abdomen and pelvis shows no acute changes  -GI considering flexible sigmoidoscopy today  3.  Paroxysmal A. Fib:  -Appears normal sinus rhythm for now  -Continue home medications     Disposition  Plans to return home upon discharge.  Await input from GI regarding flex sig      I wore full protective equipment throughout the patient encounter including eye protection and facemask.  Hand hygiene was performed before donning protective equipment and after removal when leaving the room.    Phong Adan,  MD [facility]  07/21/22  11:03 EDT

## 2022-07-22 ENCOUNTER — READMISSION MANAGEMENT (OUTPATIENT)
Dept: CALL CENTER | Facility: HOSPITAL | Age: 82
End: 2022-07-22

## 2022-07-22 VITALS
RESPIRATION RATE: 18 BRPM | BODY MASS INDEX: 24.26 KG/M2 | OXYGEN SATURATION: 94 % | DIASTOLIC BLOOD PRESSURE: 74 MMHG | WEIGHT: 136.91 LBS | HEART RATE: 63 BPM | SYSTOLIC BLOOD PRESSURE: 143 MMHG | TEMPERATURE: 98.2 F | HEIGHT: 63 IN

## 2022-07-22 PROCEDURE — 94664 DEMO&/EVAL PT USE INHALER: CPT

## 2022-07-22 PROCEDURE — G0378 HOSPITAL OBSERVATION PER HR: HCPCS

## 2022-07-22 PROCEDURE — 99214 OFFICE O/P EST MOD 30 MIN: CPT | Performed by: PHYSICIAN ASSISTANT

## 2022-07-22 PROCEDURE — 94799 UNLISTED PULMONARY SVC/PX: CPT

## 2022-07-22 PROCEDURE — 94761 N-INVAS EAR/PLS OXIMETRY MLT: CPT

## 2022-07-22 RX ORDER — CALCIUM POLYCARBOPHIL 625 MG
1250 TABLET ORAL DAILY
Qty: 60 TABLET | Refills: 0 | Status: SHIPPED | OUTPATIENT
Start: 2022-07-23 | End: 2022-08-22

## 2022-07-22 RX ADMIN — CALCIUM POLYCARBOPHIL 1250 MG: 625 TABLET, FILM COATED ORAL at 00:55

## 2022-07-22 RX ADMIN — ESCITALOPRAM 20 MG: 20 TABLET, FILM COATED ORAL at 08:24

## 2022-07-22 RX ADMIN — ASPIRIN 81 MG: 81 TABLET, CHEWABLE ORAL at 08:24

## 2022-07-22 RX ADMIN — BUDESONIDE AND FORMOTEROL FUMARATE DIHYDRATE 2 PUFF: 80; 4.5 AEROSOL RESPIRATORY (INHALATION) at 07:11

## 2022-07-22 RX ADMIN — LISINOPRIL 20 MG: 20 TABLET ORAL at 08:24

## 2022-07-22 RX ADMIN — PANTOPRAZOLE SODIUM 40 MG: 40 TABLET, DELAYED RELEASE ORAL at 06:12

## 2022-07-22 RX ADMIN — ACETAMINOPHEN 1000 MG: 500 TABLET ORAL at 00:03

## 2022-07-22 NOTE — PROGRESS NOTES
[facility]  Inpatient Progress Note    Reason for Follow-up: Diarrhea, history of colitis    Subjective     Interval History:   Sitting up in bedside.  No further episodes of diarrhea.  She reports her bowels are moving and are mushy in nature.  All in all states she feels better and is ready to go home    Current Facility-Administered Medications:   •  acetaminophen (TYLENOL) tablet 1,000 mg, 1,000 mg, Oral, Q6H PRN, Lesly Flynn APRN, 1,000 mg at 07/22/22 0003  •  aspirin chewable tablet 81 mg, 81 mg, Oral, Daily, Lesly Flynn APRN, 81 mg at 07/22/22 0824  •  budesonide-formoterol (SYMBICORT) 80-4.5 MCG/ACT inhaler 2 puff, 2 puff, Inhalation, BID - RT, Lesly Flynn APRN, 2 puff at 07/22/22 0711  •  calcium carbonate (TUMS) chewable tablet 500 mg (200 mg elemental), 1 tablet, Oral, TID PRN, Lesly Flynn APRN  •  Enoxaparin Sodium (LOVENOX) syringe 40 mg, 40 mg, Subcutaneous, Nightly, Lesly Flynn APRN, 40 mg at 07/21/22 2024  •  escitalopram (LEXAPRO) tablet 20 mg, 20 mg, Oral, Daily, Lesly Flynn APRN, 20 mg at 07/22/22 0824  •  fluticasone (FLONASE) 50 MCG/ACT nasal spray 2 spray, 2 spray, Nasal, Daily PRN, Lesly Flynn APRN  •  lisinopril (PRINIVIL,ZESTRIL) tablet 20 mg, 20 mg, Oral, Daily, Lesly Flynn APRN, 20 mg at 07/22/22 0824  •  melatonin tablet 10 mg, 10 mg, Oral, Nightly, Lesly Flynn APRN, 10 mg at 07/21/22 2024  •  nitroglycerin (NITROSTAT) SL tablet 0.4 mg, 0.4 mg, Sublingual, Q5 Min PRN, Lesly Flynn APRN  •  ondansetron (ZOFRAN) injection 4 mg, 4 mg, Intravenous, Q6H PRN, Lesly Flynn, ANTOINETTE  •  ondansetron (ZOFRAN) injection 4 mg, 4 mg, Intravenous, Q6H PRN, Lesly Flynn APRN  •  pantoprazole (PROTONIX) EC tablet 40 mg, 40 mg, Oral, QAM, Lesly Flynn APRN, 40 mg at 07/22/22 0612  •  polycarbophil tablet 1,250 mg, 1,250 mg, Oral, Daily, Tonja Tanner MD, 1,250 mg at  07/22/22 0055  •  traZODone (DESYREL) tablet 75 mg, 75 mg, Oral, Nightly, Afua Flynnbejerod ORTEGA, APRN, 75 mg at 07/21/22 2024  Review of Systems:    Constitutional: No fevers, chills, sweats   Respiratory: No shortness of breath, cough   Cardiovascular: No Chest pain, palpitations   Gastrointestinal: No nausea, vomiting, diarrhea   Genitourinary: No hematuria, dysuria    Objective     Vital Signs  Temp:  [97.5 °F (36.4 °C)-98.2 °F (36.8 °C)] 98.2 °F (36.8 °C)  Heart Rate:  [63-78] 63  Resp:  [16-18] 18  BP: (110-156)/(60-97) 143/74  Body mass index is 24.25 kg/m².    Intake/Output Summary (Last 24 hours) at 7/22/2022 1034  Last data filed at 7/21/2022 2024  Gross per 24 hour   Intake 728 ml   Output --   Net 728 ml     No intake/output data recorded.     Physical Exam:   General: Awake, alert and conversive. No acute distress.   Eyes: Normal lids and lashes, no scleral icterus.   Neck: Supple and symmetric. Trachea midline.    Skin: Warm and dry, not jaundiced.    Cardiovascular: Regular rate and rhythm.    Pulm: Quiet, even, nonlabored breathing.    Abdomen: Soft, nondistended, nontender. No rebound or guarding.    Extremities: No rashes or edema.   Psychiatric: Appropriate mood and affect. Cooperative.     Results Review:     I reviewed the patient's new clinical results.    Results from last 7 days   Lab Units 07/21/22  0338 07/20/22  0346 07/19/22  1321   WBC 10*3/mm3 8.25 7.56 9.90   HEMOGLOBIN g/dL 11.4* 11.6* 13.0   HEMATOCRIT % 35.4 34.7 39.0   PLATELETS 10*3/mm3 272 289 283     Results from last 7 days   Lab Units 07/21/22  0338 07/20/22  0346 07/19/22  1513 07/19/22  1411   SODIUM mmol/L 142 142 136 136   POTASSIUM mmol/L 4.4 4.6 4.3 4.1   CHLORIDE mmol/L 111* 109* 102 104   CO2 mmol/L 22.8 25.3 23.9 23.7   BUN mg/dL 9 11 12 11   CREATININE mg/dL 0.57 0.69 0.65 0.59   CALCIUM mg/dL 8.1* 8.6 8.8 8.4*   BILIRUBIN mg/dL  --   --  0.2 <0.2   ALK PHOS U/L  --   --  68 65   ALT (SGPT) U/L  --   --  11 8   AST  (SGOT) U/L  --   --  16 12   GLUCOSE mg/dL 86 88 80 83     Results from last 7 days   Lab Units 07/19/22  1321   INR  1.07     Lab Results   Lab Value Date/Time    LIPASE 37 08/23/2021 1320    LIPASE 20 11/24/2020 1320       Radiology:  CT Abdomen Pelvis With Contrast   Final Result       1.  Colonic diverticulosis without CT evidence of acute diverticulitis.   No bowel obstruction.   2.  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       This report was finalized on 7/20/2022 6:16 PM by Dr. Kamla Weston M.D.              Assessment & Plan     Patient Active Problem List   Diagnosis   • Vitamin D deficiency   • KINDRA (obstructive sleep apnea)   • IBS (irritable bowel syndrome)   • Essential hypertension   • Herpes zoster   • Arthritis   • Anxiety and depression   • Other emphysema ([facility]   • Acute seasonal allergic rhinitis due to pollen   • Paroxysmal atrial fibrillation ([facility])   • Osteoporosis   • Mixed hyperlipidemia   • Encounter for Medicare annual wellness exam   • COPD (chronic obstructive pulmonary disease) ([facility])   • Mixed stress and urge urinary incontinence   • Type 2 diabetes mellitus without complication, without long-term current use of insulin ([facility])   • Arthritis of both hips   • Autonomic neuropathy   • GERD without esophagitis   • C. difficile colitis   • Hypokalemia   • Hospital discharge follow-up   • Decreased hearing of both ears   • Dizziness   • Nausea   • Hypercholesterolemia   • Primary insomnia   • Generalized weakness   • Other microscopic hematuria   • Acute cystitis with hematuria   • Confusion   • Dehydration   • Abdominal pain   • Chronic diarrhea   • Metabolic encephalopathy       Assessment:  1. Acute on chronic diarrhea: Improved  2. Generalized abdominal pain, abated  3. Recent UTI      Plan:  · Okay for discharge from a GI perspective.  · Recommend addition of daily fiber supplementation.  · Diet as tolerated.  · Follow-up has been arranged  for August 8, 2022 1:30 PM    I discussed the patient's findings and my recommendations with patient and family.    Dragon dictation used throughout this note.            MICHAEL Hou  [facility]  [address] [place] [address]  Office: [phone]

## 2022-07-22 NOTE — PLAN OF CARE
Goal Outcome Evaluation:  Plan of Care Reviewed With: patient           Outcome Evaluation: VSS. A&Ox4. Prn medication given for pain. No c/o sob. Q2 turn. No falls. All needs known. Poss d/c today.

## 2022-07-22 NOTE — DISCHARGE SUMMARY
Date of Admission: 7/19/2022  Date of Discharge:  7/22/2022  Primary Care Physician: Cassi Bella MD     Discharge Diagnosis:  Active Hospital Problems    Diagnosis  POA   • **Abdominal pain [R10.9]  Yes   • Metabolic encephalopathy [G93.41]  Unknown   • Dehydration [E86.0]  Yes   • Chronic diarrhea [K52.9]  Yes   • Generalized weakness [R53.1]  Yes   • COPD (chronic obstructive pulmonary disease) (HCC) [J44.9]  Yes   • Paroxysmal atrial fibrillation (HCC) [I48.0]  Yes   • KINDRA (obstructive sleep apnea) [G47.33]  Yes   • Essential hypertension [I10]  Yes      Resolved Hospital Problems   No resolved problems to display.       DETAILS OF HOSPITAL STAY     Pertinent Test Results and Procedures Performed    CT scan of the abdomen and pelvis:  1.  Colonic diverticulosis without CT evidence of acute diverticulitis.   No bowel obstruction.   2.  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     Hospital Course  This is a very pleasant 82-year-old female who was admitted to the hospital with failure to thrive in the outpatient setting following treatment of urinary tract infection with Macrobid.  She also has a history of chronic diarrhea and microscopic colitis.  Upon presentation she was found to have an acute toxic/metabolic encephalopathy and was dehydrated.  Sedating/centrally acting medications were discontinued and she was provided supportive care with IV fluids.  Her mentation is now back to baseline.  Gastroenterology was consulted for the diarrhea which has resolved since admission to the hospital.  She did have a CT scan of the abdomen pelvis that was unremarkable for any acute abnormalities.  She is now having formed bowel movements.  For now GI will defer any additional work-up and she will be followed in the outpatient setting.  She will be released today in stable condition.      Physical Exam at Discharge:  General: No acute distress, AAOx3  HEENT: EOMI,  PERRL  Cardiovascular: +s1 and s2, RRR  Lungs: No rhonchi or wheezing  Abdomen: soft, nontender    Consults:   Consults     Date and Time Order Name Status Description    7/20/2022 10:45 AM Inpatient Gastroenterology Consult Completed             Condition on Discharge: Stable, improved    Discharge Disposition  Home or Self Care    Discharge Medications     Discharge Medications      New Medications      Instructions Start Date   polycarbophil 625 MG tablet tablet   1,250 mg, Oral, Daily   Start Date: July 23, 2022        Continue These Medications      Instructions Start Date   acetaminophen 500 MG tablet  Commonly known as: TYLENOL   1,000 mg, Oral, Every 6 Hours PRN      aspirin 81 MG chewable tablet   81 mg, Oral, Daily      calcium carbonate 500 MG chewable tablet  Commonly known as: TUMS   1 tablet, Oral, Daily      escitalopram 20 MG tablet  Commonly known as: Lexapro   20 mg, Oral, Daily      ezetimibe 10 MG tablet  Commonly known as: ZETIA   10 mg, Oral, Daily      fluticasone 50 MCG/ACT nasal spray  Commonly known as: FLONASE   2 sprays, Nasal, Daily PRN      lisinopril 20 MG tablet  Commonly known as: PRINIVIL,ZESTRIL   20 mg, Oral, Daily      Melatonin 10 MG tablet   Oral, Nightly      omeprazole 40 MG capsule  Commonly known as: priLOSEC   40 mg, Oral, Daily      Symbicort 80-4.5 MCG/ACT inhaler  Generic drug: budesonide-formoterol   No dose, route, or frequency recorded.      traZODone 50 MG tablet  Commonly known as: DESYREL   TAKE 1/2 TO 1 TABLET BY MOUTH EVERY NIGHT 1 HOUR BEFORE BEDTIME      vitamin D 1.25 MG (00709 UT) capsule capsule  Commonly known as: ERGOCALCIFEROL   TAKE 1 CAPSULE BY MOUTH EVERY 7 DAYS         Stop These Medications    Budesonide 3 MG 24 hr capsule  Commonly known as: Entocort EC     cyclobenzaprine 10 MG tablet  Commonly known as: FLEXERIL     FIBER COMPLETE PO            Discharge Diet:   Diet Instructions     Diet: Regular      Discharge Diet: Regular          Activity at  Discharge:   Activity Instructions     Activity as Tolerated            Follow-up Appointments  Future Appointments   Date Time Provider Department Center   8/11/2022  9:30 AM Jarrod Duggan MD [facility]   8/18/2022  1:00 PM Cassi Bella MD [id number]   9/12/2022  1:00 PM Vivi Downs, DNP, APRN [id number]     Additional Instructions for the Follow-ups that You Need to Schedule     Discharge Follow-up with PCP   As directed       Currently Documented PCP:    Cassi Bella MD    PCP Phone Number:    [phone]     Follow Up Details: 1 week         Discharge Follow-up with Specialty: Gastroenterology as needed   As directed      Specialty: Gastroenterology as needed               I have examined and discussed discharge planning with the patient today.    I wore full protective equipment throughout the patient encounter including eye protection and facemask.  Hand hygiene was performed before donning protective equipment and after removal when leaving the room.     Phong Adan MD  07/22/22  09:44 EDT    Time: Discharge greater than 30 min

## 2022-07-23 NOTE — OUTREACH NOTE
Prep Survey    Flowsheet Row Responses   [facility] patient discharged from? [place]   Is LACE score < 7 ? No   Emergency Room discharge w/ pulse ox? No   Eligibility [facility]   Date of Admission 07/19/22   Date of Discharge 07/22/22   Discharge Disposition Home-Health Care [facility]   Discharge diagnosis metabolic encephalopathy, dehydration, chronic diarrhea, generalized weakness   Does the patient have one of the following disease processes/diagnoses(primary or secondary)? Other   Does the patient have Home health ordered? Yes   What is the Home health agency?  [facility]    Is there a DME ordered? No   Prep survey completed? Yes          GISELE GAO - Registered Nurse

## 2022-07-25 ENCOUNTER — TRANSITIONAL CARE MANAGEMENT TELEPHONE ENCOUNTER (OUTPATIENT)
Dept: CALL CENTER | Facility: HOSPITAL | Age: 82
End: 2022-07-25

## 2022-07-25 NOTE — OUTREACH NOTE
Call Center TCM Note    Flowsheet Row Responses   [facility] [place] patient discharged from? [place]   Does the patient have one of the following disease processes/diagnoses(primary or secondary)? Other   TCM attempt successful? Yes   Call start time 1434   Call end time 1447   Discharge diagnosis metabolic encephalopathy, dehydration, chronic diarrhea, generalized weakness   Person spoke with today (if not patient) and relationship patient   Meds reviewed with patient/caregiver? Yes   Is the patient having any side effects they believe may be caused by any medication additions or changes? No   Does the patient have all medications ordered at discharge? Yes   Is the patient taking all medications as directed (includes completed medication regime)? Yes   Does the patient have a primary care provider?  Yes   Does the patient have an appointment with their PCP within 7 days of discharge? Greater than 7 days   Comments regarding PCP Scheduled a hospital followup on 8/4/2022 with Dr Bella    What is preventing the patient from scheduling follow up appointments within 7 days of discharge? Earlier appointment not available   Nursing Interventions Verified appointment date/time/provider   Has the patient kept scheduled appointments due by today? N/A   Has home health visited the patient within 72 hours of discharge? Yes   Psychosocial issues? No   Did the patient receive a copy of their discharge instructions? Yes   Nursing interventions Reviewed instructions with patient   What is the patient's perception of their health status since discharge? Improving   Is the patient/caregiver able to teach back signs and symptoms related to disease process for when to call PCP? Yes   Is the patient/caregiver able to teach back signs and symptoms related to disease process for when to call 911? Yes   Is the patient/caregiver able to teach back the hierarchy of who to call/visit for symptoms/problems? PCP, Specialist, Home health  nurse, Urgent Care, ED, 911 Yes   If the patient is a current smoker, are they able to teach back resources for cessation? Not a smoker   TCM call completed? Yes   Wrap up additional comments Feeling alittle stronger. Diarrhea has slowed down.           Omer Jones RN    7/25/2022, 14:48 EDT

## 2022-07-27 ENCOUNTER — APPOINTMENT (OUTPATIENT)
Dept: CT IMAGING | Facility: HOSPITAL | Age: 82
End: 2022-07-27

## 2022-08-02 ENCOUNTER — READMISSION MANAGEMENT (OUTPATIENT)
Dept: CALL CENTER | Facility: HOSPITAL | Age: 82
End: 2022-08-02

## 2022-08-02 NOTE — OUTREACH NOTE
"Medical Week 2 Survey    Flowsheet Row Responses   [facility] patient discharged from? [place]   Does the patient have one of the following disease processes/diagnoses(primary or secondary)? Other   Week 2 attempt successful? Yes   Call start time 1548   Discharge diagnosis metabolic encephalopathy, dehydration, chronic diarrhea, generalized weakness   Call end time 1553   Person spoke with today (if not patient) and relationship Patient and daughter   Is the patient taking all medications as directed (includes completed medication regime)? Yes   Does the patient have a primary care provider?  Yes   Does the patient have an appointment with their PCP within 7 days of discharge? Greater than 7 days   Has the patient kept scheduled appointments due by today? N/A   Comments Has appt with PCP tomorrow   Psychosocial issues? No   What is the patient's perception of their health status since discharge? New symptoms unrelated to diagnosis   Is the patient/caregiver able to teach back signs and symptoms related to disease process for when to call PCP? Yes   Is the patient/caregiver able to teach back signs and symptoms related to disease process for when to call 911? Yes   Is the patient/caregiver able to teach back the hierarchy of who to call/visit for symptoms/problems? PCP, Specialist, Home health nurse, Urgent Care, ED, 911 Yes   If the patient is a current smoker, are they able to teach back resources for cessation? Not a smoker   Additional teach back comments States she has feet swelling and it goes all the way to her knees.  She is keeping elevated.  Her bp today is 133/87.  Her breathing has been \"ok\" but having to use her inhaler more but it has helped.    Week 2 Call Completed? Yes   Wrap up additional comments Pt to discuss new symptoms with PCP tomorrow.          RENNY THORNTON - Licensed Nurse  "

## 2022-08-04 ENCOUNTER — OFFICE VISIT (OUTPATIENT)
Dept: FAMILY MEDICINE CLINIC | Facility: CLINIC | Age: 82
End: 2022-08-04

## 2022-08-04 VITALS
WEIGHT: 128.6 LBS | OXYGEN SATURATION: 94 % | HEART RATE: 77 BPM | TEMPERATURE: 99.1 F | HEIGHT: 63 IN | DIASTOLIC BLOOD PRESSURE: 64 MMHG | BODY MASS INDEX: 22.79 KG/M2 | RESPIRATION RATE: 16 BRPM | SYSTOLIC BLOOD PRESSURE: 110 MMHG

## 2022-08-04 DIAGNOSIS — E86.0 DEHYDRATION: ICD-10-CM

## 2022-08-04 DIAGNOSIS — K52.9 CHRONIC DIARRHEA: ICD-10-CM

## 2022-08-04 DIAGNOSIS — M19.90 ARTHRITIS: ICD-10-CM

## 2022-08-04 DIAGNOSIS — Z09 HOSPITAL DISCHARGE FOLLOW-UP: Primary | ICD-10-CM

## 2022-08-04 DIAGNOSIS — R42 DIZZINESS: ICD-10-CM

## 2022-08-04 DIAGNOSIS — M25.472 LEFT ANKLE SWELLING: ICD-10-CM

## 2022-08-04 DIAGNOSIS — R42 VERTIGO: ICD-10-CM

## 2022-08-04 DIAGNOSIS — R53.1 GENERALIZED WEAKNESS: ICD-10-CM

## 2022-08-04 PROCEDURE — 99495 TRANSJ CARE MGMT MOD F2F 14D: CPT | Performed by: FAMILY MEDICINE

## 2022-08-04 PROCEDURE — 1111F DSCHRG MED/CURRENT MED MERGE: CPT | Performed by: FAMILY MEDICINE

## 2022-08-04 RX ORDER — CELECOXIB 200 MG/1
200 CAPSULE ORAL DAILY
Qty: 30 CAPSULE | Refills: 3 | Status: SHIPPED | OUTPATIENT
Start: 2022-08-04 | End: 2022-12-21 | Stop reason: HOSPADM

## 2022-08-04 NOTE — PROGRESS NOTES
Transitional Care Follow Up Visit  Subjective     Kandace Andrade is a 82 y.o. female who presents for a transitional care management visit.    Within 48 business hours after discharge our office contacted her via telephone to coordinate her care and needs.      I reviewed and discussed the details of that call along with the discharge summary, hospital problems, inpatient lab results, inpatient diagnostic studies, and consultation reports with Kandace.     Current outpatient and discharge medications have been reconciled for the patient.  Reviewed by: Cassi Bella MD      Date of TCM Phone Call 7/22/2022   [facility]   Date of Admission 7/19/2022   Date of Discharge 7/22/2022   Discharge Disposition Home-Health Care [place]     Risk for Readmission (LACE) Score: 7 (7/22/2022  6:01 AM)      History of Present Illness   Pt is here for hospital follow up and she is done with antibiotics.  She is feeling fatigued and tired and somewhat dizzy.  She has some swelling in her left leg/ankle. Swelling was up to knee but now just ankle.  Dizziness is chronic.      She has been having arthritis and wants to get back on the celebrex.      Course During Hospital Stay:       CT scan of the abdomen and pelvis:  1.  Colonic diverticulosis without CT evidence of acute diverticulitis.   No bowel obstruction.   2.  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      Hospital Course  This is a very pleasant 82-year-old female who was admitted to the hospital with failure to thrive in the outpatient setting following treatment of urinary tract infection with Macrobid.  She also has a history of chronic diarrhea and microscopic colitis.  Upon presentation she was found to have an acute toxic/metabolic encephalopathy and was dehydrated.  Sedating/centrally acting medications were discontinued and she was provided supportive care with IV fluids.  Her mentation is now back to baseline.   Gastroenterology was consulted for the diarrhea which has resolved since admission to the hospital.  She did have a CT scan of the abdomen pelvis that was unremarkable for any acute abnormalities.  She is now having formed bowel movements.  For now GI will defer any additional work-up and she will be followed in the outpatient setting.  She will be released today in stable condition.         The following portions of the patient's history were reviewed and updated as appropriate: allergies, current medications, past family history, past medical history, past social history, past surgical history and problem list.    Review of Systems    Objective   Physical Exam  Vitals and nursing note reviewed.   Constitutional:       Appearance: Normal appearance. She is well-developed.   Cardiovascular:      Rate and Rhythm: Normal rate and regular rhythm.      Heart sounds: Normal heart sounds. No murmur heard.  Pulmonary:      Effort: Pulmonary effort is normal. No respiratory distress.      Breath sounds: Normal breath sounds. No stridor. No wheezing or rhonchi.   Neurological:      General: No focal deficit present.      Mental Status: She is alert and oriented to person, place, and time. She is not disoriented.   Psychiatric:         Mood and Affect: Mood normal.         Behavior: Behavior normal.         Assessment & Plan   Diagnoses and all orders for this visit:    1. Hospital discharge follow-up (Primary)    2. Dehydration    3. Generalized weakness    4. Chronic diarrhea    5. Dizziness    6. Vertigo  -     Ambulatory Referral to ENT (Otolaryngology)    7. Left ankle swelling    8. Arthritis  -     celecoxib (CeleBREX) 200 MG capsule; Take 1 capsule by mouth Daily.  Dispense: 30 capsule; Refill: 3      I requested and reviewed all records, labs, and diagnostics from the hospital with patient.  Patient is to follow-up with specialists as discussed and directed.    ent referral and will elevate leg for swelling.

## 2022-08-15 DIAGNOSIS — I10 ESSENTIAL HYPERTENSION: ICD-10-CM

## 2022-08-15 RX ORDER — LISINOPRIL 20 MG/1
20 TABLET ORAL DAILY
Qty: 30 TABLET | Refills: 2 | Status: SHIPPED | OUTPATIENT
Start: 2022-08-15 | End: 2022-10-03 | Stop reason: SDUPTHER

## 2022-08-15 NOTE — TELEPHONE ENCOUNTER
Rx Refill Note  Requested Prescriptions     Pending Prescriptions Disp Refills   • lisinopril (PRINIVIL,ZESTRIL) 20 MG tablet [Pharmacy Med Name: LISINOPRIL 20MG TABLETS] 30 tablet 2     Sig: TAKE 1 TABLET BY MOUTH DAILY      Last office visit with prescribing clinician: 8/4/2022      Next office visit with prescribing clinician: 11/3/2022            Ruperto Saldana, ZAFAR/JADE  08/15/22, 10:00 EDT

## 2022-08-16 ENCOUNTER — READMISSION MANAGEMENT (OUTPATIENT)
Dept: CALL CENTER | Facility: HOSPITAL | Age: 82
End: 2022-08-16

## 2022-08-16 NOTE — OUTREACH NOTE
Medical Week 4 Survey    Flowsheet Row Responses   [facility] patient discharged from? [place]   Does the patient have one of the following disease processes/diagnoses(primary or secondary)? Other   Week 4 attempt successful? Yes   Call start time 1058   Call end time 1101   Discharge diagnosis metabolic encephalopathy, dehydration, chronic diarrhea, generalized weakness   Person spoke with today (if not patient) and relationship Patient and daughter   Meds reviewed with patient/caregiver? Yes   Is the patient having any side effects they believe may be caused by any medication additions or changes? No   Is the patient taking all medications as directed (includes completed medication regime)? Yes   Has the patient kept scheduled appointments due by today? N/A   Comments Has appt with PCP tomorrow   Psychosocial issues? No   What is the patient's perception of their health status since discharge? Same  [still with weakness and chronic diarrhea. ]   Is the patient/caregiver able to teach back signs and symptoms related to disease process for when to call PCP? Yes   Is the patient/caregiver able to teach back signs and symptoms related to disease process for when to call 911? Yes   Is the patient/caregiver able to teach back the hierarchy of who to call/visit for symptoms/problems? PCP, Specialist, Home health nurse, Urgent Care, ED, 911 Yes   If the patient is a current smoker, are they able to teach back resources for cessation? Not a smoker   Week 4 Call Completed? Yes   Would the patient like one additional call? No   Graduated Yes   Is the patient interested in additional calls from an ambulatory ?  NOTE:  applies to high risk patients requiring additional follow-up. No   Did the patient feel the follow up calls were helpful during their recovery period? Yes   Was the number of calls appropriate? Yes          LUKASZ ORTEGA - Registered Nurse

## 2022-08-18 ENCOUNTER — TELEPHONE (OUTPATIENT)
Dept: GASTROENTEROLOGY | Facility: CLINIC | Age: 82
End: 2022-08-18

## 2022-08-18 NOTE — TELEPHONE ENCOUNTER
Caller: millicent hernandez    Relationship: Emergency Contact    Best call back number: [phone]    Who is your current provider: CARMELA    Who would you like your new provider to be: AILYN    What are your reasons for transferring care: NO REASON GIVEN SAW DR ROBERTSON IN HOSPITAL RECENTLY    Additional notes: PTS DAUGHTER IS REQUESTING A CALL FROM SOMEONE ASAP. SHE IS UPSET AS SHE WAS TOLD THE TRANSFER OF CARE WAS ALREADY COMPLETED. DAUGHTER STATED PT IS EXPERIENCING SAME SYMPTOMS AS WHEN SHE WAS IN THE HOSPITAL AND NEEDS TO BE SEEN ASAP

## 2022-08-19 ENCOUNTER — TELEPHONE (OUTPATIENT)
Dept: FAMILY MEDICINE CLINIC | Facility: CLINIC | Age: 82
End: 2022-08-19

## 2022-08-19 ENCOUNTER — OFFICE VISIT (OUTPATIENT)
Dept: GASTROENTEROLOGY | Facility: CLINIC | Age: 82
End: 2022-08-19

## 2022-08-19 VITALS
SYSTOLIC BLOOD PRESSURE: 96 MMHG | BODY MASS INDEX: 22.29 KG/M2 | HEART RATE: 77 BPM | WEIGHT: 125.8 LBS | DIASTOLIC BLOOD PRESSURE: 67 MMHG | HEIGHT: 63 IN | TEMPERATURE: 96 F

## 2022-08-19 DIAGNOSIS — R11.0 NAUSEA: ICD-10-CM

## 2022-08-19 DIAGNOSIS — R19.7 DIARRHEA, UNSPECIFIED TYPE: Primary | ICD-10-CM

## 2022-08-19 DIAGNOSIS — K21.9 GERD WITHOUT ESOPHAGITIS: ICD-10-CM

## 2022-08-19 DIAGNOSIS — K52.9 CHRONIC DIARRHEA: ICD-10-CM

## 2022-08-19 PROCEDURE — 99214 OFFICE O/P EST MOD 30 MIN: CPT | Performed by: PHYSICIAN ASSISTANT

## 2022-08-19 RX ORDER — FAMOTIDINE 20 MG/1
20 TABLET, FILM COATED ORAL 2 TIMES DAILY
Qty: 30 TABLET | Refills: 0 | Status: SHIPPED | OUTPATIENT
Start: 2022-08-19 | End: 2022-08-30

## 2022-08-19 NOTE — TELEPHONE ENCOUNTER
LMTCB    Ok for HUB to read      Yes, please stop taking the lisinopril at least for today.  And over the weekend based on her blood pressure.  If no improvement, go to the ER.

## 2022-08-19 NOTE — PATIENT INSTRUCTIONS
Pepto Bismol tablets, 3 tablets, three times a day  Try IBGuard for cramping abdominal pain  Stop Celebrex if at all possible  Start Pepcid (famotidine) twice daily

## 2022-08-19 NOTE — TELEPHONE ENCOUNTER
Yes, please stop taking the lisinopril at least for today.  And over the weekend based on her blood pressure.  If no improvement, go to the ER.

## 2022-08-19 NOTE — TELEPHONE ENCOUNTER
Patient left the gastro dr today and she is having some low blood pressure.  Patien't daughter said her bp at the Dr's office was 96-67, she is having some dizziness.  Daughter wants to know if she should stop taking the lisinopril for at least today.   Daughter didn't want to take her to the hospital today.  Ph [phone].

## 2022-08-19 NOTE — PROGRESS NOTES
Chief Complaint  Diarrhea and Nausea    Subjective        History of Present Illness  Kandace Andrade is a  82 y.o. female here for hospital follow up. She is accompanied to the clinic today by her daughter. She is a patient of Dr. Tanner.    She was hospitalized at the end of July for increased diarrhea.  She has a past medical history significant for microscopic colitis.  Last colonoscopy in April 2021 indicating some changes concerning for early microscopic colitis.  She was recently on budesonide without any relief in symptoms.  Her bowels have improved prior to discharge and then a few days following discharge, she had resumption of symptoms she reports 5-7 bowel movements during the day and 2-3 nocturnal stools.  She endorses cramping lower abdominal pain relieved with defecation.  No melena or hematochezia.  Notes increased nausea.     Of note, she is on celecoxib.  She is unsure how long she has been on this medication but review of the chart appears that she was placed on 8/4/2022.    Past Medical History:   Diagnosis Date   • Acute seasonal allergic rhinitis due to pollen    • Anxiety and depression    • Arthritis    • Asthma    • C. difficile colitis    • COPD (chronic obstructive pulmonary disease) (HCC)    • Diarrhea    • Diverticulosis    • Fibula fracture    • Herpes zoster    • History of lumbosacral spine surgery    • IBS (irritable bowel syndrome)    • Mixed hyperlipidemia    • KINDRA (obstructive sleep apnea)     NO MACHINE AT THIS TIME   • Osteoporosis    • Paroxysmal atrial fibrillation (HCC)    • Vitamin D deficiency        Past Surgical History:   Procedure Laterality Date   • APPENDECTOMY     • BACK SURGERY      lower x2   • CATARACT EXTRACTION     • COLONOSCOPY      patient doesn't recall    • COLONOSCOPY N/A 4/2/2021    Procedure: COLONOSCOPY to cecum with cecal biopsies;  Surgeon: Jarrod Duggan MD;  Location: [facility] ENDOSCOPY;  Service: Gastroenterology;  Laterality: N/A;  pre: diarhhea  post:  diverticulosis, hemorrhoids   • HYSTERECTOMY      partial   • SHOULDER SURGERY Right    • SHOULDER SURGERY     • TONSILLECTOMY     • UPPER GASTROINTESTINAL ENDOSCOPY      patient doesn't recall        Family History   Problem Relation Age of Onset   • Heart disease Mother    • Lung cancer Mother    • Diabetes Father    • Liver disease Father    • Heart disease Paternal Uncle    • Malig Hyperthermia Neg Hx        Social History     Socioeconomic History   • Marital status:    Tobacco Use   • Smoking status: Former Smoker     Packs/day: 1.00     Years: 25.00     Pack years: 25.00     Quit date: 2008     Years since quittin.3   • Smokeless tobacco: Never Used   • Tobacco comment: CAFFEINE USE - 1 CUP COFFEE/ DIET COKE   Vaping Use   • Vaping Use: Never used   Substance and Sexual Activity   • Alcohol use: No   • Drug use: No   • Sexual activity: Defer       Allergies   Allergen Reactions   • Penicillins Hives and Other (See Comments)     Elevated BP... tolerated ceftriaxone 10/2020 admission   • Meclizine Hcl Dizziness   • Bactrim [Sulfamethoxazole-Trimethoprim] Hives   • Flagyl [Metronidazole] Other (See Comments)     HYPERACTIVITY.. INSOMNIA    • Statins Other (See Comments)     Headache, elevated liver enzymes, blurred vision       Current Outpatient Medications on File Prior to Visit   Medication Sig Dispense Refill   • acetaminophen (TYLENOL) 500 MG tablet Take 1,000 mg by mouth Every 6 (Six) Hours As Needed for Mild Pain .     • aspirin 81 MG chewable tablet Chew 81 mg Daily.     • celecoxib (CeleBREX) 200 MG capsule Take 1 capsule by mouth Daily. 30 capsule 3   • escitalopram (Lexapro) 20 MG tablet Take 1 tablet by mouth Daily. 30 tablet 3   • ezetimibe (ZETIA) 10 MG tablet TAKE 1 TABLET BY MOUTH DAILY 30 tablet 3   • fluticasone (FLONASE) 50 MCG/ACT nasal spray 2 sprays into the nostril(s) as directed by provider Daily As Needed for Rhinitis.     • lisinopril (PRINIVIL,ZESTRIL) 20 MG tablet TAKE  "1 TABLET BY MOUTH DAILY 30 tablet 2   • Melatonin 10 MG tablet Take  by mouth Every Night.     • omeprazole (priLOSEC) 40 MG capsule TAKE 1 CAPSULE BY MOUTH DAILY 30 capsule 2   • polycarbophil 625 MG tablet tablet Take 2 tablets by mouth Daily for 30 days. 60 tablet 0   • Symbicort 80-4.5 MCG/ACT inhaler      • traZODone (DESYREL) 50 MG tablet TAKE 1/2 TO 1 TABLET BY MOUTH EVERY NIGHT 1 HOUR BEFORE BEDTIME 30 tablet 1   • vitamin D (ERGOCALCIFEROL) 1.25 MG (20019 UT) capsule capsule TAKE 1 CAPSULE BY MOUTH EVERY 7 DAYS 5 capsule 5   • calcium carbonate (TUMS) 500 MG chewable tablet Chew 1 tablet Daily.       No current facility-administered medications on file prior to visit.       Review of Systems   Constitutional: Negative for chills and fever.   HENT: Negative for trouble swallowing.    Respiratory: Negative for cough and shortness of breath.    Cardiovascular: Negative for chest pain and palpitations.   Gastrointestinal: Positive for abdominal pain, diarrhea and nausea. Negative for abdominal distention, blood in stool, constipation, vomiting, GERD and indigestion.        Objective   Vital Signs:   BP 96/67   Pulse 77   Temp 96 °F (35.6 °C)   Ht 160 cm (63\")   Wt 57.1 kg (125 lb 12.8 oz)   BMI 22.28 kg/m²       Physical Exam  Vitals and nursing note reviewed.   Constitutional:       General: She is not in acute distress.     Appearance: Normal appearance. She is not ill-appearing.   HENT:      Head: Normocephalic and atraumatic.      Right Ear: External ear normal.      Left Ear: External ear normal.   Eyes:      General: No scleral icterus.     Conjunctiva/sclera: Conjunctivae normal.      Pupils: Pupils are equal, round, and reactive to light.   Cardiovascular:      Rate and Rhythm: Normal rate and regular rhythm.      Heart sounds: Normal heart sounds.   Pulmonary:      Effort: Pulmonary effort is normal.      Breath sounds: Normal breath sounds.   Abdominal:      General: Abdomen is flat. Bowel sounds " are normal. There is no distension.      Palpations: Abdomen is soft.      Tenderness: There is no abdominal tenderness. There is no guarding or rebound.   Musculoskeletal:      Cervical back: Normal range of motion and neck supple.   Skin:     General: Skin is warm and dry.   Neurological:      Mental Status: She is alert and oriented to person, place, and time.   Psychiatric:         Mood and Affect: Mood normal.         Behavior: Behavior normal.          Result Review :       Common labs    Common Labsle 7/19/22 7/19/22 7/19/22 7/19/22 7/20/22 7/20/22 7/21/22 7/21/22    1321 1321 1411 1513 0346 0346 0338 0338   Glucose   83 80  88 86    BUN   11 12  11 9    Creatinine   0.59 0.65  0.69 0.57    Sodium   136 136  142 142    Potassium   4.1 4.3  4.6 4.4    Chloride   104 102  109 (A) 111 (A)    Calcium   8.4 (A) 8.8  8.6 8.1 (A)    Albumin   3.60 3.60   3.10 (A)    Total Bilirubin   <0.2 0.2       Alkaline Phosphatase   65 68       AST (SGOT)   12 16       ALT (SGPT)   8 11       WBC 9.90    7.56   8.25   Hemoglobin 13.0    11.6 (A)   11.4 (A)   Hematocrit 39.0    34.7   35.4   Platelets 283    289   272   Hemoglobin A1C  5.70 (A)         (A) Abnormal value       Comments are available for some flowsheets but are not being displayed.                               Assessment and Plan    Diagnoses and all orders for this visit:    1. Diarrhea, unspecified type (Primary)  -     Case Request; Standing  -     Case Request  -     Basic Metabolic Panel  -     CBC (No Diff)    2. Chronic diarrhea    3. GERD without esophagitis    4. Nausea    Other orders  -     famotidine (Pepcid) 20 MG tablet; Take 1 tablet by mouth 2 (Two) Times a Day.  Dispense: 30 tablet; Refill: 0      · She does not wish to try budesonide again.  It worked the first time she was diagnosed with microscopic colitis but second round did not.  · Given her history of microscopic colitis, recommend avoiding NSAIDs.  We did discuss that celecoxib also is a  causative agent for microscopic colitis.  Recommend she discontinue if at all possible.  · I will go ahead and try her on Pepto-Bismol 3 tablets 3 times a day for 1 month.  We will plan to wean after.  · Will obtain CBC and BMP given profuse diarrhea.  · Follow-up in 1 month, sooner if necessary.      Follow Up   Return in about 4 weeks (around 9/16/2022).    Dragon dictation used throughout this note.     MICHAEL Hou

## 2022-08-20 LAB
BUN SERPL-MCNC: 21 MG/DL (ref 8–27)
BUN/CREAT SERPL: 21 (ref 12–28)
CALCIUM SERPL-MCNC: 10.1 MG/DL (ref 8.7–10.3)
CHLORIDE SERPL-SCNC: 99 MMOL/L (ref 96–106)
CO2 SERPL-SCNC: 15 MMOL/L (ref 20–29)
CREAT SERPL-MCNC: 1 MG/DL (ref 0.57–1)
EGFRCR-CYS SERPLBLD CKD-EPI 2021: 56 ML/MIN/1.73
ERYTHROCYTE [DISTWIDTH] IN BLOOD BY AUTOMATED COUNT: 11.6 % (ref 11.7–15.4)
GLUCOSE SERPL-MCNC: 116 MG/DL (ref 65–99)
HCT VFR BLD AUTO: 44.1 % (ref 34–46.6)
HGB BLD-MCNC: 14.9 G/DL (ref 11.1–15.9)
MCH RBC QN AUTO: 31.2 PG (ref 26.6–33)
MCHC RBC AUTO-ENTMCNC: 33.8 G/DL (ref 31.5–35.7)
MCV RBC AUTO: 92 FL (ref 79–97)
PLATELET # BLD AUTO: 320 X10E3/UL (ref 150–450)
POTASSIUM SERPL-SCNC: 4.8 MMOL/L (ref 3.5–5.2)
RBC # BLD AUTO: 4.78 X10E6/UL (ref 3.77–5.28)
SODIUM SERPL-SCNC: 136 MMOL/L (ref 134–144)
WBC # BLD AUTO: 10.3 X10E3/UL (ref 3.4–10.8)

## 2022-08-21 NOTE — PROGRESS NOTES
Please contact Praveena and let her know that I have reviewed her labs.  She does have a slight increase in her renal function.  Please make sure she is intaking adequate liquids as this could be a sign of mild dehydration.

## 2022-08-22 ENCOUNTER — TELEPHONE (OUTPATIENT)
Dept: GASTROENTEROLOGY | Facility: CLINIC | Age: 82
End: 2022-08-22

## 2022-08-22 NOTE — TELEPHONE ENCOUNTER
----- Message from MICHAEL Hou sent at 8/21/2022  6:45 PM EDT -----  Please contact Praveena and let her know that I have reviewed her labs.  She does have a slight increase in her renal function.  Please make sure she is intaking adequate liquids as this could be a sign of mild dehydration.

## 2022-08-23 ENCOUNTER — TELEPHONE (OUTPATIENT)
Dept: GASTROENTEROLOGY | Facility: CLINIC | Age: 82
End: 2022-08-23

## 2022-08-23 RX ORDER — BUDESONIDE 3 MG/1
CAPSULE, COATED PELLETS ORAL
Qty: 154 CAPSULE | Refills: 0 | Status: SHIPPED | OUTPATIENT
Start: 2022-08-23 | End: 2022-10-14 | Stop reason: SDUPTHER

## 2022-08-23 NOTE — TELEPHONE ENCOUNTER
Caller: ann hernandez    Relationship to patient: Emergency Contact    Best call back number: [phone]    Patient is needing: PT IS HAVING 10-12 DIARRHEA DAILY PEPTO BISMOL DID NOT WORK - NEED TO TRY BUDESONIDE.      PHARMACY - [facility] [address]  [phone]    PLEASE CALL ANN

## 2022-08-24 NOTE — TELEPHONE ENCOUNTER
Franchesca Dean PA  to Me          8/23/22 12:38 PM   Sent a prescription for budesonide taper.  She already has a follow up scheduled.     **Call to daughter, Tracey (see hipaa).   Advise of above.  Verb understanding.  [facility] has picked up rx.  Marielos Zavala RN.

## 2022-08-25 ENCOUNTER — TELEPHONE (OUTPATIENT)
Dept: FAMILY MEDICINE CLINIC | Facility: CLINIC | Age: 82
End: 2022-08-25

## 2022-08-25 NOTE — TELEPHONE ENCOUNTER
Caller: millicent hernandez    Relationship: Emergency Contact    Best call back number:[phone]    Do you require a callback: YES-THEY LOST THE CONTACT INFO FOR [facility] THAT THEY WERE REFERRED TO. IT IS NOT [facility] BUT THE OTHER ONE. PLEASE CALL.

## 2022-08-30 RX ORDER — FAMOTIDINE 20 MG/1
TABLET, FILM COATED ORAL
Qty: 30 TABLET | Refills: 0 | Status: SHIPPED | OUTPATIENT
Start: 2022-08-30 | End: 2022-12-21 | Stop reason: HOSPADM

## 2022-09-12 ENCOUNTER — OFFICE VISIT (OUTPATIENT)
Dept: CARDIOLOGY | Facility: CLINIC | Age: 82
End: 2022-09-12

## 2022-09-12 VITALS
WEIGHT: 124.6 LBS | DIASTOLIC BLOOD PRESSURE: 98 MMHG | HEART RATE: 71 BPM | BODY MASS INDEX: 22.08 KG/M2 | HEIGHT: 63 IN | SYSTOLIC BLOOD PRESSURE: 150 MMHG

## 2022-09-12 DIAGNOSIS — E78.00 HYPERCHOLESTEROLEMIA: ICD-10-CM

## 2022-09-12 DIAGNOSIS — F41.9 ANXIETY AND DEPRESSION: ICD-10-CM

## 2022-09-12 DIAGNOSIS — I71.40 AAA (ABDOMINAL AORTIC ANEURYSM) WITHOUT RUPTURE: ICD-10-CM

## 2022-09-12 DIAGNOSIS — I65.23 ASYMPTOMATIC BILATERAL CAROTID ARTERY STENOSIS: ICD-10-CM

## 2022-09-12 DIAGNOSIS — F32.A ANXIETY AND DEPRESSION: ICD-10-CM

## 2022-09-12 DIAGNOSIS — I10 ESSENTIAL HYPERTENSION: Primary | ICD-10-CM

## 2022-09-12 PROCEDURE — 93000 ELECTROCARDIOGRAM COMPLETE: CPT | Performed by: NURSE PRACTITIONER

## 2022-09-12 PROCEDURE — 99214 OFFICE O/P EST MOD 30 MIN: CPT | Performed by: NURSE PRACTITIONER

## 2022-09-12 RX ORDER — ESCITALOPRAM OXALATE 20 MG/1
20 TABLET ORAL DAILY
Qty: 30 TABLET | Refills: 3 | Status: SHIPPED | OUTPATIENT
Start: 2022-09-12 | End: 2022-12-21 | Stop reason: HOSPADM

## 2022-09-12 RX ORDER — PAROXETINE HYDROCHLORIDE 40 MG/1
40 TABLET, FILM COATED ORAL EVERY MORNING
COMMUNITY
Start: 2022-08-09 | End: 2023-01-10

## 2022-09-12 NOTE — PROGRESS NOTES
Date of Office Visit: 2022  Encounter Provider: Vivi Downs, VERO, APRN  Place of Service: [facility] CARDIOLOGY  Patient Name: Kandace Andrade  :1940        Subjective:     Chief Complaint:  Hypertension, dyslipidemia, history of atrial fibrillation      History of Present Illness:  Kandace Andrade is a 82 y.o. female patient of Dr. Hammer.  I am seeing this patient in the office today for the first time and I reviewed her records.    Patient has a history of paroxysmal atrial fibrillation, hypertension, hyperlipidemia, COPD, carotid stenosis, AAA, vertigo.    Patient has a history of intermittent dizziness and balance issues for which she has been followed by ENT and neurology.  She has a history of symptomatic atrial fibrillation when acutely sick with hypotension several years prior but no known recurrence.  She has a history of vague chest discomfort and exertional shortness of breath.  She did not tolerate statins in the past with elevated liver enzymes and severe muscle pain.  She has a history of microscopic colitis.  Carotid ultrasound 3/2022 showed mild bilateral carotid stenosis.  Stress test 3/2022 showed normal myocardial perfusion imaging without evidence of ischemia considered low risk.  EF was 67%.      Patient presents to office today for follow-up appointment.  Patient's daughter is with her in the office today, per patient preference.  Patient reports she is doing okay overall since last visit.  She was hospitalized in July for some GI issues and she continues to follow with GI as an outpatient.  She does have some chronic shortness of breath which she attributes to her COPD and she reports that this is unchanged.  Her PCP is managing her COPD.  She denies any chest pain or discomfort, palpitations, edema, syncope, near syncope, falls, or abnormal bleeding.  However she does continue to report balance issues.  Strongly recommended discussing  balance physical therapy referral with PCP and daughter plans to call today to obtain this.  Avoid falls.  She had cholesterol rechecked in May after being on Zetia and there was not much improvement.  She has a history of significant statin intolerance.  BP elevated in the office today but was well controlled at home when they were last checking it, 130s over 80s.          Past Medical History:   Diagnosis Date   • Acute seasonal allergic rhinitis due to pollen    • Anxiety and depression    • Arthritis    • Asthma    • C. difficile colitis    • COPD (chronic obstructive pulmonary disease) (HCC)    • Diarrhea    • Diverticulosis    • Fibula fracture    • Herpes zoster    • History of lumbosacral spine surgery    • IBS (irritable bowel syndrome)    • Mixed hyperlipidemia    • KINDRA (obstructive sleep apnea)     NO MACHINE AT THIS TIME   • Osteoporosis    • Paroxysmal atrial fibrillation ([facility])    • Vitamin D deficiency      Past Surgical History:   Procedure Laterality Date   • APPENDECTOMY     • BACK SURGERY      lower x2   • CATARACT EXTRACTION     • COLONOSCOPY      patient doesn't recall    • COLONOSCOPY N/A 4/2/2021    Procedure: COLONOSCOPY to cecum with cecal biopsies;  Surgeon: Jarrod Duggan MD;  Location: [facility];  Service: Gastroenterology;  Laterality: N/A;  pre: diarhhea  post: diverticulosis, hemorrhoids   • HYSTERECTOMY      partial   • SHOULDER SURGERY Right    • SHOULDER SURGERY     • TONSILLECTOMY     • UPPER GASTROINTESTINAL ENDOSCOPY      patient doesn't recall      Outpatient Medications Prior to Visit   Medication Sig Dispense Refill   • acetaminophen (TYLENOL) 500 MG tablet Take 1,000 mg by mouth Every 6 (Six) Hours As Needed for Mild Pain .     • aspirin 81 MG chewable tablet Chew 81 mg Daily.     • Budesonide (ENTOCORT EC) 3 MG 24 hr capsule 3 tablets by mouth daily for 6 weeks, then 2 tablets by mouth daily for 2 weeks 154 capsule 0   • celecoxib (CeleBREX) 200 MG capsule Take 1  capsule by mouth Daily. 30 capsule 3   • ezetimibe (ZETIA) 10 MG tablet TAKE 1 TABLET BY MOUTH DAILY 30 tablet 3   • famotidine (PEPCID) 20 MG tablet TAKE 1 TABLET BY MOUTH TWICE DAILY 30 tablet 0   • fluticasone (FLONASE) 50 MCG/ACT nasal spray 2 sprays into the nostril(s) as directed by provider Daily As Needed for Rhinitis.     • lisinopril (PRINIVIL,ZESTRIL) 20 MG tablet TAKE 1 TABLET BY MOUTH DAILY 30 tablet 2   • Melatonin 10 MG tablet Take  by mouth Every Night.     • omeprazole (priLOSEC) 40 MG capsule TAKE 1 CAPSULE BY MOUTH DAILY 30 capsule 2   • PARoxetine (PAXIL) 40 MG tablet Take 40 mg by mouth Every Morning.     • Symbicort 80-4.5 MCG/ACT inhaler      • traZODone (DESYREL) 50 MG tablet TAKE 1/2 TO 1 TABLET BY MOUTH EVERY NIGHT 1 HOUR BEFORE BEDTIME 30 tablet 1   • vitamin D (ERGOCALCIFEROL) 1.25 MG (91864 UT) capsule capsule TAKE 1 CAPSULE BY MOUTH EVERY 7 DAYS 5 capsule 5   • escitalopram (Lexapro) 20 MG tablet Take 1 tablet by mouth Daily. 30 tablet 3   • calcium carbonate (TUMS) 500 MG chewable tablet Chew 1 tablet Daily.       No facility-administered medications prior to visit.       Allergies as of 2022 - Reviewed 2022   Allergen Reaction Noted   • Penicillins Hives and Other (See Comments) 10/15/2017   • Meclizine hcl Dizziness 2021   • Bactrim [sulfamethoxazole-trimethoprim] Hives 2022   • Flagyl [metronidazole] Other (See Comments) 2021   • Statins Other (See Comments) 10/15/2017     Social History     Socioeconomic History   • Marital status:    Tobacco Use   • Smoking status: Former Smoker     Packs/day: 1.00     Years: 25.00     Pack years: 25.00     Quit date: 2008     Years since quittin.3   • Smokeless tobacco: Never Used   • Tobacco comment: CAFFEINE USE - 1 CUP COFFEE/ DIET COKE   Vaping Use   • Vaping Use: Never used   Substance and Sexual Activity   • Alcohol use: No   • Drug use: No   • Sexual activity: Defer     Family History   Problem  "Relation Age of Onset   • Heart disease Mother    • Lung cancer Mother    • Diabetes Father    • Liver disease Father    • Heart disease Paternal Uncle    • Malig Hyperthermia Neg Hx        Review of Systems   Constitutional: Positive for malaise/fatigue (Chronic, unchanged).   Cardiovascular:        SEE HPI   Respiratory: Positive for shortness of breath (Chronic intermittent, unchanged, \"COPD\").    Hematologic/Lymphatic: Negative for bleeding problem.   Musculoskeletal: Negative for falls.   Gastrointestinal: Negative for melena.   Genitourinary: Negative for hematuria.   Neurological: Negative for dizziness.   Psychiatric/Behavioral: Negative for altered mental status.          Objective:     Vitals:    09/12/22 1259   BP: 150/98   BP Location: Left arm   Patient Position: Sitting   Pulse: 71   Weight: 56.5 kg (124 lb 9.6 oz)   Height: 160 cm (63\")     Body mass index is 22.07 kg/m².        PHYSICAL EXAM:  Constitutional:       General: Not in acute distress.     Appearance: Well-developed. Not diaphoretic.   Eyes:      Pupils: Pupils are equal, round, and reactive to light.   HENT:      Head: Normocephalic and atraumatic.   Pulmonary:      Effort: Pulmonary effort is normal. No respiratory distress.      Breath sounds: Normal breath sounds. No wheezing. No rales.   Cardiovascular:      Normal rate. Regular rhythm.      Murmurs: There is no murmur.      No gallop. No click. No rub.   Edema:     Peripheral edema absent.   Abdominal:      General: Bowel sounds are normal.      Palpations: Abdomen is soft.   Musculoskeletal:         General: No tenderness or deformity. Skin:     General: Skin is warm and dry.      Findings: No erythema.   Neurological:      Mental Status: Alert and oriented to person, place, and time.   Psychiatric:         Behavior: Behavior normal.             ECG 12 Lead    Date/Time: 9/12/2022 1:19 PM  Performed by: Vivi Downs DNP, APRN  Authorized by: Vivi Downs DNP, ANTOINETTE "   Comparison: compared with previous ECG from 9/16/2019  Rhythm: sinus rhythm  Rate: normal  BPM: 71  Other findings: non-specific ST-T wave changes  Comments: No significant changes from previous EKGs              Assessment:       Diagnosis Plan   1. Essential hypertension     2. Hypercholesterolemia     3. AAA (abdominal aortic aneurysm) without rupture (HCC)  Ambulatory Referral to Vascular Surgery   4. Asymptomatic bilateral carotid artery stenosis  Ambulatory Referral to Vascular Surgery         Plan:     1. Labile hypertension: Blood pressure up today but generally has been better controlled at home.  She is going to monitor at home and call if staying greater than 130 over 80 on average.   2. History of atrial fibrillation: No evidence of recurrence.  Remains on aspirin.  3. Mild bilateral carotid artery stenosis: Noted on 3/2022 carotid ultrasounds.  On aspirin.  We will recheck 3/2023.  History of significant statin intolerance.   4. Chronic intermittent vertigo  5. Hyperlipidemia: History of significant statin intolerance.  On Zetia but not much improvement in cholesterol.  We discussed healthy diet improvements but she does not think she is likely to make these.  We discussed seeing if insurance would cover a newer injectable medication.  Given her GI history she is going to check with GI during her appointment for later this week to see if they have any concerns about an injectable from a GI standpoint and then she will call our office back.  6. Abdominal aortic aneurysm: We will refer to vascular surgery  7. Diverticulosis: Follows with GI  8. Balance issues: Strongly recommended referral for balance physical therapy through PCP    Patient to follow-up with Dr. Hammer in 6 months or follow-up sooner if needed for any new, recurrent, or worsening symptoms or concerns.  Discussed in detail signs/symptoms that warrant sooner call or follow-up to the office or ER visit.             Your medication  list          Accurate as of September 12, 2022 11:59 PM. If you have any questions, ask your nurse or doctor.            CONTINUE taking these medications      Instructions Last Dose Given Next Dose Due   acetaminophen 500 MG tablet  Commonly known as: TYLENOL      Take 1,000 mg by mouth Every 6 (Six) Hours As Needed for Mild Pain .       aspirin 81 MG chewable tablet      Chew 81 mg Daily.       Budesonide 3 MG 24 hr capsule  Commonly known as: ENTOCORT EC      3 tablets by mouth daily for 6 weeks, then 2 tablets by mouth daily for 2 weeks       celecoxib 200 MG capsule  Commonly known as: CeleBREX      Take 1 capsule by mouth Daily.       escitalopram 20 MG tablet  Commonly known as: LEXAPRO      TAKE 1 TABLET BY MOUTH DAILY       ezetimibe 10 MG tablet  Commonly known as: ZETIA      TAKE 1 TABLET BY MOUTH DAILY       famotidine 20 MG tablet  Commonly known as: PEPCID      TAKE 1 TABLET BY MOUTH TWICE DAILY       fluticasone 50 MCG/ACT nasal spray  Commonly known as: FLONASE      2 sprays into the nostril(s) as directed by provider Daily As Needed for Rhinitis.       lisinopril 20 MG tablet  Commonly known as: PRINIVIL,ZESTRIL      TAKE 1 TABLET BY MOUTH DAILY       Melatonin 10 MG tablet      Take  by mouth Every Night.       omeprazole 40 MG capsule  Commonly known as: priLOSEC      TAKE 1 CAPSULE BY MOUTH DAILY       PARoxetine 40 MG tablet  Commonly known as: PAXIL      Take 40 mg by mouth Every Morning.       Symbicort 80-4.5 MCG/ACT inhaler  Generic drug: budesonide-formoterol           traZODone 50 MG tablet  Commonly known as: DESYREL      TAKE 1/2 TO 1 TABLET BY MOUTH EVERY NIGHT 1 HOUR BEFORE BEDTIME       vitamin D 1.25 MG (46498 UT) capsule capsule  Commonly known as: ERGOCALCIFEROL      TAKE 1 CAPSULE BY MOUTH EVERY 7 DAYS             Where to Get Your Medications      These medications were sent to [id number] - [place], [place] - [address] [facility] [address]  [facility] [phone] [facility] [phone] [place]  [address], [facility] [address]    Phone: [phone]   · escitalopram 20 MG tablet         The above medication changes may not have been made by this provider.  Patient's medication list was updated to reflect medications they are currently taking including medication changes made by other providers.            Thanks,    Vivi Downs, VERO, APRN  09/12/2022         Dictated utilizing Dragon dictation

## 2022-09-19 ENCOUNTER — PATIENT MESSAGE (OUTPATIENT)
Dept: CARDIOLOGY | Facility: CLINIC | Age: 82
End: 2022-09-19

## 2022-09-19 ENCOUNTER — TELEPHONE (OUTPATIENT)
Dept: CARDIOLOGY | Facility: CLINIC | Age: 82
End: 2022-09-19

## 2022-09-19 RX ORDER — ATENOLOL 25 MG/1
25 TABLET ORAL DAILY
Qty: 90 TABLET | Refills: 3 | Status: SHIPPED | OUTPATIENT
Start: 2022-09-19 | End: 2022-10-03 | Stop reason: SDUPTHER

## 2022-09-19 NOTE — TELEPHONE ENCOUNTER
----- Message from Alexander Hammer MD sent at 9/19/2022  2:32 PM EDT -----  Regarding: FW: Blood Pressure   I have added atenolol to current regimen of bp meds that she can take in am and she can take lisinopril at pm. Continue BP monitoring at home call with logs - bp and HR. Thank you!            ----- Message -----  From: Romy Ruiz RN  Sent: 9/19/2022   1:24 PM EDT  To: Alexander Hammer MD  Subject: FW: Blood Pressure                               Dr. Hammer,     Could you address this as Romy MALONE Is out of the office? Pt's daughter sent in a [employer] msg and she called the office today. Pt has a systolic B/P in the 170s today. Pt only has symptoms of dizziness, which daughter states is normal for her. She has had a headache, but not today. She is on Lisinopril 20 mg daily. Do you have any recommendations for them?     Thank you,     Romy Ruiz RN  [facility]  09/19/22 13:24 EDT             ----- Message -----  From: Kandace Andrade  Sent: 9/19/2022   1:01 PM EDT  To: [facility]  Subject: Blood Pressure                                   9/13 150/83 morning              147/74 evening  9/14  145/86  9/15   156/89  9/17   133/79  9/19   173/99    Requested blood pressure readings. This is her daughter Tracey. I am with her now after her reading of 173/99. Having her eat some salty chips. Do we need to increase BP meds?  Thank you  Tracey Holland

## 2022-09-19 NOTE — TELEPHONE ENCOUNTER
Reviewed recommendations with Tracey Kandace Andrade's daughter.  Tracey verbalized understanding of the recommendations.  Tracey stated she will include HR in future BP logs.     Thank you,  Marya Blackwell RN  Triage Nurse G

## 2022-09-20 ENCOUNTER — TELEPHONE (OUTPATIENT)
Dept: GASTROENTEROLOGY | Facility: CLINIC | Age: 82
End: 2022-09-20

## 2022-09-20 NOTE — TELEPHONE ENCOUNTER
Caller: millicent hernandez    Relationship: Emergency Contact    Best call back number: [phone]    What is the best time to reach you: ANYTIME     Who are you requesting to speak with (clinical staff, provider,  specific staff member): SCHEDULING     Do you know the name of the person who called: LISA     What was the call regarding: PTS DAUGHTER WAS RETURNING A CALL TO SCHEDULE A SCOPE.    Do you require a callback: YES

## 2022-09-22 ENCOUNTER — TELEPHONE (OUTPATIENT)
Dept: GASTROENTEROLOGY | Facility: CLINIC | Age: 82
End: 2022-09-22

## 2022-09-22 PROBLEM — R19.7 DIARRHEA: Status: ACTIVE | Noted: 2022-09-22

## 2022-09-22 NOTE — TELEPHONE ENCOUNTER
WOLF patient via telephone for. Scheduled 10/19/2022 with arrival time of 1000A . Prep paperwork mailed to verified address on file. Patient advised arrival time may change based on [facility] guidelines. WOLF GALLARDO SENT VIS MY CHART AND ALSO MAILED

## 2022-09-24 DIAGNOSIS — F51.01 PRIMARY INSOMNIA: ICD-10-CM

## 2022-09-26 DIAGNOSIS — I10 ESSENTIAL HYPERTENSION: Primary | ICD-10-CM

## 2022-09-26 RX ORDER — TRAZODONE HYDROCHLORIDE 50 MG/1
TABLET ORAL
Qty: 30 TABLET | Refills: 1 | Status: SHIPPED | OUTPATIENT
Start: 2022-09-26 | End: 2022-11-21

## 2022-09-26 NOTE — TELEPHONE ENCOUNTER
Rx Refill Note  Requested Prescriptions     Pending Prescriptions Disp Refills   • traZODone (DESYREL) 50 MG tablet [Pharmacy Med Name: TRAZODONE 50MG TABLETS] 30 tablet 1     Sig: TAKE 1/2 TO 1 TABLET BY MOUTH EVERY NIGHT 1 HOUR BEFORE BEDTIME      Last office visit with prescribing clinician: 8/4/2022      Next office visit with prescribing clinician: 11/3/2022            Ruperto Saldana, ZAFAR/JADE  09/26/22, 08:21 EDT

## 2022-09-28 ENCOUNTER — TELEPHONE (OUTPATIENT)
Dept: CARDIOLOGY | Facility: CLINIC | Age: 82
End: 2022-09-28

## 2022-09-30 LAB
BUN SERPL-MCNC: 22 MG/DL (ref 8–27)
BUN/CREAT SERPL: 33 (ref 12–28)
CALCIUM SERPL-MCNC: 9.9 MG/DL (ref 8.7–10.3)
CHLORIDE SERPL-SCNC: 102 MMOL/L (ref 96–106)
CO2 SERPL-SCNC: 23 MMOL/L (ref 20–29)
CREAT SERPL-MCNC: 0.66 MG/DL (ref 0.57–1)
EGFRCR SERPLBLD CKD-EPI 2021: 87 ML/MIN/1.73
GLUCOSE SERPL-MCNC: 89 MG/DL (ref 70–99)
POTASSIUM SERPL-SCNC: 4.1 MMOL/L (ref 3.5–5.2)
SODIUM SERPL-SCNC: 141 MMOL/L (ref 134–144)

## 2022-10-03 ENCOUNTER — TELEPHONE (OUTPATIENT)
Dept: CARDIOLOGY | Facility: CLINIC | Age: 82
End: 2022-10-03

## 2022-10-03 DIAGNOSIS — I10 ESSENTIAL HYPERTENSION: Primary | ICD-10-CM

## 2022-10-03 RX ORDER — LISINOPRIL 20 MG/1
TABLET ORAL
Qty: 45 TABLET | Refills: 0
Start: 2022-10-03 | End: 2022-10-10 | Stop reason: SDUPTHER

## 2022-10-03 RX ORDER — ATENOLOL 25 MG/1
12.5 TABLET ORAL NIGHTLY
Qty: 15 TABLET | Refills: 0
Start: 2022-10-03 | End: 2022-10-21 | Stop reason: SDUPTHER

## 2022-10-03 NOTE — TELEPHONE ENCOUNTER
Called and spoke with patient's daughter, per patient preference and okay per verbal release form.  Kidney enzymes improved back to baseline.  Patient does not feel she is tolerating the current dose of atenolol, especially not taking it in the morning.      We will have her take 20mg of lisinopril in the AM and 10mg in PM with 12.5mg atenolol in PM.  They will send some updated heart rate and blood pressure readings in a few days.  May discontinue atenolol at that time if she is still having fatigue or if heart rate low.  They will call sooner for any low heart rate or blood pressure readings or other issues or concerns.  We will also recheck BMP in 1 to 2 weeks once adjustments made.

## 2022-10-10 DIAGNOSIS — I10 ESSENTIAL HYPERTENSION: ICD-10-CM

## 2022-10-10 RX ORDER — LISINOPRIL 20 MG/1
20 TABLET ORAL 2 TIMES DAILY
Qty: 60 TABLET | Refills: 0
Start: 2022-10-10 | End: 2022-10-18 | Stop reason: SDUPTHER

## 2022-10-12 ENCOUNTER — TELEPHONE (OUTPATIENT)
Dept: FAMILY MEDICINE CLINIC | Facility: CLINIC | Age: 82
End: 2022-10-12

## 2022-10-12 RX ORDER — CYCLOBENZAPRINE HCL 10 MG
TABLET ORAL
Qty: 20 TABLET | Refills: 0 | Status: SHIPPED | OUTPATIENT
Start: 2022-10-12 | End: 2023-01-10 | Stop reason: SDUPTHER

## 2022-10-12 NOTE — TELEPHONE ENCOUNTER
Caller: alfaann desai    Relationship: Emergency Contact    Best call back number:     Who are you requesting to speak with (clinical staff, provider,  specific staff member): CLINICAL    What was the call regarding: PATIENT'S DAUGHTER, ANN, IS CALLING REQUESTING THAT A MEDICATION BE CALLED IN TO TREAT THE LOWER BACK SPASMS THAT GO DOWN INTO THE PATIENT'S LEGS.   ANN STATES THAT DR. PEDERSEN PRESCRIBED THIS TO THE PATIENT IN THE PAST TO TREAT THE SAME SYMPTOMS.  PLEASE CONTACT ANN WITH ANY QUESTIONS.     Do you require a callback:YES

## 2022-10-14 ENCOUNTER — TELEPHONE (OUTPATIENT)
Dept: CARDIOLOGY | Facility: CLINIC | Age: 82
End: 2022-10-14

## 2022-10-14 RX ORDER — AMLODIPINE BESYLATE 2.5 MG/1
2.5 TABLET ORAL DAILY
Qty: 30 TABLET | Refills: 0 | Status: SHIPPED | OUTPATIENT
Start: 2022-10-14 | End: 2022-10-17

## 2022-10-14 NOTE — TELEPHONE ENCOUNTER
----- Message from Michelle Chew MA sent at 10/14/2022  3:26 PM EDT -----  Regarding: FW: Blood Pressure   Contact: [phone]    ----- Message -----  From: Kandace Andrade  Sent: 10/14/2022   3:24 PM EDT  To: [facility]  Subject: Blood Pressure                                   Blood pressures  10/6 morning 136/99 HR 55 - evening 175/101 HR 54  10/7 morning 127/80 HR 61 - evening  168/96 HR 59  10/8 morning  158/86 HR 54   Forgot  10/9 forgot  10/10 morning 165/93 HR 55 evening 133/95 HR 67  10/11 morning 142/89 HR 64 evening 143/73 HR 70  10/12 morning. 155/86 HR 69 evening 167/99 HR 50  10/13 forgot  10/14 morning 160/82 HR 53    Let us know what she should do.   Thanks Tracey

## 2022-10-14 NOTE — TELEPHONE ENCOUNTER
Discussed with patient's daughter, per patient preference.  We will have her try amlodipine 2.5 mg but watch for any lightheadedness with position changes and watch for any low blood pressure readings.  If she does develop issues then they will hold it and contact the office.  Patient is scheduled for repeat labs on the 20 mg twice daily of lisinopril on Monday.  They will send some updated heart rate and blood pressure readings next week

## 2022-10-17 RX ORDER — BUDESONIDE 3 MG/1
CAPSULE, COATED PELLETS ORAL
Qty: 154 CAPSULE | Refills: 0 | Status: SHIPPED | OUTPATIENT
Start: 2022-10-17 | End: 2022-12-21 | Stop reason: HOSPADM

## 2022-10-17 RX ORDER — AMLODIPINE BESYLATE 2.5 MG/1
2.5 TABLET ORAL DAILY
Qty: 90 TABLET | Refills: 2 | Status: SHIPPED | OUTPATIENT
Start: 2022-10-17 | End: 2022-12-21 | Stop reason: HOSPADM

## 2022-10-18 ENCOUNTER — TELEPHONE (OUTPATIENT)
Dept: CARDIOLOGY | Facility: CLINIC | Age: 82
End: 2022-10-18

## 2022-10-18 DIAGNOSIS — I10 ESSENTIAL HYPERTENSION: ICD-10-CM

## 2022-10-18 LAB
BUN SERPL-MCNC: 16 MG/DL (ref 8–27)
BUN/CREAT SERPL: 22 (ref 12–28)
CALCIUM SERPL-MCNC: 9.9 MG/DL (ref 8.7–10.3)
CHLORIDE SERPL-SCNC: 102 MMOL/L (ref 96–106)
CO2 SERPL-SCNC: 27 MMOL/L (ref 20–29)
CREAT SERPL-MCNC: 0.72 MG/DL (ref 0.57–1)
EGFRCR SERPLBLD CKD-EPI 2021: 83 ML/MIN/1.73
GLUCOSE SERPL-MCNC: 80 MG/DL (ref 70–99)
POTASSIUM SERPL-SCNC: 4.2 MMOL/L (ref 3.5–5.2)
SODIUM SERPL-SCNC: 141 MMOL/L (ref 134–144)

## 2022-10-18 RX ORDER — LISINOPRIL 20 MG/1
20 TABLET ORAL 2 TIMES DAILY
Qty: 60 TABLET | Refills: 5 | Status: SHIPPED | OUTPATIENT
Start: 2022-10-18 | End: 2022-12-21 | Stop reason: HOSPADM

## 2022-10-18 NOTE — TELEPHONE ENCOUNTER
Notified patient's daughter of results/recommendations. She verbalized understanding. She has not taken the amlodipine yet per the daughter because they said they were told to only take it if SBP was greater than 150. They also had some trouble with getting the medication from the pharmacy since there was no pharmacist available. She said that she also believes that the patient may be putting the BP cuff on too loosely. She said she is trying to work with her mother on the right way to put on the cuff and how tight it should be, but she said she is very stubborn. Her BP has been anywhere from the 120s-140s. The daughter stated she will send in BP readings through [facility] in a couple weeks.    Tania Mauricio RN  Triage [facility]

## 2022-10-18 NOTE — TELEPHONE ENCOUNTER
Notified patient's daughter of recommendations. She verbalized understanding.    Tania Mauricio RN  Triage [facility]

## 2022-10-18 NOTE — TELEPHONE ENCOUNTER
----- Message from Kandace Andrade sent at 10/15/2022 12:15 PM EDT -----  Regarding: Blood Pressure   Contact: [phone]  Romy could you  escribe Lisinopril 20 mg bid. She is out of refills and you upped her dose.  Thanks Tracey

## 2022-10-18 NOTE — TELEPHONE ENCOUNTER
----- Message from Vivi Downs DNP, APRN sent at 10/18/2022  2:07 PM EDT -----  Please let patient's daughter know that her kidney enzymes and electrolytes look stable on 20 mg twice a day of lisinopril.  Would continue this.  Please find out how blood pressure has been running on the low-dose amlodipine.  May take a couple of weeks to see full effects of medications so would have them continue to monitor and call with some readings in a week or 2 or sooner for low readings or other issues or concerns

## 2022-10-18 NOTE — TELEPHONE ENCOUNTER
Okay great, given history of labile hypertension with history of hypotension okay to take the low-dose amlodipine as needed for blood pressure greater than 150 systolic 1-2 hours after AM meds.      Will await updated readings.  And yes, I agree, would try to ensure she has proper BP cuff placement

## 2022-10-18 NOTE — PROGRESS NOTES
Please let patient's daughter know that her kidney enzymes and electrolytes look stable on 20 mg twice a day of lisinopril.  Would continue this.  Please find out how blood pressure has been running on the low-dose amlodipine.  May take a couple of weeks to see full effects of medications so would have them continue to monitor and call with some readings in a week or 2 or sooner for low readings or other issues or concerns

## 2022-10-19 ENCOUNTER — ANESTHESIA (OUTPATIENT)
Dept: GASTROENTEROLOGY | Facility: HOSPITAL | Age: 82
End: 2022-10-19

## 2022-10-19 ENCOUNTER — HOSPITAL ENCOUNTER (OUTPATIENT)
Facility: HOSPITAL | Age: 82
Setting detail: HOSPITAL OUTPATIENT SURGERY
Discharge: HOME | End: 2022-10-19
Attending: INTERNAL MEDICINE | Admitting: INTERNAL MEDICINE

## 2022-10-19 ENCOUNTER — ANESTHESIA EVENT (OUTPATIENT)
Dept: GASTROENTEROLOGY | Facility: HOSPITAL | Age: 82
End: 2022-10-19

## 2022-10-19 VITALS
HEART RATE: 59 BPM | BODY MASS INDEX: 22.08 KG/M2 | DIASTOLIC BLOOD PRESSURE: 77 MMHG | WEIGHT: 124.6 LBS | SYSTOLIC BLOOD PRESSURE: 124 MMHG | HEIGHT: 63 IN | OXYGEN SATURATION: 100 % | RESPIRATION RATE: 16 BRPM | TEMPERATURE: 98.4 F

## 2022-10-19 DIAGNOSIS — R19.7 DIARRHEA, UNSPECIFIED TYPE: ICD-10-CM

## 2022-10-19 PROCEDURE — 25010000002 PROPOFOL 10 MG/ML EMULSION: Performed by: ANESTHESIOLOGY

## 2022-10-19 PROCEDURE — S0260 H&P FOR SURGERY: HCPCS | Performed by: INTERNAL MEDICINE

## 2022-10-19 PROCEDURE — 88305 TISSUE EXAM BY PATHOLOGIST: CPT | Performed by: INTERNAL MEDICINE

## 2022-10-19 PROCEDURE — 45331 SIGMOIDOSCOPY AND BIOPSY: CPT | Performed by: INTERNAL MEDICINE

## 2022-10-19 RX ORDER — LIDOCAINE HYDROCHLORIDE 20 MG/ML
INJECTION, SOLUTION INFILTRATION; PERINEURAL AS NEEDED
Status: DISCONTINUED | OUTPATIENT
Start: 2022-10-19 | End: 2022-10-19 | Stop reason: SURG

## 2022-10-19 RX ORDER — SODIUM CHLORIDE, SODIUM LACTATE, POTASSIUM CHLORIDE, CALCIUM CHLORIDE 600; 310; 30; 20 MG/100ML; MG/100ML; MG/100ML; MG/100ML
1000 INJECTION, SOLUTION INTRAVENOUS CONTINUOUS
Status: DISCONTINUED | OUTPATIENT
Start: 2022-10-19 | End: 2022-10-19 | Stop reason: HOSPADM

## 2022-10-19 RX ORDER — PROPOFOL 10 MG/ML
VIAL (ML) INTRAVENOUS AS NEEDED
Status: DISCONTINUED | OUTPATIENT
Start: 2022-10-19 | End: 2022-10-19 | Stop reason: SURG

## 2022-10-19 RX ORDER — ONDANSETRON 2 MG/ML
4 INJECTION INTRAMUSCULAR; INTRAVENOUS ONCE AS NEEDED
Status: DISCONTINUED | OUTPATIENT
Start: 2022-10-19 | End: 2022-10-19 | Stop reason: HOSPADM

## 2022-10-19 RX ORDER — PROPOFOL 10 MG/ML
VIAL (ML) INTRAVENOUS CONTINUOUS PRN
Status: DISCONTINUED | OUTPATIENT
Start: 2022-10-19 | End: 2022-10-19 | Stop reason: SURG

## 2022-10-19 RX ADMIN — LIDOCAINE HYDROCHLORIDE 50 MG: 20 INJECTION, SOLUTION INFILTRATION; PERINEURAL at 09:31

## 2022-10-19 RX ADMIN — SODIUM CHLORIDE, POTASSIUM CHLORIDE, SODIUM LACTATE AND CALCIUM CHLORIDE 1000 ML: 600; 310; 30; 20 INJECTION, SOLUTION INTRAVENOUS at 09:20

## 2022-10-19 RX ADMIN — Medication 120 MCG/KG/MIN: at 09:31

## 2022-10-19 RX ADMIN — PROPOFOL 60 MG: 10 INJECTION, EMULSION INTRAVENOUS at 09:31

## 2022-10-19 NOTE — NURSING NOTE
"Pt unable to do fleets enema at home due to \"internal hemorrhoids.\"  Dr. Tanner notified, tap water enema ordered.  "

## 2022-10-19 NOTE — ANESTHESIA PREPROCEDURE EVALUATION
Anesthesia Evaluation     Patient summary reviewed and Nursing notes reviewed                Airway   Mallampati: III  Dental    (+) upper dentures and lower dentures    Pulmonary - normal exam   (+) a smoker Former, COPD, asthma,sleep apnea,   Cardiovascular     ECG reviewed    (+) hypertension, dysrhythmias Paroxysmal Atrial Fib, hyperlipidemia,     ROS comment: Reviewed stress test:    ? GI artifact is present.  ? Myocardial perfusion imaging indicates a normal myocardial perfusion study with no evidence of ischemia.  ? Left ventricular ejection fraction is normal. (Calculated EF = 67%).  ? Impressions are consistent with a low risk study.         Neuro/Psych  (+) psychiatric history Anxiety and Depression,    GI/Hepatic/Renal/Endo    (+) obesity,  GERD,  diabetes mellitus type 2,     Musculoskeletal     Abdominal   (+) obese,    Substance History      OB/GYN          Other                        Anesthesia Plan    ASA 3     MAC       Anesthetic plan, risks, benefits, and alternatives have been provided, discussed and informed consent has been obtained with: patient.        CODE STATUS:       
[id number]

## 2022-10-19 NOTE — ANESTHESIA POSTPROCEDURE EVALUATION
"Patient: Kandace Andrade    Procedure Summary     Date: 10/19/22 Room / Location: [facility] ENDOSCOPY 4 /  [facility]    Anesthesia Start: 0924 Anesthesia Stop: 0945    Procedure: SIGMOIDOSCOPY FLEXIBLE TO SIGMOID COLON WITH BIOPSIES Diagnosis:       Diarrhea, unspecified type      (Diarrhea, unspecified type [R19.7])    Surgeons: Tonja Tanner MD Provider: Juliann Cash MD    Anesthesia Type: MAC ASA Status: 3          Anesthesia Type: MAC    Vitals  Vitals Value Taken Time   /77 10/19/22 1009   Temp     Pulse 59 10/19/22 1009   Resp 16 10/19/22 1009   SpO2 100 % 10/19/22 1009           Post Anesthesia Care and Evaluation    Patient location during evaluation: PHASE II  Patient participation: complete - patient participated  Level of consciousness: awake  Pain management: adequate    Airway patency: patent  Anesthetic complications: No anesthetic complications    Cardiovascular status: acceptable  Respiratory status: acceptable  Hydration status: acceptable    Comments: /77 (BP Location: Left arm, Patient Position: Lying)   Pulse 59   Temp 36.9 °C (98.4 °F) (Oral)   Resp 16   Ht 160 cm (63\")   Wt 56.5 kg (124 lb 9.6 oz)   SpO2 100%   BMI 22.07 kg/m²       "

## 2022-10-19 NOTE — DISCHARGE INSTRUCTIONS
For the next 24 hours patient needs to be with a responsible adult.    For 24 hours DO NOT drive, operate machinery, appliances, drink alcohol, make important decisions or sign legal documents.    Start with a light or bland diet if you are feeling sick to your stomach otherwise advance to regular diet as tolerated.    Follow recommendations on procedure report if provided by your doctor.    Call Dr Tanner for problems [phone].Await pathology result in 7-10 days.    Problems may include but not limited to: large amounts of bleeding, trouble breathing, repeated vomiting, severe unrelieved pain, fever or chills.

## 2022-10-19 NOTE — H&P
[facility]  Pre Procedure History & Physical    Chief Complaint: Diarrhea, history of microscopic colitis      HPI: 82-year-old woman here for flexible sigmoidoscopy to further assess diarrhea.  Her last colonoscopy was notable for some changes concerning for developing microscopic colitis.    Past Medical History:   Past Medical History:   Diagnosis Date   • Acute seasonal allergic rhinitis due to pollen    • Anxiety and depression    • Arthritis    • Asthma    • C. difficile colitis    • COPD (chronic obstructive pulmonary disease) (HCC)    • Diabetes mellitus (HCC)     Pre diabetes   • Diarrhea    • Diverticulosis    • Fibula fracture    • Herpes zoster    • History of lumbosacral spine surgery    • Hypertension    • IBS (irritable bowel syndrome)    • Mixed hyperlipidemia    • KINDRA (obstructive sleep apnea)     NO MACHINE AT THIS TIME   • Osteoporosis    • Paroxysmal atrial fibrillation (HCC)    • Vitamin D deficiency        Family History:  Family History   Problem Relation Age of Onset   • Heart disease Mother    • Lung cancer Mother    • Diabetes Father    • Liver disease Father    • Heart disease Paternal Uncle    • Malig Hyperthermia Neg Hx        Social History:   reports that she quit smoking about 14 years ago. Her smoking use included cigarettes. She has a 25.00 pack-year smoking history. She has never used smokeless tobacco. She reports that she does not drink alcohol and does not use drugs.    Medications:   Medications Prior to Admission   Medication Sig Dispense Refill Last Dose   • aspirin 81 MG chewable tablet Chew 81 mg Daily.   10/17/2022   • atenolol (TENORMIN) 25 MG tablet Take 0.5 tablets by mouth Every Night. 15 tablet 0 10/17/2022   • Budesonide (ENTOCORT EC) 3 MG 24 hr capsule 3 tablets by mouth daily for 6 weeks, then 2 tablets by mouth daily for 2 weeks 154 capsule 0 10/17/2022   • cyclobenzaprine (FLEXERIL) 10 MG tablet Take 1/2 to one tablet by mouth twice daily  as needed for muscle spasms 20 tablet 0 10/17/2022   • escitalopram (LEXAPRO) 20 MG tablet TAKE 1 TABLET BY MOUTH DAILY 30 tablet 3 10/17/2022   • ezetimibe (ZETIA) 10 MG tablet TAKE 1 TABLET BY MOUTH DAILY 30 tablet 3 10/17/2022   • famotidine (PEPCID) 20 MG tablet TAKE 1 TABLET BY MOUTH TWICE DAILY 30 tablet 0 10/17/2022   • fluticasone (FLONASE) 50 MCG/ACT nasal spray 2 sprays into the nostril(s) as directed by provider Daily As Needed for Rhinitis.   10/17/2022   • Melatonin 10 MG tablet Take  by mouth Every Night.   10/17/2022   • omeprazole (priLOSEC) 40 MG capsule TAKE 1 CAPSULE BY MOUTH DAILY 30 capsule 2 10/17/2022   • Symbicort 80-4.5 MCG/ACT inhaler    10/17/2022   • traZODone (DESYREL) 50 MG tablet TAKE 1/2 TO 1 TABLET BY MOUTH EVERY NIGHT 1 HOUR BEFORE BEDTIME 30 tablet 1 10/17/2022   • vitamin D (ERGOCALCIFEROL) 1.25 MG (56100 UT) capsule capsule TAKE 1 CAPSULE BY MOUTH EVERY 7 DAYS 5 capsule 5 Past Week   • acetaminophen (TYLENOL) 500 MG tablet Take 1,000 mg by mouth Every 6 (Six) Hours As Needed for Mild Pain .   Unknown   • amLODIPine (NORVASC) 2.5 MG tablet TAKE 1 TABLET BY MOUTH DAILY 90 tablet 2    • celecoxib (CeleBREX) 200 MG capsule Take 1 capsule by mouth Daily. 30 capsule 3    • lisinopril (PRINIVIL,ZESTRIL) 20 MG tablet Take 1 tablet by mouth 2 (Two) Times a Day. 60 tablet 5    • PARoxetine (PAXIL) 40 MG tablet Take 40 mg by mouth Every Morning.          Allergies:  Penicillins, Meclizine hcl, Bactrim [sulfamethoxazole-trimethoprim], Flagyl [metronidazole], and Statins    ROS:    Pertinent items are noted in HPI     Objective     There were no vitals taken for this visit.    Physical Exam   Constitutional: Pt is oriented to person, place, and time and well-developed, well-nourished, and in no distress.   HENT:   Mouth/Throat: Oropharynx is clear and moist.   Neck: Normal range of motion. Neck supple.   Cardiovascular: Normal rate, regular rhythm and normal heart sounds.    Pulmonary/Chest:  Effort normal and breath sounds normal. No respiratory distress. No  wheezes.   Abdominal: Soft. Bowel sounds are normal.   Skin: Skin is warm and dry.   Psychiatric: Mood, memory, affect and judgment normal.     Assessment & Plan     Diagnosis: Diarrhea, history of microscopic colitis      Anticipated Surgical Procedure:  Flexible sigmoidoscopy    The risks, benefits, and alternatives of this procedure have been discussed with the patient or the responsible party- the patient understands and agrees to proceed.

## 2022-10-21 LAB
LAB AP CASE REPORT: NORMAL
PATH REPORT.FINAL DX SPEC: NORMAL
PATH REPORT.GROSS SPEC: NORMAL

## 2022-10-21 RX ORDER — ATENOLOL 25 MG/1
12.5 TABLET ORAL NIGHTLY
Qty: 45 TABLET | Refills: 1 | Status: SHIPPED | OUTPATIENT
Start: 2022-10-21 | End: 2023-01-10 | Stop reason: SDUPTHER

## 2022-10-27 NOTE — PROGRESS NOTES
Biopsies over sigmoid colon show disruption in the surface with a focal increase in the basement membrane thickness.  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    Please have her schedule follow-up with Franchesca Bynum in the next 4 to 6 weeks, thanks

## 2022-11-01 ENCOUNTER — TELEPHONE (OUTPATIENT)
Dept: GASTROENTEROLOGY | Facility: CLINIC | Age: 82
End: 2022-11-01

## 2022-11-01 NOTE — TELEPHONE ENCOUNTER
----- Message from Kandace Andrade sent at 10/31/2022  1:53 PM EDT -----  Regarding: Biopsy results   Contact: [phone]  Are the biopsy results in?

## 2022-11-01 NOTE — TELEPHONE ENCOUNTER
----- Message from Tonja Tanner MD sent at 10/27/2022  1:40 PM EDT -----  Biopsies over sigmoid colon show disruption in the surface with a focal increase in the basement membrane thickness.  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    Please have her schedule follow-up with Franchesca Bynum in the next 4 to 6 weeks, thanks   81

## 2022-11-01 NOTE — TELEPHONE ENCOUNTER
Called pt and spoke with pt's daughter( on hipaa) and advised of Dr Tanner note. Verb understanding.     F/u made with Franchesca RODRIGUEZ on 12/09 at 11a.

## 2022-11-03 ENCOUNTER — OFFICE VISIT (OUTPATIENT)
Dept: FAMILY MEDICINE CLINIC | Facility: CLINIC | Age: 82
End: 2022-11-03

## 2022-11-03 VITALS
OXYGEN SATURATION: 98 % | WEIGHT: 127.2 LBS | HEIGHT: 63 IN | SYSTOLIC BLOOD PRESSURE: 108 MMHG | RESPIRATION RATE: 18 BRPM | HEART RATE: 64 BPM | DIASTOLIC BLOOD PRESSURE: 60 MMHG | BODY MASS INDEX: 22.54 KG/M2 | TEMPERATURE: 99.1 F

## 2022-11-03 DIAGNOSIS — M79.89 BILATERAL SWELLING OF FEET: ICD-10-CM

## 2022-11-03 DIAGNOSIS — N32.81 OVERACTIVE BLADDER: Primary | ICD-10-CM

## 2022-11-03 DIAGNOSIS — Z23 IMMUNIZATION DUE: ICD-10-CM

## 2022-11-03 PROCEDURE — 90662 IIV NO PRSV INCREASED AG IM: CPT | Performed by: FAMILY MEDICINE

## 2022-11-03 PROCEDURE — G0008 ADMIN INFLUENZA VIRUS VAC: HCPCS | Performed by: FAMILY MEDICINE

## 2022-11-03 PROCEDURE — 99214 OFFICE O/P EST MOD 30 MIN: CPT | Performed by: FAMILY MEDICINE

## 2022-11-03 RX ORDER — SOLIFENACIN SUCCINATE 5 MG/1
5 TABLET, FILM COATED ORAL DAILY
Qty: 30 TABLET | Refills: 3 | Status: SHIPPED | OUTPATIENT
Start: 2022-11-03 | End: 2023-01-10 | Stop reason: SDUPTHER

## 2022-11-03 NOTE — PROGRESS NOTES
"Chief Complaint  Anxiety    Subjective        Kandace Andrade presents to [facility]  History of Present Illness  Pt has been having increased fx and wants to get on med for this.    Edema in feet but not currently.  Worse with sitting.    Objective   Vital Signs:  /60 (BP Location: Left arm, Patient Position: Sitting, Cuff Size: Adult)   Pulse 64   Temp 99.1 °F (37.3 °C) (Temporal)   Resp 18   Ht 160 cm (63\")   Wt 57.7 kg (127 lb 3.2 oz)   SpO2 98%   BMI 22.53 kg/m²   Estimated body mass index is 22.53 kg/m² as calculated from the following:    Height as of this encounter: 160 cm (63\").    Weight as of this encounter: 57.7 kg (127 lb 3.2 oz).    BMI is within normal parameters. No other follow-up for BMI required.      Physical Exam  Vitals and nursing note reviewed.   Constitutional:       Appearance: Normal appearance. She is well-developed.   Cardiovascular:      Rate and Rhythm: Normal rate and regular rhythm.      Heart sounds: Normal heart sounds. No murmur heard.  Pulmonary:      Effort: Pulmonary effort is normal. No respiratory distress.      Breath sounds: Normal breath sounds. No stridor. No wheezing or rhonchi.   Musculoskeletal:      Right lower leg: No edema.      Left lower leg: No edema.   Neurological:      General: No focal deficit present.      Mental Status: She is alert and oriented to person, place, and time. She is not disoriented.   Psychiatric:         Mood and Affect: Mood normal.         Behavior: Behavior normal.        Result Review :                Assessment and Plan   Diagnoses and all orders for this visit:    1. Overactive bladder (Primary)  -     solifenacin (VESIcare) 5 MG tablet; Take 1 tablet by mouth Daily.  Dispense: 30 tablet; Refill: 3    2. Bilateral swelling of feet             Follow Up   Return in about 6 weeks (around 12/15/2022) for Medicare Wellness wants televisit. .  Patient was given instructions and counseling regarding " her condition or for health maintenance advice. Please see specific information pulled into the AVS if appropriate.     Elevate leg and reassured pt.  Start vesicare.  SE discussed.

## 2022-11-08 RX ORDER — EZETIMIBE 10 MG/1
10 TABLET ORAL DAILY
Qty: 30 TABLET | Refills: 3 | Status: SHIPPED | OUTPATIENT
Start: 2022-11-08 | End: 2023-01-10 | Stop reason: SDUPTHER

## 2022-11-21 DIAGNOSIS — F51.01 PRIMARY INSOMNIA: ICD-10-CM

## 2022-11-21 RX ORDER — TRAZODONE HYDROCHLORIDE 50 MG/1
TABLET ORAL
Qty: 30 TABLET | Refills: 1 | Status: SHIPPED | OUTPATIENT
Start: 2022-11-21 | End: 2023-01-25

## 2022-11-21 NOTE — TELEPHONE ENCOUNTER
Rx Refill Note  Requested Prescriptions     Pending Prescriptions Disp Refills   • traZODone (DESYREL) 50 MG tablet [Pharmacy Med Name: TRAZODONE 50MG TABLETS] 30 tablet 1     Sig: TAKE 1/2 TO 1 TABLET BY MOUTH EVERY NIGHT 1 HOUR BEFORE BEDTIME      Last office visit with prescribing clinician: 11/3/2022      Next office visit with prescribing clinician: 12/16/2022

## 2022-12-08 ENCOUNTER — APPOINTMENT (OUTPATIENT)
Dept: GENERAL RADIOLOGY | Facility: HOSPITAL | Age: 82
DRG: 871 | End: 2022-12-08
Payer: MEDICARE

## 2022-12-08 ENCOUNTER — HOSPITAL ENCOUNTER (INPATIENT)
Facility: HOSPITAL | Age: 82
LOS: 13 days | Discharge: HOME HEALTH | DRG: 871 | End: 2022-12-21
Attending: EMERGENCY MEDICINE | Admitting: INTERNAL MEDICINE
Payer: MEDICARE

## 2022-12-08 DIAGNOSIS — J18.9 PNEUMONIA OF RIGHT MIDDLE LOBE DUE TO INFECTIOUS ORGANISM: Primary | ICD-10-CM

## 2022-12-08 DIAGNOSIS — R53.1 WEAKNESS: ICD-10-CM

## 2022-12-08 DIAGNOSIS — Z86.79 HISTORY OF ATRIAL FIBRILLATION: ICD-10-CM

## 2022-12-08 DIAGNOSIS — J44.9 CHRONIC OBSTRUCTIVE PULMONARY DISEASE, UNSPECIFIED COPD TYPE: ICD-10-CM

## 2022-12-08 DIAGNOSIS — Z87.09 HISTORY OF COPD: ICD-10-CM

## 2022-12-08 DIAGNOSIS — R09.02 HYPOXIA: ICD-10-CM

## 2022-12-08 DIAGNOSIS — Z86.39 HISTORY OF DIABETES MELLITUS: ICD-10-CM

## 2022-12-08 DIAGNOSIS — J96.01 ACUTE HYPOXEMIC RESPIRATORY FAILURE: ICD-10-CM

## 2022-12-08 DIAGNOSIS — A41.9 SEPSIS, DUE TO UNSPECIFIED ORGANISM, UNSPECIFIED WHETHER ACUTE ORGAN DYSFUNCTION PRESENT: ICD-10-CM

## 2022-12-08 LAB
ALBUMIN SERPL-MCNC: 3.9 G/DL (ref 3.5–5.2)
ALBUMIN/GLOB SERPL: 2.1 G/DL
ALP SERPL-CCNC: 57 U/L (ref 39–117)
ALT SERPL W P-5'-P-CCNC: 10 U/L (ref 1–33)
ANION GAP SERPL CALCULATED.3IONS-SCNC: 11.3 MMOL/L (ref 5–15)
AST SERPL-CCNC: 16 U/L (ref 1–32)
B PARAPERT DNA SPEC QL NAA+PROBE: NOT DETECTED
B PERT DNA SPEC QL NAA+PROBE: NOT DETECTED
BASOPHILS # BLD AUTO: 0.07 10*3/MM3 (ref 0–0.2)
BASOPHILS NFR BLD AUTO: 0.3 % (ref 0–1.5)
BILIRUB SERPL-MCNC: 0.4 MG/DL (ref 0–1.2)
BILIRUB UR QL STRIP: NEGATIVE
BUN SERPL-MCNC: 13 MG/DL (ref 8–23)
BUN/CREAT SERPL: 18.1 (ref 7–25)
C PNEUM DNA NPH QL NAA+NON-PROBE: NOT DETECTED
CALCIUM SPEC-SCNC: 8.9 MG/DL (ref 8.6–10.5)
CHLORIDE SERPL-SCNC: 100 MMOL/L (ref 98–107)
CK SERPL-CCNC: 79 U/L (ref 20–180)
CLARITY UR: CLEAR
CO2 SERPL-SCNC: 24.7 MMOL/L (ref 22–29)
COLOR UR: YELLOW
CREAT SERPL-MCNC: 0.72 MG/DL (ref 0.57–1)
D-LACTATE SERPL-SCNC: 1.8 MMOL/L (ref 0.5–2)
D-LACTATE SERPL-SCNC: 2.3 MMOL/L (ref 0.5–2)
D-LACTATE SERPL-SCNC: 3.2 MMOL/L (ref 0.5–2)
D-LACTATE SERPL-SCNC: 3.7 MMOL/L (ref 0.5–2)
DEPRECATED RDW RBC AUTO: 42.4 FL (ref 37–54)
EGFRCR SERPLBLD CKD-EPI 2021: 83.1 ML/MIN/1.73
EOSINOPHIL # BLD AUTO: 0.09 10*3/MM3 (ref 0–0.4)
EOSINOPHIL NFR BLD AUTO: 0.4 % (ref 0.3–6.2)
ERYTHROCYTE [DISTWIDTH] IN BLOOD BY AUTOMATED COUNT: 12.8 % (ref 12.3–15.4)
FLUAV SUBTYP SPEC NAA+PROBE: NOT DETECTED
FLUBV RNA ISLT QL NAA+PROBE: NOT DETECTED
GLOBULIN UR ELPH-MCNC: 1.9 GM/DL
GLUCOSE BLDC GLUCOMTR-MCNC: 142 MG/DL (ref 70–130)
GLUCOSE SERPL-MCNC: 124 MG/DL (ref 65–99)
GLUCOSE UR STRIP-MCNC: NEGATIVE MG/DL
HADV DNA SPEC NAA+PROBE: NOT DETECTED
HCOV 229E RNA SPEC QL NAA+PROBE: NOT DETECTED
HCOV HKU1 RNA SPEC QL NAA+PROBE: NOT DETECTED
HCOV NL63 RNA SPEC QL NAA+PROBE: NOT DETECTED
HCOV OC43 RNA SPEC QL NAA+PROBE: NOT DETECTED
HCT VFR BLD AUTO: 36.6 % (ref 34–46.6)
HGB BLD-MCNC: 12.9 G/DL (ref 12–15.9)
HGB UR QL STRIP.AUTO: NEGATIVE
HMPV RNA NPH QL NAA+NON-PROBE: NOT DETECTED
HOLD SPECIMEN: NORMAL
HOLD SPECIMEN: NORMAL
HPIV1 RNA ISLT QL NAA+PROBE: NOT DETECTED
HPIV2 RNA SPEC QL NAA+PROBE: NOT DETECTED
HPIV3 RNA NPH QL NAA+PROBE: NOT DETECTED
HPIV4 P GENE NPH QL NAA+PROBE: NOT DETECTED
IMM GRANULOCYTES # BLD AUTO: 0.25 10*3/MM3 (ref 0–0.05)
IMM GRANULOCYTES NFR BLD AUTO: 1.1 % (ref 0–0.5)
KETONES UR QL STRIP: NEGATIVE
LEUKOCYTE ESTERASE UR QL STRIP.AUTO: NEGATIVE
LYMPHOCYTES # BLD AUTO: 3.37 10*3/MM3 (ref 0.7–3.1)
LYMPHOCYTES NFR BLD AUTO: 14.7 % (ref 19.6–45.3)
M PNEUMO IGG SER IA-ACNC: NOT DETECTED
MAGNESIUM SERPL-MCNC: 1.7 MG/DL (ref 1.6–2.4)
MCH RBC QN AUTO: 31.5 PG (ref 26.6–33)
MCHC RBC AUTO-ENTMCNC: 35.2 G/DL (ref 31.5–35.7)
MCV RBC AUTO: 89.3 FL (ref 79–97)
MONOCYTES # BLD AUTO: 1.38 10*3/MM3 (ref 0.1–0.9)
MONOCYTES NFR BLD AUTO: 6 % (ref 5–12)
NEUTROPHILS NFR BLD AUTO: 17.75 10*3/MM3 (ref 1.7–7)
NEUTROPHILS NFR BLD AUTO: 77.5 % (ref 42.7–76)
NITRITE UR QL STRIP: NEGATIVE
NRBC BLD AUTO-RTO: 0 /100 WBC (ref 0–0.2)
NT-PROBNP SERPL-MCNC: 958 PG/ML (ref 0–1800)
PH UR STRIP.AUTO: 7.5 [PH] (ref 5–8)
PLATELET # BLD AUTO: 251 10*3/MM3 (ref 140–450)
PMV BLD AUTO: 10 FL (ref 6–12)
POTASSIUM SERPL-SCNC: 4.1 MMOL/L (ref 3.5–5.2)
PROCALCITONIN SERPL-MCNC: 0.31 NG/ML (ref 0–0.25)
PROT SERPL-MCNC: 5.8 G/DL (ref 6–8.5)
PROT UR QL STRIP: NEGATIVE
QT INTERVAL: 404 MS
RBC # BLD AUTO: 4.1 10*6/MM3 (ref 3.77–5.28)
RHINOVIRUS RNA SPEC NAA+PROBE: NOT DETECTED
RSV RNA NPH QL NAA+NON-PROBE: NOT DETECTED
SARS-COV-2 RNA NPH QL NAA+NON-PROBE: NOT DETECTED
SODIUM SERPL-SCNC: 136 MMOL/L (ref 136–145)
SP GR UR STRIP: 1.01 (ref 1–1.03)
TROPONIN T SERPL-MCNC: <0.01 NG/ML (ref 0–0.03)
UROBILINOGEN UR QL STRIP: NORMAL
WBC NRBC COR # BLD: 22.91 10*3/MM3 (ref 3.4–10.8)
WHOLE BLOOD HOLD COAG: NORMAL
WHOLE BLOOD HOLD SPECIMEN: NORMAL

## 2022-12-08 PROCEDURE — 25010000002 ENOXAPARIN PER 10 MG: Performed by: INTERNAL MEDICINE

## 2022-12-08 PROCEDURE — 84145 PROCALCITONIN (PCT): CPT | Performed by: EMERGENCY MEDICINE

## 2022-12-08 PROCEDURE — 83735 ASSAY OF MAGNESIUM: CPT | Performed by: EMERGENCY MEDICINE

## 2022-12-08 PROCEDURE — 83880 ASSAY OF NATRIURETIC PEPTIDE: CPT

## 2022-12-08 PROCEDURE — 83605 ASSAY OF LACTIC ACID: CPT | Performed by: EMERGENCY MEDICINE

## 2022-12-08 PROCEDURE — 0202U NFCT DS 22 TRGT SARS-COV-2: CPT | Performed by: EMERGENCY MEDICINE

## 2022-12-08 PROCEDURE — 25010000002 AZITHROMYCIN PER 500 MG: Performed by: EMERGENCY MEDICINE

## 2022-12-08 PROCEDURE — 82962 GLUCOSE BLOOD TEST: CPT

## 2022-12-08 PROCEDURE — 25010000002 CEFTRIAXONE PER 250 MG: Performed by: EMERGENCY MEDICINE

## 2022-12-08 PROCEDURE — 80053 COMPREHEN METABOLIC PANEL: CPT

## 2022-12-08 PROCEDURE — 99285 EMERGENCY DEPT VISIT HI MDM: CPT

## 2022-12-08 PROCEDURE — 93005 ELECTROCARDIOGRAM TRACING: CPT

## 2022-12-08 PROCEDURE — 93005 ELECTROCARDIOGRAM TRACING: CPT | Performed by: EMERGENCY MEDICINE

## 2022-12-08 PROCEDURE — 84484 ASSAY OF TROPONIN QUANT: CPT

## 2022-12-08 PROCEDURE — 93010 ELECTROCARDIOGRAM REPORT: CPT | Performed by: INTERNAL MEDICINE

## 2022-12-08 PROCEDURE — 36415 COLL VENOUS BLD VENIPUNCTURE: CPT | Performed by: EMERGENCY MEDICINE

## 2022-12-08 PROCEDURE — 85025 COMPLETE CBC W/AUTO DIFF WBC: CPT

## 2022-12-08 PROCEDURE — 71046 X-RAY EXAM CHEST 2 VIEWS: CPT

## 2022-12-08 PROCEDURE — 87040 BLOOD CULTURE FOR BACTERIA: CPT | Performed by: EMERGENCY MEDICINE

## 2022-12-08 PROCEDURE — 81003 URINALYSIS AUTO W/O SCOPE: CPT | Performed by: EMERGENCY MEDICINE

## 2022-12-08 PROCEDURE — 82550 ASSAY OF CK (CPK): CPT | Performed by: EMERGENCY MEDICINE

## 2022-12-08 RX ORDER — ASPIRIN 81 MG/1
81 TABLET, CHEWABLE ORAL DAILY
Status: DISCONTINUED | OUTPATIENT
Start: 2022-12-09 | End: 2022-12-21 | Stop reason: HOSPADM

## 2022-12-08 RX ORDER — SODIUM CHLORIDE 9 MG/ML
100 INJECTION, SOLUTION INTRAVENOUS CONTINUOUS
Status: DISCONTINUED | OUTPATIENT
Start: 2022-12-08 | End: 2022-12-11

## 2022-12-08 RX ORDER — ENOXAPARIN SODIUM 100 MG/ML
40 INJECTION SUBCUTANEOUS NIGHTLY
Status: DISCONTINUED | OUTPATIENT
Start: 2022-12-08 | End: 2022-12-21 | Stop reason: HOSPADM

## 2022-12-08 RX ORDER — ONDANSETRON 2 MG/ML
4 INJECTION INTRAMUSCULAR; INTRAVENOUS EVERY 6 HOURS PRN
Status: DISCONTINUED | OUTPATIENT
Start: 2022-12-08 | End: 2022-12-21 | Stop reason: HOSPADM

## 2022-12-08 RX ORDER — SODIUM CHLORIDE 9 MG/ML
40 INJECTION, SOLUTION INTRAVENOUS AS NEEDED
Status: DISCONTINUED | OUTPATIENT
Start: 2022-12-08 | End: 2022-12-21 | Stop reason: HOSPADM

## 2022-12-08 RX ORDER — OXYBUTYNIN CHLORIDE 5 MG/1
5 TABLET, EXTENDED RELEASE ORAL DAILY
Status: DISCONTINUED | OUTPATIENT
Start: 2022-12-09 | End: 2022-12-21 | Stop reason: HOSPADM

## 2022-12-08 RX ORDER — AMLODIPINE BESYLATE 5 MG/1
5 TABLET ORAL DAILY
Status: DISCONTINUED | OUTPATIENT
Start: 2022-12-09 | End: 2022-12-21 | Stop reason: HOSPADM

## 2022-12-08 RX ORDER — PAROXETINE HYDROCHLORIDE 20 MG/1
40 TABLET, FILM COATED ORAL EVERY MORNING
Status: DISCONTINUED | OUTPATIENT
Start: 2022-12-09 | End: 2022-12-21 | Stop reason: HOSPADM

## 2022-12-08 RX ORDER — TRAZODONE HYDROCHLORIDE 50 MG/1
50 TABLET ORAL NIGHTLY PRN
Status: DISCONTINUED | OUTPATIENT
Start: 2022-12-08 | End: 2022-12-21 | Stop reason: HOSPADM

## 2022-12-08 RX ORDER — LISINOPRIL 20 MG/1
20 TABLET ORAL 2 TIMES DAILY
Status: DISCONTINUED | OUTPATIENT
Start: 2022-12-08 | End: 2022-12-09

## 2022-12-08 RX ORDER — SODIUM CHLORIDE 0.9 % (FLUSH) 0.9 %
10 SYRINGE (ML) INJECTION EVERY 12 HOURS SCHEDULED
Status: DISCONTINUED | OUTPATIENT
Start: 2022-12-08 | End: 2022-12-21 | Stop reason: HOSPADM

## 2022-12-08 RX ORDER — SODIUM CHLORIDE 0.9 % (FLUSH) 0.9 %
10 SYRINGE (ML) INJECTION AS NEEDED
Status: DISCONTINUED | OUTPATIENT
Start: 2022-12-08 | End: 2022-12-21 | Stop reason: HOSPADM

## 2022-12-08 RX ORDER — NITROGLYCERIN 0.4 MG/1
0.4 TABLET SUBLINGUAL
Status: DISCONTINUED | OUTPATIENT
Start: 2022-12-08 | End: 2022-12-21 | Stop reason: HOSPADM

## 2022-12-08 RX ORDER — EZETIMIBE 10 MG/1
10 TABLET ORAL DAILY
Status: DISCONTINUED | OUTPATIENT
Start: 2022-12-09 | End: 2022-12-21 | Stop reason: HOSPADM

## 2022-12-08 RX ORDER — PANTOPRAZOLE SODIUM 40 MG/1
40 TABLET, DELAYED RELEASE ORAL EVERY MORNING
Status: DISCONTINUED | OUTPATIENT
Start: 2022-12-09 | End: 2022-12-21 | Stop reason: HOSPADM

## 2022-12-08 RX ORDER — INSULIN LISPRO 100 [IU]/ML
0-9 INJECTION, SOLUTION INTRAVENOUS; SUBCUTANEOUS
Status: DISCONTINUED | OUTPATIENT
Start: 2022-12-09 | End: 2022-12-21 | Stop reason: HOSPADM

## 2022-12-08 RX ORDER — DEXTROSE MONOHYDRATE 25 G/50ML
25 INJECTION, SOLUTION INTRAVENOUS
Status: DISCONTINUED | OUTPATIENT
Start: 2022-12-08 | End: 2022-12-21 | Stop reason: HOSPADM

## 2022-12-08 RX ORDER — ATENOLOL 25 MG/1
12.5 TABLET ORAL NIGHTLY
Status: DISCONTINUED | OUTPATIENT
Start: 2022-12-08 | End: 2022-12-21 | Stop reason: HOSPADM

## 2022-12-08 RX ORDER — NICOTINE POLACRILEX 4 MG
15 LOZENGE BUCCAL
Status: DISCONTINUED | OUTPATIENT
Start: 2022-12-08 | End: 2022-12-21 | Stop reason: HOSPADM

## 2022-12-08 RX ORDER — BUDESONIDE AND FORMOTEROL FUMARATE DIHYDRATE 80; 4.5 UG/1; UG/1
2 AEROSOL RESPIRATORY (INHALATION)
Status: DISCONTINUED | OUTPATIENT
Start: 2022-12-08 | End: 2022-12-17

## 2022-12-08 RX ORDER — ACETAMINOPHEN 500 MG
1000 TABLET ORAL EVERY 6 HOURS PRN
Status: DISCONTINUED | OUTPATIENT
Start: 2022-12-08 | End: 2022-12-21 | Stop reason: HOSPADM

## 2022-12-08 RX ADMIN — SODIUM CHLORIDE 100 ML/HR: 9 INJECTION, SOLUTION INTRAVENOUS at 21:10

## 2022-12-08 RX ADMIN — AZITHROMYCIN MONOHYDRATE 500 MG: 500 INJECTION, POWDER, LYOPHILIZED, FOR SOLUTION INTRAVENOUS at 16:29

## 2022-12-08 RX ADMIN — SODIUM CHLORIDE 1000 ML: 9 INJECTION, SOLUTION INTRAVENOUS at 16:29

## 2022-12-08 RX ADMIN — Medication 10 ML: at 21:09

## 2022-12-08 RX ADMIN — CEFTRIAXONE SODIUM 1 G: 1 INJECTION, POWDER, FOR SOLUTION INTRAMUSCULAR; INTRAVENOUS at 16:28

## 2022-12-08 RX ADMIN — ATENOLOL 12.5 MG: 25 TABLET ORAL at 23:14

## 2022-12-08 RX ADMIN — ENOXAPARIN SODIUM 40 MG: 100 INJECTION SUBCUTANEOUS at 23:15

## 2022-12-08 NOTE — ED NOTES
Nursing report ED to floor  Kandace Andrade  82 y.o.  female    HPI :   Chief Complaint   Patient presents with    Shortness of Breath    Fall       Admitting doctor:   Sammy Bullock MD    Admitting diagnosis:   The primary encounter diagnosis was Pneumonia of right middle lobe due to infectious organism. Diagnoses of Sepsis, due to unspecified organism, unspecified whether acute organ dysfunction present (HCC), Hypoxia, History of COPD, History of diabetes mellitus, and History of atrial fibrillation were also pertinent to this visit.    Code status:   Current Code Status       Date Active Code Status Order ID Comments User Context       Prior            Allergies:   Penicillins, Meclizine hcl, Bactrim [sulfamethoxazole-trimethoprim], Flagyl [metronidazole], and Statins    Isolation:   No active isolations    Intake and Output  No intake or output data in the 24 hours ending 12/08/22 1751    Weight:   There were no vitals filed for this visit.    Most recent vitals:   Vitals:    12/08/22 1358   BP: 99/50   BP Location: Right arm   Patient Position: Lying   Pulse: 86   Resp: 18   Temp: 99.3 °F (37.4 °C)   TempSrc: Tympanic   SpO2: 94%       Active LDAs/IV Access:   Lines, Drains & Airways       Active LDAs       Name Placement date Placement time Site Days    Peripheral IV 12/08/22 1359 Left Antecubital 12/08/22  1359  Antecubital  less than 1                    Labs (abnormal labs have a star):   Labs Reviewed   COMPREHENSIVE METABOLIC PANEL - Abnormal; Notable for the following components:       Result Value    Glucose 124 (*)     Total Protein 5.8 (*)     All other components within normal limits    Narrative:     GFR Normal >60  Chronic Kidney Disease <60  Kidney Failure <15    The GFR formula is only valid for adults with stable renal function between ages 18 and 70.   CBC WITH AUTO DIFFERENTIAL - Abnormal; Notable for the following components:    WBC 22.91 (*)     Neutrophil % 77.5 (*)     Lymphocyte % 14.7 (*)      Immature Grans % 1.1 (*)     Neutrophils, Absolute 17.75 (*)     Lymphocytes, Absolute 3.37 (*)     Monocytes, Absolute 1.38 (*)     Immature Grans, Absolute 0.25 (*)     All other components within normal limits   LACTIC ACID, PLASMA - Abnormal; Notable for the following components:    Lactate 3.7 (*)     All other components within normal limits   PROCALCITONIN - Abnormal; Notable for the following components:    Procalcitonin 0.31 (*)     All other components within normal limits    Narrative:     As a Marker for Sepsis (Non-Neonates):    1. <0.5 ng/mL represents a low risk of severe sepsis and/or septic shock.  2. >2 ng/mL represents a high risk of severe sepsis and/or septic shock.    As a Marker for Lower Respiratory Tract Infections that require antibiotic therapy:    PCT on Admission    Antibiotic Therapy       6-12 Hrs later    >0.5                Strongly Recommended  >0.25 - <0.5        Recommended   0.1 - 0.25          Discouraged              Remeasure/reassess PCT  <0.1                Strongly Discouraged     Remeasure/reassess PCT    As 28 day mortality risk marker: \"Change in Procalcitonin Result\" (>80% or <=80%) if Day 0 (or Day 1) and Day 4 values are available. Refer to http://www.[employer]-pct-calculator.com    Change in PCT <=80%  A decrease of PCT levels below or equal to 80% defines a positive change in PCT test result representing a higher risk for 28-day all-cause mortality of patients diagnosed with severe sepsis for septic shock.    Change in PCT >80%  A decrease of PCT levels of more than 80% defines a negative change in PCT result representing a lower risk for 28-day all-cause mortality of patients diagnosed with severe sepsis or septic shock.      RESPIRATORY PANEL PCR W/ COVID-19 (SARS-COV-2) NANI/NICK/ESMER/PAD/[facility] IN-HOUSE, NP [facility] IN [place], 3-4 HR TAT - Normal    Narrative:     In the setting of a positive respiratory panel with a viral infection PLUS a negative procalcitonin  without other underlying concern for bacterial infection, consider observing off antibiotics or discontinuation of antibiotics and continue supportive care. If the respiratory panel is positive for atypical bacterial infection (Bordetella pertussis, Chlamydophila pneumoniae, or Mycoplasma pneumoniae), consider antibiotic de-escalation to target atypical bacterial infection.   BNP (IN-HOUSE) - Normal    Narrative:     Among patients with dyspnea, NT-proBNP is highly sensitive for the detection of acute congestive heart failure. In addition NT-proBNP of <300 pg/ml effectively rules out acute congestive heart failure with 99% negative predictive value.    Results may be falsely decreased if patient taking Biotin.     TROPONIN (IN-HOUSE) - Normal    Narrative:     Troponin T Reference Range:  <= 0.03 ng/mL-   Negative for AMI  >0.03 ng/mL-     Abnormal for myocardial necrosis.  Clinicians would have to utilize clinical acumen, EKG, Troponin and serial changes to determine if it is an Acute Myocardial Infarction or myocardial injury due to an underlying chronic condition.       Results may be falsely decreased if patient taking Biotin.     CK - Normal   MAGNESIUM - Normal   BLOOD CULTURE   BLOOD CULTURE   RAINBOW DRAW    Narrative:     The following orders were created for panel order [place] Draw.  Procedure                               Abnormality         Status                     ---------                               -----------         ------                     Green Top (Gel)[[id number]]                                  Final result               Lavender Top[[id number]]                                     Final result               Gold Top - SST[[id number]]                                   Final result               Light Blue Top[[id number]]                                   Final result                 Please view results for these tests on the individual orders.   URINALYSIS W/ MICROSCOPIC IF INDICATED (NO  CULTURE)   LACTIC ACID, REFLEX   CBC AND DIFFERENTIAL    Narrative:     The following orders were created for panel order CBC & Differential.  Procedure                               Abnormality         Status                     ---------                               -----------         ------                     CBC Auto Differential[[id number]]        Abnormal            Final result                 Please view results for these tests on the individual orders.   GREEN TOP   LAVENDER TOP   GOLD TOP - SST   LIGHT BLUE TOP       EKG:   ECG 12 Lead ED Triage Standing Order; SOA   Final Result   HEART RATE= 86  bpm   RR Interval= 698  ms   WA Interval= 152  ms   P Horizontal Axis= -2  deg   P Front Axis= 10  deg   QRSD Interval= 87  ms   QT Interval= 404  ms   QRS Axis= 19  deg   T Wave Axis= 16  deg   - OTHERWISE NORMAL ECG -   Sinus rhythm   Low voltage, precordial leads   No change from previous tracing   Electronically Signed By: Rene EspinosaDAGO) ([facility]) 08-Dec-2022 14:29:56   Date and Time of Study: 2022-12-08 14:15:20          Meds given in ED:   Medications   sodium chloride 0.9 % flush 10 mL (has no administration in time range)   cefTRIAXone (ROCEPHIN) 1 g in sodium chloride 0.9 % 100 mL IVPB-VTB (0 g Intravenous Stopped 12/8/22 1725)   azithromycin (ZITHROMAX) 500 mg in sodium chloride 0.9 % 250 mL IVPB-VTB (500 mg Intravenous Given 12/8/22 1629)   sodium chloride 0.9 % bolus 1,000 mL (0 mL Intravenous Stopped 12/8/22 1725)       Imaging results:  XR Chest 2 View    Result Date: 12/8/2022  FINDINGS AND IMPRESSION: Minimal left basilar pulmonary opacification is grossly unchanged since 07/07/2022. There is new pulmonary opacification within the right mid and lower lung concerning for pneumonia in the appropriate clinical context. Asymmetric pulmonary edema is less likely. Correlation with patient history is recommended. There is mild asymmetric elevation right hemidiaphragm. No pneumothorax is seen.  Cardiac silhouette is within normal limits for size.  This report was finalized on 2022 3:28 PM by Dr. Christian López M.D.       Ambulatory status:   - Ax2    Social issues:   Social History     Socioeconomic History    Marital status:    Tobacco Use    Smoking status: Former     Packs/day: 1.00     Years: 25.00     Pack years: 25.00     Types: Cigarettes     Quit date: 2008     Years since quittin.6    Smokeless tobacco: Never    Tobacco comments:     CAFFEINE USE - 1 CUP COFFEE/ DIET COKE   Vaping Use    Vaping Use: Never used   Substance and Sexual Activity    Alcohol use: No    Drug use: No    Sexual activity: Defer       NIH Stroke Scale:         Registered Nurse, RN  22 17:51 EST

## 2022-12-08 NOTE — ED PROVIDER NOTES
EMERGENCY DEPARTMENT ENCOUNTER    Room Number:  S610/1  Date of encounter:  12/8/2022  PCP: Cassi Bella MD  Historian: Patient, daughter      HPI:  Chief Complaint: Fall out of bed, shortness of breath  A complete HPI/ROS/PMH/PSH/SH/FH are unobtainable due to: None    Context: Kandace Andrade is a 82 y.o. female who presents to the ED via EMS from home after she lost her balance fell reaching over to her nightstand from bed and rolled out of bed.  Clinic of some left low back pain and shortness of breath.  States the shortness of breath has been going on for the last 2 days.  Seems to be worse with exertion, stable chronic dry cough, no fevers, chills, chest pains.  Had an episode of vomiting about 3 or 4 days ago but nothing since then.  No diarrhea.  No significant dysuria or urinary urgency or frequency outside of baseline.  Patient reports eating and drinking normally.  Daughter feels like she appears definitely less active than baseline currently.      MEDICAL RECORD REVIEW    Medication allergies listed for penicillins, meclizine, Bactrim, Flagyl, statin    PAST MEDICAL HISTORY  Active Ambulatory Problems     Diagnosis Date Noted   • Vitamin D deficiency    • KINDRA (obstructive sleep apnea)    • IBS (irritable bowel syndrome)    • Essential hypertension    • Herpes zoster    • Arthritis    • Anxiety and depression    • Other emphysema ([facility]) 08/30/2019   • Acute seasonal allergic rhinitis due to pollen 08/30/2019   • Paroxysmal atrial fibrillation ([facility])    • Osteoporosis    • Mixed hyperlipidemia    • Encounter for Medicare annual wellness exam 11/22/2019   • COPD (chronic obstructive pulmonary disease) ([facility]) 11/22/2019   • Mixed stress and urge urinary incontinence 02/25/2020   • Type 2 diabetes mellitus without complication, without long-term current use of insulin ([facility]) 02/26/2020   • Arthritis of both hips 02/27/2020   • Autonomic neuropathy 10/19/2020   • GERD without esophagitis 11/10/2020   • C.  difficile colitis 11/25/2020   • Hypokalemia 11/25/2020   • Hospital discharge follow-up 12/07/2020   • Decreased hearing of both ears 04/12/2021   • Dizziness 04/12/2021   • Nausea 04/12/2021   • Hypercholesterolemia 11/23/2021   • Primary insomnia 12/28/2021   • Generalized weakness 02/18/2022   • Other microscopic hematuria 02/18/2022   • Acute cystitis with hematuria 07/18/2022   • Confusion 07/18/2022   • Dehydration 07/19/2022   • Abdominal pain 07/19/2022   • Chronic diarrhea 07/19/2022   • Metabolic encephalopathy 07/20/2022   • Left ankle swelling 08/04/2022   • Diarrhea 09/22/2022   • Overactive bladder 11/03/2022     Resolved Ambulatory Problems     Diagnosis Date Noted   • No Resolved Ambulatory Problems     Past Medical History:   Diagnosis Date   • Asthma    • Diabetes mellitus (HCC)    • Diverticulosis    • Fibula fracture    • History of lumbosacral spine surgery    • Hypertension          PAST SURGICAL HISTORY  Past Surgical History:   Procedure Laterality Date   • APPENDECTOMY     • BACK SURGERY      lower x2   • CATARACT EXTRACTION     • COLONOSCOPY      patient doesn't recall    • COLONOSCOPY N/A 4/2/2021    Procedure: COLONOSCOPY to cecum with cecal biopsies;  Surgeon: Jarrod Duggan MD;  Location: [facility];  Service: Gastroenterology;  Laterality: N/A;  pre: diarhhea  post: diverticulosis, hemorrhoids   • HYSTERECTOMY      partial   • SHOULDER SURGERY Right    • SHOULDER SURGERY     • SIGMOIDOSCOPY N/A 10/19/2022    Procedure: SIGMOIDOSCOPY FLEXIBLE TO SIGMOID COLON WITH BIOPSIES;  Surgeon: Tonja Tanner MD;  Location: [facility];  Service: Gastroenterology;  Laterality: N/A;  PRE: DIARRHEA  POST: HEMORRHOIDS   • TONSILLECTOMY     • UPPER GASTROINTESTINAL ENDOSCOPY      patient doesn't recall          FAMILY HISTORY  Family History   Problem Relation Age of Onset   • Heart disease Mother    • Lung cancer Mother    • Diabetes Father    • Liver disease Father    • Heart disease  Paternal Uncle    • Malig Hyperthermia Neg Hx          SOCIAL HISTORY  Social History     Socioeconomic History   • Marital status:    Tobacco Use   • Smoking status: Former     Packs/day: 1.00     Years: 25.00     Pack years: 25.00     Types: Cigarettes     Quit date: 2008     Years since quittin.6   • Smokeless tobacco: Never   • Tobacco comments:     CAFFEINE USE - 1 CUP COFFEE/ DIET COKE   Vaping Use   • Vaping Use: Never used   Substance and Sexual Activity   • Alcohol use: No   • Drug use: No   • Sexual activity: Defer         ALLERGIES  Penicillins, Meclizine hcl, Bactrim [sulfamethoxazole-trimethoprim], Flagyl [metronidazole], and Statins        REVIEW OF SYSTEMS  Review of Systems     All systems reviewed and negative except for those discussed in HPI.       PHYSICAL EXAM    I have reviewed the triage vital signs and nursing notes.    ED Triage Vitals [22 1358]   Temp Heart Rate Resp BP SpO2   99.3 °F (37.4 °C) 86 18 99/50 94 %      Temp src Heart Rate Source Patient Position BP Location FiO2 (%)   Tympanic Monitor Lying Right arm --       Physical Exam  General: No acute distress, nontoxic  HEENT: Mucous membranes moist, atraumatic, EOMI  Neck: Full ROM  Pulm: Symmetric chest rise, nonlabored, lungs slightly diminished in the bases bilaterally but no overt wheezes/rales/rhonchi  Cardiovascular: Regular rate and rhythm, intact distal pulses, no peripheral edema  GI: Soft, nontender, nondistended, no rebound, no guarding, bowel sounds present  MSK: Full ROM, no deformity  Skin: Warm, dry  Neuro: Awake, alert, oriented x 4, GCS 15, moving all extremities, no focal deficits  Psych: Calm, cooperative      N95, protective eye goggles, and gloves used during this encounter. Patient in surgical mask.      LAB RESULTS  Recent Results (from the past 24 hour(s))   ECG 12 Lead ED Triage Standing Order; SOA    Collection Time: 22  2:15 PM   Result Value Ref Range    QT Interval 404 ms    Comprehensive Metabolic Panel    Collection Time: 12/08/22  2:16 PM    Specimen: Blood   Result Value Ref Range    Glucose 124 (H) 65 - 99 mg/dL    BUN 13 8 - 23 mg/dL    Creatinine 0.72 0.57 - 1.00 mg/dL    Sodium 136 136 - 145 mmol/L    Potassium 4.1 3.5 - 5.2 mmol/L    Chloride 100 98 - 107 mmol/L    CO2 24.7 22.0 - 29.0 mmol/L    Calcium 8.9 8.6 - 10.5 mg/dL    Total Protein 5.8 (L) 6.0 - 8.5 g/dL    Albumin 3.90 3.50 - 5.20 g/dL    ALT (SGPT) 10 1 - 33 U/L    AST (SGOT) 16 1 - 32 U/L    Alkaline Phosphatase 57 39 - 117 U/L    Total Bilirubin 0.4 0.0 - 1.2 mg/dL    Globulin 1.9 gm/dL    A/G Ratio 2.1 g/dL    BUN/Creatinine Ratio 18.1 7.0 - 25.0    Anion Gap 11.3 5.0 - 15.0 mmol/L    eGFR 83.1 >60.0 mL/min/1.73   BNP    Collection Time: 12/08/22  2:16 PM    Specimen: Blood   Result Value Ref Range    proBNP 958.0 0.0 - 1,800.0 pg/mL   Troponin    Collection Time: 12/08/22  2:16 PM    Specimen: Blood   Result Value Ref Range    Troponin T <0.010 0.000 - 0.030 ng/mL   Green Top (Gel)    Collection Time: 12/08/22  2:16 PM   Result Value Ref Range    Extra Tube Hold for add-ons.    Lavender Top    Collection Time: 12/08/22  2:16 PM   Result Value Ref Range    Extra Tube hold for add-on    Gold Top - SST    Collection Time: 12/08/22  2:16 PM   Result Value Ref Range    Extra Tube Hold for add-ons.    Light Blue Top    Collection Time: 12/08/22  2:16 PM   Result Value Ref Range    Extra Tube Hold for add-ons.    CBC Auto Differential    Collection Time: 12/08/22  2:16 PM    Specimen: Blood   Result Value Ref Range    WBC 22.91 (H) 3.40 - 10.80 10*3/mm3    RBC 4.10 3.77 - 5.28 10*6/mm3    Hemoglobin 12.9 12.0 - 15.9 g/dL    Hematocrit 36.6 34.0 - 46.6 %    MCV 89.3 79.0 - 97.0 fL    MCH 31.5 26.6 - 33.0 pg    MCHC 35.2 31.5 - 35.7 g/dL    RDW 12.8 12.3 - 15.4 %    RDW-SD 42.4 37.0 - 54.0 fl    MPV 10.0 6.0 - 12.0 fL    Platelets 251 140 - 450 10*3/mm3    Neutrophil % 77.5 (H) 42.7 - 76.0 %    Lymphocyte % 14.7 (L)  19.6 - 45.3 %    Monocyte % 6.0 5.0 - 12.0 %    Eosinophil % 0.4 0.3 - 6.2 %    Basophil % 0.3 0.0 - 1.5 %    Immature Grans % 1.1 (H) 0.0 - 0.5 %    Neutrophils, Absolute 17.75 (H) 1.70 - 7.00 10*3/mm3    Lymphocytes, Absolute 3.37 (H) 0.70 - 3.10 10*3/mm3    Monocytes, Absolute 1.38 (H) 0.10 - 0.90 10*3/mm3    Eosinophils, Absolute 0.09 0.00 - 0.40 10*3/mm3    Basophils, Absolute 0.07 0.00 - 0.20 10*3/mm3    Immature Grans, Absolute 0.25 (H) 0.00 - 0.05 10*3/mm3    nRBC 0.0 0.0 - 0.2 /100 WBC   Procalcitonin    Collection Time: 12/08/22  2:16 PM    Specimen: Blood   Result Value Ref Range    Procalcitonin 0.31 (H) 0.00 - 0.25 ng/mL   CK    Collection Time: 12/08/22  2:16 PM    Specimen: Blood   Result Value Ref Range    Creatine Kinase 79 20 - 180 U/L   Magnesium    Collection Time: 12/08/22  2:16 PM    Specimen: Blood   Result Value Ref Range    Magnesium 1.7 1.6 - 2.4 mg/dL   Lactic Acid, Plasma    Collection Time: 12/08/22  3:15 PM    Specimen: Blood   Result Value Ref Range    Lactate 3.7 (C) 0.5 - 2.0 mmol/L   Respiratory Panel PCR w/COVID-19(SARS-CoV-2) NANI/NICK/ESMER/PAD/COR/MAD/SANTINO In-House, NP Swab in [facility], 3-4 HR TAT - Swab, Nasopharynx    Collection Time: 12/08/22  3:15 PM    Specimen: Nasopharynx; Swab   Result Value Ref Range    ADENOVIRUS, PCR Not Detected Not Detected    Coronavirus 229E Not Detected Not Detected    Coronavirus HKU1 Not Detected Not Detected    Coronavirus NL63 Not Detected Not Detected    Coronavirus OC43 Not Detected Not Detected    COVID19 Not Detected Not Detected - Ref. Range    Human Metapneumovirus Not Detected Not Detected    Human Rhinovirus/Enterovirus Not Detected Not Detected    Influenza A PCR Not Detected Not Detected    Influenza B PCR Not Detected Not Detected    Parainfluenza Virus 1 Not Detected Not Detected    Parainfluenza Virus 2 Not Detected Not Detected    Parainfluenza Virus 3 Not Detected Not Detected    Parainfluenza Virus 4 Not Detected Not Detected     RSV, PCR Not Detected Not Detected    Bordetella pertussis pcr Not Detected Not Detected    Bordetella parapertussis PCR Not Detected Not Detected    Chlamydophila pneumoniae PCR Not Detected Not Detected    Mycoplasma pneumo by PCR Not Detected Not Detected   Urinalysis With Microscopic If Indicated (No Culture) - Urine, Clean Catch    Collection Time: 12/08/22  6:16 PM    Specimen: Urine, Clean Catch   Result Value Ref Range    Color, UA Yellow Yellow, Straw    Appearance, UA Clear Clear    pH, UA 7.5 5.0 - 8.0    Specific Gravity, UA 1.011 1.005 - 1.030    Glucose, UA Negative Negative    Ketones, UA Negative Negative    Bilirubin, UA Negative Negative    Blood, UA Negative Negative    Protein, UA Negative Negative    Leuk Esterase, UA Negative Negative    Nitrite, UA Negative Negative    Urobilinogen, UA 0.2 E.U./dL 0.2 - 1.0 E.U./dL   STAT Lactic Acid, Reflex    Collection Time: 12/08/22  6:16 PM    Specimen: Blood   Result Value Ref Range    Lactate 2.3 (C) 0.5 - 2.0 mmol/L   STAT Lactic Acid, Reflex    Collection Time: 12/08/22  8:19 PM    Specimen: Blood   Result Value Ref Range    Lactate 3.2 (C) 0.5 - 2.0 mmol/L   POC Glucose Once    Collection Time: 12/08/22  8:32 PM    Specimen: Blood   Result Value Ref Range    Glucose 142 (H) 70 - 130 mg/dL       Ordered the above labs and independently reviewed the results.        RADIOLOGY  XR Chest 2 View    Result Date: 12/8/2022  Chest radiograph  HISTORY:Shortness of air  TECHNIQUE: Two PA and lateral radiographs  COMPARISON:Chest radiograph 07/07/2022      FINDINGS AND IMPRESSION: Minimal left basilar pulmonary opacification is grossly unchanged since 07/07/2022. There is new pulmonary opacification within the right mid and lower lung concerning for pneumonia in the appropriate clinical context. Asymmetric pulmonary edema is less likely. Correlation with patient history is recommended. There is mild asymmetric elevation right hemidiaphragm. No pneumothorax is  seen. Cardiac silhouette is within normal limits for size.  This report was finalized on 12/8/2022 3:28 PM by Dr. Christian López M.D.        I ordered the above noted radiological studies. Reviewed by me.  See dictation for official radiology interpretation.      PROCEDURES    Critical Care  Performed by: Jonah Lei MD  Authorized by: Jonah Lei MD     Critical care provider statement:     Critical care time (minutes):  35    Critical care time was exclusive of:  Separately billable procedures and treating other patients    Critical care was necessary to treat or prevent imminent or life-threatening deterioration of the following conditions:  Sepsis    Critical care was time spent personally by me on the following activities:  Development of treatment plan with patient or surrogate, discussions with consultants, evaluation of patient's response to treatment, examination of patient, obtaining history from patient or surrogate, ordering and performing treatments and interventions, ordering and review of laboratory studies, ordering and review of radiographic studies, pulse oximetry, re-evaluation of patient's condition and review of old charts    Care discussed with: admitting provider        EKG    EKG Time: 1415  Rhythm/Rate: Sinus rhythm with rate of 86  Normal axis  Borderline prolonged QTC of 484  No acute ischemic changes  No STEMI     Interpreted Contemporaneously by me, independently viewed  No emergent changes with slightly more prolonged QTc as compared to July 2022      MEDICATIONS GIVEN IN ER    Medications   sodium chloride 0.9 % flush 10 mL (has no administration in time range)   traZODone (DESYREL) tablet 50 mg (has no administration in time range)   budesonide-formoterol (SYMBICORT) 80-4.5 MCG/ACT inhaler 2 puff (2 puffs Inhalation Not Given 12/8/22 2123)   oxybutynin XL (DITROPAN-XL) 24 hr tablet 5 mg (has no administration in time range)   pantoprazole (PROTONIX) EC tablet 40 mg (has no  administration in time range)   lisinopril (PRINIVIL,ZESTRIL) tablet 20 mg (20 mg Oral Not Given 12/8/22 2112)   ezetimibe (ZETIA) tablet 10 mg (has no administration in time range)   PARoxetine (PAXIL) tablet 40 mg (has no administration in time range)   atenolol (TENORMIN) tablet 12.5 mg (has no administration in time range)   aspirin chewable tablet 81 mg (has no administration in time range)   amLODIPine (NORVASC) tablet 5 mg (has no administration in time range)   acetaminophen (TYLENOL) tablet 1,000 mg (has no administration in time range)   sodium chloride 0.9 % flush 10 mL (10 mL Intravenous Given 12/8/22 2109)   sodium chloride 0.9 % flush 10 mL (has no administration in time range)   sodium chloride 0.9 % infusion 40 mL (has no administration in time range)   dextrose (GLUTOSE) oral gel 15 g (has no administration in time range)   dextrose (D50W) (25 g/50 mL) IV injection 25 g (has no administration in time range)   glucagon (human recombinant) (GLUCAGEN DIAGNOSTIC) injection 1 mg (has no administration in time range)   Enoxaparin Sodium (LOVENOX) syringe 40 mg (has no administration in time range)   nitroglycerin (NITROSTAT) SL tablet 0.4 mg (has no administration in time range)   sodium chloride 0.9 % infusion (100 mL/hr Intravenous New Bag 12/8/22 2110)   insulin lispro (ADMELOG) injection 0-9 Units (has no administration in time range)   ondansetron (ZOFRAN) injection 4 mg (has no administration in time range)   cefTRIAXone (ROCEPHIN) 1 g in sodium chloride 0.9 % 100 mL IVPB-VTB (has no administration in time range)   azithromycin (ZITHROMAX) 500 mg in sodium chloride 0.9 % 250 mL IVPB-VTB (has no administration in time range)   cefTRIAXone (ROCEPHIN) 1 g in sodium chloride 0.9 % 100 mL IVPB-VTB (0 g Intravenous Stopped 12/8/22 1725)   azithromycin (ZITHROMAX) 500 mg in sodium chloride 0.9 % 250 mL IVPB-VTB (500 mg Intravenous Given 12/8/22 1629)   sodium chloride 0.9 % bolus 1,000 mL (0 mL Intravenous  Stopped 12/8/22 1725)         PROGRESS, DATA ANALYSIS, CONSULTS, AND MEDICAL DECISION MAKING    All labs have been independently reviewed by me.  All radiology studies have been reviewed by me and discussed with radiologist dictating the report.   EKG's independently viewed and interpreted by me.  Discussion below represents my analysis of pertinent findings related to patient's condition, differential diagnosis, treatment plan and final disposition.    Initial concern for pneumonia, heart failure, renal failure, electrolyte abnormalities, UTI, anemia, COPD exacerbation, among others.  Plan for labs, chest x-ray, viral panel, and a reevaluation with results.    ED Course as of 12/08/22 2155   Thu Dec 08, 2022   1500 Troponin T: <0.010 [DC]   1500 WBC(!): 22.91  10.3 three months ago [DC]   1501 Hemoglobin: 12.9 [DC]   1501 Platelets: 251 [DC]   1501 Glucose(!): 124 [DC]   1501 BUN: 13 [DC]   1501 Creatinine: 0.72 [DC]   1501 Sodium: 136 [DC]   1501 Potassium: 4.1 [DC]   1501 CO2: 24.7 [DC]   1501 ALT (SGPT): 10 [DC]   1501 AST (SGOT): 16 [DC]   1501 Alkaline Phosphatase: 57 [DC]   1501 Total Bilirubin: 0.4 [DC]   1501 proBNP: 958.0 [DC]   1539 XR Chest 2 View  reviewed [DC]   1615 Procalcitonin(!): 0.31 [DC]   1615 Respiratory Panel PCR w/COVID-19(SARS-CoV-2) NANI/NICK/ESMER/PAD/COR/MAD/SANTINO In-House, NP Swab in UTM/VTM, 3-4 HR TAT - Swab, Nasopharynx [DC]   1722 Discussed with Dr. Bullock, hospitalist, discussed patient's clinical course and findings today, treatment modalities, and need for hospitalization. [DC]      ED Course User Index  [DC] Jonah Lei MD     Patient and daughter fully updated on the findings today and course and plan and need for hospital stay with IV antibiotics.  All questions and concerns addressed.    AS OF 21:55 EST VITALS:    BP - 103/52  HR - 70  TEMP - 97.7 °F (36.5 °C) (Oral)  02 SATS - 91%        DIAGNOSIS  Final diagnoses:   Pneumonia of right middle lobe due to infectious organism    Sepsis, due to unspecified organism, unspecified whether acute organ dysfunction present (HCC)   Hypoxia   History of COPD   History of diabetes mellitus   History of atrial fibrillation         DISPOSITION  HOSPITALIZATION    Discussed treatment plan and reason for hospitalization with pt/family and hospitalizing physician.  Pt/family voiced understanding of the plan for hospitalization for further testing/treatment as needed.                  Jonah Lei MD  12/08/22 7081

## 2022-12-08 NOTE — ED NOTES
Pt arrived by EMS from home. C/o SOA, called for fall out of bed. Denies LOC, denies hitting head. EKG by EMS 1325     Patient was placed in face mask during first look triage.  Patient was wearing a face mask throughout encounter.  I wore personal protective equipment throughout the encounter.  Hand hygiene was performed before and after patient encounter.

## 2022-12-09 PROBLEM — A41.9 SEPSIS DUE TO PNEUMONIA (HCC): Status: ACTIVE | Noted: 2022-12-09

## 2022-12-09 PROBLEM — J18.9 SEPSIS DUE TO PNEUMONIA (HCC): Status: ACTIVE | Noted: 2022-12-09

## 2022-12-09 LAB
ALBUMIN SERPL-MCNC: 3.1 G/DL (ref 3.5–5.2)
ALBUMIN/GLOB SERPL: 1.5 G/DL
ALP SERPL-CCNC: 54 U/L (ref 39–117)
ALT SERPL W P-5'-P-CCNC: 8 U/L (ref 1–33)
ANION GAP SERPL CALCULATED.3IONS-SCNC: 10 MMOL/L (ref 5–15)
AST SERPL-CCNC: 15 U/L (ref 1–32)
BASOPHILS # BLD AUTO: 0.05 10*3/MM3 (ref 0–0.2)
BASOPHILS NFR BLD AUTO: 0.4 % (ref 0–1.5)
BILIRUB SERPL-MCNC: 0.4 MG/DL (ref 0–1.2)
BUN SERPL-MCNC: 12 MG/DL (ref 8–23)
BUN/CREAT SERPL: 26.1 (ref 7–25)
CALCIUM SPEC-SCNC: 8.4 MG/DL (ref 8.6–10.5)
CHLORIDE SERPL-SCNC: 105 MMOL/L (ref 98–107)
CO2 SERPL-SCNC: 22 MMOL/L (ref 22–29)
CREAT SERPL-MCNC: 0.46 MG/DL (ref 0.57–1)
DEPRECATED RDW RBC AUTO: 43.5 FL (ref 37–54)
EGFRCR SERPLBLD CKD-EPI 2021: 95.1 ML/MIN/1.73
EOSINOPHIL # BLD AUTO: 0.12 10*3/MM3 (ref 0–0.4)
EOSINOPHIL NFR BLD AUTO: 0.9 % (ref 0.3–6.2)
ERYTHROCYTE [DISTWIDTH] IN BLOOD BY AUTOMATED COUNT: 12.8 % (ref 12.3–15.4)
GLOBULIN UR ELPH-MCNC: 2.1 GM/DL
GLUCOSE BLDC GLUCOMTR-MCNC: 116 MG/DL (ref 70–130)
GLUCOSE BLDC GLUCOMTR-MCNC: 85 MG/DL (ref 70–130)
GLUCOSE BLDC GLUCOMTR-MCNC: 86 MG/DL (ref 70–130)
GLUCOSE BLDC GLUCOMTR-MCNC: 95 MG/DL (ref 70–130)
GLUCOSE SERPL-MCNC: 85 MG/DL (ref 65–99)
HBA1C MFR BLD: 6 % (ref 4.8–5.6)
HCT VFR BLD AUTO: 33.3 % (ref 34–46.6)
HGB BLD-MCNC: 11.2 G/DL (ref 12–15.9)
IMM GRANULOCYTES # BLD AUTO: 0.11 10*3/MM3 (ref 0–0.05)
IMM GRANULOCYTES NFR BLD AUTO: 0.8 % (ref 0–0.5)
LYMPHOCYTES # BLD AUTO: 2.34 10*3/MM3 (ref 0.7–3.1)
LYMPHOCYTES NFR BLD AUTO: 17 % (ref 19.6–45.3)
MCH RBC QN AUTO: 31.5 PG (ref 26.6–33)
MCHC RBC AUTO-ENTMCNC: 33.6 G/DL (ref 31.5–35.7)
MCV RBC AUTO: 93.8 FL (ref 79–97)
MONOCYTES # BLD AUTO: 0.78 10*3/MM3 (ref 0.1–0.9)
MONOCYTES NFR BLD AUTO: 5.7 % (ref 5–12)
NEUTROPHILS NFR BLD AUTO: 10.39 10*3/MM3 (ref 1.7–7)
NEUTROPHILS NFR BLD AUTO: 75.2 % (ref 42.7–76)
NRBC BLD AUTO-RTO: 0 /100 WBC (ref 0–0.2)
PLAT MORPH BLD: NORMAL
PLATELET # BLD AUTO: 185 10*3/MM3 (ref 140–450)
PMV BLD AUTO: 10.3 FL (ref 6–12)
POTASSIUM SERPL-SCNC: 4.1 MMOL/L (ref 3.5–5.2)
PROT SERPL-MCNC: 5.2 G/DL (ref 6–8.5)
RBC # BLD AUTO: 3.55 10*6/MM3 (ref 3.77–5.28)
RBC MORPH BLD: NORMAL
SODIUM SERPL-SCNC: 137 MMOL/L (ref 136–145)
WBC MORPH BLD: NORMAL
WBC NRBC COR # BLD: 13.79 10*3/MM3 (ref 3.4–10.8)

## 2022-12-09 PROCEDURE — 94799 UNLISTED PULMONARY SVC/PX: CPT

## 2022-12-09 PROCEDURE — 80053 COMPREHEN METABOLIC PANEL: CPT | Performed by: INTERNAL MEDICINE

## 2022-12-09 PROCEDURE — 82962 GLUCOSE BLOOD TEST: CPT

## 2022-12-09 PROCEDURE — 94761 N-INVAS EAR/PLS OXIMETRY MLT: CPT

## 2022-12-09 PROCEDURE — 94760 N-INVAS EAR/PLS OXIMETRY 1: CPT

## 2022-12-09 PROCEDURE — 94640 AIRWAY INHALATION TREATMENT: CPT

## 2022-12-09 PROCEDURE — 85025 COMPLETE CBC W/AUTO DIFF WBC: CPT | Performed by: INTERNAL MEDICINE

## 2022-12-09 PROCEDURE — 83036 HEMOGLOBIN GLYCOSYLATED A1C: CPT | Performed by: INTERNAL MEDICINE

## 2022-12-09 PROCEDURE — 25010000002 AZITHROMYCIN PER 500 MG: Performed by: INTERNAL MEDICINE

## 2022-12-09 PROCEDURE — 25010000002 CEFTRIAXONE PER 250 MG: Performed by: INTERNAL MEDICINE

## 2022-12-09 PROCEDURE — 92610 EVALUATE SWALLOWING FUNCTION: CPT

## 2022-12-09 PROCEDURE — 25010000002 ENOXAPARIN PER 10 MG: Performed by: INTERNAL MEDICINE

## 2022-12-09 PROCEDURE — 85007 BL SMEAR W/DIFF WBC COUNT: CPT | Performed by: INTERNAL MEDICINE

## 2022-12-09 RX ORDER — CYCLOBENZAPRINE HCL 10 MG
5 TABLET ORAL ONCE
Status: COMPLETED | OUTPATIENT
Start: 2022-12-09 | End: 2022-12-09

## 2022-12-09 RX ORDER — GUAIFENESIN AND DEXTROMETHORPHAN HYDROBROMIDE 600; 30 MG/1; MG/1
2 TABLET, EXTENDED RELEASE ORAL 2 TIMES DAILY
Status: DISCONTINUED | OUTPATIENT
Start: 2022-12-09 | End: 2022-12-21 | Stop reason: HOSPADM

## 2022-12-09 RX ADMIN — SODIUM CHLORIDE 100 ML/HR: 9 INJECTION, SOLUTION INTRAVENOUS at 18:19

## 2022-12-09 RX ADMIN — BUDESONIDE AND FORMOTEROL FUMARATE DIHYDRATE 2 PUFF: 80; 4.5 AEROSOL RESPIRATORY (INHALATION) at 09:04

## 2022-12-09 RX ADMIN — ATENOLOL 12.5 MG: 25 TABLET ORAL at 21:35

## 2022-12-09 RX ADMIN — Medication 10 ML: at 08:35

## 2022-12-09 RX ADMIN — ASPIRIN 81 MG: 81 TABLET, CHEWABLE ORAL at 08:35

## 2022-12-09 RX ADMIN — PANTOPRAZOLE SODIUM 40 MG: 40 TABLET, DELAYED RELEASE ORAL at 06:45

## 2022-12-09 RX ADMIN — CEFTRIAXONE SODIUM 1 G: 1 INJECTION, POWDER, FOR SOLUTION INTRAMUSCULAR; INTRAVENOUS at 20:05

## 2022-12-09 RX ADMIN — ENOXAPARIN SODIUM 40 MG: 100 INJECTION SUBCUTANEOUS at 21:34

## 2022-12-09 RX ADMIN — Medication 10 ML: at 21:36

## 2022-12-09 RX ADMIN — CEFTRIAXONE SODIUM 1 G: 1 INJECTION, POWDER, FOR SOLUTION INTRAMUSCULAR; INTRAVENOUS at 15:33

## 2022-12-09 RX ADMIN — CYCLOBENZAPRINE 5 MG: 10 TABLET, FILM COATED ORAL at 22:56

## 2022-12-09 RX ADMIN — GUAIFENESIN AND DEXTROMETHORPHAN HYDROBROMIDE 2 TABLET: 600; 30 TABLET, EXTENDED RELEASE ORAL at 21:34

## 2022-12-09 RX ADMIN — SODIUM CHLORIDE 500 MG: 9 INJECTION, SOLUTION INTRAVENOUS at 16:26

## 2022-12-09 RX ADMIN — LISINOPRIL 20 MG: 20 TABLET ORAL at 08:35

## 2022-12-09 RX ADMIN — SODIUM CHLORIDE 100 ML/HR: 9 INJECTION, SOLUTION INTRAVENOUS at 06:48

## 2022-12-09 RX ADMIN — AMLODIPINE BESYLATE 5 MG: 5 TABLET ORAL at 08:35

## 2022-12-09 RX ADMIN — OXYBUTYNIN CHLORIDE 5 MG: 5 TABLET, EXTENDED RELEASE ORAL at 08:35

## 2022-12-09 RX ADMIN — BUDESONIDE AND FORMOTEROL FUMARATE DIHYDRATE 2 PUFF: 80; 4.5 AEROSOL RESPIRATORY (INHALATION) at 19:18

## 2022-12-09 RX ADMIN — ACETAMINOPHEN 1000 MG: 500 TABLET ORAL at 18:22

## 2022-12-09 RX ADMIN — PAROXETINE HYDROCHLORIDE HEMIHYDRATE 40 MG: 20 TABLET, FILM COATED ORAL at 06:45

## 2022-12-09 NOTE — H&P
Internal medicine history and physical  INTERNAL MEDICINE   [facility]       Patient Identification:  Name: Kandace Andrade  Age: 82 y.o.  Sex: female  :  1940  MRN: [id number]                   Primary Care Physician: Cassi Bella MD                               Date of admission:2022    Chief Complaint: Not feeling very well for the last 2 days with increasing shortness of breath and weakness and earlier today fell out of bed while trying to reach to her nightstand.    History of Present Illness:   Patient is 82-year-old female who has complicated past medical history including COPD, recurrent diarrhea, diabetes, paroxysmal atrial fibrillation as well as obstructive sleep apnea and IBS was in her usual state of health until 3 to 4 days ago when she had an unexplained short lived episode of vomiting and after which she seemed to have recovered from it.  In that background couple days ago she started noticing feeling unwell and weak with increasing shortness of breath and earlier today fell out of the bed as she was trying to reach to her nightstand.  Because of progressive change in her status and a fall she called EMS and patient was brought to the emergency room where work-up revealed new right lung infiltrate and hypoxia with elevated white blood cell count.  Patient is being admitted for further care.      Past Medical History:  Past Medical History:   Diagnosis Date   • Acute seasonal allergic rhinitis due to pollen    • Anxiety and depression    • Arthritis    • Asthma    • C. difficile colitis    • COPD (chronic obstructive pulmonary disease) (HCC)    • Diabetes mellitus (HCC)     Pre diabetes   • Diarrhea    • Diverticulosis    • Fibula fracture    • Herpes zoster    • History of lumbosacral spine surgery    • Hypertension    • IBS (irritable bowel syndrome)    • Mixed hyperlipidemia    • KINDRA (obstructive sleep apnea)     NO MACHINE AT THIS TIME   • Osteoporosis    •  Paroxysmal atrial fibrillation (HCC)    • Vitamin D deficiency      Past Surgical History:  Past Surgical History:   Procedure Laterality Date   • APPENDECTOMY     • BACK SURGERY      lower x2   • CATARACT EXTRACTION     • COLONOSCOPY      patient doesn't recall    • COLONOSCOPY N/A 4/2/2021    Procedure: COLONOSCOPY to cecum with cecal biopsies;  Surgeon: Jarrod Duggan MD;  Location: [facility] ENDOSCOPY;  Service: Gastroenterology;  Laterality: N/A;  pre: diarhhea  post: diverticulosis, hemorrhoids   • HYSTERECTOMY      partial   • SHOULDER SURGERY Right    • SHOULDER SURGERY     • SIGMOIDOSCOPY N/A 10/19/2022    Procedure: SIGMOIDOSCOPY FLEXIBLE TO SIGMOID COLON WITH BIOPSIES;  Surgeon: Tonja Tanner MD;  Location: [facility] ENDOSCOPY;  Service: Gastroenterology;  Laterality: N/A;  PRE: DIARRHEA  POST: HEMORRHOIDS   • TONSILLECTOMY     • UPPER GASTROINTESTINAL ENDOSCOPY      patient doesn't recall       Home Meds:  Medications Prior to Admission   Medication Sig Dispense Refill Last Dose   • acetaminophen (TYLENOL) 500 MG tablet Take 1,000 mg by mouth Every 6 (Six) Hours As Needed for Mild Pain .      • amLODIPine (NORVASC) 2.5 MG tablet TAKE 1 TABLET BY MOUTH DAILY 90 tablet 2    • aspirin 81 MG chewable tablet Chew 81 mg Daily.      • atenolol (TENORMIN) 25 MG tablet Take 0.5 tablets by mouth Every Night. 45 tablet 1    • Budesonide (ENTOCORT EC) 3 MG 24 hr capsule 3 tablets by mouth daily for 6 weeks, then 2 tablets by mouth daily for 2 weeks 154 capsule 0    • celecoxib (CeleBREX) 200 MG capsule Take 1 capsule by mouth Daily. 30 capsule 3    • cyclobenzaprine (FLEXERIL) 10 MG tablet Take 1/2 to one tablet by mouth twice daily as needed for muscle spasms 20 tablet 0    • escitalopram (LEXAPRO) 20 MG tablet TAKE 1 TABLET BY MOUTH DAILY 30 tablet 3    • ezetimibe (ZETIA) 10 MG tablet Take 1 tablet by mouth Daily. 30 tablet 3    • famotidine (PEPCID) 20 MG tablet TAKE 1 TABLET BY MOUTH TWICE DAILY 30 tablet 0    •  fluticasone (FLONASE) 50 MCG/ACT nasal spray 2 sprays into the nostril(s) as directed by provider Daily As Needed for Rhinitis.      • lisinopril (PRINIVIL,ZESTRIL) 20 MG tablet Take 1 tablet by mouth 2 (Two) Times a Day. 60 tablet 5    • Melatonin 10 MG tablet Take  by mouth Every Night.      • omeprazole (priLOSEC) 40 MG capsule TAKE 1 CAPSULE BY MOUTH DAILY 30 capsule 2    • PARoxetine (PAXIL) 40 MG tablet Take 40 mg by mouth Every Morning.      • solifenacin (VESIcare) 5 MG tablet Take 1 tablet by mouth Daily. 30 tablet 3    • Symbicort 80-4.5 MCG/ACT inhaler       • traZODone (DESYREL) 50 MG tablet TAKE 1/2 TO 1 TABLET BY MOUTH EVERY NIGHT 1 HOUR BEFORE BEDTIME 30 tablet 1    • vitamin D (ERGOCALCIFEROL) 1.25 MG (77780 UT) capsule capsule TAKE 1 CAPSULE BY MOUTH EVERY 7 DAYS 5 capsule 5      Current Meds:     Current Facility-Administered Medications:   •  sodium chloride 0.9 % flush 10 mL, 10 mL, Intravenous, PRN, Jonah Lei MD  Allergies:  Allergies   Allergen Reactions   • Penicillins Hives and Other (See Comments)     Elevated BP... tolerated ceftriaxone 10/2020 admission   • Meclizine Hcl Dizziness   • Bactrim [Sulfamethoxazole-Trimethoprim] Hives   • Flagyl [Metronidazole] Other (See Comments)     HYPERACTIVITY.. INSOMNIA    • Statins Other (See Comments)     Headache, elevated liver enzymes, blurred vision     Social History:   Social History     Tobacco Use   • Smoking status: Former     Packs/day: 1.00     Years: 25.00     Pack years: 25.00     Types: Cigarettes     Quit date: 2008     Years since quittin.6   • Smokeless tobacco: Never   • Tobacco comments:     CAFFEINE USE - 1 CUP COFFEE/ DIET COKE   Substance Use Topics   • Alcohol use: No      Family History:  Family History   Problem Relation Age of Onset   • Heart disease Mother    • Lung cancer Mother    • Diabetes Father    • Liver disease Father    • Heart disease Paternal Uncle    • Malig Hyperthermia Neg Hx           Review of  Systems  See history of present illness and past medical history.    As described in the history presenting illness.  Remainder of ROS is negative.      Vitals:   BP 98/57 (BP Location: Left arm, Patient Position: Lying)   Pulse 67   Temp 99.3 °F (37.4 °C) (Tympanic)   Resp 18   SpO2 92%   I/O: No intake or output data in the 24 hours ending 12/08/22 2022  Exam:  Patient is examined using the personal protective equipment as per guidelines from infection control for this particular patient as enacted.  Hand washing was performed before and after patient interaction.  General Appearance:   Awake cooperative female does not appear to be in any acute distress.  Claims to be feeling somewhat better after being in the emergency room with subsequent treatment.   Head:    Normocephalic, without obvious abnormality, atraumatic   Eyes:    PERRL, conjunctiva/corneas clear, EOM's intact, both eyes   Ears:    Normal external ear canals, both ears   Nose:   Nares normal, septum midline, mucosa normal, no drainage    or sinus tenderness   Throat:   Lips, tongue, gums normal; oral mucosa pink and moist   Neck:   Supple, symmetrical, trachea midline, no adenopathy;     thyroid:  no enlargement/tenderness/nodules; no carotid    bruit or JVD   Back:     Symmetric, no curvature, ROM normal, no CVA tenderness   Lungs:    Decreased breath sounds at the bases   Chest Wall:    No tenderness or deformity    Heart:   Irregularly irregular   Abdomen:    Soft nontender   Extremities:   Extremities normal, atraumatic, no cyanosis or edema   Pulses:   Pulses palpable in all extremities; symmetric all extremities   Skin:  Ecchymotic changes noted   Neurologic:  Awake oriented grossly nonfocal       Data Review:      I reviewed the patient's new clinical results.  Results from last 7 days   Lab Units 12/08/22  1416   WBC 10*3/mm3 22.91*   HEMOGLOBIN g/dL 12.9   PLATELETS 10*3/mm3 251     Results from last 7 days   Lab Units 12/08/22  1416    SODIUM mmol/L 136   POTASSIUM mmol/L 4.1   CHLORIDE mmol/L 100   CO2 mmol/L 24.7   BUN mg/dL 13   CREATININE mg/dL 0.72   CALCIUM mg/dL 8.9   GLUCOSE mg/dL 124*     XR Chest 2 View    Result Date: 12/8/2022  FINDINGS AND IMPRESSION: Minimal left basilar pulmonary opacification is grossly unchanged since 07/07/2022. There is new pulmonary opacification within the right mid and lower lung concerning for pneumonia in the appropriate clinical context. Asymmetric pulmonary edema is less likely. Correlation with patient history is recommended. There is mild asymmetric elevation right hemidiaphragm. No pneumothorax is seen. Cardiac silhouette is within normal limits for size.  This report was finalized on 12/8/2022 3:28 PM by Dr. Christian López M.D.      Microbiology Results (last 10 days)     Procedure Component Value - Date/Time    Respiratory Panel PCR w/COVID-19(SARS-CoV-2) NANI/NICK/ESMER/PAD/COR/MAD/SANTINO In-House, NP Swab in UTM/VTM, 3-4 HR TAT - Swab, Nasopharynx [[id number]]  (Normal) Collected: 12/08/22 1515    Lab Status: Final result Specimen: Swab from Nasopharynx Updated: 12/08/22 1612     ADENOVIRUS, PCR Not Detected     Coronavirus 229E Not Detected     Coronavirus HKU1 Not Detected     Coronavirus NL63 Not Detected     Coronavirus OC43 Not Detected     COVID19 Not Detected     Human Metapneumovirus Not Detected     Human Rhinovirus/Enterovirus Not Detected     Influenza A PCR Not Detected     Influenza B PCR Not Detected     Parainfluenza Virus 1 Not Detected     Parainfluenza Virus 2 Not Detected     Parainfluenza Virus 3 Not Detected     Parainfluenza Virus 4 Not Detected     RSV, PCR Not Detected     Bordetella pertussis pcr Not Detected     Bordetella parapertussis PCR Not Detected     Chlamydophila pneumoniae PCR Not Detected     Mycoplasma pneumo by PCR Not Detected    Narrative:      In the setting of a positive respiratory panel with a viral infection PLUS a negative procalcitonin without other  underlying concern for bacterial infection, consider observing off antibiotics or discontinuation of antibiotics and continue supportive care. If the respiratory panel is positive for atypical bacterial infection (Bordetella pertussis, Chlamydophila pneumoniae, or Mycoplasma pneumoniae), consider antibiotic de-escalation to target atypical bacterial infection.        ECG 12 Lead ED Triage Standing Order; SOA   Final Result   HEART RATE= 86  bpm   RR Interval= 698  ms   WY Interval= 152  ms   P Horizontal Axis= -2  deg   P Front Axis= 10  deg   QRSD Interval= 87  ms   QT Interval= 404  ms   QRS Axis= 19  deg   T Wave Axis= 16  deg   - OTHERWISE NORMAL ECG -   Sinus rhythm   Low voltage, precordial leads   No change from previous tracing   Electronically Signed By: Rene EspinosaDAGO) ([facility]) 08-Dec-2022 14:29:56   Date and Time of Study: 2022-12-08 14:15:20          Assessment:  Active Hospital Problems    Diagnosis  POA   • **Pneumonia of right middle lobe due to infectious organism [J18.9]  Yes   • Chronic diarrhea [K52.9]  Yes   • Generalized weakness [R53.1]  Yes   • Decreased hearing of both ears [H91.93]  Yes   • Autonomic neuropathy [G90.9]  Yes   • Type 2 diabetes mellitus without complication, without long-term current use of insulin (HCC) [E11.9]  Yes   • COPD (chronic obstructive pulmonary disease) (HCC) [J44.9]  Yes   • Paroxysmal atrial fibrillation (HCC) [I48.0]  Yes   • KINDRA (obstructive sleep apnea) [G47.33]  Yes   • Essential hypertension [I10]  Yes   • IBS (irritable bowel syndrome) [K58.9]  Yes   • Anxiety and depression [F41.9, F32.A]  Yes       Medical decision making/care plan: See admitting orders  · Community-acquired pneumonia with possible element of aspiration-continue with IV antibiotics follow-up on blood cultures and monitor her need for oxygen supplementation and adjust her treatment based on her clinical response and evolving culture data.  · COPD with obstructive sleep apnea-continue  home regimen monitor for need for oxygen requirement and provided with oxygen supplementation.  · Diabetes mellitus-continue with Accu-Cheks and sliding scale coverage and watch for hypoglycemia  · Paroxysmal atrial fibrillation currently in sinus rhythm-continue with her home regimen.    Sammy Bullock MD   12/8/2022  20:22 EST    Parts of this note may be an electronic transcription/translation of spoken language to printed text using the Dragon dictation system.

## 2022-12-09 NOTE — PROGRESS NOTES
Name: Kandace Andrade ADMIT: 2022   : 1940  PCP: Cassi Bella MD    MRN: [id number] LOS: 1 days   AGE/SEX: 82 y.o. female  ROOM: [facility]     Subjective   Subjective   CC: cough/shortness of breath  No acute events. Patient still has a non-productive cough and shortness of breath but this has improved from admission. Taking PO. No CP/dyspnea/f/c/n/v/d.  Her son is at bedside.  Objective   Objective   Vital Signs  Temp:  [97.2 °F (36.2 °C)-98.5 °F (36.9 °C)] 97.2 °F (36.2 °C)  Heart Rate:  [51-71] 71  Resp:  [16-18] 16  BP: ()/(52-81) 110/74  SpO2:  [86 %-99 %] 91 %  on  Flow (L/min):  [2-3] 3;   Device (Oxygen Therapy): nasal cannula  Body mass index is 23.98 kg/m².  Physical Exam  Vitals and nursing note reviewed.   Constitutional:       General: She is not in acute distress.     Appearance: She is ill-appearing. She is not toxic-appearing or diaphoretic.   HENT:      Head: Normocephalic and atraumatic.      Nose: Nose normal.      Mouth/Throat:      Mouth: Mucous membranes are moist.      Pharynx: Oropharynx is clear.   Eyes:      Conjunctiva/sclera: Conjunctivae normal.      Pupils: Pupils are equal, round, and reactive to light.   Cardiovascular:      Rate and Rhythm: Normal rate and regular rhythm.      Pulses: Normal pulses.   Pulmonary:      Effort: Pulmonary effort is normal.      Breath sounds: Examination of the right-lower field reveals decreased breath sounds and rales. Examination of the left-lower field reveals decreased breath sounds. Decreased breath sounds and rales present.   Abdominal:      General: Bowel sounds are normal.      Palpations: Abdomen is soft.      Tenderness: There is no abdominal tenderness.   Musculoskeletal:         General: No swelling or tenderness.      Cervical back: Normal range of motion and neck supple.   Skin:     General: Skin is warm and dry.      Capillary Refill: Capillary refill takes less than 2 seconds.   Neurological:      General: No  focal deficit present.      Mental Status: She is alert and oriented to person, place, and time.   Psychiatric:         Mood and Affect: Mood normal.         Behavior: Behavior normal.       Results Review     I reviewed the patient's new clinical results.  I reviewed the patient's telemetry.  I reviewed the patient's chest xray  Results from last 7 days   Lab Units 12/09/22  0657 12/08/22  1416   WBC 10*3/mm3 13.79* 22.91*   HEMOGLOBIN g/dL 11.2* 12.9   PLATELETS 10*3/mm3 185 251     Results from last 7 days   Lab Units 12/09/22  0657 12/08/22  1416   SODIUM mmol/L 137 136   POTASSIUM mmol/L 4.1 4.1   CHLORIDE mmol/L 105 100   CO2 mmol/L 22.0 24.7   BUN mg/dL 12 13   CREATININE mg/dL 0.46* 0.72   GLUCOSE mg/dL 85 124*   EGFR mL/min/1.73 95.1 83.1     Results from last 7 days   Lab Units 12/09/22  0657 12/08/22  1416   ALBUMIN g/dL 3.10* 3.90   BILIRUBIN mg/dL 0.4 0.4   ALK PHOS U/L 54 57   AST (SGOT) U/L 15 16   ALT (SGPT) U/L 8 10     Results from last 7 days   Lab Units 12/09/22  0657 12/08/22  1416   CALCIUM mg/dL 8.4* 8.9   ALBUMIN g/dL 3.10* 3.90   MAGNESIUM mg/dL  --  1.7     Results from last 7 days   Lab Units 12/08/22  2248 12/08/22  2019 12/08/22  1816 12/08/22  1515 12/08/22  1416   PROCALCITONIN ng/mL  --   --   --   --  0.31*   LACTATE mmol/L 1.8 3.2* 2.3* 3.7*  --      Hemoglobin A1C   Date/Time Value Ref Range Status   12/09/2022 0657 6.00 (H) 4.80 - 5.60 % Final     Glucose   Date/Time Value Ref Range Status   12/09/2022 1707 95 70 - 130 mg/dL Final     Comment:     Meter: [id number] : [id number] Ruy Saldivar JEMAL   12/09/2022 1154 85 70 - 130 mg/dL Final     Comment:     Meter: [id number] : [id number] Maco Ybarra NA   12/09/2022 0635 86 70 - 130 mg/dL Final     Comment:     Meter: [id number] : [id number] Francisco JOAQUIN   12/08/2022 2032 142 (H) 70 - 130 mg/dL Final     Comment:     Meter: [id number] : [id number] Franicsco JOAQUIN       XR Chest 2 View    Result Date:  12/8/2022  FINDINGS AND IMPRESSION: Minimal left basilar pulmonary opacification is grossly unchanged since 07/07/2022. There is new pulmonary opacification within the right mid and lower lung concerning for pneumonia in the appropriate clinical context. Asymmetric pulmonary edema is less likely. Correlation with patient history is recommended. There is mild asymmetric elevation right hemidiaphragm. No pneumothorax is seen. Cardiac silhouette is within normal limits for size.  This report was finalized on 12/8/2022 3:28 PM by Dr. Christian López M.D.      Scheduled Medications  amLODIPine, 5 mg, Oral, Daily  aspirin, 81 mg, Oral, Daily  atenolol, 12.5 mg, Oral, Nightly  azithromycin, 500 mg, Intravenous, Q24H  budesonide-formoterol, 2 puff, Inhalation, BID - RT  cefTRIAXone, 1 g, Intravenous, Q24H  enoxaparin, 40 mg, Subcutaneous, Nightly  ezetimibe, 10 mg, Oral, Daily  insulin lispro, 0-9 Units, Subcutaneous, TID AC  lisinopril, 20 mg, Oral, BID  oxybutynin XL, 5 mg, Oral, Daily  pantoprazole, 40 mg, Oral, QAM  PARoxetine, 40 mg, Oral, QAM  sodium chloride, 10 mL, Intravenous, Q12H    Infusions  sodium chloride, 100 mL/hr, Last Rate: 100 mL/hr (12/09/22 1819)    Diet  Diet: Cardiac Diets, Diabetic Diets, Renal Diets; Healthy Heart (2-3 Na+); Consistent Carbohydrate; Low Sodium (2-3g), Low Potassium, Low Phosphorus; Texture: Regular Texture (IDDSI 7); Fluid Consistency: Thin (IDDSI 0)       Assessment/Plan     Active Hospital Problems    Diagnosis  POA   • **Pneumonia of right middle lobe due to infectious organism [J18.9]  Yes   • Sepsis due to pneumonia (HCC) [J18.9, A41.9]  Yes   • Chronic diarrhea [K52.9]  Yes   • Generalized weakness [R53.1]  Yes   • Decreased hearing of both ears [H91.93]  Yes   • Autonomic neuropathy [G90.9]  Yes   • Type 2 diabetes mellitus without complication, without long-term current use of insulin (HCC) [E11.9]  Yes   • COPD (chronic obstructive pulmonary disease) (HCC) [J44.9]  Yes   •  Paroxysmal atrial fibrillation (HCC) [I48.0]  Yes   • KINDRA (obstructive sleep apnea) [G47.33]  Yes   • Essential hypertension [I10]  Yes   • IBS (irritable bowel syndrome) [K58.9]  Yes   • Anxiety and depression [F41.9, F32.A]  Yes      Resolved Hospital Problems   No resolved problems to display.   RML PNA with Sepsis, present on Admission  - leukocytosis, lactic acidosis present on admission  - increase ceftriaxone to 2g Q24H pending blood cx's  - RVP is negative, will stop azithromycin  - send S. Pneumo, legionella, and sputum culture  - encourage pulmonary toilet and wean oxygen as tolerated    Type 2 DM  - continue ssi/hypoglycemia protocol    COPD  - no exacerbation  - continue current regimen    HTN/PAF  - rate controlled, not on AC at home  - continue atenolol and norvasc  - hold lisinopril    Lovenox 40 mg SC daily for DVT prophylaxis.  Full code.  Discussed with patient, family, nursing staff and care team on multidisciplinary rounds.  Anticipate discharge to SNU facility in 2-3 days.      Hector Simons MD  [facility]  12/09/22  18:44 EST

## 2022-12-09 NOTE — PLAN OF CARE
Goal Outcome Evaluation:  Plan of Care Reviewed With: patient        Progress: no change  Outcome Evaluation: Patient had no complaints through the night. Has not been OOB due to fatigue. IVF started. Night meds given and started Lovenox. Purewick in place. Currently on 2 L O2, will wean down as tolerated. SR-SB on monitor, HR got did get down to upper 40's but did not sustain, however has been in 50's most of the night. ACHS. Overall VSS. WCTM.

## 2022-12-09 NOTE — CASE MANAGEMENT/SOCIAL WORK
Discharge Planning Assessment  [facility]     Patient Name: Kandace Andrade  MRN: [id number]  Today's Date: 12/9/2022    Admit Date: 12/8/2022    Plan: HH vs SNF   Discharge Needs Assessment     Row Name 12/09/22 1520       Living Environment    People in Home alone    Current Living Arrangements home    Primary Care Provided by self    Provides Primary Care For no one    Family Caregiver if Needed child(chriss), adult    Able to Return to Prior Arrangements yes       Resource/Environmental Concerns    Resource/Environmental Concerns none       Transition Planning    Patient/Family Anticipates Transition to inpatient rehabilitation facility    Patient/Family Anticipated Services at Transition skilled nursing;rehabilitation services    Transportation Anticipated family or friend will provide       Discharge Needs Assessment    Equipment Currently Used at Home cane, straight;walker, rolling    Concerns to be Addressed discharge planning    Discharge Facility/Level of Care Needs nursing facility, skilled    Current Discharge Risk lives alone;dependent with mobility/activities of daily living               Discharge Plan     Row Name 12/09/22 1520       Plan    Plan HH vs SNF    Patient/Family in Agreement with Plan yes    Plan Comments CCP spoke with patient at bedside; explained role, verified facesheet, and discussed dc plan. Patient uses no DME at home regularly but has a walker and cane if needed. She lives alone in a 1 level home with 1 step to get inside. She has used [facility] in the past and has no history of SNF. Patient was admitted for a fall out of bed. Patient states she thinks she will need SNF at d/c. Patient is agreeable to referrals to [facility], [facility], [facility], and [facility]. PT eval is pending - CCP to follow to assess level of care needs. AWILDA, BARRIEW              Continued Care and Services - Admitted Since 12/8/2022    Coordination has not been started for this encounter.           Demographic Summary     Row Name 12/09/22 1519       General Information    Admission Type inpatient    Arrived From home    Referral Source admission list    Reason for Consult discharge planning    Preferred Language English       Contact Information    Permission Granted to Share Info With family/designee               Functional Status     Row Name 12/09/22 1519       Functional Status    Usual Activity Tolerance moderate    Current Activity Tolerance fair       Functional Status, IADL    Medications independent    Meal Preparation independent    Housekeeping independent    Laundry independent    Shopping independent       Mental Status    General Appearance WDL WDL               Psychosocial    No documentation.                Abuse/Neglect    No documentation.                Legal    No documentation.                Substance Abuse    No documentation.                Patient Forms    No documentation.                   ANUSHA Del Rio

## 2022-12-09 NOTE — PLAN OF CARE
Goal Outcome Evaluation:  Plan of Care Reviewed With: patient        Progress: no change  Outcome Evaluation: Appetite fair. Oxygen increased to 3L NC for complaints of SOA. VSS. Skin intact. Instruction provided on IS with fair result. Enc. to use while awake. IV antibiotics and fluids infusing. WCTM.

## 2022-12-09 NOTE — PLAN OF CARE
Goal Outcome Evaluation:  Plan of Care Reviewed With: patient, daughter           Outcome Evaluation: Clinical swallow eval completed. Pt took trials of thin (cup/straw), pureed, soft, mixed, and regular solids. No s/s were noted. Laryngeal elevation was adequate with palpation. Vocal quality was clear post swallow. Recommend regular and thin; meds w/ thin; upright for meals and 30 min after; slow rate; small bites/sips. If MD is concerned for silent aspiration please order VFSS. ST to sign off at this time.

## 2022-12-09 NOTE — THERAPY EVALUATION
Acute Care - Speech Language Pathology   Swallow Initial Evaluation [facility]     Patient Name: Kandace Andrade  : 1940  MRN: [id number]  Today's Date: 2022               Admit Date: 2022    Visit Dx:     ICD-10-CM ICD-9-CM   1. Pneumonia of right middle lobe due to infectious organism  J18.9 486   2. Sepsis, due to unspecified organism, unspecified whether acute organ dysfunction present ([facility])  A41.9 038.9     995.91   3. Hypoxia  R09.02 799.02   4. History of COPD  Z87.09 V12.69   5. History of diabetes mellitus  Z86.39 V12.29   6. History of atrial fibrillation  Z86.79 V12.59     Patient Active Problem List   Diagnosis   • Vitamin D deficiency   • KINDRA (obstructive sleep apnea)   • IBS (irritable bowel syndrome)   • Essential hypertension   • Herpes zoster   • Arthritis   • Anxiety and depression   • Other emphysema ([facility])   • Acute seasonal allergic rhinitis due to pollen   • Paroxysmal atrial fibrillation ([facility])   • Osteoporosis   • Mixed hyperlipidemia   • Encounter for Medicare annual wellness exam   • COPD (chronic obstructive pulmonary disease) ([facility])   • Mixed stress and urge urinary incontinence   • Type 2 diabetes mellitus without complication, without long-term current use of insulin ([facility])   • Arthritis of both hips   • Autonomic neuropathy   • GERD without esophagitis   • C. difficile colitis   • Hypokalemia   • Hospital discharge follow-up   • Decreased hearing of both ears   • Dizziness   • Nausea   • Hypercholesterolemia   • Primary insomnia   • Generalized weakness   • Other microscopic hematuria   • Acute cystitis with hematuria   • Confusion   • Dehydration   • Abdominal pain   • Chronic diarrhea   • Metabolic encephalopathy   • Left ankle swelling   • Diarrhea   • Overactive bladder   • Pneumonia of right middle lobe due to infectious organism     Past Medical History:   Diagnosis Date   • Acute seasonal allergic rhinitis due to pollen    • Anxiety and depression    • Arthritis     • Asthma    • C. difficile colitis    • COPD (chronic obstructive pulmonary disease) (HCC)    • Diabetes mellitus (HCC)     Pre diabetes   • Diarrhea    • Diverticulosis    • Fibula fracture    • Herpes zoster    • History of lumbosacral spine surgery    • Hypertension    • IBS (irritable bowel syndrome)    • Mixed hyperlipidemia    • KINDRA (obstructive sleep apnea)     NO MACHINE AT THIS TIME   • Osteoporosis    • Paroxysmal atrial fibrillation (HCC)    • Vitamin D deficiency      Past Surgical History:   Procedure Laterality Date   • APPENDECTOMY     • BACK SURGERY      lower x2   • CATARACT EXTRACTION     • COLONOSCOPY      patient doesn't recall    • COLONOSCOPY N/A 4/2/2021    Procedure: COLONOSCOPY to cecum with cecal biopsies;  Surgeon: Jarrod Duggan MD;  Location:  [facility];  Service: Gastroenterology;  Laterality: N/A;  pre: diarhhea  post: diverticulosis, hemorrhoids   • HYSTERECTOMY      partial   • SHOULDER SURGERY Right    • SHOULDER SURGERY     • SIGMOIDOSCOPY N/A 10/19/2022    Procedure: SIGMOIDOSCOPY FLEXIBLE TO SIGMOID COLON WITH BIOPSIES;  Surgeon: Tonja Tanner MD;  Location:  [facility];  Service: Gastroenterology;  Laterality: N/A;  PRE: DIARRHEA  POST: HEMORRHOIDS   • TONSILLECTOMY     • UPPER GASTROINTESTINAL ENDOSCOPY      patient doesn't recall        SLP Recommendation and Plan  SLP Swallowing Diagnosis: functional oral phase, functional pharyngeal phase (12/09/22 1500)  SLP Diet Recommendation: regular textures, thin liquids (12/09/22 1500)  Recommended Precautions and Strategies: upright posture during/after eating, small bites of food and sips of liquid (12/09/22 1500)  SLP Rec. for Method of Medication Administration: meds whole, with thin liquids (12/09/22 1500)     Monitor for Signs of Aspiration: yes (12/09/22 1500)     Swallow Criteria for Skilled Therapeutic Interventions Met: no problems identified which require skilled intervention (12/09/22 1500)  Anticipated  Discharge Disposition (SLP): unknown (12/09/22 1500)     Therapy Frequency (Swallow): evaluation only (12/09/22 1500)  Predicted Duration Therapy Intervention (Days): until discharge (12/09/22 1500)                                        Plan of Care Reviewed With: patient, daughter  Outcome Evaluation: Clinical swallow eval completed. Pt took trials of thin (cup/straw), pureed, soft, mixed, and regular solids. No s/s were noted. Laryngeal elevation was adequate with palpation. Vocal quality was clear post swallow. Recommend regular and thin; meds w/ thin; upright for meals and 30 min after; slow rate; small bites/sips. If MD is concerned for silent aspiration please order VFSS. ST to sign off at this time.      SWALLOW EVALUATION (last 72 hours)     SLP Adult Swallow Evaluation     Row Name 12/09/22 1500                   Rehab Evaluation    Document Type evaluation  -AW        Subjective Information no complaints  -AW        Patient Observations alert;cooperative;agree to therapy  -AW        Patient/Family/Caregiver Comments/Observations Pt's daughter at bedside.  -AW        Patient Effort good  -AW        Symptoms Noted During/After Treatment none  -AW           General Information    Patient Profile Reviewed yes  -AW        Pertinent History Of Current Problem Pt admitted with weakness, SOB, RML PNA, sepsis; h/o COPD  -AW        Current Method of Nutrition regular textures;thin liquids  -AW        Precautions/Limitations, Vision WFL;for purposes of eval  -AW        Precautions/Limitations, Hearing WFL;for purposes of eval  -AW        Prior Level of Function-Communication WFL  -AW        Prior Level of Function-Swallowing no diet consistency restrictions  -AW        Plans/Goals Discussed with patient;agreed upon  -AW        Barriers to Rehab none identified  -AW        Patient's Goals for Discharge return home  -AW        Family Goals for Discharge family did not state  -AW           Pain    Additional  Documentation Pain Scale: Numbers Pre/Post-Treatment (Group)  -AW           Pain Scale: Numbers Pre/Post-Treatment    Pretreatment Pain Rating 0/10 - no pain  -AW        Posttreatment Pain Rating 0/10 - no pain  -AW           Oral Motor Structure and Function    Dentition Assessment upper dentures/partial in place;lower dentures/partial in place  -AW        Secretion Management WNL/WFL  -AW        Mucosal Quality moist, healthy  -AW        Volitional Swallow WFL  -AW           Oral Musculature and Cranial Nerve Assessment    Oral Motor General Assessment WFL  -AW           General Eating/Swallowing Observations    Respiratory Support Currently in Use nasal cannula  -AW        Eating/Swallowing Skills fed by SLP;self-fed  -AW        Positioning During Eating upright in bed  -AW        Utensils Used spoon;cup;straw  -AW        Consistencies Trialed regular textures;soft to chew textures;mixed consistency;pureed;thin liquids  -AW           Clinical Swallow Eval    Clinical Swallow Evaluation Summary Clinical swallow eval completed. Pt took trials of thin (cup/straw), pureed, soft, mixed, and regular solids. No s/s were noted. Laryngeal elevation was adequate with palpation. Vocal quality was clear post swallow. Recommend regular and thin; meds w/ thin; upright for meals and 30 min after; slow rate; small bites/sips. If MD is concerned for silent aspiration please order VFSS. ST to sign off at this time.  -AW           SLP Evaluation Clinical Impression    SLP Swallowing Diagnosis functional oral phase;functional pharyngeal phase  -AW        Functional Impact risk of aspiration/pneumonia  -AW        Swallow Criteria for Skilled Therapeutic Interventions Met no problems identified which require skilled intervention  -AW           Recommendations    Therapy Frequency (Swallow) evaluation only  -AW        Predicted Duration Therapy Intervention (Days) until discharge  -AW        SLP Diet Recommendation regular  textures;thin liquids  -AW        Recommended Precautions and Strategies upright posture during/after eating;small bites of food and sips of liquid  -AW        Oral Care Recommendations Oral Care BID/PRN  -AW        SLP Rec. for Method of Medication Administration meds whole;with thin liquids  -AW        Monitor for Signs of Aspiration yes  -AW        Anticipated Discharge Disposition (SLP) unknown  -AW              User Key  (r) = Recorded By, (t) = Taken By, (c) = Cosigned By    Initials Name Effective Dates    Tatum Burkett MS CCC-SLP 06/16/21 -                 EDUCATION  The patient has been educated in the following areas:   Dysphagia (Swallowing Impairment) Oral Care/Hydration.              Time Calculation:    Time Calculation- SLP     Row Name 12/09/22 1612             Time Calculation- SLP    SLP Start Time 1200  -AW      SLP Received On 12/09/22  -AW            User Key  (r) = Recorded By, (t) = Taken By, (c) = Cosigned By    Initials Name Provider Type    Tatum Burkett MS CCC-SLP Speech and Language Pathologist                Therapy Charges for Today     Code Description Service Date Service Provider Modifiers Qty    [id number]  ST EVAL ORAL PHARYNG SWALLOW 4 12/9/2022 Tatum Richardson MS CCC-SLP GN 1               Tatum Richardson MS CCC-SLP  12/9/2022

## 2022-12-10 LAB
ANION GAP SERPL CALCULATED.3IONS-SCNC: 10 MMOL/L (ref 5–15)
BASOPHILS # BLD AUTO: 0.05 10*3/MM3 (ref 0–0.2)
BASOPHILS NFR BLD AUTO: 0.5 % (ref 0–1.5)
BUN SERPL-MCNC: 9 MG/DL (ref 8–23)
BUN/CREAT SERPL: 18 (ref 7–25)
CALCIUM SPEC-SCNC: 8.4 MG/DL (ref 8.6–10.5)
CHLORIDE SERPL-SCNC: 107 MMOL/L (ref 98–107)
CO2 SERPL-SCNC: 21 MMOL/L (ref 22–29)
CREAT SERPL-MCNC: 0.5 MG/DL (ref 0.57–1)
DEPRECATED RDW RBC AUTO: 43.5 FL (ref 37–54)
EGFRCR SERPLBLD CKD-EPI 2021: 93.2 ML/MIN/1.73
EOSINOPHIL # BLD AUTO: 0.34 10*3/MM3 (ref 0–0.4)
EOSINOPHIL NFR BLD AUTO: 3.5 % (ref 0.3–6.2)
ERYTHROCYTE [DISTWIDTH] IN BLOOD BY AUTOMATED COUNT: 12.9 % (ref 12.3–15.4)
GLUCOSE BLDC GLUCOMTR-MCNC: 119 MG/DL (ref 70–130)
GLUCOSE BLDC GLUCOMTR-MCNC: 177 MG/DL (ref 70–130)
GLUCOSE BLDC GLUCOMTR-MCNC: 79 MG/DL (ref 70–130)
GLUCOSE BLDC GLUCOMTR-MCNC: 86 MG/DL (ref 70–130)
GLUCOSE SERPL-MCNC: 82 MG/DL (ref 65–99)
HCT VFR BLD AUTO: 34.2 % (ref 34–46.6)
HGB BLD-MCNC: 11.8 G/DL (ref 12–15.9)
IMM GRANULOCYTES # BLD AUTO: 0.08 10*3/MM3 (ref 0–0.05)
IMM GRANULOCYTES NFR BLD AUTO: 0.8 % (ref 0–0.5)
L PNEUMO1 AG UR QL IA: NEGATIVE
LYMPHOCYTES # BLD AUTO: 2.07 10*3/MM3 (ref 0.7–3.1)
LYMPHOCYTES NFR BLD AUTO: 21.2 % (ref 19.6–45.3)
MCH RBC QN AUTO: 31.9 PG (ref 26.6–33)
MCHC RBC AUTO-ENTMCNC: 34.5 G/DL (ref 31.5–35.7)
MCV RBC AUTO: 92.4 FL (ref 79–97)
MONOCYTES # BLD AUTO: 0.55 10*3/MM3 (ref 0.1–0.9)
MONOCYTES NFR BLD AUTO: 5.6 % (ref 5–12)
NEUTROPHILS NFR BLD AUTO: 6.68 10*3/MM3 (ref 1.7–7)
NEUTROPHILS NFR BLD AUTO: 68.4 % (ref 42.7–76)
NRBC BLD AUTO-RTO: 0 /100 WBC (ref 0–0.2)
PLATELET # BLD AUTO: 223 10*3/MM3 (ref 140–450)
PMV BLD AUTO: 10.1 FL (ref 6–12)
POTASSIUM SERPL-SCNC: 3.9 MMOL/L (ref 3.5–5.2)
RBC # BLD AUTO: 3.7 10*6/MM3 (ref 3.77–5.28)
S PNEUM AG SPEC QL LA: NEGATIVE
SODIUM SERPL-SCNC: 138 MMOL/L (ref 136–145)
WBC NRBC COR # BLD: 9.77 10*3/MM3 (ref 3.4–10.8)

## 2022-12-10 PROCEDURE — 87899 AGENT NOS ASSAY W/OPTIC: CPT | Performed by: INTERNAL MEDICINE

## 2022-12-10 PROCEDURE — 80048 BASIC METABOLIC PNL TOTAL CA: CPT | Performed by: INTERNAL MEDICINE

## 2022-12-10 PROCEDURE — 85025 COMPLETE CBC W/AUTO DIFF WBC: CPT | Performed by: INTERNAL MEDICINE

## 2022-12-10 PROCEDURE — 82962 GLUCOSE BLOOD TEST: CPT

## 2022-12-10 PROCEDURE — 25010000002 ENOXAPARIN PER 10 MG: Performed by: INTERNAL MEDICINE

## 2022-12-10 PROCEDURE — 87449 NOS EACH ORGANISM AG IA: CPT | Performed by: INTERNAL MEDICINE

## 2022-12-10 PROCEDURE — 94799 UNLISTED PULMONARY SVC/PX: CPT

## 2022-12-10 PROCEDURE — 94664 DEMO&/EVAL PT USE INHALER: CPT

## 2022-12-10 PROCEDURE — 94761 N-INVAS EAR/PLS OXIMETRY MLT: CPT

## 2022-12-10 PROCEDURE — 25010000002 CEFTRIAXONE PER 250 MG: Performed by: INTERNAL MEDICINE

## 2022-12-10 RX ORDER — DEXAMETHASONE SODIUM PHOSPHATE 1 MG/ML
4 SOLUTION/ DROPS OPHTHALMIC 2 TIMES DAILY
Status: DISCONTINUED | OUTPATIENT
Start: 2022-12-10 | End: 2022-12-21 | Stop reason: HOSPADM

## 2022-12-10 RX ORDER — CYCLOBENZAPRINE HCL 10 MG
5 TABLET ORAL 2 TIMES DAILY PRN
Status: DISCONTINUED | OUTPATIENT
Start: 2022-12-10 | End: 2022-12-21 | Stop reason: HOSPADM

## 2022-12-10 RX ORDER — CIPROFLOXACIN AND DEXAMETHASONE 3; 1 MG/ML; MG/ML
4 SUSPENSION/ DROPS AURICULAR (OTIC) 2 TIMES DAILY
Status: DISCONTINUED | OUTPATIENT
Start: 2022-12-10 | End: 2022-12-10 | Stop reason: CLARIF

## 2022-12-10 RX ORDER — CIPROFLOXACIN HYDROCHLORIDE 3.5 MG/ML
4 SOLUTION/ DROPS TOPICAL 2 TIMES DAILY
Status: DISCONTINUED | OUTPATIENT
Start: 2022-12-10 | End: 2022-12-21 | Stop reason: HOSPADM

## 2022-12-10 RX ORDER — CIPROFLOXACIN AND DEXAMETHASONE 3; 1 MG/ML; MG/ML
4 SUSPENSION/ DROPS AURICULAR (OTIC) 2 TIMES DAILY
Status: DISCONTINUED | OUTPATIENT
Start: 2022-12-10 | End: 2022-12-10

## 2022-12-10 RX ORDER — IPRATROPIUM BROMIDE AND ALBUTEROL SULFATE 2.5; .5 MG/3ML; MG/3ML
3 SOLUTION RESPIRATORY (INHALATION)
Status: DISCONTINUED | OUTPATIENT
Start: 2022-12-10 | End: 2022-12-21 | Stop reason: HOSPADM

## 2022-12-10 RX ADMIN — BUDESONIDE AND FORMOTEROL FUMARATE DIHYDRATE 2 PUFF: 80; 4.5 AEROSOL RESPIRATORY (INHALATION) at 08:19

## 2022-12-10 RX ADMIN — PANTOPRAZOLE SODIUM 40 MG: 40 TABLET, DELAYED RELEASE ORAL at 06:33

## 2022-12-10 RX ADMIN — TRAZODONE HYDROCHLORIDE 25 MG: 50 TABLET ORAL at 02:17

## 2022-12-10 RX ADMIN — ASPIRIN 81 MG: 81 TABLET, CHEWABLE ORAL at 09:36

## 2022-12-10 RX ADMIN — IPRATROPIUM BROMIDE AND ALBUTEROL SULFATE 3 ML: .5; 3 SOLUTION RESPIRATORY (INHALATION) at 15:23

## 2022-12-10 RX ADMIN — DEXAMETHASONE SODIUM PHOSPHATE 4 DROP: 1 SOLUTION/ DROPS OPHTHALMIC at 13:42

## 2022-12-10 RX ADMIN — ATENOLOL 12.5 MG: 25 TABLET ORAL at 20:08

## 2022-12-10 RX ADMIN — TRAZODONE HYDROCHLORIDE 25 MG: 50 TABLET ORAL at 22:36

## 2022-12-10 RX ADMIN — GUAIFENESIN AND DEXTROMETHORPHAN HYDROBROMIDE 2 TABLET: 600; 30 TABLET, EXTENDED RELEASE ORAL at 20:07

## 2022-12-10 RX ADMIN — SODIUM CHLORIDE 100 ML/HR: 9 INJECTION, SOLUTION INTRAVENOUS at 15:29

## 2022-12-10 RX ADMIN — CIPROFLOXACIN HYDROCHLORIDE 4 DROP: 3 SOLUTION/ DROPS OPHTHALMIC at 13:42

## 2022-12-10 RX ADMIN — Medication 10 ML: at 20:09

## 2022-12-10 RX ADMIN — Medication 10 ML: at 09:37

## 2022-12-10 RX ADMIN — ENOXAPARIN SODIUM 40 MG: 100 INJECTION SUBCUTANEOUS at 20:08

## 2022-12-10 RX ADMIN — SODIUM CHLORIDE 100 ML/HR: 9 INJECTION, SOLUTION INTRAVENOUS at 06:32

## 2022-12-10 RX ADMIN — GUAIFENESIN AND DEXTROMETHORPHAN HYDROBROMIDE 2 TABLET: 600; 30 TABLET, EXTENDED RELEASE ORAL at 09:36

## 2022-12-10 RX ADMIN — OXYBUTYNIN CHLORIDE 5 MG: 5 TABLET, EXTENDED RELEASE ORAL at 09:36

## 2022-12-10 RX ADMIN — DEXAMETHASONE SODIUM PHOSPHATE 4 DROP: 1 SOLUTION/ DROPS OPHTHALMIC at 20:09

## 2022-12-10 RX ADMIN — PAROXETINE HYDROCHLORIDE HEMIHYDRATE 40 MG: 20 TABLET, FILM COATED ORAL at 06:33

## 2022-12-10 RX ADMIN — AMLODIPINE BESYLATE 5 MG: 5 TABLET ORAL at 09:36

## 2022-12-10 RX ADMIN — CEFTRIAXONE SODIUM 2 G: 2 INJECTION, POWDER, FOR SOLUTION INTRAMUSCULAR; INTRAVENOUS at 15:32

## 2022-12-10 RX ADMIN — CIPROFLOXACIN HYDROCHLORIDE 4 DROP: 3 SOLUTION/ DROPS OPHTHALMIC at 20:09

## 2022-12-10 RX ADMIN — ACETAMINOPHEN 1000 MG: 500 TABLET ORAL at 21:35

## 2022-12-10 RX ADMIN — BUDESONIDE AND FORMOTEROL FUMARATE DIHYDRATE 2 PUFF: 80; 4.5 AEROSOL RESPIRATORY (INHALATION) at 19:17

## 2022-12-10 RX ADMIN — CYCLOBENZAPRINE 5 MG: 10 TABLET, FILM COATED ORAL at 20:07

## 2022-12-10 NOTE — PROGRESS NOTES
Name: Kandace Andrade ADMIT: 2022   : 1940  PCP: Cassi Bella MD    MRN: [id number] LOS: 2 days   AGE/SEX: 82 y.o. female  ROOM: [facility]     Subjective   Subjective   CC: cough/shortness of breath  No acute events. Patient states she overall feels better. Still having non-productive cough. Taking PO. No CP/dyspnea/f/c/n/v/d.  No family at bedside.  Objective   Objective   Vital Signs  Temp:  [97.8 °F (36.6 °C)-98.4 °F (36.9 °C)] 98.4 °F (36.9 °C)  Heart Rate:  [54-72] 66  Resp:  [16-17] 16  BP: (109-138)/(59-85) 120/68  SpO2:  [92 %-94 %] 93 %  on  Flow (L/min):  [2-3] 3;   Device (Oxygen Therapy): nasal cannula  Body mass index is 23.98 kg/m².  Physical Exam  Vitals and nursing note reviewed.   Constitutional:       General: She is not in acute distress.     Appearance: She is ill-appearing. She is not toxic-appearing or diaphoretic.   HENT:      Head: Normocephalic and atraumatic.      Nose: Nose normal.      Mouth/Throat:      Mouth: Mucous membranes are moist.      Pharynx: Oropharynx is clear.   Eyes:      Conjunctiva/sclera: Conjunctivae normal.      Pupils: Pupils are equal, round, and reactive to light.   Cardiovascular:      Rate and Rhythm: Normal rate and regular rhythm.      Pulses: Normal pulses.   Pulmonary:      Effort: Pulmonary effort is normal.      Breath sounds: Examination of the right-lower field reveals decreased breath sounds and rales. Examination of the left-lower field reveals decreased breath sounds. Decreased breath sounds and rales present.   Abdominal:      General: Bowel sounds are normal.      Palpations: Abdomen is soft.      Tenderness: There is no abdominal tenderness.   Musculoskeletal:         General: No swelling or tenderness.      Cervical back: Normal range of motion and neck supple.   Skin:     General: Skin is warm and dry.      Capillary Refill: Capillary refill takes less than 2 seconds.   Neurological:      General: No focal deficit present.       Mental Status: She is alert and oriented to person, place, and time.   Psychiatric:         Mood and Affect: Mood normal.         Behavior: Behavior normal.     Results Review     I reviewed the patient's new clinical results.  I reviewed the patient's telemetry.  Results from last 7 days   Lab Units 12/10/22  0732 12/09/22  0657 12/08/22  1416   WBC 10*3/mm3 9.77 13.79* 22.91*   HEMOGLOBIN g/dL 11.8* 11.2* 12.9   PLATELETS 10*3/mm3 223 185 251     Results from last 7 days   Lab Units 12/10/22  0732 12/09/22  0657 12/08/22  1416   SODIUM mmol/L 138 137 136   POTASSIUM mmol/L 3.9 4.1 4.1   CHLORIDE mmol/L 107 105 100   CO2 mmol/L 21.0* 22.0 24.7   BUN mg/dL 9 12 13   CREATININE mg/dL 0.50* 0.46* 0.72   GLUCOSE mg/dL 82 85 124*   EGFR mL/min/1.73 93.2 95.1 83.1     Results from last 7 days   Lab Units 12/09/22  0657 12/08/22  1416   ALBUMIN g/dL 3.10* 3.90   BILIRUBIN mg/dL 0.4 0.4   ALK PHOS U/L 54 57   AST (SGOT) U/L 15 16   ALT (SGPT) U/L 8 10     Results from last 7 days   Lab Units 12/10/22  0732 12/09/22  0657 12/08/22  1416   CALCIUM mg/dL 8.4* 8.4* 8.9   ALBUMIN g/dL  --  3.10* 3.90   MAGNESIUM mg/dL  --   --  1.7     Results from last 7 days   Lab Units 12/08/22  2248 12/08/22  2019 12/08/22  1816 12/08/22  1515 12/08/22  1416   PROCALCITONIN ng/mL  --   --   --   --  0.31*   LACTATE mmol/L 1.8 3.2* 2.3* 3.7*  --      Hemoglobin A1C   Date/Time Value Ref Range Status   12/09/2022 0657 6.00 (H) 4.80 - 5.60 % Final     Glucose   Date/Time Value Ref Range Status   12/10/2022 1102 119 70 - 130 mg/dL Final     Comment:     Meter: [id number] : [id number] Garcia Abby JEMAL   12/10/2022 0625 79 70 - 130 mg/dL Final     Comment:     Meter: [id number] : [id number] José Miguel JOAQUIN   12/09/2022 2016 116 70 - 130 mg/dL Final     Comment:     Meter: [id number] : [id number] José Miguel JOAQUIN   12/09/2022 1707 95 70 - 130 mg/dL Final     Comment:     Meter: [id number] : [id number] Ruy JOAQUIN    12/09/2022 1154 85 70 - 130 mg/dL Final     Comment:     Meter: [id number] : [id number] Maco Ybarra NA   12/09/2022 0635 86 70 - 130 mg/dL Final     Comment:     Meter: [id number] : [id number] Francisco Eduardo NA   12/08/2022 2032 142 (H) 70 - 130 mg/dL Final     Comment:     Meter: [id number] : [id number] Francisco JOAQUIN       XR Chest 2 View    Result Date: 12/8/2022  FINDINGS AND IMPRESSION: Minimal left basilar pulmonary opacification is grossly unchanged since 07/07/2022. There is new pulmonary opacification within the right mid and lower lung concerning for pneumonia in the appropriate clinical context. Asymmetric pulmonary edema is less likely. Correlation with patient history is recommended. There is mild asymmetric elevation right hemidiaphragm. No pneumothorax is seen. Cardiac silhouette is within normal limits for size.  This report was finalized on 12/8/2022 3:28 PM by Dr. Christian López M.D.      Scheduled Medications  amLODIPine, 5 mg, Oral, Daily  aspirin, 81 mg, Oral, Daily  atenolol, 12.5 mg, Oral, Nightly  budesonide-formoterol, 2 puff, Inhalation, BID - RT  cefTRIAXone, 2 g, Intravenous, Q24H  ciprofloxacin, 4 drop, Left Ear, BID   And  dexamethasone, 4 drop, Left Ear, BID  enoxaparin, 40 mg, Subcutaneous, Nightly  ezetimibe, 10 mg, Oral, Daily  guaifenesin-dextromethorphan, 2 tablet, Oral, BID  insulin lispro, 0-9 Units, Subcutaneous, TID AC  ipratropium-albuterol, 3 mL, Nebulization, 4x Daily - RT  oxybutynin XL, 5 mg, Oral, Daily  pantoprazole, 40 mg, Oral, QAM  PARoxetine, 40 mg, Oral, QAM  sodium chloride, 10 mL, Intravenous, Q12H    Infusions  sodium chloride, 100 mL/hr, Last Rate: 100 mL/hr (12/10/22 0632)    Diet  Diet: Cardiac Diets, Diabetic Diets, Renal Diets; Healthy Heart (2-3 Na+); Consistent Carbohydrate; Low Sodium (2-3g), Low Potassium, Low Phosphorus; Texture: Regular Texture (IDDSI 7); Fluid Consistency: Thin (IDDSI 0)       Assessment/Plan     Active Hospital  Problems    Diagnosis  POA   • **Pneumonia of right middle lobe due to infectious organism [J18.9]  Yes   • Sepsis due to pneumonia (HCC) [J18.9, A41.9]  Yes   • Chronic diarrhea [K52.9]  Yes   • Generalized weakness [R53.1]  Yes   • Decreased hearing of both ears [H91.93]  Yes   • Autonomic neuropathy [G90.9]  Yes   • Type 2 diabetes mellitus without complication, without long-term current use of insulin (HCC) [E11.9]  Yes   • COPD (chronic obstructive pulmonary disease) (HCC) [J44.9]  Yes   • Paroxysmal atrial fibrillation (HCC) [I48.0]  Yes   • KINDRA (obstructive sleep apnea) [G47.33]  Yes   • Essential hypertension [I10]  Yes   • IBS (irritable bowel syndrome) [K58.9]  Yes   • Anxiety and depression [F41.9, F32.A]  Yes      Resolved Hospital Problems   No resolved problems to display.   RML PNA with Sepsis, present on Admission  - leukocytosis, lactic acidosis present on admission  - continue ceftriaxone 2g Q24H pending blood cx's  - RVP, S. Pneumo, and legionella are negative, she has been unable to produce a sample for sputum culture thusfar  - encourage pulmonary toilet and wean oxygen as tolerated    Type 2 DM  - continue ssi/hypoglycemia protocol    COPD  - no exacerbation  - continue current regimen    HTN/PAF  - rate controlled, not on AC at home  - continue atenolol and norvasc  - hold lisinopril    Lovenox 40 mg SC daily for DVT prophylaxis.  Full code.  Discussed with patient and nursing staff.  Anticipate discharge to SNU facility in 2-3 days.      Hector Simons MD  [facility]  12/10/22  13:36 EST

## 2022-12-10 NOTE — PLAN OF CARE
Goal Outcome Evaluation:  Plan of Care Reviewed With: patient        Progress: no change  Outcome Evaluation: Pt without complaints of pain today. Flexeril PRN ordered as well as ear drops. IV fluids continue. Pt complained of dizziness when up to chair. Remains on 3L NC. Purewick in place. BS stable. WCTM.

## 2022-12-10 NOTE — PLAN OF CARE
Goal Outcome Evaluation:  Plan of Care Reviewed With: patient        Progress: improving  Outcome Evaluation: Patient had no c/o pain tonight but did complain of muscle spasms in BLE, order received for one time dose of Flexeril which patient takes at home. Trazadone given this evening to assist with rest. Purewick to LWS. On 2-3 L O2 to maintain SAT's. IVF continued. IV antibiotics continued. Urine specimen sent. VSS. ACHS. Hopefully PT will be able to see today and evaluate, patient is still pretty weak. WCTM.

## 2022-12-11 ENCOUNTER — APPOINTMENT (OUTPATIENT)
Dept: GENERAL RADIOLOGY | Facility: HOSPITAL | Age: 82
DRG: 871 | End: 2022-12-11
Payer: MEDICARE

## 2022-12-11 LAB
ANION GAP SERPL CALCULATED.3IONS-SCNC: 11.7 MMOL/L (ref 5–15)
ARTERIAL PATENCY WRIST A: POSITIVE
ATMOSPHERIC PRESS: 752.8 MMHG
BASE EXCESS BLDA CALC-SCNC: 0.6 MMOL/L (ref 0–2)
BASOPHILS # BLD AUTO: 0.05 10*3/MM3 (ref 0–0.2)
BASOPHILS NFR BLD AUTO: 0.5 % (ref 0–1.5)
BDY SITE: ABNORMAL
BUN SERPL-MCNC: 7 MG/DL (ref 8–23)
BUN/CREAT SERPL: 10.8 (ref 7–25)
CALCIUM SPEC-SCNC: 8.9 MG/DL (ref 8.6–10.5)
CHLORIDE SERPL-SCNC: 104 MMOL/L (ref 98–107)
CO2 SERPL-SCNC: 25.3 MMOL/L (ref 22–29)
CREAT SERPL-MCNC: 0.65 MG/DL (ref 0.57–1)
DEPRECATED RDW RBC AUTO: 44.4 FL (ref 37–54)
EGFRCR SERPLBLD CKD-EPI 2021: 87.5 ML/MIN/1.73
EOSINOPHIL # BLD AUTO: 0.33 10*3/MM3 (ref 0–0.4)
EOSINOPHIL NFR BLD AUTO: 3.5 % (ref 0.3–6.2)
ERYTHROCYTE [DISTWIDTH] IN BLOOD BY AUTOMATED COUNT: 13 % (ref 12.3–15.4)
GAS FLOW AIRWAY: 6 LPM
GLUCOSE BLDC GLUCOMTR-MCNC: 101 MG/DL (ref 70–130)
GLUCOSE BLDC GLUCOMTR-MCNC: 111 MG/DL (ref 70–130)
GLUCOSE BLDC GLUCOMTR-MCNC: 128 MG/DL (ref 70–130)
GLUCOSE BLDC GLUCOMTR-MCNC: 82 MG/DL (ref 70–130)
GLUCOSE SERPL-MCNC: 75 MG/DL (ref 65–99)
HCO3 BLDA-SCNC: 24.5 MMOL/L (ref 22–28)
HCT VFR BLD AUTO: 38.6 % (ref 34–46.6)
HGB BLD-MCNC: 12.5 G/DL (ref 12–15.9)
IMM GRANULOCYTES # BLD AUTO: 0.07 10*3/MM3 (ref 0–0.05)
IMM GRANULOCYTES NFR BLD AUTO: 0.7 % (ref 0–0.5)
LYMPHOCYTES # BLD AUTO: 3.28 10*3/MM3 (ref 0.7–3.1)
LYMPHOCYTES NFR BLD AUTO: 34.9 % (ref 19.6–45.3)
MCH RBC QN AUTO: 30.1 PG (ref 26.6–33)
MCHC RBC AUTO-ENTMCNC: 32.4 G/DL (ref 31.5–35.7)
MCV RBC AUTO: 93 FL (ref 79–97)
MODALITY: ABNORMAL
MONOCYTES # BLD AUTO: 0.5 10*3/MM3 (ref 0.1–0.9)
MONOCYTES NFR BLD AUTO: 5.3 % (ref 5–12)
NEUTROPHILS NFR BLD AUTO: 5.18 10*3/MM3 (ref 1.7–7)
NEUTROPHILS NFR BLD AUTO: 55.1 % (ref 42.7–76)
NRBC BLD AUTO-RTO: 0 /100 WBC (ref 0–0.2)
PCO2 BLDA: 36 MM HG (ref 35–45)
PH BLDA: 7.44 PH UNITS (ref 7.35–7.45)
PLATELET # BLD AUTO: 297 10*3/MM3 (ref 140–450)
PMV BLD AUTO: 10.1 FL (ref 6–12)
PO2 BLDA: 52.6 MM HG (ref 80–100)
POTASSIUM SERPL-SCNC: 3.8 MMOL/L (ref 3.5–5.2)
RBC # BLD AUTO: 4.15 10*6/MM3 (ref 3.77–5.28)
SAO2 % BLDCOA: 88.2 % (ref 92–99)
SET MECH RESP RATE: 16
SODIUM SERPL-SCNC: 141 MMOL/L (ref 136–145)
TOTAL RATE: 16 BREATHS/MINUTE
WBC NRBC COR # BLD: 9.41 10*3/MM3 (ref 3.4–10.8)

## 2022-12-11 PROCEDURE — 82962 GLUCOSE BLOOD TEST: CPT

## 2022-12-11 PROCEDURE — 97116 GAIT TRAINING THERAPY: CPT

## 2022-12-11 PROCEDURE — 25010000002 FUROSEMIDE PER 20 MG: Performed by: INTERNAL MEDICINE

## 2022-12-11 PROCEDURE — 85025 COMPLETE CBC W/AUTO DIFF WBC: CPT | Performed by: INTERNAL MEDICINE

## 2022-12-11 PROCEDURE — 82803 BLOOD GASES ANY COMBINATION: CPT

## 2022-12-11 PROCEDURE — 25010000002 ENOXAPARIN PER 10 MG: Performed by: INTERNAL MEDICINE

## 2022-12-11 PROCEDURE — 25010000002 CEFTRIAXONE PER 250 MG: Performed by: INTERNAL MEDICINE

## 2022-12-11 PROCEDURE — 94799 UNLISTED PULMONARY SVC/PX: CPT

## 2022-12-11 PROCEDURE — 97162 PT EVAL MOD COMPLEX 30 MIN: CPT

## 2022-12-11 PROCEDURE — 80048 BASIC METABOLIC PNL TOTAL CA: CPT | Performed by: INTERNAL MEDICINE

## 2022-12-11 PROCEDURE — 94664 DEMO&/EVAL PT USE INHALER: CPT

## 2022-12-11 PROCEDURE — 36600 WITHDRAWAL OF ARTERIAL BLOOD: CPT

## 2022-12-11 PROCEDURE — 94760 N-INVAS EAR/PLS OXIMETRY 1: CPT

## 2022-12-11 PROCEDURE — 71045 X-RAY EXAM CHEST 1 VIEW: CPT

## 2022-12-11 PROCEDURE — 94761 N-INVAS EAR/PLS OXIMETRY MLT: CPT

## 2022-12-11 RX ORDER — FUROSEMIDE 10 MG/ML
40 INJECTION INTRAMUSCULAR; INTRAVENOUS ONCE
Status: COMPLETED | OUTPATIENT
Start: 2022-12-11 | End: 2022-12-11

## 2022-12-11 RX ORDER — SODIUM CHLORIDE FOR INHALATION 7 %
4 VIAL, NEBULIZER (ML) INHALATION
Status: DISCONTINUED | OUTPATIENT
Start: 2022-12-11 | End: 2022-12-21 | Stop reason: HOSPADM

## 2022-12-11 RX ORDER — CHOLECALCIFEROL (VITAMIN D3) 125 MCG
5 CAPSULE ORAL NIGHTLY PRN
Status: DISCONTINUED | OUTPATIENT
Start: 2022-12-11 | End: 2022-12-21 | Stop reason: HOSPADM

## 2022-12-11 RX ORDER — FLUTICASONE PROPIONATE 50 MCG
2 SPRAY, SUSPENSION (ML) NASAL DAILY
Status: DISCONTINUED | OUTPATIENT
Start: 2022-12-11 | End: 2022-12-21 | Stop reason: HOSPADM

## 2022-12-11 RX ADMIN — AMLODIPINE BESYLATE 5 MG: 5 TABLET ORAL at 09:11

## 2022-12-11 RX ADMIN — CIPROFLOXACIN HYDROCHLORIDE 4 DROP: 3 SOLUTION/ DROPS OPHTHALMIC at 09:14

## 2022-12-11 RX ADMIN — ATENOLOL 12.5 MG: 25 TABLET ORAL at 21:36

## 2022-12-11 RX ADMIN — OXYBUTYNIN CHLORIDE 5 MG: 5 TABLET, EXTENDED RELEASE ORAL at 09:11

## 2022-12-11 RX ADMIN — FUROSEMIDE 40 MG: 10 INJECTION, SOLUTION INTRAMUSCULAR; INTRAVENOUS at 18:12

## 2022-12-11 RX ADMIN — ENOXAPARIN SODIUM 40 MG: 100 INJECTION SUBCUTANEOUS at 21:45

## 2022-12-11 RX ADMIN — PANTOPRAZOLE SODIUM 40 MG: 40 TABLET, DELAYED RELEASE ORAL at 06:53

## 2022-12-11 RX ADMIN — BUDESONIDE AND FORMOTEROL FUMARATE DIHYDRATE 2 PUFF: 80; 4.5 AEROSOL RESPIRATORY (INHALATION) at 07:44

## 2022-12-11 RX ADMIN — Medication 10 ML: at 21:38

## 2022-12-11 RX ADMIN — ACETAMINOPHEN 1000 MG: 500 TABLET ORAL at 14:10

## 2022-12-11 RX ADMIN — Medication 10 ML: at 09:13

## 2022-12-11 RX ADMIN — DEXAMETHASONE SODIUM PHOSPHATE 4 DROP: 1 SOLUTION/ DROPS OPHTHALMIC at 09:14

## 2022-12-11 RX ADMIN — PAROXETINE HYDROCHLORIDE HEMIHYDRATE 40 MG: 20 TABLET, FILM COATED ORAL at 06:53

## 2022-12-11 RX ADMIN — CIPROFLOXACIN HYDROCHLORIDE 4 DROP: 3 SOLUTION/ DROPS OPHTHALMIC at 21:39

## 2022-12-11 RX ADMIN — GUAIFENESIN AND DEXTROMETHORPHAN HYDROBROMIDE 2 TABLET: 600; 30 TABLET, EXTENDED RELEASE ORAL at 21:37

## 2022-12-11 RX ADMIN — GUAIFENESIN AND DEXTROMETHORPHAN HYDROBROMIDE 2 TABLET: 600; 30 TABLET, EXTENDED RELEASE ORAL at 09:12

## 2022-12-11 RX ADMIN — DEXAMETHASONE SODIUM PHOSPHATE 4 DROP: 1 SOLUTION/ DROPS OPHTHALMIC at 21:39

## 2022-12-11 RX ADMIN — IPRATROPIUM BROMIDE AND ALBUTEROL SULFATE 3 ML: .5; 3 SOLUTION RESPIRATORY (INHALATION) at 07:42

## 2022-12-11 RX ADMIN — EZETIMIBE 10 MG: 10 TABLET ORAL at 09:12

## 2022-12-11 RX ADMIN — FLUTICASONE PROPIONATE 2 SPRAY: 50 SPRAY, METERED NASAL at 02:08

## 2022-12-11 RX ADMIN — SODIUM CHLORIDE 100 ML/HR: 9 INJECTION, SOLUTION INTRAVENOUS at 01:24

## 2022-12-11 RX ADMIN — IPRATROPIUM BROMIDE AND ALBUTEROL SULFATE 3 ML: .5; 3 SOLUTION RESPIRATORY (INHALATION) at 16:10

## 2022-12-11 RX ADMIN — SODIUM CHLORIDE 100 ML/HR: 9 INJECTION, SOLUTION INTRAVENOUS at 10:59

## 2022-12-11 RX ADMIN — IPRATROPIUM BROMIDE AND ALBUTEROL SULFATE 3 ML: .5; 3 SOLUTION RESPIRATORY (INHALATION) at 11:11

## 2022-12-11 RX ADMIN — ASPIRIN 81 MG: 81 TABLET, CHEWABLE ORAL at 09:11

## 2022-12-11 RX ADMIN — BUDESONIDE AND FORMOTEROL FUMARATE DIHYDRATE 2 PUFF: 80; 4.5 AEROSOL RESPIRATORY (INHALATION) at 19:35

## 2022-12-11 RX ADMIN — CEFTRIAXONE SODIUM 1 G: 1 INJECTION, POWDER, FOR SOLUTION INTRAMUSCULAR; INTRAVENOUS at 16:26

## 2022-12-11 NOTE — PLAN OF CARE
Goal Outcome Evaluation:  Plan of Care Reviewed With: patient           Outcome Evaluation: Pt admitted to [facility] with reports of SOA, weakness and fall out of bed. Pt diagnosed with community aquired pneumonia and evaluated by PT this date. Pt requires CGA for mobility with Rwx and cues for PLB per O2 sats dropping to 79% upon sitting EOB. Pt able to recover quickly and educated on importance of breathing through activity. Pt may benefit D/C to snf vs HHPT pending progress during acute stay.    Patient was not wearing a face mask during this therapy encounter. Therapist used appropriate personal protective equipment including eye protection, mask, and gloves.  Mask used was standard procedure mask. Appropriate PPE was worn during the entire therapy session. Hand hygiene was completed before and after therapy session. Patient is not in enhanced droplet precautions.

## 2022-12-11 NOTE — NURSING NOTE
Pt oxygen increased to 6L NC sats 87-89%. ABG's per Dr Simons  PO2 52.6 Sats 88.2%. STAT CXR ordered.

## 2022-12-11 NOTE — PROGRESS NOTES
Name: Kandace Andrade ADMIT: 2022   : 1940  PCP: Cassi Bella MD    MRN: [id number] LOS: 3 days   AGE/SEX: 82 y.o. female  ROOM: [facility]     Subjective   Subjective   CC: cough/shortness of breath  No acute events. Patient overall feels about the same. Still having non-productive cough. Taking PO. States she is having trouble sleeping. No CP/dyspnea/f/c/n/v/d.  No family at bedside.  Objective   Objective   Vital Signs  Temp:  [97.6 °F (36.4 °C)-98.6 °F (37 °C)] 97.6 °F (36.4 °C)  Heart Rate:  [55-69] 68  Resp:  [16-17] 16  BP: (119-134)/(69-75) 119/73  SpO2:  [87 %-96 %] 92 %  on  Flow (L/min):  [2-3.5] 3;   Device (Oxygen Therapy): humidified;nasal cannula  Body mass index is 23.98 kg/m².  Physical Exam  Vitals and nursing note reviewed.   Constitutional:       General: She is not in acute distress.     Appearance: She is ill-appearing. She is not toxic-appearing or diaphoretic.   HENT:      Head: Normocephalic and atraumatic.      Nose: Nose normal.      Mouth/Throat:      Mouth: Mucous membranes are moist.      Pharynx: Oropharynx is clear.   Eyes:      Conjunctiva/sclera: Conjunctivae normal.      Pupils: Pupils are equal, round, and reactive to light.   Cardiovascular:      Rate and Rhythm: Normal rate and regular rhythm.      Pulses: Normal pulses.   Pulmonary:      Effort: Pulmonary effort is normal.      Breath sounds: Examination of the right-lower field reveals decreased breath sounds and rales. Examination of the left-lower field reveals decreased breath sounds. Decreased breath sounds and rales present.   Abdominal:      General: Bowel sounds are normal.      Palpations: Abdomen is soft.      Tenderness: There is no abdominal tenderness.   Musculoskeletal:         General: No swelling or tenderness.      Cervical back: Normal range of motion and neck supple.   Skin:     General: Skin is warm and dry.      Capillary Refill: Capillary refill takes less than 2 seconds.   Neurological:       General: No focal deficit present.      Mental Status: She is alert and oriented to person, place, and time.   Psychiatric:         Mood and Affect: Mood normal.         Behavior: Behavior normal.     Results Review     I reviewed the patient's new clinical results.  I reviewed the patient's telemetry.  Results from last 7 days   Lab Units 12/11/22  0813 12/10/22  0732 12/09/22  0657 12/08/22  1416   WBC 10*3/mm3 9.41 9.77 13.79* 22.91*   HEMOGLOBIN g/dL 12.5 11.8* 11.2* 12.9   PLATELETS 10*3/mm3 297 223 185 251     Results from last 7 days   Lab Units 12/11/22  1359 12/10/22  0732 12/09/22  0657 12/08/22  1416   SODIUM mmol/L 141 138 137 136   POTASSIUM mmol/L 3.8 3.9 4.1 4.1   CHLORIDE mmol/L 104 107 105 100   CO2 mmol/L 25.3 21.0* 22.0 24.7   BUN mg/dL 7* 9 12 13   CREATININE mg/dL 0.65 0.50* 0.46* 0.72   GLUCOSE mg/dL 75 82 85 124*   EGFR mL/min/1.73 87.5 93.2 95.1 83.1     Results from last 7 days   Lab Units 12/09/22  0657 12/08/22  1416   ALBUMIN g/dL 3.10* 3.90   BILIRUBIN mg/dL 0.4 0.4   ALK PHOS U/L 54 57   AST (SGOT) U/L 15 16   ALT (SGPT) U/L 8 10     Results from last 7 days   Lab Units 12/11/22  1359 12/10/22  0732 12/09/22  0657 12/08/22  1416   CALCIUM mg/dL 8.9 8.4* 8.4* 8.9   ALBUMIN g/dL  --   --  3.10* 3.90   MAGNESIUM mg/dL  --   --   --  1.7     Results from last 7 days   Lab Units 12/08/22 2248 12/08/22  2019 12/08/22  1816 12/08/22  1515 12/08/22  1416   PROCALCITONIN ng/mL  --   --   --   --  0.31*   LACTATE mmol/L 1.8 3.2* 2.3* 3.7*  --      Hemoglobin A1C   Date/Time Value Ref Range Status   12/09/2022 0657 6.00 (H) 4.80 - 5.60 % Final     Glucose   Date/Time Value Ref Range Status   12/11/2022 1100 128 70 - 130 mg/dL Final     Comment:     Meter: [id number] : [id number] Alyx JOAQUIN   12/11/2022 0645 82 70 - 130 mg/dL Final     Comment:     Meter: [id number] : [id number] Destini Suárez RN   12/10/2022 2042 177 (H) 70 - 130 mg/dL Final     Comment:     Meter: [id number]  : [id number] Francisco Eduardo NA   12/10/2022 1604 86 70 - 130 mg/dL Final     Comment:     Meter: [id number] : [id number] Ruy Saldivar NA   12/10/2022 1102 119 70 - 130 mg/dL Final     Comment:     Meter: [id number] : [id number] Radha Moncada NA   12/10/2022 0625 79 70 - 130 mg/dL Final     Comment:     Meter: [id number] : [id number] Valdezlamine Cason NA   12/09/2022 2016 116 70 - 130 mg/dL Final     Comment:     Meter: [id number] : [id number] José Miguel Cason NA       No radiology results for the last day  Scheduled Medications  amLODIPine, 5 mg, Oral, Daily  aspirin, 81 mg, Oral, Daily  atenolol, 12.5 mg, Oral, Nightly  budesonide-formoterol, 2 puff, Inhalation, BID - RT  cefTRIAXone, 1 g, Intravenous, Q24H  ciprofloxacin, 4 drop, Left Ear, BID   And  dexamethasone, 4 drop, Left Ear, BID  enoxaparin, 40 mg, Subcutaneous, Nightly  ezetimibe, 10 mg, Oral, Daily  fluticasone, 2 spray, Each Nare, Daily  guaifenesin-dextromethorphan, 2 tablet, Oral, BID  insulin lispro, 0-9 Units, Subcutaneous, TID AC  ipratropium-albuterol, 3 mL, Nebulization, 4x Daily - RT  oxybutynin XL, 5 mg, Oral, Daily  pantoprazole, 40 mg, Oral, QAM  PARoxetine, 40 mg, Oral, QAM  sodium chloride, 10 mL, Intravenous, Q12H  sodium chloride, 4 mL, Nebulization, BID - RT    Infusions   Diet  Diet: Cardiac Diets, Diabetic Diets, Renal Diets; Healthy Heart (2-3 Na+); Consistent Carbohydrate; Low Sodium (2-3g), Low Potassium, Low Phosphorus; Texture: Regular Texture (IDDSI 7); Fluid Consistency: Thin (IDDSI 0)       Assessment/Plan     Active Hospital Problems    Diagnosis  POA   • **Pneumonia of right middle lobe due to infectious organism [J18.9]  Yes   • Sepsis due to pneumonia (HCC) [J18.9, A41.9]  Yes   • Chronic diarrhea [K52.9]  Yes   • Generalized weakness [R53.1]  Yes   • Decreased hearing of both ears [H91.93]  Yes   • Autonomic neuropathy [G90.9]  Yes   • Type 2 diabetes mellitus without complication, without long-term current  use of insulin (HCC) [E11.9]  Yes   • COPD (chronic obstructive pulmonary disease) (HCC) [J44.9]  Yes   • Paroxysmal atrial fibrillation (HCC) [I48.0]  Yes   • KINDRA (obstructive sleep apnea) [G47.33]  Yes   • Essential hypertension [I10]  Yes   • IBS (irritable bowel syndrome) [K58.9]  Yes   • Anxiety and depression [F41.9, F32.A]  Yes      Resolved Hospital Problems   No resolved problems to display.     RML PNA with Sepsis, present on Admission  - leukocytosis, lactic acidosis present on admission  - blood cx's are negative, reduce ceftriaxone to 1g Q24H  - RVP, S. Pneumo, and legionella are negative, she has been unable to produce a sample for sputum culture thusfar  - encourage pulmonary toilet and wean oxygen as tolerated-add flutter valve and saline nebs    Type 2 DM   - continue ssi/hypoglycemia protocol    COPD  - no exacerbation  - continue current regimen    HTN/PAF  - rate controlled, not on AC at home  - continue atenolol and norvasc  - hold lisinopril    Lovenox 40 mg SC daily for DVT prophylaxis.  Full code.  Discussed with patient and nursing staff.  Anticipate discharge to SNU facility in 2-3 days.      Hector Simons MD  [facility]  12/11/22  15:56 EST    Addendum:  Called by RN due to increased oxygen requirements. She had an ABG and Chest xray which I have reviewed. She appears to be developing pulmonary edema. Her IV fluids had already been stopped. Will give IV lasix and monitor volume status closely.

## 2022-12-11 NOTE — PLAN OF CARE
Goal Outcome Evaluation:  Plan of Care Reviewed With: patient        Progress: no change  Outcome Evaluation: Patient had some c/o headache, treated with Tylenol. Muscle spasms noted again, Flexeril given. Trazadone given to help with sleep with little effect. Spoke with on-call and got Flonase restarted due to patient c/o nasal congestion. Has been between 2-4 L O2 through the night. Good urine output. Purewick in place. VSS. ACHS. Encourage more mobility and pumonary toilet. WCTM.

## 2022-12-12 ENCOUNTER — APPOINTMENT (OUTPATIENT)
Dept: CT IMAGING | Facility: HOSPITAL | Age: 82
DRG: 871 | End: 2022-12-12
Payer: MEDICARE

## 2022-12-12 ENCOUNTER — PATIENT MESSAGE (OUTPATIENT)
Dept: FAMILY MEDICINE CLINIC | Facility: CLINIC | Age: 82
End: 2022-12-12

## 2022-12-12 LAB
ANION GAP SERPL CALCULATED.3IONS-SCNC: 11.5 MMOL/L (ref 5–15)
BASOPHILS # BLD AUTO: 0.08 10*3/MM3 (ref 0–0.2)
BASOPHILS NFR BLD AUTO: 0.9 % (ref 0–1.5)
BUN SERPL-MCNC: 10 MG/DL (ref 8–23)
BUN/CREAT SERPL: 16.1 (ref 7–25)
CALCIUM SPEC-SCNC: 9.4 MG/DL (ref 8.6–10.5)
CHLORIDE SERPL-SCNC: 100 MMOL/L (ref 98–107)
CO2 SERPL-SCNC: 27.5 MMOL/L (ref 22–29)
CREAT SERPL-MCNC: 0.62 MG/DL (ref 0.57–1)
DEPRECATED RDW RBC AUTO: 42.8 FL (ref 37–54)
EGFRCR SERPLBLD CKD-EPI 2021: 88.5 ML/MIN/1.73
EOSINOPHIL # BLD AUTO: 0.33 10*3/MM3 (ref 0–0.4)
EOSINOPHIL NFR BLD AUTO: 3.8 % (ref 0.3–6.2)
ERYTHROCYTE [DISTWIDTH] IN BLOOD BY AUTOMATED COUNT: 12.8 % (ref 12.3–15.4)
GLUCOSE BLDC GLUCOMTR-MCNC: 102 MG/DL (ref 70–130)
GLUCOSE BLDC GLUCOMTR-MCNC: 103 MG/DL (ref 70–130)
GLUCOSE BLDC GLUCOMTR-MCNC: 172 MG/DL (ref 70–130)
GLUCOSE BLDC GLUCOMTR-MCNC: 88 MG/DL (ref 70–130)
GLUCOSE SERPL-MCNC: 94 MG/DL (ref 65–99)
HCT VFR BLD AUTO: 37.4 % (ref 34–46.6)
HGB BLD-MCNC: 13 G/DL (ref 12–15.9)
IMM GRANULOCYTES # BLD AUTO: 0.16 10*3/MM3 (ref 0–0.05)
IMM GRANULOCYTES NFR BLD AUTO: 1.8 % (ref 0–0.5)
LYMPHOCYTES # BLD AUTO: 1.57 10*3/MM3 (ref 0.7–3.1)
LYMPHOCYTES NFR BLD AUTO: 18 % (ref 19.6–45.3)
MCH RBC QN AUTO: 31.9 PG (ref 26.6–33)
MCHC RBC AUTO-ENTMCNC: 34.8 G/DL (ref 31.5–35.7)
MCV RBC AUTO: 91.7 FL (ref 79–97)
MONOCYTES # BLD AUTO: 0.63 10*3/MM3 (ref 0.1–0.9)
MONOCYTES NFR BLD AUTO: 7.2 % (ref 5–12)
NEUTROPHILS NFR BLD AUTO: 5.96 10*3/MM3 (ref 1.7–7)
NEUTROPHILS NFR BLD AUTO: 68.3 % (ref 42.7–76)
NRBC BLD AUTO-RTO: 0 /100 WBC (ref 0–0.2)
PLATELET # BLD AUTO: 283 10*3/MM3 (ref 140–450)
PMV BLD AUTO: 9.8 FL (ref 6–12)
POTASSIUM SERPL-SCNC: 4 MMOL/L (ref 3.5–5.2)
RBC # BLD AUTO: 4.08 10*6/MM3 (ref 3.77–5.28)
SODIUM SERPL-SCNC: 139 MMOL/L (ref 136–145)
WBC NRBC COR # BLD: 8.73 10*3/MM3 (ref 3.4–10.8)

## 2022-12-12 PROCEDURE — 94761 N-INVAS EAR/PLS OXIMETRY MLT: CPT

## 2022-12-12 PROCEDURE — 94760 N-INVAS EAR/PLS OXIMETRY 1: CPT

## 2022-12-12 PROCEDURE — 85025 COMPLETE CBC W/AUTO DIFF WBC: CPT | Performed by: INTERNAL MEDICINE

## 2022-12-12 PROCEDURE — 94799 UNLISTED PULMONARY SVC/PX: CPT

## 2022-12-12 PROCEDURE — 94664 DEMO&/EVAL PT USE INHALER: CPT

## 2022-12-12 PROCEDURE — 63710000001 INSULIN LISPRO (HUMAN) PER 5 UNITS: Performed by: INTERNAL MEDICINE

## 2022-12-12 PROCEDURE — 25010000002 CEFTRIAXONE PER 250 MG: Performed by: INTERNAL MEDICINE

## 2022-12-12 PROCEDURE — 80048 BASIC METABOLIC PNL TOTAL CA: CPT | Performed by: INTERNAL MEDICINE

## 2022-12-12 PROCEDURE — 71275 CT ANGIOGRAPHY CHEST: CPT

## 2022-12-12 PROCEDURE — 82962 GLUCOSE BLOOD TEST: CPT

## 2022-12-12 PROCEDURE — 25010000002 ENOXAPARIN PER 10 MG: Performed by: INTERNAL MEDICINE

## 2022-12-12 PROCEDURE — 25010000002 FUROSEMIDE PER 20 MG: Performed by: INTERNAL MEDICINE

## 2022-12-12 PROCEDURE — 0 IOPAMIDOL PER 1 ML: Performed by: INTERNAL MEDICINE

## 2022-12-12 RX ORDER — FUROSEMIDE 10 MG/ML
40 INJECTION INTRAMUSCULAR; INTRAVENOUS EVERY 8 HOURS
Status: COMPLETED | OUTPATIENT
Start: 2022-12-12 | End: 2022-12-13

## 2022-12-12 RX ADMIN — ATENOLOL 12.5 MG: 25 TABLET ORAL at 22:05

## 2022-12-12 RX ADMIN — Medication 10 ML: at 09:43

## 2022-12-12 RX ADMIN — FUROSEMIDE 40 MG: 10 INJECTION, SOLUTION INTRAMUSCULAR; INTRAVENOUS at 18:24

## 2022-12-12 RX ADMIN — BUDESONIDE AND FORMOTEROL FUMARATE DIHYDRATE 2 PUFF: 80; 4.5 AEROSOL RESPIRATORY (INHALATION) at 07:09

## 2022-12-12 RX ADMIN — DEXAMETHASONE SODIUM PHOSPHATE 4 DROP: 1 SOLUTION/ DROPS OPHTHALMIC at 09:43

## 2022-12-12 RX ADMIN — PAROXETINE HYDROCHLORIDE HEMIHYDRATE 40 MG: 20 TABLET, FILM COATED ORAL at 09:42

## 2022-12-12 RX ADMIN — CIPROFLOXACIN HYDROCHLORIDE 4 DROP: 3 SOLUTION/ DROPS OPHTHALMIC at 09:42

## 2022-12-12 RX ADMIN — ASPIRIN 81 MG: 81 TABLET, CHEWABLE ORAL at 09:42

## 2022-12-12 RX ADMIN — SODIUM CHLORIDE SOLN NEBU 7% 4 ML: 7 NEBU SOLN at 11:30

## 2022-12-12 RX ADMIN — CIPROFLOXACIN HYDROCHLORIDE 4 DROP: 3 SOLUTION/ DROPS OPHTHALMIC at 22:06

## 2022-12-12 RX ADMIN — AMLODIPINE BESYLATE 5 MG: 5 TABLET ORAL at 09:42

## 2022-12-12 RX ADMIN — DEXAMETHASONE SODIUM PHOSPHATE 4 DROP: 1 SOLUTION/ DROPS OPHTHALMIC at 22:07

## 2022-12-12 RX ADMIN — Medication 5 MG: at 00:19

## 2022-12-12 RX ADMIN — FLUTICASONE PROPIONATE 2 SPRAY: 50 SPRAY, METERED NASAL at 09:43

## 2022-12-12 RX ADMIN — IPRATROPIUM BROMIDE AND ALBUTEROL SULFATE 3 ML: .5; 3 SOLUTION RESPIRATORY (INHALATION) at 11:30

## 2022-12-12 RX ADMIN — OXYBUTYNIN CHLORIDE 5 MG: 5 TABLET, EXTENDED RELEASE ORAL at 09:42

## 2022-12-12 RX ADMIN — IOPAMIDOL 95 ML: 755 INJECTION, SOLUTION INTRAVENOUS at 16:03

## 2022-12-12 RX ADMIN — TRAZODONE HYDROCHLORIDE 50 MG: 50 TABLET ORAL at 00:19

## 2022-12-12 RX ADMIN — ENOXAPARIN SODIUM 40 MG: 100 INJECTION SUBCUTANEOUS at 22:06

## 2022-12-12 RX ADMIN — TRAZODONE HYDROCHLORIDE 50 MG: 50 TABLET ORAL at 22:05

## 2022-12-12 RX ADMIN — INSULIN LISPRO 2 UNITS: 100 INJECTION, SOLUTION INTRAVENOUS; SUBCUTANEOUS at 12:23

## 2022-12-12 RX ADMIN — Medication 5 MG: at 22:13

## 2022-12-12 RX ADMIN — EZETIMIBE 10 MG: 10 TABLET ORAL at 09:42

## 2022-12-12 RX ADMIN — FUROSEMIDE 40 MG: 10 INJECTION, SOLUTION INTRAMUSCULAR; INTRAVENOUS at 09:42

## 2022-12-12 RX ADMIN — CEFTRIAXONE SODIUM 1 G: 1 INJECTION, POWDER, FOR SOLUTION INTRAMUSCULAR; INTRAVENOUS at 22:06

## 2022-12-12 RX ADMIN — GUAIFENESIN AND DEXTROMETHORPHAN HYDROBROMIDE 2 TABLET: 600; 30 TABLET, EXTENDED RELEASE ORAL at 12:23

## 2022-12-12 RX ADMIN — IPRATROPIUM BROMIDE AND ALBUTEROL SULFATE 3 ML: .5; 3 SOLUTION RESPIRATORY (INHALATION) at 15:20

## 2022-12-12 RX ADMIN — BUDESONIDE AND FORMOTEROL FUMARATE DIHYDRATE 2 PUFF: 80; 4.5 AEROSOL RESPIRATORY (INHALATION) at 21:28

## 2022-12-12 RX ADMIN — PANTOPRAZOLE SODIUM 40 MG: 40 TABLET, DELAYED RELEASE ORAL at 09:42

## 2022-12-12 NOTE — CONSULTS
Patient Care Team:  Cassi Bella MD as PCP - General (Family Medicine)      Subjective     Patient is a 82 y.o. female.  Asked to see for obstructive sleep apnea/hypoxemia.  Patient was admitted on 12/8 with 2 days of increasing shortness of breath and weakness and fell out of her bed the day of admission she has a history of COPD paroxysmal A. fib, diarrhea Beatties, obstructive sleep apnea and irritable bowel syndrome her initial symptoms were nausea and vomiting that resolved fairly quickly.  In the emergency department x-ray reported a new right lung infiltrate she was hypoxic and had a leukocytosis was admitted for further treatment.  She is a former smoker a pack a day for about 25 years but she quit 14 years ago.  Her O2 requirements increased significantly overnight from 2 L to 10 L.  Patient says she quit smoking years ago treatment diagnosed with COPD she uses Symbicort sometimes.  Never really needed oxygen had really been not short of breath moving around this shortness of breath is all relatively acute over the last couple of days.  Minimal cough no sputum she is not have any chest pain or pressure said little tightness earlier across the chest bilaterally.  She has not had any more nausea or vomiting after the initial episodes early before she came in.  She has not choked on any food or drink.  No lower extremity pain or swelling no history of blood clots.  She does have a history of sleep apnea she said she had a Pap machine she does not know where its gone to its been years she said it always felt like she was suffocating being in the machine.  I asked if she would consider retrying it and she said she would.    Review of Systems:  No headaches or acute visual changes.  No difficulty swallowing or bad heartburn or indigestion she does have a history of some and is on treatment for it but not recently a problem.  No palpitations no dysuria hematuria and urgency or frequency no  melena hematochezia no polyuria polydipsia heat or cold intolerances.  No easy bleeding or bruising      History  Past Medical History:   Diagnosis Date   • Acute seasonal allergic rhinitis due to pollen    • Anxiety and depression    • Arthritis    • Asthma    • C. difficile colitis    • COPD (chronic obstructive pulmonary disease) (HCC)    • Diabetes mellitus (HCC)     Pre diabetes   • Diarrhea    • Diverticulosis    • Fibula fracture    • Herpes zoster    • History of lumbosacral spine surgery    • Hypertension    • IBS (irritable bowel syndrome)    • Mixed hyperlipidemia    • KINDRA (obstructive sleep apnea)     NO MACHINE AT THIS TIME   • Osteoporosis    • Paroxysmal atrial fibrillation (HCC)    • Vitamin D deficiency      Past Surgical History:   Procedure Laterality Date   • APPENDECTOMY     • BACK SURGERY      lower x2   • CATARACT EXTRACTION     • COLONOSCOPY      patient doesn't recall    • COLONOSCOPY N/A 2021    Procedure: COLONOSCOPY to cecum with cecal biopsies;  Surgeon: Jarrod Duggan MD;  Location: [facility] ENDOSCOPY;  Service: Gastroenterology;  Laterality: N/A;  pre: diarhhea  post: diverticulosis, hemorrhoids   • HYSTERECTOMY      partial   • SHOULDER SURGERY Right    • SHOULDER SURGERY     • SIGMOIDOSCOPY N/A 10/19/2022    Procedure: SIGMOIDOSCOPY FLEXIBLE TO SIGMOID COLON WITH BIOPSIES;  Surgeon: Tonja Tanner MD;  Location:  [facility] ENDOSCOPY;  Service: Gastroenterology;  Laterality: N/A;  PRE: DIARRHEA  POST: HEMORRHOIDS   • TONSILLECTOMY     • UPPER GASTROINTESTINAL ENDOSCOPY      patient doesn't recall      Social History     Socioeconomic History   • Marital status:    Tobacco Use   • Smoking status: Former     Packs/day: 1.00     Years: 25.00     Pack years: 25.00     Types: Cigarettes     Quit date: 2008     Years since quittin.6   • Smokeless tobacco: Never   • Tobacco comments:     CAFFEINE USE - 1 CUP COFFEE/ DIET COKE   Vaping Use   • Vaping Use: Never used   Substance  and Sexual Activity   • Alcohol use: No   • Drug use: No   • Sexual activity: Defer     Family History   Problem Relation Age of Onset   • Heart disease Mother    • Lung cancer Mother    • Diabetes Father    • Liver disease Father    • Heart disease Paternal Uncle    • Malig Hyperthermia Neg Hx          Allergies:  Penicillins, Meclizine hcl, Bactrim [sulfamethoxazole-trimethoprim], Flagyl [metronidazole], and Statins    Medications:  Prior to Admission medications    Medication Sig Start Date End Date Taking? Authorizing Provider   acetaminophen (TYLENOL) 500 MG tablet Take 1,000 mg by mouth Every 6 (Six) Hours As Needed for Mild Pain .   Yes Laurie Mullins MD   aspirin 81 MG chewable tablet Chew 81 mg Daily.   Yes Laurie Mullins MD   atenolol (TENORMIN) 25 MG tablet Take 0.5 tablets by mouth Every Night. 10/21/22  Yes Vivi Downs DNP, APRN   Budesonide (ENTOCORT EC) 3 MG 24 hr capsule 3 tablets by mouth daily for 6 weeks, then 2 tablets by mouth daily for 2 weeks 10/17/22 1/12/23 Yes Franchesca Dean PA   celecoxib (CeleBREX) 200 MG capsule Take 1 capsule by mouth Daily. 8/4/22  Yes Cassi Bella MD   cyclobenzaprine (FLEXERIL) 10 MG tablet Take 1/2 to one tablet by mouth twice daily as needed for muscle spasms 10/12/22  Yes Matthew Dixon MD   escitalopram (LEXAPRO) 20 MG tablet TAKE 1 TABLET BY MOUTH DAILY 9/12/22  Yes Cassi Bella MD   ezetimibe (ZETIA) 10 MG tablet Take 1 tablet by mouth Daily. 11/8/22  Yes Alexander Hammer MD   fluticasone (FLONASE) 50 MCG/ACT nasal spray 2 sprays into the nostril(s) as directed by provider Daily As Needed for Rhinitis.   Yes Laurie Mullins MD   lisinopril (PRINIVIL,ZESTRIL) 20 MG tablet Take 1 tablet by mouth 2 (Two) Times a Day. 10/18/22  Yes Vivi Downs DNP, APRN   Melatonin 10 MG tablet Take  by mouth Every Night.   Yes Laurie Mullins MD   omeprazole (priLOSEC) 40 MG capsule TAKE 1 CAPSULE BY MOUTH DAILY  1/28/21  Yes Cassi Bella MD   PARoxetine (PAXIL) 40 MG tablet Take 40 mg by mouth Every Morning. 8/9/22  Yes ProviderLaurie MD   solifenacin (VESIcare) 5 MG tablet Take 1 tablet by mouth Daily. 11/3/22  Yes Cassi Bella MD   Symbicort 80-4.5 MCG/ACT inhaler  11/4/21  Yes ProviderLaurie MD   traZODone (DESYREL) 50 MG tablet TAKE 1/2 TO 1 TABLET BY MOUTH EVERY NIGHT 1 HOUR BEFORE BEDTIME 11/21/22  Yes Cassi Bella MD   vitamin D (ERGOCALCIFEROL) 1.25 MG (91803 UT) capsule capsule TAKE 1 CAPSULE BY MOUTH EVERY 7 DAYS 5/6/22  Yes Cassi Bella MD   amLODIPine (NORVASC) 2.5 MG tablet TAKE 1 TABLET BY MOUTH DAILY 10/17/22   Vivi Downs, DNP, APRN   famotidine (PEPCID) 20 MG tablet TAKE 1 TABLET BY MOUTH TWICE DAILY 8/30/22   Jermaine Franchesca, PA     amLODIPine, 5 mg, Oral, Daily  aspirin, 81 mg, Oral, Daily  atenolol, 12.5 mg, Oral, Nightly  budesonide-formoterol, 2 puff, Inhalation, BID - RT  cefTRIAXone, 1 g, Intravenous, Q24H  ciprofloxacin, 4 drop, Left Ear, BID   And  dexamethasone, 4 drop, Left Ear, BID  enoxaparin, 40 mg, Subcutaneous, Nightly  ezetimibe, 10 mg, Oral, Daily  fluticasone, 2 spray, Each Nare, Daily  furosemide, 40 mg, Intravenous, Q8H  guaifenesin-dextromethorphan, 2 tablet, Oral, BID  insulin lispro, 0-9 Units, Subcutaneous, TID AC  ipratropium-albuterol, 3 mL, Nebulization, 4x Daily - RT  oxybutynin XL, 5 mg, Oral, Daily  pantoprazole, 40 mg, Oral, QAM  PARoxetine, 40 mg, Oral, QAM  sodium chloride, 10 mL, Intravenous, Q12H  sodium chloride, 4 mL, Nebulization, BID - RT           Objective     Vital Signs  Vital Sign Min/Max for last 24 hours  Temp  Min: 97.3 °F (36.3 °C)  Max: 97.7 °F (36.5 °C)   BP  Min: 115/78  Max: 151/98   Pulse  Min: 59  Max: 77   Resp  Min: 16  Max: 22   SpO2  Min: 87 %  Max: 97 %   Flow (L/min)  Min: 3  Max: 10   Weight  Min: 57.9 kg (127 lb 10.3 oz)  Max: 57.9 kg (127 lb 10.3 oz)       Intake/Output Summary (Last 24 hours) at  12/12/2022 1157  Last data filed at 12/12/2022 1100  Gross per 24 hour   Intake 690 ml   Output 2700 ml   Net -2010 ml     I/O this shift:  In: 240 [P.O.:240]  Out: 700 [Urine:700]  Last Weight and Admission Weight        12/12/22  0400   Weight: 57.9 kg (127 lb 10.3 oz)     Flowsheet Rows    Flowsheet Row First Filed Value   Admission Height 160 cm (63\") Documented at 12/08/2022 2314   Admission Weight 61.4 kg (135 lb 5.8 oz) Documented at 12/08/2022 2314          Body mass index is 22.61 kg/m².           Physical Exam:  General Appearance: Well-developed older white female she is resting in bed she is on 10 L high flow nasal cannula with oxygen saturations about 90% she does not look in acute distress  Eyes: Conjunctiva are clear and anicteric pupils are equal  ENT: Mucous membranes are moist no erythema no exudates Mallampati type II airway and nasal septum is midline  Neck: No adenopathy or thyromegaly no visible jugular venous tension, trachea midline  Lungs: Clear nonlabored symmetric expansion no wheezes rales or rhonchi  Cardiac: Regular rate and rhythm no murmur no gallop  Abdomen: Soft nontender no palpable hepatosplenomegaly or masses  : Not examined  Musculoskeletal: Mild thoracic kyphosis  Skin: Warm and dry no jaundice no petechiae  Neuro: She is alert and oriented she is cooperative and following commands very hard of hearing  Extremities/P Vascular: No clubbing no cyanosis no edema palpable radial and dorsalis pedis pulses there is no calf tenderness or palpable cords.  She has palpable radial and dorsalis pedis pulses  MSE: A little flat affect      Labs:  Results from last 7 days   Lab Units 12/12/22  0635 12/11/22  1359 12/10/22  0732 12/09/22  0657 12/08/22  1416   GLUCOSE mg/dL 94 75 82 85 124*   SODIUM mmol/L 139 141 138 137 136   POTASSIUM mmol/L 4.0 3.8 3.9 4.1 4.1   MAGNESIUM mg/dL  --   --   --   --  1.7   CO2 mmol/L 27.5 25.3 21.0* 22.0 24.7   CHLORIDE mmol/L 100 104 107 105 100   ANION  GAP mmol/L 11.5 11.7 10.0 10.0 11.3   CREATININE mg/dL 0.62 0.65 0.50* 0.46* 0.72   BUN mg/dL 10 7* 9 12 13   BUN / CREAT RATIO  16.1 10.8 18.0 26.1* 18.1   CALCIUM mg/dL 9.4 8.9 8.4* 8.4* 8.9   ALK PHOS U/L  --   --   --  54 57   TOTAL PROTEIN g/dL  --   --   --  5.2* 5.8*   ALT (SGPT) U/L  --   --   --  8 10   AST (SGOT) U/L  --   --   --  15 16   BILIRUBIN mg/dL  --   --   --  0.4 0.4   ALBUMIN g/dL  --   --   --  3.10* 3.90   GLOBULIN gm/dL  --   --   --  2.1 1.9     Estimated Creatinine Clearance: 62.8 mL/min (by C-G formula based on SCr of 0.62 mg/dL).      Results from last 7 days   Lab Units 12/12/22  0635 12/11/22  0813 12/10/22  0732 12/09/22  0657 12/08/22  1416   WBC 10*3/mm3 8.73 9.41 9.77 13.79* 22.91*   RBC 10*6/mm3 4.08 4.15 3.70* 3.55* 4.10   HEMOGLOBIN g/dL 13.0 12.5 11.8* 11.2* 12.9   HEMATOCRIT % 37.4 38.6 34.2 33.3* 36.6   MCV fL 91.7 93.0 92.4 93.8 89.3   MCH pg 31.9 30.1 31.9 31.5 31.5   MCHC g/dL 34.8 32.4 34.5 33.6 35.2   RDW % 12.8 13.0 12.9 12.8 12.8   RDW-SD fl 42.8 44.4 43.5 43.5 42.4   MPV fL 9.8 10.1 10.1 10.3 10.0   PLATELETS 10*3/mm3 283 297 223 185 251   NEUTROPHIL % % 68.3 55.1 68.4 75.2 77.5*   LYMPHOCYTE % % 18.0* 34.9 21.2 17.0* 14.7*   MONOCYTES % % 7.2 5.3 5.6 5.7 6.0   EOSINOPHIL % % 3.8 3.5 3.5 0.9 0.4   BASOPHIL % % 0.9 0.5 0.5 0.4 0.3   IMM GRAN % % 1.8* 0.7* 0.8* 0.8* 1.1*   NEUTROS ABS 10*3/mm3 5.96 5.18 6.68 10.39* 17.75*   LYMPHS ABS 10*3/mm3 1.57 3.28* 2.07 2.34 3.37*   MONOS ABS 10*3/mm3 0.63 0.50 0.55 0.78 1.38*   EOS ABS 10*3/mm3 0.33 0.33 0.34 0.12 0.09   BASOS ABS 10*3/mm3 0.08 0.05 0.05 0.05 0.07   IMMATURE GRANS (ABS) 10*3/mm3 0.16* 0.07* 0.08* 0.11* 0.25*   NRBC /100 WBC 0.0 0.0 0.0 0.0 0.0     Results from last 7 days   Lab Units 12/11/22  1659   PH, ARTERIAL pH units 7.441   PO2 ART mm Hg 52.6*   PCO2, ARTERIAL mm Hg 36.0   HCO3 ART mmol/L 24.5     Results from last 7 days   Lab Units 12/08/22  1416   CK TOTAL U/L 79   TROPONIN T ng/mL <0.010     Results  from last 7 days   Lab Units 12/08/22  1416   PROBNP pg/mL 958.0         Results from last 7 days   Lab Units 12/08/22  2248 12/08/22  2019 12/08/22  1816 12/08/22  1515 12/08/22  1416   LACTATE mmol/L 1.8 3.2* 2.3* 3.7*  --    PROCALCITONIN ng/mL  --   --   --   --  0.31*         Microbiology Results (last 10 days)     Procedure Component Value - Date/Time    S. Pneumo Ag Urine or CSF - Urine, Urine, Clean Catch [[id number]]  (Normal) Collected: 12/10/22 0033    Lab Status: Final result Specimen: Urine, Clean Catch Updated: 12/10/22 0137     Strep Pneumo Ag Negative    Legionella Antigen, Urine - Urine, Urine, Clean Catch [[id number]]  (Normal) Collected: 12/10/22 0033    Lab Status: Final result Specimen: Urine, Clean Catch Updated: 12/10/22 0137     LEGIONELLA ANTIGEN, URINE Negative    Blood Culture - Blood, Arm, Left [[id number]]  (Normal) Collected: 12/08/22 1527    Lab Status: Preliminary result Specimen: Blood from Arm, Left Updated: 12/11/22 1601     Blood Culture No growth at 3 days    Blood Culture - Blood, Arm, Left [[id number]]  (Normal) Collected: 12/08/22 1527    Lab Status: Preliminary result Specimen: Blood from Arm, Left Updated: 12/11/22 1601     Blood Culture No growth at 3 days    Respiratory Panel PCR w/COVID-19(SARS-CoV-2) NANI/NICK/ESMER/PAD/COR/MAD/SANTINO In-House, NP Swab in UTM/VTM, 3-4 HR TAT - Swab, Nasopharynx [[id number]]  (Normal) Collected: 12/08/22 1515    Lab Status: Final result Specimen: Swab from Nasopharynx Updated: 12/08/22 1612     ADENOVIRUS, PCR Not Detected     Coronavirus 229E Not Detected     Coronavirus HKU1 Not Detected     Coronavirus NL63 Not Detected     Coronavirus OC43 Not Detected     COVID19 Not Detected     Human Metapneumovirus Not Detected     Human Rhinovirus/Enterovirus Not Detected     Influenza A PCR Not Detected     Influenza B PCR Not Detected     Parainfluenza Virus 1 Not Detected     Parainfluenza Virus 2 Not Detected     Parainfluenza Virus 3 Not Detected      Parainfluenza Virus 4 Not Detected     RSV, PCR Not Detected     Bordetella pertussis pcr Not Detected     Bordetella parapertussis PCR Not Detected     Chlamydophila pneumoniae PCR Not Detected     Mycoplasma pneumo by PCR Not Detected    Narrative:      In the setting of a positive respiratory panel with a viral infection PLUS a negative procalcitonin without other underlying concern for bacterial infection, consider observing off antibiotics or discontinuation of antibiotics and continue supportive care. If the respiratory panel is positive for atypical bacterial infection (Bordetella pertussis, Chlamydophila pneumoniae, or Mycoplasma pneumoniae), consider antibiotic de-escalation to target atypical bacterial infection.            Diagnostics:  XR Chest 2 View    Result Date: 12/8/2022  Chest radiograph  HISTORY:Shortness of air  TECHNIQUE: Two PA and lateral radiographs  COMPARISON:Chest radiograph 07/07/2022      FINDINGS AND IMPRESSION: Minimal left basilar pulmonary opacification is grossly unchanged since 07/07/2022. There is new pulmonary opacification within the right mid and lower lung concerning for pneumonia in the appropriate clinical context. Asymmetric pulmonary edema is less likely. Correlation with patient history is recommended. There is mild asymmetric elevation right hemidiaphragm. No pneumothorax is seen. Cardiac silhouette is within normal limits for size.  This report was finalized on 12/8/2022 3:28 PM by Dr. Christian López M.D.      XR Chest 1 View    Result Date: 12/11/2022  XR CHEST 1 VW-  HISTORY:  Hypoxemia.  COMPARISON:  Chest radiograph 12/8/2022  FINDINGS:  A single view of the chest was obtained. The cardiac silhouette and mediastinal and hilar contours are not significantly changed. There is calcific aortic atherosclerosis. There are new slightly prominent interstitial opacities in both lungs concerning for possible interstitial pulmonary edema. Previously noted pulmonary opacities  in the right lung are not significantly changed. There is no significant pleural effusion. There is degenerative disc disease.  This report was finalized on 12/11/2022 5:51 PM by Dr. Kamla Weston M.D.          Chest x-rays reviewed there may be a little atelectasis at the bases there are some chronic scarring in the right base it is hard to know how much interstitial changes there are point this represents some interstitial disease/edema heart is just artifactual related to differences in radiographic techniques    Assessment & Plan     1. Acute hypoxic respiratory failure I am not certain etiology she came in with a leukocytosis and mildly elevated procalcitonin at possible infiltrate she has been treated with antibiotics and her white count is normal but she is now acutely hypoxic she does not have other symptoms suggesting a markedly worsening pneumonia and it certainly does not look worse on chest x-ray there is some question of some mild increased interstitial markings/edema and she has had Lasix and has put out some urine but has not helped her breathing.  Her initial proBNP when she came in was normal.  There is nothing on chest exam that suggest an etiology is actually sounds pretty clear I do not hear rales or rhonchi.  I am concerned about the possibility of a pulmonary embolus I am not sure or even a normal D-dimer would preclude me from doing a CT scan at this point in time.  I will go ahead and order a CT angiogram of the chest I did discuss it with the patient she is agreeable.  We will continue supplemental oxygen wean as tolerated.  If she gets worse we may need to go to heated high flow or noninvasive ventilation.  2. Obstructive sleep apnea patient has been diagnosed formally and had a machine she did not tolerate well and has not used it in years and years she is willing to try again we will see if she tolerates.  3. Pneumonia came in with questionable pneumonia she has been treated with  antibiotics her white count is normal she is defervesced this just does not fit with the marked worsening and hypoxia over the last 24 hours.  4. COPD by history with a significant smoking history bronchodilators as you are doing she does not seem to be in an acute exacerbation.  5. Essential hypertension  6. Diabetes mellitus type 2      Eusebio Be Jr, MD  12/12/22  11:57 EST    Time:

## 2022-12-12 NOTE — PROGRESS NOTES
Name: Kandace Andrade ADMIT: 2022   : 1940  PCP: Cassi Bella MD    MRN: [id number] LOS: 4 days   AGE/SEX: 82 y.o. female  ROOM: [facility]     Subjective   Subjective   CC: cough/shortness of breath  No acute events. Patient overall feels about the same. Still having non-productive cough. Taking PO. States she is having trouble sleeping. No CP/dyspnea/f/c/n/v/d.  No family at bedside.  Objective   Objective   Vital Signs  Temp:  [97.3 °F (36.3 °C)-97.7 °F (36.5 °C)] 97.7 °F (36.5 °C)  Heart Rate:  [59-77] 59  Resp:  [16-22] 16  BP: (115-151)/(73-98) 115/78  SpO2:  [87 %-97 %] 94 %  on  Flow (L/min):  [3-10] 10;   Device (Oxygen Therapy): high-flow nasal cannula;humidified  Body mass index is 22.61 kg/m².  Physical Exam  Vitals and nursing note reviewed.   Constitutional:       General: She is not in acute distress.     Appearance: She is ill-appearing. She is not toxic-appearing or diaphoretic.   HENT:      Head: Normocephalic and atraumatic.      Nose: Nose normal.      Mouth/Throat:      Mouth: Mucous membranes are moist.      Pharynx: Oropharynx is clear.   Eyes:      Conjunctiva/sclera: Conjunctivae normal.      Pupils: Pupils are equal, round, and reactive to light.   Cardiovascular:      Rate and Rhythm: Normal rate and regular rhythm.      Pulses: Normal pulses.   Pulmonary:      Effort: Pulmonary effort is normal.      Breath sounds: Examination of the right-lower field reveals decreased breath sounds and rales. Examination of the left-lower field reveals decreased breath sounds. Decreased breath sounds and rales present.   Abdominal:      General: Bowel sounds are normal.      Palpations: Abdomen is soft.      Tenderness: There is no abdominal tenderness.   Musculoskeletal:         General: No swelling or tenderness.      Cervical back: Normal range of motion and neck supple.   Skin:     General: Skin is warm and dry.      Capillary Refill: Capillary refill takes less than 2 seconds.    Neurological:      General: No focal deficit present.      Mental Status: She is alert and oriented to person, place, and time.   Psychiatric:         Mood and Affect: Mood normal.         Behavior: Behavior normal.     Results Review     I reviewed the patient's new clinical results.  I reviewed the patient's telemetry.  Results from last 7 days   Lab Units 12/12/22  0635 12/11/22  0813 12/10/22  0732 12/09/22  0657   WBC 10*3/mm3 8.73 9.41 9.77 13.79*   HEMOGLOBIN g/dL 13.0 12.5 11.8* 11.2*   PLATELETS 10*3/mm3 283 297 223 185     Results from last 7 days   Lab Units 12/12/22  0635 12/11/22  1359 12/10/22  0732 12/09/22  0657   SODIUM mmol/L 139 141 138 137   POTASSIUM mmol/L 4.0 3.8 3.9 4.1   CHLORIDE mmol/L 100 104 107 105   CO2 mmol/L 27.5 25.3 21.0* 22.0   BUN mg/dL 10 7* 9 12   CREATININE mg/dL 0.62 0.65 0.50* 0.46*   GLUCOSE mg/dL 94 75 82 85   EGFR mL/min/1.73 88.5 87.5 93.2 95.1     Results from last 7 days   Lab Units 12/09/22  0657 12/08/22  1416   ALBUMIN g/dL 3.10* 3.90   BILIRUBIN mg/dL 0.4 0.4   ALK PHOS U/L 54 57   AST (SGOT) U/L 15 16   ALT (SGPT) U/L 8 10     Results from last 7 days   Lab Units 12/12/22  0635 12/11/22  1359 12/10/22  0732 12/09/22  0657 12/08/22  1416   CALCIUM mg/dL 9.4 8.9 8.4* 8.4* 8.9   ALBUMIN g/dL  --   --   --  3.10* 3.90   MAGNESIUM mg/dL  --   --   --   --  1.7     Results from last 7 days   Lab Units 12/08/22  2248 12/08/22  2019 12/08/22  1816 12/08/22  1515 12/08/22  1416   PROCALCITONIN ng/mL  --   --   --   --  0.31*   LACTATE mmol/L 1.8 3.2* 2.3* 3.7*  --      Glucose   Date/Time Value Ref Range Status   12/12/2022 1122 172 (H) 70 - 130 mg/dL Final     Comment:     Meter: [id number] : [id number] Radha Coral    12/12/2022 0541 102 70 - 130 mg/dL Final     Comment:     Meter: [id number] : [id number] Justin Rueda    12/11/2022 2031 101 70 - 130 mg/dL Final     Comment:     Meter: [id number] : [id number] Link Ramirez    12/11/2022 1621 111 70 -  130 mg/dL Final     Comment:     Meter: [id number] : [id number] Maco Ybarra NA   12/11/2022 1100 128 70 - 130 mg/dL Final     Comment:     Meter: [id number] : [id number] Alyx Begum NA   12/11/2022 0645 82 70 - 130 mg/dL Final     Comment:     Meter: [id number] : [id number] Destini Kamini RN   12/10/2022 2042 177 (H) 70 - 130 mg/dL Final     Comment:     Meter: [id number] : [id number] Francisco Eduardo NA       No radiology results for the last day  Scheduled Medications  amLODIPine, 5 mg, Oral, Daily  aspirin, 81 mg, Oral, Daily  atenolol, 12.5 mg, Oral, Nightly  budesonide-formoterol, 2 puff, Inhalation, BID - RT  cefTRIAXone, 1 g, Intravenous, Q24H  ciprofloxacin, 4 drop, Left Ear, BID   And  dexamethasone, 4 drop, Left Ear, BID  enoxaparin, 40 mg, Subcutaneous, Nightly  ezetimibe, 10 mg, Oral, Daily  fluticasone, 2 spray, Each Nare, Daily  furosemide, 40 mg, Intravenous, Q8H  guaifenesin-dextromethorphan, 2 tablet, Oral, BID  insulin lispro, 0-9 Units, Subcutaneous, TID AC  ipratropium-albuterol, 3 mL, Nebulization, 4x Daily - RT  oxybutynin XL, 5 mg, Oral, Daily  pantoprazole, 40 mg, Oral, QAM  PARoxetine, 40 mg, Oral, QAM  sodium chloride, 10 mL, Intravenous, Q12H  sodium chloride, 4 mL, Nebulization, BID - RT    Infusions   Diet  Diet: Cardiac Diets, Diabetic Diets, Renal Diets; Healthy Heart (2-3 Na+); Consistent Carbohydrate; Low Sodium (2-3g), Low Potassium, Low Phosphorus; Texture: Regular Texture (IDDSI 7); Fluid Consistency: Thin (IDDSI 0)       Assessment/Plan     Active Hospital Problems    Diagnosis  POA   • **Pneumonia of right middle lobe due to infectious organism [J18.9]  Yes   • Sepsis due to pneumonia (HCC) [J18.9, A41.9]  Yes   • Chronic diarrhea [K52.9]  Yes   • Generalized weakness [R53.1]  Yes   • Decreased hearing of both ears [H91.93]  Yes   • Autonomic neuropathy [G90.9]  Yes   • Type 2 diabetes mellitus without complication, without long-term current use of insulin (HCC)  [E11.9]  Yes   • COPD (chronic obstructive pulmonary disease) (HCC) [J44.9]  Yes   • Paroxysmal atrial fibrillation (HCC) [I48.0]  Yes   • KINDRA (obstructive sleep apnea) [G47.33]  Yes   • Essential hypertension [I10]  Yes   • IBS (irritable bowel syndrome) [K58.9]  Yes   • Anxiety and depression [F41.9, F32.A]  Yes      Resolved Hospital Problems   No resolved problems to display.     RML PNA with Sepsis, present on Admission  - leukocytosis, lactic acidosis present on admission  - blood cx's are negative, continue ceftriaxone 1g Q24H  - RVP, S. Pneumo, and legionella are negative, she has been unable to produce a sample for sputum culture thusfar  - encourage pulmonary toilet and wean oxygen as tolerated-she is not doing great with incentive spirometry and I have again encouraged, continue duo-nebs, saline nebs, and flutter valve    Hypoxemia  - probably due in most part to above, has some rales and possible pulmonary edema on cxr, will give a few doses of lasix and wean as tolerated-she does not look terribly volume overloaded peripherally  - check echocardiogram  - she does have KINDRA but does not use CPAP at home-will ask pulmonology to evaluate to see if this would be of benefit to start in the hospital    Type 2 DM   - continue ssi/hypoglycemia protocol    COPD  - no exacerbation  - continue current regimen    HTN/PAF  - rate controlled, not on AC at home  - continue atenolol and norvasc  - hold lisinopril    Lovenox 40 mg SC daily for DVT prophylaxis.  Full code.  Discussed with patient, nursing staff and care team on multidisciplinary rounds.  Anticipate discharge to SNU facility in 2-3 days.      Hector Simons MD  [facility]  12/12/22  12:16 EST

## 2022-12-12 NOTE — PLAN OF CARE
Goal Outcome Evaluation:   [place] x4 received lasix late evening urinated over 2 liters external catheter in place, VSS 10L high flow patient desats due to has sleep apnea but doesn't wear cpap. When awake 02sat over 94%

## 2022-12-13 ENCOUNTER — APPOINTMENT (OUTPATIENT)
Dept: CARDIOLOGY | Facility: HOSPITAL | Age: 82
DRG: 871 | End: 2022-12-13
Payer: MEDICARE

## 2022-12-13 LAB
ANION GAP SERPL CALCULATED.3IONS-SCNC: 11 MMOL/L (ref 5–15)
AORTIC DIMENSIONLESS INDEX: 0.6 (DI)
ASCENDING AORTA: 3.2 CM
BACTERIA SPEC AEROBE CULT: NORMAL
BACTERIA SPEC AEROBE CULT: NORMAL
BASOPHILS # BLD AUTO: 0.09 10*3/MM3 (ref 0–0.2)
BASOPHILS NFR BLD AUTO: 0.8 % (ref 0–1.5)
BH CV ECHO MEAS - ACS: 1.53 CM
BH CV ECHO MEAS - AO MEAN PG: 4.1 MMHG
BH CV ECHO MEAS - AO ROOT DIAM: 3.1 CM
BH CV ECHO MEAS - AO V2 MAX: 139 CM/SEC
BH CV ECHO MEAS - AO V2 VTI: 28.7 CM
BH CV ECHO MEAS - AVA(I,D): 1.74 CM2
BH CV ECHO MEAS - EDV(CUBED): 73.1 ML
BH CV ECHO MEAS - EDV(MOD-SP2): 43 ML
BH CV ECHO MEAS - EDV(MOD-SP4): 86 ML
BH CV ECHO MEAS - EF(MOD-BP): 66 %
BH CV ECHO MEAS - EF(MOD-SP2): 62.8 %
BH CV ECHO MEAS - EF(MOD-SP4): 64 %
BH CV ECHO MEAS - ESV(CUBED): 14.9 ML
BH CV ECHO MEAS - ESV(MOD-SP2): 16 ML
BH CV ECHO MEAS - ESV(MOD-SP4): 31 ML
BH CV ECHO MEAS - FS: 41.2 %
BH CV ECHO MEAS - IVS/LVPW: 1.12 CM
BH CV ECHO MEAS - IVSD: 1.08 CM
BH CV ECHO MEAS - LAT PEAK E' VEL: 6 CM/SEC
BH CV ECHO MEAS - LV DIASTOLIC VOL/BSA (35-75): 53.8 CM2
BH CV ECHO MEAS - LV MASS(C)D: 141.4 GRAMS
BH CV ECHO MEAS - LV MEAN PG: 1.23 MMHG
BH CV ECHO MEAS - LV SYSTOLIC VOL/BSA (12-30): 19.4 CM2
BH CV ECHO MEAS - LV V1 MAX: 88 CM/SEC
BH CV ECHO MEAS - LV V1 VTI: 16.6 CM
BH CV ECHO MEAS - LVIDD: 4.2 CM
BH CV ECHO MEAS - LVIDS: 2.46 CM
BH CV ECHO MEAS - LVOT AREA: 3 CM2
BH CV ECHO MEAS - LVOT DIAM: 1.96 CM
BH CV ECHO MEAS - LVPWD: 0.97 CM
BH CV ECHO MEAS - MED PEAK E' VEL: 5 CM/SEC
BH CV ECHO MEAS - MV A DUR: 0.11 SEC
BH CV ECHO MEAS - MV A MAX VEL: 99.7 CM/SEC
BH CV ECHO MEAS - MV DEC SLOPE: 345.1 CM/SEC2
BH CV ECHO MEAS - MV DEC TIME: 331 MSEC
BH CV ECHO MEAS - MV E MAX VEL: 68.6 CM/SEC
BH CV ECHO MEAS - MV E/A: 0.69
BH CV ECHO MEAS - MV MEAN PG: 1.4 MMHG
BH CV ECHO MEAS - MV P1/2T: 71.7 MSEC
BH CV ECHO MEAS - MV V2 VTI: 31.7 CM
BH CV ECHO MEAS - MVA(P1/2T): 3.1 CM2
BH CV ECHO MEAS - MVA(VTI): 1.57 CM2
BH CV ECHO MEAS - PA ACC SLOPE: 24.5 CM/SEC2
BH CV ECHO MEAS - PA ACC TIME: 0.1 SEC
BH CV ECHO MEAS - PA PR(ACCEL): 36.2 MMHG
BH CV ECHO MEAS - PA V2 MAX: 65.9 CM/SEC
BH CV ECHO MEAS - RAP SYSTOLE: 3 MMHG
BH CV ECHO MEAS - RV MAX PG: 1.33 MMHG
BH CV ECHO MEAS - RV V1 MAX: 57.6 CM/SEC
BH CV ECHO MEAS - RV V1 VTI: 10.6 CM
BH CV ECHO MEAS - SI(MOD-SP2): 16.9 ML/M2
BH CV ECHO MEAS - SI(MOD-SP4): 34.4 ML/M2
BH CV ECHO MEAS - SV(LVOT): 49.8 ML
BH CV ECHO MEAS - SV(MOD-SP2): 27 ML
BH CV ECHO MEAS - SV(MOD-SP4): 55 ML
BH CV ECHO MEASUREMENTS AVERAGE E/E' RATIO: 12.47
BH CV XLRA - TDI S': 12 CM/SEC
BUN SERPL-MCNC: 18 MG/DL (ref 8–23)
BUN/CREAT SERPL: 28.1 (ref 7–25)
CALCIUM SPEC-SCNC: 9.3 MG/DL (ref 8.6–10.5)
CHLORIDE SERPL-SCNC: 99 MMOL/L (ref 98–107)
CO2 SERPL-SCNC: 26 MMOL/L (ref 22–29)
CREAT SERPL-MCNC: 0.64 MG/DL (ref 0.57–1)
DEPRECATED RDW RBC AUTO: 42.2 FL (ref 37–54)
EGFRCR SERPLBLD CKD-EPI 2021: 87.8 ML/MIN/1.73
EOSINOPHIL # BLD AUTO: 0.4 10*3/MM3 (ref 0–0.4)
EOSINOPHIL NFR BLD AUTO: 3.6 % (ref 0.3–6.2)
ERYTHROCYTE [DISTWIDTH] IN BLOOD BY AUTOMATED COUNT: 12.6 % (ref 12.3–15.4)
GLUCOSE BLDC GLUCOMTR-MCNC: 79 MG/DL (ref 70–130)
GLUCOSE BLDC GLUCOMTR-MCNC: 94 MG/DL (ref 70–130)
GLUCOSE BLDC GLUCOMTR-MCNC: 99 MG/DL (ref 70–130)
GLUCOSE BLDC GLUCOMTR-MCNC: 99 MG/DL (ref 70–130)
GLUCOSE SERPL-MCNC: 87 MG/DL (ref 65–99)
HCT VFR BLD AUTO: 39.1 % (ref 34–46.6)
HGB BLD-MCNC: 13.5 G/DL (ref 12–15.9)
IMM GRANULOCYTES # BLD AUTO: 0.52 10*3/MM3 (ref 0–0.05)
IMM GRANULOCYTES NFR BLD AUTO: 4.7 % (ref 0–0.5)
LEFT ATRIUM VOLUME INDEX: 20.6 ML/M2
LYMPHOCYTES # BLD AUTO: 2.83 10*3/MM3 (ref 0.7–3.1)
LYMPHOCYTES NFR BLD AUTO: 25.4 % (ref 19.6–45.3)
MAXIMAL PREDICTED HEART RATE: 137 BPM
MCH RBC QN AUTO: 31.1 PG (ref 26.6–33)
MCHC RBC AUTO-ENTMCNC: 34.5 G/DL (ref 31.5–35.7)
MCV RBC AUTO: 90.1 FL (ref 79–97)
MONOCYTES # BLD AUTO: 0.83 10*3/MM3 (ref 0.1–0.9)
MONOCYTES NFR BLD AUTO: 7.4 % (ref 5–12)
NEUTROPHILS NFR BLD AUTO: 58.1 % (ref 42.7–76)
NEUTROPHILS NFR BLD AUTO: 6.49 10*3/MM3 (ref 1.7–7)
NRBC BLD AUTO-RTO: 0 /100 WBC (ref 0–0.2)
PLATELET # BLD AUTO: 317 10*3/MM3 (ref 140–450)
PMV BLD AUTO: 9.9 FL (ref 6–12)
POTASSIUM SERPL-SCNC: 3.6 MMOL/L (ref 3.5–5.2)
RBC # BLD AUTO: 4.34 10*6/MM3 (ref 3.77–5.28)
SODIUM SERPL-SCNC: 136 MMOL/L (ref 136–145)
STRESS TARGET HR: 116 BPM
WBC NRBC COR # BLD: 11.16 10*3/MM3 (ref 3.4–10.8)

## 2022-12-13 PROCEDURE — 25010000002 ENOXAPARIN PER 10 MG: Performed by: INTERNAL MEDICINE

## 2022-12-13 PROCEDURE — 94799 UNLISTED PULMONARY SVC/PX: CPT

## 2022-12-13 PROCEDURE — 25010000002 FUROSEMIDE PER 20 MG: Performed by: INTERNAL MEDICINE

## 2022-12-13 PROCEDURE — 97530 THERAPEUTIC ACTIVITIES: CPT

## 2022-12-13 PROCEDURE — 25010000002 CEFTRIAXONE PER 250 MG: Performed by: INTERNAL MEDICINE

## 2022-12-13 PROCEDURE — 93306 TTE W/DOPPLER COMPLETE: CPT | Performed by: INTERNAL MEDICINE

## 2022-12-13 PROCEDURE — 85025 COMPLETE CBC W/AUTO DIFF WBC: CPT | Performed by: INTERNAL MEDICINE

## 2022-12-13 PROCEDURE — 25010000002 PERFLUTREN (DEFINITY) 8.476 MG IN SODIUM CHLORIDE (PF) 0.9 % 10 ML INJECTION: Performed by: INTERNAL MEDICINE

## 2022-12-13 PROCEDURE — 82962 GLUCOSE BLOOD TEST: CPT

## 2022-12-13 PROCEDURE — 93306 TTE W/DOPPLER COMPLETE: CPT

## 2022-12-13 PROCEDURE — 80048 BASIC METABOLIC PNL TOTAL CA: CPT | Performed by: INTERNAL MEDICINE

## 2022-12-13 RX ADMIN — IPRATROPIUM BROMIDE AND ALBUTEROL SULFATE 3 ML: .5; 3 SOLUTION RESPIRATORY (INHALATION) at 15:37

## 2022-12-13 RX ADMIN — Medication 5 MG: at 20:54

## 2022-12-13 RX ADMIN — FUROSEMIDE 40 MG: 10 INJECTION, SOLUTION INTRAMUSCULAR; INTRAVENOUS at 03:42

## 2022-12-13 RX ADMIN — PERFLUTREN 2 ML: 6.52 INJECTION, SUSPENSION INTRAVENOUS at 10:44

## 2022-12-13 RX ADMIN — PANTOPRAZOLE SODIUM 40 MG: 40 TABLET, DELAYED RELEASE ORAL at 09:27

## 2022-12-13 RX ADMIN — ASPIRIN 81 MG: 81 TABLET, CHEWABLE ORAL at 09:27

## 2022-12-13 RX ADMIN — IPRATROPIUM BROMIDE AND ALBUTEROL SULFATE 3 ML: .5; 3 SOLUTION RESPIRATORY (INHALATION) at 11:26

## 2022-12-13 RX ADMIN — EZETIMIBE 10 MG: 10 TABLET ORAL at 09:27

## 2022-12-13 RX ADMIN — ATENOLOL 12.5 MG: 25 TABLET ORAL at 20:55

## 2022-12-13 RX ADMIN — DEXAMETHASONE SODIUM PHOSPHATE 4 DROP: 1 SOLUTION/ DROPS OPHTHALMIC at 20:55

## 2022-12-13 RX ADMIN — SODIUM CHLORIDE SOLN NEBU 7% 4 ML: 7 NEBU SOLN at 11:27

## 2022-12-13 RX ADMIN — AMLODIPINE BESYLATE 5 MG: 5 TABLET ORAL at 09:27

## 2022-12-13 RX ADMIN — CIPROFLOXACIN HYDROCHLORIDE 4 DROP: 3 SOLUTION/ DROPS OPHTHALMIC at 20:55

## 2022-12-13 RX ADMIN — Medication 10 ML: at 09:28

## 2022-12-13 RX ADMIN — FLUTICASONE PROPIONATE 2 SPRAY: 50 SPRAY, METERED NASAL at 09:27

## 2022-12-13 RX ADMIN — BUDESONIDE AND FORMOTEROL FUMARATE DIHYDRATE 2 PUFF: 80; 4.5 AEROSOL RESPIRATORY (INHALATION) at 07:53

## 2022-12-13 RX ADMIN — ENOXAPARIN SODIUM 40 MG: 100 INJECTION SUBCUTANEOUS at 20:55

## 2022-12-13 RX ADMIN — OXYBUTYNIN CHLORIDE 5 MG: 5 TABLET, EXTENDED RELEASE ORAL at 09:27

## 2022-12-13 RX ADMIN — Medication 10 ML: at 20:55

## 2022-12-13 RX ADMIN — CYCLOBENZAPRINE 5 MG: 10 TABLET, FILM COATED ORAL at 20:54

## 2022-12-13 RX ADMIN — GUAIFENESIN AND DEXTROMETHORPHAN HYDROBROMIDE 2 TABLET: 600; 30 TABLET, EXTENDED RELEASE ORAL at 09:27

## 2022-12-13 RX ADMIN — CIPROFLOXACIN HYDROCHLORIDE 4 DROP: 3 SOLUTION/ DROPS OPHTHALMIC at 09:27

## 2022-12-13 RX ADMIN — CEFTRIAXONE SODIUM 1 G: 1 INJECTION, POWDER, FOR SOLUTION INTRAMUSCULAR; INTRAVENOUS at 17:31

## 2022-12-13 RX ADMIN — TRAZODONE HYDROCHLORIDE 50 MG: 50 TABLET ORAL at 20:55

## 2022-12-13 RX ADMIN — BUDESONIDE AND FORMOTEROL FUMARATE DIHYDRATE 2 PUFF: 80; 4.5 AEROSOL RESPIRATORY (INHALATION) at 20:27

## 2022-12-13 RX ADMIN — GUAIFENESIN AND DEXTROMETHORPHAN HYDROBROMIDE 2 TABLET: 600; 30 TABLET, EXTENDED RELEASE ORAL at 20:55

## 2022-12-13 RX ADMIN — DEXAMETHASONE SODIUM PHOSPHATE 4 DROP: 1 SOLUTION/ DROPS OPHTHALMIC at 09:27

## 2022-12-13 RX ADMIN — PAROXETINE HYDROCHLORIDE HEMIHYDRATE 40 MG: 20 TABLET, FILM COATED ORAL at 09:27

## 2022-12-13 NOTE — PLAN OF CARE
Goal Outcome Evaluation:  Plan of Care Reviewed With: patient        Progress: improving  Outcome Evaluation: pt still requiring 8-10 lpm of O2 throughout shift.  Pt encouraged to use IS and to get up to chair. Pt sat in chair for meals today and worked with PT. Echo competed. RT aware of need for bipap during HS.

## 2022-12-13 NOTE — DISCHARGE PLACEMENT REQUEST
Kandace Andrade (82 y.o. Female)     Date of Birth   1940    Social Security Number       Address   [address] [place] [address]    Home Phone   [phone]    MRN   [id number]       [facility]    Marital Status                               Admission Date   12/8/22    Admission Type   Emergency    Admitting Provider   Sammy Bullock MD    Attending Provider   Hector Simons MD    Department, Room/Bed   [address], [address]/1       Discharge Date       Discharge Disposition       Discharge Destination                               Attending Provider: Hector Simons MD    Allergies: Penicillins, Meclizine Hcl, Bactrim [Sulfamethoxazole-trimethoprim], Flagyl [Metronidazole], Statins    Isolation: None   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)   Code Status: CPR    Ht: 160 cm (63\")   Wt: 58.1 kg (128 lb 1.4 oz)    Admission Cmt: None   Principal Problem: Pneumonia of right middle lobe due to infectious organism [J18.9]                 Active Insurance as of 12/8/2022     Primary Coverage     Payor Plan Insurance Group Employer/Plan Group    HUMANA MEDICARE REPLACEMENT HUMANA MEDICARE REPLACEMENT [id number]     Payor Plan Address Payor Plan Phone Number Payor Plan Fax Number Effective Dates    [address] [address] [phone]  1/1/2018 - None Entered    [facility] [id number]       Subscriber Name Subscriber Birth Date Member ID       KANDACE ANDRADE 1940 [id number]                 Emergency Contacts      (Rel.) Home Phone Work Phone Mobile Phone    millicent hernandez (Daughter) [phone] -- [phone]    TAYE LEE (Daughter) [phone] -- [phone]    LupeBrendan (Son) -- -- [phone]

## 2022-12-13 NOTE — PLAN OF CARE
Goal Outcome Evaluation:      Vss overnight, weaned down to 8L [place].  Pt sats well when asleep, o2 demand increases while awake sitting in bed. IV lasix given.

## 2022-12-13 NOTE — PLAN OF CARE
Goal Outcome Evaluation:  Plan of Care Reviewed With: patient        Progress: improving  Outcome Evaluation: Very pleasant pt seen by PT this PM for treatment. Pt stood from chair req CGA and use of fww after cues given for B hand placement. Pt amb 12' x 2 w/ seated rest of approx 2 min req CGA and use of fww. Pt's sats dropping briefly into mid to high 80's after sitting although 97-98% during amb. Pt performed B LE ther ex in chair for strengthening. Pt states she does not use assistive device at BL although requires at this times 2/2 weakness and activity intolerance.  Pt may benefit from SNF after dc to improve strength, mobility and endurance.    Patient was not wearing a face mask during this therapy encounter. Therapist used appropriate personal protective equipment including mask and gloves.  Mask used was standard procedure mask. Appropriate PPE was worn during the entire therapy session. Hand hygiene was completed before and after therapy session. Patient is not in enhanced droplet precautions.

## 2022-12-13 NOTE — PROGRESS NOTES
Name: Kandace Andrade ADMIT: 2022   : 1940  PCP: Cassi Bella MD    MRN: [id number] LOS: 5 days   AGE/SEX: 82 y.o. female  ROOM: [facility]     Subjective   Subjective   CC: cough/shortness of breath  No acute events. Patient overall feels about the same. Denies new complaints. Taking PO. Did not have NIPPV started last PM for unclear reasons. No CP/dyspnea/f/c/n/v/d.  No family at bedside.  Objective   Objective   Vital Signs  Temp:  [97.4 °F (36.3 °C)-98.3 °F (36.8 °C)] 97.6 °F (36.4 °C)  Heart Rate:  [55-79] 71  Resp:  [14-18] 16  BP: ()/(56-73) 97/56  SpO2:  [90 %-97 %] 90 %  on  Flow (L/min):  [8-10] 10;   Device (Oxygen Therapy): high-flow nasal cannula;humidified  Body mass index is 22.67 kg/m².  Physical Exam  Vitals and nursing note reviewed.   Constitutional:       General: She is not in acute distress.     Appearance: She is ill-appearing. She is not toxic-appearing or diaphoretic.   HENT:      Head: Normocephalic and atraumatic.      Nose: Nose normal.      Mouth/Throat:      Mouth: Mucous membranes are moist.      Pharynx: Oropharynx is clear.   Eyes:      Conjunctiva/sclera: Conjunctivae normal.      Pupils: Pupils are equal, round, and reactive to light.   Cardiovascular:      Rate and Rhythm: Normal rate and regular rhythm.      Pulses: Normal pulses.   Pulmonary:      Effort: Pulmonary effort is normal.      Breath sounds: Examination of the right-lower field reveals decreased breath sounds. Examination of the left-lower field reveals decreased breath sounds. Decreased breath sounds and rales (mild, right base) present.   Abdominal:      General: Bowel sounds are normal.      Palpations: Abdomen is soft.      Tenderness: There is no abdominal tenderness.   Musculoskeletal:         General: No swelling or tenderness.      Cervical back: Normal range of motion and neck supple.   Skin:     General: Skin is warm and dry.      Capillary Refill: Capillary refill takes less than 2  seconds.   Neurological:      General: No focal deficit present.      Mental Status: She is alert and oriented to person, place, and time.   Psychiatric:         Mood and Affect: Mood normal.         Behavior: Behavior normal.     Results Review     I reviewed the patient's new clinical results.  I reviewed the patient's telemetry.  I reviewed the patient's CT Angiogram of the chest  Results from last 7 days   Lab Units 12/13/22  0856 12/12/22  0635 12/11/22  0813 12/10/22  0732   WBC 10*3/mm3 11.16* 8.73 9.41 9.77   HEMOGLOBIN g/dL 13.5 13.0 12.5 11.8*   PLATELETS 10*3/mm3 317 283 297 223     Results from last 7 days   Lab Units 12/13/22  0856 12/12/22  0635 12/11/22  1359 12/10/22  0732   SODIUM mmol/L 136 139 141 138   POTASSIUM mmol/L 3.6 4.0 3.8 3.9   CHLORIDE mmol/L 99 100 104 107   CO2 mmol/L 26.0 27.5 25.3 21.0*   BUN mg/dL 18 10 7* 9   CREATININE mg/dL 0.64 0.62 0.65 0.50*   GLUCOSE mg/dL 87 94 75 82   EGFR mL/min/1.73 87.8 88.5 87.5 93.2     Results from last 7 days   Lab Units 12/09/22  0657 12/08/22  1416   ALBUMIN g/dL 3.10* 3.90   BILIRUBIN mg/dL 0.4 0.4   ALK PHOS U/L 54 57   AST (SGOT) U/L 15 16   ALT (SGPT) U/L 8 10     Results from last 7 days   Lab Units 12/13/22  0856 12/12/22  0635 12/11/22  1359 12/10/22  0732 12/09/22  0657 12/08/22  1416   CALCIUM mg/dL 9.3 9.4 8.9 8.4* 8.4* 8.9   ALBUMIN g/dL  --   --   --   --  3.10* 3.90   MAGNESIUM mg/dL  --   --   --   --   --  1.7     Results from last 7 days   Lab Units 12/08/22  2248 12/08/22  2019 12/08/22  1816 12/08/22  1515 12/08/22  1416   PROCALCITONIN ng/mL  --   --   --   --  0.31*   LACTATE mmol/L 1.8 3.2* 2.3* 3.7*  --      Glucose   Date/Time Value Ref Range Status   12/13/2022 1119 99 70 - 130 mg/dL Final     Comment:     Meter: [id number] : [id number] Radha Moncada    12/13/2022 0646 94 70 - 130 mg/dL Final     Comment:     Meter: [id number] : [id number] Shanon Anderson    12/12/2022 2121 103 70 - 130 mg/dL Final     Comment:      Meter: [id number] : [id number] Jax Voss NA   12/12/2022 1618 88 70 - 130 mg/dL Final     Comment:     Meter: [id number] : [id number] Radha Moncada NA   12/12/2022 1122 172 (H) 70 - 130 mg/dL Final     Comment:     Meter: [id number] : [id number] Radha Moncada NA   12/12/2022 0541 102 70 - 130 mg/dL Final     Comment:     Meter: [id number] : [id number] Justin Rueda NA   12/11/2022 2031 101 70 - 130 mg/dL Final     Comment:     Meter: [id number] : [id number] Link Ramirez NA       CT Angiogram Chest    Result Date: 12/12/2022  1. No evidence of pulmonary embolus. 2. Trace bilateral pleural effusions with mild bilateral lower lobe atelectasis. 3. Ill-defined and somewhat groundglass infiltrate in the right upper lobe with some minimal groundglass infiltrate in the superior segment right lower lobe and these findings could be related to mild pneumonia. 4. Mild dilatation of the thoracic aorta. 5. Short-term follow-up CT of the chest and proximal a 6 weeks to 8 weeks suggested.  Radiation dose reduction techniques were utilized, including automated exposure control and exposure modulation based on body size.  This report was finalized on 12/12/2022 4:35 PM by Dr. Sonido Vela M.D.      Scheduled Medications  amLODIPine, 5 mg, Oral, Daily  aspirin, 81 mg, Oral, Daily  atenolol, 12.5 mg, Oral, Nightly  budesonide-formoterol, 2 puff, Inhalation, BID - RT  cefTRIAXone, 1 g, Intravenous, Q24H  ciprofloxacin, 4 drop, Left Ear, BID   And  dexamethasone, 4 drop, Left Ear, BID  enoxaparin, 40 mg, Subcutaneous, Nightly  ezetimibe, 10 mg, Oral, Daily  fluticasone, 2 spray, Each Nare, Daily  guaifenesin-dextromethorphan, 2 tablet, Oral, BID  insulin lispro, 0-9 Units, Subcutaneous, TID AC  ipratropium-albuterol, 3 mL, Nebulization, 4x Daily - RT  oxybutynin XL, 5 mg, Oral, Daily  pantoprazole, 40 mg, Oral, QAM  PARoxetine, 40 mg, Oral, QAM  sodium chloride, 10 mL, Intravenous, Q12H  sodium chloride,  4 mL, Nebulization, BID - RT    Infusions   Diet  Diet: Cardiac Diets, Diabetic Diets, Renal Diets; Healthy Heart (2-3 Na+); Consistent Carbohydrate; Low Sodium (2-3g), Low Potassium, Low Phosphorus; Texture: Regular Texture (IDDSI 7); Fluid Consistency: Thin (IDDSI 0)       Assessment/Plan     Active Hospital Problems    Diagnosis  POA   • **Pneumonia of right middle lobe due to infectious organism [J18.9]  Yes   • Sepsis due to pneumonia (HCC) [J18.9, A41.9]  Yes   • Chronic diarrhea [K52.9]  Yes   • Generalized weakness [R53.1]  Yes   • Decreased hearing of both ears [H91.93]  Yes   • Autonomic neuropathy [G90.9]  Yes   • Type 2 diabetes mellitus without complication, without long-term current use of insulin (HCC) [E11.9]  Yes   • COPD (chronic obstructive pulmonary disease) (HCC) [J44.9]  Yes   • Paroxysmal atrial fibrillation (HCC) [I48.0]  Yes   • KINDRA (obstructive sleep apnea) [G47.33]  Yes   • Essential hypertension [I10]  Yes   • IBS (irritable bowel syndrome) [K58.9]  Yes   • Anxiety and depression [F41.9, F32.A]  Yes      Resolved Hospital Problems   No resolved problems to display.     RML PNA with Sepsis, present on Admission  - leukocytosis, lactic acidosis present on admission  - blood cx's are negative, continue ceftriaxone 1g Q24H  - RVP, S. Pneumo, and legionella are negative, she has been unable to produce a sample for sputum culture thusfar  - encourage pulmonary toilet and wean oxygen as tolerated-she is not doing great with incentive spirometry and I have again encouraged, continue duo-nebs, saline nebs, and flutter valve    Hypoxemia  - probably due in most part to above, did not really respond much to lasix and she does not look volume overloaded peripherally, echocardiogram noted has some mild LV impaired relaxation but overall looks good  - she does have KINDRA but does not use CPAP at home-NIPPV ordered for last night but this was not started for unclear reasons, RT to start tonight  -  appreciate pulmonology recs    Type 2 DM   - continue ssi/hypoglycemia protocol    COPD  - no exacerbation  - continue current regimen    HTN/PAF  - rate controlled, not on AC at home  - continue atenolol and norvasc  - hold lisinopril    Lovenox 40 mg SC daily for DVT prophylaxis.  Full code.  Discussed with patient, nursing staff and care team on multidisciplinary rounds.  Anticipate discharge to SNU facility in 2-3 days.      Hector Simons MD  [facility]  12/13/22  14:28 EST

## 2022-12-13 NOTE — PROGRESS NOTES
LOS: 5 days   Patient Care Team:  Cassi Bella MD as PCP - General (Family Medicine)    Subjective     Following for respiratory failure and sleep apnea patient is still requiring about 9 L O2 she does not feel short of breath on the oxygen she sitting up eating on my arrival still coughing.  Unfortunately she did not use the PAP machine last night spoke with RT the machines in the room apparently did not get set up and were not really sure why and the patient cannot tell me why or nursing.  RT is going to work on that today.  No chest pain no sputum with her cough.    Review of Systems:          Objective     Vital Signs  Vital Sign Min/Max for last 24 hours  Temp  Min: 97.4 °F (36.3 °C)  Max: 98.5 °F (36.9 °C)   BP  Min: 107/69  Max: 133/73   Pulse  Min: 55  Max: 79   Resp  Min: 14  Max: 18   SpO2  Min: 90 %  Max: 97 %   Flow (L/min)  Min: 8  Max: 10   Weight  Min: 58.1 kg (128 lb 1.4 oz)  Max: 58.1 kg (128 lb 1.4 oz)        Ventilator/Non-Invasive Ventilation Settings (From admission, onward)     Start     Ordered    12/12/22 1230  NIPPV (CPAP or BIPAP)  At Bedtime & As Needed-RT        Comments: Bleeding O2 as tolerated work with patient to find best tolerated interface   Question Answer Comment   Indication: Sleep Apnea    Type: BIPAP    IPAP 16    EPAP 8    Titrate Oxygen for SpO2 90% - 95%        12/12/22 1230                             Body mass index is 22.69 kg/m².  I/O last 3 completed shifts:  In: 1410 [P.O.:1410]  Out: 3900 [Urine:3900]  No intake/output data recorded.        Physical Exam:    General Appearance: Well-developed older white female she is resting in bed she is on 9 L high flow nasal cannula with oxygen saturations about 93% she does not look in acute distress  Eyes: Conjunctiva are clear and anicteric pupils are equal  ENT: Mucous membranes are moist no erythema no exudates Mallampati type II airway and nasal septum is midline  Neck: No adenopathy or thyromegaly no visible  jugular venous distension, trachea midline  Lungs: Clear nonlabored symmetric expansion no wheezes rales or rhonchi  Cardiac: Regular rate and rhythm no murmur no gallop  Abdomen: Soft nontender no palpable hepatosplenomegaly or masses  : Not examined  Musculoskeletal: Mild thoracic kyphosis  Skin: Warm and dry no jaundice no petechiae  Neuro: She is alert and oriented she is cooperative and following commands very hard of hearing  Extremities/P Vascular: No clubbing no cyanosis no edema palpable radial and dorsalis pedis pulses there is no calf tenderness or palpable cords.  She has palpable radial and dorsalis pedis pulses  MSE: A little flat affect still just not really interactive.    Labs:  Results from last 7 days   Lab Units 12/12/22  0635 12/11/22  1359 12/10/22  0732 12/09/22  0657 12/08/22  1416   GLUCOSE mg/dL 94 75 82 85 124*   SODIUM mmol/L 139 141 138 137 136   POTASSIUM mmol/L 4.0 3.8 3.9 4.1 4.1   MAGNESIUM mg/dL  --   --   --   --  1.7   CO2 mmol/L 27.5 25.3 21.0* 22.0 24.7   CHLORIDE mmol/L 100 104 107 105 100   ANION GAP mmol/L 11.5 11.7 10.0 10.0 11.3   CREATININE mg/dL 0.62 0.65 0.50* 0.46* 0.72   BUN mg/dL 10 7* 9 12 13   BUN / CREAT RATIO  16.1 10.8 18.0 26.1* 18.1   CALCIUM mg/dL 9.4 8.9 8.4* 8.4* 8.9   ALK PHOS U/L  --   --   --  54 57   TOTAL PROTEIN g/dL  --   --   --  5.2* 5.8*   ALT (SGPT) U/L  --   --   --  8 10   AST (SGOT) U/L  --   --   --  15 16   BILIRUBIN mg/dL  --   --   --  0.4 0.4   ALBUMIN g/dL  --   --   --  3.10* 3.90   GLOBULIN gm/dL  --   --   --  2.1 1.9     Estimated Creatinine Clearance: 63.1 mL/min (by C-G formula based on SCr of 0.62 mg/dL).      Results from last 7 days   Lab Units 12/12/22  0635 12/11/22  0813 12/10/22  0732 12/09/22  0657 12/08/22  1416   WBC 10*3/mm3 8.73 9.41 9.77 13.79* 22.91*   RBC 10*6/mm3 4.08 4.15 3.70* 3.55* 4.10   HEMOGLOBIN g/dL 13.0 12.5 11.8* 11.2* 12.9   HEMATOCRIT % 37.4 38.6 34.2 33.3* 36.6   MCV fL 91.7 93.0 92.4 93.8 89.3   MCH  pg 31.9 30.1 31.9 31.5 31.5   MCHC g/dL 34.8 32.4 34.5 33.6 35.2   RDW % 12.8 13.0 12.9 12.8 12.8   RDW-SD fl 42.8 44.4 43.5 43.5 42.4   MPV fL 9.8 10.1 10.1 10.3 10.0   PLATELETS 10*3/mm3 283 297 223 185 251   NEUTROPHIL % % 68.3 55.1 68.4 75.2 77.5*   LYMPHOCYTE % % 18.0* 34.9 21.2 17.0* 14.7*   MONOCYTES % % 7.2 5.3 5.6 5.7 6.0   EOSINOPHIL % % 3.8 3.5 3.5 0.9 0.4   BASOPHIL % % 0.9 0.5 0.5 0.4 0.3   IMM GRAN % % 1.8* 0.7* 0.8* 0.8* 1.1*   NEUTROS ABS 10*3/mm3 5.96 5.18 6.68 10.39* 17.75*   LYMPHS ABS 10*3/mm3 1.57 3.28* 2.07 2.34 3.37*   MONOS ABS 10*3/mm3 0.63 0.50 0.55 0.78 1.38*   EOS ABS 10*3/mm3 0.33 0.33 0.34 0.12 0.09   BASOS ABS 10*3/mm3 0.08 0.05 0.05 0.05 0.07   IMMATURE GRANS (ABS) 10*3/mm3 0.16* 0.07* 0.08* 0.11* 0.25*   NRBC /100 WBC 0.0 0.0 0.0 0.0 0.0     Results from last 7 days   Lab Units 12/11/22  1659   PH, ARTERIAL pH units 7.441   PO2 ART mm Hg 52.6*   PCO2, ARTERIAL mm Hg 36.0   HCO3 ART mmol/L 24.5     Results from last 7 days   Lab Units 12/08/22  1416   CK TOTAL U/L 79   TROPONIN T ng/mL <0.010     Results from last 7 days   Lab Units 12/08/22  1416   PROBNP pg/mL 958.0         Results from last 7 days   Lab Units 12/08/22  2248 12/08/22 2019 12/08/22  1816 12/08/22  1515 12/08/22  1416   LACTATE mmol/L 1.8 3.2* 2.3* 3.7*  --    PROCALCITONIN ng/mL  --   --   --   --  0.31*         Microbiology Results (last 10 days)     Procedure Component Value - Date/Time    S. Pneumo Ag Urine or CSF - Urine, Urine, Clean Catch [[id number]]  (Normal) Collected: 12/10/22 0033    Lab Status: Final result Specimen: Urine, Clean Catch Updated: 12/10/22 0137     Strep Pneumo Ag Negative    Legionella Antigen, Urine - Urine, Urine, Clean Catch [[id number]]  (Normal) Collected: 12/10/22 0033    Lab Status: Final result Specimen: Urine, Clean Catch Updated: 12/10/22 0137     LEGIONELLA ANTIGEN, URINE Negative    Blood Culture - Blood, Arm, Left [[id number]]  (Normal) Collected: 12/08/22 1527    Lab Status:  Preliminary result Specimen: Blood from Arm, Left Updated: 12/12/22 1600     Blood Culture No growth at 4 days    Blood Culture - Blood, Arm, Left [[id number]]  (Normal) Collected: 12/08/22 1527    Lab Status: Preliminary result Specimen: Blood from Arm, Left Updated: 12/12/22 1600     Blood Culture No growth at 4 days    Respiratory Panel PCR w/COVID-19(SARS-CoV-2) NANI/NICK/ESMER/PAD/COR/MAD/SANTINO In-House, NP Swab in UTM/VTM, 3-4 HR TAT - Swab, Nasopharynx [[id number]]  (Normal) Collected: 12/08/22 1515    Lab Status: Final result Specimen: Swab from Nasopharynx Updated: 12/08/22 1612     ADENOVIRUS, PCR Not Detected     Coronavirus 229E Not Detected     Coronavirus HKU1 Not Detected     Coronavirus NL63 Not Detected     Coronavirus OC43 Not Detected     COVID19 Not Detected     Human Metapneumovirus Not Detected     Human Rhinovirus/Enterovirus Not Detected     Influenza A PCR Not Detected     Influenza B PCR Not Detected     Parainfluenza Virus 1 Not Detected     Parainfluenza Virus 2 Not Detected     Parainfluenza Virus 3 Not Detected     Parainfluenza Virus 4 Not Detected     RSV, PCR Not Detected     Bordetella pertussis pcr Not Detected     Bordetella parapertussis PCR Not Detected     Chlamydophila pneumoniae PCR Not Detected     Mycoplasma pneumo by PCR Not Detected    Narrative:      In the setting of a positive respiratory panel with a viral infection PLUS a negative procalcitonin without other underlying concern for bacterial infection, consider observing off antibiotics or discontinuation of antibiotics and continue supportive care. If the respiratory panel is positive for atypical bacterial infection (Bordetella pertussis, Chlamydophila pneumoniae, or Mycoplasma pneumoniae), consider antibiotic de-escalation to target atypical bacterial infection.              amLODIPine, 5 mg, Oral, Daily  aspirin, 81 mg, Oral, Daily  atenolol, 12.5 mg, Oral, Nightly  budesonide-formoterol, 2 puff, Inhalation, BID -  RT  cefTRIAXone, 1 g, Intravenous, Q24H  ciprofloxacin, 4 drop, Left Ear, BID   And  dexamethasone, 4 drop, Left Ear, BID  enoxaparin, 40 mg, Subcutaneous, Nightly  ezetimibe, 10 mg, Oral, Daily  fluticasone, 2 spray, Each Nare, Daily  guaifenesin-dextromethorphan, 2 tablet, Oral, BID  insulin lispro, 0-9 Units, Subcutaneous, TID AC  ipratropium-albuterol, 3 mL, Nebulization, 4x Daily - RT  oxybutynin XL, 5 mg, Oral, Daily  pantoprazole, 40 mg, Oral, QAM  PARoxetine, 40 mg, Oral, QAM  sodium chloride, 10 mL, Intravenous, Q12H  sodium chloride, 4 mL, Nebulization, BID - RT           Diagnostics:  XR Chest 2 View    Result Date: 12/8/2022  Chest radiograph  HISTORY:Shortness of air  TECHNIQUE: Two PA and lateral radiographs  COMPARISON:Chest radiograph 07/07/2022      FINDINGS AND IMPRESSION: Minimal left basilar pulmonary opacification is grossly unchanged since 07/07/2022. There is new pulmonary opacification within the right mid and lower lung concerning for pneumonia in the appropriate clinical context. Asymmetric pulmonary edema is less likely. Correlation with patient history is recommended. There is mild asymmetric elevation right hemidiaphragm. No pneumothorax is seen. Cardiac silhouette is within normal limits for size.  This report was finalized on 12/8/2022 3:28 PM by Dr. Christian López M.D.      CT Angiogram Chest    Result Date: 12/12/2022  CT ANGIOGRAM OF THE CHEST WITH CONTRAST INCLUDING RECONSTRUCTION IMAGES 12/12/2022  HISTORY: Shortness of breath. Possible pulmonary embolus.  Following the intravenous contrast injection CT angiography was performed through the chest. Sagittal, coronal and 3-D reconstruction images were reviewed.  The pulmonary arterial system is well opacified with no evidence of bursal aortic and coronary calcification. A few shotty mediastinal nodes are seen. Trace bilateral pleural effusions are seen with some mild bilateral lower lobe atelectasis. There are some scattered  somewhat ill-defined groundglass appearing infiltrates in the right upper lobe with some minimal groundglass opacity in the superior segment right lower lobe such as on images 58 through 60.  There is mild dilatation of the thoracic aorta with the ascending aorta measuring 3.9 cm in the ascending aorta measuring approximately 4.0 cm just above the diaphragm. Small right renal cyst is seen.      1. No evidence of pulmonary embolus. 2. Trace bilateral pleural effusions with mild bilateral lower lobe atelectasis. 3. Ill-defined and somewhat groundglass infiltrate in the right upper lobe with some minimal groundglass infiltrate in the superior segment right lower lobe and these findings could be related to mild pneumonia. 4. Mild dilatation of the thoracic aorta. 5. Short-term follow-up CT of the chest and proximal a 6 weeks to 8 weeks suggested.  Radiation dose reduction techniques were utilized, including automated exposure control and exposure modulation based on body size.  This report was finalized on 12/12/2022 4:35 PM by Dr. Sonido Vela M.D.      XR Chest 1 View    Result Date: 12/11/2022  XR CHEST 1 VW-  HISTORY:  Hypoxemia.  COMPARISON:  Chest radiograph 12/8/2022  FINDINGS:  A single view of the chest was obtained. The cardiac silhouette and mediastinal and hilar contours are not significantly changed. There is calcific aortic atherosclerosis. There are new slightly prominent interstitial opacities in both lungs concerning for possible interstitial pulmonary edema. Previously noted pulmonary opacities in the right lung are not significantly changed. There is no significant pleural effusion. There is degenerative disc disease.  This report was finalized on 12/11/2022 5:51 PM by Dr. Kamla Weston M.D.          I personally reviewed CT angio of the chest there is no PE there are tiny bilateral pleural effusions with associated compressive atelectasis and there is infiltrate in the right lung predominantly  groundglass most in the right upper lobe a little bit right lower lobe.  She also has pretty extensive emphysema.    Active Hospital Problems    Diagnosis  POA   • **Pneumonia of right middle lobe due to infectious organism [J18.9]  Yes   • Sepsis due to pneumonia (HCC) [J18.9, A41.9]  Yes   • Chronic diarrhea [K52.9]  Yes   • Generalized weakness [R53.1]  Yes   • Decreased hearing of both ears [H91.93]  Yes   • Autonomic neuropathy [G90.9]  Yes   • Type 2 diabetes mellitus without complication, without long-term current use of insulin (HCC) [E11.9]  Yes   • COPD (chronic obstructive pulmonary disease) (HCC) [J44.9]  Yes   • Paroxysmal atrial fibrillation (HCC) [I48.0]  Yes   • KINDRA (obstructive sleep apnea) [G47.33]  Yes   • Essential hypertension [I10]  Yes   • IBS (irritable bowel syndrome) [K58.9]  Yes   • Anxiety and depression [F41.9, F32.A]  Yes      Resolved Hospital Problems   No resolved problems to display.         Assessment & Plan     1. Acute hypoxic respiratory failure likely multifactorial she does have extensive emphysema she does have a pneumonia fairly extensive interstitial groundglass type infiltrates on the right side she has suggestions of some heart failure with bilateral small pleural effusions and associated compressive atelectasis I suspect all of this is a contributing factor and her sleep apnea probably worsens at least nocturnal hypoxia.  I think we have to treat what we can.  She did make some improvement and it looks like there is a little bit of room to wean O2.  2. Bilateral pleural effusions there are small the bilateral nature suggest CHF I know her BNP was normal when she came in.  She got diuresed aggressively yesterday.  3. Obstructive sleep apnea patient has been diagnosed formally and had a machine she did not tolerate well and has not used it in years and years she is willing to try again we will see if she tolerates we really do not know what happened yesterday RT is going  to try and ensure this happens today.  4. Pneumonia she does have evidence suggesting a pneumonia in the right side predominantly groundglass infiltrates she will need follow-up radiographs on this to ensure it clears.  5. COPD CT reveals pretty extensive upper lobe predominant emphysema  6. Essential hypertension  7. Diabetes mellitus type 2    I discussed with patient and nursing patient really needs to be up out of bed today    Plan for disposition:    Eusebio Be Jr, MD  12/13/22  07:41 EST    Time:

## 2022-12-13 NOTE — THERAPY TREATMENT NOTE
Patient Name: Kandace Andrade  : 1940    MRN: [id number]                              Today's Date: 2022       Admit Date: 2022    Visit Dx:     ICD-10-CM ICD-9-CM   1. Pneumonia of right middle lobe due to infectious organism  J18.9 486   2. Sepsis, due to unspecified organism, unspecified whether acute organ dysfunction present ([facility])  A41.9 038.9     995.91   3. Hypoxia  R09.02 799.02   4. History of COPD  Z87.09 V12.69   5. History of diabetes mellitus  Z86.39 V12.29   6. History of atrial fibrillation  Z86.79 V12.59     Patient Active Problem List   Diagnosis   • Vitamin D deficiency   • KINDRA (obstructive sleep apnea)   • IBS (irritable bowel syndrome)   • Essential hypertension   • Herpes zoster   • Arthritis   • Anxiety and depression   • Other emphysema ([facility])   • Acute seasonal allergic rhinitis due to pollen   • Paroxysmal atrial fibrillation ([facility])   • Osteoporosis   • Mixed hyperlipidemia   • Encounter for Medicare annual wellness exam   • COPD (chronic obstructive pulmonary disease) ([facility])   • Mixed stress and urge urinary incontinence   • Type 2 diabetes mellitus without complication, without long-term current use of insulin ([facility])   • Arthritis of both hips   • Autonomic neuropathy   • GERD without esophagitis   • C. difficile colitis   • Hypokalemia   • Hospital discharge follow-up   • Decreased hearing of both ears   • Dizziness   • Nausea   • Hypercholesterolemia   • Primary insomnia   • Generalized weakness   • Other microscopic hematuria   • Acute cystitis with hematuria   • Confusion   • Dehydration   • Abdominal pain   • Chronic diarrhea   • Metabolic encephalopathy   • Left ankle swelling   • Diarrhea   • Overactive bladder   • Pneumonia of right middle lobe due to infectious organism   • Sepsis due to pneumonia ([facility])     Past Medical History:   Diagnosis Date   • Acute seasonal allergic rhinitis due to pollen    • Anxiety and depression    • Arthritis    • Asthma    • C. difficile  colitis    • COPD (chronic obstructive pulmonary disease) (HCC)    • Diabetes mellitus (HCC)     Pre diabetes   • Diarrhea    • Diverticulosis    • Fibula fracture    • Herpes zoster    • History of lumbosacral spine surgery    • Hypertension    • IBS (irritable bowel syndrome)    • Mixed hyperlipidemia    • KINDRA (obstructive sleep apnea)     NO MACHINE AT THIS TIME   • Osteoporosis    • Paroxysmal atrial fibrillation (HCC)    • Vitamin D deficiency      Past Surgical History:   Procedure Laterality Date   • APPENDECTOMY     • BACK SURGERY      lower x2   • CATARACT EXTRACTION     • COLONOSCOPY      patient doesn't recall    • COLONOSCOPY N/A 4/2/2021    Procedure: COLONOSCOPY to cecum with cecal biopsies;  Surgeon: Jarrod Duggan MD;  Location: [facility] ENDOSCOPY;  Service: Gastroenterology;  Laterality: N/A;  pre: diarhhea  post: diverticulosis, hemorrhoids   • HYSTERECTOMY      partial   • SHOULDER SURGERY Right    • SHOULDER SURGERY     • SIGMOIDOSCOPY N/A 10/19/2022    Procedure: SIGMOIDOSCOPY FLEXIBLE TO SIGMOID COLON WITH BIOPSIES;  Surgeon: Tonja Tanner MD;  Location: [facility] ENDOSCOPY;  Service: Gastroenterology;  Laterality: N/A;  PRE: DIARRHEA  POST: HEMORRHOIDS   • TONSILLECTOMY     • UPPER GASTROINTESTINAL ENDOSCOPY      patient doesn't recall       General Information     Row Name 12/13/22 1506          Physical Therapy Time and Intention    Document Type therapy note (daily note)  -PH     Mode of Treatment physical therapy  -PH     Row Name 12/13/22 1506          General Information    Existing Precautions/Restrictions fall  -PH     Row Name 12/13/22 1506          Safety Issues, Functional Mobility    Impairments Affecting Function (Mobility) balance;endurance/activity tolerance;strength;shortness of breath  -PH     Comment, Safety Issues/Impairments (Mobility) gt belt and non skid socks donned  -PH           User Key  (r) = Recorded By, (t) = Taken By, (c) = Cosigned By    Initials Name Provider Type     PH Elsi Mendoza PTA Physical Therapist Assistant               Mobility     Row Name 12/13/22 1506          Bed Mobility    Comment, (Bed Mobility) UIC and returned to chair  -PH     Row Name 12/13/22 1506          Sit-Stand Transfer    Sit-Stand Stanley (Transfers) contact guard  -PH     Assistive Device (Sit-Stand Transfers) walker, front-wheeled  -PH     Row Name 12/13/22 1506          Gait/Stairs (Locomotion)    Stanley Level (Gait) contact guard  -PH     Assistive Device (Gait) walker, front-wheeled  -PH     Distance in Feet (Gait) 12' x 2  -PH     Deviations/Abnormal Patterns (Gait) mikala decreased;gait speed decreased;stride length decreased  -PH     Bilateral Gait Deviations forward flexed posture;heel strike decreased  -PH     Stanley Level (Stairs) not tested  -PH     Comment, (Gait/Stairs) SOA noted w/ sats at 86% after first gait trial after sitting although in mid 90s during amb. Pt limited by fatigue and SOA.  -PH           User Key  (r) = Recorded By, (t) = Taken By, (c) = Cosigned By    Initials Name Provider Type     Elsi Mendoza PTA Physical Therapist Assistant               Obj/Interventions     Row Name 12/13/22 1508          Motor Skills    Therapeutic Exercise other (see comments)  BAP, LAQ, seated march; x 10-15 reps  -PH     Row Name 12/13/22 1508          Balance    Balance Assessment standing static balance  -PH     Static Standing Balance contact guard  -PH     Position/Device Used, Standing Balance walker, front-wheeled  -PH           User Key  (r) = Recorded By, (t) = Taken By, (c) = Cosigned By    Initials Name Provider Type     Elsi Mendoza PTA Physical Therapist Assistant               Goals/Plan    No documentation.                Clinical Impression     Row Name 12/13/22 1508          Pain    Pretreatment Pain Rating 0/10 - no pain  -PH     Posttreatment Pain Rating 0/10 - no pain  -PH     Additional Documentation Pain Scale:  Numbers Pre/Post-Treatment (Group)  -PH     Row Name 12/13/22 1508          Plan of Care Review    Plan of Care Reviewed With patient  -PH     Progress improving  -PH     Outcome Evaluation Very pleasant pt seen by PT this PM for treatment. Pt stood from chair req CGA and use of fww after cues given for B hand placement. Pt amb 12' x 2 w/ seated rest of approx 2 min req CGA and use of fww. Pt's sats dropping briefly into mid to high 80's after sitting although 97-98% during amb. Pt performed B LE ther ex in chair for strengthening. Pt states she does not use assistive device at BL although requires at this times 2/2 weakness and activity intolerance.  Pt may benefit from SNF after dc to improve strength, mobility and endurance.  -PH     Row Name 12/13/22 1508          Positioning and Restraints    Pre-Treatment Position sitting in chair/recliner  -PH     Post Treatment Position chair  -PH     In Chair reclined;call light within reach;encouraged to call for assist;exit alarm on  -PH           User Key  (r) = Recorded By, (t) = Taken By, (c) = Cosigned By    Initials Name Provider Type    PH Elsi Mendoza PTA Physical Therapist Assistant               Outcome Measures     Row Name 12/13/22 1511          How much help from another person do you currently need...    Turning from your back to your side while in flat bed without using bedrails? 3  -PH     Moving from lying on back to sitting on the side of a flat bed without bedrails? 3  -PH     Moving to and from a bed to a chair (including a wheelchair)? 3  -PH     Standing up from a chair using your arms (e.g., wheelchair, bedside chair)? 3  -PH     Climbing 3-5 steps with a railing? 2  -PH     To walk in hospital room? 3  -PH     AM-PAC 6 Clicks Score (PT) 17  -PH     Highest level of mobility 5 --> Static standing  -PH     Row Name 12/13/22 1511          Functional Assessment    Outcome Measure Options AM-PAC 6 Clicks Basic Mobility (PT)  -PH           User  Key  (r) = Recorded By, (t) = Taken By, (c) = Cosigned By    Initials Name Provider Type     Elsi Mendoza PTA Physical Therapist Assistant                             Physical Therapy Education     Title: PT OT SLP Therapies (Done)     Topic: Physical Therapy (Done)     Point: Mobility training (Done)     Learning Progress Summary           Patient Acceptance, E,D, VU by  at 12/13/2022 1511    Acceptance, E, VU,NR by  at 12/11/2022 1317                   Point: Home exercise program (Done)     Learning Progress Summary           Patient Acceptance, E,D, VU by  at 12/13/2022 1511    Acceptance, E, VU,NR by CN at 12/11/2022 1317                   Point: Body mechanics (Done)     Learning Progress Summary           Patient Acceptance, E,D, VU by  at 12/13/2022 1511    Acceptance, E, VU,NR by  at 12/11/2022 1317                   Point: Precautions (Done)     Learning Progress Summary           Patient Acceptance, E,D, VU by  at 12/13/2022 1511    Acceptance, E, VU,NR by  at 12/11/2022 1317                               User Key     Initials Effective Dates Name Provider Type Discipline     06/16/21 -  Alyssa Harrison PT Physical Therapist PT     06/16/21 -  Elsi Mendoza PTA Physical Therapist Assistant PT              PT Recommendation and Plan     Plan of Care Reviewed With: patient  Progress: improving  Outcome Evaluation: Very pleasant pt seen by PT this PM for treatment. Pt stood from chair req CGA and use of fww after cues given for B hand placement. Pt amb 12' x 2 w/ seated rest of approx 2 min req CGA and use of fww. Pt's sats dropping briefly into mid to high 80's after sitting although 97-98% during amb. Pt performed B LE ther ex in chair for strengthening. Pt states she does not use assistive device at BL although requires at this times 2/2 weakness and activity intolerance.  Pt may benefit from SNF after dc to improve strength, mobility and endurance.      Time Calculation:    PT Charges     Row Name 12/13/22 1512             Time Calculation    Start Time 1411  -PH      Stop Time 1425  -PH      Time Calculation (min) 14 min  -PH      PT Received On 12/13/22  -PH      PT - Next Appointment 12/14/22  -PH         Timed Charges    63972 - PT Therapeutic Exercise Minutes 3  -PH      94250 - PT Therapeutic Activity Minutes 11  -PH         Total Minutes    Timed Charges Total Minutes 14  -PH       Total Minutes 14  -PH            User Key  (r) = Recorded By, (t) = Taken By, (c) = Cosigned By    Initials Name Provider Type    PH Elsi Mendoza PTA Physical Therapist Assistant              Therapy Charges for Today     Code Description Service Date Service Provider Modifiers Qty    33990999560 HC PT THERAPEUTIC ACT EA 15 MIN 12/13/2022 Elsi Mendoza PTA GP 1          PT G-Codes  Outcome Measure Options: AM-PAC 6 Clicks Basic Mobility (PT)  AM-PAC 6 Clicks Score (PT): 17  PT Discharge Summary  Anticipated Discharge Disposition (PT): skilled nursing facility    Elsi Mendoza PTA  12/13/2022

## 2022-12-13 NOTE — CASE MANAGEMENT/SOCIAL WORK
Continued Stay Note  [facility]     Patient Name: Kandace Andrade  MRN: [id number]  Today's Date: 12/13/2022    Admit Date: 12/8/2022    Plan: SNF - pending bed availability and pre-cert   Discharge Plan     Row Name 12/13/22 2176       Plan    Plan SNF - pending bed availability and pre-cert    Patient/Family in Agreement with Plan yes    Plan Comments Per Tania Jarvis should have a bed for patient and per Jack Tracy [facility] probably will have a bed.  Per Chrissy - no beds at present.  Spoke with patient at bedside and she states she will see which is more convenient for her daughters.  Will need pre-cert.  Still on O2 at 10 [address].  CCP continues to follow.  Packet in CCP office.  Kiki [facility]CHILD                    Expected Discharge Date and Time     Expected Discharge Date Expected Discharge Time    Dec 14, 2022             Becky S. [place], RN

## 2022-12-14 LAB
ANION GAP SERPL CALCULATED.3IONS-SCNC: 6.3 MMOL/L (ref 5–15)
BUN SERPL-MCNC: 15 MG/DL (ref 8–23)
BUN/CREAT SERPL: 24.6 (ref 7–25)
CALCIUM SPEC-SCNC: 9 MG/DL (ref 8.6–10.5)
CHLORIDE SERPL-SCNC: 99 MMOL/L (ref 98–107)
CO2 SERPL-SCNC: 27.7 MMOL/L (ref 22–29)
CREAT SERPL-MCNC: 0.61 MG/DL (ref 0.57–1)
DEPRECATED RDW RBC AUTO: 44.3 FL (ref 37–54)
EGFRCR SERPLBLD CKD-EPI 2021: 88.8 ML/MIN/1.73
EOSINOPHIL # BLD MANUAL: 0.9 10*3/MM3 (ref 0–0.4)
EOSINOPHIL NFR BLD MANUAL: 9.1 % (ref 0.3–6.2)
ERYTHROCYTE [DISTWIDTH] IN BLOOD BY AUTOMATED COUNT: 12.9 % (ref 12.3–15.4)
GLUCOSE BLDC GLUCOMTR-MCNC: 105 MG/DL (ref 70–130)
GLUCOSE BLDC GLUCOMTR-MCNC: 187 MG/DL (ref 70–130)
GLUCOSE BLDC GLUCOMTR-MCNC: 82 MG/DL (ref 70–130)
GLUCOSE BLDC GLUCOMTR-MCNC: 94 MG/DL (ref 70–130)
GLUCOSE SERPL-MCNC: 84 MG/DL (ref 65–99)
HCT VFR BLD AUTO: 35.3 % (ref 34–46.6)
HGB BLD-MCNC: 11.7 G/DL (ref 12–15.9)
LYMPHOCYTES # BLD MANUAL: 2.1 10*3/MM3 (ref 0.7–3.1)
LYMPHOCYTES NFR BLD MANUAL: 7.1 % (ref 5–12)
MCH RBC QN AUTO: 31.2 PG (ref 26.6–33)
MCHC RBC AUTO-ENTMCNC: 33.1 G/DL (ref 31.5–35.7)
MCV RBC AUTO: 94.1 FL (ref 79–97)
MONOCYTES # BLD: 0.7 10*3/MM3 (ref 0.1–0.9)
NEUTROPHILS # BLD AUTO: 6.2 10*3/MM3 (ref 1.7–7)
NEUTROPHILS NFR BLD MANUAL: 62.6 % (ref 42.7–76)
PLAT MORPH BLD: NORMAL
PLATELET # BLD AUTO: 289 10*3/MM3 (ref 140–450)
PMV BLD AUTO: 10 FL (ref 6–12)
POTASSIUM SERPL-SCNC: 3.6 MMOL/L (ref 3.5–5.2)
RBC # BLD AUTO: 3.75 10*6/MM3 (ref 3.77–5.28)
RBC MORPH BLD: NORMAL
SODIUM SERPL-SCNC: 133 MMOL/L (ref 136–145)
VARIANT LYMPHS NFR BLD MANUAL: 21.2 % (ref 19.6–45.3)
WBC MORPH BLD: NORMAL
WBC NRBC COR # BLD: 9.9 10*3/MM3 (ref 3.4–10.8)

## 2022-12-14 PROCEDURE — 94799 UNLISTED PULMONARY SVC/PX: CPT

## 2022-12-14 PROCEDURE — 25010000002 ENOXAPARIN PER 10 MG: Performed by: INTERNAL MEDICINE

## 2022-12-14 PROCEDURE — 80048 BASIC METABOLIC PNL TOTAL CA: CPT | Performed by: INTERNAL MEDICINE

## 2022-12-14 PROCEDURE — 94760 N-INVAS EAR/PLS OXIMETRY 1: CPT

## 2022-12-14 PROCEDURE — 85025 COMPLETE CBC W/AUTO DIFF WBC: CPT | Performed by: INTERNAL MEDICINE

## 2022-12-14 PROCEDURE — 25010000002 CEFTRIAXONE PER 250 MG: Performed by: INTERNAL MEDICINE

## 2022-12-14 PROCEDURE — 85007 BL SMEAR W/DIFF WBC COUNT: CPT | Performed by: INTERNAL MEDICINE

## 2022-12-14 PROCEDURE — 63710000001 INSULIN LISPRO (HUMAN) PER 5 UNITS: Performed by: INTERNAL MEDICINE

## 2022-12-14 PROCEDURE — 94664 DEMO&/EVAL PT USE INHALER: CPT

## 2022-12-14 PROCEDURE — 82962 GLUCOSE BLOOD TEST: CPT

## 2022-12-14 PROCEDURE — 94761 N-INVAS EAR/PLS OXIMETRY MLT: CPT

## 2022-12-14 RX ADMIN — TRAZODONE HYDROCHLORIDE 50 MG: 50 TABLET ORAL at 21:43

## 2022-12-14 RX ADMIN — DEXAMETHASONE SODIUM PHOSPHATE 4 DROP: 1 SOLUTION/ DROPS OPHTHALMIC at 08:28

## 2022-12-14 RX ADMIN — SODIUM CHLORIDE SOLN NEBU 7% 4 ML: 7 NEBU SOLN at 20:07

## 2022-12-14 RX ADMIN — IPRATROPIUM BROMIDE AND ALBUTEROL SULFATE 3 ML: .5; 3 SOLUTION RESPIRATORY (INHALATION) at 20:02

## 2022-12-14 RX ADMIN — CIPROFLOXACIN HYDROCHLORIDE 4 DROP: 3 SOLUTION/ DROPS OPHTHALMIC at 21:43

## 2022-12-14 RX ADMIN — Medication 10 ML: at 21:43

## 2022-12-14 RX ADMIN — GUAIFENESIN AND DEXTROMETHORPHAN HYDROBROMIDE 2 TABLET: 600; 30 TABLET, EXTENDED RELEASE ORAL at 21:43

## 2022-12-14 RX ADMIN — EZETIMIBE 10 MG: 10 TABLET ORAL at 08:29

## 2022-12-14 RX ADMIN — FLUTICASONE PROPIONATE 2 SPRAY: 50 SPRAY, METERED NASAL at 08:28

## 2022-12-14 RX ADMIN — CYCLOBENZAPRINE 5 MG: 10 TABLET, FILM COATED ORAL at 21:43

## 2022-12-14 RX ADMIN — OXYBUTYNIN CHLORIDE 5 MG: 5 TABLET, EXTENDED RELEASE ORAL at 08:28

## 2022-12-14 RX ADMIN — PAROXETINE HYDROCHLORIDE HEMIHYDRATE 40 MG: 20 TABLET, FILM COATED ORAL at 06:35

## 2022-12-14 RX ADMIN — IPRATROPIUM BROMIDE AND ALBUTEROL SULFATE 3 ML: .5; 3 SOLUTION RESPIRATORY (INHALATION) at 16:09

## 2022-12-14 RX ADMIN — GUAIFENESIN AND DEXTROMETHORPHAN HYDROBROMIDE 2 TABLET: 600; 30 TABLET, EXTENDED RELEASE ORAL at 08:28

## 2022-12-14 RX ADMIN — IPRATROPIUM BROMIDE AND ALBUTEROL SULFATE 3 ML: .5; 3 SOLUTION RESPIRATORY (INHALATION) at 11:35

## 2022-12-14 RX ADMIN — CIPROFLOXACIN HYDROCHLORIDE 4 DROP: 3 SOLUTION/ DROPS OPHTHALMIC at 08:28

## 2022-12-14 RX ADMIN — BUDESONIDE AND FORMOTEROL FUMARATE DIHYDRATE 2 PUFF: 80; 4.5 AEROSOL RESPIRATORY (INHALATION) at 07:08

## 2022-12-14 RX ADMIN — Medication 5 MG: at 21:42

## 2022-12-14 RX ADMIN — PANTOPRAZOLE SODIUM 40 MG: 40 TABLET, DELAYED RELEASE ORAL at 06:35

## 2022-12-14 RX ADMIN — CEFTRIAXONE SODIUM 1 G: 1 INJECTION, POWDER, FOR SOLUTION INTRAMUSCULAR; INTRAVENOUS at 17:08

## 2022-12-14 RX ADMIN — INSULIN LISPRO 2 UNITS: 100 INJECTION, SOLUTION INTRAVENOUS; SUBCUTANEOUS at 12:12

## 2022-12-14 RX ADMIN — ENOXAPARIN SODIUM 40 MG: 100 INJECTION SUBCUTANEOUS at 21:43

## 2022-12-14 RX ADMIN — ATENOLOL 12.5 MG: 25 TABLET ORAL at 21:42

## 2022-12-14 RX ADMIN — SODIUM CHLORIDE SOLN NEBU 7% 4 ML: 7 NEBU SOLN at 11:35

## 2022-12-14 RX ADMIN — ASPIRIN 81 MG: 81 TABLET, CHEWABLE ORAL at 08:28

## 2022-12-14 RX ADMIN — DEXAMETHASONE SODIUM PHOSPHATE 4 DROP: 1 SOLUTION/ DROPS OPHTHALMIC at 21:43

## 2022-12-14 NOTE — PLAN OF CARE
Goal Outcome Evaluation:      Vss overnight, pt refusing bipap to sleep d/t dizziness. While pt asleep, o2 decreased down to 4-6 liters.

## 2022-12-14 NOTE — PROGRESS NOTES
Name: Kandace Andrade ADMIT: 2022   : 1940  PCP: Cassi Bella MD    MRN: [id number] LOS: 6 days   AGE/SEX: 82 y.o. female  ROOM: [facility]     Subjective   Subjective   CC: cough/shortness of breath  No acute events. Patient overall feels about the same. Denies new complaints. Taking PO. She refused NIPPV last night and does not give me a good reason. She has not been participating much in therapy and is not really using incentive spirometry. No CP/dyspnea/f/c/n/v/d.  No family at bedside.  Objective   Objective   Vital Signs  Temp:  [97.6 °F (36.4 °C)-98 °F (36.7 °C)] 98 °F (36.7 °C)  Heart Rate:  [59-78] 67  Resp:  [14-16] 14  BP: ()/(50-66) 97/56  SpO2:  [89 %-96 %] 90 %  on  Flow (L/min):  [4-10] 7;   Device (Oxygen Therapy): high-flow nasal cannula;humidified  Body mass index is 22.46 kg/m².  Physical Exam  Vitals and nursing note reviewed.   Constitutional:       General: She is not in acute distress.     Appearance: She is ill-appearing. She is not toxic-appearing or diaphoretic.   HENT:      Head: Normocephalic and atraumatic.      Nose: Nose normal.      Mouth/Throat:      Mouth: Mucous membranes are moist.      Pharynx: Oropharynx is clear.   Eyes:      Conjunctiva/sclera: Conjunctivae normal.      Pupils: Pupils are equal, round, and reactive to light.   Cardiovascular:      Rate and Rhythm: Normal rate and regular rhythm.      Pulses: Normal pulses.   Pulmonary:      Effort: Pulmonary effort is normal.      Breath sounds: Examination of the right-lower field reveals decreased breath sounds. Examination of the left-lower field reveals decreased breath sounds. Decreased breath sounds and rales (mild, right base) present.   Abdominal:      General: Bowel sounds are normal.      Palpations: Abdomen is soft.      Tenderness: There is no abdominal tenderness.   Musculoskeletal:         General: No swelling or tenderness.      Cervical back: Normal range of motion and neck supple.    Skin:     General: Skin is warm and dry.      Capillary Refill: Capillary refill takes less than 2 seconds.   Neurological:      General: No focal deficit present.      Mental Status: She is alert and oriented to person, place, and time.   Psychiatric:         Mood and Affect: Mood normal.         Behavior: Behavior normal.     Results Review     I reviewed the patient's new clinical results.  I reviewed the patient's telemetry.  Results from last 7 days   Lab Units 12/14/22  0718 12/13/22  0856 12/12/22  0635 12/11/22  0813   WBC 10*3/mm3 9.90 11.16* 8.73 9.41   HEMOGLOBIN g/dL 11.7* 13.5 13.0 12.5   PLATELETS 10*3/mm3 289 317 283 297     Results from last 7 days   Lab Units 12/14/22  0718 12/13/22  0856 12/12/22  0635 12/11/22  1359   SODIUM mmol/L 133* 136 139 141   POTASSIUM mmol/L 3.6 3.6 4.0 3.8   CHLORIDE mmol/L 99 99 100 104   CO2 mmol/L 27.7 26.0 27.5 25.3   BUN mg/dL 15 18 10 7*   CREATININE mg/dL 0.61 0.64 0.62 0.65   GLUCOSE mg/dL 84 87 94 75   EGFR mL/min/1.73 88.8 87.8 88.5 87.5     Results from last 7 days   Lab Units 12/09/22  0657 12/08/22  1416   ALBUMIN g/dL 3.10* 3.90   BILIRUBIN mg/dL 0.4 0.4   ALK PHOS U/L 54 57   AST (SGOT) U/L 15 16   ALT (SGPT) U/L 8 10     Results from last 7 days   Lab Units 12/14/22  0718 12/13/22  0856 12/12/22  0635 12/11/22  1359 12/10/22  0732 12/09/22  0657 12/08/22  1416   CALCIUM mg/dL 9.0 9.3 9.4 8.9   < > 8.4* 8.9   ALBUMIN g/dL  --   --   --   --   --  3.10* 3.90   MAGNESIUM mg/dL  --   --   --   --   --   --  1.7    < > = values in this interval not displayed.     Results from last 7 days   Lab Units 12/08/22  2248 12/08/22 2019 12/08/22  1816 12/08/22  1515 12/08/22  1416   PROCALCITONIN ng/mL  --   --   --   --  0.31*   LACTATE mmol/L 1.8 3.2* 2.3* 3.7*  --      Glucose   Date/Time Value Ref Range Status   12/14/2022 1108 187 (H) 70 - 130 mg/dL Final     Comment:     Meter: [id number] : [id number] Radha JOAQUIN   12/14/2022 0623 94 70 - 130 mg/dL  Final     Comment:     Meter: [id number] : [id number] Davida Ray NA   12/13/2022 2038 99 70 - 130 mg/dL Final     Comment:     Meter: [id number] : [id number] Davida Ray NA   12/13/2022 1607 79 70 - 130 mg/dL Final     Comment:     Meter: [id number] : [id number] Radha Moncada NA   12/13/2022 1119 99 70 - 130 mg/dL Final     Comment:     Meter: [id number] : [id number] Radha Moncada NA   12/13/2022 0646 94 70 - 130 mg/dL Final     Comment:     Meter: [id number] : [id number] Shanon Anderson NA   12/12/2022 2121 103 70 - 130 mg/dL Final     Comment:     Meter: [id number] : [id number] Jax JOAQUIN       CT Angiogram Chest    Result Date: 12/12/2022  1. No evidence of pulmonary embolus. 2. Trace bilateral pleural effusions with mild bilateral lower lobe atelectasis. 3. Ill-defined and somewhat groundglass infiltrate in the right upper lobe with some minimal groundglass infiltrate in the superior segment right lower lobe and these findings could be related to mild pneumonia. 4. Mild dilatation of the thoracic aorta. 5. Short-term follow-up CT of the chest and proximal a 6 weeks to 8 weeks suggested.  Radiation dose reduction techniques were utilized, including automated exposure control and exposure modulation based on body size.  This report was finalized on 12/12/2022 4:35 PM by Dr. Sonido Vela M.D.      Scheduled Medications  amLODIPine, 5 mg, Oral, Daily  aspirin, 81 mg, Oral, Daily  atenolol, 12.5 mg, Oral, Nightly  budesonide-formoterol, 2 puff, Inhalation, BID - RT  cefTRIAXone, 1 g, Intravenous, Q24H  ciprofloxacin, 4 drop, Left Ear, BID   And  dexamethasone, 4 drop, Left Ear, BID  enoxaparin, 40 mg, Subcutaneous, Nightly  ezetimibe, 10 mg, Oral, Daily  fluticasone, 2 spray, Each Nare, Daily  guaifenesin-dextromethorphan, 2 tablet, Oral, BID  insulin lispro, 0-9 Units, Subcutaneous, TID AC  ipratropium-albuterol, 3 mL, Nebulization, 4x Daily - RT  oxybutynin XL, 5 mg, Oral,  Daily  pantoprazole, 40 mg, Oral, QAM  PARoxetine, 40 mg, Oral, QAM  sodium chloride, 10 mL, Intravenous, Q12H  sodium chloride, 4 mL, Nebulization, BID - RT    Infusions   Diet  Diet: Cardiac Diets, Diabetic Diets, Renal Diets; Healthy Heart (2-3 Na+); Consistent Carbohydrate; Low Sodium (2-3g), Low Potassium, Low Phosphorus; Texture: Regular Texture (IDDSI 7); Fluid Consistency: Thin (IDDSI 0)       Assessment/Plan     Active Hospital Problems    Diagnosis  POA   • **Pneumonia of right middle lobe due to infectious organism [J18.9]  Yes   • Sepsis due to pneumonia (HCC) [J18.9, A41.9]  Yes   • Chronic diarrhea [K52.9]  Yes   • Generalized weakness [R53.1]  Yes   • Decreased hearing of both ears [H91.93]  Yes   • Autonomic neuropathy [G90.9]  Yes   • Type 2 diabetes mellitus without complication, without long-term current use of insulin (HCC) [E11.9]  Yes   • COPD (chronic obstructive pulmonary disease) (HCC) [J44.9]  Yes   • Paroxysmal atrial fibrillation (HCC) [I48.0]  Yes   • KINDRA (obstructive sleep apnea) [G47.33]  Yes   • Essential hypertension [I10]  Yes   • IBS (irritable bowel syndrome) [K58.9]  Yes   • Anxiety and depression [F41.9, F32.A]  Yes      Resolved Hospital Problems   No resolved problems to display.     RML PNA with Sepsis, present on Admission  - leukocytosis, lactic acidosis present on admission  - blood cx's are negative, continue ceftriaxone 1g Q24H  - RVP, S. Pneumo, and legionella are negative, she has been unable to produce a sample for sputum culture thusfar  - encourage pulmonary toilet and wean oxygen as tolerated-she is not doing great with incentive spirometry and I have again encouraged, continue duo-nebs, saline nebs, and flutter valve    COPD  - complicating above, no exacerbation  - continue current regimen    Hypoxemia  - due in most part to above, did not really respond much to lasix and she does not look volume overloaded peripherally, echocardiogram noted has some mild LV  impaired relaxation but overall looks good  - she does have KINDRA and NIPPV ordered but patient refuses  - I stressed the critical importance of compliance with NIPPV, physical therapy, and incentive spirometry and told her that she will not recover without these  - appreciate pulmonology recs    Type 2 DM   - continue ssi/hypoglycemia protocol    HTN/PAF  - rate controlled, not on AC at home  - continue atenolol and norvasc  - hold lisinopril    Lovenox 40 mg SC daily for DVT prophylaxis.  Full code.  Discussed with patient, nursing staff and care team on multidisciplinary rounds.  Anticipate discharge to SNU facility in 2-3 days.      Hector Simons MD  [facility]  12/14/22  13:49 EST

## 2022-12-14 NOTE — PROGRESS NOTES
LOS: 6 days   Patient Care Team:  Cassi Bella MD as PCP - General (Family Medicine)    Subjective     Following for respiratory failure and sleep apnea patient is still requiring about 9 L O2 she does not feel short of breath on the oxygen she sitting up eating on my arrival still coughing.  I found out that both last night and the night prior respiratory had tried to get the patient to use PAP machine as have nursing several times and patient flatly refuses to even try the PAP machine.  She is not cooperating with pulmonary hygiene.  She really will not tell me why I ask her if she is depressed she will answer me then either.    Review of Systems:          Objective     Vital Signs  Vital Sign Min/Max for last 24 hours  Temp  Min: 97.6 °F (36.4 °C)  Max: 98 °F (36.7 °C)   BP  Min: 91/54  Max: 114/66   Pulse  Min: 59  Max: 78   Resp  Min: 16  Max: 16   SpO2  Min: 89 %  Max: 96 %   Flow (L/min)  Min: 4  Max: 10   Weight  Min: 57.5 kg (126 lb 12.2 oz)  Max: 57.5 kg (126 lb 12.2 oz)        Ventilator/Non-Invasive Ventilation Settings (From admission, onward)     Start     Ordered    12/12/22 1230  NIPPV (CPAP or BIPAP)  At Bedtime & As Needed-RT        Comments: Bleeding O2 as tolerated work with patient to find best tolerated interface   Question Answer Comment   Indication: Sleep Apnea    Type: BIPAP    IPAP 16    EPAP 8    Titrate Oxygen for SpO2 90% - 95%        12/12/22 1230                             Body mass index is 22.46 kg/m².  I/O last 3 completed shifts:  In: 1080 [P.O.:1080]  Out: 1600 [Urine:1600]  No intake/output data recorded.        Physical Exam:    General Appearance: Well-developed older white female she is resting in bed she is on 5 L high flow nasal cannula with oxygen saturations about 90% she does not look in acute distress  Eyes: Conjunctiva are clear and anicteric pupils are equal  ENT: Mucous membranes are moist no erythema no exudates Mallampati type II airway and nasal  septum is midline  Neck: No adenopathy or thyromegaly no visible jugular venous distension, trachea midline  Lungs: Clear nonlabored symmetric expansion no wheezes rales or rhonchi, she is not taking deep breaths at all even when I encourage her just very shallow efforts.  Cardiac: Regular rate and rhythm no murmur no gallop  Abdomen: Soft nontender no palpable hepatosplenomegaly or masses  : Not examined  Musculoskeletal: Mild thoracic kyphosis  Skin: Warm and dry no jaundice no petechiae  Neuro: She is alert and oriented she is cooperative and following commands very hard of hearing  Extremities/P Vascular: No clubbing no cyanosis no edema palpable radial and dorsalis pedis pulses there is no calf tenderness or palpable cords.  She has palpable radial and dorsalis pedis pulses  MSE: A little flat affect still just not really interactive.    Labs:  Results from last 7 days   Lab Units 12/14/22  0718 12/13/22  0856 12/12/22  0635 12/11/22  1359 12/10/22  0732 12/09/22  0657 12/08/22  1416   GLUCOSE mg/dL 84 87 94 75 82 85 124*   SODIUM mmol/L 133* 136 139 141 138 137 136   POTASSIUM mmol/L 3.6 3.6 4.0 3.8 3.9 4.1 4.1   MAGNESIUM mg/dL  --   --   --   --   --   --  1.7   CO2 mmol/L 27.7 26.0 27.5 25.3 21.0* 22.0 24.7   CHLORIDE mmol/L 99 99 100 104 107 105 100   ANION GAP mmol/L 6.3 11.0 11.5 11.7 10.0 10.0 11.3   CREATININE mg/dL 0.61 0.64 0.62 0.65 0.50* 0.46* 0.72   BUN mg/dL 15 18 10 7* 9 12 13   BUN / CREAT RATIO  24.6 28.1* 16.1 10.8 18.0 26.1* 18.1   CALCIUM mg/dL 9.0 9.3 9.4 8.9 8.4* 8.4* 8.9   ALK PHOS U/L  --   --   --   --   --  54 57   TOTAL PROTEIN g/dL  --   --   --   --   --  5.2* 5.8*   ALT (SGPT) U/L  --   --   --   --   --  8 10   AST (SGOT) U/L  --   --   --   --   --  15 16   BILIRUBIN mg/dL  --   --   --   --   --  0.4 0.4   ALBUMIN g/dL  --   --   --   --   --  3.10* 3.90   GLOBULIN gm/dL  --   --   --   --   --  2.1 1.9     Estimated Creatinine Clearance: 63.4 mL/min (by C-G formula based on  SCr of 0.61 mg/dL).      Results from last 7 days   Lab Units 12/14/22  0718 12/13/22  0856 12/12/22  0635 12/11/22  0813 12/10/22  0732 12/09/22  0657 12/08/22  1416   WBC 10*3/mm3 9.90 11.16* 8.73 9.41 9.77 13.79* 22.91*   RBC 10*6/mm3 3.75* 4.34 4.08 4.15 3.70* 3.55* 4.10   HEMOGLOBIN g/dL 11.7* 13.5 13.0 12.5 11.8* 11.2* 12.9   HEMATOCRIT % 35.3 39.1 37.4 38.6 34.2 33.3* 36.6   MCV fL 94.1 90.1 91.7 93.0 92.4 93.8 89.3   MCH pg 31.2 31.1 31.9 30.1 31.9 31.5 31.5   MCHC g/dL 33.1 34.5 34.8 32.4 34.5 33.6 35.2   RDW % 12.9 12.6 12.8 13.0 12.9 12.8 12.8   RDW-SD fl 44.3 42.2 42.8 44.4 43.5 43.5 42.4   MPV fL 10.0 9.9 9.8 10.1 10.1 10.3 10.0   PLATELETS 10*3/mm3 289 317 283 297 223 185 251   NEUTROPHIL % %  --  58.1 68.3 55.1 68.4 75.2 77.5*   LYMPHOCYTE % %  --  25.4 18.0* 34.9 21.2 17.0* 14.7*   MONOCYTES % %  --  7.4 7.2 5.3 5.6 5.7 6.0   EOSINOPHIL % %  --  3.6 3.8 3.5 3.5 0.9 0.4   BASOPHIL % %  --  0.8 0.9 0.5 0.5 0.4 0.3   IMM GRAN % %  --  4.7* 1.8* 0.7* 0.8* 0.8* 1.1*   NEUTROS ABS 10*3/mm3 6.20 6.49 5.96 5.18 6.68 10.39* 17.75*   LYMPHS ABS 10*3/mm3  --  2.83 1.57 3.28* 2.07 2.34 3.37*   MONOS ABS 10*3/mm3  --  0.83 0.63 0.50 0.55 0.78 1.38*   EOS ABS 10*3/mm3 0.90* 0.40 0.33 0.33 0.34 0.12 0.09   BASOS ABS 10*3/mm3  --  0.09 0.08 0.05 0.05 0.05 0.07   IMMATURE GRANS (ABS) 10*3/mm3  --  0.52* 0.16* 0.07* 0.08* 0.11* 0.25*   NRBC /100 WBC  --  0.0 0.0 0.0 0.0 0.0 0.0     Results from last 7 days   Lab Units 12/11/22  1659   PH, ARTERIAL pH units 7.441   PO2 ART mm Hg 52.6*   PCO2, ARTERIAL mm Hg 36.0   HCO3 ART mmol/L 24.5     Results from last 7 days   Lab Units 12/08/22  1416   CK TOTAL U/L 79   TROPONIN T ng/mL <0.010     Results from last 7 days   Lab Units 12/08/22  1416   PROBNP pg/mL 958.0         Results from last 7 days   Lab Units 12/08/22  2248 12/08/22  2019 12/08/22  1816 12/08/22  1515 12/08/22  1416   LACTATE mmol/L 1.8 3.2* 2.3* 3.7*  --    PROCALCITONIN ng/mL  --   --   --   --  0.31*          Microbiology Results (last 10 days)     Procedure Component Value - Date/Time    S. Pneumo Ag Urine or CSF - Urine, Urine, Clean Catch [[id number]]  (Normal) Collected: 12/10/22 0033    Lab Status: Final result Specimen: Urine, Clean Catch Updated: 12/10/22 0137     Strep Pneumo Ag Negative    Legionella Antigen, Urine - Urine, Urine, Clean Catch [[id number]]  (Normal) Collected: 12/10/22 0033    Lab Status: Final result Specimen: Urine, Clean Catch Updated: 12/10/22 0137     LEGIONELLA ANTIGEN, URINE Negative    Blood Culture - Blood, Arm, Left [[id number]]  (Normal) Collected: 12/08/22 1527    Lab Status: Final result Specimen: Blood from Arm, Left Updated: 12/13/22 1600     Blood Culture No growth at 5 days    Blood Culture - Blood, Arm, Left [[id number]]  (Normal) Collected: 12/08/22 1527    Lab Status: Final result Specimen: Blood from Arm, Left Updated: 12/13/22 1600     Blood Culture No growth at 5 days    Respiratory Panel PCR w/COVID-19(SARS-CoV-2) NANI/NICK/ESMER/PAD/COR/MAD/SANTINO In-House, NP Swab in UTM/VTM, 3-4 HR TAT - Swab, Nasopharynx [[id number]]  (Normal) Collected: 12/08/22 1515    Lab Status: Final result Specimen: Swab from Nasopharynx Updated: 12/08/22 1612     ADENOVIRUS, PCR Not Detected     Coronavirus 229E Not Detected     Coronavirus HKU1 Not Detected     Coronavirus NL63 Not Detected     Coronavirus OC43 Not Detected     COVID19 Not Detected     Human Metapneumovirus Not Detected     Human Rhinovirus/Enterovirus Not Detected     Influenza A PCR Not Detected     Influenza B PCR Not Detected     Parainfluenza Virus 1 Not Detected     Parainfluenza Virus 2 Not Detected     Parainfluenza Virus 3 Not Detected     Parainfluenza Virus 4 Not Detected     RSV, PCR Not Detected     Bordetella pertussis pcr Not Detected     Bordetella parapertussis PCR Not Detected     Chlamydophila pneumoniae PCR Not Detected     Mycoplasma pneumo by PCR Not Detected    Narrative:      In the setting of a positive  respiratory panel with a viral infection PLUS a negative procalcitonin without other underlying concern for bacterial infection, consider observing off antibiotics or discontinuation of antibiotics and continue supportive care. If the respiratory panel is positive for atypical bacterial infection (Bordetella pertussis, Chlamydophila pneumoniae, or Mycoplasma pneumoniae), consider antibiotic de-escalation to target atypical bacterial infection.              amLODIPine, 5 mg, Oral, Daily  aspirin, 81 mg, Oral, Daily  atenolol, 12.5 mg, Oral, Nightly  budesonide-formoterol, 2 puff, Inhalation, BID - RT  cefTRIAXone, 1 g, Intravenous, Q24H  ciprofloxacin, 4 drop, Left Ear, BID   And  dexamethasone, 4 drop, Left Ear, BID  enoxaparin, 40 mg, Subcutaneous, Nightly  ezetimibe, 10 mg, Oral, Daily  fluticasone, 2 spray, Each Nare, Daily  guaifenesin-dextromethorphan, 2 tablet, Oral, BID  insulin lispro, 0-9 Units, Subcutaneous, TID AC  ipratropium-albuterol, 3 mL, Nebulization, 4x Daily - RT  oxybutynin XL, 5 mg, Oral, Daily  pantoprazole, 40 mg, Oral, QAM  PARoxetine, 40 mg, Oral, QAM  sodium chloride, 10 mL, Intravenous, Q12H  sodium chloride, 4 mL, Nebulization, BID - RT           Diagnostics:  XR Chest 2 View    Result Date: 12/8/2022  Chest radiograph  HISTORY:Shortness of air  TECHNIQUE: Two PA and lateral radiographs  COMPARISON:Chest radiograph 07/07/2022      FINDINGS AND IMPRESSION: Minimal left basilar pulmonary opacification is grossly unchanged since 07/07/2022. There is new pulmonary opacification within the right mid and lower lung concerning for pneumonia in the appropriate clinical context. Asymmetric pulmonary edema is less likely. Correlation with patient history is recommended. There is mild asymmetric elevation right hemidiaphragm. No pneumothorax is seen. Cardiac silhouette is within normal limits for size.  This report was finalized on 12/8/2022 3:28 PM by Dr. Christian López M.D.      CT Angiogram  Chest    Result Date: 12/12/2022  CT ANGIOGRAM OF THE CHEST WITH CONTRAST INCLUDING RECONSTRUCTION IMAGES 12/12/2022  HISTORY: Shortness of breath. Possible pulmonary embolus.  Following the intravenous contrast injection CT angiography was performed through the chest. Sagittal, coronal and 3-D reconstruction images were reviewed.  The pulmonary arterial system is well opacified with no evidence of bursal aortic and coronary calcification. A few shotty mediastinal nodes are seen. Trace bilateral pleural effusions are seen with some mild bilateral lower lobe atelectasis. There are some scattered somewhat ill-defined groundglass appearing infiltrates in the right upper lobe with some minimal groundglass opacity in the superior segment right lower lobe such as on images 58 through 60.  There is mild dilatation of the thoracic aorta with the ascending aorta measuring 3.9 cm in the ascending aorta measuring approximately 4.0 cm just above the diaphragm. Small right renal cyst is seen.      1. No evidence of pulmonary embolus. 2. Trace bilateral pleural effusions with mild bilateral lower lobe atelectasis. 3. Ill-defined and somewhat groundglass infiltrate in the right upper lobe with some minimal groundglass infiltrate in the superior segment right lower lobe and these findings could be related to mild pneumonia. 4. Mild dilatation of the thoracic aorta. 5. Short-term follow-up CT of the chest and proximal a 6 weeks to 8 weeks suggested.  Radiation dose reduction techniques were utilized, including automated exposure control and exposure modulation based on body size.  This report was finalized on 12/12/2022 4:35 PM by Dr. Sonido Vela M.D.      XR Chest 1 View    Result Date: 12/11/2022  XR CHEST 1 VW-  HISTORY:  Hypoxemia.  COMPARISON:  Chest radiograph 12/8/2022  FINDINGS:  A single view of the chest was obtained. The cardiac silhouette and mediastinal and hilar contours are not significantly changed. There is  calcific aortic atherosclerosis. There are new slightly prominent interstitial opacities in both lungs concerning for possible interstitial pulmonary edema. Previously noted pulmonary opacities in the right lung are not significantly changed. There is no significant pleural effusion. There is degenerative disc disease.  This report was finalized on 12/11/2022 5:51 PM by Dr. Kamla Weston M.D.      Results for orders placed during the hospital encounter of 12/08/22    Adult Transthoracic Echo Complete W/ Cont if Necessary Per Protocol    Interpretation Summary  •  Left ventricular ejection fraction appears to be 66 - 70%.  •  Left ventricular diastolic function is consistent with (grade I) impaired relaxation.  •  Estimated right ventricular systolic pressure from tricuspid regurgitation is normal (<35 mmHg).          Active Hospital Problems    Diagnosis  POA   • **Pneumonia of right middle lobe due to infectious organism [J18.9]  Yes   • Sepsis due to pneumonia (HCC) [J18.9, A41.9]  Yes   • Chronic diarrhea [K52.9]  Yes   • Generalized weakness [R53.1]  Yes   • Decreased hearing of both ears [H91.93]  Yes   • Autonomic neuropathy [G90.9]  Yes   • Type 2 diabetes mellitus without complication, without long-term current use of insulin (HCC) [E11.9]  Yes   • COPD (chronic obstructive pulmonary disease) (HCC) [J44.9]  Yes   • Paroxysmal atrial fibrillation (HCC) [I48.0]  Yes   • KINDRA (obstructive sleep apnea) [G47.33]  Yes   • Essential hypertension [I10]  Yes   • IBS (irritable bowel syndrome) [K58.9]  Yes   • Anxiety and depression [F41.9, F32.A]  Yes      Resolved Hospital Problems   No resolved problems to display.         Assessment & Plan     1. Acute hypoxic respiratory failure likely multifactorial she does have extensive emphysema she does have a pneumonia fairly extensive interstitial groundglass type infiltrates on the right side she has suggestions of some heart failure with bilateral small pleural effusions  and associated compressive atelectasis I suspect all of this is a contributing factor and her sleep apnea probably worsens at least nocturnal hypoxia.  I think we have to treat what we can.  Unfortunately the patient is not participating and declining therapies that is going to significantly limit what we can do  2. Bilateral pleural effusions there are small the bilateral nature suggest CHF I know her BNP was normal when she came in.  She got diuresed aggressively yesterday.  3. Obstructive sleep apnea patient has been diagnosed formally and had a machine she did not tolerate well and has not used it in years and years.  Despite efforts she has refused to even try a machine here I have  told her that we have new machines different kinds of settings new interfaces that has made no difference.  4. Pneumonia she does have evidence suggesting a pneumonia in the right side predominantly groundglass infiltrates she will need follow-up radiographs on this to ensure it clears.  5. COPD CT reveals pretty extensive upper lobe predominant emphysema  6. Essential hypertension  7. Diabetes mellitus type 2    I discussed with patient again today that she also needs to be up out of bed spend most of her time up in a chair and may be moving around some.    Plan for disposition:    Eusebio Be Jr, MD  12/14/22  10:57 EST    Time:

## 2022-12-14 NOTE — NURSING NOTE
Pt refusing to wear NIPPV before bed.  Stated \"I don't know who started that\".  Educated the pt that the pulmonologist ordered it for her to wear at night to help improve her breathing.  Pt stated \"She is too dizzy to wear that\".  Will re-attempt with second set of vitals, able to wean pt down to 8L

## 2022-12-15 LAB
ANION GAP SERPL CALCULATED.3IONS-SCNC: 6 MMOL/L (ref 5–15)
BUN SERPL-MCNC: 16 MG/DL (ref 8–23)
BUN/CREAT SERPL: 26.7 (ref 7–25)
CALCIUM SPEC-SCNC: 8.6 MG/DL (ref 8.6–10.5)
CHLORIDE SERPL-SCNC: 102 MMOL/L (ref 98–107)
CO2 SERPL-SCNC: 29 MMOL/L (ref 22–29)
CREAT SERPL-MCNC: 0.6 MG/DL (ref 0.57–1)
DEPRECATED RDW RBC AUTO: 41.7 FL (ref 37–54)
EGFRCR SERPLBLD CKD-EPI 2021: 89.2 ML/MIN/1.73
EOSINOPHIL # BLD MANUAL: 0.26 10*3/MM3 (ref 0–0.4)
EOSINOPHIL NFR BLD MANUAL: 3 % (ref 0.3–6.2)
ERYTHROCYTE [DISTWIDTH] IN BLOOD BY AUTOMATED COUNT: 12.5 % (ref 12.3–15.4)
GLUCOSE BLDC GLUCOMTR-MCNC: 112 MG/DL (ref 70–130)
GLUCOSE BLDC GLUCOMTR-MCNC: 123 MG/DL (ref 70–130)
GLUCOSE BLDC GLUCOMTR-MCNC: 188 MG/DL (ref 70–130)
GLUCOSE BLDC GLUCOMTR-MCNC: 86 MG/DL (ref 70–130)
GLUCOSE SERPL-MCNC: 83 MG/DL (ref 65–99)
HCT VFR BLD AUTO: 33.9 % (ref 34–46.6)
HGB BLD-MCNC: 11.6 G/DL (ref 12–15.9)
LYMPHOCYTES # BLD MANUAL: 0.96 10*3/MM3 (ref 0.7–3.1)
LYMPHOCYTES NFR BLD MANUAL: 6 % (ref 5–12)
MCH RBC QN AUTO: 31.2 PG (ref 26.6–33)
MCHC RBC AUTO-ENTMCNC: 34.2 G/DL (ref 31.5–35.7)
MCV RBC AUTO: 91.1 FL (ref 79–97)
METAMYELOCYTES NFR BLD MANUAL: 4 % (ref 0–0)
MONOCYTES # BLD: 0.52 10*3/MM3 (ref 0.1–0.9)
MYELOCYTES NFR BLD MANUAL: 1 % (ref 0–0)
NEUTROPHILS # BLD AUTO: 6.55 10*3/MM3 (ref 1.7–7)
NEUTROPHILS NFR BLD MANUAL: 75 % (ref 42.7–76)
NRBC BLD AUTO-RTO: 0 /100 WBC (ref 0–0.2)
PLAT MORPH BLD: NORMAL
PLATELET # BLD AUTO: 290 10*3/MM3 (ref 140–450)
PMV BLD AUTO: 9.6 FL (ref 6–12)
POTASSIUM SERPL-SCNC: 3.7 MMOL/L (ref 3.5–5.2)
RBC # BLD AUTO: 3.72 10*6/MM3 (ref 3.77–5.28)
RBC MORPH BLD: NORMAL
SODIUM SERPL-SCNC: 137 MMOL/L (ref 136–145)
VARIANT LYMPHS NFR BLD MANUAL: 11 % (ref 19.6–45.3)
WBC MORPH BLD: NORMAL
WBC NRBC COR # BLD: 8.73 10*3/MM3 (ref 3.4–10.8)

## 2022-12-15 PROCEDURE — 97530 THERAPEUTIC ACTIVITIES: CPT

## 2022-12-15 PROCEDURE — 85025 COMPLETE CBC W/AUTO DIFF WBC: CPT | Performed by: INTERNAL MEDICINE

## 2022-12-15 PROCEDURE — 94799 UNLISTED PULMONARY SVC/PX: CPT

## 2022-12-15 PROCEDURE — 82962 GLUCOSE BLOOD TEST: CPT

## 2022-12-15 PROCEDURE — 94761 N-INVAS EAR/PLS OXIMETRY MLT: CPT

## 2022-12-15 PROCEDURE — 94664 DEMO&/EVAL PT USE INHALER: CPT

## 2022-12-15 PROCEDURE — 94660 CPAP INITIATION&MGMT: CPT

## 2022-12-15 PROCEDURE — 80048 BASIC METABOLIC PNL TOTAL CA: CPT | Performed by: INTERNAL MEDICINE

## 2022-12-15 PROCEDURE — 63710000001 INSULIN LISPRO (HUMAN) PER 5 UNITS: Performed by: INTERNAL MEDICINE

## 2022-12-15 PROCEDURE — 25010000002 ENOXAPARIN PER 10 MG: Performed by: INTERNAL MEDICINE

## 2022-12-15 PROCEDURE — 85007 BL SMEAR W/DIFF WBC COUNT: CPT | Performed by: INTERNAL MEDICINE

## 2022-12-15 RX ADMIN — EZETIMIBE 10 MG: 10 TABLET ORAL at 08:54

## 2022-12-15 RX ADMIN — PANTOPRAZOLE SODIUM 40 MG: 40 TABLET, DELAYED RELEASE ORAL at 06:29

## 2022-12-15 RX ADMIN — PAROXETINE HYDROCHLORIDE HEMIHYDRATE 40 MG: 20 TABLET, FILM COATED ORAL at 06:30

## 2022-12-15 RX ADMIN — ACETAMINOPHEN 1000 MG: 500 TABLET ORAL at 17:05

## 2022-12-15 RX ADMIN — ENOXAPARIN SODIUM 40 MG: 100 INJECTION SUBCUTANEOUS at 21:00

## 2022-12-15 RX ADMIN — BUDESONIDE AND FORMOTEROL FUMARATE DIHYDRATE 2 PUFF: 80; 4.5 AEROSOL RESPIRATORY (INHALATION) at 21:46

## 2022-12-15 RX ADMIN — GUAIFENESIN AND DEXTROMETHORPHAN HYDROBROMIDE 2 TABLET: 600; 30 TABLET, EXTENDED RELEASE ORAL at 21:00

## 2022-12-15 RX ADMIN — IPRATROPIUM BROMIDE AND ALBUTEROL SULFATE 3 ML: .5; 3 SOLUTION RESPIRATORY (INHALATION) at 08:42

## 2022-12-15 RX ADMIN — IPRATROPIUM BROMIDE AND ALBUTEROL SULFATE 3 ML: .5; 3 SOLUTION RESPIRATORY (INHALATION) at 15:29

## 2022-12-15 RX ADMIN — Medication 5 MG: at 21:00

## 2022-12-15 RX ADMIN — GUAIFENESIN AND DEXTROMETHORPHAN HYDROBROMIDE 2 TABLET: 600; 30 TABLET, EXTENDED RELEASE ORAL at 08:55

## 2022-12-15 RX ADMIN — CIPROFLOXACIN HYDROCHLORIDE 4 DROP: 3 SOLUTION/ DROPS OPHTHALMIC at 21:00

## 2022-12-15 RX ADMIN — Medication 10 ML: at 21:06

## 2022-12-15 RX ADMIN — IPRATROPIUM BROMIDE AND ALBUTEROL SULFATE 3 ML: .5; 3 SOLUTION RESPIRATORY (INHALATION) at 11:28

## 2022-12-15 RX ADMIN — ASPIRIN 81 MG: 81 TABLET, CHEWABLE ORAL at 08:55

## 2022-12-15 RX ADMIN — FLUTICASONE PROPIONATE 2 SPRAY: 50 SPRAY, METERED NASAL at 08:55

## 2022-12-15 RX ADMIN — INSULIN LISPRO 2 UNITS: 100 INJECTION, SOLUTION INTRAVENOUS; SUBCUTANEOUS at 17:05

## 2022-12-15 RX ADMIN — CIPROFLOXACIN HYDROCHLORIDE 4 DROP: 3 SOLUTION/ DROPS OPHTHALMIC at 08:55

## 2022-12-15 RX ADMIN — AMLODIPINE BESYLATE 5 MG: 5 TABLET ORAL at 08:55

## 2022-12-15 RX ADMIN — ATENOLOL 12.5 MG: 25 TABLET ORAL at 21:00

## 2022-12-15 RX ADMIN — OXYBUTYNIN CHLORIDE 5 MG: 5 TABLET, EXTENDED RELEASE ORAL at 08:55

## 2022-12-15 RX ADMIN — SODIUM CHLORIDE SOLN NEBU 7% 4 ML: 7 NEBU SOLN at 08:43

## 2022-12-15 RX ADMIN — DEXAMETHASONE SODIUM PHOSPHATE 4 DROP: 1 SOLUTION/ DROPS OPHTHALMIC at 21:01

## 2022-12-15 RX ADMIN — CYCLOBENZAPRINE 5 MG: 10 TABLET, FILM COATED ORAL at 21:00

## 2022-12-15 RX ADMIN — DEXAMETHASONE SODIUM PHOSPHATE 4 DROP: 1 SOLUTION/ DROPS OPHTHALMIC at 08:55

## 2022-12-15 RX ADMIN — BUDESONIDE AND FORMOTEROL FUMARATE DIHYDRATE 2 PUFF: 80; 4.5 AEROSOL RESPIRATORY (INHALATION) at 08:43

## 2022-12-15 NOTE — PROGRESS NOTES
DAILY PROGRESS NOTE  [facility]    Patient Identification:  Name: Kandace Andrade  Age: 82 y.o.  Sex: female  :  1940  MRN: [id number]         Primary Care Physician: Cassi Bella MD      Subjective  Patient with no acute complaints.  When asked how she is doing she replies \"I do not know\".    Objective:  General Appearance:  Comfortable, in no acute distress and not in pain.    Vital signs: (most recent): Blood pressure 98/65, pulse 85, temperature 98 °F (36.7 °C), temperature source Oral, resp. rate 16, height 160 cm (63\"), weight 58.2 kg (128 lb 4.9 oz), SpO2 93 %.    Lungs:  Normal effort and normal respiratory rate.  Breath sounds clear to auscultation.  (Oxygen saturation 92% on 8 L nasal cannula.)  Heart: Normal rate.  Regular rhythm.    Extremities: There is no dependent edema.    Neurological: Patient is alert and oriented to person, place and time.    Skin:  Warm and dry.                Vital signs in last 24 hours:  Temp:  [97.8 °F (36.6 °C)-98.2 °F (36.8 °C)] 98 °F (36.7 °C)  Heart Rate:  [69-95] 85  Resp:  [16-20] 16  BP: ()/(62-66) 98/65    Intake/Output:    Intake/Output Summary (Last 24 hours) at 12/15/2022 1817  Last data filed at 12/15/2022 0925  Gross per 24 hour   Intake --   Output 1000 ml   Net -1000 ml         Results from last 7 days   Lab Units 12/15/22  0704 22  0718 22  0856 22  0635 22  0813 12/10/22  0732 22  0657   WBC 10*3/mm3 8.73 9.90 11.16* 8.73 9.41 9.77 13.79*   HEMOGLOBIN g/dL 11.6* 11.7* 13.5 13.0 12.5 11.8* 11.2*   PLATELETS 10*3/mm3 290 289 317 283 297 223 185     Results from last 7 days   Lab Units 12/15/22  0704 22  0718 22  0856 22  0635 22  1359 12/10/22  0732 22  0657   SODIUM mmol/L 137 133* 136 139 141 138 137   POTASSIUM mmol/L 3.7 3.6 3.6 4.0 3.8 3.9 4.1   CHLORIDE mmol/L 102 99 99 100 104 107 105   CO2 mmol/L 29.0 27.7 26.0 27.5 25.3 21.0* 22.0   BUN mg/dL 16 15 18 10 7*  9 12   CREATININE mg/dL 0.60 0.61 0.64 0.62 0.65 0.50* 0.46*   GLUCOSE mg/dL 83 84 87 94 75 82 85   Estimated Creatinine Clearance: 65.3 mL/min (by C-G formula based on SCr of 0.6 mg/dL).  Results from last 7 days   Lab Units 12/15/22  0704 12/14/22  0718 12/13/22  0856 12/12/22  0635 12/11/22  1359 12/10/22  0732 12/09/22  0657   CALCIUM mg/dL 8.6 9.0 9.3 9.4 8.9 8.4* 8.4*   ALBUMIN g/dL  --   --   --   --   --   --  3.10*     Results from last 7 days   Lab Units 12/09/22  0657   ALBUMIN g/dL 3.10*   BILIRUBIN mg/dL 0.4   ALK PHOS U/L 54   AST (SGOT) U/L 15   ALT (SGPT) U/L 8       Assessment:    Pneumonia of right middle lobe due to infectious organism    KINDRA (obstructive sleep apnea)    IBS (irritable bowel syndrome)    Essential hypertension    Anxiety and depression    Paroxysmal atrial fibrillation (HCC)    COPD (chronic obstructive pulmonary disease) (HCC)    Type 2 diabetes mellitus without complication, without long-term current use of insulin (HCC)    Autonomic neuropathy    Decreased hearing of both ears    Generalized weakness    Chronic diarrhea    Sepsis due to pneumonia (HCC)    RML PNA with Sepsis, present on Admission  - leukocytosis, lactic acidosis present on admission  - blood cx's are negative, continue ceftriaxone 1g Q24H  - RVP, S. Pneumo, and legionella are negative, she has been unable to produce a sample for sputum culture thusfar  - encourage pulmonary toilet and wean oxygen as tolerated-     COPD  - complicating above, no exacerbation  - continue current regimen     Hypoxemia  - due in most part to above, did not really respond much to lasix and she does not look volume overloaded peripherally, echocardiogram noted has some mild LV impaired relaxation but overall looks good  - she does have KINDRA and NIPPV ordered but patient refuses  - I stressed the critical importance of compliance with NIPPV, physical therapy, and incentive spirometry and told her that she will not recover without  these  - appreciate pulmonology recs     Type 2 DM   - continue ssi/hypoglycemia protocol     HTN/PAF  - rate controlled, not on AC at home  - continue atenolol and norvasc  - hold lisinopril    Medical noncompliance  Patient's daughter is at bedside.  She states they are trying to convince her mother to cooperate with imaging.     Lovenox 40 mg SC daily for DVT prophylaxis.  Full code.    All problems new to this examiner.    Plan:  Please see above.  Discussed with patient and daughter at bedside.  Discussed in rodarte rounds this morning.    Kwabena Lew MD  12/15/2022  18:17 EST

## 2022-12-15 NOTE — PROGRESS NOTES
LOS: 7 days   Patient Care Team:  Cassi Bella MD as PCP - General (Family Medicine)    Subjective     Following for respiratory failure and sleep apnea patient.  Patient continues to refuse PAP therapy again she is refusing to do incentive spirometry she does not like that.  She does not want to get up out of bed.  Apparently her family is talked to her trying to convince her to use the PAP machine with sleep and do her I-S    Review of Systems:          Objective     Vital Signs  Vital Sign Min/Max for last 24 hours  Temp  Min: 97.8 °F (36.6 °C)  Max: 98.2 °F (36.8 °C)   BP  Min: 98/65  Max: 122/66   Pulse  Min: 69  Max: 95   Resp  Min: 16  Max: 20   SpO2  Min: 86 %  Max: 93 %   Flow (L/min)  Min: 5  Max: 8   Weight  Min: 58.2 kg (128 lb 4.9 oz)  Max: 58.2 kg (128 lb 4.9 oz)        Ventilator/Non-Invasive Ventilation Settings (From admission, onward)     Start     Ordered    12/12/22 1230  NIPPV (CPAP or BIPAP)  At Bedtime & As Needed-RT        Comments: Bleeding O2 as tolerated work with patient to find best tolerated interface   Question Answer Comment   Indication: Sleep Apnea    Type: BIPAP    IPAP 16    EPAP 8    Titrate Oxygen for SpO2 90% - 95%        12/12/22 1230                             Body mass index is 22.73 kg/m².  I/O last 3 completed shifts:  In: -   Out: 500 [Urine:500]  I/O this shift:  In: -   Out: 500 [Urine:500]        Physical Exam:    General Appearance: Well-developed older white female she is resting in bed she is on 8 L high flow nasal cannula with oxygen saturations about 95% she does not look in acute distress  Eyes: Conjunctiva are clear and anicteric pupils are equal  ENT: Mucous membranes are moist no erythema no exudates Mallampati type II airway and nasal septum is midline  Neck: No adenopathy or thyromegaly no visible jugular venous distension, trachea midline  Lungs: Clear nonlabored symmetric expansion no wheezes rales or rhonchi, she is not taking deep breaths  at all even when I encourage her just very shallow efforts.  This persist she just will not take a deep breath she does not note any pain  Cardiac: Regular rate and rhythm no murmur no gallop  Abdomen: Soft nontender no palpable hepatosplenomegaly or masses  : Not examined  Musculoskeletal: Mild thoracic kyphosis  Skin: Warm and dry no jaundice no petechiae  Neuro: s very hard of hearing  Extremities/P Vascular: No clubbing no cyanosis no edema palpable radial and dorsalis pedis pulses there is no calf tenderness or palpable cords.  She has palpable radial and dorsalis pedis pulses  MSE: A little flat affect     Labs:  Results from last 7 days   Lab Units 12/15/22  0704 12/14/22  0718 12/13/22  0856 12/12/22  0635 12/11/22  1359 12/10/22  0732 12/09/22  0657   GLUCOSE mg/dL 83 84 87 94 75 82 85   SODIUM mmol/L 137 133* 136 139 141 138 137   POTASSIUM mmol/L 3.7 3.6 3.6 4.0 3.8 3.9 4.1   CO2 mmol/L 29.0 27.7 26.0 27.5 25.3 21.0* 22.0   CHLORIDE mmol/L 102 99 99 100 104 107 105   ANION GAP mmol/L 6.0 6.3 11.0 11.5 11.7 10.0 10.0   CREATININE mg/dL 0.60 0.61 0.64 0.62 0.65 0.50* 0.46*   BUN mg/dL 16 15 18 10 7* 9 12   BUN / CREAT RATIO  26.7* 24.6 28.1* 16.1 10.8 18.0 26.1*   CALCIUM mg/dL 8.6 9.0 9.3 9.4 8.9 8.4* 8.4*   ALK PHOS U/L  --   --   --   --   --   --  54   TOTAL PROTEIN g/dL  --   --   --   --   --   --  5.2*   ALT (SGPT) U/L  --   --   --   --   --   --  8   AST (SGOT) U/L  --   --   --   --   --   --  15   BILIRUBIN mg/dL  --   --   --   --   --   --  0.4   ALBUMIN g/dL  --   --   --   --   --   --  3.10*   GLOBULIN gm/dL  --   --   --   --   --   --  2.1     Estimated Creatinine Clearance: 65.3 mL/min (by C-G formula based on SCr of 0.6 mg/dL).      Results from last 7 days   Lab Units 12/15/22  0704 12/14/22  0718 12/13/22  0856 12/12/22  0635 12/11/22  0813 12/10/22  0732 12/09/22  0657   WBC 10*3/mm3 8.73 9.90 11.16* 8.73 9.41 9.77 13.79*   RBC 10*6/mm3 3.72* 3.75* 4.34 4.08 4.15 3.70* 3.55*    HEMOGLOBIN g/dL 11.6* 11.7* 13.5 13.0 12.5 11.8* 11.2*   HEMATOCRIT % 33.9* 35.3 39.1 37.4 38.6 34.2 33.3*   MCV fL 91.1 94.1 90.1 91.7 93.0 92.4 93.8   MCH pg 31.2 31.2 31.1 31.9 30.1 31.9 31.5   MCHC g/dL 34.2 33.1 34.5 34.8 32.4 34.5 33.6   RDW % 12.5 12.9 12.6 12.8 13.0 12.9 12.8   RDW-SD fl 41.7 44.3 42.2 42.8 44.4 43.5 43.5   MPV fL 9.6 10.0 9.9 9.8 10.1 10.1 10.3   PLATELETS 10*3/mm3 290 289 317 283 297 223 185   NEUTROPHIL % %  --   --  58.1 68.3 55.1 68.4 75.2   LYMPHOCYTE % %  --   --  25.4 18.0* 34.9 21.2 17.0*   MONOCYTES % %  --   --  7.4 7.2 5.3 5.6 5.7   EOSINOPHIL % %  --   --  3.6 3.8 3.5 3.5 0.9   BASOPHIL % %  --   --  0.8 0.9 0.5 0.5 0.4   IMM GRAN % %  --   --  4.7* 1.8* 0.7* 0.8* 0.8*   NEUTROS ABS 10*3/mm3 6.55 6.20 6.49 5.96 5.18 6.68 10.39*   LYMPHS ABS 10*3/mm3  --   --  2.83 1.57 3.28* 2.07 2.34   MONOS ABS 10*3/mm3  --   --  0.83 0.63 0.50 0.55 0.78   EOS ABS 10*3/mm3 0.26 0.90* 0.40 0.33 0.33 0.34 0.12   BASOS ABS 10*3/mm3  --   --  0.09 0.08 0.05 0.05 0.05   IMMATURE GRANS (ABS) 10*3/mm3  --   --  0.52* 0.16* 0.07* 0.08* 0.11*   NRBC /100 WBC 0.0  --  0.0 0.0 0.0 0.0 0.0     Results from last 7 days   Lab Units 12/11/22  1659   PH, ARTERIAL pH units 7.441   PO2 ART mm Hg 52.6*   PCO2, ARTERIAL mm Hg 36.0   HCO3 ART mmol/L 24.5                 Results from last 7 days   Lab Units 12/08/22  2248 12/08/22  2019 12/08/22  1816   LACTATE mmol/L 1.8 3.2* 2.3*         Microbiology Results (last 10 days)     Procedure Component Value - Date/Time    S. Pneumo Ag Urine or CSF - Urine, Urine, Clean Catch [[id number]]  (Normal) Collected: 12/10/22 0033    Lab Status: Final result Specimen: Urine, Clean Catch Updated: 12/10/22 0137     Strep Pneumo Ag Negative    Legionella Antigen, Urine - Urine, Urine, Clean Catch [[id number]]  (Normal) Collected: 12/10/22 0033    Lab Status: Final result Specimen: Urine, Clean Catch Updated: 12/10/22 0137     LEGIONELLA ANTIGEN, URINE Negative    Blood Culture -  Blood, Arm, Left [[id number]]  (Normal) Collected: 12/08/22 1527    Lab Status: Final result Specimen: Blood from Arm, Left Updated: 12/13/22 1600     Blood Culture No growth at 5 days    Blood Culture - Blood, Arm, Left [[id number]]  (Normal) Collected: 12/08/22 1527    Lab Status: Final result Specimen: Blood from Arm, Left Updated: 12/13/22 1600     Blood Culture No growth at 5 days    Respiratory Panel PCR w/COVID-19(SARS-CoV-2) NANI/NICK/ESMER/PAD/COR/MAD/SANTINO In-House, NP Swab in UTM/VTM, 3-4 HR TAT - Swab, Nasopharynx [[id number]]  (Normal) Collected: 12/08/22 1515    Lab Status: Final result Specimen: Swab from Nasopharynx Updated: 12/08/22 1612     ADENOVIRUS, PCR Not Detected     Coronavirus 229E Not Detected     Coronavirus HKU1 Not Detected     Coronavirus NL63 Not Detected     Coronavirus OC43 Not Detected     COVID19 Not Detected     Human Metapneumovirus Not Detected     Human Rhinovirus/Enterovirus Not Detected     Influenza A PCR Not Detected     Influenza B PCR Not Detected     Parainfluenza Virus 1 Not Detected     Parainfluenza Virus 2 Not Detected     Parainfluenza Virus 3 Not Detected     Parainfluenza Virus 4 Not Detected     RSV, PCR Not Detected     Bordetella pertussis pcr Not Detected     Bordetella parapertussis PCR Not Detected     Chlamydophila pneumoniae PCR Not Detected     Mycoplasma pneumo by PCR Not Detected    Narrative:      In the setting of a positive respiratory panel with a viral infection PLUS a negative procalcitonin without other underlying concern for bacterial infection, consider observing off antibiotics or discontinuation of antibiotics and continue supportive care. If the respiratory panel is positive for atypical bacterial infection (Bordetella pertussis, Chlamydophila pneumoniae, or Mycoplasma pneumoniae), consider antibiotic de-escalation to target atypical bacterial infection.              amLODIPine, 5 mg, Oral, Daily  aspirin, 81 mg, Oral, Daily  atenolol, 12.5 mg,  Oral, Nightly  budesonide-formoterol, 2 puff, Inhalation, BID - RT  ciprofloxacin, 4 drop, Left Ear, BID   And  dexamethasone, 4 drop, Left Ear, BID  enoxaparin, 40 mg, Subcutaneous, Nightly  ezetimibe, 10 mg, Oral, Daily  fluticasone, 2 spray, Each Nare, Daily  guaifenesin-dextromethorphan, 2 tablet, Oral, BID  insulin lispro, 0-9 Units, Subcutaneous, TID AC  ipratropium-albuterol, 3 mL, Nebulization, 4x Daily - RT  oxybutynin XL, 5 mg, Oral, Daily  pantoprazole, 40 mg, Oral, QAM  PARoxetine, 40 mg, Oral, QAM  sodium chloride, 10 mL, Intravenous, Q12H  sodium chloride, 4 mL, Nebulization, BID - RT           Diagnostics:  XR Chest 2 View    Result Date: 12/8/2022  Chest radiograph  HISTORY:Shortness of air  TECHNIQUE: Two PA and lateral radiographs  COMPARISON:Chest radiograph 07/07/2022      FINDINGS AND IMPRESSION: Minimal left basilar pulmonary opacification is grossly unchanged since 07/07/2022. There is new pulmonary opacification within the right mid and lower lung concerning for pneumonia in the appropriate clinical context. Asymmetric pulmonary edema is less likely. Correlation with patient history is recommended. There is mild asymmetric elevation right hemidiaphragm. No pneumothorax is seen. Cardiac silhouette is within normal limits for size.  This report was finalized on 12/8/2022 3:28 PM by Dr. Christian López M.D.      CT Angiogram Chest    Result Date: 12/12/2022  CT ANGIOGRAM OF THE CHEST WITH CONTRAST INCLUDING RECONSTRUCTION IMAGES 12/12/2022  HISTORY: Shortness of breath. Possible pulmonary embolus.  Following the intravenous contrast injection CT angiography was performed through the chest. Sagittal, coronal and 3-D reconstruction images were reviewed.  The pulmonary arterial system is well opacified with no evidence of bursal aortic and coronary calcification. A few shotty mediastinal nodes are seen. Trace bilateral pleural effusions are seen with some mild bilateral lower lobe atelectasis. There  are some scattered somewhat ill-defined groundglass appearing infiltrates in the right upper lobe with some minimal groundglass opacity in the superior segment right lower lobe such as on images 58 through 60.  There is mild dilatation of the thoracic aorta with the ascending aorta measuring 3.9 cm in the ascending aorta measuring approximately 4.0 cm just above the diaphragm. Small right renal cyst is seen.      1. No evidence of pulmonary embolus. 2. Trace bilateral pleural effusions with mild bilateral lower lobe atelectasis. 3. Ill-defined and somewhat groundglass infiltrate in the right upper lobe with some minimal groundglass infiltrate in the superior segment right lower lobe and these findings could be related to mild pneumonia. 4. Mild dilatation of the thoracic aorta. 5. Short-term follow-up CT of the chest and proximal a 6 weeks to 8 weeks suggested.  Radiation dose reduction techniques were utilized, including automated exposure control and exposure modulation based on body size.  This report was finalized on 12/12/2022 4:35 PM by Dr. Sonido Vela M.D.      XR Chest 1 View    Result Date: 12/11/2022  XR CHEST 1 VW-  HISTORY:  Hypoxemia.  COMPARISON:  Chest radiograph 12/8/2022  FINDINGS:  A single view of the chest was obtained. The cardiac silhouette and mediastinal and hilar contours are not significantly changed. There is calcific aortic atherosclerosis. There are new slightly prominent interstitial opacities in both lungs concerning for possible interstitial pulmonary edema. Previously noted pulmonary opacities in the right lung are not significantly changed. There is no significant pleural effusion. There is degenerative disc disease.  This report was finalized on 12/11/2022 5:51 PM by Dr. Kamla Weston M.D.      Results for orders placed during the hospital encounter of 12/08/22    Adult Transthoracic Echo Complete W/ Cont if Necessary Per Protocol    Interpretation Summary  •  Left  ventricular ejection fraction appears to be 66 - 70%.  •  Left ventricular diastolic function is consistent with (grade I) impaired relaxation.  •  Estimated right ventricular systolic pressure from tricuspid regurgitation is normal (<35 mmHg).          Active Hospital Problems    Diagnosis  POA   • **Pneumonia of right middle lobe due to infectious organism [J18.9]  Yes   • Sepsis due to pneumonia (HCC) [J18.9, A41.9]  Yes   • Chronic diarrhea [K52.9]  Yes   • Generalized weakness [R53.1]  Yes   • Decreased hearing of both ears [H91.93]  Yes   • Autonomic neuropathy [G90.9]  Yes   • Type 2 diabetes mellitus without complication, without long-term current use of insulin (HCC) [E11.9]  Yes   • COPD (chronic obstructive pulmonary disease) (HCC) [J44.9]  Yes   • Paroxysmal atrial fibrillation (HCC) [I48.0]  Yes   • KINDRA (obstructive sleep apnea) [G47.33]  Yes   • Essential hypertension [I10]  Yes   • IBS (irritable bowel syndrome) [K58.9]  Yes   • Anxiety and depression [F41.9, F32.A]  Yes      Resolved Hospital Problems   No resolved problems to display.         Assessment & Plan     1. Acute hypoxic respiratory failure likely multifactorial she does have extensive emphysema she does have a pneumonia fairly extensive interstitial groundglass type infiltrates on the right side she has suggestions of some heart failure with bilateral small pleural effusions and associated compressive atelectasis I suspect all of this is a contributing factor and her sleep apnea probably worsens at least nocturnal hypoxia.  I think we have to treat what we can.  Unfortunately the patient is not participating and declining therapies that is going to significantly limit what we can do  2. Bilateral pleural effusions there are small the bilateral nature suggest CHF I know her BNP was normal when she came in.  She got diuresed aggressively yesterday.  3. Obstructive sleep apnea patient has been diagnosed formally and had a machine she did not  tolerate well and has not used it in years and years.  Despite efforts she has refused to even try a machine here I have  told her that we have new machines different kinds of settings new interfaces that has made no difference.  Family is working on her as well  4. Pneumonia she does have evidence suggesting a pneumonia in the right side predominantly groundglass infiltrates she will need follow-up radiographs on this to ensure it clears.  5. COPD CT reveals pretty extensive upper lobe predominant emphysema  6. Essential hypertension  7. Diabetes mellitus type 2    I discussed with patient again today that she also needs to be up out of bed spend most of her time up in a chair and may be moving around some she is declining this as well.  I think patient's her own worst enemy and I do not know what to do to get her to be more active in her own care    Plan for disposition:    Eusebio Be Jr, MD  12/15/22  17:19 EST    Time:

## 2022-12-15 NOTE — PLAN OF CARE
Goal Outcome Evaluation:  Plan of Care Reviewed With: patient        Progress: improving  Outcome Evaluation: Pt seen by PT this AM for treatment. Pt mildly lethargic although responsive to question, directions, and cues. Pt sat up to EOB w/ SV and extra time. Pt stood then amb 25' req CGA and use of fww. Pt slow w/ no LOB and limited in distance by fatigue. Pt able to perform a few B LE ther ex although very fatigued after gait. Pt may benefit from SNF after dc.    Patient was not wearing a face mask during this therapy encounter. Therapist used appropriate personal protective equipment including mask and gloves.  Mask used was standard procedure mask. Appropriate PPE was worn during the entire therapy session. Hand hygiene was completed before and after therapy session. Patient is not in enhanced droplet precautions.

## 2022-12-15 NOTE — THERAPY TREATMENT NOTE
Patient Name: Kandace Andrade  : 1940    MRN: [id number]                              Today's Date: 12/15/2022       Admit Date: 2022    Visit Dx:     ICD-10-CM ICD-9-CM   1. Pneumonia of right middle lobe due to infectious organism  J18.9 486   2. Sepsis, due to unspecified organism, unspecified whether acute organ dysfunction present ([facility])  A41.9 038.9     995.91   3. Hypoxia  R09.02 799.02   4. History of COPD  Z87.09 V12.69   5. History of diabetes mellitus  Z86.39 V12.29   6. History of atrial fibrillation  Z86.79 V12.59     Patient Active Problem List   Diagnosis   • Vitamin D deficiency   • KINDRA (obstructive sleep apnea)   • IBS (irritable bowel syndrome)   • Essential hypertension   • Herpes zoster   • Arthritis   • Anxiety and depression   • Other emphysema ([facility])   • Acute seasonal allergic rhinitis due to pollen   • Paroxysmal atrial fibrillation ([facility])   • Osteoporosis   • Mixed hyperlipidemia   • Encounter for Medicare annual wellness exam   • COPD (chronic obstructive pulmonary disease) ([facility])   • Mixed stress and urge urinary incontinence   • Type 2 diabetes mellitus without complication, without long-term current use of insulin ([facility])   • Arthritis of both hips   • Autonomic neuropathy   • GERD without esophagitis   • C. difficile colitis   • Hypokalemia   • Hospital discharge follow-up   • Decreased hearing of both ears   • Dizziness   • Nausea   • Hypercholesterolemia   • Primary insomnia   • Generalized weakness   • Other microscopic hematuria   • Acute cystitis with hematuria   • Confusion   • Dehydration   • Abdominal pain   • Chronic diarrhea   • Metabolic encephalopathy   • Left ankle swelling   • Diarrhea   • Overactive bladder   • Pneumonia of right middle lobe due to infectious organism   • Sepsis due to pneumonia ([facility])     Past Medical History:   Diagnosis Date   • Acute seasonal allergic rhinitis due to pollen    • Anxiety and depression    • Arthritis    • Asthma    • C. difficile  colitis    • COPD (chronic obstructive pulmonary disease) ([facility])    • Diabetes mellitus ([facility])     Pre diabetes   • Diarrhea    • Diverticulosis    • Fibula fracture    • Herpes zoster    • History of lumbosacral spine surgery    • Hypertension    • IBS (irritable bowel syndrome)    • Mixed hyperlipidemia    • KINDRA (obstructive sleep apnea)     NO MACHINE AT THIS TIME   • Osteoporosis    • Paroxysmal atrial fibrillation ([facility])    • Vitamin D deficiency      Past Surgical History:   Procedure Laterality Date   • APPENDECTOMY     • BACK SURGERY      lower x2   • CATARACT EXTRACTION     • COLONOSCOPY      patient doesn't recall    • COLONOSCOPY N/A 4/2/2021    Procedure: COLONOSCOPY to cecum with cecal biopsies;  Surgeon: Jarrod Duggan MD;  Location: [facility] ENDOSCOPY;  Service: Gastroenterology;  Laterality: N/A;  pre: diarhhea  post: diverticulosis, hemorrhoids   • HYSTERECTOMY      partial   • SHOULDER SURGERY Right    • SHOULDER SURGERY     • SIGMOIDOSCOPY N/A 10/19/2022    Procedure: SIGMOIDOSCOPY FLEXIBLE TO SIGMOID COLON WITH BIOPSIES;  Surgeon: Tonja Tanner MD;  Location: [facility] ENDOSCOPY;  Service: Gastroenterology;  Laterality: N/A;  PRE: DIARRHEA  POST: HEMORRHOIDS   • TONSILLECTOMY     • UPPER GASTROINTESTINAL ENDOSCOPY      patient doesn't recall       General Information     Row Name 12/15/22 1213          Physical Therapy Time and Intention    Document Type therapy note (daily note)  -PH     Mode of Treatment physical therapy  -PH     Row Name 12/15/22 1213          General Information    Existing Precautions/Restrictions fall  -PH     Row Name 12/15/22 1213          Safety Issues, Functional Mobility    Impairments Affecting Function (Mobility) balance;endurance/activity tolerance;strength;shortness of breath  -PH     Comment, Safety Issues/Impairments (Mobility) gt belt and non skid socks donned  -PH           User Key  (r) = Recorded By, (t) = Taken By, (c) = Cosigned By    Initials Name Provider Type     PH Elsi Mendoza PTA Physical Therapist Assistant               Mobility     Row Name 12/15/22 1213          Bed Mobility    Bed Mobility supine-sit  -PH     Supine-Sit [place] (Bed Mobility) supervision  -PH     Assistive Device (Bed Mobility) bed rails;head of bed elevated  -PH     Comment, (Bed Mobility) extra time to EOB; pt mildly lethargic although responsive to questions/directions/cues  -PH     Row Name 12/15/22 1213          Sit-Stand Transfer    Sit-Stand Izard (Transfers) contact guard;verbal cues  -PH     Assistive Device (Sit-Stand Transfers) walker, front-wheeled  -PH     Row Name 12/15/22 1213          Gait/Stairs (Locomotion)    [place]zard Level (Gait) contact guard;verbal cues  -PH     Assistive Device (Gait) walker, front-wheeled  -PH     Distance in Feet (Gait) 25'  -PH     Deviations/Abnormal Patterns (Gait) mikala decreased;gait speed decreased;stride length decreased  -PH     Bilateral Gait Deviations forward flexed posture  -PH     Izard Level (Stairs) not tested  -PH     Comment, (Gait/Stairs) Pt's sats on HF O2 were 88% when seated in chair after gait. pt slow w/ no LOB; limited in distance by fatigue  -PH           User Key  (r) = Recorded By, (t) = Taken By, (c) = Cosigned By    Initials Name Provider Type    PH Elsi Mendoza PTA Physical Therapist Assistant               Obj/Interventions     Row Name 12/15/22 1217          Motor Skills    Therapeutic Exercise other (see comments)  BAP, LAQ, seated march; x 10 reps all  -PH     Row Name 12/15/22 1217          Balance    Balance Assessment sitting static balance;standing static balance  -PH     Static Sitting Balance supervision  -PH     Static Standing Balance contact guard  -PH     Position/Device Used, Standing Balance walker, front-wheeled  -PH           User Key  (r) = Recorded By, (t) = Taken By, (c) = Cosigned By    Initials Name Provider Type    PH Elsi Mendoza PTA Physical  Therapist Assistant               Goals/Plan    No documentation.                Clinical Impression     Row Name 12/15/22 1218          Pain    Pretreatment Pain Rating 0/10 - no pain  -PH     Posttreatment Pain Rating 0/10 - no pain  -PH     Additional Documentation Pain Scale: Numbers Pre/Post-Treatment (Group)  -PH     Row Name 12/15/22 1218          Plan of Care Review    Plan of Care Reviewed With patient  -PH     Progress improving  -PH     Outcome Evaluation Pt seen by PT this AM for treatment. Pt mildly lethargic although responsive to question, directions, and cues. Pt sat up to EOB w/ SV and extra time. Pt stood then amb 25' req CGA and use of fww. Pt slow w/ no LOB and limited in distance by fatigue. Pt able to perform a few B LE ther ex although very fatigued after gait. Pt may benefit from SNF after dc.  -PH     Row Name 12/15/22 1218          Vital Signs    O2 Delivery Pre Treatment supplemental O2  -PH     O2 Delivery Intra Treatment supplemental O2  -PH     O2 Delivery Post Treatment supplemental O2  -PH     Row Name 12/15/22 1218          Positioning and Restraints    Pre-Treatment Position in bed  -PH     Post Treatment Position chair  -PH     In Chair reclined;call light within reach;encouraged to call for assist;exit alarm on;notified nsg  -PH           User Key  (r) = Recorded By, (t) = Taken By, (c) = Cosigned By    Initials Name Provider Type    PH Elsi Mendoza PTA Physical Therapist Assistant               Outcome Measures     Row Name 12/15/22 1221          How much help from another person do you currently need...    Turning from your back to your side while in flat bed without using bedrails? 3  -PH     Moving from lying on back to sitting on the side of a flat bed without bedrails? 3  -PH     Moving to and from a bed to a chair (including a wheelchair)? 3  -PH     Standing up from a chair using your arms (e.g., wheelchair, bedside chair)? 3  -PH     Climbing 3-5 steps with a  railing? 2  -PH     To walk in hospital room? 3  -PH     AM-PAC 6 Clicks Score (PT) 17  -PH     Highest level of mobility 5 --> Static standing  -PH     Row Name 12/15/22 1221          Functional Assessment    Outcome Measure Options AM-PAC 6 Clicks Basic Mobility (PT)  -PH           User Key  (r) = Recorded By, (t) = Taken By, (c) = Cosigned By    Initials Name Provider Type    PH Elsi Mendoza PTA Physical Therapist Assistant                             Physical Therapy Education     Title: PT OT SLP Therapies (Done)     Topic: Physical Therapy (Done)     Point: Mobility training (Done)     Learning Progress Summary           Patient Acceptance, E,D, VU by  at 12/15/2022 1222    Acceptance, E,D, VU by  at 12/13/2022 1511    Acceptance, E, VU,NR by CN at 12/11/2022 1317                   Point: Home exercise program (Done)     Learning Progress Summary           Patient Acceptance, E,D, VU by  at 12/15/2022 1222    Acceptance, E,D, VU by  at 12/13/2022 1511    Acceptance, E, VU,NR by CN at 12/11/2022 1317                   Point: Body mechanics (Done)     Learning Progress Summary           Patient Acceptance, E,D, VU by  at 12/15/2022 1222    Acceptance, E,D, VU by  at 12/13/2022 1511    Acceptance, E, VU,NR by CN at 12/11/2022 1317                   Point: Precautions (Done)     Learning Progress Summary           Patient Acceptance, E,D, VU by  at 12/15/2022 1222    Acceptance, E,D, VU by  at 12/13/2022 1511    Acceptance, E, VU,NR by CN at 12/11/2022 1317                               User Key     Initials Effective Dates Name Provider Type Discipline    CN 06/16/21 -  Alyssa Harrison, PT Physical Therapist PT     06/16/21 -  Elsi Mendoza PTA Physical Therapist Assistant PT              PT Recommendation and Plan     Plan of Care Reviewed With: patient  Progress: improving  Outcome Evaluation: Pt seen by PT this AM for treatment. Pt mildly lethargic although  responsive to question, directions, and cues. Pt sat up to EOB w/ SV and extra time. Pt stood then amb 25' req CGA and use of fww. Pt slow w/ no LOB and limited in distance by fatigue. Pt able to perform a few B LE ther ex although very fatigued after gait. Pt may benefit from SNF after dc.     Time Calculation:    PT Charges     Row Name 12/15/22 1223             Time Calculation    Start Time 1021  -PH      Stop Time 1036  -PH      Time Calculation (min) 15 min  -PH      PT Received On 12/15/22  -PH      PT - Next Appointment 12/16/22  -PH         Timed Charges    89775 - PT Therapeutic Exercise Minutes 2  -PH      50875 - PT Therapeutic Activity Minutes 8  -PH         Total Minutes    Timed Charges Total Minutes 10  -PH       Total Minutes 10  -PH            User Key  (r) = Recorded By, (t) = Taken By, (c) = Cosigned By    Initials Name Provider Type    PH Elsi Mendoza PTA Physical Therapist Assistant              Therapy Charges for Today     Code Description Service Date Service Provider Modifiers Qty    [id number]  PT THERAPEUTIC ACT EA 15 MIN 12/15/2022 Elsi Mendoza PTA GP 1          PT G-Codes  Outcome Measure Options: AM-PAC 6 Clicks Basic Mobility (PT)  AM-PAC 6 Clicks Score (PT): 17  PT Discharge Summary  Anticipated Discharge Disposition (PT): skilled nursing facility    Elsi Mendoza PTA  12/15/2022

## 2022-12-15 NOTE — CASE MANAGEMENT/SOCIAL WORK
Continued Stay Note  [facility]     Patient Name: Kandace Andrade  MRN: [id number]  Today's Date: 12/15/2022    Admit Date: 12/8/2022    Plan: SNF- pending bed availability and pre-cert   Discharge Plan     Row Name 12/15/22 1633       Plan    Plan SNF- pending bed availability and pre-cert    Patient/Family in Agreement with Plan yes    Plan Comments Patient continues on 8L HF.  Spoke with patient and daughter at bedside.  They prefer [facility] or [facility] and would like [facility] to follow up on bed availability closer to [place].  They are not interested in [place] and would consider [facility].  Advised that CCP will follow up on Monday. Jennifer MALONE RN               Discharge Codes    No documentation.               Expected Discharge Date and Time     Expected Discharge Date Expected Discharge Time    Dec 19, 2022             Jennifer Howell RN

## 2022-12-15 NOTE — PLAN OF CARE
Patient on 7-8L HFNC this shift. Continues to refuse the bipap. This RN was able to educated and encourage the patient to use IS. Patient reports using the IS several times this shift and demonstrated proper used. VSS. PRN tylenol as needed. OOB to chair for several hours this shift. No new complaints.       Problem: Adult Inpatient Plan of Care  Goal: Plan of Care Review  Outcome: Ongoing, Progressing  Flowsheets  Taken 12/15/2022 1619 by Lisa Anderson RN  Progress: improving  Taken 12/15/2022 1218 by Elsi Mendoza PTA  Plan of Care Reviewed With: patient   Goal Outcome Evaluation:           Progress: improving

## 2022-12-16 ENCOUNTER — APPOINTMENT (OUTPATIENT)
Dept: GENERAL RADIOLOGY | Facility: HOSPITAL | Age: 82
DRG: 871 | End: 2022-12-16
Payer: MEDICARE

## 2022-12-16 LAB
ANION GAP SERPL CALCULATED.3IONS-SCNC: 7.9 MMOL/L (ref 5–15)
BASOPHILS # BLD AUTO: 0.08 10*3/MM3 (ref 0–0.2)
BASOPHILS NFR BLD AUTO: 0.9 % (ref 0–1.5)
BUN SERPL-MCNC: 15 MG/DL (ref 8–23)
BUN/CREAT SERPL: 23.4 (ref 7–25)
CALCIUM SPEC-SCNC: 9.3 MG/DL (ref 8.6–10.5)
CHLORIDE SERPL-SCNC: 104 MMOL/L (ref 98–107)
CO2 SERPL-SCNC: 27.1 MMOL/L (ref 22–29)
CREAT SERPL-MCNC: 0.64 MG/DL (ref 0.57–1)
DEPRECATED RDW RBC AUTO: 43.6 FL (ref 37–54)
EGFRCR SERPLBLD CKD-EPI 2021: 87.8 ML/MIN/1.73
EOSINOPHIL # BLD AUTO: 0.35 10*3/MM3 (ref 0–0.4)
EOSINOPHIL NFR BLD AUTO: 4.1 % (ref 0.3–6.2)
ERYTHROCYTE [DISTWIDTH] IN BLOOD BY AUTOMATED COUNT: 12.8 % (ref 12.3–15.4)
GLUCOSE BLDC GLUCOMTR-MCNC: 127 MG/DL (ref 70–130)
GLUCOSE BLDC GLUCOMTR-MCNC: 127 MG/DL (ref 70–130)
GLUCOSE BLDC GLUCOMTR-MCNC: 99 MG/DL (ref 70–130)
GLUCOSE BLDC GLUCOMTR-MCNC: 99 MG/DL (ref 70–130)
GLUCOSE SERPL-MCNC: 95 MG/DL (ref 65–99)
HCT VFR BLD AUTO: 36 % (ref 34–46.6)
HGB BLD-MCNC: 12.2 G/DL (ref 12–15.9)
IMM GRANULOCYTES # BLD AUTO: 0.39 10*3/MM3 (ref 0–0.05)
IMM GRANULOCYTES NFR BLD AUTO: 4.6 % (ref 0–0.5)
LYMPHOCYTES # BLD AUTO: 2.03 10*3/MM3 (ref 0.7–3.1)
LYMPHOCYTES NFR BLD AUTO: 24 % (ref 19.6–45.3)
MCH RBC QN AUTO: 31.9 PG (ref 26.6–33)
MCHC RBC AUTO-ENTMCNC: 33.9 G/DL (ref 31.5–35.7)
MCV RBC AUTO: 94 FL (ref 79–97)
MONOCYTES # BLD AUTO: 0.81 10*3/MM3 (ref 0.1–0.9)
MONOCYTES NFR BLD AUTO: 9.6 % (ref 5–12)
NEUTROPHILS NFR BLD AUTO: 4.81 10*3/MM3 (ref 1.7–7)
NEUTROPHILS NFR BLD AUTO: 56.8 % (ref 42.7–76)
NRBC BLD AUTO-RTO: 0 /100 WBC (ref 0–0.2)
PLATELET # BLD AUTO: 300 10*3/MM3 (ref 140–450)
PMV BLD AUTO: 9.7 FL (ref 6–12)
POTASSIUM SERPL-SCNC: 3.9 MMOL/L (ref 3.5–5.2)
RBC # BLD AUTO: 3.83 10*6/MM3 (ref 3.77–5.28)
SODIUM SERPL-SCNC: 139 MMOL/L (ref 136–145)
WBC NRBC COR # BLD: 8.47 10*3/MM3 (ref 3.4–10.8)

## 2022-12-16 PROCEDURE — 25010000002 ENOXAPARIN PER 10 MG: Performed by: INTERNAL MEDICINE

## 2022-12-16 PROCEDURE — 85025 COMPLETE CBC W/AUTO DIFF WBC: CPT | Performed by: INTERNAL MEDICINE

## 2022-12-16 PROCEDURE — 94799 UNLISTED PULMONARY SVC/PX: CPT

## 2022-12-16 PROCEDURE — 94761 N-INVAS EAR/PLS OXIMETRY MLT: CPT

## 2022-12-16 PROCEDURE — 71045 X-RAY EXAM CHEST 1 VIEW: CPT

## 2022-12-16 PROCEDURE — 80048 BASIC METABOLIC PNL TOTAL CA: CPT | Performed by: INTERNAL MEDICINE

## 2022-12-16 PROCEDURE — 94664 DEMO&/EVAL PT USE INHALER: CPT

## 2022-12-16 PROCEDURE — 82962 GLUCOSE BLOOD TEST: CPT

## 2022-12-16 PROCEDURE — 94660 CPAP INITIATION&MGMT: CPT

## 2022-12-16 RX ADMIN — DEXAMETHASONE SODIUM PHOSPHATE 4 DROP: 1 SOLUTION/ DROPS OPHTHALMIC at 21:31

## 2022-12-16 RX ADMIN — Medication 10 ML: at 21:31

## 2022-12-16 RX ADMIN — ATENOLOL 12.5 MG: 25 TABLET ORAL at 21:30

## 2022-12-16 RX ADMIN — ENOXAPARIN SODIUM 40 MG: 100 INJECTION SUBCUTANEOUS at 21:31

## 2022-12-16 RX ADMIN — BUDESONIDE AND FORMOTEROL FUMARATE DIHYDRATE 2 PUFF: 80; 4.5 AEROSOL RESPIRATORY (INHALATION) at 20:59

## 2022-12-16 RX ADMIN — PANTOPRAZOLE SODIUM 40 MG: 40 TABLET, DELAYED RELEASE ORAL at 06:18

## 2022-12-16 RX ADMIN — CIPROFLOXACIN HYDROCHLORIDE 4 DROP: 3 SOLUTION/ DROPS OPHTHALMIC at 09:12

## 2022-12-16 RX ADMIN — OXYBUTYNIN CHLORIDE 5 MG: 5 TABLET, EXTENDED RELEASE ORAL at 09:12

## 2022-12-16 RX ADMIN — DEXAMETHASONE SODIUM PHOSPHATE 4 DROP: 1 SOLUTION/ DROPS OPHTHALMIC at 09:12

## 2022-12-16 RX ADMIN — GUAIFENESIN AND DEXTROMETHORPHAN HYDROBROMIDE 2 TABLET: 600; 30 TABLET, EXTENDED RELEASE ORAL at 21:30

## 2022-12-16 RX ADMIN — AMLODIPINE BESYLATE 5 MG: 5 TABLET ORAL at 09:12

## 2022-12-16 RX ADMIN — PAROXETINE HYDROCHLORIDE HEMIHYDRATE 40 MG: 20 TABLET, FILM COATED ORAL at 06:18

## 2022-12-16 RX ADMIN — BUDESONIDE AND FORMOTEROL FUMARATE DIHYDRATE 2 PUFF: 80; 4.5 AEROSOL RESPIRATORY (INHALATION) at 09:01

## 2022-12-16 RX ADMIN — EZETIMIBE 10 MG: 10 TABLET ORAL at 09:12

## 2022-12-16 RX ADMIN — IPRATROPIUM BROMIDE AND ALBUTEROL SULFATE 3 ML: .5; 3 SOLUTION RESPIRATORY (INHALATION) at 12:28

## 2022-12-16 RX ADMIN — Medication 10 ML: at 09:13

## 2022-12-16 RX ADMIN — IPRATROPIUM BROMIDE AND ALBUTEROL SULFATE 3 ML: .5; 3 SOLUTION RESPIRATORY (INHALATION) at 09:01

## 2022-12-16 RX ADMIN — ASPIRIN 81 MG: 81 TABLET, CHEWABLE ORAL at 09:12

## 2022-12-16 RX ADMIN — CIPROFLOXACIN HYDROCHLORIDE 4 DROP: 3 SOLUTION/ DROPS OPHTHALMIC at 21:31

## 2022-12-16 RX ADMIN — SODIUM CHLORIDE SOLN NEBU 7% 4 ML: 7 NEBU SOLN at 09:01

## 2022-12-16 RX ADMIN — FLUTICASONE PROPIONATE 2 SPRAY: 50 SPRAY, METERED NASAL at 09:12

## 2022-12-16 RX ADMIN — IPRATROPIUM BROMIDE AND ALBUTEROL SULFATE 3 ML: .5; 3 SOLUTION RESPIRATORY (INHALATION) at 16:05

## 2022-12-16 RX ADMIN — GUAIFENESIN AND DEXTROMETHORPHAN HYDROBROMIDE 2 TABLET: 600; 30 TABLET, EXTENDED RELEASE ORAL at 11:36

## 2022-12-16 NOTE — PLAN OF CARE
Goal Outcome Evaluation:  Plan of Care Reviewed With: patient        Progress: no change   A&Ox4. 8L humidified high flow. Assist x1. Patient c/o but refuses medication. Ear drops. Glucose monitored. SR on monitor.VSS. All needs met.

## 2022-12-16 NOTE — NURSING NOTE
Pt wore bipap for about 1 hour and requested to have it off.  Educated the pt on trying to wear the bipap for a longer period of time to help with her airway, pt refusing

## 2022-12-17 LAB
ANION GAP SERPL CALCULATED.3IONS-SCNC: 9 MMOL/L (ref 5–15)
BASOPHILS # BLD AUTO: 0.09 10*3/MM3 (ref 0–0.2)
BASOPHILS NFR BLD AUTO: 1.1 % (ref 0–1.5)
BUN SERPL-MCNC: 15 MG/DL (ref 8–23)
BUN/CREAT SERPL: 23.4 (ref 7–25)
CALCIUM SPEC-SCNC: 9.1 MG/DL (ref 8.6–10.5)
CHLORIDE SERPL-SCNC: 99 MMOL/L (ref 98–107)
CO2 SERPL-SCNC: 28 MMOL/L (ref 22–29)
CREAT SERPL-MCNC: 0.64 MG/DL (ref 0.57–1)
DEPRECATED RDW RBC AUTO: 42.6 FL (ref 37–54)
EGFRCR SERPLBLD CKD-EPI 2021: 87.8 ML/MIN/1.73
EOSINOPHIL # BLD AUTO: 0.26 10*3/MM3 (ref 0–0.4)
EOSINOPHIL NFR BLD AUTO: 3.2 % (ref 0.3–6.2)
ERYTHROCYTE [DISTWIDTH] IN BLOOD BY AUTOMATED COUNT: 12.4 % (ref 12.3–15.4)
GLUCOSE BLDC GLUCOMTR-MCNC: 124 MG/DL (ref 70–130)
GLUCOSE BLDC GLUCOMTR-MCNC: 125 MG/DL (ref 70–130)
GLUCOSE BLDC GLUCOMTR-MCNC: 98 MG/DL (ref 70–130)
GLUCOSE BLDC GLUCOMTR-MCNC: 99 MG/DL (ref 70–130)
GLUCOSE SERPL-MCNC: 83 MG/DL (ref 65–99)
HCT VFR BLD AUTO: 36.7 % (ref 34–46.6)
HGB BLD-MCNC: 12.5 G/DL (ref 12–15.9)
IMM GRANULOCYTES # BLD AUTO: 0.24 10*3/MM3 (ref 0–0.05)
IMM GRANULOCYTES NFR BLD AUTO: 2.9 % (ref 0–0.5)
LYMPHOCYTES # BLD AUTO: 2.15 10*3/MM3 (ref 0.7–3.1)
LYMPHOCYTES NFR BLD AUTO: 26.1 % (ref 19.6–45.3)
MCH RBC QN AUTO: 31.7 PG (ref 26.6–33)
MCHC RBC AUTO-ENTMCNC: 34.1 G/DL (ref 31.5–35.7)
MCV RBC AUTO: 93.1 FL (ref 79–97)
MONOCYTES # BLD AUTO: 0.76 10*3/MM3 (ref 0.1–0.9)
MONOCYTES NFR BLD AUTO: 9.2 % (ref 5–12)
NEUTROPHILS NFR BLD AUTO: 4.75 10*3/MM3 (ref 1.7–7)
NEUTROPHILS NFR BLD AUTO: 57.5 % (ref 42.7–76)
NRBC BLD AUTO-RTO: 0 /100 WBC (ref 0–0.2)
PLATELET # BLD AUTO: 344 10*3/MM3 (ref 140–450)
PMV BLD AUTO: 9.9 FL (ref 6–12)
POTASSIUM SERPL-SCNC: 3.9 MMOL/L (ref 3.5–5.2)
RBC # BLD AUTO: 3.94 10*6/MM3 (ref 3.77–5.28)
SODIUM SERPL-SCNC: 136 MMOL/L (ref 136–145)
WBC NRBC COR # BLD: 8.25 10*3/MM3 (ref 3.4–10.8)

## 2022-12-17 PROCEDURE — 94761 N-INVAS EAR/PLS OXIMETRY MLT: CPT

## 2022-12-17 PROCEDURE — 94664 DEMO&/EVAL PT USE INHALER: CPT

## 2022-12-17 PROCEDURE — 80048 BASIC METABOLIC PNL TOTAL CA: CPT | Performed by: INTERNAL MEDICINE

## 2022-12-17 PROCEDURE — 94799 UNLISTED PULMONARY SVC/PX: CPT

## 2022-12-17 PROCEDURE — 25010000002 ENOXAPARIN PER 10 MG: Performed by: INTERNAL MEDICINE

## 2022-12-17 PROCEDURE — 82962 GLUCOSE BLOOD TEST: CPT

## 2022-12-17 PROCEDURE — 85025 COMPLETE CBC W/AUTO DIFF WBC: CPT | Performed by: INTERNAL MEDICINE

## 2022-12-17 RX ORDER — BUDESONIDE AND FORMOTEROL FUMARATE DIHYDRATE 160; 4.5 UG/1; UG/1
2 AEROSOL RESPIRATORY (INHALATION)
Status: DISCONTINUED | OUTPATIENT
Start: 2022-12-17 | End: 2022-12-21 | Stop reason: HOSPADM

## 2022-12-17 RX ADMIN — Medication 10 ML: at 21:20

## 2022-12-17 RX ADMIN — CIPROFLOXACIN HYDROCHLORIDE 4 DROP: 3 SOLUTION/ DROPS OPHTHALMIC at 09:29

## 2022-12-17 RX ADMIN — OXYBUTYNIN CHLORIDE 5 MG: 5 TABLET, EXTENDED RELEASE ORAL at 09:29

## 2022-12-17 RX ADMIN — BUDESONIDE AND FORMOTEROL FUMARATE DIHYDRATE 2 PUFF: 80; 4.5 AEROSOL RESPIRATORY (INHALATION) at 07:52

## 2022-12-17 RX ADMIN — CYCLOBENZAPRINE 5 MG: 10 TABLET, FILM COATED ORAL at 19:31

## 2022-12-17 RX ADMIN — ASPIRIN 81 MG: 81 TABLET, CHEWABLE ORAL at 09:29

## 2022-12-17 RX ADMIN — EZETIMIBE 10 MG: 10 TABLET ORAL at 09:30

## 2022-12-17 RX ADMIN — IPRATROPIUM BROMIDE AND ALBUTEROL SULFATE 3 ML: .5; 3 SOLUTION RESPIRATORY (INHALATION) at 12:01

## 2022-12-17 RX ADMIN — GUAIFENESIN AND DEXTROMETHORPHAN HYDROBROMIDE 2 TABLET: 600; 30 TABLET, EXTENDED RELEASE ORAL at 09:29

## 2022-12-17 RX ADMIN — ACETAMINOPHEN 1000 MG: 500 TABLET ORAL at 19:29

## 2022-12-17 RX ADMIN — SODIUM CHLORIDE SOLN NEBU 7% 4 ML: 7 NEBU SOLN at 07:52

## 2022-12-17 RX ADMIN — ENOXAPARIN SODIUM 40 MG: 100 INJECTION SUBCUTANEOUS at 21:19

## 2022-12-17 RX ADMIN — IPRATROPIUM BROMIDE AND ALBUTEROL SULFATE 3 ML: .5; 3 SOLUTION RESPIRATORY (INHALATION) at 07:51

## 2022-12-17 RX ADMIN — IPRATROPIUM BROMIDE AND ALBUTEROL SULFATE 3 ML: .5; 3 SOLUTION RESPIRATORY (INHALATION) at 16:04

## 2022-12-17 RX ADMIN — PAROXETINE HYDROCHLORIDE HEMIHYDRATE 40 MG: 20 TABLET, FILM COATED ORAL at 06:22

## 2022-12-17 RX ADMIN — ACETAMINOPHEN 1000 MG: 500 TABLET ORAL at 06:24

## 2022-12-17 RX ADMIN — AMLODIPINE BESYLATE 5 MG: 5 TABLET ORAL at 09:29

## 2022-12-17 RX ADMIN — CIPROFLOXACIN HYDROCHLORIDE 4 DROP: 3 SOLUTION/ DROPS OPHTHALMIC at 21:21

## 2022-12-17 RX ADMIN — PANTOPRAZOLE SODIUM 40 MG: 40 TABLET, DELAYED RELEASE ORAL at 06:22

## 2022-12-17 RX ADMIN — Medication 10 ML: at 09:29

## 2022-12-17 RX ADMIN — BUDESONIDE AND FORMOTEROL FUMARATE DIHYDRATE 2 PUFF: 160; 4.5 AEROSOL RESPIRATORY (INHALATION) at 21:30

## 2022-12-17 RX ADMIN — DEXAMETHASONE SODIUM PHOSPHATE 4 DROP: 1 SOLUTION/ DROPS OPHTHALMIC at 21:21

## 2022-12-17 RX ADMIN — ATENOLOL 12.5 MG: 25 TABLET ORAL at 21:19

## 2022-12-17 RX ADMIN — FLUTICASONE PROPIONATE 2 SPRAY: 50 SPRAY, METERED NASAL at 09:29

## 2022-12-17 RX ADMIN — DEXAMETHASONE SODIUM PHOSPHATE 4 DROP: 1 SOLUTION/ DROPS OPHTHALMIC at 09:29

## 2022-12-17 RX ADMIN — GUAIFENESIN AND DEXTROMETHORPHAN HYDROBROMIDE 2 TABLET: 600; 30 TABLET, EXTENDED RELEASE ORAL at 21:20

## 2022-12-17 NOTE — PLAN OF CARE
Goal Outcome Evaluation:  Plan of Care Reviewed With: patient        Progress: improving   A&Ox4. Titrated down from 8L humified high flow to 4L NC. Assist x1 to bathroom. Purewick, catheter care given. Glucose monitored. No patient complaints at this time. VSS. All needs met.

## 2022-12-17 NOTE — PROGRESS NOTES
LOS: 8 days   Patient Care Team:  Cassi Bella MD as PCP - General (Family Medicine)    Subjective     Following for respiratory failure and sleep apnea patient.  Patient continues to refuse PAP therapy again she is refusing to do incentive spirometry she does not like that.  She does not want to get up out of bed.  Apparently her family is talked to her trying to convince her to use the PAP machine with sleep and do her I-S    Review of Systems:          Objective     Vital Signs  Vital Sign Min/Max for last 24 hours  Temp  Min: 97.4 °F (36.3 °C)  Max: 98.4 °F (36.9 °C)   BP  Min: 101/68  Max: 133/82   Pulse  Min: 65  Max: 80   Resp  Min: 16  Max: 20   SpO2  Min: 92 %  Max: 99 %   Flow (L/min)  Min: 8  Max: 8   Weight  Min: 55.8 kg (123 lb 0.3 oz)  Max: 55.8 kg (123 lb 0.3 oz)        Ventilator/Non-Invasive Ventilation Settings (From admission, onward)     Start     Ordered    12/12/22 1230  NIPPV (CPAP or BIPAP)  At Bedtime & As Needed-RT        Comments: Bleeding O2 as tolerated work with patient to find best tolerated interface   Question Answer Comment   Indication: Sleep Apnea    Type: BIPAP    IPAP 16    EPAP 8    Titrate Oxygen for SpO2 90% - 95%        12/12/22 1230                             Body mass index is 21.79 kg/m².  I/O last 3 completed shifts:  In: 800 [P.O.:800]  Out: 1800 [Urine:1800]  No intake/output data recorded.        Physical Exam:    General Appearance: Well-developed older white female she is resting in bed she is on 8 L high flow nasal cannula with oxygen saturations about 95% she does not look in acute distress  Eyes: Conjunctiva are clear and anicteric pupils are equal  ENT: Mucous membranes are moist no erythema no exudates Mallampati type II airway and nasal septum is midline  Neck: No adenopathy or thyromegaly no visible jugular venous distension, trachea midline  Lungs: Clear nonlabored symmetric expansion no wheezes rales or rhonchi, she is not taking deep  breaths at all even when I encourage her just very shallow efforts.  This persist she just will not take a deep breath she does not note any pain  Cardiac: Regular rate and rhythm no murmur no gallop  Abdomen: Soft nontender no palpable hepatosplenomegaly or masses  : Not examined  Musculoskeletal: Mild thoracic kyphosis  Skin: Warm and dry no jaundice no petechiae  Neuro: s very hard of hearing  Extremities/P Vascular: No clubbing no cyanosis no edema palpable radial and dorsalis pedis pulses there is no calf tenderness or palpable cords.  She has palpable radial and dorsalis pedis pulses  MSE: A little flat affect     Labs:  Results from last 7 days   Lab Units 12/16/22  0604 12/15/22  0704 12/14/22  0718 12/13/22  0856 12/12/22  0635 12/11/22  1359 12/10/22  0732   GLUCOSE mg/dL 95 83 84 87 94 75 82   SODIUM mmol/L 139 137 133* 136 139 141 138   POTASSIUM mmol/L 3.9 3.7 3.6 3.6 4.0 3.8 3.9   CO2 mmol/L 27.1 29.0 27.7 26.0 27.5 25.3 21.0*   CHLORIDE mmol/L 104 102 99 99 100 104 107   ANION GAP mmol/L 7.9 6.0 6.3 11.0 11.5 11.7 10.0   CREATININE mg/dL 0.64 0.60 0.61 0.64 0.62 0.65 0.50*   BUN mg/dL 15 16 15 18 10 7* 9   BUN / CREAT RATIO  23.4 26.7* 24.6 28.1* 16.1 10.8 18.0   CALCIUM mg/dL 9.3 8.6 9.0 9.3 9.4 8.9 8.4*     Estimated Creatinine Clearance: 58.7 mL/min (by C-G formula based on SCr of 0.64 mg/dL).      Results from last 7 days   Lab Units 12/16/22  0604 12/15/22  0704 12/14/22  0718 12/13/22  0856 12/12/22  0635 12/11/22  0813 12/10/22  0732   WBC 10*3/mm3 8.47 8.73 9.90 11.16* 8.73 9.41 9.77   RBC 10*6/mm3 3.83 3.72* 3.75* 4.34 4.08 4.15 3.70*   HEMOGLOBIN g/dL 12.2 11.6* 11.7* 13.5 13.0 12.5 11.8*   HEMATOCRIT % 36.0 33.9* 35.3 39.1 37.4 38.6 34.2   MCV fL 94.0 91.1 94.1 90.1 91.7 93.0 92.4   MCH pg 31.9 31.2 31.2 31.1 31.9 30.1 31.9   MCHC g/dL 33.9 34.2 33.1 34.5 34.8 32.4 34.5   RDW % 12.8 12.5 12.9 12.6 12.8 13.0 12.9   RDW-SD fl 43.6 41.7 44.3 42.2 42.8 44.4 43.5   MPV fL 9.7 9.6 10.0 9.9 9.8  10.1 10.1   PLATELETS 10*3/mm3 300 290 289 317 283 297 223   NEUTROPHIL % % 56.8  --   --  58.1 68.3 55.1 68.4   LYMPHOCYTE % % 24.0  --   --  25.4 18.0* 34.9 21.2   MONOCYTES % % 9.6  --   --  7.4 7.2 5.3 5.6   EOSINOPHIL % % 4.1  --   --  3.6 3.8 3.5 3.5   BASOPHIL % % 0.9  --   --  0.8 0.9 0.5 0.5   IMM GRAN % % 4.6*  --   --  4.7* 1.8* 0.7* 0.8*   NEUTROS ABS 10*3/mm3 4.81 6.55 6.20 6.49 5.96 5.18 6.68   LYMPHS ABS 10*3/mm3 2.03  --   --  2.83 1.57 3.28* 2.07   MONOS ABS 10*3/mm3 0.81  --   --  0.83 0.63 0.50 0.55   EOS ABS 10*3/mm3 0.35 0.26 0.90* 0.40 0.33 0.33 0.34   BASOS ABS 10*3/mm3 0.08  --   --  0.09 0.08 0.05 0.05   IMMATURE GRANS (ABS) 10*3/mm3 0.39*  --   --  0.52* 0.16* 0.07* 0.08*   NRBC /100 WBC 0.0 0.0  --  0.0 0.0 0.0 0.0     Results from last 7 days   Lab Units 12/11/22  1659   PH, ARTERIAL pH units 7.441   PO2 ART mm Hg 52.6*   PCO2, ARTERIAL mm Hg 36.0   HCO3 ART mmol/L 24.5                         Microbiology Results (last 10 days)     Procedure Component Value - Date/Time    S. Pneumo Ag Urine or CSF - Urine, Urine, Clean Catch [[id number]]  (Normal) Collected: 12/10/22 0033    Lab Status: Final result Specimen: Urine, Clean Catch Updated: 12/10/22 0137     Strep Pneumo Ag Negative    Legionella Antigen, Urine - Urine, Urine, Clean Catch [[id number]]  (Normal) Collected: 12/10/22 0033    Lab Status: Final result Specimen: Urine, Clean Catch Updated: 12/10/22 0137     LEGIONELLA ANTIGEN, URINE Negative    Blood Culture - Blood, Arm, Left [[id number]]  (Normal) Collected: 12/08/22 1527    Lab Status: Final result Specimen: Blood from Arm, Left Updated: 12/13/22 1600     Blood Culture No growth at 5 days    Blood Culture - Blood, Arm, Left [[id number]]  (Normal) Collected: 12/08/22 1527    Lab Status: Final result Specimen: Blood from Arm, Left Updated: 12/13/22 1600     Blood Culture No growth at 5 days    Respiratory Panel PCR w/COVID-19(SARS-CoV-2) NANI/NICK/ESMER/PAD/COR/MAD/SANTINO In-House, NP Swab  in UTM/VTM, 3-4 HR TAT - Swab, Nasopharynx [[id number]]  (Normal) Collected: 12/08/22 1515    Lab Status: Final result Specimen: Swab from Nasopharynx Updated: 12/08/22 1612     ADENOVIRUS, PCR Not Detected     Coronavirus 229E Not Detected     Coronavirus HKU1 Not Detected     Coronavirus NL63 Not Detected     Coronavirus OC43 Not Detected     COVID19 Not Detected     Human Metapneumovirus Not Detected     Human Rhinovirus/Enterovirus Not Detected     Influenza A PCR Not Detected     Influenza B PCR Not Detected     Parainfluenza Virus 1 Not Detected     Parainfluenza Virus 2 Not Detected     Parainfluenza Virus 3 Not Detected     Parainfluenza Virus 4 Not Detected     RSV, PCR Not Detected     Bordetella pertussis pcr Not Detected     Bordetella parapertussis PCR Not Detected     Chlamydophila pneumoniae PCR Not Detected     Mycoplasma pneumo by PCR Not Detected    Narrative:      In the setting of a positive respiratory panel with a viral infection PLUS a negative procalcitonin without other underlying concern for bacterial infection, consider observing off antibiotics or discontinuation of antibiotics and continue supportive care. If the respiratory panel is positive for atypical bacterial infection (Bordetella pertussis, Chlamydophila pneumoniae, or Mycoplasma pneumoniae), consider antibiotic de-escalation to target atypical bacterial infection.              amLODIPine, 5 mg, Oral, Daily  aspirin, 81 mg, Oral, Daily  atenolol, 12.5 mg, Oral, Nightly  budesonide-formoterol, 2 puff, Inhalation, BID - RT  ciprofloxacin, 4 drop, Left Ear, BID   And  dexamethasone, 4 drop, Left Ear, BID  enoxaparin, 40 mg, Subcutaneous, Nightly  ezetimibe, 10 mg, Oral, Daily  fluticasone, 2 spray, Each Nare, Daily  guaifenesin-dextromethorphan, 2 tablet, Oral, BID  insulin lispro, 0-9 Units, Subcutaneous, TID AC  ipratropium-albuterol, 3 mL, Nebulization, 4x Daily - RT  oxybutynin XL, 5 mg, Oral, Daily  pantoprazole, 40 mg, Oral,  QAM  PARoxetine, 40 mg, Oral, QAM  sodium chloride, 10 mL, Intravenous, Q12H  sodium chloride, 4 mL, Nebulization, BID - RT           Diagnostics:  XR Chest 2 View    Result Date: 12/8/2022  Chest radiograph  HISTORY:Shortness of air  TECHNIQUE: Two PA and lateral radiographs  COMPARISON:Chest radiograph 07/07/2022      FINDINGS AND IMPRESSION: Minimal left basilar pulmonary opacification is grossly unchanged since 07/07/2022. There is new pulmonary opacification within the right mid and lower lung concerning for pneumonia in the appropriate clinical context. Asymmetric pulmonary edema is less likely. Correlation with patient history is recommended. There is mild asymmetric elevation right hemidiaphragm. No pneumothorax is seen. Cardiac silhouette is within normal limits for size.  This report was finalized on 12/8/2022 3:28 PM by Dr. Christian López M.D.      CT Angiogram Chest    Result Date: 12/12/2022  CT ANGIOGRAM OF THE CHEST WITH CONTRAST INCLUDING RECONSTRUCTION IMAGES 12/12/2022  HISTORY: Shortness of breath. Possible pulmonary embolus.  Following the intravenous contrast injection CT angiography was performed through the chest. Sagittal, coronal and 3-D reconstruction images were reviewed.  The pulmonary arterial system is well opacified with no evidence of bursal aortic and coronary calcification. A few shotty mediastinal nodes are seen. Trace bilateral pleural effusions are seen with some mild bilateral lower lobe atelectasis. There are some scattered somewhat ill-defined groundglass appearing infiltrates in the right upper lobe with some minimal groundglass opacity in the superior segment right lower lobe such as on images 58 through 60.  There is mild dilatation of the thoracic aorta with the ascending aorta measuring 3.9 cm in the ascending aorta measuring approximately 4.0 cm just above the diaphragm. Small right renal cyst is seen.      1. No evidence of pulmonary embolus. 2. Trace bilateral pleural  effusions with mild bilateral lower lobe atelectasis. 3. Ill-defined and somewhat groundglass infiltrate in the right upper lobe with some minimal groundglass infiltrate in the superior segment right lower lobe and these findings could be related to mild pneumonia. 4. Mild dilatation of the thoracic aorta. 5. Short-term follow-up CT of the chest and proximal a 6 weeks to 8 weeks suggested.  Radiation dose reduction techniques were utilized, including automated exposure control and exposure modulation based on body size.  This report was finalized on 12/12/2022 4:35 PM by Dr. Sonido Vela M.D.      XR Chest 1 View    Result Date: 12/11/2022  XR CHEST 1 VW-  HISTORY:  Hypoxemia.  COMPARISON:  Chest radiograph 12/8/2022  FINDINGS:  A single view of the chest was obtained. The cardiac silhouette and mediastinal and hilar contours are not significantly changed. There is calcific aortic atherosclerosis. There are new slightly prominent interstitial opacities in both lungs concerning for possible interstitial pulmonary edema. Previously noted pulmonary opacities in the right lung are not significantly changed. There is no significant pleural effusion. There is degenerative disc disease.  This report was finalized on 12/11/2022 5:51 PM by Dr. Kamla Weston M.D.      Results for orders placed during the hospital encounter of 12/08/22    Adult Transthoracic Echo Complete W/ Cont if Necessary Per Protocol    Interpretation Summary  •  Left ventricular ejection fraction appears to be 66 - 70%.  •  Left ventricular diastolic function is consistent with (grade I) impaired relaxation.  •  Estimated right ventricular systolic pressure from tricuspid regurgitation is normal (<35 mmHg).          Active Hospital Problems    Diagnosis  POA   • **Pneumonia of right middle lobe due to infectious organism [J18.9]  Yes   • Sepsis due to pneumonia (HCC) [J18.9, A41.9]  Yes   • Chronic diarrhea [K52.9]  Yes   • Generalized weakness  [R53.1]  Yes   • Decreased hearing of both ears [H91.93]  Yes   • Autonomic neuropathy [G90.9]  Yes   • Type 2 diabetes mellitus without complication, without long-term current use of insulin (HCC) [E11.9]  Yes   • COPD (chronic obstructive pulmonary disease) (HCC) [J44.9]  Yes   • Paroxysmal atrial fibrillation (HCC) [I48.0]  Yes   • KINDRA (obstructive sleep apnea) [G47.33]  Yes   • Essential hypertension [I10]  Yes   • IBS (irritable bowel syndrome) [K58.9]  Yes   • Anxiety and depression [F41.9, F32.A]  Yes      Resolved Hospital Problems   No resolved problems to display.   Chest x-ray today reviewed I think the right upper lobe infiltrate looks a little better there is a little atelectasis at the right base and elevated right hemidiaphragm I really do not appreciate any significant effusions      Assessment & Plan     1. Acute hypoxic respiratory failure likely multifactorial she does have extensive emphysema she does have a pneumonia fairly extensive interstitial groundglass type infiltrates on the right side she has suggestions of some heart failure with bilateral small pleural effusions and associated compressive atelectasis I suspect all of this is a contributing factor and her sleep apnea probably worsens at least nocturnal hypoxia.  I think we have to treat what we can.  Unfortunately the patient is not participating and declining therapies that is going to significantly limit what we can do  2. Bilateral pleural effusions there are small the bilateral nature suggest CHF I know her BNP was normal when she came in.  She got diuresed aggressively yesterday.  3. Obstructive sleep apnea patient has been diagnosed formally and had a machine she did not tolerate well and has not used it in years and years.  Apparently they put the Pap machine on her it lasted just a few minutes and then she refused she said the machine ate her face.  She says she has very severe claustrophobia.  I told her that she probably  needed to start using the machine during the day when she was awake and calm and everybody was around her and get used to it she was not receptive  4. Pneumonia she does have evidence suggesting a pneumonia in the right side predominantly groundglass infiltrates she will need follow-up radiographs on this to ensure it clears.  5. COPD CT reveals pretty extensive upper lobe predominant emphysema  6. Essential hypertension  7. Diabetes mellitus type 2    To really can to be hard for patient to get better if she will not do anything she will not get up out of bed she will do her I-S or open up she will not use a Pap machine    Plan for disposition:    Eusebio Be Jr, MD  12/16/22  19:38 EST    Time:

## 2022-12-17 NOTE — SIGNIFICANT NOTE
PT will check on pt tomorrow. Pt amb with nsg to/from bathroom and reported fatigue. Pt agreeable to attempt PT but began jerking movments once near eob. Pt reported back spasms and returned to supine unable to cont. RN notified and medicated pt. Pt has hx of spinal sx and back spasms \" when fatigued/under stress.\"

## 2022-12-17 NOTE — PLAN OF CARE
Problem: Adult Inpatient Plan of Care  Goal: Plan of Care Review  Outcome: Ongoing, Progressing  Flowsheets (Taken 12/17/2022 3297)  Progress: no change  Plan of Care Reviewed With: patient  Outcome Evaluation: A&Ox4. Refused BiPAP. Remains on 8L HF. VSS. No complaints overnight.     Don't know, Unknown

## 2022-12-17 NOTE — PROGRESS NOTES
PROGRESS NOTE  Patient Name: Kandace Andrade  Age/Sex: 82 y.o. female  : 1940  MRN: [id number]    Date of Admission: 2022  Date of Encounter Visit: 22   LOS: 9 days   Patient Care Team:  Cassi Bella MD as PCP - General (Family Medicine)    Chief Complaint: Respiratory failure    Hospital course: Patient has combination of interstitial edema, possible pneumonia, COPD with emphysema, obstructive sleep apnea, diabetes mellitus, all complicated by noncompliance with recommendation and refusal to use the incentive spirometer or use the BiPAP etc.  Patient has no edema on exam at this point  She has no fever or chills  She not on any active antibiotics        REVIEW OF SYSTEMS:   CONSTITUTIONAL: no fever or chills  CARDIOVASCULAR: No chest pain, chest pressure or chest discomfort. No palpitations or edema.   RESPIRATORY: No shortness of breath, cough or sputum.   GASTROINTESTINAL: No anorexia, nausea, vomiting or diarrhea. No abdominal pain or blood.   HEMATOLOGIC: No bleeding or bruising.     Ventilator/Non-Invasive Ventilation Settings (From admission, onward)     Start     Ordered    22 1230  NIPPV (CPAP or BIPAP)  At Bedtime & As Needed-RT        Comments: Bleeding O2 as tolerated work with patient to find best tolerated interface   Question Answer Comment   Indication: Sleep Apnea    Type: BIPAP    IPAP 16    EPAP 8    Titrate Oxygen for SpO2 90% - 95%        22 1230                  Vital Signs  Temp:  [97.3 °F (36.3 °C)-98.2 °F (36.8 °C)] 98.2 °F (36.8 °C)  Heart Rate:  [58-81] 71  Resp:  [16-20] 18  BP: (103-123)/(65-82) 110/65  SpO2:  [92 %-99 %] 92 %  on  Flow (L/min):  [4-8] 4.5 Device (Oxygen Therapy): nasal cannula    Intake/Output Summary (Last 24 hours) at 2022 1534  Last data filed at 2022 1233  Gross per 24 hour   Intake 780 ml   Output 800 ml   Net -20 ml     Flowsheet Rows    Flowsheet Row First Filed Value   Admission Height 160 cm (63\") Documented at  12/08/2022 2314   Admission Weight 61.4 kg (135 lb 5.8 oz) Documented at 12/08/2022 2314        Body mass index is 21.48 kg/m².      12/15/22  0701 12/16/22  0449 12/17/22  0420   Weight: 58.2 kg (128 lb 4.9 oz) 55.8 kg (123 lb 0.3 oz) 55 kg (121 lb 4.1 oz)       Physical Exam:  GEN:  No acute distress, alert, cooperative, well developed   EYES:   Sclerae clear. No icterus. PERRL. Normal EOM  ENT:   External ears/nose normal, no oral lesions, no thrush, mucous membranes moist  NECK:  Supple, midline trachea, no JVD  LUNGS: Normal chest on inspection, very minimal wheezes otherwise she has faint crackles posteriorly with decent breath sounds anteriorly and diminished posteriorly no crackles. Respirations regular, even and unlabored.   CV:  Regular rhythm and rate. Normal S1/S2. No murmurs, gallops, or rubs noted.  ABD:  Soft, nontender and nondistended. Normal bowel sounds. No guarding  EXT:  Moves all extremities well. No cyanosis. No redness. No edema.   Skin: Dry, intact, no bleeding    Results Review:    Results From Last 14 Days   Lab Units 12/08/22  2248 12/08/22  2019 12/08/22  1816   LACTATE mmol/L 1.8 3.2* 2.3*     Results from last 7 days   Lab Units 12/17/22  0757 12/16/22  0604 12/15/22  0704 12/14/22  0718 12/13/22  0856 12/12/22  0635 12/11/22  1359   SODIUM mmol/L 136 139 137 133* 136 139 141   POTASSIUM mmol/L 3.9 3.9 3.7 3.6 3.6 4.0 3.8   CHLORIDE mmol/L 99 104 102 99 99 100 104   CO2 mmol/L 28.0 27.1 29.0 27.7 26.0 27.5 25.3   BUN mg/dL 15 15 16 15 18 10 7*   CREATININE mg/dL 0.64 0.64 0.60 0.61 0.64 0.62 0.65   CALCIUM mg/dL 9.1 9.3 8.6 9.0 9.3 9.4 8.9   ANION GAP mmol/L 9.0 7.9 6.0 6.3 11.0 11.5 11.7                 Results from last 7 days   Lab Units 12/17/22  0757 12/16/22  0604 12/15/22  0704 12/14/22  0718 12/13/22  0856 12/12/22  0635 12/11/22  0813   WBC 10*3/mm3 8.25 8.47 8.73 9.90 11.16* 8.73 9.41   HEMOGLOBIN g/dL 12.5 12.2 11.6* 11.7* 13.5 13.0 12.5   HEMATOCRIT % 36.7 36.0 33.9* 35.3  39.1 37.4 38.6   PLATELETS 10*3/mm3 344 300 290 289 317 283 297   MCV fL 93.1 94.0 91.1 94.1 90.1 91.7 93.0   NEUTROPHIL % % 57.5 56.8  --   --  58.1 68.3 55.1   LYMPHOCYTE % % 26.1 24.0  --   --  25.4 18.0* 34.9   MONOCYTES % % 9.2 9.6  --   --  7.4 7.2 5.3   EOSINOPHIL % % 3.2 4.1  --   --  3.6 3.8 3.5   BASOPHIL % % 1.1 0.9  --   --  0.8 0.9 0.5   IMM GRAN % % 2.9* 4.6*  --   --  4.7* 1.8* 0.7*                   Invalid input(s): LDLCALC  Results from last 7 days   Lab Units 12/11/22  1659   PH, ARTERIAL pH units 7.441   PCO2, ARTERIAL mm Hg 36.0   PO2 ART mm Hg 52.6*   HCO3 ART mmol/L 24.5         Glucose   Date/Time Value Ref Range Status   12/17/2022 1204 98 70 - 130 mg/dL Final     Comment:     Meter: [id number] : [id number] Redmond Yariel NA   12/17/2022 0632 99 70 - 130 mg/dL Final     Comment:     Meter: [id number] : [id number] Ezio Santos NA   12/16/2022 2147 127 70 - 130 mg/dL Final     Comment:     Meter: [id number] : [id number] Ezio Youngna NA   12/16/2022 1627 127 70 - 130 mg/dL Final     Comment:     Meter: [id number] : [id number] Meron Pritchett NA   12/16/2022 1150 99 70 - 130 mg/dL Final     Comment:     Meter: [id number] : [id number] Tony Loyola NA   12/16/2022 0614 99 70 - 130 mg/dL Final     Comment:     Meter: [id number] : [id number] Eziomelchor Youngna NA   12/15/2022 2248 123 70 - 130 mg/dL Final     Comment:     Meter: [id number] : [id number] Ezio Youngna NA   12/15/2022 1542 188 (H) 70 - 130 mg/dL Final     Comment:     Meter: [id number] : [id number] Wandy JOAQUIN                               Imaging:   Imaging Results (All)      I reviewed the patient's new clinical results.  I personally viewed and interpreted the patient's imaging results:        Medication Review:   amLODIPine, 5 mg, Oral, Daily  aspirin, 81 mg, Oral, Daily  atenolol, 12.5 mg, Oral, Nightly  budesonide-formoterol, 2 puff, Inhalation, BID - RT  ciprofloxacin, 4 drop, Left Ear,  BID   And  dexamethasone, 4 drop, Left Ear, BID  enoxaparin, 40 mg, Subcutaneous, Nightly  ezetimibe, 10 mg, Oral, Daily  fluticasone, 2 spray, Each Nare, Daily  guaifenesin-dextromethorphan, 2 tablet, Oral, BID  insulin lispro, 0-9 Units, Subcutaneous, TID AC  ipratropium-albuterol, 3 mL, Nebulization, 4x Daily - RT  oxybutynin XL, 5 mg, Oral, Daily  pantoprazole, 40 mg, Oral, QAM  PARoxetine, 40 mg, Oral, QAM  sodium chloride, 10 mL, Intravenous, Q12H  sodium chloride, 4 mL, Nebulization, BID - RT             ASSESSMENT:   1. Acute hypoxemic respiratory failure  2. Pulmonary infiltrate with interstitial edema and less likely pneumonia  3. Obstructive sleep apnea intolerant to CPAP  4. COPD with emphysema  5. Essential hypertension  6. Diabetes mellitus  7. Noncompliance with recommendation    PLAN:  White count is normal, chemistry results were normal, last ABG showed no CO2 retention, glycemic control is satisfactory.  Last chest x-ray from yesterday showed some hazy infiltrate that can be seen in infection and also in some cases of volume overload.  Patient was diuresed earlier and currently is not on any scheduled diuretics.  Patient does not want to be on the BiPAP and was asked to discontinue it  She will continue working with the deep breathing exercises  Patient is currently on no antibiotics, her white count is normal and we will continue to monitor off the antibiotics.  Discussed with patient and primary team  Titrate oxygen as needed    Disposition:     Jose Jason MD  12/17/22  15:34 EST            Dictated utilizing Dragon dictation

## 2022-12-17 NOTE — PROGRESS NOTES
DAILY PROGRESS NOTE  [facility]    Patient Identification:  Name: Kandace Andrade  Age: 82 y.o.  Sex: female  :  1940  MRN: [id number]         Primary Care Physician: Cassi Bella MD      Subjective  Patient with no acute complaints.    Objective:  General Appearance:  Comfortable, well-appearing, in no acute distress and not in pain.    Vital signs: (most recent): Blood pressure 123/88, pulse 74, temperature 97.6 °F (36.4 °C), temperature source Oral, resp. rate 20, height 160 cm (63\"), weight 55.8 kg (123 lb 0.3 oz), SpO2 99 %.    Lungs:  Normal effort and normal respiratory rate.  Breath sounds clear to auscultation.    Heart: Normal rate.  Regular rhythm.    Extremities: There is no dependent edema.    Neurological: Patient is alert and oriented to person, place and time.    Skin:  Warm and dry.                Vital signs in last 24 hours:  Temp:  [97.4 °F (36.3 °C)-98.4 °F (36.9 °C)] 97.6 °F (36.4 °C)  Heart Rate:  [65-80] 74  Resp:  [16-20] 20  BP: (101-133)/(68-88) 123/88    Intake/Output:    Intake/Output Summary (Last 24 hours) at 2022 1907  Last data filed at 2022 1700  Gross per 24 hour   Intake 800 ml   Output 1300 ml   Net -500 ml         Results from last 7 days   Lab Units 22  0604 12/15/22  0704 22  0718 22  0856 22  0635 22  0813 12/10/22  0732   WBC 10*3/mm3 8.47 8.73 9.90 11.16* 8.73 9.41 9.77   HEMOGLOBIN g/dL 12.2 11.6* 11.7* 13.5 13.0 12.5 11.8*   PLATELETS 10*3/mm3 300 290 289 317 283 297 223     Results from last 7 days   Lab Units 22  0604 12/15/22  0704 22  0718 22  0856 22  0635 22  1359 12/10/22  0732   SODIUM mmol/L 139 137 133* 136 139 141 138   POTASSIUM mmol/L 3.9 3.7 3.6 3.6 4.0 3.8 3.9   CHLORIDE mmol/L 104 102 99 99 100 104 107   CO2 mmol/L 27.1 29.0 27.7 26.0 27.5 25.3 21.0*   BUN mg/dL 15 16 15 18 10 7* 9   CREATININE mg/dL 0.64 0.60 0.61 0.64 0.62 0.65 0.50*   GLUCOSE mg/dL 95  83 84 87 94 75 82   Estimated Creatinine Clearance: 58.7 mL/min (by C-G formula based on SCr of 0.64 mg/dL).  Results from last 7 days   Lab Units 12/16/22  0604 12/15/22  0704 12/14/22  0718 12/13/22  0856 12/12/22  0635 12/11/22  1359 12/10/22  0732   CALCIUM mg/dL 9.3 8.6 9.0 9.3 9.4 8.9 8.4*         Assessment:    Pneumonia of right middle lobe due to infectious organism    KINDRA (obstructive sleep apnea)    IBS (irritable bowel syndrome)    Essential hypertension    Anxiety and depression    Paroxysmal atrial fibrillation (HCC)    COPD (chronic obstructive pulmonary disease) (HCC)    Type 2 diabetes mellitus without complication, without long-term current use of insulin (HCC)    Autonomic neuropathy    Decreased hearing of both ears    Generalized weakness    Chronic diarrhea    Sepsis due to pneumonia (HCC)    RML PNA with Sepsis, present on Admission  - leukocytosis, lactic acidosis present on admission  - blood cx's are negative, continue ceftriaxone 1g Q24H  - RVP, S. Pneumo, and legionella are negative, she has been unable to produce a sample for sputum culture thusfar  - encourage pulmonary toilet and wean oxygen as tolerated-     COPD  - complicating above, no exacerbation  - continue current regimen     Hypoxemia  At this point we are looking at her chronic hypoxic,respiratory failure.  - she does have KINDRA and NIPPV ordered but patient refuses  Patient repeatedly encouraged to comply with regime.  Pulmonary input appreciated.     Type 2 DM   - continue ssi/hypoglycemia protocol     HTN/PAF  - rate controlled, not on AC at home  - continue atenolol and norvasc  - hold lisinopril     Medical noncompliance  .     Lovenox 40 mg SC daily for DVT prophylaxis.  Full code.    Plan:  Please see above.  Discharge planning.  Discussed with RN.    Kwabena Lew MD  12/16/2022  19:07 EST

## 2022-12-17 NOTE — PROGRESS NOTES
DAILY PROGRESS NOTE  [facility]    Patient Identification:  Name: Kandace Andrade  Age: 82 y.o.  Sex: female  :  1940  MRN: [id number]         Primary Care Physician: Cassi Bella MD    Subjective:  Interval History: Feeling and breathing better.  She has not been compliant with I-S per my discussion with RN has not been compliant with BiPAP either.  Even without noncompliance her O2 requirements have actually improved from 8 L down to 4 L.  She is still quite symptomatic as she states she just returned to the bathroom and she is basically huffing and puffing in bed.  Denies any chest pain nausea vomiting diarrhea or fevers    Objective: Elderly and frail.  Conversational and pleasant.  No family at bedside    Scheduled Meds:amLODIPine, 5 mg, Oral, Daily  aspirin, 81 mg, Oral, Daily  atenolol, 12.5 mg, Oral, Nightly  budesonide-formoterol, 2 puff, Inhalation, BID - RT  ciprofloxacin, 4 drop, Left Ear, BID   And  dexamethasone, 4 drop, Left Ear, BID  enoxaparin, 40 mg, Subcutaneous, Nightly  ezetimibe, 10 mg, Oral, Daily  fluticasone, 2 spray, Each Nare, Daily  guaifenesin-dextromethorphan, 2 tablet, Oral, BID  insulin lispro, 0-9 Units, Subcutaneous, TID AC  ipratropium-albuterol, 3 mL, Nebulization, 4x Daily - RT  oxybutynin XL, 5 mg, Oral, Daily  pantoprazole, 40 mg, Oral, QAM  PARoxetine, 40 mg, Oral, QAM  sodium chloride, 10 mL, Intravenous, Q12H  sodium chloride, 4 mL, Nebulization, BID - RT      Continuous Infusions:     Vital signs in last 24 hours:  Temp:  [97.3 °F (36.3 °C)-98.2 °F (36.8 °C)] 98.2 °F (36.8 °C)  Heart Rate:  [58-81] 71  Resp:  [16-20] 18  BP: (103-123)/(65-82) 110/65    Intake/Output:    Intake/Output Summary (Last 24 hours) at 2022 1510  Last data filed at 2022 1233  Gross per 24 hour   Intake 780 ml   Output 800 ml   Net -20 ml       Exam:  /65 (BP Location: Right arm, Patient Position: Lying)   Pulse 71   Temp 98.2 °F (36.8 °C)  (Axillary)   Resp 18   Ht 160 cm (63\")   Wt 55 kg (121 lb 4.1 oz)   SpO2 92%   BMI 21.48 kg/m²     General Appearance:    Alert, cooperative, nontoxic, AAOx3                          Head:    Normocephalic, without obvious abnormality, atraumatic                         Throat:   Oral mucosa pink and moist                           Neck:   No JVD                         Lungs:    Decreased bases otherwise clear to auscultation bilaterally, respirations unlabored                 Chest Wall:    No tenderness or deformity                          Heart:    Regular rate and rhythm, S1 and S2 normal -laying supine                  Abdomen:     Soft, nontender, bowel sounds active,                 Extremities:   Moving all without signs of edema or cyanosis                        Pulses:   Pulses palpable in all extremities                              Data Review:  Labs in chart were reviewed.    Assessment:  Active Hospital Problems    Diagnosis  POA   • **Pneumonia of right middle lobe due to infectious organism [J18.9]  Yes   • Sepsis due to pneumonia (HCC) [J18.9, A41.9]  Yes   • Chronic diarrhea [K52.9]  Yes   • Generalized weakness [R53.1]  Yes   • Decreased hearing of both ears [H91.93]  Yes   • Autonomic neuropathy [G90.9]  Yes   • Type 2 diabetes mellitus without complication, without long-term current use of insulin (HCC) [E11.9]  Yes   • COPD (chronic obstructive pulmonary disease) (HCC) [J44.9]  Yes   • Paroxysmal atrial fibrillation (HCC) [I48.0]  Yes   • KINDRA (obstructive sleep apnea) [G47.33]  Yes   • Essential hypertension [I10]  Yes   • IBS (irritable bowel syndrome) [K58.9]  Yes   • Anxiety and depression [F41.9, F32.A]  Yes      Resolved Hospital Problems   No resolved problems to display.       Plan:    Pneumonia treated with Rocephin x7 days, azithromycin x2 days    Sepsis resolving-BC NGTD    Acute hypoxic respiratory failure -noncompliance noted with BiPAP though O2 requirements improved over  the last 24 hours down to 4 L via nasal cannula   -Case discussed with RN.  I conveyed to the patient the importance of compliance and how we will improve her clinical state and oxygenation.   -COPD without acute exacerbationthough patient has extensive emphysema   -On sodium chloride nebulized meds, DuoNebs, Symbicort and will go ahead and increase that to more appropriate dose given emphysema/COPD    DM2 -BS stable    HTN controlled on Norvasc    PAF -RC on atenolol.  On ASA but no AC    HLD on statin    Lovenox at prophylactic dosing    DC daily labs given stability of them      Generalized weakness with PT following -[facility]P coordinating SNF    Zaki Saldivar MD  12/17/2022  15:10 EST

## 2022-12-18 LAB
GLUCOSE BLDC GLUCOMTR-MCNC: 110 MG/DL (ref 70–130)
GLUCOSE BLDC GLUCOMTR-MCNC: 128 MG/DL (ref 70–130)
GLUCOSE BLDC GLUCOMTR-MCNC: 155 MG/DL (ref 70–130)
GLUCOSE BLDC GLUCOMTR-MCNC: 98 MG/DL (ref 70–130)
PROCALCITONIN SERPL-MCNC: 0.05 NG/ML (ref 0–0.25)

## 2022-12-18 PROCEDURE — 97530 THERAPEUTIC ACTIVITIES: CPT

## 2022-12-18 PROCEDURE — 84145 PROCALCITONIN (PCT): CPT | Performed by: INTERNAL MEDICINE

## 2022-12-18 PROCEDURE — 82962 GLUCOSE BLOOD TEST: CPT

## 2022-12-18 PROCEDURE — 94664 DEMO&/EVAL PT USE INHALER: CPT

## 2022-12-18 PROCEDURE — 94799 UNLISTED PULMONARY SVC/PX: CPT

## 2022-12-18 PROCEDURE — 94761 N-INVAS EAR/PLS OXIMETRY MLT: CPT

## 2022-12-18 PROCEDURE — 25010000002 ENOXAPARIN PER 10 MG: Performed by: INTERNAL MEDICINE

## 2022-12-18 RX ORDER — PRAMIPEXOLE DIHYDROCHLORIDE 0.25 MG/1
0.25 TABLET ORAL EVERY 12 HOURS SCHEDULED
Status: DISCONTINUED | OUTPATIENT
Start: 2022-12-18 | End: 2022-12-19

## 2022-12-18 RX ORDER — PREDNISONE 20 MG/1
20 TABLET ORAL 2 TIMES DAILY WITH MEALS
Status: DISCONTINUED | OUTPATIENT
Start: 2022-12-18 | End: 2022-12-19

## 2022-12-18 RX ADMIN — GUAIFENESIN AND DEXTROMETHORPHAN HYDROBROMIDE 2 TABLET: 600; 30 TABLET, EXTENDED RELEASE ORAL at 20:43

## 2022-12-18 RX ADMIN — PANTOPRAZOLE SODIUM 40 MG: 40 TABLET, DELAYED RELEASE ORAL at 09:56

## 2022-12-18 RX ADMIN — EZETIMIBE 10 MG: 10 TABLET ORAL at 09:56

## 2022-12-18 RX ADMIN — IPRATROPIUM BROMIDE AND ALBUTEROL SULFATE 3 ML: .5; 3 SOLUTION RESPIRATORY (INHALATION) at 07:30

## 2022-12-18 RX ADMIN — FLUTICASONE PROPIONATE 2 SPRAY: 50 SPRAY, METERED NASAL at 09:58

## 2022-12-18 RX ADMIN — OXYBUTYNIN CHLORIDE 5 MG: 5 TABLET, EXTENDED RELEASE ORAL at 09:56

## 2022-12-18 RX ADMIN — Medication 10 ML: at 09:58

## 2022-12-18 RX ADMIN — CIPROFLOXACIN HYDROCHLORIDE 4 DROP: 3 SOLUTION/ DROPS OPHTHALMIC at 20:43

## 2022-12-18 RX ADMIN — Medication 10 ML: at 20:44

## 2022-12-18 RX ADMIN — BUDESONIDE AND FORMOTEROL FUMARATE DIHYDRATE 2 PUFF: 160; 4.5 AEROSOL RESPIRATORY (INHALATION) at 20:28

## 2022-12-18 RX ADMIN — ACETAMINOPHEN 1000 MG: 500 TABLET ORAL at 09:57

## 2022-12-18 RX ADMIN — CIPROFLOXACIN HYDROCHLORIDE 4 DROP: 3 SOLUTION/ DROPS OPHTHALMIC at 09:58

## 2022-12-18 RX ADMIN — PAROXETINE HYDROCHLORIDE HEMIHYDRATE 40 MG: 20 TABLET, FILM COATED ORAL at 09:57

## 2022-12-18 RX ADMIN — AMLODIPINE BESYLATE 5 MG: 5 TABLET ORAL at 09:56

## 2022-12-18 RX ADMIN — DEXAMETHASONE SODIUM PHOSPHATE 4 DROP: 1 SOLUTION/ DROPS OPHTHALMIC at 20:43

## 2022-12-18 RX ADMIN — PRAMIPEXOLE DIHYDROCHLORIDE 0.25 MG: 0.25 TABLET ORAL at 15:45

## 2022-12-18 RX ADMIN — DEXAMETHASONE SODIUM PHOSPHATE 4 DROP: 1 SOLUTION/ DROPS OPHTHALMIC at 09:58

## 2022-12-18 RX ADMIN — GUAIFENESIN AND DEXTROMETHORPHAN HYDROBROMIDE 2 TABLET: 600; 30 TABLET, EXTENDED RELEASE ORAL at 09:57

## 2022-12-18 RX ADMIN — IPRATROPIUM BROMIDE AND ALBUTEROL SULFATE 3 ML: .5; 3 SOLUTION RESPIRATORY (INHALATION) at 15:42

## 2022-12-18 RX ADMIN — ATENOLOL 12.5 MG: 25 TABLET ORAL at 20:43

## 2022-12-18 RX ADMIN — IPRATROPIUM BROMIDE AND ALBUTEROL SULFATE 3 ML: .5; 3 SOLUTION RESPIRATORY (INHALATION) at 11:48

## 2022-12-18 RX ADMIN — ASPIRIN 81 MG: 81 TABLET, CHEWABLE ORAL at 09:57

## 2022-12-18 RX ADMIN — BUDESONIDE AND FORMOTEROL FUMARATE DIHYDRATE 2 PUFF: 160; 4.5 AEROSOL RESPIRATORY (INHALATION) at 07:30

## 2022-12-18 RX ADMIN — SODIUM CHLORIDE SOLN NEBU 7% 4 ML: 7 NEBU SOLN at 07:30

## 2022-12-18 RX ADMIN — ACETAMINOPHEN 1000 MG: 500 TABLET ORAL at 19:44

## 2022-12-18 RX ADMIN — ENOXAPARIN SODIUM 40 MG: 100 INJECTION SUBCUTANEOUS at 20:43

## 2022-12-18 NOTE — PLAN OF CARE
Goal Outcome Evaluation:               Pt agreeable to PT. Pt educated on PLB. Pt cga for sup to sit, sit to sup, STS to rwx. She amb 30'x2 with cga seated rest on O2. DEsat to 88% with pt reporting fatigue and declining further gait/activity. Pt appears labored with all activity but improvement noted today. Extra O2 tubing provided for amb to BR prn as able with nsg. Recommend snf at d/c.

## 2022-12-18 NOTE — PROGRESS NOTES
PROGRESS NOTE  Patient Name: Kandace Andrade  Age/Sex: 82 y.o. female  : 1940  MRN: [id number]    Date of Admission: 2022  Date of Encounter Visit: 22   LOS: 10 days   Patient Care Team:  Cassi Bella MD as PCP - General (Family Medicine)    Chief Complaint: Respiratory failure    Hospital course: Patient has combination of interstitial edema, possible pneumonia, COPD with emphysema, obstructive sleep apnea, diabetes mellitus, all complicated by noncompliance with recommendation and refusal to use the incentive spirometer or use the BiPAP etc.  Patient has no edema on exam at this point  She has no fever or chills  She not on any active antibiotics  She is having some coughing spells  She is having some myoclonic jerks affecting the lower extremities more so on the left side that has been going on for a while but they are much worse during the acute illness        REVIEW OF SYSTEMS:   CONSTITUTIONAL: no fever or chills  CARDIOVASCULAR: No chest pain, chest pressure or chest discomfort. No palpitations or edema.   RESPIRATORY: No shortness of breath, cough or sputum.   GASTROINTESTINAL: No anorexia, nausea, vomiting or diarrhea. No abdominal pain or blood.   HEMATOLOGIC: No bleeding or bruising.     Ventilator/Non-Invasive Ventilation Settings (From admission, onward)     Start     Ordered    22 1230  NIPPV (CPAP or BIPAP)  At Bedtime & As Needed-RT        Comments: Bleeding O2 as tolerated work with patient to find best tolerated interface   Question Answer Comment   Indication: Sleep Apnea    Type: BIPAP    IPAP 16    EPAP 8    Titrate Oxygen for SpO2 90% - 95%        22 1230                  Vital Signs  Temp:  [97.6 °F (36.4 °C)-97.9 °F (36.6 °C)] 97.6 °F (36.4 °C)  Heart Rate:  [62-79] 62  Resp:  [18-20] 20  BP: (108-113)/(67-72) 108/67  SpO2:  [93 %-98 %] 94 %  on  Flow (L/min):  [3-4.5] 3 Device (Oxygen Therapy): nasal cannula;humidified    Intake/Output Summary (Last 24  hours) at 12/18/2022 1338  Last data filed at 12/18/2022 1245  Gross per 24 hour   Intake 720 ml   Output 600 ml   Net 120 ml     Flowsheet Rows    Flowsheet Row First Filed Value   Admission Height 160 cm (63\") Documented at 12/08/2022 2314   Admission Weight 61.4 kg (135 lb 5.8 oz) Documented at 12/08/2022 2314        Body mass index is 22.14 kg/m².      12/16/22  0449 12/17/22  0420 12/18/22  0549   Weight: 55.8 kg (123 lb 0.3 oz) 55 kg (121 lb 4.1 oz) 56.7 kg (125 lb)       Physical Exam:  GEN:  No acute distress, alert, cooperative, well developed   EYES:   Sclerae clear. No icterus. PERRL. Normal EOM  ENT:   External ears/nose normal, no oral lesions, no thrush, mucous membranes moist  NECK:  Supple, midline trachea, no JVD  LUNGS: Normal chest on inspection, positive expiratory wheezes otherwise she has faint crackles posteriorly with decent breath sounds anteriorly. Respirations regular, even and unlabored.   CV:  Regular rhythm and rate. Normal S1/S2. No murmurs, gallops, or rubs noted.  ABD:  Soft, nontender and nondistended. Normal bowel sounds. No guarding  EXT:  Moves all extremities well. No cyanosis. No redness. No edema.   Skin: Dry, intact, no bleeding    Results Review:    Results From Last 14 Days   Lab Units 12/08/22  2248 12/08/22  2019 12/08/22  1816   LACTATE mmol/L 1.8 3.2* 2.3*     Results from last 7 days   Lab Units 12/17/22  0757 12/16/22  0604 12/15/22  0704 12/14/22  0718 12/13/22  0856 12/12/22  0635 12/11/22  1359   SODIUM mmol/L 136 139 137 133* 136 139 141   POTASSIUM mmol/L 3.9 3.9 3.7 3.6 3.6 4.0 3.8   CHLORIDE mmol/L 99 104 102 99 99 100 104   CO2 mmol/L 28.0 27.1 29.0 27.7 26.0 27.5 25.3   BUN mg/dL 15 15 16 15 18 10 7*   CREATININE mg/dL 0.64 0.64 0.60 0.61 0.64 0.62 0.65   CALCIUM mg/dL 9.1 9.3 8.6 9.0 9.3 9.4 8.9   ANION GAP mmol/L 9.0 7.9 6.0 6.3 11.0 11.5 11.7                 Results from last 7 days   Lab Units 12/17/22  0757 12/16/22  0604 12/15/22  0704 12/14/22  0718  12/13/22  0856 12/12/22  0635   WBC 10*3/mm3 8.25 8.47 8.73 9.90 11.16* 8.73   HEMOGLOBIN g/dL 12.5 12.2 11.6* 11.7* 13.5 13.0   HEMATOCRIT % 36.7 36.0 33.9* 35.3 39.1 37.4   PLATELETS 10*3/mm3 344 300 290 289 317 283   MCV fL 93.1 94.0 91.1 94.1 90.1 91.7   NEUTROPHIL % % 57.5 56.8  --   --  58.1 68.3   LYMPHOCYTE % % 26.1 24.0  --   --  25.4 18.0*   MONOCYTES % % 9.2 9.6  --   --  7.4 7.2   EOSINOPHIL % % 3.2 4.1  --   --  3.6 3.8   BASOPHIL % % 1.1 0.9  --   --  0.8 0.9   IMM GRAN % % 2.9* 4.6*  --   --  4.7* 1.8*                   Invalid input(s): LDLCALC  Results from last 7 days   Lab Units 12/11/22  1659   PH, ARTERIAL pH units 7.441   PCO2, ARTERIAL mm Hg 36.0   PO2 ART mm Hg 52.6*   HCO3 ART mmol/L 24.5         Glucose   Date/Time Value Ref Range Status   12/18/2022 1144 128 70 - 130 mg/dL Final     Comment:     Meter: [id number] : [id number] Isaac Kinggail CNA   12/18/2022 0625 98 70 - 130 mg/dL Final     Comment:     Meter: [id number] : [id number] Ezio Santos NA   12/17/2022 2133 125 70 - 130 mg/dL Final     Comment:     Meter: [id number] : [id number] Ezio Santos NA   12/17/2022 1655 124 70 - 130 mg/dL Final     Comment:     Meter: [id number] : [id number] Ruy Saldivar NA   12/17/2022 1204 98 70 - 130 mg/dL Final     Comment:     Meter: [id number] : [id number] Ruy Saldivar NA   12/17/2022 0632 99 70 - 130 mg/dL Final     Comment:     Meter: [id number] : [id number] Ezio Santos JEMAL   12/16/2022 2147 127 70 - 130 mg/dL Final     Comment:     Meter: [id number] : [id number] Ezio JOAQUIN   12/16/2022 1627 127 70 - 130 mg/dL Final     Comment:     Meter: [id number] : [id number] Meron JOAQUIN     Results from last 7 days   Lab Units 12/18/22  0709   PROCALCITONIN ng/mL 0.05                           Imaging:   Imaging Results (All)      I reviewed the patient's new clinical results.  I personally viewed and interpreted the patient's imaging results:         Medication Review:   amLODIPine, 5 mg, Oral, Daily  aspirin, 81 mg, Oral, Daily  atenolol, 12.5 mg, Oral, Nightly  budesonide-formoterol, 2 puff, Inhalation, BID - RT  ciprofloxacin, 4 drop, Left Ear, BID   And  dexamethasone, 4 drop, Left Ear, BID  enoxaparin, 40 mg, Subcutaneous, Nightly  ezetimibe, 10 mg, Oral, Daily  fluticasone, 2 spray, Each Nare, Daily  guaifenesin-dextromethorphan, 2 tablet, Oral, BID  insulin lispro, 0-9 Units, Subcutaneous, TID AC  ipratropium-albuterol, 3 mL, Nebulization, 4x Daily - RT  oxybutynin XL, 5 mg, Oral, Daily  pantoprazole, 40 mg, Oral, QAM  PARoxetine, 40 mg, Oral, QAM  sodium chloride, 10 mL, Intravenous, Q12H  sodium chloride, 4 mL, Nebulization, BID - RT             ASSESSMENT:   1. Acute hypoxemic respiratory failure  2. Pulmonary infiltrate with interstitial edema and less likely pneumonia  3. Obstructive sleep apnea intolerant to CPAP  4. COPD with emphysema  5. Essential hypertension  6. Diabetes mellitus  7. Noncompliance with recommendation    PLAN:  White count is normal, chemistry results were normal, last ABG showed no CO2 retention, glycemic control is satisfactory.    Patient was having congestion and cough and wheezing today so we will resume steroids with p.o. prednisone  Still no indication for any systemic antibiotic at this point  Continue with the bronchodilators  Oxygen requirements are actually slightly better  We will add some dopaminergic agent to see if the leg cramps and the restless legs are related and in that case she should have good clinical response  Discussed with the primary team and discussed with the patient  Continue to Titrate oxygen as needed    Disposition: Per primary team    Jose Jason MD  12/18/22  13:38 EST            Dictated utilizing Dragon dictation

## 2022-12-18 NOTE — PLAN OF CARE
Goal Outcome Evaluation:  Plan of Care Reviewed With: patient        Progress: improving   A&Ox4. 3L humidified NC. Assist x1 to bathroom. Purewick in place, catheter care provided. SR on monitor. Patient c/o headache, tylenol given in the AM but refused later in shift. Warm wash cloth given to place on forehead. Increase in visible muscle spasms, but refused flexeril d/t patient not wanted to be too lethargic. Mirapex added. Patient education printed on this medication and given to patient. Glucose monitored. Meals provided by family. VSS. All needs met.

## 2022-12-18 NOTE — THERAPY TREATMENT NOTE
Patient Name: Kandace Andrade  : 1940    MRN: [id number]                              Today's Date: 2022       Admit Date: 2022    Visit Dx:     ICD-10-CM ICD-9-CM   1. Pneumonia of right middle lobe due to infectious organism  J18.9 486   2. Sepsis, due to unspecified organism, unspecified whether acute organ dysfunction present ([facility])  A41.9 038.9     995.91   3. Hypoxia  R09.02 799.02   4. History of COPD  Z87.09 V12.69   5. History of diabetes mellitus  Z86.39 V12.29   6. History of atrial fibrillation  Z86.79 V12.59     Patient Active Problem List   Diagnosis   • Vitamin D deficiency   • KINDRA (obstructive sleep apnea)   • IBS (irritable bowel syndrome)   • Essential hypertension   • Herpes zoster   • Arthritis   • Anxiety and depression   • Other emphysema ([facility])   • Acute seasonal allergic rhinitis due to pollen   • Paroxysmal atrial fibrillation ([facility])   • Osteoporosis   • Mixed hyperlipidemia   • Encounter for Medicare annual wellness exam   • COPD (chronic obstructive pulmonary disease) ([facility])   • Mixed stress and urge urinary incontinence   • Type 2 diabetes mellitus without complication, without long-term current use of insulin ([facility])   • Arthritis of both hips   • Autonomic neuropathy   • GERD without esophagitis   • C. difficile colitis   • Hypokalemia   • Hospital discharge follow-up   • Decreased hearing of both ears   • Dizziness   • Nausea   • Hypercholesterolemia   • Primary insomnia   • Generalized weakness   • Other microscopic hematuria   • Acute cystitis with hematuria   • Confusion   • Dehydration   • Abdominal pain   • Chronic diarrhea   • Metabolic encephalopathy   • Left ankle swelling   • Diarrhea   • Overactive bladder   • Pneumonia of right middle lobe due to infectious organism   • Sepsis due to pneumonia ([facility])     Past Medical History:   Diagnosis Date   • Acute seasonal allergic rhinitis due to pollen    • Anxiety and depression    • Arthritis    • Asthma    • C. difficile  colitis    • COPD (chronic obstructive pulmonary disease) (HCC)    • Diabetes mellitus (HCC)     Pre diabetes   • Diarrhea    • Diverticulosis    • Fibula fracture    • Herpes zoster    • History of lumbosacral spine surgery    • Hypertension    • IBS (irritable bowel syndrome)    • Mixed hyperlipidemia    • KINDRA (obstructive sleep apnea)     NO MACHINE AT THIS TIME   • Osteoporosis    • Paroxysmal atrial fibrillation (HCC)    • Vitamin D deficiency      Past Surgical History:   Procedure Laterality Date   • APPENDECTOMY     • BACK SURGERY      lower x2   • CATARACT EXTRACTION     • COLONOSCOPY      patient doesn't recall    • COLONOSCOPY N/A 4/2/2021    Procedure: COLONOSCOPY to cecum with cecal biopsies;  Surgeon: Jarrod Duggan MD;  Location: [facility] ENDOSCOPY;  Service: Gastroenterology;  Laterality: N/A;  pre: diarhhea  post: diverticulosis, hemorrhoids   • HYSTERECTOMY      partial   • SHOULDER SURGERY Right    • SHOULDER SURGERY     • SIGMOIDOSCOPY N/A 10/19/2022    Procedure: SIGMOIDOSCOPY FLEXIBLE TO SIGMOID COLON WITH BIOPSIES;  Surgeon: Tonja Tanner MD;  Location: [facility] ENDOSCOPY;  Service: Gastroenterology;  Laterality: N/A;  PRE: DIARRHEA  POST: HEMORRHOIDS   • TONSILLECTOMY     • UPPER GASTROINTESTINAL ENDOSCOPY      patient doesn't recall       General Information     Row Name 12/18/22 0915          Physical Therapy Time and Intention    Document Type therapy note (daily note)  -SV     Mode of Treatment individual therapy;physical therapy  -SV           User Key  (r) = Recorded By, (t) = Taken By, (c) = Cosigned By    Initials Name Provider Type    SV Ana Mckeon, PT Physical Therapist               Mobility     Row Name 12/18/22 1005          Bed Mobility    Supine-Sit [place] (Bed Mobility) standby assist  -SV     Supine-Sit-Supine [place] (Bed Mobility) standby assist  -SV     Assistive Device (Bed Mobility) bed rails;head of bed elevated  -SV     Comment, (Bed Mobility) extra  time, labored  -SV     Row Name 12/18/22 1005          Sit-Stand Transfer    Sit-Stand [place] (Transfers) contact guard  -SV     Assistive Device (Sit-Stand Transfers) walker, front-wheeled  -SV     Row Name 12/18/22 1005          Gait/Stairs (Locomotion)    [place] Level (Gait) contact guard  -SV     Assistive Device (Gait) walker, front-wheeled  -SV     Distance in Feet (Gait) 30'x2 on O2 desat to 88% noted , seated rest , shuffle pattern  -SV           User Key  (r) = Recorded By, (t) = Taken By, (c) = Cosigned By    Initials Name Provider Type    Ana Patel, PT Physical Therapist               Obj/Interventions    No documentation.                Goals/Plan    No documentation.                Clinical Impression     Row Name 12/18/22 0919          Pain    Pretreatment Pain Rating 0/10 - no pain  -SV     Row Name 12/18/22 1006 12/18/22 0919       Vital Signs    Pre Systolic BP Rehab -- 18  -SV    Pre Treatment Diastolic BP -- 67  -SV    Pretreatment Heart Rate (beats/min) -- 67  -SV    Pre SpO2 (%) -- 94  -SV    O2 Delivery Pre Treatment -- supplemental O2  -SV    Intra SpO2 (%) 88  -SV --    Post SpO2 (%) 94  -SV --    Pre Patient Position Supine  -SV --    Intra Patient Position Standing  -SV --    Post Patient Position Supine  -SV --    Row Name 12/18/22 1006          Positioning and Restraints    Pre-Treatment Position in bed  -SV     Post Treatment Position bed  -SV     In Bed call light within reach;encouraged to call for assist;exit alarm on;fowlers  -SV           User Key  (r) = Recorded By, (t) = Taken By, (c) = Cosigned By    Initials Name Provider Type    Ana Patel, PT Physical Therapist               Outcome Measures     Row Name 12/18/22 1007 12/18/22 0920       How much help from another person do you currently need...    Turning from your back to your side while in flat bed without using bedrails? 4  -SV 3  -SV    Moving from lying on back to sitting on the side of a  flat bed without bedrails? 3  -SV 3  -SV    Moving to and from a bed to a chair (including a wheelchair)? 3  -SV 3  -SV    Standing up from a chair using your arms (e.g., wheelchair, bedside chair)? 3  -SV 3  -SV    Climbing 3-5 steps with a railing? 2  -SV 2  -SV    To walk in hospital room? 3  -SV 3  -SV    AM-PAC 6 Clicks Score (PT) 18  -SV 17  -SV    Highest level of mobility 6 --> Walked 10 steps or more  -SV 5 --> Static standing  -SV          User Key  (r) = Recorded By, (t) = Taken By, (c) = Cosigned By    Initials Name Provider Type    SV Ana Mckeon, PT Physical Therapist                             Physical Therapy Education     Title: PT OT SLP Therapies (In Progress)     Topic: Physical Therapy (In Progress)     Point: Mobility training (In Progress)     Learning Progress Summary           Patient Acceptance, E, NR by  at 12/18/2022 0920    Acceptance, E,D, VU by PH at 12/15/2022 1222    Acceptance, E,D, VU by PH at 12/13/2022 1511    Acceptance, E, VU,NR by CN at 12/11/2022 1317                   Point: Home exercise program (In Progress)     Learning Progress Summary           Patient Acceptance, E, NR by  at 12/18/2022 0920    Acceptance, E,D, VU by PH at 12/15/2022 1222    Acceptance, E,D, VU by PH at 12/13/2022 1511    Acceptance, E, VU,NR by CN at 12/11/2022 1317                   Point: Body mechanics (In Progress)     Learning Progress Summary           Patient Acceptance, E, NR by SV at 12/18/2022 0920    Acceptance, E,D, VU by PH at 12/15/2022 1222    Acceptance, E,D, VU by PH at 12/13/2022 1511    Acceptance, E, VU,NR by CN at 12/11/2022 1317                   Point: Precautions (In Progress)     Learning Progress Summary           Patient Acceptance, E, NR by SV at 12/18/2022 0920    Acceptance, E,D, VU by PH at 12/15/2022 1222    Acceptance, E,D, VU by PH at 12/13/2022 1511    Acceptance, E, VU,NR by CN at 12/11/2022 1317                               User Key     Initials  Effective Dates Name Provider Type Discipline     06/16/21 -  Ana Mckeon, PT Physical Therapist PT    CN 06/16/21 -  Alyssa Harrison, PT Physical Therapist PT    PH 06/16/21 -  Elsi Mendoza PTA Physical Therapist Assistant PT              PT Recommendation and Plan           Time Calculation:     Therapy Charges for Today     Code Description Service Date Service Provider Modifiers Qty    92340165458 HC PT THERAPEUTIC ACT EA 15 MIN 12/18/2022 Ana Mckeon, PT GP 1          PT G-Codes  Outcome Measure Options: AM-PAC 6 Clicks Basic Mobility (PT)  AM-PAC 6 Clicks Score (PT): 18       Ana Mckeon, PT  12/18/2022

## 2022-12-18 NOTE — PROGRESS NOTES
Name: Kandace Andrade ADMIT: 2022   : 1940  PCP: Cassi Bella MD    MRN: [id number] LOS: 10 days   AGE/SEX: 82 y.o. female  ROOM: [facility] [[email or web address]     Subjective   Subjective   Patient reports continuation of improvement of shortness of breath and dry cough.  No sputum production.  No fever or chills.  No chest pain.  No palpitation.  No PND orthopnea.  No ankle edema.  No hemoptysis.  No wheezing.    Review of Systems  GI.  No abdominal pain.  No nausea or vomiting.  Normal bowel habits without constipation/diarrhea/bleeding per rectum/melena.    .  No dysuria or hematuria.       Objective   Objective   Vital Signs  Temp:  [97.6 °F (36.4 °C)-98.2 °F (36.8 °C)] 97.6 °F (36.4 °C)  Heart Rate:  [62-79] 62  Resp:  [18-20] 20  BP: (108-113)/(65-72) 108/67  SpO2:  [92 %-98 %] 94 %  on  Flow (L/min):  [3-4.5] 3;   Device (Oxygen Therapy): nasal cannula;humidified    Intake/Output Summary (Last 24 hours) at 2022 1230  Last data filed at 2022 2300  Gross per 24 hour   Intake 720 ml   Output 600 ml   Net 120 ml     Body mass index is 22.14 kg/m².      22  0449 22  0420 22  0549   Weight: 55.8 kg (123 lb 0.3 oz) 55 kg (121 lb 4.1 oz) 56.7 kg (125 lb)     Physical Exam  General.  Elderly female.  Appears depressed.  No respiratory distress.  No acute pain.  Alert and oriented x3.  Eyes.  No pallor or jaundice.  Pupils equal round and reactive.  Intact extraocular musculature.  Oral cavity.  Moist mucous membrane.  Neck.  Supple.  No JVD.  No lymphadenopathy or thyromegaly.  Cardiovascular.  Controlled rate atrial fibrillation and grade 2 systolic murmur.  Chest.  Poor bilateral air entry with rhonchi at the right lung base.  No wheeze.  Abdomen.  Soft lax.  No tenderness.  No guarding or rebound.  No distention.  Extremities.  No clubbing/cyanosis/edema.  CNS.  No acute focal neurological deficits.    Results Review:      Results from last 7 days   Lab Units 22  0754  12/16/22  0604 12/15/22  0704 12/14/22  0718 12/13/22  0856 12/12/22  0635 12/11/22  1359   SODIUM mmol/L 136 139 137 133* 136 139 141   POTASSIUM mmol/L 3.9 3.9 3.7 3.6 3.6 4.0 3.8   CHLORIDE mmol/L 99 104 102 99 99 100 104   CO2 mmol/L 28.0 27.1 29.0 27.7 26.0 27.5 25.3   BUN mg/dL 15 15 16 15 18 10 7*   CREATININE mg/dL 0.64 0.64 0.60 0.61 0.64 0.62 0.65   GLUCOSE mg/dL 83 95 83 84 87 94 75   CALCIUM mg/dL 9.1 9.3 8.6 9.0 9.3 9.4 8.9     Estimated Creatinine Clearance: 59.6 mL/min (by C-G formula based on SCr of 0.64 mg/dL).      Results from last 7 days   Lab Units 12/18/22  1144 12/18/22  0625 12/17/22  2133 12/17/22  1655 12/17/22  1204 12/17/22  0632 12/16/22  2147 12/16/22  1627   GLUCOSE mg/dL 128 98 125 124 98 99 127 127                       Invalid input(s):  PHOS        Invalid input(s): LDLCALC  Results from last 7 days   Lab Units 12/17/22  0757 12/16/22  0604 12/15/22  0704 12/14/22  0718 12/13/22  0856 12/13/22  0856 12/12/22  0635   WBC 10*3/mm3 8.25 8.47 8.73 9.90  --  11.16* 8.73   HEMOGLOBIN g/dL 12.5 12.2 11.6* 11.7*  --  13.5 13.0   HEMATOCRIT % 36.7 36.0 33.9* 35.3  --  39.1 37.4   PLATELETS 10*3/mm3 344 300 290 289  --  317 283   MCV fL 93.1 94.0 91.1 94.1  --  90.1 91.7   MCH pg 31.7 31.9 31.2 31.2  --  31.1 31.9   MCHC g/dL 34.1 33.9 34.2 33.1  --  34.5 34.8   RDW % 12.4 12.8 12.5 12.9  --  12.6 12.8   RDW-SD fl 42.6 43.6 41.7 44.3  --  42.2 42.8   MPV fL 9.9 9.7 9.6 10.0  --  9.9 9.8   NEUTROPHIL % % 57.5 56.8  --   --   --  58.1 68.3   LYMPHOCYTE % % 26.1 24.0  --   --   --  25.4 18.0*   MONOCYTES % % 9.2 9.6  --   --   --  7.4 7.2   EOSINOPHIL % % 3.2 4.1  --   --   --  3.6 3.8   BASOPHIL % % 1.1 0.9  --   --   --  0.8 0.9   IMM GRAN % % 2.9* 4.6*  --   --   --  4.7* 1.8*   NEUTROS ABS 10*3/mm3 4.75 4.81 6.55 6.20   < > 6.49 5.96   LYMPHS ABS 10*3/mm3 2.15 2.03  --   --   --  2.83 1.57   MONOS ABS 10*3/mm3 0.76 0.81  --   --   --  0.83 0.63   EOS ABS 10*3/mm3 0.26 0.35 0.26 0.90*   < >  0.40 0.33   BASOS ABS 10*3/mm3 0.09 0.08  --   --   --  0.09 0.08   IMMATURE GRANS (ABS) 10*3/mm3 0.24* 0.39*  --   --   --  0.52* 0.16*   NRBC /100 WBC 0.0 0.0 0.0  --   --  0.0 0.0    < > = values in this interval not displayed.         Results from last 7 days   Lab Units 12/11/22  1659   PH, ARTERIAL pH units 7.441   PO2 ART mm Hg 52.6*   PCO2, ARTERIAL mm Hg 36.0   HCO3 ART mmol/L 24.5     Results from last 7 days   Lab Units 12/18/22  0709   PROCALCITONIN ng/mL 0.05                               Imaging:  Imaging Results (Last 24 Hours)     ** No results found for the last 24 hours. **             I reviewed the patient's new clinical results / labs / tests / procedures      Assessment/Plan     Active Hospital Problems    Diagnosis  POA   • **Pneumonia of right middle lobe due to infectious organism [J18.9]  Yes   • Sepsis due to pneumonia (HCC) [J18.9, A41.9]  Yes   • Chronic diarrhea [K52.9]  Yes   • Generalized weakness [R53.1]  Yes   • Decreased hearing of both ears [H91.93]  Yes   • Autonomic neuropathy [G90.9]  Yes   • Type 2 diabetes mellitus without complication, without long-term current use of insulin (HCC) [E11.9]  Yes   • COPD (chronic obstructive pulmonary disease) (HCC) [J44.9]  Yes   • Paroxysmal atrial fibrillation (HCC) [I48.0]  Yes   • KINDRA (obstructive sleep apnea) [G47.33]  Yes   • Essential hypertension [I10]  Yes   • IBS (irritable bowel syndrome) [K58.9]  Yes   • Anxiety and depression [F41.9, F32.A]  Yes      Resolved Hospital Problems   No resolved problems to display.           · Acute on chronic hypoxemic respiratory failure secondary to right middle lobe pneumonia with sepsis in a patient with a history of COPD and obstructive sleep apnea blood cultures are negative.  Procalcitonin normal.  CBC is normal.  Urine Legionella and Streptococcus antigen are negative.  Respiratory PCR panel is negative.  Unable to produce sputum for studies.  S/p Rocephin treatment.  Refuses noninvasive  positive pressure ventilation we will continue Symbicort and DuoNeb/Mucinex.  Most likely need chronic oxygen at this time. status post pulmonary consultation.  · Type 2 diabetes.  Acceptable Accu-Chek on sliding scale.  · Hypertension/atrial fibrillation.  Good blood pressure control.  Controlled rate atrial fibrillation.  Not on home anticoagulation probably secondary to high risk of fall.  We will continue with atenolol/Norvasc/aspirin.  Lisinopril DC'd.  · VTE prophylaxis.  On Lovenox.  · Deconditioning and weakness.  On physical therapy recommends SNF    Discussed my findings and plan of treatment with the patient.  Disposition.  Awaiting skilled facility placement.    Roxanne Fuller MD  [facility]  12/18/22  12:30 EST

## 2022-12-19 PROBLEM — G25.81 RESTLESS LEG SYNDROME: Status: ACTIVE | Noted: 2022-12-19

## 2022-12-19 LAB
GLUCOSE BLDC GLUCOMTR-MCNC: 116 MG/DL (ref 70–130)
GLUCOSE BLDC GLUCOMTR-MCNC: 166 MG/DL (ref 70–130)
GLUCOSE BLDC GLUCOMTR-MCNC: 234 MG/DL (ref 70–130)
GLUCOSE BLDC GLUCOMTR-MCNC: 95 MG/DL (ref 70–130)

## 2022-12-19 PROCEDURE — 63710000001 INSULIN LISPRO (HUMAN) PER 5 UNITS: Performed by: INTERNAL MEDICINE

## 2022-12-19 PROCEDURE — 94664 DEMO&/EVAL PT USE INHALER: CPT

## 2022-12-19 PROCEDURE — 94799 UNLISTED PULMONARY SVC/PX: CPT

## 2022-12-19 PROCEDURE — 97530 THERAPEUTIC ACTIVITIES: CPT

## 2022-12-19 PROCEDURE — 25010000002 ENOXAPARIN PER 10 MG: Performed by: INTERNAL MEDICINE

## 2022-12-19 PROCEDURE — 82962 GLUCOSE BLOOD TEST: CPT

## 2022-12-19 PROCEDURE — 63710000001 PREDNISONE PER 1 MG: Performed by: INTERNAL MEDICINE

## 2022-12-19 RX ORDER — PREDNISONE 20 MG/1
20 TABLET ORAL
Status: DISCONTINUED | OUTPATIENT
Start: 2022-12-20 | End: 2022-12-21 | Stop reason: HOSPADM

## 2022-12-19 RX ORDER — CLONAZEPAM 0.5 MG/1
0.5 TABLET ORAL 2 TIMES DAILY
Status: DISCONTINUED | OUTPATIENT
Start: 2022-12-19 | End: 2022-12-20

## 2022-12-19 RX ADMIN — IPRATROPIUM BROMIDE AND ALBUTEROL SULFATE 3 ML: .5; 3 SOLUTION RESPIRATORY (INHALATION) at 07:28

## 2022-12-19 RX ADMIN — ASPIRIN 81 MG: 81 TABLET, CHEWABLE ORAL at 08:43

## 2022-12-19 RX ADMIN — BUDESONIDE AND FORMOTEROL FUMARATE DIHYDRATE 2 PUFF: 160; 4.5 AEROSOL RESPIRATORY (INHALATION) at 07:28

## 2022-12-19 RX ADMIN — FLUTICASONE PROPIONATE 2 SPRAY: 50 SPRAY, METERED NASAL at 08:44

## 2022-12-19 RX ADMIN — ENOXAPARIN SODIUM 40 MG: 100 INJECTION SUBCUTANEOUS at 20:29

## 2022-12-19 RX ADMIN — BUDESONIDE AND FORMOTEROL FUMARATE DIHYDRATE 2 PUFF: 160; 4.5 AEROSOL RESPIRATORY (INHALATION) at 20:10

## 2022-12-19 RX ADMIN — DEXAMETHASONE SODIUM PHOSPHATE 4 DROP: 1 SOLUTION/ DROPS OPHTHALMIC at 08:43

## 2022-12-19 RX ADMIN — PRAMIPEXOLE DIHYDROCHLORIDE 0.25 MG: 0.25 TABLET ORAL at 08:43

## 2022-12-19 RX ADMIN — DEXAMETHASONE SODIUM PHOSPHATE 4 DROP: 1 SOLUTION/ DROPS OPHTHALMIC at 20:29

## 2022-12-19 RX ADMIN — EZETIMIBE 10 MG: 10 TABLET ORAL at 08:43

## 2022-12-19 RX ADMIN — Medication 10 ML: at 20:33

## 2022-12-19 RX ADMIN — SODIUM CHLORIDE SOLN NEBU 7% 4 ML: 7 NEBU SOLN at 07:28

## 2022-12-19 RX ADMIN — PREDNISONE 20 MG: 20 TABLET ORAL at 08:43

## 2022-12-19 RX ADMIN — IPRATROPIUM BROMIDE AND ALBUTEROL SULFATE 3 ML: .5; 3 SOLUTION RESPIRATORY (INHALATION) at 15:32

## 2022-12-19 RX ADMIN — Medication 10 ML: at 08:43

## 2022-12-19 RX ADMIN — PANTOPRAZOLE SODIUM 40 MG: 40 TABLET, DELAYED RELEASE ORAL at 06:08

## 2022-12-19 RX ADMIN — INSULIN LISPRO 4 UNITS: 100 INJECTION, SOLUTION INTRAVENOUS; SUBCUTANEOUS at 18:12

## 2022-12-19 RX ADMIN — PAROXETINE HYDROCHLORIDE HEMIHYDRATE 40 MG: 20 TABLET, FILM COATED ORAL at 06:07

## 2022-12-19 RX ADMIN — GUAIFENESIN AND DEXTROMETHORPHAN HYDROBROMIDE 2 TABLET: 600; 30 TABLET, EXTENDED RELEASE ORAL at 20:29

## 2022-12-19 RX ADMIN — CLONAZEPAM 0.5 MG: 0.5 TABLET ORAL at 20:29

## 2022-12-19 RX ADMIN — CIPROFLOXACIN HYDROCHLORIDE 4 DROP: 3 SOLUTION/ DROPS OPHTHALMIC at 20:29

## 2022-12-19 RX ADMIN — ATENOLOL 12.5 MG: 25 TABLET ORAL at 20:29

## 2022-12-19 RX ADMIN — IPRATROPIUM BROMIDE AND ALBUTEROL SULFATE 3 ML: .5; 3 SOLUTION RESPIRATORY (INHALATION) at 11:27

## 2022-12-19 RX ADMIN — AMLODIPINE BESYLATE 5 MG: 5 TABLET ORAL at 08:43

## 2022-12-19 RX ADMIN — CIPROFLOXACIN HYDROCHLORIDE 4 DROP: 3 SOLUTION/ DROPS OPHTHALMIC at 08:43

## 2022-12-19 NOTE — PROGRESS NOTES
Nutrition Services    Patient Name:  Kandace Andrade  YOB: 1940  MRN: [id number]  Admit Date:  12/8/2022    Assessment Date:  12/19/22    CLINICAL NUTRITION ASSESSMENT     Encounter Information         Reason for Encounter LOS    Admitting Diagnosis PNA, sepsis    Pertinent Medical History DM2, HTN, Afib, anxiety, depression, arthritis, asthma, Cdiff, COPD, diverticulosis, IBS, HLD, KINDRA, OP, PAF    Current Issues PNA, weakness, muscle spasms     Current Nutrition Orders & Evaluation of Intake       Oral Nutrition     Current PO Diet Diet: Cardiac Diets, Diabetic Diets, Renal Diets; Healthy Heart (2-3 Na+); Consistent Carbohydrate; Low Sodium (2-3g), Low Potassium, Low Phosphorus; Texture: Regular Texture (IDDSI 7); Fluid Consistency: Thin (IDDSI 0)   Supplement n/a   PO Evaluation     Trending % PO Intake 0-100% x 8 meals    --  Anthropometrics          Height    Weight Height: 160 cm (63\")  Weight: 55.3 kg (121 lb 14.6 oz) (12/19/22 0612)    BMI kg/m2 Body mass index is 21.6 kg/m².    Weight Trend/Change Loss    Weight History  Wt Readings from Last 15 Encounters:   12/19/22 0612 55.3 kg (121 lb 14.6 oz)   12/18/22 0549 56.7 kg (125 lb)   12/17/22 0420 55 kg (121 lb 4.1 oz)   12/16/22 0449 55.8 kg (123 lb 0.3 oz)   12/15/22 0701 58.2 kg (128 lb 4.9 oz)   12/14/22 0500 57.5 kg (126 lb 12.2 oz)   12/13/22 0950 58.1 kg (128 lb)   12/13/22 0623 58.1 kg (128 lb 1.4 oz)   12/12/22 0400 57.9 kg (127 lb 10.3 oz)   12/08/22 2314 61.4 kg (135 lb 5.8 oz)   11/03/22 1303 57.7 kg (127 lb 3.2 oz)   10/19/22 0826 56.5 kg (124 lb 9.6 oz)   09/12/22 1259 56.5 kg (124 lb 9.6 oz)   08/19/22 0901 57.1 kg (125 lb 12.8 oz)   08/04/22 1553 58.3 kg (128 lb 9.6 oz)   07/22/22 0622 62.1 kg (136 lb 14.5 oz)   07/21/22 0539 62.2 kg (137 lb 2 oz)   07/20/22 0625 59.6 kg (131 lb 4.8 oz)   07/19/22 1245 59.4 kg (130 lb 15.3 oz)   07/07/22 1101 59 kg (130 lb)   05/19/22 1334 61.1 kg (134 lb 12.8 oz)   03/15/22 1249 62.1 kg (137 lb)    03/03/22 1000 61.2 kg (135 lb)   02/18/22 1309 62.1 kg (137 lb)   11/23/21 1237 61.7 kg (136 lb)   11/11/21 1531 60.1 kg (132 lb 9.6 oz)   09/02/21 1113 60.3 kg (133 lb)      --  Labs        Pertinent Labs Reviewed, listed below     Results from last 7 days   Lab Units 12/17/22  0757 12/16/22  0604 12/15/22  0704   SODIUM mmol/L 136 139 137   POTASSIUM mmol/L 3.9 3.9 3.7   CHLORIDE mmol/L 99 104 102   CO2 mmol/L 28.0 27.1 29.0   BUN mg/dL 15 15 16   CREATININE mg/dL 0.64 0.64 0.60   CALCIUM mg/dL 9.1 9.3 8.6   GLUCOSE mg/dL 83 95 83     Results from last 7 days   Lab Units 12/17/22  0757   HEMOGLOBIN g/dL 12.5   HEMATOCRIT % 36.7   WBC 10*3/mm3 8.25     Results from last 7 days   Lab Units 12/17/22  0757 12/16/22  0604 12/15/22  0704 12/14/22  0718 12/13/22  0856   PLATELETS 10*3/mm3 344 300 290 289 317     COVID19   Date Value Ref Range Status   12/08/2022 Not Detected Not Detected - Ref. Range Final     Lab Results   Component Value Date    HGBA1C 6.00 (H) 12/09/2022          Medications            Scheduled Medications amLODIPine, 5 mg, Oral, Daily  aspirin, 81 mg, Oral, Daily  atenolol, 12.5 mg, Oral, Nightly  budesonide-formoterol, 2 puff, Inhalation, BID - RT  ciprofloxacin, 4 drop, Left Ear, BID   And  dexamethasone, 4 drop, Left Ear, BID  enoxaparin, 40 mg, Subcutaneous, Nightly  ezetimibe, 10 mg, Oral, Daily  fluticasone, 2 spray, Each Nare, Daily  guaifenesin-dextromethorphan, 2 tablet, Oral, BID  insulin lispro, 0-9 Units, Subcutaneous, TID AC  ipratropium-albuterol, 3 mL, Nebulization, 4x Daily - RT  oxybutynin XL, 5 mg, Oral, Daily  pantoprazole, 40 mg, Oral, QAM  PARoxetine, 40 mg, Oral, QAM  pramipexole, 0.25 mg, Oral, Q12H  predniSONE, 20 mg, Oral, BID With Meals  sodium chloride, 10 mL, Intravenous, Q12H  sodium chloride, 4 mL, Nebulization, BID - RT        Infusions      PRN Medications •  acetaminophen  •  benzocaine-menthol  •  cyclobenzaprine  •  dextrose  •  dextrose  •  glucagon (human  recombinant)  •  melatonin  •  nitroglycerin  •  ondansetron  •  sodium chloride  •  sodium chloride  •  sodium chloride  •  traZODone     Physical Findings         Skin, GI, Oral, LDA 3L O2; + BM 12/17; B=18, bruised    NFPE Not applicable at this time   --  PES STATEMENT / NUTRITION DIAGNOSIS      Nutrition Dx Problem  Problem: Nutrition Appropriate for Condition at this Time  Etiology: Factors Affecting Nutrition (fair appetite)  Signs/Symptoms: Report/Observation (patient report)    Comment:      NUTRITION INTERVENTION / PLAN OF CARE  Intervention Goal        Intervention Goal(s) Maintain nutrition status, Reduce/improve symptoms, Meet estimated needs, Disease management/therapy, Tolerate PO  and Maintain weight     Nutrition Intervention        RD Action Encourage intake, Follow Tx Progress and Care plan reviewed     Nutrition Prescription          Diet      Supplement/Snack    EN/PN      Prescription Ordered No changes at this time     Monitor/Evaluation        Monitor Per protocol   Discharge Needs Pending clinical course   Education Will instruct as appropriate     RD to follow up per protocol.    Electronically signed by:  Renee Castelan RD  12/19/22 15:01 EST

## 2022-12-19 NOTE — PROGRESS NOTES
PROGRESS NOTE  Patient Name: Kandace Adnrade  Age/Sex: 82 y.o. female  : 1940  MRN: [id number]    Date of Admission: 2022  Date of Encounter Visit: 22   LOS: 11 days   Patient Care Team:  Cassi Bella MD as PCP - General (Family Medicine)    Chief Complaint: Respiratory failure    Hospital course: Patient has combination of interstitial edema, possible pneumonia, COPD with emphysema, obstructive sleep apnea, diabetes mellitus, all complicated by noncompliance with recommendation and refusal to use the incentive spirometer or use the BiPAP etc.  She has no residual edema  She is afebrile on no antibiotics  She is still having the leg myoclonus and did not get any better from the dopaminergic agent          REVIEW OF SYSTEMS:   CONSTITUTIONAL: no fever or chills  CARDIOVASCULAR: No chest pain, chest pressure or chest discomfort. No palpitations or edema.   RESPIRATORY: Improving shortness of breath with improving oxygen requirements down to 3 L/min.   GASTROINTESTINAL: No anorexia, nausea, vomiting or diarrhea. No abdominal pain or blood.   HEMATOLOGIC: No bleeding or bruising.     Ventilator/Non-Invasive Ventilation Settings (From admission, onward)    Nasal cannula oxygen at 3 L/min         Vital Signs  Temp:  [97.3 °F (36.3 °C)-97.5 °F (36.4 °C)] 97.5 °F (36.4 °C)  Heart Rate:  [65-87] 73  Resp:  [18-20] 20  BP: (104-124)/(71-73) 124/73  SpO2:  [90 %-93 %] 90 %  on  Flow (L/min):  [3] 3 Device (Oxygen Therapy): humidified;nasal cannula    Intake/Output Summary (Last 24 hours) at 2022 1331  Last data filed at 2022 0713  Gross per 24 hour   Intake 240 ml   Output 800 ml   Net -560 ml     Flowsheet Rows    Flowsheet Row First Filed Value   Admission Height 160 cm (63\") Documented at 2022 2314   Admission Weight 61.4 kg (135 lb 5.8 oz) Documented at 2022 2314        Body mass index is 21.6 kg/m².      22  0420 22  0549 22  0612   Weight: 55 kg (121 lb  4.1 oz) 56.7 kg (125 lb) 55.3 kg (121 lb 14.6 oz)       Physical Exam:  GEN:  No acute distress, alert, cooperative, well developed   EYES:   Sclerae clear. No icterus. PERRL. Normal EOM  ENT:   External ears/nose normal, no oral lesions, no thrush, mucous membranes moist  NECK:  Supple, midline trachea, no JVD  LUNGS: Normal chest on inspection, no expiratory wheezes on quiet breathing.  Still diminished breath sounds bilaterally . Respirations regular, even and unlabored.   CV:  Regular rhythm and rate. Normal S1/S2. No murmurs, gallops, or rubs noted.  ABD:  Soft, nontender and nondistended. Normal bowel sounds. No guarding  EXT:  Moves all extremities well. No cyanosis. No redness. No edema.   Positive myoclonus of the lower extremities     skin: Dry, intact, no bleeding    Results Review:    Results From Last 14 Days   Lab Units 12/08/22  2248 12/08/22  2019 12/08/22  1816   LACTATE mmol/L 1.8 3.2* 2.3*     Results from last 7 days   Lab Units 12/17/22  0757 12/16/22  0604 12/15/22  0704 12/14/22  0718 12/13/22  0856   SODIUM mmol/L 136 139 137 133* 136   POTASSIUM mmol/L 3.9 3.9 3.7 3.6 3.6   CHLORIDE mmol/L 99 104 102 99 99   CO2 mmol/L 28.0 27.1 29.0 27.7 26.0   BUN mg/dL 15 15 16 15 18   CREATININE mg/dL 0.64 0.64 0.60 0.61 0.64   CALCIUM mg/dL 9.1 9.3 8.6 9.0 9.3   ANION GAP mmol/L 9.0 7.9 6.0 6.3 11.0                 Results from last 7 days   Lab Units 12/17/22  0757 12/16/22  0604 12/15/22  0704 12/14/22  0718 12/13/22  0856   WBC 10*3/mm3 8.25 8.47 8.73 9.90 11.16*   HEMOGLOBIN g/dL 12.5 12.2 11.6* 11.7* 13.5   HEMATOCRIT % 36.7 36.0 33.9* 35.3 39.1   PLATELETS 10*3/mm3 344 300 290 289 317   MCV fL 93.1 94.0 91.1 94.1 90.1   NEUTROPHIL % % 57.5 56.8  --   --  58.1   LYMPHOCYTE % % 26.1 24.0  --   --  25.4   MONOCYTES % % 9.2 9.6  --   --  7.4   EOSINOPHIL % % 3.2 4.1  --   --  3.6   BASOPHIL % % 1.1 0.9  --   --  0.8   IMM GRAN % % 2.9* 4.6*  --   --  4.7*                   Invalid input(s): LDLCALC           Glucose   Date/Time Value Ref Range Status   12/19/2022 1117 116 70 - 130 mg/dL Final     Comment:     Meter: [id number] : [id number] Tony Loyola NA   12/19/2022 0621 95 70 - 130 mg/dL Final     Comment:     Meter: [id number] : [id number] Demario David NA   12/18/2022 2150 155 (H) 70 - 130 mg/dL Final     Comment:     Meter: [id number] : [id number] Francisco Eduardo NA   12/18/2022 1641 110 70 - 130 mg/dL Final     Comment:     Meter: [id number] : [id number] Isaac Sangita CNA   12/18/2022 1144 128 70 - 130 mg/dL Final     Comment:     Meter: [id number] : [id number] Isaac Kinggail CNA   12/18/2022 0625 98 70 - 130 mg/dL Final     Comment:     Meter: [id number] : [id number] Ezio Santos NA   12/17/2022 2133 125 70 - 130 mg/dL Final     Comment:     Meter: [id number] : [id number] Ezio Youngna NA   12/17/2022 1655 124 70 - 130 mg/dL Final     Comment:     Meter: [id number] : [id number] Ruy JOAQUIN     Results from last 7 days   Lab Units 12/18/22  0709   PROCALCITONIN ng/mL 0.05                           Imaging:   Imaging Results (All)      I reviewed the patient's new clinical results.  I personally viewed and interpreted the patient's imaging results:        Medication Review:   amLODIPine, 5 mg, Oral, Daily  aspirin, 81 mg, Oral, Daily  atenolol, 12.5 mg, Oral, Nightly  budesonide-formoterol, 2 puff, Inhalation, BID - RT  ciprofloxacin, 4 drop, Left Ear, BID   And  dexamethasone, 4 drop, Left Ear, BID  enoxaparin, 40 mg, Subcutaneous, Nightly  ezetimibe, 10 mg, Oral, Daily  fluticasone, 2 spray, Each Nare, Daily  guaifenesin-dextromethorphan, 2 tablet, Oral, BID  insulin lispro, 0-9 Units, Subcutaneous, TID AC  ipratropium-albuterol, 3 mL, Nebulization, 4x Daily - RT  oxybutynin XL, 5 mg, Oral, Daily  pantoprazole, 40 mg, Oral, QAM  PARoxetine, 40 mg, Oral, QAM  pramipexole, 0.25 mg, Oral, Q12H  predniSONE, 20 mg, Oral, BID With Meals  sodium chloride, 10 mL,  Intravenous, Q12H  sodium chloride, 4 mL, Nebulization, BID - RT             ASSESSMENT:   1. Acute hypoxemic respiratory failure  2. Pulmonary infiltrate with interstitial edema and less likely pneumonia  3. Obstructive sleep apnea intolerant to CPAP  4. COPD with emphysema  5. Essential hypertension  6. Diabetes mellitus  7. Noncompliance with recommendation  8. Bilateral lower extremity myoclonus    PLAN:  White count is normal, chemistry results were normal, last ABG showed no CO2 retention, glycemic control is satisfactory.    Patient is having no more wheezing and congestion, she is on the p.o. prednisone at the 40 mg daily dose we will consider tapering the dose down as of tomorrow  Still no indication for any systemic antibiotic at this point  Continue with the bronchodilators  Oxygen requirements are actually slightly better  Did not do better with a dopaminergic agent for the  leg myoclonus, patient is likely to benefit more from clonazepam but we need to monitor closely given her respiratory status.  We will start 0.5 mg p.o. twice daily and reassess in a.m., seizure is very unlikely in her case  Continue to Titrate oxygen as needed    Disposition: Per primary team    Jose Jason MD  12/19/22  13:31 EST            Dictated utilizing Dragon dictation

## 2022-12-19 NOTE — CASE MANAGEMENT/SOCIAL WORK
Continued Stay Note  [facility]     Patient Name: Kandace Andrade  MRN: [id number]  Today's Date: 12/19/2022    Admit Date: 12/8/2022    Plan: [facility]-pre-cert pending   Discharge Plan     Row Name 12/19/22 1623       Plan    Plan [facility]-pre-cert pending    Patient/Family in Agreement with Plan yes    Plan Comments Spoke with Chrissy/Adelita.  [facility] has a semi-private room and [facility] does not have any beds.  Patient does not want a semi-private room and is agreeable to [facility].  Spoke with Rossana/Aidee and they will initiate cert.  Faiza is aware that if pre-cert is denied, she will likely need to go home with HH unless she wants to private pay. Jennifer MALONE RN               Discharge Codes    No documentation.               Expected Discharge Date and Time     Expected Discharge Date Expected Discharge Time    Dec 21, 2022             Jennifer Hwoell RN

## 2022-12-19 NOTE — PLAN OF CARE
Goal Outcome Evaluation:  Plan of Care Reviewed With: patient        Progress: improving  Outcome Evaluation: Pt seen by PT this date for treatment. Pt reports feeling better and overall incr gait distance and quality of gait as well as level of assist req. Pt sat up to EOB w/ SBA. Pt stood 3x w/ SBA and use of fww. Pt amb 60' then 150' req SBA and use of fww. Pt mildly slow w/no overt LOB. Pt in chair at end of session w/ aide notified and alarm set. Pt may benefit from HH PT and assist of family after dc.    Patient was intermittently wearing a face mask during this therapy encounter. Therapist used appropriate personal protective equipment including mask and gloves.  Mask used was standard procedure mask. Appropriate PPE was worn during the entire therapy session. Hand hygiene was completed before and after therapy session. Patient is not in enhanced droplet precautions.

## 2022-12-19 NOTE — THERAPY TREATMENT NOTE
Patient Name: Kandace Andrade  : 1940    MRN: [id number]                              Today's Date: 2022       Admit Date: 2022    Visit Dx:     ICD-10-CM ICD-9-CM   1. Pneumonia of right middle lobe due to infectious organism  J18.9 486   2. Sepsis, due to unspecified organism, unspecified whether acute organ dysfunction present ([facility])  A41.9 038.9     995.91   3. Hypoxia  R09.02 799.02   4. History of COPD  Z87.09 V12.69   5. History of diabetes mellitus  Z86.39 V12.29   6. History of atrial fibrillation  Z86.79 V12.59     Patient Active Problem List   Diagnosis   • Vitamin D deficiency   • KINDRA (obstructive sleep apnea)   • IBS (irritable bowel syndrome)   • Essential hypertension   • Herpes zoster   • Arthritis   • Anxiety and depression   • Other emphysema ([facility])   • Acute seasonal allergic rhinitis due to pollen   • Paroxysmal atrial fibrillation ([facility])   • Osteoporosis   • Mixed hyperlipidemia   • Encounter for Medicare annual wellness exam   • COPD (chronic obstructive pulmonary disease) ([facility])   • Mixed stress and urge urinary incontinence   • Type 2 diabetes mellitus without complication, without long-term current use of insulin ([facility])   • Arthritis of both hips   • Autonomic neuropathy   • GERD without esophagitis   • C. difficile colitis   • Hypokalemia   • Hospital discharge follow-up   • Decreased hearing of both ears   • Dizziness   • Nausea   • Hypercholesterolemia   • Primary insomnia   • Generalized weakness   • Other microscopic hematuria   • Acute cystitis with hematuria   • Confusion   • Dehydration   • Abdominal pain   • Chronic diarrhea   • Metabolic encephalopathy   • Left ankle swelling   • Diarrhea   • Overactive bladder   • Pneumonia of right middle lobe due to infectious organism   • Sepsis due to pneumonia ([facility])   • Restless leg syndrome     Past Medical History:   Diagnosis Date   • Acute seasonal allergic rhinitis due to pollen    • Anxiety and depression    • Arthritis     • Asthma    • C. difficile colitis    • COPD (chronic obstructive pulmonary disease) (HCC)    • Diabetes mellitus (HCC)     Pre diabetes   • Diarrhea    • Diverticulosis    • Fibula fracture    • Herpes zoster    • History of lumbosacral spine surgery    • Hypertension    • IBS (irritable bowel syndrome)    • Mixed hyperlipidemia    • KINDRA (obstructive sleep apnea)     NO MACHINE AT THIS TIME   • Osteoporosis    • Paroxysmal atrial fibrillation (HCC)    • Vitamin D deficiency      Past Surgical History:   Procedure Laterality Date   • APPENDECTOMY     • BACK SURGERY      lower x2   • CATARACT EXTRACTION     • COLONOSCOPY      patient doesn't recall    • COLONOSCOPY N/A 4/2/2021    Procedure: COLONOSCOPY to cecum with cecal biopsies;  Surgeon: Jarrod Duggan MD;  Location: [facility] ENDOSCOPY;  Service: Gastroenterology;  Laterality: N/A;  pre: diarhhea  post: diverticulosis, hemorrhoids   • HYSTERECTOMY      partial   • SHOULDER SURGERY Right    • SHOULDER SURGERY     • SIGMOIDOSCOPY N/A 10/19/2022    Procedure: SIGMOIDOSCOPY FLEXIBLE TO SIGMOID COLON WITH BIOPSIES;  Surgeon: Tonja Tanner MD;  Location: [facility] ENDOSCOPY;  Service: Gastroenterology;  Laterality: N/A;  PRE: DIARRHEA  POST: HEMORRHOIDS   • TONSILLECTOMY     • UPPER GASTROINTESTINAL ENDOSCOPY      patient doesn't recall       General Information     Row Name 12/19/22 1602          Physical Therapy Time and Intention    Document Type therapy note (daily note)  -PH     Mode of Treatment physical therapy  -PH     Row Name 12/19/22 1602          General Information    Existing Precautions/Restrictions fall  -PH     Row Name 12/19/22 1602          Safety Issues, Functional Mobility    Impairments Affecting Function (Mobility) balance;endurance/activity tolerance;strength;shortness of breath  -PH     Comment, Safety Issues/Impairments (Mobility) gt belt and non skid socks donned  -PH           User Key  (r) = Recorded By, (t) = Taken By, (c) = Cosigned By     Initials Name Provider Type     Elsi Mendoza PTA Physical Therapist Assistant               Mobility     Row Name 12/19/22 1603          Bed Mobility    Bed Mobility supine-sit  -PH     Supine-Sit [place] (Bed Mobility) standby assist  -PH     Assistive Device (Bed Mobility) bed rails;head of bed elevated  -PH     Comment, (Bed Mobility) fairly quick to EOB  -PH     Row Name 12/19/22 1603          Sit-Stand Transfer    Sit-Stand [place] (Transfers) contact guard;standby assist;supervision  -PH     Assistive Device (Sit-Stand Transfers) walker, front-wheeled  -PH     Comment, (Sit-Stand Transfer) 1x; from chair (2), from EOB  -PH     Row Name 12/19/22 1603          Gait/Stairs (Locomotion)    [place] Level (Gait) contact guard;standby assist  -PH     Assistive Device (Gait) walker, front-wheeled  -PH     Distance in Feet (Gait) 60'; 150'  -PH     Deviations/Abnormal Patterns (Gait) gait speed decreased  -PH     [place] Level (Stairs) not tested  -PH     Comment, (Gait/Stairs) mildly slow gait speed although steady w/ no LOB; sats in low 90s after gait on 3L  -PH           User Key  (r) = Recorded By, (t) = Taken By, (c) = Cosigned By    Initials Name Provider Type     Elsi Mendoza PTA Physical Therapist Assistant               Obj/Interventions     Row Name 12/19/22 1606          Balance    Balance Assessment sitting static balance;standing static balance  -PH     Static Sitting Balance modified independence  -PH     Static Standing Balance standby assist  -PH     Position/Device Used, Standing Balance walker, front-wheeled  -PH           User Key  (r) = Recorded By, (t) = Taken By, (c) = Cosigned By    Initials Name Provider Type     Elsi Mendoza PTA Physical Therapist Assistant               Goals/Plan    No documentation.                Clinical Impression     Row Name 12/19/22 1605          Pain    Pretreatment Pain Rating 0/10 - no pain  -PH      Posttreatment Pain Rating 0/10 - no pain  -PH     Additional Documentation Pain Scale: Numbers Pre/Post-Treatment (Group)  -PH     Row Name 12/19/22 1603          Plan of Care Review    Plan of Care Reviewed With patient  -PH     Progress improving  -PH     Outcome Evaluation Pt seen by PT this date for treatment. Pt reports feeling better and overall incr gait distance and quality of gait as well as level of assist req. Pt sat up to EOB w/ SBA. Pt stood 3x w/ SBA and use of fww. Pt amb 60' then 150' req SBA and use of fww. Pt mildly slow w/no overt LOB. Pt in chair at end of session w/ aide notified and alarm set. Pt may benefit from  PT and assist of family after dc.  -PH     Row Name 12/19/22 1606          Positioning and Restraints    Pre-Treatment Position in bed  -PH     Post Treatment Position chair  -PH     In Chair sitting;call light within reach;encouraged to call for assist;exit alarm on;notified nsg  -PH           User Key  (r) = Recorded By, (t) = Taken By, (c) = Cosigned By    Initials Name Provider Type    PH Elsi Mendoza PTA Physical Therapist Assistant               Outcome Measures     Row Name 12/19/22 6935          How much help from another person do you currently need...    Turning from your back to your side while in flat bed without using bedrails? 4  -PH     Moving from lying on back to sitting on the side of a flat bed without bedrails? 3  -PH     Moving to and from a bed to a chair (including a wheelchair)? 4  -PH     Standing up from a chair using your arms (e.g., wheelchair, bedside chair)? 3  -PH     Climbing 3-5 steps with a railing? 2  -PH     To walk in hospital room? 3  -PH     AM-PAC 6 Clicks Score (PT) 19  -PH     Highest level of mobility 6 --> Walked 10 steps or more  -PH     Row Name 12/19/22 8015          Functional Assessment    Outcome Measure Options AM-PAC 6 Clicks Basic Mobility (PT)  -PH           User Key  (r) = Recorded By, (t) = Taken By, (c) = Cosigned  By    Initials Name Provider Type     Elsi Mendoza PTA Physical Therapist Assistant                             Physical Therapy Education     Title: PT OT SLP Therapies (In Progress)     Topic: Physical Therapy (In Progress)     Point: Mobility training (Done)     Learning Progress Summary           Patient Acceptance, E,TB, VU by  at 12/19/2022 1608    Acceptance, E, NR by  at 12/18/2022 0920    Acceptance, E,D, VU by  at 12/15/2022 1222    Acceptance, E,D, VU by  at 12/13/2022 1511    Acceptance, E, VU,NR by CN at 12/11/2022 1317                   Point: Home exercise program (In Progress)     Learning Progress Summary           Patient Acceptance, E, NR by  at 12/18/2022 0920    Acceptance, E,D, VU by  at 12/15/2022 1222    Acceptance, E,D, VU by  at 12/13/2022 1511    Acceptance, E, VU,NR by CN at 12/11/2022 1317                   Point: Body mechanics (Done)     Learning Progress Summary           Patient Acceptance, E,TB, VU by  at 12/19/2022 1608    Acceptance, E, NR by  at 12/18/2022 0920    Acceptance, E,D, VU by  at 12/15/2022 1222    Acceptance, E,D, VU by  at 12/13/2022 1511    Acceptance, E, VU,NR by CN at 12/11/2022 1317                   Point: Precautions (Done)     Learning Progress Summary           Patient Acceptance, E,TB, VU by  at 12/19/2022 1608    Acceptance, E, NR by  at 12/18/2022 0920    Acceptance, E,D, VU by  at 12/15/2022 1222    Acceptance, E,D, VU by  at 12/13/2022 1511    Acceptance, E, VU,NR by CN at 12/11/2022 1317                               User Key     Initials Effective Dates Name Provider Type Discipline     06/16/21 -  Ana Mckeon, PT Physical Therapist PT    CN 06/16/21 -  Alyssa Harrison, PT Physical Therapist PT     06/16/21 -  Elsi Mendoza PTA Physical Therapist Assistant PT              PT Recommendation and Plan     Plan of Care Reviewed With: patient  Progress: improving  Outcome Evaluation: Pt  seen by PT this date for treatment. Pt reports feeling better and overall incr gait distance and quality of gait as well as level of assist req. Pt sat up to EOB w/ SBA. Pt stood 3x w/ SBA and use of fww. Pt amb 60' then 150' req SBA and use of fww. Pt mildly slow w/no overt LOB. Pt in chair at end of session w/ aide notified and alarm set. Pt may benefit from  PT and assist of family after dc.     Time Calculation:    PT Charges     Row Name 12/19/22 1608             Time Calculation    Start Time 1503  -PH      Stop Time 1517  -PH      Time Calculation (min) 14 min  -PH      PT Received On 12/19/22  -PH      PT - Next Appointment 12/20/22  -PH         Timed Charges    88113 - PT Therapeutic Activity Minutes 14  -PH         Total Minutes    Timed Charges Total Minutes 14  -PH       Total Minutes 14  -PH            User Key  (r) = Recorded By, (t) = Taken By, (c) = Cosigned By    Initials Name Provider Type    PH Elsi Mendoza PTA Physical Therapist Assistant              Therapy Charges for Today     Code Description Service Date Service Provider Modifiers Qty    [id number]  PT THERAPEUTIC ACT EA 15 MIN 12/19/2022 Elsi Mendoza PTA  1          PT G-Codes  Outcome Measure Options: AM-PAC 6 Clicks Basic Mobility (PT)  AM-PAC 6 Clicks Score (PT): 19  PT Discharge Summary  Anticipated Discharge Disposition (PT): home with home health, home with assist    Elsi Mendoza PTA  12/19/2022

## 2022-12-19 NOTE — PROGRESS NOTES
Name: Kandace Andrade ADMIT: 2022   : 1940  PCP: Cassi Bella MD    MRN: [id number] LOS: 11 days   AGE/SEX: 82 y.o. female  ROOM: [facility]     Subjective   Subjective   Patient reports no change in shortness of breath and dry cough.  No sputum production.  No fever or chills.  No chest pain.  No palpitation.  No PND orthopnea.  No ankle edema.  No hemoptysis.  No wheezing.  Patient reports that jerking-like movement of the legs are better after initiation of Mirapex.    Review of Systems  GI.  No abdominal pain.  No nausea or vomiting.  Normal bowel habits without constipation/diarrhea/bleeding per rectum/melena.    .  No dysuria or hematuria.       Objective   Objective   Vital Signs  Temp:  [97.3 °F (36.3 °C)-97.8 °F (36.6 °C)] 97.5 °F (36.4 °C)  Heart Rate:  [65-87] 73  Resp:  [18-20] 20  BP: (104-124)/(71-73) 124/73  SpO2:  [90 %-93 %] 90 %  on  Flow (L/min):  [3] 3;   Device (Oxygen Therapy): humidified;nasal cannula    Intake/Output Summary (Last 24 hours) at 2022 1224  Last data filed at 2022 0713  Gross per 24 hour   Intake 480 ml   Output 800 ml   Net -320 ml     Body mass index is 21.6 kg/m².      22  0420 22  0549 22  0612   Weight: 55 kg (121 lb 4.1 oz) 56.7 kg (125 lb) 55.3 kg (121 lb 14.6 oz)     Physical Exam    General.  Elderly female.  Appears depressed.  No respiratory distress.  No acute pain.  Alert and oriented x3.  Eyes.  No pallor or jaundice.  Pupils equal round and reactive.  Intact extraocular musculature.  Oral cavity.  Moist mucous membrane.  Neck.  Supple.  No JVD.  No lymphadenopathy or thyromegaly.  Cardiovascular.  Regular rate and rhythm with occasional ectopic beats.  Grade 2 systolic murmur.  Chest.  Poor bilateral air entry with scattered bilateral rhonchi.  Abdomen.  Soft lax.  No tenderness.  No guarding or rebound.  No distention.  Extremities.  No clubbing/cyanosis/edema.  CNS.  No acute focal neurological  deficits.    Results Review:      Results from last 7 days   Lab Units 12/17/22  0757 12/16/22  0604 12/15/22  0704 12/14/22  0718 12/13/22  0856   SODIUM mmol/L 136 139 137 133* 136   POTASSIUM mmol/L 3.9 3.9 3.7 3.6 3.6   CHLORIDE mmol/L 99 104 102 99 99   CO2 mmol/L 28.0 27.1 29.0 27.7 26.0   BUN mg/dL 15 15 16 15 18   CREATININE mg/dL 0.64 0.64 0.60 0.61 0.64   GLUCOSE mg/dL 83 95 83 84 87   CALCIUM mg/dL 9.1 9.3 8.6 9.0 9.3     Estimated Creatinine Clearance: 58.1 mL/min (by C-G formula based on SCr of 0.64 mg/dL).      Results from last 7 days   Lab Units 12/19/22  1117 12/19/22  0621 12/18/22  2150 12/18/22  1641 12/18/22  1144 12/18/22  0625 12/17/22  2133 12/17/22  1655   GLUCOSE mg/dL 116 95 155* 110 128 98 125 124                       Invalid input(s):  PHOS        Invalid input(s): LDLCALC  Results from last 7 days   Lab Units 12/17/22  0757 12/16/22  0604 12/15/22  0704 12/14/22  0718 12/13/22  0856 12/13/22  0856   WBC 10*3/mm3 8.25 8.47 8.73 9.90  --  11.16*   HEMOGLOBIN g/dL 12.5 12.2 11.6* 11.7*  --  13.5   HEMATOCRIT % 36.7 36.0 33.9* 35.3  --  39.1   PLATELETS 10*3/mm3 344 300 290 289  --  317   MCV fL 93.1 94.0 91.1 94.1  --  90.1   MCH pg 31.7 31.9 31.2 31.2  --  31.1   MCHC g/dL 34.1 33.9 34.2 33.1  --  34.5   RDW % 12.4 12.8 12.5 12.9  --  12.6   RDW-SD fl 42.6 43.6 41.7 44.3  --  42.2   MPV fL 9.9 9.7 9.6 10.0  --  9.9   NEUTROPHIL % % 57.5 56.8  --   --   --  58.1   LYMPHOCYTE % % 26.1 24.0  --   --   --  25.4   MONOCYTES % % 9.2 9.6  --   --   --  7.4   EOSINOPHIL % % 3.2 4.1  --   --   --  3.6   BASOPHIL % % 1.1 0.9  --   --   --  0.8   IMM GRAN % % 2.9* 4.6*  --   --   --  4.7*   NEUTROS ABS 10*3/mm3 4.75 4.81 6.55 6.20   < > 6.49   LYMPHS ABS 10*3/mm3 2.15 2.03  --   --   --  2.83   MONOS ABS 10*3/mm3 0.76 0.81  --   --   --  0.83   EOS ABS 10*3/mm3 0.26 0.35 0.26 0.90*   < > 0.40   BASOS ABS 10*3/mm3 0.09 0.08  --   --   --  0.09   IMMATURE GRANS (ABS) 10*3/mm3 0.24* 0.39*  --   --    --  0.52*   NRBC /100 WBC 0.0 0.0 0.0  --   --  0.0    < > = values in this interval not displayed.             Results from last 7 days   Lab Units 12/18/22  0709   PROCALCITONIN ng/mL 0.05                               Imaging:  Imaging Results (Last 24 Hours)     ** No results found for the last 24 hours. **             I reviewed the patient's new clinical results / labs / tests / procedures      Assessment/Plan     Active Hospital Problems    Diagnosis  POA   • **Pneumonia of right middle lobe due to infectious organism [J18.9]  Yes   • Restless leg syndrome [G25.81]  Clinically Undetermined   • Sepsis due to pneumonia (HCC) [J18.9, A41.9]  Yes   • Chronic diarrhea [K52.9]  Yes   • Generalized weakness [R53.1]  Yes   • Decreased hearing of both ears [H91.93]  Yes   • Autonomic neuropathy [G90.9]  Yes   • Type 2 diabetes mellitus without complication, without long-term current use of insulin (HCC) [E11.9]  Yes   • COPD (chronic obstructive pulmonary disease) (HCC) [J44.9]  Yes   • Paroxysmal atrial fibrillation (HCC) [I48.0]  Yes   • KINDRA (obstructive sleep apnea) [G47.33]  Yes   • Essential hypertension [I10]  Yes   • IBS (irritable bowel syndrome) [K58.9]  Yes   • Anxiety and depression [F41.9, F32.A]  Yes      Resolved Hospital Problems   No resolved problems to display.     Refused blood tests today.      · Acute on chronic hypoxemic respiratory failure secondary to right middle lobe pneumonia with sepsis in a patient with a history of COPD and obstructive sleep apnea blood cultures are negative.  Procalcitonin normal.  CBC is normal.  Urine Legionella and Streptococcus antigen are negative.  Respiratory PCR panel is negative.  Unable to produce sputum for studies.  S/p Rocephin treatment..  Continue Symbicort and DuoNeb/Mucinex.  Pulmonary added p.o. steroids yesterday.  Most likely need chronic oxygen at this time.  · Restless leg syndrome.  Improved on Mirapex.  · Type 2 diabetes.  Acceptable Accu-Chek on  sliding scale.  · Hypertension/atrial fibrillation.  Good blood pressure control.  Controlled rate atrial fibrillation.  Not on home anticoagulation probably secondary to high risk of fall.  We will continue with atenolol/Norvasc/aspirin.  Lisinopril DC'd.  · VTE prophylaxis.  On Lovenox.  · Deconditioning and weakness.  On physical therapy recommends SNF    Discussed my findings and plan of treatment with the patient.  Disposition.  Awaiting skilled facility placement.    Roxanne Fuller MD  [facility]  12/19/22  12:24 EST

## 2022-12-20 LAB
GLUCOSE BLDC GLUCOMTR-MCNC: 106 MG/DL (ref 70–130)
GLUCOSE BLDC GLUCOMTR-MCNC: 121 MG/DL (ref 70–130)
GLUCOSE BLDC GLUCOMTR-MCNC: 130 MG/DL (ref 70–130)
GLUCOSE BLDC GLUCOMTR-MCNC: 90 MG/DL (ref 70–130)

## 2022-12-20 PROCEDURE — 94799 UNLISTED PULMONARY SVC/PX: CPT

## 2022-12-20 PROCEDURE — 94760 N-INVAS EAR/PLS OXIMETRY 1: CPT

## 2022-12-20 PROCEDURE — 82962 GLUCOSE BLOOD TEST: CPT

## 2022-12-20 PROCEDURE — 94761 N-INVAS EAR/PLS OXIMETRY MLT: CPT

## 2022-12-20 PROCEDURE — 90791 PSYCH DIAGNOSTIC EVALUATION: CPT

## 2022-12-20 PROCEDURE — 94664 DEMO&/EVAL PT USE INHALER: CPT

## 2022-12-20 PROCEDURE — 63710000001 PREDNISONE PER 1 MG: Performed by: INTERNAL MEDICINE

## 2022-12-20 PROCEDURE — 97530 THERAPEUTIC ACTIVITIES: CPT

## 2022-12-20 PROCEDURE — 25010000002 ENOXAPARIN PER 10 MG: Performed by: INTERNAL MEDICINE

## 2022-12-20 RX ORDER — PRAMIPEXOLE DIHYDROCHLORIDE 0.25 MG/1
0.25 TABLET ORAL 2 TIMES DAILY
Status: DISCONTINUED | OUTPATIENT
Start: 2022-12-20 | End: 2022-12-21 | Stop reason: HOSPADM

## 2022-12-20 RX ADMIN — CIPROFLOXACIN HYDROCHLORIDE 4 DROP: 3 SOLUTION/ DROPS OPHTHALMIC at 20:36

## 2022-12-20 RX ADMIN — EZETIMIBE 10 MG: 10 TABLET ORAL at 08:45

## 2022-12-20 RX ADMIN — PREDNISONE 20 MG: 20 TABLET ORAL at 08:44

## 2022-12-20 RX ADMIN — DEXAMETHASONE SODIUM PHOSPHATE 4 DROP: 1 SOLUTION/ DROPS OPHTHALMIC at 08:45

## 2022-12-20 RX ADMIN — GUAIFENESIN AND DEXTROMETHORPHAN HYDROBROMIDE 2 TABLET: 600; 30 TABLET, EXTENDED RELEASE ORAL at 08:44

## 2022-12-20 RX ADMIN — PANTOPRAZOLE SODIUM 40 MG: 40 TABLET, DELAYED RELEASE ORAL at 06:07

## 2022-12-20 RX ADMIN — PAROXETINE HYDROCHLORIDE HEMIHYDRATE 40 MG: 20 TABLET, FILM COATED ORAL at 06:07

## 2022-12-20 RX ADMIN — OXYBUTYNIN CHLORIDE 5 MG: 5 TABLET, EXTENDED RELEASE ORAL at 08:44

## 2022-12-20 RX ADMIN — Medication 10 ML: at 20:36

## 2022-12-20 RX ADMIN — DEXAMETHASONE SODIUM PHOSPHATE 4 DROP: 1 SOLUTION/ DROPS OPHTHALMIC at 20:36

## 2022-12-20 RX ADMIN — BUDESONIDE AND FORMOTEROL FUMARATE DIHYDRATE 2 PUFF: 160; 4.5 AEROSOL RESPIRATORY (INHALATION) at 07:31

## 2022-12-20 RX ADMIN — IPRATROPIUM BROMIDE AND ALBUTEROL SULFATE 3 ML: .5; 3 SOLUTION RESPIRATORY (INHALATION) at 16:02

## 2022-12-20 RX ADMIN — ENOXAPARIN SODIUM 40 MG: 100 INJECTION SUBCUTANEOUS at 20:35

## 2022-12-20 RX ADMIN — FLUTICASONE PROPIONATE 2 SPRAY: 50 SPRAY, METERED NASAL at 08:46

## 2022-12-20 RX ADMIN — CIPROFLOXACIN HYDROCHLORIDE 4 DROP: 3 SOLUTION/ DROPS OPHTHALMIC at 08:46

## 2022-12-20 RX ADMIN — GUAIFENESIN AND DEXTROMETHORPHAN HYDROBROMIDE 2 TABLET: 600; 30 TABLET, EXTENDED RELEASE ORAL at 20:35

## 2022-12-20 RX ADMIN — ATENOLOL 12.5 MG: 25 TABLET ORAL at 20:35

## 2022-12-20 RX ADMIN — PRAMIPEXOLE DIHYDROCHLORIDE 0.25 MG: 0.25 TABLET ORAL at 14:24

## 2022-12-20 RX ADMIN — IPRATROPIUM BROMIDE AND ALBUTEROL SULFATE 3 ML: .5; 3 SOLUTION RESPIRATORY (INHALATION) at 12:15

## 2022-12-20 RX ADMIN — SODIUM CHLORIDE SOLN NEBU 7% 4 ML: 7 NEBU SOLN at 12:23

## 2022-12-20 RX ADMIN — Medication 5 MG: at 00:15

## 2022-12-20 RX ADMIN — BUDESONIDE AND FORMOTEROL FUMARATE DIHYDRATE 2 PUFF: 160; 4.5 AEROSOL RESPIRATORY (INHALATION) at 19:26

## 2022-12-20 RX ADMIN — Medication 10 ML: at 08:46

## 2022-12-20 RX ADMIN — PRAMIPEXOLE DIHYDROCHLORIDE 0.25 MG: 0.25 TABLET ORAL at 20:35

## 2022-12-20 RX ADMIN — AMLODIPINE BESYLATE 5 MG: 5 TABLET ORAL at 08:47

## 2022-12-20 RX ADMIN — ASPIRIN 81 MG: 81 TABLET, CHEWABLE ORAL at 08:44

## 2022-12-20 NOTE — PROGRESS NOTES
Name: Kandace Andrade ADMIT: 2022   : 1940  PCP: Cassi Bella MD    MRN: [id number] LOS: 12 days   AGE/SEX: 82 y.o. female  ROOM: [facility]     Subjective   Subjective   Patient reports no change in shortness of breath and dry cough. No fever or chills.  No chest pain.  no palpitation.  No PND orthopnea.  No ankle edema.  No hemoptysis.  No wheezing.  Restless leg syndrome improved.  Nurses report that Mirapex has been DC'd yesterday and when she was given Klonopin for restless leg that made her very confused.    Review of Systems  GI.  No abdominal pain.  No nausea or vomiting.  Normal bowel habits without constipation/diarrhea/bleeding per rectum/melena.    .  No dysuria or hematuria.       Objective   Objective   Vital Signs  Temp:  [97.6 °F (36.4 °C)-97.9 °F (36.6 °C)] 97.6 °F (36.4 °C)  Heart Rate:  [59-99] 65  Resp:  [18-20] 18  BP: (104-138)/(76-95) 126/76  SpO2:  [93 %-98 %] 98 %  on  Flow (L/min):  [2-33] 2;   Device (Oxygen Therapy): nasal cannula    Intake/Output Summary (Last 24 hours) at 2022 1419  Last data filed at 2022 1005  Gross per 24 hour   Intake 240 ml   Output --   Net 240 ml     Body mass index is 22.23 kg/m².      22  0549 22  0612 22  0501   Weight: 56.7 kg (125 lb) 55.3 kg (121 lb 14.6 oz) 56.9 kg (125 lb 8 oz)     Physical Exam    General.  Elderly female.  Appears depressed.  No respiratory distress.  No acute pain.  Alert and oriented x3.    Eyes.  No pallor or jaundice.  Pupils equal round and reactive.  Intact extraocular musculature.  Oral cavity.  Moist mucous membrane.  Neck.  Supple.  No JVD.  No lymphadenopathy or thyromegaly.  Cardiovascular.  Regular rate and rhythm.  Grade 2 systolic murmur.  Chest.  Poor bilateral air entry with minimal scattered bilateral rhonchi (improved from the last 2 days).  Abdomen.  Soft lax.  No tenderness.  No guarding or rebound.  No distention.  Extremities.  No clubbing/cyanosis/edema.  CNS.  No  acute focal neurological deficits.    Results Review:      Results from last 7 days   Lab Units 12/17/22  0757 12/16/22  0604 12/15/22  0704 12/14/22  0718   SODIUM mmol/L 136 139 137 133*   POTASSIUM mmol/L 3.9 3.9 3.7 3.6   CHLORIDE mmol/L 99 104 102 99   CO2 mmol/L 28.0 27.1 29.0 27.7   BUN mg/dL 15 15 16 15   CREATININE mg/dL 0.64 0.64 0.60 0.61   GLUCOSE mg/dL 83 95 83 84   CALCIUM mg/dL 9.1 9.3 8.6 9.0     Estimated Creatinine Clearance: 59.8 mL/min (by C-G formula based on SCr of 0.64 mg/dL).      Results from last 7 days   Lab Units 12/20/22  1119 12/20/22  0623 12/19/22  2033 12/19/22  1647 12/19/22  1117 12/19/22  0621 12/18/22  2150 12/18/22  1641   GLUCOSE mg/dL 106 90 166* 234* 116 95 155* 110                       Invalid input(s):  PHOS        Invalid input(s): LDLCALC  Results from last 7 days   Lab Units 12/17/22  0757 12/16/22  0604 12/15/22  0704 12/15/22  0704 12/14/22  0718 12/14/22  0718   WBC 10*3/mm3 8.25 8.47  --  8.73  --  9.90   HEMOGLOBIN g/dL 12.5 12.2  --  11.6*  --  11.7*   HEMATOCRIT % 36.7 36.0  --  33.9*  --  35.3   PLATELETS 10*3/mm3 344 300  --  290  --  289   MCV fL 93.1 94.0  --  91.1  --  94.1   MCH pg 31.7 31.9  --  31.2  --  31.2   MCHC g/dL 34.1 33.9  --  34.2  --  33.1   RDW % 12.4 12.8  --  12.5  --  12.9   RDW-SD fl 42.6 43.6  --  41.7  --  44.3   MPV fL 9.9 9.7  --  9.6  --  10.0   NEUTROPHIL % % 57.5 56.8   < >  --   --   --    LYMPHOCYTE % % 26.1 24.0   < >  --   --   --    MONOCYTES % % 9.2 9.6   < >  --   --   --    EOSINOPHIL % % 3.2 4.1   < >  --   --   --    BASOPHIL % % 1.1 0.9   < >  --   --   --    IMM GRAN % % 2.9* 4.6*   < >  --   --   --    NEUTROS ABS 10*3/mm3 4.75 4.81   < > 6.55  --  6.20   LYMPHS ABS 10*3/mm3 2.15 2.03   < >  --   --   --    MONOS ABS 10*3/mm3 0.76 0.81   < >  --   --   --    EOS ABS 10*3/mm3 0.26 0.35   < > 0.26  --  0.90*   BASOS ABS 10*3/mm3 0.09 0.08   < >  --   --   --    IMMATURE GRANS (ABS) 10*3/mm3 0.24* 0.39*   < >  --   --   --     NRBC /100 WBC 0.0 0.0  --  0.0   < >  --     < > = values in this interval not displayed.             Results from last 7 days   Lab Units 12/18/22  0709   PROCALCITONIN ng/mL 0.05                               Imaging:  Imaging Results (Last 24 Hours)     ** No results found for the last 24 hours. **             I reviewed the patient's new clinical results / labs / tests / procedures      Assessment/Plan     Active Hospital Problems    Diagnosis  POA   • **Pneumonia of right middle lobe due to infectious organism [J18.9]  Yes   • Restless leg syndrome [G25.81]  Clinically Undetermined   • Sepsis due to pneumonia (HCC) [J18.9, A41.9]  Yes   • Chronic diarrhea [K52.9]  Yes   • Generalized weakness [R53.1]  Yes   • Decreased hearing of both ears [H91.93]  Yes   • Autonomic neuropathy [G90.9]  Yes   • Type 2 diabetes mellitus without complication, without long-term current use of insulin (HCC) [E11.9]  Yes   • COPD (chronic obstructive pulmonary disease) (HCC) [J44.9]  Yes   • Paroxysmal atrial fibrillation (HCC) [I48.0]  Yes   • KINDRA (obstructive sleep apnea) [G47.33]  Yes   • Essential hypertension [I10]  Yes   • IBS (irritable bowel syndrome) [K58.9]  Yes   • Anxiety and depression [F41.9, F32.A]  Yes      Resolved Hospital Problems   No resolved problems to display.     Refused blood tests today.      · Acute on chronic hypoxemic respiratory failure secondary to right middle lobe pneumonia with sepsis in a patient with a history of COPD and obstructive sleep apnea blood cultures are negative.  Procalcitonin normal.  CBC is normal.  Urine Legionella and Streptococcus antigen are negative.  Respiratory PCR panel is negative.  Unable to produce sputum for studies.  S/p Rocephin treatment..  Continue Symbicort and DuoNeb/Mucinex.  Discussed with pulmonary who is decreasing p.o. steroids at this time.  Successfully weaned off oxygen at this time.    · Restless leg syndrome.  Was improving on her Mirapex however she  was given Klonopin yesterday and Mirapex was DC'd and that made her very confused.  We will resume Mirapex and increase gradually as needed.  · Type 2 diabetes.  Acceptable Accu-Chek on sliding scale.   · Hypertension/atrial fibrillation.  Good blood pressure control.  Controlled rate atrial fibrillation.  No angina or congestive heart failure.  Not on home anticoagulation probably secondary to high risk of fall.  We will continue with atenolol/Norvasc/aspirin.  Lisinopril DC'd.  · VTE prophylaxis.  On Lovenox.  · Deconditioning and weakness.  On physical therapy recommends SNF    Discussed my findings and plan of treatment with the patient/nurse and multidisciplinary rounds.  Disposition.  Awaiting skilled facility placement.    Roxanne Fuller MD  [facility]  12/20/22  14:19 EST

## 2022-12-20 NOTE — CASE MANAGEMENT/SOCIAL WORK
Continued Stay Note  [facility]     Patient Name: Kandace Andrade  MRN: [id number]  Today's Date: 12/20/2022    Admit Date: 12/8/2022    Plan: Return home with [facility] following (referral pending)   Discharge Plan     Row Name 12/20/22 1533       Plan    Plan Return home with [facility] following (referral pending)    Patient/Family in Agreement with Plan yes    Plan Comments Per Michael - not approved for SNF - [id number] needs to be completed by tomorrow morning.  Sent to PA review but they do not think it would be approved.  Spoke with patient at bedside.  She states she just feels too weak/tired to care for herself.  She is agreeable to HH following - any agency that has availability - spoke with [facility] and she will review.  Plan is home with HH - pending acceptance.  CCP continues to follow.  Kiki FAIRCHILD    Row Name 12/20/22 1441       Plan    Plan [facility] - prec-ert pending, vs. home with HH    Plan Comments Spoke with [facility] is waiting pre-cert.  Waiting decision before deciding if will return home with HH.  CCP following.  Kiki FAIRCHILD                Expected Discharge Date and Time     Expected Discharge Date Expected Discharge Time    Dec 21, 2022             Becky S. [place], DEVORAH

## 2022-12-20 NOTE — CONSULTS
Patient seen by [facility] d/t depression. Patient interviewed alone in room 610 (6S). Introduced self and role. Patient agreed to be evaluated. Patient is A/OX4.    Patient sitting up in bedside chair and just finished session with physical therapy. Patient presented to ED via EMS on 12/8 for fall at home and it was discovered patient has pneumonia.. Patient with flat affect at beginning of interview but as the conversation progressed patient began to show more emotion and was laughing.     Patient is a 82 y.o.  female living by herself in a pation home at [facility]. The patient stated she was  for 63 years.  Latter day: Anglican.  Member at [employer]. Talked about her and her late  going to  [email or web address] frequently. The patient stated it is harder to go now and she is looking into a smaller [employer].  Children: 3 adult children. All live in [place].  Occupation: Stay at home mom.  Hobbies: [employer]un[employer], dice games, reading, going out to lunch with \"my posse.\" The patient stated she refers to her group of 5 friends as her \"posee.\"  Education: Highschool  Legal: No  : Tracey (daughter)  : No  Support System: Children, her \"posee\", sister and brother-in-law.   Hx of Violence: No  Hx of Abuse/Trauma: No  Do you feel safe at home: Yes    The patient has a mental health history of anxiety and depression. The patient rated her depression 4/10. Rated anxiety 6 or 8/10, stating \"If I get a bad phone call my mind goes to the worst place.\" She is currently taking paxil and Trazadone prescribed to her by her PCP. The patient stated she feels like these medications work well for her. The patient stated her sleep has been much improved since initiation of Trazadone. She denied any SI, or HI. She denied any suicide attempts in past. The patient denied any inpatient psychiatric treatment. When asked about a wish to be dead, patient stated \"it's flickered\" and \"I'm 82, I raised my  family, my  is gone, my purpose was to raise my kids.\" the patient she is praying to god to figure out her purpose now. The patient stated her group of friends (\"posee\"), her family, and her Jewish are protective factors for not progressing to wanting to her hurt/kill herself. The patient stated sometime she experiences A/V hallucinations at home, stating \"sometimes I will see something run across the window\", and stated she sometimes hears noises like a \"squeak.\" The patient stated this started after her  passed away. The patient does not have a therapist but did state she has a close friend, wgo is part of her \"posee\" who she can confide in. Does not have a psychiatrist.    The patient denied any substance use. Stated she used to smoke tobacco but has been quit for 20 or so years.    The patient conversed a lot about her late . The patient was able to share some memories she had of her late . Some of the memories made her tearful, others made her laugh. The patient stated it felt good to talk to someone.     The patient denied need for any outpatient resources at this time but was thankful for [facility] seeing her. Will continue to follow.

## 2022-12-20 NOTE — PLAN OF CARE
Patient on and off 1L NC this shift. Patient remains A & O x4, has had period of confusion since last night r/t klonopin given at bedtime 12/19. Mirapex restarted for RLS. IS encouraged, as well as bipap at night. Access consulted for possible worsening depression. They will continue to follow. Patient ambulated with PT, OOB to chair for several hours. No new complaints. SNF at [place].       Problem: Fall Injury Risk  Goal: Absence of Fall and Fall-Related Injury  Outcome: Ongoing, Progressing  Intervention: Identify and Manage Contributors  Recent Flowsheet Documentation  Taken 12/20/2022 1415 by Lisa Anderson RN  Medication Review/Management: medications reviewed  Taken 12/20/2022 1205 by Lisa Anderson RN  Medication Review/Management: medications reviewed  Taken 12/20/2022 1010 by Lisa Anderson RN  Medication Review/Management: medications reviewed  Taken 12/20/2022 0900 by Lisa Anderson RN  Medication Review/Management: medications reviewed  Intervention: Promote Injury-Free Environment  Recent Flowsheet Documentation  Taken 12/20/2022 1415 by Lisa Anderson RN  Safety Promotion/Fall Prevention: safety round/check completed  Taken 12/20/2022 1205 by Lisa Anderson RN  Safety Promotion/Fall Prevention: safety round/check completed  Taken 12/20/2022 1010 by Lisa Anderson RN  Safety Promotion/Fall Prevention: safety round/check completed     Problem: COPD (Chronic Obstructive Pulmonary Disease) Comorbidity  Goal: Maintenance of COPD Symptom Control  Outcome: Ongoing, Progressing  Intervention: Maintain COPD-Symptom Control  Recent Flowsheet Documentation  Taken 12/20/2022 1415 by Lisa Anderson RN  Medication Review/Management: medications reviewed  Taken 12/20/2022 1205 by Lisa Anderson RN  Medication Review/Management: medications reviewed  Taken 12/20/2022 1010 by Lisa Anderson RN  Medication Review/Management: medications reviewed  Taken 12/20/2022 0900 by Lisa Anderson RN  Medication  Review/Management: medications reviewed     Problem: Diabetes Comorbidity  Goal: Blood Glucose Level Within Targeted Range  Outcome: Ongoing, Progressing     Problem: Hypertension Comorbidity  Goal: Blood Pressure in Desired Range  Outcome: Ongoing, Progressing  Intervention: Maintain Blood Pressure Management  Recent Flowsheet Documentation  Taken 12/20/2022 1415 by Lisa Anderson RN  Medication Review/Management: medications reviewed  Taken 12/20/2022 1205 by Lisa Anderson RN  Medication Review/Management: medications reviewed  Taken 12/20/2022 1010 by Lisa Anderson RN  Medication Review/Management: medications reviewed  Taken 12/20/2022 0900 by Lisa Anderson RN  Medication Review/Management: medications reviewed     Problem: Obstructive Sleep Apnea Risk or Actual Comorbidity Management  Goal: Unobstructed Breathing During Sleep  Outcome: Ongoing, Progressing     Problem: Adult Inpatient Plan of Care  Goal: Plan of Care Review  Outcome: Ongoing, Progressing  Flowsheets  Taken 12/20/2022 1544 by Lisa Anderson RN  Progress: improving  Taken 12/20/2022 1354 by Elsi Mendoza PTA  Plan of Care Reviewed With: patient  Goal: Patient-Specific Goal (Individualized)  Outcome: Ongoing, Progressing  Goal: Absence of Hospital-Acquired Illness or Injury  Outcome: Ongoing, Progressing  Intervention: Identify and Manage Fall Risk  Recent Flowsheet Documentation  Taken 12/20/2022 1415 by Lisa Anderson RN  Safety Promotion/Fall Prevention: safety round/check completed  Taken 12/20/2022 1205 by Lisa Anderson RN  Safety Promotion/Fall Prevention: safety round/check completed  Taken 12/20/2022 1010 by Lisa Anderson RN  Safety Promotion/Fall Prevention: safety round/check completed  Intervention: Prevent Skin Injury  Recent Flowsheet Documentation  Taken 12/20/2022 1415 by Lisa Anderson RN  Body Position: position changed independently  Skin Protection:   adhesive use limited   incontinence pads  utilized  Taken 12/20/2022 1205 by Lisa Anderson RN  Body Position: position changed independently  Taken 12/20/2022 1010 by Lisa Anderson RN  Body Position: position changed independently  Taken 12/20/2022 0900 by Lisa Anderson RN  Body Position:   position changed independently   sitting up in bed   weight shifting  Skin Protection:   adhesive use limited   incontinence pads utilized  Intervention: Prevent and Manage VTE (Venous Thromboembolism) Risk  Recent Flowsheet Documentation  Taken 12/20/2022 1415 by Lisa Anderson RN  Activity Management: activity adjusted per tolerance  VTE Prevention/Management:   bilateral   sequential compression devices off   patient refused intervention  Taken 12/20/2022 1205 by Lisa Anderson RN  Activity Management: activity adjusted per tolerance  Taken 12/20/2022 1010 by Lisa Anderson RN  Activity Management: activity adjusted per tolerance  Taken 12/20/2022 0900 by Lisa Anderson RN  Activity Management: activity adjusted per tolerance  VTE Prevention/Management:   bilateral   sequential compression devices off   patient refused intervention  Range of Motion: active ROM (range of motion) encouraged  Intervention: Prevent Infection  Recent Flowsheet Documentation  Taken 12/20/2022 1415 by Lisa Anderson RN  Infection Prevention:   hand hygiene promoted   personal protective equipment utilized  Taken 12/20/2022 1205 by Lisa Anderson RN  Infection Prevention:   hand hygiene promoted   personal protective equipment utilized  Taken 12/20/2022 1010 by Lisa Anderson RN  Infection Prevention:   hand hygiene promoted   personal protective equipment utilized  Taken 12/20/2022 0900 by Lisa Anderson RN  Infection Prevention:   hand hygiene promoted   personal protective equipment utilized  Goal: Optimal Comfort and Wellbeing  Outcome: Ongoing, Progressing  Intervention: Provide Person-Centered Care  Recent Flowsheet Documentation  Taken 12/20/2022 1415 by Lisa Anderson  RN  Trust Relationship/Rapport:   care explained   choices provided  Taken 12/20/2022 0900 by Lisa Anderson RN  Trust Relationship/Rapport:   care explained   choices provided  Goal: Readiness for Transition of Care  Outcome: Ongoing, Progressing     Problem: Skin Injury Risk Increased  Goal: Skin Health and Integrity  Outcome: Ongoing, Progressing  Intervention: Optimize Skin Protection  Recent Flowsheet Documentation  Taken 12/20/2022 1415 by Lisa Anderson RN  Pressure Reduction Techniques:   frequent weight shift encouraged   weight shift assistance provided  Pressure Reduction Devices:   alternating pressure pump (ADD)   pressure-redistributing mattress utilized  Skin Protection:   adhesive use limited   incontinence pads utilized  Taken 12/20/2022 0900 by Lisa Anderson RN  Pressure Reduction Techniques:   frequent weight shift encouraged   weight shift assistance provided  Pressure Reduction Devices:   alternating pressure pump (ADD)   pressure-redistributing mattress utilized  Skin Protection:   adhesive use limited   incontinence pads utilized   Goal Outcome Evaluation:           Progress: improving

## 2022-12-20 NOTE — CASE MANAGEMENT/SOCIAL WORK
Continued Stay Note  [facility]     Patient Name: Kandace Andrade  MRN: [id number]  Today's Date: 12/20/2022    Admit Date: 12/8/2022    Plan: [facility] - prec-ert pending, vs. home with HH   Discharge Plan     Row Name 12/20/22 1441       Plan    Plan [facility] - prec-ert pending, vs. home with HH    Plan Comments Spoke with [facility] is waiting pre-cert.  Waiting decision before deciding if will return home with HH.  CCP following.  Kiki FAIRCHILD                   Expected Discharge Date and Time     Expected Discharge Date Expected Discharge Time    Dec 21, 2022             Becky S. [place], RN

## 2022-12-20 NOTE — THERAPY TREATMENT NOTE
Patient Name: Kandace Andrade  : 1940    MRN: [id number]                              Today's Date: 2022       Admit Date: 2022    Visit Dx:     ICD-10-CM ICD-9-CM   1. Pneumonia of right middle lobe due to infectious organism  J18.9 486   2. Sepsis, due to unspecified organism, unspecified whether acute organ dysfunction present ([facility])  A41.9 038.9     995.91   3. Hypoxia  R09.02 799.02   4. History of COPD  Z87.09 V12.69   5. History of diabetes mellitus  Z86.39 V12.29   6. History of atrial fibrillation  Z86.79 V12.59     Patient Active Problem List   Diagnosis   • Vitamin D deficiency   • KINDRA (obstructive sleep apnea)   • IBS (irritable bowel syndrome)   • Essential hypertension   • Herpes zoster   • Arthritis   • Anxiety and depression   • Other emphysema ([facility])   • Acute seasonal allergic rhinitis due to pollen   • Paroxysmal atrial fibrillation ([facility])   • Osteoporosis   • Mixed hyperlipidemia   • Encounter for Medicare annual wellness exam   • COPD (chronic obstructive pulmonary disease) ([facility])   • Mixed stress and urge urinary incontinence   • Type 2 diabetes mellitus without complication, without long-term current use of insulin ([facility])   • Arthritis of both hips   • Autonomic neuropathy   • GERD without esophagitis   • C. difficile colitis   • Hypokalemia   • Hospital discharge follow-up   • Decreased hearing of both ears   • Dizziness   • Nausea   • Hypercholesterolemia   • Primary insomnia   • Generalized weakness   • Other microscopic hematuria   • Acute cystitis with hematuria   • Confusion   • Dehydration   • Abdominal pain   • Chronic diarrhea   • Metabolic encephalopathy   • Left ankle swelling   • Diarrhea   • Overactive bladder   • Pneumonia of right middle lobe due to infectious organism   • Sepsis due to pneumonia ([facility])   • Restless leg syndrome     Past Medical History:   Diagnosis Date   • Acute seasonal allergic rhinitis due to pollen    • Anxiety and depression    • Arthritis     • Asthma    • C. difficile colitis    • COPD (chronic obstructive pulmonary disease) (HCC)    • Diabetes mellitus (HCC)     Pre diabetes   • Diarrhea    • Diverticulosis    • Fibula fracture    • Herpes zoster    • History of lumbosacral spine surgery    • Hypertension    • IBS (irritable bowel syndrome)    • Mixed hyperlipidemia    • KINDRA (obstructive sleep apnea)     NO MACHINE AT THIS TIME   • Osteoporosis    • Paroxysmal atrial fibrillation (HCC)    • Vitamin D deficiency      Past Surgical History:   Procedure Laterality Date   • APPENDECTOMY     • BACK SURGERY      lower x2   • CATARACT EXTRACTION     • COLONOSCOPY      patient doesn't recall    • COLONOSCOPY N/A 4/2/2021    Procedure: COLONOSCOPY to cecum with cecal biopsies;  Surgeon: Jarrod Duggan MD;  Location: [facility] ENDOSCOPY;  Service: Gastroenterology;  Laterality: N/A;  pre: diarhhea  post: diverticulosis, hemorrhoids   • HYSTERECTOMY      partial   • SHOULDER SURGERY Right    • SHOULDER SURGERY     • SIGMOIDOSCOPY N/A 10/19/2022    Procedure: SIGMOIDOSCOPY FLEXIBLE TO SIGMOID COLON WITH BIOPSIES;  Surgeon: Tonja Tanner MD;  Location: [facility] ENDOSCOPY;  Service: Gastroenterology;  Laterality: N/A;  PRE: DIARRHEA  POST: HEMORRHOIDS   • TONSILLECTOMY     • UPPER GASTROINTESTINAL ENDOSCOPY      patient doesn't recall       General Information     Row Name 12/20/22 [facility]          Physical Therapy Time and Intention    Document Type therapy note (daily note)  -PH     Mode of Treatment physical therapy  -PH     Row Name 12/20/22 [facility]          General Information    Existing Precautions/Restrictions fall  -PH     Row Name 12/20/22 [facility]          Safety Issues, Functional Mobility    Impairments Affecting Function (Mobility) balance;endurance/activity tolerance;strength;shortness of breath  -PH     Comment, Safety Issues/Impairments (Mobility) gt belt and non skid socks donned  -PH           User Key  (r) = Recorded By, (t) = Taken By, (c) = Cosigned By     Initials Name Provider Type     Elsi Mendoza PTA Physical Therapist Assistant               Mobility     Row Name 12/20/22 1352          Bed Mobility    Bed Mobility supine-sit  -PH     Supine-Sit [place] (Bed Mobility) standby assist  -PH     Comment, (Bed Mobility) a little extra time to EOB  -PH     Row Name 12/20/22 1352          Sit-Stand Transfer    Sit-Stand [place] (Transfers) supervision;verbal cues  -PH     Assistive Device (Sit-Stand Transfers) walker, front-wheeled  -PH     Comment, (Sit-Stand Transfer) extra time to complete w/ no overt LOB  -PH     Row Name 12/20/22 1352          Gait/Stairs (Locomotion)    [place] Level (Gait) supervision;standby assist  -PH     Assistive Device (Gait) walker, front-wheeled  -PH     Distance in Feet (Gait) 150', 20' to chair  -PH     Deviations/Abnormal Patterns (Gait) gait speed decreased;mikala decreased  -PH     [place] Level (Stairs) not tested  -PH     Comment, (Gait/Stairs) a little slower today from 12/19 w/ no overt LOB; attempted to get O2 sats w/ portable pulse ox w/ pt's fingers cold and not registering for monitor; pt limited in distance by fatigue. Pt's O2 after seated on toilet was 93-94%;  -PH           User Key  (r) = Recorded By, (t) = Taken By, (c) = Cosigned By    Initials Name Provider Type     ReidElsi brandon PTA Physical Therapist Assistant               Obj/Interventions     Row Name 12/20/22 [facility]4          Motor Skills    Therapeutic Exercise other (see comments)  LAQ, seated march; x 12 reps each  -PH     Row Name 12/20/22 [facility]          Balance    Balance Assessment sitting static balance;standing static balance  -PH     Static Sitting Balance modified independence  -PH     Static Standing Balance standby assist  -PH     Position/Device Used, Standing Balance walker, front-wheeled  -PH           User Key  (r) = Recorded By, (t) = Taken By, (c) = Cosigned By    Initials Name Provider Type    PH  Elsi Mendoza PTA Physical Therapist Assistant               Goals/Plan    No documentation.                Clinical Impression     Row Name 12/20/22 1356          Pain    Pretreatment Pain Rating 0/10 - no pain  -PH     Posttreatment Pain Rating 0/10 - no pain  -PH     Row Name 12/20/22 1355          Plan of Care Review    Plan of Care Reviewed With patient  -PH     Progress no change  -PH     Outcome Evaluation Pt seen by PT this PM for treatment. Pt sat up to EOB w/ SBA and extra time. Pt performed B LE ther ex for strengthening at EOB. Pt then stood w/ SV and use of fww. Pt amb 150' then to BR, followed by 20' to chair req SBA and use of fww. Pt quieter and a little slower than 12/19.  No overt LOB. Pt amb on RA w/ O2 sats at 93% after returning to room w/ pt req a few reminders to breath during amb. Pt on RA at end of session w/ RN notified.  Pt in chair at end of session. Discussed w/ pt home w/ HH PT vs SNF. 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 Pt further stated that she would prefer to return home w/ HH PT. Pt would benefit from HH PT for strengthening and to improve endurance.  -PH     Row Name 12/20/22 2254          Positioning and Restraints    Pre-Treatment Position in bed  -PH     Post Treatment Position chair  -PH     In Chair sitting;call light within reach;encouraged to call for assist;exit alarm on;notified nsg  -PH           User Key  (r) = Recorded By, (t) = Taken By, (c) = Cosigned By    Initials Name Provider Type    PH Elsi Mendoza PTA Physical Therapist Assistant               Outcome Measures     Row Name 12/20/22 0412          How much help from another person do you currently need...    Turning from your back to your side while in flat bed without using bedrails? 4  -PH     Moving from lying on back to sitting on the side of a flat bed without bedrails? 3  -PH     Moving to and from a bed to a chair (including a  wheelchair)? 4  -PH     Standing up from a chair using your arms (e.g., wheelchair, bedside chair)? 4  -PH     Climbing 3-5 steps with a railing? 2  -PH     To walk in hospital room? 3  -PH     AM-PAC 6 Clicks Score (PT) 20  -PH     Highest level of mobility 6 --> Walked 10 steps or more  -PH     Row Name 12/20/22 1401          Functional Assessment    Outcome Measure Options AM-PAC 6 Clicks Basic Mobility (PT)  -PH           User Key  (r) = Recorded By, (t) = Taken By, (c) = Cosigned By    Initials Name Provider Type    Elsi Carrion, PTA Physical Therapist Assistant                             Physical Therapy Education     Title: PT OT SLP Therapies (Done)     Topic: Physical Therapy (Done)     Point: Mobility training (Done)     Learning Progress Summary           Patient Acceptance, E,D, VU by PH at 12/20/2022 1401    Acceptance, E,TB, VU by PH at 12/19/2022 1608    Acceptance, E, NR by SV at 12/18/2022 0920    Acceptance, E,D, VU by PH at 12/15/2022 1222    Acceptance, E,D, VU by PH at 12/13/2022 1511    Acceptance, E, VU,NR by CN at 12/11/2022 1317                   Point: Home exercise program (Done)     Learning Progress Summary           Patient Acceptance, E,D, VU by PH at 12/20/2022 1401    Acceptance, E, NR by SV at 12/18/2022 0920    Acceptance, E,D, VU by PH at 12/15/2022 1222    Acceptance, E,D, VU by PH at 12/13/2022 1511    Acceptance, E, VU,NR by CN at 12/11/2022 1317                   Point: Body mechanics (Done)     Learning Progress Summary           Patient Acceptance, E,D, VU by PH at 12/20/2022 1401    Acceptance, E,TB, VU by PH at 12/19/2022 1608    Acceptance, E, NR by SV at 12/18/2022 0920    Acceptance, E,D, VU by PH at 12/15/2022 1222    Acceptance, E,D, VU by PH at 12/13/2022 1511    Acceptance, E, VU,NR by CN at 12/11/2022 1317                   Point: Precautions (Done)     Learning Progress Summary           Patient Acceptance, E,D, VU by  at 12/20/2022 9680     Acceptance, E,TB, VU by  at 12/19/2022 1608    Acceptance, E, NR by  at 12/18/2022 0920    Acceptance, E,D, VU by  at 12/15/2022 1222    Acceptance, E,D, VU by  at 12/13/2022 1511    Acceptance, E, VU,NR by  at 12/11/2022 1317                               User Key     Initials Effective Dates Name Provider Type Discipline     06/16/21 -  Ana Mckeon, PT Physical Therapist PT    CN 06/16/21 -  Alyssa Harrison, [place] Physical Therapist PT     06/16/21 -  Elsi Mendoza PTA Physical Therapist Assistant PT              PT Recommendation and Plan     Plan of Care Reviewed With: patient  Progress: no change  Outcome Evaluation: Pt seen by PT this PM for treatment. Pt sat up to EOB w/ SBA and extra time. Pt performed B LE ther ex for strengthening at EOB. Pt then stood w/ SV and use of fww. Pt amb 150' then to BR, followed by 20' to chair req SBA and use of fww. Pt quieter and a little slower than 12/19.  No overt LOB. Pt amb on RA w/ O2 sats at 93% after returning to room w/ pt req a few reminders to breath during amb. Pt on RA at end of session w/ RN notified.  Pt in chair at end of session. Discussed w/ pt home w/ HH PT vs SNF. 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 Pt further stated that she would prefer to return home w/ HH PT. Pt would benefit from HH PT for strengthening and to improve endurance.     Time Calculation:    PT Charges     Row Name 12/20/22 1402             Time Calculation    Start Time 1306  -PH      Stop Time 1331  -PH      Time Calculation (min) 25 min  -PH      PT Received On 12/20/22  -PH      PT - Next Appointment 12/21/22  -PH         Timed Charges    76594 - PT Therapeutic Exercise Minutes 2  -PH      98677 - PT Therapeutic Activity Minutes 23  -PH         Total Minutes    Timed Charges Total Minutes 25  -PH       Total Minutes 25  -PH            User Key  (r) = Recorded By, (t) = Taken By, (c) = Cosigned By     Initials Name Provider Type     Elsi Mendoza PTA Physical Therapist Assistant              Therapy Charges for Today     Code Description Service Date Service Provider Modifiers Qty    71230285450 HC PT THERAPEUTIC ACT EA 15 MIN 12/19/2022 Elsi Mendoza PTA GP 1    56064296375 HC PT THERAPEUTIC ACT EA 15 MIN 12/20/2022 Elsi Mendoza PTA GP 2          PT G-Codes  Outcome Measure Options: AM-PAC 6 Clicks Basic Mobility (PT)  AM-PAC 6 Clicks Score (PT): 20  PT Discharge Summary  Anticipated Discharge Disposition (PT): home with assist, home with home health    Elsi Mendoza PTA  12/20/2022

## 2022-12-20 NOTE — PLAN OF CARE
Goal Outcome Evaluation:  Plan of Care Reviewed With: patient        Progress: no change  Outcome Evaluation: Pt seen by PT this PM for treatment. Pt sat up to EOB w/ SBA and extra time. Pt performed B LE ther ex for strengthening at EOB. Pt then stood w/ SV and use of fww. Pt amb 150' then to BR, followed by 20' to chair req SBA and use of fww. Pt quieter and a little slower than 12/19.  No overt LOB. Pt amb on RA w/ O2 sats at 93% after returning to room w/ pt req a few reminders to breath during amb. Pt on RA at end of session w/ RN notified.  Pt in chair at end of session. Discussed w/ pt home w/ HH PT vs SNF. 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 Pt further stated that she would prefer to return home w/ HH PT. Pt would benefit from HH PT for strengthening and to improve endurance.      Patient was intermittently wearing a face mask during this therapy encounter. Therapist used appropriate personal protective equipment including mask and gloves.  Mask used was standard procedure mask. Appropriate PPE was worn during the entire therapy session. Hand hygiene was completed before and after therapy session. Patient is not in enhanced droplet precautions.

## 2022-12-20 NOTE — PROGRESS NOTES
[facility]  [phone]  Dr. Demian Linton    Subjective:  LOS: 12    Chief Complaint: COPD    Sitting by the side of the bed.  Exercising her legs.  On low-flow oxygen.  States she feels better.  Eager to leave the hospital either to rehab or home.    Objective   Vital Signs past 24hrs    Temp range: Temp (24hrs), Av.7 °F (36.5 °C), Min:97.6 °F (36.4 °C), Max:97.9 °F (36.6 °C)    BP range: BP: (104-138)/(76-95) 126/76  Pulse range: Heart Rate:  [59-99] 65  Resp rate range: Resp:  [18-20] 18    Device (Oxygen Therapy): nasal cannulaFlow (L/min):  [2-33] 2  Oxygen range:SpO2:  [93 %-98 %] 98 %      56.9 kg (125 lb 8 oz); Body mass index is 22.23 kg/m².    Intake/Output Summary (Last 24 hours) at 2022 1322  Last data filed at 2022 1005  Gross per 24 hour   Intake 480 ml   Output 600 ml   Net -120 ml       Physical Exam  Eyes:      Pupils: Pupils are equal, round, and reactive to light.   Cardiovascular:      Rate and Rhythm: Normal rate and regular rhythm.      Heart sounds: No murmur heard.  Pulmonary:      Breath sounds: Decreased breath sounds present.   Abdominal:      General: Bowel sounds are normal.      Palpations: Abdomen is soft.      Tenderness: There is no abdominal tenderness.   Musculoskeletal:         General: No swelling.   Neurological:      Mental Status: She is alert.       Results Review:    I have reviewed the laboratory and imaging data since the last note by [facility] physician.  My annotations are noted in assessment and plan.    Medication Review:  I have reviewed the current MAR.  My annotations are noted in assessment and plan.       Plan   Lines, Drains & Airways     Active LDAs     Name Placement date Placement time Site Days    Peripheral IV 22 Posterior;Right Forearm 22  Forearm  less than 1    External Urinary Catheter 22  --  11              PCCM Problems  Hypoxia  COPD  Bilateral mild groundglass infiltrates  Bilateral tiny  pleural effusion  KINDRA intolerant of CPAP  Lower extremity myoclonus      THESE ARE NEW MEDICAL PROBLEMS TO ME.    Plan of Treatment    On low-flow oxygen and tolerating.    Wheezing heard yesterday therefore on prednisone.  None heard today we will reduce dose of prednisone.    Bilateral mild groundglass infiltrates thought to be due to pulmonary edema.  Consider rescan with high-resolution CT chest in 8 to 12 weeks.    KINDRA and intolerant of CPAP.    On clonazepam for leg myoclonus.    Demian Linton MD  12/20/22  13:22 EST      Part of this note may be an electronic transcription/translation of spoken language to printed text using the Dragon Dictation System.

## 2022-12-20 NOTE — PLAN OF CARE
Goal Outcome Evaluation:  Plan of Care Reviewed With: patient        Progress: improving  Outcome Evaluation: Cooperative with care but emotionally flat during first part of shift. Second half of shift she became a little more lively and opened up about some of the challenges ahead for her since her spouse has passed. VSS. Continues on 3L of O2, which she currently uses at home. Prednisone 20 mg BID reduced to daily with breakfast. Mirapex dc'd. Klonopin BID added. Up in chair for part of the day. Safety measures in place.

## 2022-12-21 ENCOUNTER — READMISSION MANAGEMENT (OUTPATIENT)
Dept: CALL CENTER | Facility: HOSPITAL | Age: 82
End: 2022-12-21

## 2022-12-21 VITALS
TEMPERATURE: 97.5 F | DIASTOLIC BLOOD PRESSURE: 67 MMHG | OXYGEN SATURATION: 87 % | WEIGHT: 125 LBS | SYSTOLIC BLOOD PRESSURE: 122 MMHG | BODY MASS INDEX: 22.15 KG/M2 | HEIGHT: 63 IN | HEART RATE: 73 BPM | RESPIRATION RATE: 18 BRPM

## 2022-12-21 PROBLEM — J96.01 ACUTE HYPOXEMIC RESPIRATORY FAILURE: Status: ACTIVE | Noted: 2022-12-21

## 2022-12-21 PROBLEM — K52.9 CHRONIC DIARRHEA: Status: RESOLVED | Noted: 2022-07-19 | Resolved: 2022-12-21

## 2022-12-21 LAB
ALBUMIN SERPL-MCNC: 4.1 G/DL (ref 3.5–5.2)
ALBUMIN/GLOB SERPL: 1.8 G/DL
ALP SERPL-CCNC: 75 U/L (ref 39–117)
ALT SERPL W P-5'-P-CCNC: 19 U/L (ref 1–33)
ANION GAP SERPL CALCULATED.3IONS-SCNC: 8 MMOL/L (ref 5–15)
AST SERPL-CCNC: 18 U/L (ref 1–32)
BASOPHILS # BLD AUTO: 0.1 10*3/MM3 (ref 0–0.2)
BASOPHILS NFR BLD AUTO: 0.8 % (ref 0–1.5)
BILIRUB SERPL-MCNC: 0.2 MG/DL (ref 0–1.2)
BUN SERPL-MCNC: 17 MG/DL (ref 8–23)
BUN/CREAT SERPL: 25.8 (ref 7–25)
CALCIUM SPEC-SCNC: 9.7 MG/DL (ref 8.6–10.5)
CHLORIDE SERPL-SCNC: 100 MMOL/L (ref 98–107)
CO2 SERPL-SCNC: 28 MMOL/L (ref 22–29)
CREAT SERPL-MCNC: 0.66 MG/DL (ref 0.57–1)
DEPRECATED RDW RBC AUTO: 45.8 FL (ref 37–54)
EGFRCR SERPLBLD CKD-EPI 2021: 87.2 ML/MIN/1.73
EOSINOPHIL # BLD AUTO: 0.1 10*3/MM3 (ref 0–0.4)
EOSINOPHIL NFR BLD AUTO: 0.8 % (ref 0.3–6.2)
ERYTHROCYTE [DISTWIDTH] IN BLOOD BY AUTOMATED COUNT: 13 % (ref 12.3–15.4)
GLOBULIN UR ELPH-MCNC: 2.3 GM/DL
GLUCOSE BLDC GLUCOMTR-MCNC: 81 MG/DL (ref 70–130)
GLUCOSE SERPL-MCNC: 81 MG/DL (ref 65–99)
HCT VFR BLD AUTO: 37.5 % (ref 34–46.6)
HGB BLD-MCNC: 12.1 G/DL (ref 12–15.9)
IMM GRANULOCYTES # BLD AUTO: 0.13 10*3/MM3 (ref 0–0.05)
IMM GRANULOCYTES NFR BLD AUTO: 1 % (ref 0–0.5)
LYMPHOCYTES # BLD AUTO: 2.86 10*3/MM3 (ref 0.7–3.1)
LYMPHOCYTES NFR BLD AUTO: 22.5 % (ref 19.6–45.3)
MCH RBC QN AUTO: 30.7 PG (ref 26.6–33)
MCHC RBC AUTO-ENTMCNC: 32.3 G/DL (ref 31.5–35.7)
MCV RBC AUTO: 95.2 FL (ref 79–97)
MONOCYTES # BLD AUTO: 0.77 10*3/MM3 (ref 0.1–0.9)
MONOCYTES NFR BLD AUTO: 6.1 % (ref 5–12)
NEUTROPHILS NFR BLD AUTO: 68.8 % (ref 42.7–76)
NEUTROPHILS NFR BLD AUTO: 8.74 10*3/MM3 (ref 1.7–7)
NRBC BLD AUTO-RTO: 0 /100 WBC (ref 0–0.2)
PLATELET # BLD AUTO: 393 10*3/MM3 (ref 140–450)
PMV BLD AUTO: 9.9 FL (ref 6–12)
POTASSIUM SERPL-SCNC: 3.6 MMOL/L (ref 3.5–5.2)
PROT SERPL-MCNC: 6.4 G/DL (ref 6–8.5)
RBC # BLD AUTO: 3.94 10*6/MM3 (ref 3.77–5.28)
SODIUM SERPL-SCNC: 136 MMOL/L (ref 136–145)
WBC NRBC COR # BLD: 12.7 10*3/MM3 (ref 3.4–10.8)

## 2022-12-21 PROCEDURE — 85025 COMPLETE CBC W/AUTO DIFF WBC: CPT | Performed by: INTERNAL MEDICINE

## 2022-12-21 PROCEDURE — 82962 GLUCOSE BLOOD TEST: CPT

## 2022-12-21 PROCEDURE — 94664 DEMO&/EVAL PT USE INHALER: CPT

## 2022-12-21 PROCEDURE — 94799 UNLISTED PULMONARY SVC/PX: CPT

## 2022-12-21 PROCEDURE — 97530 THERAPEUTIC ACTIVITIES: CPT

## 2022-12-21 PROCEDURE — 80053 COMPREHEN METABOLIC PANEL: CPT | Performed by: INTERNAL MEDICINE

## 2022-12-21 PROCEDURE — 63710000001 PREDNISONE PER 1 MG: Performed by: INTERNAL MEDICINE

## 2022-12-21 RX ORDER — AMLODIPINE BESYLATE 5 MG/1
5 TABLET ORAL DAILY
Qty: 30 TABLET | Refills: 3 | Status: SHIPPED | OUTPATIENT
Start: 2022-12-22 | End: 2023-01-10 | Stop reason: SDUPTHER

## 2022-12-21 RX ORDER — IPRATROPIUM BROMIDE AND ALBUTEROL SULFATE 2.5; .5 MG/3ML; MG/3ML
3 SOLUTION RESPIRATORY (INHALATION)
Qty: 360 ML | Refills: 3 | Status: SHIPPED | OUTPATIENT
Start: 2022-12-21

## 2022-12-21 RX ORDER — PRAMIPEXOLE DIHYDROCHLORIDE 0.25 MG/1
0.25 TABLET ORAL 2 TIMES DAILY
Qty: 60 TABLET | Refills: 3 | Status: SHIPPED | OUTPATIENT
Start: 2022-12-21 | End: 2023-03-30

## 2022-12-21 RX ORDER — PREDNISONE 20 MG/1
20 TABLET ORAL
Qty: 5 TABLET | Refills: 0 | Status: SHIPPED | OUTPATIENT
Start: 2022-12-22 | End: 2023-01-10

## 2022-12-21 RX ORDER — BUDESONIDE AND FORMOTEROL FUMARATE DIHYDRATE 160; 4.5 UG/1; UG/1
2 AEROSOL RESPIRATORY (INHALATION)
Qty: 1 EACH | Refills: 3 | Status: SHIPPED | OUTPATIENT
Start: 2022-12-21 | End: 2023-01-10 | Stop reason: SDUPTHER

## 2022-12-21 RX ADMIN — GUAIFENESIN AND DEXTROMETHORPHAN HYDROBROMIDE 2 TABLET: 600; 30 TABLET, EXTENDED RELEASE ORAL at 08:24

## 2022-12-21 RX ADMIN — DEXAMETHASONE SODIUM PHOSPHATE 4 DROP: 1 SOLUTION/ DROPS OPHTHALMIC at 08:24

## 2022-12-21 RX ADMIN — IPRATROPIUM BROMIDE AND ALBUTEROL SULFATE 3 ML: .5; 3 SOLUTION RESPIRATORY (INHALATION) at 07:04

## 2022-12-21 RX ADMIN — PAROXETINE HYDROCHLORIDE HEMIHYDRATE 40 MG: 20 TABLET, FILM COATED ORAL at 05:58

## 2022-12-21 RX ADMIN — OXYBUTYNIN CHLORIDE 5 MG: 5 TABLET, EXTENDED RELEASE ORAL at 08:24

## 2022-12-21 RX ADMIN — ASPIRIN 81 MG: 81 TABLET, CHEWABLE ORAL at 08:24

## 2022-12-21 RX ADMIN — Medication 10 ML: at 08:24

## 2022-12-21 RX ADMIN — PANTOPRAZOLE SODIUM 40 MG: 40 TABLET, DELAYED RELEASE ORAL at 05:59

## 2022-12-21 RX ADMIN — BUDESONIDE AND FORMOTEROL FUMARATE DIHYDRATE 2 PUFF: 160; 4.5 AEROSOL RESPIRATORY (INHALATION) at 07:06

## 2022-12-21 RX ADMIN — EZETIMIBE 10 MG: 10 TABLET ORAL at 08:24

## 2022-12-21 RX ADMIN — SODIUM CHLORIDE SOLN NEBU 7% 4 ML: 7 NEBU SOLN at 07:04

## 2022-12-21 RX ADMIN — AMLODIPINE BESYLATE 5 MG: 5 TABLET ORAL at 08:24

## 2022-12-21 RX ADMIN — FLUTICASONE PROPIONATE 2 SPRAY: 50 SPRAY, METERED NASAL at 08:24

## 2022-12-21 RX ADMIN — CIPROFLOXACIN HYDROCHLORIDE 4 DROP: 3 SOLUTION/ DROPS OPHTHALMIC at 08:24

## 2022-12-21 RX ADMIN — PREDNISONE 20 MG: 20 TABLET ORAL at 08:24

## 2022-12-21 NOTE — PLAN OF CARE
Goal Outcome Evaluation:  Plan of Care Reviewed With: patient        Progress: no change  Outcome Evaluation: Pt seen by PT this date for treatment. Pt reported she feels ready to return home today. Pt sat up to EOB w/ SBA and stood w/ SV and use of fww w/ extra time. Pt was SV w/ no AD as she reached down to pull up pants. Pt amb w/ SBA and use of fww around nsg station. Pt slow w/ no overt LOB. Pt educ to rest as needed after amb around house upon return home and conserve energy. Pt verbally agreeable. Pt stated her dtr will be assisting her upon return home and working from home. Pt would benefit from  PT after dc.    Patient was intermittently wearing a face mask during this therapy encounter. Therapist used appropriate personal protective equipment including mask and gloves.  Mask used was standard procedure mask. Appropriate PPE was worn during the entire therapy session. Hand hygiene was completed before and after therapy session. Patient is not in enhanced droplet precautions.

## 2022-12-21 NOTE — NURSING NOTE
Access center follow up regarding depression: Some confusion overnight but no changes otherwise. Pt declined outpatient resources on initial assessment. Will follow briefly for support. CCP working towards d/c planning goals.

## 2022-12-21 NOTE — OUTREACH NOTE
Prep Survey    Flowsheet Row Responses   [facility] patient discharged from? [facility]   Is LACE score < 7 ? No   Eligibility [facility]   Date of Admission 12/08/22   Date of Discharge 12/21/22   Discharge Disposition Home or Self Care   Discharge diagnosis Sepsis due to pneumonia    Does the patient have one of the following disease processes/diagnoses(primary or secondary)? Sepsis   Does the patient have Home health ordered? Yes   What is the Home health agency?  [facility]    Is there a DME ordered? Yes   What DME was ordered? [facility] providing O2   Prep survey completed? Yes          EUGENE QUINTANILLA - Registered Nurse

## 2022-12-21 NOTE — CASE MANAGEMENT/SOCIAL WORK
Case Management Discharge Note      Final Note: DC'd home with family and [facility] following         Selected Continued Care - Discharged on 12/21/2022 Admission date: 12/8/2022 - Discharge disposition: Home-Health Care c    Destination Coordination complete.    Service Provider Selected Services Address Phone Fax Patient Preferred    [facility] [address] [address] [phone] --          Durable Medical Equipment Coordination complete.    Service Provider Selected Services Address Phone Fax Patient Preferred    [facility] [address], [facility] [address] [phone] --              Home Medical Care Coordination complete.    Service Provider Selected Services Address Phone Fax Patient Preferred    [facility] [address] [address] [phone] --               Transportation Services  Private: Car    Final Discharge Disposition Code: 06 - home with home health care

## 2022-12-21 NOTE — PROGRESS NOTES
Enter Query Response Below      Query Response: Unable to clinically determine.             If applicable, please update the problem list.     Patient: Kandace Andrade        : 1940  Account: [id number]           Admit Date: 2022        How to Respond to this query:       a. Click New Note     b. Answer query within the yellow box.                c. Update the Problem List, if applicable.      If you have any questions about this query contact me at:  [phone]    Dr. Fuller:    82 year old patient admitted  with sepsis and pneumonia. Per  Hospitalist PN, \"developing pulmonary edema\". Pulmonology PN  states \"Pulmonary infiltrate with interstitial edema and less likely pneumonia\".   Treatment has included discontinuing IV fluids, Lasix IV, supplemental oxygen, and monitoring intake and output.  After study, can the pulmonary edema be further specified as:    Acute pulmonary edema  Chronic pulmonary edema  Other, please specify: _____________  Unable to clinically determine     By submitting this query, we are merely seeking further clarification of documentation to accurately reflect all conditions that you are monitoring, evaluating, treating or that extend the hospitalization or utilize additional resources of care. Please utilize your independent clinical judgment when addressing the question(s) above.     This query and your response, once completed, will be entered into the legal medical record.    Sincerely,  Judith Wiley RN, BSN, CCDS  Clinical Documentation Integrity Program   [email or web address]

## 2022-12-21 NOTE — CASE MANAGEMENT/SOCIAL WORK
Continued Stay Note  [facility]     Patient Name: Kandace Andrade  MRN: [id number]  Today's Date: 12/21/2022    Admit Date: 12/8/2022    Plan: Return home with [facility] following and [facility] providing O2.   Discharge Plan     Row Name 12/21/22 0922       Plan    Plan Return home with [facility] following and [facility] providing O2.    Patient/Family in Agreement with Plan yes    Plan Comments Patient told PT that her daughter will work from home and assist her for a few days.  Spoke with patient at bedside, she is agreeable to [facility] for O2 and nebulizer - left message for Boby.  Left message for [facility] that patient will be DC'd.  Kiki FAIRCHILD                Expected Discharge Date and Time     Expected Discharge Date Expected Discharge Time    Dec 21, 2022             Becky S. Humeniuk, RN

## 2022-12-21 NOTE — PLAN OF CARE
Goal Outcome Evaluation:  Plan of Care Reviewed With: patient        Progress: improving   A&Ox4. 2L NC. Assist x1 with walker. SR on monitor. No patient complaints at this time. Discharge orders received. VSS. All needs met.

## 2022-12-21 NOTE — NURSING NOTE
Case Management/Social Work    Patient Name:  Kandace Andrade  YOB: 1940  MRN: [id number]  Admit Date:  12/8/2022        O2 sat 85% on RA at rest.  On 2l and sat 94%.      Electronically signed by:  Becky S. Humeniuk, RN  12/21/22 11:33 EST

## 2022-12-21 NOTE — PLAN OF CARE
Problem: Adult Inpatient Plan of Care  Goal: Plan of Care Review  Outcome: Ongoing, Progressing  Flowsheets (Taken 12/21/2022 0335)  Progress: improving  Plan of Care Reviewed With: patient  Outcome Evaluation: Slept well overnight. Remains on 1L NC. Slight amount of confusion overnight but reoriented. A&Ox4 now. VSS. Plan to D/C home with HH or to SNF.  \

## 2022-12-21 NOTE — THERAPY TREATMENT NOTE
Patient Name: Kandace Andrade  : 1940    MRN: [id number]                              Today's Date: 2022       Admit Date: 2022    Visit Dx:     ICD-10-CM ICD-9-CM   1. Pneumonia of right middle lobe due to infectious organism  J18.9 486   2. Sepsis, due to unspecified organism, unspecified whether acute organ dysfunction present ([facility])  A41.9 038.9     995.91   3. Hypoxia  R09.02 799.02   4. History of COPD  Z87.09 V12.69   5. History of diabetes mellitus  Z86.39 V12.29   6. History of atrial fibrillation  Z86.79 V12.59   7. Acute hypoxemic respiratory failure ([facility])  J96.01 518.81   8. Chronic obstructive pulmonary disease, unspecified COPD type ([facility])  J44.9 496   9. Weakness  R53.1 780.79     Patient Active Problem List   Diagnosis   • Vitamin D deficiency   • KINDRA (obstructive sleep apnea)   • IBS (irritable bowel syndrome)   • Essential hypertension   • Herpes zoster   • Arthritis   • Anxiety and depression   • Other emphysema ([facility])   • Acute seasonal allergic rhinitis due to pollen   • Paroxysmal atrial fibrillation ([facility])   • Osteoporosis   • Mixed hyperlipidemia   • Encounter for Medicare annual wellness exam   • COPD (chronic obstructive pulmonary disease) ([facility])   • Mixed stress and urge urinary incontinence   • Type 2 diabetes mellitus without complication, without long-term current use of insulin ([facility])   • Arthritis of both hips   • Autonomic neuropathy   • GERD without esophagitis   • C. difficile colitis   • Hypokalemia   • Hospital discharge follow-up   • Decreased hearing of both ears   • Dizziness   • Nausea   • Hypercholesterolemia   • Primary insomnia   • Generalized weakness   • Other microscopic hematuria   • Acute cystitis with hematuria   • Confusion   • Dehydration   • Abdominal pain   • Metabolic encephalopathy   • Left ankle swelling   • Diarrhea   • Overactive bladder   • Pneumonia of right middle lobe due to infectious organism   • Sepsis due to pneumonia ([facility])   • Restless  leg syndrome   • Acute hypoxemic respiratory failure ([facility])     Past Medical History:   Diagnosis Date   • Acute seasonal allergic rhinitis due to pollen    • Anxiety and depression    • Arthritis    • Asthma    • C. difficile colitis    • COPD (chronic obstructive pulmonary disease) ([facility])    • Diabetes mellitus ([facility])     Pre diabetes   • Diarrhea    • Diverticulosis    • Fibula fracture    • Herpes zoster    • History of lumbosacral spine surgery    • Hypertension    • IBS (irritable bowel syndrome)    • Mixed hyperlipidemia    • KINDRA (obstructive sleep apnea)     NO MACHINE AT THIS TIME   • Osteoporosis    • Paroxysmal atrial fibrillation ([facility])    • Vitamin D deficiency      Past Surgical History:   Procedure Laterality Date   • APPENDECTOMY     • BACK SURGERY      lower x2   • CATARACT EXTRACTION     • COLONOSCOPY      patient doesn't recall    • COLONOSCOPY N/A 4/2/2021    Procedure: COLONOSCOPY to cecum with cecal biopsies;  Surgeon: Jarrod Duggan MD;  Location: [facility] END[facility]COPY;  Service: Gastroenterology;  Laterality: N/A;  pre: diarhhea  post: diverticulosis, hemorrhoids   • HYSTERECTOMY      partial   • SHOULDER SURGERY Right    • SHOULDER SURGERY     • SIGMOIDOSCOPY N/A 10/19/2022    Procedure: SIGMOIDOSCOPY FLEXIBLE TO SIGMOID COLON WITH BIOPSIES;  Surgeon: Tonja Tanner MD;  Location: [facility] ENDOSCOPY;  Service: Gastroenterology;  Laterality: N/A;  PRE: DIARRHEA  POST: HEMORRHOIDS   • TONSILLECTOMY     • UPPER GASTROINTESTINAL ENDOSCOPY      patient doesn't recall       General Information     Row Name 12/21/22 0939          Physical Therapy Time and Intention    Document Type therapy note (daily note)  -PH     Mode of Treatment physical therapy  -PH     Row Name 12/21/22 0939          General Information    Existing Precautions/Restrictions fall  -PH     Row Name 12/21/22 0939          Safety Issues, Functional Mobility    Impairments Affecting Function (Mobility) balance;endurance/activity  tolerance;strength;shortness of breath  -PH     Comment, Safety Issues/Impairments (Mobility) gt belt and non skid socks donned  -PH           User Key  (r) = Recorded By, (t) = Taken By, (c) = Cosigned By    Initials Name Provider Type    Elsi Carrion PTA Physical Therapist Assistant               Mobility     Row Name 12/21/22 0940          Bed Mobility    Bed Mobility supine-sit  -PH     Supine-Sit [place] (Bed Mobility) standby assist  -PH     Supine-Sit-Supine [place] (Bed Mobility) standby assist  -PH     Assistive Device (Bed Mobility) bed rails;head of bed elevated  -PH     Comment, (Bed Mobility) extra time to EOB  -PH     Row Name 12/21/22 0940          Sit-Stand Transfer    Sit-Stand [place] (Transfers) supervision  -PH     Assistive Device (Sit-Stand Transfers) walker, front-wheeled  -PH     Comment, (Sit-Stand Transfer) slow w/ no overt LOB; pt reached down to pull up pants when standing w/ SV and no AD  -PH     Row Name 12/21/22 0940          Gait/Stairs (Locomotion)    [place] Level (Gait) supervision;standby assist  -PH     Assistive Device (Gait) walker, front-wheeled  -PH     Distance in Feet (Gait) 150'  -PH     Deviations/Abnormal Patterns (Gait) mikala decreased;gait speed decreased;stride length decreased  -PH     Bilateral Gait Deviations forward flexed posture  -PH     [place] Level (Stairs) not tested  -PH     Comment, (Gait/Stairs) slow w/ no overt LOB; pt limited by fatigue; O2 sats on RA at 94% after seated EOB from gait; pt reports that she has no steps to negotiate upon return home.  -PH           User Key  (r) = Recorded By, (t) = Taken By, (c) = Cosigned By    Initials Name Provider Type    Elsi Carrion PTA Physical Therapist Assistant               Obj/Interventions     Row Name 12/21/22 09          Balance    Balance Assessment sitting static balance;standing static balance  -PH     Static Sitting Balance modified independence   -PH     Static Standing Balance standby assist  -PH     Position/Device Used, Standing Balance walker, front-wheeled  -PH           User Key  (r) = Recorded By, (t) = Taken By, (c) = Cosigned By    Initials Name Provider Type    Elsi Carrion PTA Physical Therapist Assistant               Goals/Plan    No documentation.                Clinical Impression     Row Name 12/21/22 0942          Pain    Pretreatment Pain Rating 0/10 - no pain  -PH     Posttreatment Pain Rating 0/10 - no pain  -PH     Additional Documentation Pain Scale: Numbers Pre/Post-Treatment (Group)  -PH     Row Name 12/21/22 0942          Plan of Care Review    Plan of Care Reviewed With patient  -PH     Progress no change  -PH     Outcome Evaluation Pt seen by PT this date for treatment. Pt reported she feels ready to return home today. Pt sat up to EOB w/ SBA and stood w/ SV and use of fww w/ extra time. Pt was SV w/ no AD as she reached down to pull up pants. Pt amb w/ SBA and use of fww around nsg station. Pt slow w/ no overt LOB. Pt educ to rest as needed after amb around house upon return home and conserve energy. Pt verbally agreeable. Pt stated her dtr will be assisting her upon return home and working from home. Pt would benefit from  PT after dc.  -PH     Row Name 12/21/22 0942          Positioning and Restraints    Pre-Treatment Position in bed  -PH     Post Treatment Position bed  -PH     In Bed fowlers;call light within reach;encouraged to call for assist;exit alarm on  -PH           User Key  (r) = Recorded By, (t) = Taken By, (c) = Cosigned By    Initials Name Provider Type    Elsi Carrion PTA Physical Therapist Assistant               Outcome Measures     Row Name 12/21/22 0946          How much help from another person do you currently need...    Turning from your back to your side while in flat bed without using bedrails? 4  -PH     Moving from lying on back to sitting on the side of a flat bed without  bedrails? 3  -PH     Moving to and from a bed to a chair (including a wheelchair)? 3  -PH     Standing up from a chair using your arms (e.g., wheelchair, bedside chair)? 4  -PH     Climbing 3-5 steps with a railing? 3  -PH     To walk in hospital room? 3  -PH     AM-PAC 6 Clicks Score (PT) 20  -PH     Highest level of mobility 6 --> Walked 10 steps or more  -PH     Row Name 12/21/22 0946          Functional Assessment    Outcome Measure Options AM-PAC 6 Clicks Basic Mobility (PT)  -PH           User Key  (r) = Recorded By, (t) = Taken By, (c) = Cosigned By    Initials Name Provider Type    PH Elsi Mendoza PTA Physical Therapist Assistant                             Physical Therapy Education     Title: PT OT SLP Therapies (Resolved)     Topic: Physical Therapy (Resolved)     Point: Mobility training (Resolved)     Learning Progress Summary           Patient Acceptance, E,D, VU by PH at 12/20/2022 1401    Acceptance, E,TB, VU by PH at 12/19/2022 1608    Acceptance, E, NR by SV at 12/18/2022 0920    Acceptance, E,D, VU by PH at 12/15/2022 1222    Acceptance, E,D, VU by PH at 12/13/2022 1511    Acceptance, E, VU,NR by CN at 12/11/2022 1317                   Point: Home exercise program (Resolved)     Learning Progress Summary           Patient Acceptance, E,D, VU by PH at 12/20/2022 1401    Acceptance, E, NR by SV at 12/18/2022 0920    Acceptance, E,D, VU by PH at 12/15/2022 1222    Acceptance, E,D, VU by PH at 12/13/2022 1511    Acceptance, E, VU,NR by CN at 12/11/2022 1317                   Point: Body mechanics (Resolved)     Learning Progress Summary           Patient Acceptance, E,D, VU by PH at 12/20/2022 1401    Acceptance, E,TB, VU by PH at 12/19/2022 1608    Acceptance, E, NR by SV at 12/18/2022 0920    Acceptance, E,D, VU by PH at 12/15/2022 1222    Acceptance, E,D, VU by PH at 12/13/2022 1511    Acceptance, E, VU,NR by CN at 12/11/2022 1317                   Point: Precautions (Resolved)      Learning Progress Summary           Patient Acceptance, E,D, VU by  at 12/20/2022 1401    Acceptance, E,TB, VU by  at 12/19/2022 1608    Acceptance, E, NR by  at 12/18/2022 0920    Acceptance, E,D, VU by  at 12/15/2022 1222    Acceptance, E,D, VU by  at 12/13/2022 1511    Acceptance, E, VU,NR by  at 12/11/2022 1317                               User Key     Initials Effective Dates Name Provider Type Discipline     06/16/21 -  Ana Mckeon, PT Physical Therapist PT    CN 06/16/21 -  Alyssa Harrison, PT Physical Therapist PT     06/16/21 -  Elsi Mendoza PTA Physical Therapist Assistant PT              PT Recommendation and Plan     Plan of Care Reviewed With: patient  Progress: no change  Outcome Evaluation: Pt seen by PT this date for treatment. Pt reported she feels ready to return home today. Pt sat up to EOB w/ SBA and stood w/ SV and use of fww w/ extra time. Pt was SV w/ no AD as she reached down to pull up pants. Pt amb w/ SBA and use of fww around nsg station. Pt slow w/ no overt LOB. Pt educ to rest as needed after amb around house upon return home and conserve energy. Pt verbally agreeable. Pt stated her dtr will be assisting her upon return home and working from home. Pt would benefit from  PT after dc.     Time Calculation:    PT Charges     Row Name 12/21/22 0947             Time Calculation    Start Time 0828  -PH      Stop Time 0846  -PH      Time Calculation (min) 18 min  -PH      PT Received On 12/21/22  -PH      PT - Next Appointment 12/22/22  -PH         Timed Charges    08163 - PT Therapeutic Activity Minutes 18  -PH         Total Minutes    Timed Charges Total Minutes 18  -PH       Total Minutes 18  -PH            User Key  (r) = Recorded By, (t) = Taken By, (c) = Cosigned By    Initials Name Provider Type     Elsi Mendoza PTA Physical Therapist Assistant              Therapy Charges for Today     Code Description Service Date Service Provider  Modifiers Qty    64754903630 HC PT THERAPEUTIC ACT EA 15 MIN 12/20/2022 Elsi Mendoza, EMMA GP 2    81947372683 HC PT THERAPEUTIC ACT EA 15 MIN 12/21/2022 Elsi Mendoza PTA GP 1          PT G-Codes  Outcome Measure Options: AM-PAC 6 Clicks Basic Mobility (PT)  AM-PAC 6 Clicks Score (PT): 20  PT Discharge Summary  Anticipated Discharge Disposition (PT): home with home health, home with assist    Elsi Mendoza PTA  12/21/2022

## 2022-12-21 NOTE — DISCHARGE SUMMARY
Patient Name: Kandace Andrade  : 1940  MRN: [id number]    Date of Admission: 2022  Date of Discharge:  2022  Primary Care Physician: Cassi Bella MD      Discharge Diagnoses     Active Hospital Problems    Diagnosis  POA   • **Pneumonia of right middle lobe due to infectious organism [J18.9]  Yes   • Acute hypoxemic respiratory failure (HCC) [J96.01]  Yes   • Restless leg syndrome [G25.81]  Yes   • Sepsis due to pneumonia (HCC) [J18.9, A41.9]  Yes   • Generalized weakness [R53.1]  Yes   • Decreased hearing of both ears [H91.93]  Yes   • Autonomic neuropathy [G90.9]  Yes   • Type 2 diabetes mellitus without complication, without long-term current use of insulin (HCC) [E11.9]  Yes   • COPD (chronic obstructive pulmonary disease) (HCC) [J44.9]  Yes   • Paroxysmal atrial fibrillation (HCC) [I48.0]  Yes   • KINDRA (obstructive sleep apnea) [G47.33]  Yes   • Essential hypertension [I10]  Yes   • IBS (irritable bowel syndrome) [K58.9]  Yes   • Anxiety and depression [F41.9, F32.A]  Yes      Resolved Hospital Problems    Diagnosis Date Resolved POA   • Chronic diarrhea [K52.9] 2022 Yes        Hospital Course     Brief admission history and physical.  Please refer to the H&P for full detail.  A pleasant 82 years old white female who has a past history of chronic hypoxemic respiratory failure/COPD/chronic diarrhea/A. fib/type 2 diabetes/obstructive sleep apnea/irritable bowel syndrome/hypertension who presented to the emergency department with shortness of breath/weakness and fall.  Physical examination admission included a blood pressure of 98/57 a pulse of 67 temperature 99.3 respiratory rate of 18 and an O2 sats of 92% on oxygen rest of the examination is remarkable for old frail female/poor bilateral air entry at the bases controlled atrial fibrillation  Hospital course.  Initial ER evaluation included a CK that was normal.  Procalcitonin elevated at 0.31.  Magnesium was normal.  CBC was  normal except a white count of 22.9.  Troponin was negative.  proBNP was normal.  CMP normal except a random blood sugar of 124 and total protein of 5.8.  Respiratory PCR panel was negative.  Lactic acid elevated at 3.7.  Blood cultures obtained in the emergency department.  UA was negative for UTI.  Chest x-ray with left basilar opacification which is old and new pulmonary opacification of the right mid and lower lung concerning for pneumonia, right hemidiaphragm elevation.  The impression on this lady was that of an acute on chronic hypoxemic respiratory failure secondary to right-sided pneumonia associated with sepsis in a patient with a history of COPD and obstructive sleep apnea.  Blood cultures are obtained and the patient was started on Rocephin.  Blood cultures eventually came back negative.  Respiratory PCR panel was negative.  Urine Legionella and Streptococcus antigen were negative.  She was unable to produce sputum for studies.  She was also started on bronchodilators and Symbicort and Mucinex.  Pulmonary was consulted and followed up with the patient and she was started on steroid during her hospital stay because of progressive wheezes and at the time of discharge she was tapered down to and steroids p.o. in 5 days.  She failed weaning of the oxygen and her O2 sats were 87% on room air at the time of discharge and she was prescribed oxygen.  At the time of discharge she will be on DuoNeb//Symbicort.  She has declined CPAP or BiPAP.  He is to follow-up with pulmonology as an outpatient.  During this admission.  She was noted to have restless leg syndrome during this admission that has improved after initiation of Mirapex.  She has a history of type 2 diabetes.  Her A1c was 6 and she is not on any home medication she was placed on sliding scale insulin while she is in the hospital.  As far as her hypertension and atrial fibrillation.  Blood pressure was initially low and lisinopril was stopped.  Her A.  fib rate was controlled.  There was no evidence of angina or congestive heart failure.  She was not on home anticoagulation probably because she is a high risk of fall.  She follows up with cardiology.  We continued her aspirin/Norvasc at [facility] and her blood pressure remained stable and lisinopril was DC'd.  She is to follow-up with her cardiologist.  She developed deconditioning and weakness during this admission.  Physical therapy was consulted and worked with the patient.  A trial of skilled facility placement was done however she was not accepted and the eventual plan was to send her home with home health and physical therapy.  At the time of discharge she was asymptomatic and hemodynamically stable  Consultants     Consult Orders (all) (From admission, onward)     Start     Ordered    12/20/22 1114  Inpatient Access Center Consult  Once        Provider:  (Not yet assigned)    12/20/22 1113    12/12/22 1117  Inpatient Pulmonology Consult  Once        Specialty:  Pulmonary Disease  Provider:  Lukasz Segura MD    12/12/22 1116    12/08/22 2125  Inpatient Case Management  Consult  Once        Provider:  (Not yet assigned)    12/08/22 2124 12/08/22 1619  [facility] (on-call MD unless specified) Details  Once        Specialty:  Hospitalist  Provider:  Sammy Bullock MD    12/08/22 1618              Procedures     Imaging Results (All)     Procedure Component Value Units Date/Time    XR Chest 1 View [[id number]] Collected: 12/16/22 0724     Updated: 12/16/22 0806    Narrative:      CHEST SINGLE VIEW     HISTORY: Follow-up pleural effusion. Hypoxia.     COMPARISON: CT angiogram chest 12/12/2022, chest x-ray 12/11/2022,  12/08/2022.      FINDINGS: There is advanced pulmonary emphysema greatest in the upper  lobes. There is elevation of the right hemidiaphragm with mild adjacent  subsegmental right basilar atelectasis. There is mild hazy infiltrate  within the right upper lobe that was also  demonstrated with chest CT 4  days ago and this is without evidence for significant change. Left lung  is comparatively clear. Thoracic aorta is tortuous. Heart size within  normal limits.       Impression:      1. There is pulmonary emphysema with minimal hazy infiltrate within the  right upper lobe that is without evidence for significant change.  2. Elevation right hemidiaphragm with subsegmental right basilar  atelectasis.     This report was finalized on 12/16/2022 8:03 AM by Dr. Trevin Espinoza M.D.       CT Angiogram Chest [[id number]] Collected: 12/12/22 1619     Updated: 12/12/22 1638    Narrative:      CT ANGIOGRAM OF THE CHEST WITH CONTRAST INCLUDING RECONSTRUCTION IMAGES  12/12/2022     HISTORY: Shortness of breath. Possible pulmonary embolus.     Following the intravenous contrast injection CT angiography was  performed through the chest. Sagittal, coronal and 3-D reconstruction  images were reviewed.     The pulmonary arterial system is well opacified with no evidence of  bursal aortic and coronary calcification. A few shotty mediastinal nodes  are seen. Trace bilateral pleural effusions are seen with some mild  bilateral lower lobe atelectasis. There are some scattered somewhat  ill-defined groundglass appearing infiltrates in the right upper lobe  with some minimal groundglass opacity in the superior segment right  lower lobe such as on images 58 through 60.     There is mild dilatation of the thoracic aorta with the ascending aorta  measuring 3.9 cm in the ascending aorta measuring approximately 4.0 cm  just above the diaphragm. Small right renal cyst is seen.       Impression:      1. No evidence of pulmonary embolus.  2. Trace bilateral pleural effusions with mild bilateral lower lobe  atelectasis.  3. Ill-defined and somewhat groundglass infiltrate in the right upper  lobe with some minimal groundglass infiltrate in the superior segment  right lower lobe and these findings could be related to  mild pneumonia.  4. Mild dilatation of the thoracic aorta.  5. Short-term follow-up CT of the chest and proximal a 6 weeks to 8  weeks suggested.     Radiation dose reduction techniques were utilized, including automated  exposure control and exposure modulation based on body size.     This report was finalized on 12/12/2022 4:35 PM by Dr. Sonido Vela M.D.       XR Chest 1 View [[id number]] Collected: 12/11/22 1749     Updated: 12/11/22 1754    Narrative:      XR CHEST 1 VW-     HISTORY:  Hypoxemia.     COMPARISON:  Chest radiograph 12/8/2022     FINDINGS:    A single view of the chest was obtained. The cardiac silhouette and  mediastinal and hilar contours are not significantly changed. There is  calcific aortic atherosclerosis. There are new slightly prominent  interstitial opacities in both lungs concerning for possible  interstitial pulmonary edema. Previously noted pulmonary opacities in  the right lung are not significantly changed. There is no significant  pleural effusion. There is degenerative disc disease.     This report was finalized on 12/11/2022 5:51 PM by Dr. Kamla Weston M.D.       XR Chest 2 View [[id number]] Collected: 12/08/22 1523     Updated: 12/08/22 1531    Narrative:      Chest radiograph     HISTORY:Shortness of air     TECHNIQUE: Two PA and lateral radiographs     COMPARISON:Chest radiograph 07/07/2022       Impression:      FINDINGS AND IMPRESSION:  Minimal left basilar pulmonary opacification is grossly unchanged since  07/07/2022. There is new pulmonary opacification within the right mid  and lower lung concerning for pneumonia in the appropriate clinical  context. Asymmetric pulmonary edema is less likely. Correlation with  patient history is recommended. There is mild asymmetric elevation right  hemidiaphragm. No pneumothorax is seen. Cardiac silhouette is within  normal limits for size.     This report was finalized on 12/8/2022 3:28 PM by Dr. Christian López M.D.              Pertinent Labs     Results from last 7 days   Lab Units 12/21/22  0619 12/17/22  0757 12/16/22  0604 12/15/22  0704   WBC 10*3/mm3 12.70* 8.25 8.47 8.73   HEMOGLOBIN g/dL 12.1 12.5 12.2 11.6*   PLATELETS 10*3/mm3 393 344 300 290     Results from last 7 days   Lab Units 12/21/22  0619 12/17/22 0757 12/16/22  0604 12/15/22  0704   SODIUM mmol/L 136 136 139 137   POTASSIUM mmol/L 3.6 3.9 3.9 3.7   CHLORIDE mmol/L 100 99 104 102   CO2 mmol/L 28.0 28.0 27.1 29.0   BUN mg/dL 17 15 15 16   CREATININE mg/dL 0.66 0.64 0.64 0.60   GLUCOSE mg/dL 81 83 95 83   Estimated Creatinine Clearance: 57.8 mL/min (by C-G formula based on SCr of 0.66 mg/dL).  Results from last 7 days   Lab Units 12/21/22  0619   ALBUMIN g/dL 4.10   BILIRUBIN mg/dL 0.2   ALK PHOS U/L 75   AST (SGOT) U/L 18   ALT (SGPT) U/L 19     Results from last 7 days   Lab Units 12/21/22  0619 12/17/22 0757 12/16/22  0604 12/15/22  0704   CALCIUM mg/dL 9.7 9.1 9.3 8.6   ALBUMIN g/dL 4.10  --   --   --                Invalid input(s): LDLCALC      Imaging Results (Last 24 Hours)     ** No results found for the last 24 hours. **          Test Results Pending at Discharge         Discharge Exam   Physical Exam  Vitals.  Temperature 97.5 a pulse of 73 respirate rate of 18 and blood pressure 122/67 O2 sats of 87% on room air and 97% on 1L.  Excited to go home.  General.  Elderly female.    Normal mood and affect today..  No respiratory distress.  No acute pain.  Alert and oriented x3.    Eyes.  No pallor or jaundice.  Pupils equal round and reactive.  Intact extraocular musculature.  Oral cavity.  Moist mucous membrane.  Neck.  Supple.  No JVD.  No lymphadenopathy or thyromegaly.  Cardiovascular.  Regular rate and rhythm.  Grade 2 systolic murmur.  Chest.  Poor bilateral air entry with no added sounds.  Abdomen.  Soft lax.  No tenderness.  No guarding or rebound.  No distention.  Extremities.  No clubbing/cyanosis/edema.  CNS.  No acute focal neurological  deficits.     Discharge Details        Discharge Medications      New Medications      Instructions Start Date   budesonide-formoterol 160-4.5 MCG/ACT inhaler  Commonly known as: SYMBICORT  Replaces: Symbicort 80-4.5 MCG/ACT inhaler   2 puffs, Inhalation, 2 Times Daily - RT      ipratropium-albuterol 0.5-2.5 mg/3 ml nebulizer  Commonly known as: DUO-NEB   3 mL, Nebulization, 4 Times Daily - RT      pramipexole 0.25 MG tablet  Commonly known as: MIRAPEX   0.25 mg, Oral, 2 Times Daily      predniSONE 20 MG tablet  Commonly known as: DELTASONE   20 mg, Oral, Daily With Breakfast   Start Date: December 22, 2022        Changes to Medications      Instructions Start Date   amLODIPine 5 MG tablet  Commonly known as: NORVASC  What changed:   · medication strength  · how much to take   5 mg, Oral, Daily   Start Date: December 22, 2022        Continue These Medications      Instructions Start Date   acetaminophen 500 MG tablet  Commonly known as: TYLENOL   1,000 mg, Oral, Every 6 Hours PRN      aspirin 81 MG chewable tablet   81 mg, Oral, Daily      atenolol 25 MG tablet  Commonly known as: TENORMIN   12.5 mg, Oral, Nightly      cyclobenzaprine 10 MG tablet  Commonly known as: FLEXERIL   Take 1/2 to one tablet by mouth twice daily as needed for muscle spasms      ezetimibe 10 MG tablet  Commonly known as: ZETIA   10 mg, Oral, Daily      fluticasone 50 MCG/ACT nasal spray  Commonly known as: FLONASE   2 sprays, Nasal, Daily PRN      Melatonin 10 MG tablet   Oral, Nightly      omeprazole 40 MG capsule  Commonly known as: priLOSEC   40 mg, Oral, Daily      PARoxetine 40 MG tablet  Commonly known as: PAXIL   40 mg, Oral, Every Morning      solifenacin 5 MG tablet  Commonly known as: VESIcare   5 mg, Oral, Daily      traZODone 50 MG tablet  Commonly known as: DESYREL   TAKE 1/2 TO 1 TABLET BY MOUTH EVERY NIGHT 1 HOUR BEFORE BEDTIME      vitamin D 1.25 MG (72751 UT) capsule capsule  Commonly known as: ERGOCALCIFEROL   TAKE 1  CAPSULE BY MOUTH EVERY 7 DAYS         Stop These Medications    Budesonide 3 MG 24 hr capsule  Commonly known as: ENTOCORT EC     celecoxib 200 MG capsule  Commonly known as: CeleBREX     escitalopram 20 MG tablet  Commonly known as: LEXAPRO     famotidine 20 MG tablet  Commonly known as: PEPCID     lisinopril 20 MG tablet  Commonly known as: PRINIVIL,ZESTRIL     Symbicort 80-4.5 MCG/ACT inhaler  Generic drug: budesonide-formoterol  Replaced by: budesonide-formoterol 160-4.5 MCG/ACT inhaler            Allergies   Allergen Reactions   • Penicillins Hives and Other (See Comments)     Elevated BP... tolerated ceftriaxone 10/2020 admission   • Meclizine Hcl Dizziness   • Bactrim [Sulfamethoxazole-Trimethoprim] Hives   • Flagyl [Metronidazole] Other (See Comments)     HYPERACTIVITY.. INSOMNIA    • Statins Other (See Comments)     Headache, elevated liver enzymes, blurred vision         Discharge Disposition:  Condition: Stable    Diet:   Diet Order   Procedures   • Diet: Cardiac Diets, Diabetic Diets, Renal Diets; Healthy Heart (2-3 Na+); Consistent Carbohydrate; Low Sodium (2-3g), Low Potassium, Low Phosphorus; Texture: Regular Texture (IDDSI 7); Fluid Consistency: Thin (IDDSI 0)       Activity:   Activity Instructions     Activity as Tolerated      Other Activity Instructions      Activity Instructions: Home health physical therapy to evaluate and treat.  Use your walker with ambulation.    Up WIth Assist                CODE STATUS:    Code Status and Medical Interventions:   Ordered at: 12/08/22 2027     Code Status (Patient has no pulse and is not breathing):    CPR (Attempt to Resuscitate)     Medical Interventions (Patient has pulse or is breathing):    Full Support       Future Appointments   Date Time Provider Department Center   3/14/2023 12:45 PM Alexander Hammer MD [facility]     Additional Instructions for the Follow-ups that You Need to Schedule     Ambulatory Referral to Home Health   As  directed      Face to Face Visit Date: 12/21/2022    Follow-up provider for Plan of Care?: I treated the patient in an acute care facility and will not continue treatment after discharge.    Follow-up provider: LAKSHMI PEDERSEN [[id number]]    Reason/Clinical Findings: Deconditioning/weakness/acute hypoxemic respiratory failure/COPD/restless leg syndrome    Describe mobility limitations that make leaving home difficult: As above    Nursing/Therapeutic Services Requested: Skilled Nursing Physical Therapy    Skilled nursing orders: O2 instruction COPD management    PT orders: Therapeutic exercise Gait Training Transfer training Strengthening    Weight Bearing Status: As Tolerated    Frequency: 1 Week 1         Call MD With Problems / Concerns   As directed      Instructions: Call MD or return to ER if chest pain/palpitation/shortness of breath/fever or chills/hemoptysis/nausea or vomiting/wheezing    Order Comments: Instructions: Call MD or return to ER if chest pain/palpitation/shortness of breath/fever or chills/hemoptysis/nausea or vomiting/wheezing          Discharge Follow-up with PCP   As directed       Currently Documented PCP:    Lakshmi Pedersen MD    PCP Phone Number:    [phone]     Follow Up Details: Primary MD.  1 week.  Acute on chronic hypoxemic respiratory failure/right middle lobe pneumonia/COPD/restless leg syndrome/obstructive sleep apnea (refuses BiPAP)/type 2 diabetes/hypertension/A. fib/deconditioning         Discharge Follow-up with Specified Provider: Cardiology; 2 Weeks   As directed      To: Cardiology    Follow Up: 2 Weeks    Follow Up Details: Hypertension/A. fib         Discharge Follow-up with Specified Provider: Pulmonary acute on chronic hypoxemic respiratory failure/right middle lobe pneumonia/COPD/obstructive sleep apnea refuses CPAP; 2 Weeks   As directed      To: Pulmonary acute on chronic hypoxemic respiratory failure/right middle lobe pneumonia/COPD/obstructive sleep apnea  refuses CPAP    Follow Up: 2 Weeks            Contact information for follow-up providers     Cassi Bella MD .    Specialties: Family Medicine, Emergency Medicine, Urgent Care  Why: Primary MD.  1 week.  Acute on chronic hypoxemic respiratory failure/right middle lobe pneumonia/COPD/restless leg syndrome/obstructive sleep apnea (refuses BiPAP)/type 2 diabetes/hypertension/A. fib/deconditioning  Contact information:  [address] [facility]    [place] [address]  [phone]                   Contact information for after-discharge care     Destination     [facility] .    Service: Skilled Nursing  Contact information:  [address]  [facility] [address]  [phone]                 [facility] .    Service: Home Health Services  Contact information:  [address]  [facility] [address]  [phone]                               Time Spent on Discharge:  Greater than 30 minutes      Roxanne Fuller MD  [facility]  12/21/22  08:47 EST

## 2022-12-22 ENCOUNTER — TRANSITIONAL CARE MANAGEMENT TELEPHONE ENCOUNTER (OUTPATIENT)
Dept: CALL CENTER | Facility: HOSPITAL | Age: 82
End: 2022-12-22

## 2022-12-22 NOTE — OUTREACH NOTE
Call Center TCM Note    Flowsheet Row Responses   [facility] facility patient discharged from? [place]   Does the patient have one of the following disease processes/diagnoses(primary or secondary)? Sepsis   TCM attempt successful? No   Unsuccessful attempts Attempt 2          Viviana Vizcarra MA    12/22/2022, 16:20 EST

## 2022-12-22 NOTE — OUTREACH NOTE
Call Center TCM Note    Flowsheet Row Responses   [facility] patient discharged from? [place]   Does the patient have one of the following disease processes/diagnoses(primary or secondary)? Sepsis   TCM attempt successful? No   Unsuccessful attempts Attempt 1          Viviana Vizcarra MA    12/22/2022, 09:35 EST

## 2022-12-23 ENCOUNTER — TRANSITIONAL CARE MANAGEMENT TELEPHONE ENCOUNTER (OUTPATIENT)
Dept: CALL CENTER | Facility: HOSPITAL | Age: 82
End: 2022-12-23

## 2022-12-23 NOTE — OUTREACH NOTE
Call Center TCM Note    Flowsheet Row Responses   [facility] patient discharged from? [place]   Does the patient have one of the following disease processes/diagnoses(primary or secondary)? Sepsis   TCM attempt successful? No   Unsuccessful attempts Attempt 3          Viviana Merchant RN    12/23/2022, 08:33 EST

## 2023-01-10 ENCOUNTER — OFFICE VISIT (OUTPATIENT)
Dept: FAMILY MEDICINE CLINIC | Facility: CLINIC | Age: 83
End: 2023-01-10
Payer: MEDICARE

## 2023-01-10 VITALS
RESPIRATION RATE: 16 BRPM | OXYGEN SATURATION: 97 % | HEART RATE: 74 BPM | TEMPERATURE: 97.1 F | BODY MASS INDEX: 22.36 KG/M2 | HEIGHT: 63 IN | SYSTOLIC BLOOD PRESSURE: 124 MMHG | WEIGHT: 126.2 LBS | DIASTOLIC BLOOD PRESSURE: 78 MMHG

## 2023-01-10 DIAGNOSIS — E55.9 VITAMIN D DEFICIENCY: ICD-10-CM

## 2023-01-10 DIAGNOSIS — E11.9 TYPE 2 DIABETES MELLITUS WITHOUT COMPLICATION, WITHOUT LONG-TERM CURRENT USE OF INSULIN: ICD-10-CM

## 2023-01-10 DIAGNOSIS — G25.81 RESTLESS LEG SYNDROME: ICD-10-CM

## 2023-01-10 DIAGNOSIS — N32.81 OVERACTIVE BLADDER: ICD-10-CM

## 2023-01-10 DIAGNOSIS — E78.2 MIXED HYPERLIPIDEMIA: ICD-10-CM

## 2023-01-10 DIAGNOSIS — F32.A ANXIETY AND DEPRESSION: ICD-10-CM

## 2023-01-10 DIAGNOSIS — K21.9 GERD WITHOUT ESOPHAGITIS: ICD-10-CM

## 2023-01-10 DIAGNOSIS — J44.9 CHRONIC OBSTRUCTIVE PULMONARY DISEASE, UNSPECIFIED COPD TYPE: Primary | ICD-10-CM

## 2023-01-10 DIAGNOSIS — I10 ESSENTIAL HYPERTENSION: ICD-10-CM

## 2023-01-10 DIAGNOSIS — F41.9 ANXIETY AND DEPRESSION: ICD-10-CM

## 2023-01-10 DIAGNOSIS — E78.00 HYPERCHOLESTEROLEMIA: ICD-10-CM

## 2023-01-10 PROCEDURE — 99214 OFFICE O/P EST MOD 30 MIN: CPT | Performed by: FAMILY MEDICINE

## 2023-01-10 RX ORDER — FLUTICASONE PROPIONATE 50 MCG
2 SPRAY, SUSPENSION (ML) NASAL DAILY PRN
Qty: 18 ML | Refills: 5 | Status: SHIPPED | OUTPATIENT
Start: 2023-01-10

## 2023-01-10 RX ORDER — SOLIFENACIN SUCCINATE 5 MG/1
5 TABLET, FILM COATED ORAL DAILY
Qty: 30 TABLET | Refills: 3 | Status: SHIPPED | OUTPATIENT
Start: 2023-01-10

## 2023-01-10 RX ORDER — ESCITALOPRAM OXALATE 20 MG/1
20 TABLET ORAL DAILY
COMMUNITY
Start: 2022-12-28 | End: 2023-01-11 | Stop reason: SDUPTHER

## 2023-01-10 RX ORDER — BUDESONIDE AND FORMOTEROL FUMARATE DIHYDRATE 160; 4.5 UG/1; UG/1
2 AEROSOL RESPIRATORY (INHALATION)
Qty: 1 EACH | Refills: 3 | Status: SHIPPED | OUTPATIENT
Start: 2023-01-10

## 2023-01-10 RX ORDER — AMLODIPINE BESYLATE 5 MG/1
5 TABLET ORAL DAILY
Qty: 30 TABLET | Refills: 3 | Status: SHIPPED | OUTPATIENT
Start: 2023-01-10

## 2023-01-10 RX ORDER — CYCLOBENZAPRINE HCL 10 MG
TABLET ORAL
Qty: 20 TABLET | Refills: 0 | Status: SHIPPED | OUTPATIENT
Start: 2023-01-10

## 2023-01-10 RX ORDER — OMEPRAZOLE 40 MG/1
40 CAPSULE, DELAYED RELEASE ORAL DAILY
Qty: 90 CAPSULE | Refills: 1 | Status: SHIPPED | OUTPATIENT
Start: 2023-01-10

## 2023-01-10 RX ORDER — EZETIMIBE 10 MG/1
10 TABLET ORAL DAILY
Qty: 30 TABLET | Refills: 3 | Status: SHIPPED | OUTPATIENT
Start: 2023-01-10

## 2023-01-10 RX ORDER — ATENOLOL 25 MG/1
12.5 TABLET ORAL NIGHTLY
Qty: 45 TABLET | Refills: 1 | Status: SHIPPED | OUTPATIENT
Start: 2023-01-10

## 2023-01-10 NOTE — PROGRESS NOTES
Chief Complaint  HTN, HLD    Subjective        Kandace Andrade presents to [facility]  History of Present Illness  PT presents today for refills, follow up from hospital stay last month and  HTN- no CP or HA  COPD- on 2.5 O2  HLD- need refills and stable  Depression and anxiety- stable with med and no H or SI.        Patient presents for hospital follow-up and checkup.       Brief admission history and physical.  Please refer to the H&P for full detail.  A pleasant 82 years old white female who has a past history of chronic hypoxemic respiratory failure/COPD/chronic diarrhea/A. fib/type 2 diabetes/obstructive sleep apnea/irritable bowel syndrome/hypertension who presented to the emergency department with shortness of breath/weakness and fall.  Physical examination admission included a blood pressure of 98/57 a pulse of 67 temperature 99.3 respiratory rate of 18 and an O2 sats of 92% on oxygen rest of the examination is remarkable for old frail female/poor bilateral air entry at the bases controlled atrial fibrillation  Hospital course.  Initial ER evaluation included a CK that was normal.  Procalcitonin elevated at 0.31.  Magnesium was normal.  CBC was normal except a white count of 22.9.  Troponin was negative.  proBNP was normal.  CMP normal except a random blood sugar of 124 and total protein of 5.8.  Respiratory PCR panel was negative.  Lactic acid elevated at 3.7.  Blood cultures obtained in the emergency department.  UA was negative for UTI.  Chest x-ray with left basilar opacification which is old and new pulmonary opacification of the right mid and lower lung concerning for pneumonia, right hemidiaphragm elevation.  The impression on this lady was that of an acute on chronic hypoxemic respiratory failure secondary to right-sided pneumonia associated with sepsis in a patient with a history of COPD and obstructive sleep apnea.  Blood cultures are obtained and the patient was  started on Rocephin.  Blood cultures eventually came back negative.  Respiratory PCR panel was negative.  Urine Legionella and Streptococcus antigen were negative.  She was unable to produce sputum for studies.  She was also started on bronchodilators and Symbicort and Mucinex.  Pulmonary was consulted and followed up with the patient and she was started on steroid during her hospital stay because of progressive wheezes and at the time of discharge she was tapered down to and steroids p.o. in 5 days.  She failed weaning of the oxygen and her O2 sats were 87% on room air at the time of discharge and she was prescribed oxygen.  At the time of discharge she will be on DuoNeb//Symbicort.  She has declined CPAP or BiPAP.  He is to follow-up with pulmonology as an outpatient.  During this admission.  She was noted to have restless leg syndrome during this admission that has improved after initiation of Mirapex.  She has a history of type 2 diabetes.  Her A1c was 6 and she is not on any home medication she was placed on sliding scale insulin while she is in the hospital.  As far as her hypertension and atrial fibrillation.  Blood pressure was initially low and lisinopril was stopped.  Her A. fib rate was controlled.  There was no evidence of angina or congestive heart failure.  She was not on home anticoagulation probably because she is a high risk of fall.  She follows up with cardiology.  We continued her aspirin/Norvasc at atenolol and her blood pressure remained stable and lisinopril was DC'd.  She is to follow-up with her cardiologist.  She developed deconditioning and weakness during this admission.  Physical therapy was consulted and worked with the patient.  A trial of skilled facility placement was done however she was not accepted and the eventual plan was to send her home with home health and physical therapy.  At the time of discharge she was asymptomatic and hemodynamically stable    Objective   Vital  Signs:  /78 (BP Location: Left arm, Patient Position: Sitting, Cuff Size: Adult)   Pulse 74   Temp 97.1 °F (36.2 °C) (Temporal)   Resp 16   Ht 160 cm (63\")   Wt 57.2 kg (126 lb 3.2 oz)   SpO2 97%   BMI 22.36 kg/m²   Estimated body mass index is 22.36 kg/m² as calculated from the following:    Height as of this encounter: 160 cm (63\").    Weight as of this encounter: 57.2 kg (126 lb 3.2 oz).       BMI is within normal parameters. No other follow-up for BMI required.      Physical Exam  Vitals and nursing note reviewed.   Constitutional:       Appearance: Normal appearance. She is well-developed.   Cardiovascular:      Rate and Rhythm: Normal rate and regular rhythm.      Heart sounds: Normal heart sounds. No murmur heard.  Pulmonary:      Effort: Pulmonary effort is normal. No respiratory distress.      Breath sounds: Normal breath sounds. No stridor. No wheezing or rhonchi.   Neurological:      General: No focal deficit present.      Mental Status: She is alert and oriented to person, place, and time. She is not disoriented.   Psychiatric:         Mood and Affect: Mood normal.         Behavior: Behavior normal.        Result Review :                   Assessment and Plan   Diagnoses and all orders for this visit:    1. Chronic obstructive pulmonary disease, unspecified COPD type (HCC) (Primary)  -     Ambulatory Referral to Pulmonology  -     budesonide-formoterol (SYMBICORT) 160-4.5 MCG/ACT inhaler; Inhale 2 puffs 2 (Two) Times a Day.  Dispense: 1 each; Refill: 3    2. GERD without esophagitis  -     omeprazole (priLOSEC) 40 MG capsule; Take 1 capsule by mouth Daily.  Dispense: 90 capsule; Refill: 1    3. Overactive bladder  -     solifenacin (VESIcare) 5 MG tablet; Take 1 tablet by mouth Daily.  Dispense: 30 tablet; Refill: 3    4. Vitamin D deficiency    5. Mixed hyperlipidemia    6. Type 2 diabetes mellitus without complication, without long-term current use of insulin ([facility])    7. Restless leg  syndrome    8. Hypercholesterolemia  -     ezetimibe (ZETIA) 10 MG tablet; Take 1 tablet by mouth Daily.  Dispense: 30 tablet; Refill: 3    9. Essential hypertension  -     amLODIPine (NORVASC) 5 MG tablet; Take 1 tablet by mouth Daily.  Dispense: 30 tablet; Refill: 3  -     atenolol (TENORMIN) 25 MG tablet; Take 0.5 tablets by mouth Every Night.  Dispense: 45 tablet; Refill: 1    10. Anxiety and depression    Other orders  -     cyclobenzaprine (FLEXERIL) 10 MG tablet; Take 1/2 to one tablet by mouth twice daily as needed for muscle spasms  Dispense: 20 tablet; Refill: 0  -     fluticasone (FLONASE) 50 MCG/ACT nasal spray; 2 sprays into the nostril(s) as directed by provider Daily As Needed for Rhinitis.  Dispense: 18 mL; Refill: 5             Follow Up   Return in about 3 months (around 4/10/2023).  Patient was given instructions and counseling regarding her condition or for health maintenance advice. Please see specific information pulled into the AVS if appropriate.     Refills, labs and reviewed old records from hospital stay.  Will get pulm referral.

## 2023-01-11 RX ORDER — ESCITALOPRAM OXALATE 20 MG/1
20 TABLET ORAL DAILY
Qty: 30 TABLET | Refills: 1 | Status: SHIPPED | OUTPATIENT
Start: 2023-01-11 | End: 2023-01-11 | Stop reason: SDUPTHER

## 2023-01-11 RX ORDER — ESCITALOPRAM OXALATE 20 MG/1
20 TABLET ORAL DAILY
Qty: 90 TABLET | Refills: 1 | Status: SHIPPED | OUTPATIENT
Start: 2023-01-11

## 2023-01-11 NOTE — TELEPHONE ENCOUNTER
Relationship:     Best call back number: [phone]  Requested Prescriptions:   Requested Prescriptions     Pending Prescriptions Disp Refills   • escitalopram (LEXAPRO) 20 MG tablet       Sig: Take 1 tablet by mouth Daily.        Pharmacy where request should be sent: [id number] [place], [place] - [address] AT [facility] - [phone] [facility] [phone] FX     Additional details provided by patient: PER PATIENTS DAUGHTER THIS MEDICATION WAS DISCUSSED WITH YOU ON 1/10/23. PATIENT IS OUT OF MEDICATION AND MUST GET THIS PRESCRIBED TODAY. PLEASE CALL.  Does the patient have less than a 3 day supply:  [x] Yes  [] No    Would you like a call back once the refill request has been completed: [x] Yes [] No    If the office needs to give you a call back, can they leave a voicemail: [x] Yes [] No    Rodney Garcia Rep   01/11/23 09:37 EST

## 2023-01-11 NOTE — TELEPHONE ENCOUNTER
Rx Refill Note  Requested Prescriptions     Pending Prescriptions Disp Refills   • escitalopram (LEXAPRO) 20 MG tablet       Sig: Take 1 tablet by mouth Daily.      Last office visit with prescribing clinician: 1/10/2023   Last telemedicine visit with prescribing clinician: 1/16/2023   Next office visit with prescribing clinician: 1/16/2023

## 2023-01-16 ENCOUNTER — TELEMEDICINE (OUTPATIENT)
Dept: FAMILY MEDICINE CLINIC | Facility: CLINIC | Age: 83
End: 2023-01-16
Payer: MEDICARE

## 2023-01-16 DIAGNOSIS — Z00.00 ENCOUNTER FOR MEDICARE ANNUAL WELLNESS EXAM: Primary | ICD-10-CM

## 2023-01-16 PROBLEM — N30.01 ACUTE CYSTITIS WITH HEMATURIA: Status: RESOLVED | Noted: 2022-07-18 | Resolved: 2023-01-16

## 2023-01-16 PROBLEM — N39.0 UTI (URINARY TRACT INFECTION): Status: RESOLVED | Noted: 2022-07-20 | Resolved: 2023-01-16

## 2023-01-16 PROBLEM — A41.9 SEPSIS DUE TO PNEUMONIA (HCC): Status: RESOLVED | Noted: 2022-12-09 | Resolved: 2023-01-16

## 2023-01-16 PROBLEM — R19.7 DIARRHEA: Status: RESOLVED | Noted: 2021-03-29 | Resolved: 2023-01-16

## 2023-01-16 PROBLEM — J01.00 ACUTE NON-RECURRENT MAXILLARY SINUSITIS: Status: RESOLVED | Noted: 2019-11-22 | Resolved: 2023-01-16

## 2023-01-16 PROBLEM — J18.9 SEPSIS DUE TO PNEUMONIA: Status: RESOLVED | Noted: 2022-12-09 | Resolved: 2023-01-16

## 2023-01-16 PROBLEM — N39.0 ACUTE UTI (URINARY TRACT INFECTION): Status: RESOLVED | Noted: 2022-07-19 | Resolved: 2023-01-16

## 2023-01-16 PROBLEM — J96.01 ACUTE HYPOXEMIC RESPIRATORY FAILURE: Status: RESOLVED | Noted: 2022-12-21 | Resolved: 2023-01-16

## 2023-01-16 PROBLEM — N39.0 ACUTE UTI: Status: RESOLVED | Noted: 2020-10-19 | Resolved: 2023-01-16

## 2023-01-16 PROBLEM — J18.9 PNEUMONIA OF RIGHT MIDDLE LOBE DUE TO INFECTIOUS ORGANISM: Status: RESOLVED | Noted: 2022-12-08 | Resolved: 2023-01-16

## 2023-01-16 PROBLEM — K51.00 PANCOLITIS: Status: RESOLVED | Noted: 2020-11-24 | Resolved: 2023-01-16

## 2023-01-16 PROBLEM — H61.22 IMPACTED CERUMEN OF LEFT EAR: Status: RESOLVED | Noted: 2020-11-10 | Resolved: 2023-01-16

## 2023-01-16 PROCEDURE — 96160 PT-FOCUSED HLTH RISK ASSMT: CPT | Performed by: FAMILY MEDICINE

## 2023-01-16 PROCEDURE — 1170F FXNL STATUS ASSESSED: CPT | Performed by: FAMILY MEDICINE

## 2023-01-16 PROCEDURE — G0439 PPPS, SUBSEQ VISIT: HCPCS | Performed by: FAMILY MEDICINE

## 2023-01-16 PROCEDURE — 1159F MED LIST DOCD IN RCRD: CPT | Performed by: FAMILY MEDICINE

## 2023-01-16 NOTE — PROGRESS NOTES
The ABCs of the Annual Wellness Visit  Subsequent Medicare Wellness Visit    Subjective      Kandace Andrade is a 82 y.o. female who presents for a Subsequent Medicare Wellness Visit.  Patient consented to a televisit due to COVID-19 pandemic.  She is at home and I am at my desk at [facility].  She presents for Medicare wellness exam.  The following portions of the patient's history were reviewed and   updated as appropriate: allergies, current medications, past family history, past medical history, past social history, past surgical history and problem list.    Compared to one year ago, the patient feels her physical   health is the same.    Compared to one year ago, the patient feels her mental   health is the same.    Recent Hospitalizations:  This patient has had a [facility] admission record on file within the last 365 days.    Current Medical Providers:  Patient Care Team:  Cassi Bella MD as PCP - General (Family Medicine)    Outpatient Medications Prior to Visit   Medication Sig Dispense Refill   • acetaminophen (TYLENOL) 500 MG tablet Take 1,000 mg by mouth Every 6 (Six) Hours As Needed for Mild Pain .     • amLODIPine (NORVASC) 5 MG tablet Take 1 tablet by mouth Daily. 30 tablet 3   • aspirin 81 MG chewable tablet Chew 81 mg Daily.     • atenolol (TENORMIN) 25 MG tablet Take 0.5 tablets by mouth Every Night. 45 tablet 1   • budesonide-formoterol (SYMBICORT) 160-4.5 MCG/ACT inhaler Inhale 2 puffs 2 (Two) Times a Day. 1 each 3   • cyclobenzaprine (FLEXERIL) 10 MG tablet Take 1/2 to one tablet by mouth twice daily as needed for muscle spasms 20 tablet 0   • escitalopram (LEXAPRO) 20 MG tablet Take 1 tablet by mouth Daily. 90 tablet 1   • ezetimibe (ZETIA) 10 MG tablet Take 1 tablet by mouth Daily. 30 tablet 3   • fluticasone (FLONASE) 50 MCG/ACT nasal spray 2 sprays into the nostril(s) as directed by provider Daily As Needed for Rhinitis. 18 mL 5   • ipratropium-albuterol (DUO-NEB) 0.5-2.5 mg/3 ml  nebulizer Take 3 mL by nebulization 4 (Four) Times a Day. 360 mL 3   • Melatonin 10 MG tablet Take  by mouth Every Night.     • omeprazole (priLOSEC) 40 MG capsule Take 1 capsule by mouth Daily. 90 capsule 1   • pramipexole (MIRAPEX) 0.25 MG tablet Take 1 tablet by mouth 2 (Two) Times a Day. 60 tablet 3   • solifenacin (VESIcare) 5 MG tablet Take 1 tablet by mouth Daily. 30 tablet 3   • traZODone (DESYREL) 50 MG tablet TAKE 1/2 TO 1 TABLET BY MOUTH EVERY NIGHT 1 HOUR BEFORE BEDTIME 30 tablet 1   • vitamin D (ERGOCALCIFEROL) 1.25 MG (06938 UT) capsule capsule TAKE 1 CAPSULE BY MOUTH EVERY 7 DAYS 5 capsule 5     No facility-administered medications prior to visit.       No opioid medication identified on active medication list. I have reviewed chart for other potential  high risk medication/s and harmful drug interactions in the elderly.          Aspirin is on active medication list. Aspirin use is indicated based on review of current medical condition/s. Pros and cons of this therapy have been discussed today. Benefits of this medication outweigh potential harm.  Patient has been encouraged to continue taking this medication.  .      Patient Active Problem List   Diagnosis   • Vitamin D deficiency   • KINDRA (obstructive sleep apnea)   • IBS (irritable bowel syndrome)   • Essential hypertension   • Herpes zoster   • Arthritis   • Anxiety and depression   • Other emphysema ([facility])   • Acute seasonal allergic rhinitis due to pollen   • Paroxysmal atrial fibrillation ([facility])   • Osteoporosis   • Mixed hyperlipidemia   • Encounter for Medicare annual wellness exam   • COPD (chronic obstructive pulmonary disease) ([facility])   • Mixed stress and urge urinary incontinence   • Type 2 diabetes mellitus without complication, without long-term current use of insulin ([facility])   • Arthritis of both hips   • Autonomic neuropathy   • GERD without esophagitis   • C. difficile colitis   • Hypokalemia   • Hospital discharge follow-up   • Decreased  "hearing of both ears   • Dizziness   • Nausea   • Hypercholesterolemia   • Primary insomnia   • Generalized weakness   • Other microscopic hematuria   • Confusion   • Dehydration   • Abdominal pain   • Metabolic encephalopathy   • Left ankle swelling   • Diarrhea   • Overactive bladder   • Restless leg syndrome     Advance Care Planning  Advance Directive is not on file.  ACP discussion was held with the patient during this visit. Patient has an advance directive (not in EMR), copy requested.     Objective    There were no vitals filed for this visit.  Estimated body mass index is 22.36 kg/m² as calculated from the following:    Height as of 1/10/23: 160 cm (63\").    Weight as of 1/10/23: 57.2 kg (126 lb 3.2 oz).    BMI is within normal parameters. No other follow-up for BMI required.      Does the patient have evidence of cognitive impairment?   No    Lab Results   Component Value Date    HGBA1C 6.00 (H) 2022          HEALTH RISK ASSESSMENT    Smoking Status:  Social History     Tobacco Use   Smoking Status Former   • Packs/day: 1.00   • Years: 25.00   • Pack years: 25.00   • Types: Cigarettes   • Quit date: 2008   • Years since quittin.7   Smokeless Tobacco Never   Tobacco Comments    CAFFEINE USE - 1 CUP COFFEE/ DIET COKE     Alcohol Consumption:  Social History     Substance and Sexual Activity   Alcohol Use No     Fall Risk Screen:    GIOVANNI Fall Risk Assessment has not been completed.    Depression Screening:  PHQ-2/PHQ-9 Depression Screening 2023   Little Interest or Pleasure in Doing Things 0-->not at all   Feeling Down, Depressed or Hopeless 0-->not at all   PHQ-9: Brief Depression Severity Measure Score 0       Health Habits and Functional and Cognitive Screening:  Functional & Cognitive Status 2023   Do you have difficulty preparing food and eating? No   Do you have difficulty bathing yourself, getting dressed or grooming yourself? No   Do you have difficulty using the toilet? No "   Do you have difficulty moving around from place to place? No   Do you have trouble with steps or getting out of a bed or a chair? No   Current Diet Well Balanced Diet   Dental Exam Not up to date   Eye Exam Up to date   Exercise (times per week) 3 times per week   Current Exercises Include Walking   Current Exercise Activities Include -   Do you need help using the phone?  No   Are you deaf or do you have serious difficulty hearing?  No   Do you need help with transportation? Yes   Do you need help shopping? No   Do you need help preparing meals?  No   Do you need help with housework?  Yes   Do you need help with laundry? No   Do you need help taking your medications? No   Do you need help managing money? No   Do you ever drive or ride in a car without wearing a seat belt? No   Have you felt unusual stress, anger or loneliness in the last month? Yes   Who do you live with? Alone   If you need help, do you have trouble finding someone available to you? No   Have you been bothered in the last four weeks by sexual problems? No   Do you have difficulty concentrating, remembering or making decisions? No       Age-appropriate Screening Schedule:  Refer to the list below for future screening recommendations based on patient's age, sex and/or medical conditions. Orders for these recommended tests are listed in the plan section. The patient has been provided with a written plan.    Health Maintenance   Topic Date Due   • ZOSTER VACCINE (1 of 2) Never done   • DIABETIC EYE EXAM  Never done   • DXA SCAN  07/17/2020   • URINE MICROALBUMIN  02/18/2023   • LIPID PANEL  05/19/2023   • HEMOGLOBIN A1C  06/09/2023   • TDAP/TD VACCINES (2 - Td or Tdap) 02/08/2030   • INFLUENZA VACCINE  Completed                CMS Preventative Services Quick Reference  Risk Factors Identified During Encounter:    Inactivity/Sedentary: Patient was advised to exercise at least 150 minutes a week per CDC recommendations.    The above risks/problems  have been discussed with the patient.  Pertinent information has been shared with the patient in the After Visit Summary.    Diagnoses and all orders for this visit:    1. Encounter for Medicare annual wellness exam (Primary)        Follow Up:   Next Medicare Wellness visit to be scheduled in 1 year.      An After Visit Summary and PPPS were made available to the patient.      25 minutes spent on this televisit and Medicare wellness exam.  MWE done and patient to work on diet and exercise for Preventive Counseling.

## 2023-01-20 ENCOUNTER — TRANSCRIBE ORDERS (OUTPATIENT)
Dept: ADMINISTRATIVE | Facility: HOSPITAL | Age: 83
End: 2023-01-20
Payer: MEDICARE

## 2023-01-20 DIAGNOSIS — R91.8 LUNG MASS: Primary | ICD-10-CM

## 2023-01-23 DIAGNOSIS — E78.00 HYPERCHOLESTEROLEMIA: ICD-10-CM

## 2023-01-23 RX ORDER — EZETIMIBE 10 MG/1
10 TABLET ORAL DAILY
Qty: 30 TABLET | Refills: 3 | OUTPATIENT
Start: 2023-01-23

## 2023-01-25 DIAGNOSIS — F51.01 PRIMARY INSOMNIA: ICD-10-CM

## 2023-01-25 RX ORDER — TRAZODONE HYDROCHLORIDE 50 MG/1
TABLET ORAL
Qty: 30 TABLET | Refills: 1 | Status: SHIPPED | OUTPATIENT
Start: 2023-01-25

## 2023-01-25 NOTE — TELEPHONE ENCOUNTER
Rx Refill Note  Requested Prescriptions     Pending Prescriptions Disp Refills   • traZODone (DESYREL) 50 MG tablet [Pharmacy Med Name: TRAZODONE 50MG TABLETS] 30 tablet 1     Sig: TAKE 1/2 TO 1 TABLET BY MOUTH EVERY NIGHT 1 HOUR BEFORE BEDTIME      Last office visit with prescribing clinician: 1/10/2023   Last telemedicine visit with prescribing clinician: 4/10/2023   Next office visit with prescribing clinician: 4/10/2023

## 2023-02-06 ENCOUNTER — TELEMEDICINE (OUTPATIENT)
Dept: FAMILY MEDICINE CLINIC | Facility: CLINIC | Age: 83
End: 2023-02-06
Payer: MEDICARE

## 2023-02-06 DIAGNOSIS — F51.01 PRIMARY INSOMNIA: ICD-10-CM

## 2023-02-06 DIAGNOSIS — R53.1 GENERALIZED WEAKNESS: ICD-10-CM

## 2023-02-06 DIAGNOSIS — R60.0 EDEMA OF BOTH LEGS: Primary | ICD-10-CM

## 2023-02-06 DIAGNOSIS — R53.83 OTHER FATIGUE: ICD-10-CM

## 2023-02-06 PROCEDURE — 99214 OFFICE O/P EST MOD 30 MIN: CPT | Performed by: FAMILY MEDICINE

## 2023-02-06 NOTE — PROGRESS NOTES
CHIEF COMPLAINT:feet swelling and ankle swelling and fatigue.   Subjective   Kandace Andrade is a 82 y.o. female.     History of Present Illness pt presents with daughter.    Patient presents during the Covid-19 pandemic/federally declared [facility] public health emergency.  This service was conducted via video visit  PT is at home and I am at my desk at my office.  Via [employer]   She was placed on amlodipine about a month ago and taken off the lisinopril.   For the past 10 days She has been having some foot and leg swelling and fatigue.  No change in wt over past 10 days.  She has been using inhaler as well.  Uses neb prn.    Trazodone may be making her tired.  She is taking this for insomnia.       The following portions of the patient's history were reviewed and updated as appropriate: allergies, current medications, past family history, past medical history, past social history, past surgical history and problem list.    Review of Systems    Patient Active Problem List   Diagnosis   • Vitamin D deficiency   • KINDRA (obstructive sleep apnea)   • IBS (irritable bowel syndrome)   • Essential hypertension   • Herpes zoster   • Arthritis   • Anxiety and depression   • Other emphysema ([facility])   • Acute seasonal allergic rhinitis due to pollen   • Paroxysmal atrial fibrillation (HCC)   • Osteoporosis   • Mixed hyperlipidemia   • Encounter for Medicare annual wellness exam   • COPD (chronic obstructive pulmonary disease) ([facility])   • Mixed stress and urge urinary incontinence   • Type 2 diabetes mellitus without complication, without long-term current use of insulin ([facility])   • Arthritis of both hips   • Autonomic neuropathy   • GERD without esophagitis   • C. difficile colitis   • Hypokalemia   • Hospital discharge follow-up   • Decreased hearing of both ears   • Dizziness   • Nausea   • Hypercholesterolemia   • Primary insomnia   • Generalized weakness   • Other microscopic hematuria   • Confusion   • Dehydration   •  Abdominal pain   • Metabolic encephalopathy   • Left ankle swelling   • Diarrhea   • Overactive bladder   • Restless leg syndrome   • Edema of both legs   • Other fatigue       Allergies   Allergen Reactions   • Penicillins Hives and Other (See Comments)     Elevated BP... tolerated ceftriaxone 10/2020 admission   • Meclizine Hcl Dizziness   • Bactrim [Sulfamethoxazole-Trimethoprim] Hives   • Flagyl [Metronidazole] Other (See Comments)     HYPERACTIVITY.. INSOMNIA    • Statins Other (See Comments)     Headache, elevated liver enzymes, blurred vision         Current Outpatient Medications:   •  acetaminophen (TYLENOL) 500 MG tablet, Take 1,000 mg by mouth Every 6 (Six) Hours As Needed for Mild Pain ., Disp: , Rfl:   •  amLODIPine (NORVASC) 5 MG tablet, Take 1 tablet by mouth Daily., Disp: 30 tablet, Rfl: 3  •  aspirin 81 MG chewable tablet, Chew 81 mg Daily., Disp: , Rfl:   •  atenolol (TENORMIN) 25 MG tablet, Take 0.5 tablets by mouth Every Night., Disp: 45 tablet, Rfl: 1  •  budesonide-formoterol (SYMBICORT) 160-4.5 MCG/ACT inhaler, Inhale 2 puffs 2 (Two) Times a Day., Disp: 1 each, Rfl: 3  •  cyclobenzaprine (FLEXERIL) 10 MG tablet, Take 1/2 to one tablet by mouth twice daily as needed for muscle spasms, Disp: 20 tablet, Rfl: 0  •  escitalopram (LEXAPRO) 20 MG tablet, Take 1 tablet by mouth Daily., Disp: 90 tablet, Rfl: 1  •  ezetimibe (ZETIA) 10 MG tablet, Take 1 tablet by mouth Daily., Disp: 30 tablet, Rfl: 3  •  fluticasone (FLONASE) 50 MCG/ACT nasal spray, 2 sprays into the nostril(s) as directed by provider Daily As Needed for Rhinitis., Disp: 18 mL, Rfl: 5  •  ipratropium-albuterol (DUO-NEB) 0.5-2.5 mg/3 ml nebulizer, Take 3 mL by nebulization 4 (Four) Times a Day., Disp: 360 mL, Rfl: 3  •  Melatonin 10 MG tablet, Take  by mouth Every Night., Disp: , Rfl:   •  omeprazole (priLOSEC) 40 MG capsule, Take 1 capsule by mouth Daily., Disp: 90 capsule, Rfl: 1  •  pramipexole (MIRAPEX) 0.25 MG tablet, Take 1 tablet  by mouth 2 (Two) Times a Day., Disp: 60 tablet, Rfl: 3  •  solifenacin (VESIcare) 5 MG tablet, Take 1 tablet by mouth Daily., Disp: 30 tablet, Rfl: 3  •  traZODone (DESYREL) 50 MG tablet, TAKE 1/2 TO 1 TABLET BY MOUTH EVERY NIGHT 1 HOUR BEFORE BEDTIME, Disp: 30 tablet, Rfl: 1  •  vitamin D (ERGOCALCIFEROL) 1.25 MG (39845 UT) capsule capsule, TAKE 1 CAPSULE BY MOUTH EVERY 7 DAYS, Disp: 5 capsule, Rfl: 5    Past Medical History:   Diagnosis Date   • Acute seasonal allergic rhinitis due to pollen    • Anxiety and depression    • Arthritis    • Asthma    • C. difficile colitis    • COPD (chronic obstructive pulmonary disease) (HCC)    • Diabetes mellitus (HCC)     Pre diabetes   • Diarrhea    • Diverticulosis    • Fibula fracture    • Herpes zoster    • History of lumbosacral spine surgery    • Hypertension    • IBS (irritable bowel syndrome)    • Mixed hyperlipidemia    • KINDRA (obstructive sleep apnea)     NO MACHINE AT THIS TIME   • Osteoporosis    • Paroxysmal atrial fibrillation (HCC)    • Vitamin D deficiency        Past Surgical History:   Procedure Laterality Date   • APPENDECTOMY     • BACK SURGERY      lower x2   • CATARACT EXTRACTION     • COLONOSCOPY      patient doesn't recall    • COLONOSCOPY N/A 4/2/2021    Procedure: COLONOSCOPY to cecum with cecal biopsies;  Surgeon: Jarrod Duggan MD;  Location: [facility] ENDOSCOPY;  Service: Gastroenterology;  Laterality: N/A;  pre: diarhhea  post: diverticulosis, hemorrhoids   • HYSTERECTOMY      partial   • SHOULDER SURGERY Right    • SHOULDER SURGERY     • SIGMOIDOSCOPY N/A 10/19/2022    Procedure: SIGMOIDOSCOPY FLEXIBLE TO SIGMOID COLON WITH BIOPSIES;  Surgeon: Tonja Tanner MD;  Location: [facility] ENDOSCOPY;  Service: Gastroenterology;  Laterality: N/A;  PRE: DIARRHEA  POST: HEMORRHOIDS   • TONSILLECTOMY     • UPPER GASTROINTESTINAL ENDOSCOPY      patient doesn't recall        Family History   Problem Relation Age of Onset   • Heart disease Mother    • Lung cancer  Mother    • Diabetes Father    • Liver disease Father    • Heart disease Paternal Uncle    • Malig Hyperthermia Neg Hx        Social History     Tobacco Use   • Smoking status: Former     Packs/day: 1.00     Years: 25.00     Pack years: 25.00     Types: Cigarettes     Quit date: 2008     Years since quittin.7   • Smokeless tobacco: Never   • Tobacco comments:     CAFFEINE USE - 1 CUP COFFEE/ DIET COKE   Substance Use Topics   • Alcohol use: No            Objective     There were no vitals taken for this visit. Video Visit    Physical Exam  NAD  AAOx3  No labored breathing.      Lab Results   Component Value Date    GLUCOSE 81 2022    BUN 17 2022    CREATININE 0.66 2022    EGFRIFNONA 87 2022    EGFRIFAFRI 100 2022    BCR 25.8 (H) 2022    K 3.6 2022    CO2 28.0 2022    CALCIUM 9.7 2022    PROTENTOTREF 6.9 2022    ALBUMIN 4.10 2022    LABIL2 2.0 2022    AST 18 2022    ALT 19 2022       WBC   Date Value Ref Range Status   2022 12.70 (H) 3.40 - 10.80 10*3/mm3 Final   2022 10.3 3.4 - 10.8 x10E3/uL Final     RBC   Date Value Ref Range Status   2022 3.94 3.77 - 5.28 10*6/mm3 Final   2022 4.78 3.77 - 5.28 x10E6/uL Final     Hemoglobin   Date Value Ref Range Status   2022 12.1 12.0 - 15.9 g/dL Final     Hematocrit   Date Value Ref Range Status   2022 37.5 34.0 - 46.6 % Final     MCV   Date Value Ref Range Status   2022 95.2 79.0 - 97.0 fL Final     MCH   Date Value Ref Range Status   2022 30.7 26.6 - 33.0 pg Final     MCHC   Date Value Ref Range Status   2022 32.3 31.5 - 35.7 g/dL Final     RDW   Date Value Ref Range Status   2022 13.0 12.3 - 15.4 % Final     RDW-SD   Date Value Ref Range Status   2022 45.8 37.0 - 54.0 fl Final     MPV   Date Value Ref Range Status   2022 9.9 6.0 - 12.0 fL Final     Platelets   Date Value Ref Range Status   2022 393 140 -  450 10*3/mm3 Final     Neutrophil %   Date Value Ref Range Status   12/21/2022 68.8 42.7 - 76.0 % Final     Lymphocyte %   Date Value Ref Range Status   12/21/2022 22.5 19.6 - 45.3 % Final     Monocyte %   Date Value Ref Range Status   12/21/2022 6.1 5.0 - 12.0 % Final     Eosinophil %   Date Value Ref Range Status   12/21/2022 0.8 0.3 - 6.2 % Final     Basophil %   Date Value Ref Range Status   12/21/2022 0.8 0.0 - 1.5 % Final     Immature Grans %   Date Value Ref Range Status   12/21/2022 1.0 (H) 0.0 - 0.5 % Final     Neutrophils, Absolute   Date Value Ref Range Status   12/21/2022 8.74 (H) 1.70 - 7.00 10*3/mm3 Final     Lymphocytes, Absolute   Date Value Ref Range Status   12/21/2022 2.86 0.70 - 3.10 10*3/mm3 Final     Monocytes, Absolute   Date Value Ref Range Status   12/21/2022 0.77 0.10 - 0.90 10*3/mm3 Final     Eosinophils, Absolute   Date Value Ref Range Status   12/21/2022 0.10 0.00 - 0.40 10*3/mm3 Final     Basophils, Absolute   Date Value Ref Range Status   12/21/2022 0.10 0.00 - 0.20 10*3/mm3 Final     Immature Grans, Absolute   Date Value Ref Range Status   12/21/2022 0.13 (H) 0.00 - 0.05 10*3/mm3 Final     nRBC   Date Value Ref Range Status   12/21/2022 0.0 0.0 - 0.2 /100 WBC Final       Lab Results   Component Value Date    HGBA1C 6.00 (H) 12/09/2022       No results found for: [id number]    TSH   Date Value Ref Range Status   08/23/2021 1.890 0.270 - 4.200 uIU/mL Final       No results found for: CHOL  Lab Results   Component Value Date    TRIG 162 (H) 05/19/2022     Lab Results   Component Value Date    HDL 76 05/19/2022     Lab Results   Component Value Date     (H) 05/19/2022     Lab Results   Component Value Date    VLDL 29 05/19/2022     No results found for: LDLHDL      Procedures    Assessment & Plan   Problems Addressed this Visit     Generalized weakness    Edema of both legs - Primary    Other fatigue   Diagnoses       Codes Comments    Edema of both legs    -  Primary ICD-10-CM:  R60.0  ICD-9-CM: 782.3     Generalized weakness     ICD-10-CM: R53.1  ICD-9-CM: 780.79     Other fatigue     ICD-10-CM: R53.83  ICD-9-CM: 780.79           No orders of the defined types were placed in this encounter.      Current Outpatient Medications   Medication Sig Dispense Refill   • acetaminophen (TYLENOL) 500 MG tablet Take 1,000 mg by mouth Every 6 (Six) Hours As Needed for Mild Pain .     • amLODIPine (NORVASC) 5 MG tablet Take 1 tablet by mouth Daily. 30 tablet 3   • aspirin 81 MG chewable tablet Chew 81 mg Daily.     • atenolol (TENORMIN) 25 MG tablet Take 0.5 tablets by mouth Every Night. 45 tablet 1   • budesonide-formoterol (SYMBICORT) 160-4.5 MCG/ACT inhaler Inhale 2 puffs 2 (Two) Times a Day. 1 each 3   • cyclobenzaprine (FLEXERIL) 10 MG tablet Take 1/2 to one tablet by mouth twice daily as needed for muscle spasms 20 tablet 0   • escitalopram (LEXAPRO) 20 MG tablet Take 1 tablet by mouth Daily. 90 tablet 1   • ezetimibe (ZETIA) 10 MG tablet Take 1 tablet by mouth Daily. 30 tablet 3   • fluticasone (FLONASE) 50 MCG/ACT nasal spray 2 sprays into the nostril(s) as directed by provider Daily As Needed for Rhinitis. 18 mL 5   • ipratropium-albuterol (DUO-NEB) 0.5-2.5 mg/3 ml nebulizer Take 3 mL by nebulization 4 (Four) Times a Day. 360 mL 3   • Melatonin 10 MG tablet Take  by mouth Every Night.     • omeprazole (priLOSEC) 40 MG capsule Take 1 capsule by mouth Daily. 90 capsule 1   • pramipexole (MIRAPEX) 0.25 MG tablet Take 1 tablet by mouth 2 (Two) Times a Day. 60 tablet 3   • solifenacin (VESIcare) 5 MG tablet Take 1 tablet by mouth Daily. 30 tablet 3   • traZODone (DESYREL) 50 MG tablet TAKE 1/2 TO 1 TABLET BY MOUTH EVERY NIGHT 1 HOUR BEFORE BEDTIME 30 tablet 1   • vitamin D (ERGOCALCIFEROL) 1.25 MG (56490 UT) capsule capsule TAKE 1 CAPSULE BY MOUTH EVERY 7 DAYS 5 capsule 5     No current facility-administered medications for this visit.       No follow-ups on file.    There are no Patient  Instructions on file for this visit.         Time spent on visit:  24   minutes.  This patient has consented to a telehealth visit via video. The visit was scheduled as a video visit to comply with patient safety concerns in accordance with CDC recommendations.  All vitals recorded within this visit are reported by the patient.  Cut amlodipine in half.  And take 2.5 mg; edema may be SE of this med.   Take only 25 mg trazodone  Elevate legs.    Pt to let me know in one week how she is doing.   Refills.

## 2023-02-15 ENCOUNTER — TELEPHONE (OUTPATIENT)
Dept: GASTROENTEROLOGY | Facility: CLINIC | Age: 83
End: 2023-02-15
Payer: MEDICARE

## 2023-02-15 RX ORDER — BUDESONIDE 3 MG/1
CAPSULE, COATED PELLETS ORAL
Qty: 154 CAPSULE | Refills: 0 | Status: SHIPPED | OUTPATIENT
Start: 2023-02-15 | End: 2023-03-28 | Stop reason: SDUPTHER

## 2023-02-15 NOTE — TELEPHONE ENCOUNTER
Called and spoke to daughter patient is having recurrence in her diarrhea.  I offered them appointment tomorrow with me at 830 but daughter is unable to assist with video visits and her mother does not have a smart phone or computer to do this on her own.  Gave her appointment with Franchesca Bynum on 2/23 at 1400-this was a hospital slot.  This is the only slot I could find between myself, Dr. Tanner, and Franchesca Bynum.  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  I sent to pharmacy.  Explained that she needs to follow-up with Franchesca Bynum to see how she is doing and discuss taper recommendations given her previous history of microscopic colitis.    Also, the visit will need to be changed to a video visit as patient is getting over pneumonia and is very weak and unable to make it to the office.  Daughter also currently has pneumonia so she is unable to take her.  Can someone please change follow-up to a video visit?  I did explain to daughter that patient should ensure insurance is still paying for video visits.  If insurance does not pay, patient will responsible for entire cost of visit.

## 2023-02-15 NOTE — TELEPHONE ENCOUNTER
----- Message from Rodney Perez sent at 2/15/2023 10:02 AM EST -----  Diarrhea is back 3 x a day after eating. Can we get a video visit with Franchesca? This is Tracey Holland, her daughter and my number is . Thank you

## 2023-02-23 ENCOUNTER — TELEMEDICINE (OUTPATIENT)
Dept: GASTROENTEROLOGY | Facility: CLINIC | Age: 83
End: 2023-02-23
Payer: MEDICARE

## 2023-02-23 VITALS — BODY MASS INDEX: 22.32 KG/M2 | WEIGHT: 126 LBS | HEIGHT: 63 IN

## 2023-02-23 DIAGNOSIS — R19.7 DIARRHEA, UNSPECIFIED TYPE: Primary | ICD-10-CM

## 2023-02-23 PROCEDURE — 99213 OFFICE O/P EST LOW 20 MIN: CPT | Performed by: PHYSICIAN ASSISTANT

## 2023-02-23 NOTE — PROGRESS NOTES
Chief Complaint  Diarrhea and mucus in stool    Subjective          2 patient identifiers (Full Name and ) used to verify patient identity.    You have chosen to receive care through a telehealth visit.  Do you consent to use a video/audio connection for your medical care today? YES    This was an audio and video enabled telemedicine encounter. Provider was located at their home in [place]. The patient was located at home in [place]. I did complete this video visit with the patient using [employer]. Duration of video visit was 17 min.    History of Present Illness  Kandace Andrade is a  82 y.o. female here for follow-up for diarrhea.  This was conducted via video visit as the patient is still trying to recover from hospitalization at the end of the year with pneumonia and the daughter is also currently ill.  She is a patient of Dr. Tanner was last seen by me in the clinic on 2022.    On 2/15/2023 her daughter had called in with recurrence in diarrhea.  Katelyn Mir PA-C spoke with him and placed her back on the budesonide. She continues to complain of diarrhea with urgency but does report some improvement.  She does report occasional episodes of nocturnal stooling.  She denies any peri blood or melena.  She continues to have some cramping lower abdominal pain relieved with defecation.     Flexible sigmoidoscopy completed 10/19/2022: Nonbleeding internal hemorrhoids, hypertrophied anal papilla, pathology from random sigmoid biopsies with focal disruption, collagenous colitis could not be completely excluded.    Past Medical History:   Diagnosis Date   • Acute seasonal allergic rhinitis due to pollen    • Anxiety and depression    • Arthritis    • Asthma    • C. difficile colitis    • COPD (chronic obstructive pulmonary disease) (HCC)    • Diabetes mellitus (HCC)     Pre diabetes   • Diarrhea    • Diverticulosis    • Fibula fracture    • Herpes zoster    • History of lumbosacral spine surgery    •  Hypertension    • IBS (irritable bowel syndrome)    • Mixed hyperlipidemia    • KINDRA (obstructive sleep apnea)     NO MACHINE AT THIS TIME   • Osteoporosis    • Paroxysmal atrial fibrillation (HCC)    • Vitamin D deficiency        Past Surgical History:   Procedure Laterality Date   • APPENDECTOMY     • BACK SURGERY      lower x2   • CATARACT EXTRACTION     • COLONOSCOPY      patient doesn't recall    • COLONOSCOPY N/A 2021    Procedure: COLONOSCOPY to cecum with cecal biopsies;  Surgeon: Jarrod Duggan MD;  Location:  [facility] ENDOSCOPY;  Service: Gastroenterology;  Laterality: N/A;  pre: diarhhea  post: diverticulosis, hemorrhoids   • HYSTERECTOMY      partial   • SHOULDER SURGERY Right    • SHOULDER SURGERY     • SIGMOIDOSCOPY N/A 10/19/2022    Procedure: SIGMOIDOSCOPY FLEXIBLE TO SIGMOID COLON WITH BIOPSIES;  Surgeon: Tonja Tanner MD;  Location:  [facility] ENDOSCOPY;  Service: Gastroenterology;  Laterality: N/A;  PRE: DIARRHEA  POST: HEMORRHOIDS   • TONSILLECTOMY     • UPPER GASTROINTESTINAL ENDOSCOPY      patient doesn't recall        Family History   Problem Relation Age of Onset   • Heart disease Mother    • Lung cancer Mother    • Diabetes Father    • Liver disease Father    • Heart disease Paternal Uncle    • Malig Hyperthermia Neg Hx        Social History     Socioeconomic History   • Marital status:    Tobacco Use   • Smoking status: Former     Packs/day: 1.00     Years: 25.00     Pack years: 25.00     Types: Cigarettes     Quit date: 2008     Years since quittin.8   • Smokeless tobacco: Never   • Tobacco comments:     CAFFEINE USE - 1 CUP COFFEE/ DIET COKE   Vaping Use   • Vaping Use: Never used   Substance and Sexual Activity   • Alcohol use: No   • Drug use: No   • Sexual activity: Defer       Allergies   Allergen Reactions   • Penicillins Hives and Other (See Comments)     Elevated BP... tolerated ceftriaxone 10/2020 admission   • Meclizine Hcl Dizziness   • Bactrim  [Sulfamethoxazole-Trimethoprim] Hives   • Flagyl [Metronidazole] Other (See Comments)     HYPERACTIVITY.. INSOMNIA    • Statins Other (See Comments)     Headache, elevated liver enzymes, blurred vision       Current Outpatient Medications on File Prior to Visit   Medication Sig Dispense Refill   • acetaminophen (TYLENOL) 500 MG tablet Take 1,000 mg by mouth Every 6 (Six) Hours As Needed for Mild Pain .     • amLODIPine (NORVASC) 5 MG tablet Take 1 tablet by mouth Daily. (Patient taking differently: Take 2.5 mg by mouth Daily.) 30 tablet 3   • aspirin 81 MG chewable tablet Chew 81 mg Daily.     • atenolol (TENORMIN) 25 MG tablet Take 0.5 tablets by mouth Every Night. 45 tablet 1   • Budesonide (ENTOCORT EC) 3 MG 24 hr capsule 3 tablets by mouth daily for 6 weeks, then 2 tablets by mouth daily for 2 weeks 154 capsule 0   • budesonide-formoterol (SYMBICORT) 160-4.5 MCG/ACT inhaler Inhale 2 puffs 2 (Two) Times a Day. 1 each 3   • cyclobenzaprine (FLEXERIL) 10 MG tablet Take 1/2 to one tablet by mouth twice daily as needed for muscle spasms 20 tablet 0   • escitalopram (LEXAPRO) 20 MG tablet Take 1 tablet by mouth Daily. 90 tablet 1   • ezetimibe (ZETIA) 10 MG tablet Take 1 tablet by mouth Daily. 30 tablet 3   • fluticasone (FLONASE) 50 MCG/ACT nasal spray 2 sprays into the nostril(s) as directed by provider Daily As Needed for Rhinitis. 18 mL 5   • ipratropium-albuterol (DUO-NEB) 0.5-2.5 mg/3 ml nebulizer Take 3 mL by nebulization 4 (Four) Times a Day. 360 mL 3   • Melatonin 10 MG tablet Take  by mouth Every Night.     • omeprazole (priLOSEC) 40 MG capsule Take 1 capsule by mouth Daily. 90 capsule 1   • pramipexole (MIRAPEX) 0.25 MG tablet Take 1 tablet by mouth 2 (Two) Times a Day. 60 tablet 3   • solifenacin (VESIcare) 5 MG tablet Take 1 tablet by mouth Daily. 30 tablet 3   • traZODone (DESYREL) 50 MG tablet TAKE 1/2 TO 1 TABLET BY MOUTH EVERY NIGHT 1 HOUR BEFORE BEDTIME 30 tablet 1   • vitamin D (ERGOCALCIFEROL) 1.25  "MG (00458 UT) capsule capsule TAKE 1 CAPSULE BY MOUTH EVERY 7 DAYS 5 capsule 5     No current facility-administered medications on file prior to visit.       Review of Systems     Objective   Vital Signs:   Ht 160 cm (63\")   Wt 57.2 kg (126 lb)   BMI 22.32 kg/m²       Physical Exam  Constitutional:       Appearance: Normal appearance.   Pulmonary:      Effort: Pulmonary effort is normal.   Neurological:      Mental Status: She is alert.   Psychiatric:         Mood and Affect: Mood normal.         Behavior: Behavior normal.         Thought Content: Thought content normal.         Judgment: Judgment normal.          Result Review :       Common labs    Common Labs 12/16/22 12/16/22 12/17/22 12/17/22 12/21/22 12/21/22    0604 [id number] [id number]   Glucose  95  83  81   BUN  15  15  17   Creatinine  0.64  0.64  0.66   Sodium  139  136  136   Potassium  3.9  3.9  3.6   Chloride  104  99  100   Calcium  9.3  9.1  9.7   Albumin      4.10   Total Bilirubin      0.2   Alkaline Phosphatase      75   AST (SGOT)      18   ALT (SGPT)      19   WBC 8.47  8.25  12.70 (A)    Hemoglobin 12.2  12.5  12.1    Hematocrit 36.0  36.7  37.5    Platelets 300  344  393    (A) Abnormal value                                Assessment and Plan    Diagnoses and all orders for this visit:    1. Diarrhea, unspecified type (Primary)      · Diarrhea presumed secondary to microscopic colitis.  She has seen some improvement on the budesonide.  Recommend continuing taper.  · We will follow-up in 1 month just prior to her de-escalating the budesonide dose to 3 g.  · She knows to contact sooner if she begins to have any recurrence in symptoms or any new questions or concerns.      Follow Up   No follow-ups on file.    Dragon dictation used throughout this note.     MICHAEL Hou   "

## 2023-03-01 ENCOUNTER — OFFICE VISIT (OUTPATIENT)
Dept: CARDIOLOGY | Facility: CLINIC | Age: 83
End: 2023-03-01
Payer: MEDICARE

## 2023-03-01 VITALS
HEIGHT: 64 IN | BODY MASS INDEX: 21.13 KG/M2 | SYSTOLIC BLOOD PRESSURE: 133 MMHG | DIASTOLIC BLOOD PRESSURE: 82 MMHG | HEART RATE: 67 BPM | OXYGEN SATURATION: 97 % | WEIGHT: 123.8 LBS

## 2023-03-01 DIAGNOSIS — I65.23 CAROTID ARTERY STENOSIS, ASYMPTOMATIC, BILATERAL: ICD-10-CM

## 2023-03-01 DIAGNOSIS — E78.2 MIXED HYPERLIPIDEMIA: ICD-10-CM

## 2023-03-01 DIAGNOSIS — I48.0 PAROXYSMAL ATRIAL FIBRILLATION: ICD-10-CM

## 2023-03-01 DIAGNOSIS — I10 ESSENTIAL HYPERTENSION: Primary | ICD-10-CM

## 2023-03-01 PROCEDURE — 99214 OFFICE O/P EST MOD 30 MIN: CPT | Performed by: INTERNAL MEDICINE

## 2023-03-01 RX ORDER — LISINOPRIL AND HYDROCHLOROTHIAZIDE 12.5; 1 MG/1; MG/1
1 TABLET ORAL DAILY
Qty: 90 TABLET | Refills: 3 | Status: SHIPPED | OUTPATIENT
Start: 2023-03-01

## 2023-03-01 NOTE — PROGRESS NOTES
PATIENTINFORMATION    Date of Office Visit: 2023  Encounter Provider: Alexander Hammer MD  Place of Service: [facility] CARDIOLOGY  Patient Name: Kandace Andrade  : 1940    Subjective:     Encounter Date:2023      Patient ID: Kandace Andrade is a 82 y.o. female.    Chief Complaint   Patient presents with   • Atrial Fibrillation     HPI  Ms. Andrade is a pleasant 81 yo lady who came to cardiology clinic for posthospital discharge follow-up and for evaluation of extremity edema.  She was admitted in 2022 with diagnosis of pneumonia.  Lisinopril discontinued because of hypotension.  She started having bilateral lower extremity swelling involving the ankles and feet for which amlodipine dose was decreased recently.  Her breathing has actually improved since after discharge from hospital.  She is mostly sedentary and does not walk much but she denies any chest pain during activities.  No palpitations, presyncope syncope, orthopnea or PND.  She is compliant with current medications.     ROS  All systems reviewed and negative except as noted in HPI.    Past Medical History:   Diagnosis Date   • Acute seasonal allergic rhinitis due to pollen    • Anxiety and depression    • Arthritis    • Asthma    • C. difficile colitis    • COPD (chronic obstructive pulmonary disease) (HCC)    • Diabetes mellitus (HCC)     Pre diabetes   • Diarrhea    • Diverticulosis    • Fibula fracture    • Herpes zoster    • History of lumbosacral spine surgery    • Hypertension    • IBS (irritable bowel syndrome)    • Mixed hyperlipidemia    • KINDRA (obstructive sleep apnea)     NO MACHINE AT THIS TIME   • Osteoporosis    • Paroxysmal atrial fibrillation (HCC)    • Vitamin D deficiency        Past Surgical History:   Procedure Laterality Date   • APPENDECTOMY     • BACK SURGERY      lower x2   • CATARACT EXTRACTION     • COLONOSCOPY      patient doesn't recall    • COLONOSCOPY N/A 2021    Procedure:  COLONOSCOPY to cecum with cecal biopsies;  Surgeon: Jarrod Duggan MD;  Location:  [facility];  Service: Gastroenterology;  Laterality: N/A;  pre: diarhhea  post: diverticulosis, hemorrhoids   • HYSTERECTOMY      partial   • SHOULDER SURGERY Right    • SHOULDER SURGERY     • SIGMOIDOSCOPY N/A 10/19/2022    Procedure: SIGMOIDOSCOPY FLEXIBLE TO SIGMOID COLON WITH BIOPSIES;  Surgeon: Tonja Tanner MD;  Location:  [facility];  Service: Gastroenterology;  Laterality: N/A;  PRE: DIARRHEA  POST: HEMORRHOIDS   • TONSILLECTOMY     • UPPER GASTROINTESTINAL ENDOSCOPY      patient doesn't recall        Social History     Socioeconomic History   • Marital status:    Tobacco Use   • Smoking status: Former     Packs/day: 1.00     Years: 25.00     Pack years: 25.00     Types: Cigarettes     Quit date: 2008     Years since quittin.8   • Smokeless tobacco: Never   • Tobacco comments:     CAFFEINE USE - 1 CUP COFFEE/ DIET COKE   Vaping Use   • Vaping Use: Never used   Substance and Sexual Activity   • Alcohol use: No   • Drug use: No   • Sexual activity: Defer       Family History   Problem Relation Age of Onset   • Heart disease Mother    • Lung cancer Mother    • Diabetes Father    • Liver disease Father    • Heart disease Paternal Uncle    • Malig Hyperthermia Neg Hx          Procedures       Objective:     There were no vitals taken for this visit. There is no height or weight on file to calculate BMI.     Constitutional:       General: Not in acute distress.     Appearance: Well-developed. Not diaphoretic.   Eyes:      Pupils: Pupils are equal, round, and reactive to light.   HENT:      Head: Normocephalic and atraumatic.   Neck:      Thyroid: No thyromegaly.   Pulmonary:      Effort: Pulmonary effort is normal. No respiratory distress.      Breath sounds: Normal breath sounds. No wheezing. No rales.   Chest:      Chest wall: Not tender to palpatation.   Cardiovascular:      Normal rate. Regular  rhythm.      No gallop.   Pulses:     Intact distal pulses.   Edema:     Peripheral edema absent.   Abdominal:      General: Bowel sounds are normal. There is no distension.      Palpations: Abdomen is soft.      Tenderness: There is no guarding.   Musculoskeletal: Normal range of motion.         General: No deformity.      Cervical back: Normal range of motion and neck supple. Skin:     General: Skin is warm and dry.      Findings: No rash.   Neurological:      Mental Status: Alert and oriented to person, place, and time.      Cranial Nerves: No cranial nerve deficit.      Deep Tendon Reflexes: Reflexes are normal and symmetric.   Psychiatric:         Judgment: Judgment normal.         Review Of Data: I have reviewed pertinent recent labs, images and documents and pertinent findings included in HPI or assessment below.    Lipid Panel    Lipid Panel 5/19/22   Total Cholesterol 279 (A)   Triglycerides 162 (A)   HDL Cholesterol 76   VLDL Cholesterol 29   LDL Cholesterol  174 (A)   (A) Abnormal value                Assessment/Plan:         1.  Paroxysmal atrial fibrillation diagnosed when she was being treated for hypertension-no recurrence  2.   History of atypical chest pain.  Coronary artery calcification involving LAD and left circumflex  -Normal myocardial perfusion study March 2022  -Denies any rest or exertional chest pain.  3.    Mild bilateral carotid artery stenosis per Doppler in March 2022  4.    Labile hypertension-BP in office today is 152/112 mmHg  5.    Hypercholesterolemia with statin intolerance  6.  Bilateral lower extremity swelling likely from amlodipine  -Getting better with decrease dose of 2.5 mg p.o. daily    Restart lisinopril/hydrochlorothiazide  Start Repatha  BMP in 2-week  Follow-up in clinic in 3 months      Diagnosis and plan of care discussed with patient and verbalized understanding.            Your medication list          Accurate as of March 1, 2023 12:35 PM. If you have any  questions, ask your nurse or doctor.            CHANGE how you take these medications      Instructions Last Dose Given Next Dose Due   amLODIPine 5 MG tablet  Commonly known as: NORVASC  What changed: how much to take      Take 1 tablet by mouth Daily.          CONTINUE taking these medications      Instructions Last Dose Given Next Dose Due   acetaminophen 500 MG tablet  Commonly known as: TYLENOL      Take 2 tablets by mouth Every 6 (Six) Hours As Needed for Mild Pain.       aspirin 81 MG chewable tablet      Chew 1 tablet Daily.       atenolol 25 MG tablet  Commonly known as: TENORMIN      Take 0.5 tablets by mouth Every Night.       Budesonide 3 MG 24 hr capsule  Commonly known as: ENTOCORT EC      3 tablets by mouth daily for 6 weeks, then 2 tablets by mouth daily for 2 weeks       budesonide-formoterol 160-4.5 MCG/ACT inhaler  Commonly known as: SYMBICORT      Inhale 2 puffs 2 (Two) Times a Day.       cyclobenzaprine 10 MG tablet  Commonly known as: FLEXERIL      Take 1/2 to one tablet by mouth twice daily as needed for muscle spasms       escitalopram 20 MG tablet  Commonly known as: LEXAPRO      Take 1 tablet by mouth Daily.       ezetimibe 10 MG tablet  Commonly known as: ZETIA      Take 1 tablet by mouth Daily.       fluticasone 50 MCG/ACT nasal spray  Commonly known as: FLONASE      2 sprays into the nostril(s) as directed by provider Daily As Needed for Rhinitis.       ipratropium-albuterol 0.5-2.5 mg/3 ml nebulizer  Commonly known as: DUO-NEB      Take 3 mL by nebulization 4 (Four) Times a Day.       Melatonin 10 MG tablet      Take  by mouth Every Night.       omeprazole 40 MG capsule  Commonly known as: priLOSEC      Take 1 capsule by mouth Daily.       pramipexole 0.25 MG tablet  Commonly known as: MIRAPEX      Take 1 tablet by mouth 2 (Two) Times a Day.       solifenacin 5 MG tablet  Commonly known as: VESIcare      Take 1 tablet by mouth Daily.       traZODone 50 MG tablet  Commonly known as:  DESYREL      TAKE 1/2 TO 1 TABLET BY MOUTH EVERY NIGHT 1 HOUR BEFORE BEDTIME       vitamin D 1.25 MG (47144 UT) capsule capsule  Commonly known as: ERGOCALCIFEROL      TAKE 1 CAPSULE BY MOUTH EVERY 7 DAYS                  Alexander Hammer MD  03/01/23  12:35 EST

## 2023-03-02 DIAGNOSIS — I48.0 PAROXYSMAL ATRIAL FIBRILLATION: Primary | ICD-10-CM

## 2023-03-03 ENCOUNTER — TELEPHONE (OUTPATIENT)
Dept: CARDIOLOGY | Facility: CLINIC | Age: 83
End: 2023-03-03
Payer: MEDICARE

## 2023-03-06 ENCOUNTER — HOSPITAL ENCOUNTER (OUTPATIENT)
Dept: CT IMAGING | Facility: HOSPITAL | Age: 83
Discharge: HOME | End: 2023-03-06
Admitting: INTERNAL MEDICINE
Payer: MEDICARE

## 2023-03-06 DIAGNOSIS — R91.8 LUNG MASS: ICD-10-CM

## 2023-03-06 PROCEDURE — 71250 CT THORAX DX C-: CPT

## 2023-03-15 ENCOUNTER — LAB (OUTPATIENT)
Dept: LAB | Facility: HOSPITAL | Age: 83
End: 2023-03-15
Payer: MEDICARE

## 2023-03-15 DIAGNOSIS — I10 ESSENTIAL HYPERTENSION: ICD-10-CM

## 2023-03-15 LAB
ANION GAP SERPL CALCULATED.3IONS-SCNC: 14 MMOL/L (ref 5–15)
BUN SERPL-MCNC: 14 MG/DL (ref 8–23)
BUN/CREAT SERPL: 19.4 (ref 7–25)
CALCIUM SPEC-SCNC: 9.4 MG/DL (ref 8.6–10.5)
CHLORIDE SERPL-SCNC: 98 MMOL/L (ref 98–107)
CO2 SERPL-SCNC: 27 MMOL/L (ref 22–29)
CREAT SERPL-MCNC: 0.72 MG/DL (ref 0.57–1)
EGFRCR SERPLBLD CKD-EPI 2021: 83.1 ML/MIN/1.73
GLUCOSE SERPL-MCNC: 102 MG/DL (ref 65–99)
POTASSIUM SERPL-SCNC: 3.5 MMOL/L (ref 3.5–5.2)
SODIUM SERPL-SCNC: 139 MMOL/L (ref 136–145)

## 2023-03-15 PROCEDURE — 36415 COLL VENOUS BLD VENIPUNCTURE: CPT

## 2023-03-15 PROCEDURE — 80048 BASIC METABOLIC PNL TOTAL CA: CPT

## 2023-03-24 DIAGNOSIS — E55.9 VITAMIN D DEFICIENCY: ICD-10-CM

## 2023-03-24 RX ORDER — ERGOCALCIFEROL 1.25 MG/1
CAPSULE ORAL
Qty: 5 CAPSULE | Refills: 5 | Status: SHIPPED | OUTPATIENT
Start: 2023-03-24

## 2023-03-28 ENCOUNTER — OFFICE VISIT (OUTPATIENT)
Dept: GASTROENTEROLOGY | Facility: CLINIC | Age: 83
End: 2023-03-28
Payer: MEDICARE

## 2023-03-28 VITALS
DIASTOLIC BLOOD PRESSURE: 72 MMHG | OXYGEN SATURATION: 94 % | HEART RATE: 62 BPM | BODY MASS INDEX: 21.65 KG/M2 | TEMPERATURE: 97.8 F | SYSTOLIC BLOOD PRESSURE: 113 MMHG | HEIGHT: 64 IN | WEIGHT: 126.8 LBS

## 2023-03-28 DIAGNOSIS — E55.9 VITAMIN D DEFICIENCY: ICD-10-CM

## 2023-03-28 DIAGNOSIS — K52.831 COLLAGENOUS COLITIS: Primary | ICD-10-CM

## 2023-03-28 PROCEDURE — 3074F SYST BP LT 130 MM HG: CPT | Performed by: PHYSICIAN ASSISTANT

## 2023-03-28 PROCEDURE — 1159F MED LIST DOCD IN RCRD: CPT | Performed by: PHYSICIAN ASSISTANT

## 2023-03-28 PROCEDURE — 3078F DIAST BP <80 MM HG: CPT | Performed by: PHYSICIAN ASSISTANT

## 2023-03-28 PROCEDURE — 1160F RVW MEDS BY RX/DR IN RCRD: CPT | Performed by: PHYSICIAN ASSISTANT

## 2023-03-28 PROCEDURE — 99213 OFFICE O/P EST LOW 20 MIN: CPT | Performed by: PHYSICIAN ASSISTANT

## 2023-03-28 RX ORDER — BUDESONIDE 3 MG/1
CAPSULE, COATED PELLETS ORAL
Qty: 154 CAPSULE | Refills: 0 | Status: SHIPPED | OUTPATIENT
Start: 2023-03-28 | End: 2023-06-06

## 2023-03-28 RX ORDER — ERGOCALCIFEROL 1.25 MG/1
50000 CAPSULE ORAL
Qty: 5 CAPSULE | Refills: 5 | OUTPATIENT
Start: 2023-03-28

## 2023-03-28 RX ORDER — CELECOXIB 200 MG/1
1 CAPSULE ORAL DAILY
COMMUNITY
Start: 2023-02-24

## 2023-03-28 NOTE — TELEPHONE ENCOUNTER
Caller: Lupe Kandace BREANA    Relationship: Self    Best call back number: [phone]  Requested Prescriptions:   Requested Prescriptions     Pending Prescriptions Disp Refills   • vitamin D (ERGOCALCIFEROL) 1.25 MG (67505 UT) capsule capsule 5 capsule 5     Sig: Take 1 capsule by mouth Every 7 (Seven) Days.        Pharmacy where request should be sent: [id number] [place], [place] - [address] AT [facility] [phone] [facility] [phone]      Last office visit with prescribing clinician: 1/10/2023   Last telemedicine visit with prescribing clinician: 4/13/2023   Next office visit with prescribing clinician: 4/13/2023     Additional details provided by patient: NEEDS REFILL  Does the patient have less than a 3 day supply:  [x] Yes  [] No    Would you like a call back once the refill request has been completed: [x] Yes [x] No    If the office needs to give you a call back, can they leave a voicemail: [x] Yes [] No    Rodney Garcia Rep   03/28/23 15:37 EDT

## 2023-03-28 NOTE — PROGRESS NOTES
Chief Complaint  Diarrhea    Subjective        History of Present Illness  Kandace Andrade is a  83 y.o. female here for follow-up for microscopic colitis.  She is a patient of Dr. Aquino and was last seen by me via telemedicine on 2/23/2023.    She is now having normal bowel movements occurring 1-2 times a day.  She has weaned down to 2 g budesonide.  No abdominal pain, peri blood or melena.    Flexible sigmoidoscopy completed 10/19/2022: Nonbleeding internal hemorrhoids, hypertrophied anal papilla, pathology from random sigmoid biopsies with focal disruption, collagenous colitis could not be completely excluded.    Past Medical History:   Diagnosis Date   • Acute seasonal allergic rhinitis due to pollen    • Anxiety and depression    • Arthritis    • Asthma    • C. difficile colitis    • COPD (chronic obstructive pulmonary disease) (HCC)    • Diabetes mellitus (HCC)     Pre diabetes   • Diarrhea    • Diverticulosis    • Fibula fracture    • Herpes zoster    • History of lumbosacral spine surgery    • Hypertension    • IBS (irritable bowel syndrome)    • Mixed hyperlipidemia    • KINDRA (obstructive sleep apnea)     NO MACHINE AT THIS TIME   • Osteoporosis    • Paroxysmal atrial fibrillation (HCC)    • Vitamin D deficiency        Past Surgical History:   Procedure Laterality Date   • APPENDECTOMY     • BACK SURGERY      lower x2   • CATARACT EXTRACTION     • COLONOSCOPY      patient doesn't recall    • COLONOSCOPY N/A 4/2/2021    Procedure: COLONOSCOPY to cecum with cecal biopsies;  Surgeon: Jarrod Duggan MD;  Location: [facility];  Service: Gastroenterology;  Laterality: N/A;  pre: diarhhea  post: diverticulosis, hemorrhoids   • HYSTERECTOMY      partial   • SHOULDER SURGERY Right    • SHOULDER SURGERY     • SIGMOIDOSCOPY N/A 10/19/2022    Procedure: SIGMOIDOSCOPY FLEXIBLE TO SIGMOID COLON WITH BIOPSIES;  Surgeon: Tonja Tanner MD;  Location: [facility];  Service: Gastroenterology;  Laterality: N/A;   PRE: DIARRHEA  POST: HEMORRHOIDS   • TONSILLECTOMY     • UPPER GASTROINTESTINAL ENDOSCOPY      patient doesn't recall        Family History   Problem Relation Age of Onset   • Heart disease Mother    • Lung cancer Mother    • Diabetes Father    • Liver disease Father    • Heart disease Paternal Uncle    • Malig Hyperthermia Neg Hx        Social History     Socioeconomic History   • Marital status:    Tobacco Use   • Smoking status: Former     Packs/day: 1.00     Years: 25.00     Pack years: 25.00     Types: Cigarettes     Quit date: 2008     Years since quittin.9   • Smokeless tobacco: Never   • Tobacco comments:     CAFFEINE USE - 1 CUP COFFEE/ DIET COKE   Vaping Use   • Vaping Use: Never used   Substance and Sexual Activity   • Alcohol use: No   • Drug use: No   • Sexual activity: Defer       Allergies   Allergen Reactions   • Penicillins Hives and Other (See Comments)     Elevated BP... tolerated ceftriaxone 10/2020 admission   • Meclizine Hcl Dizziness   • Bactrim [Sulfamethoxazole-Trimethoprim] Hives   • Flagyl [Metronidazole] Other (See Comments)     HYPERACTIVITY.. INSOMNIA    • Statins Other (See Comments)     Headache, elevated liver enzymes, blurred vision       Current Outpatient Medications on File Prior to Visit   Medication Sig Dispense Refill   • acetaminophen (TYLENOL) 500 MG tablet Take 2 tablets by mouth Every 6 (Six) Hours As Needed for Mild Pain.     • amLODIPine (NORVASC) 5 MG tablet Take 1 tablet by mouth Daily. (Patient taking differently: Take 2.5 mg by mouth Daily.) 30 tablet 3   • aspirin 81 MG chewable tablet Chew 1 tablet Daily.     • atenolol (TENORMIN) 25 MG tablet Take 0.5 tablets by mouth Every Night. 45 tablet 1   • budesonide-formoterol (SYMBICORT) 160-4.5 MCG/ACT inhaler Inhale 2 puffs 2 (Two) Times a Day. 1 each 3   • cyclobenzaprine (FLEXERIL) 10 MG tablet Take 1/2 to one tablet by mouth twice daily as needed for muscle spasms 20 tablet 0   • escitalopram  "(LEXAPRO) 20 MG tablet Take 1 tablet by mouth Daily. 90 tablet 1   • Evolocumab with Infusor (REPATHA) solution cartridge Inject 3.5 mL under the skin into the appropriate area as directed Every 14 (Fourteen) Days. 10.5 mL 3   • ezetimibe (ZETIA) 10 MG tablet Take 1 tablet by mouth Daily. 30 tablet 3   • fluticasone (FLONASE) 50 MCG/ACT nasal spray 2 sprays into the nostril(s) as directed by provider Daily As Needed for Rhinitis. 18 mL 5   • ipratropium-albuterol (DUO-NEB) 0.5-2.5 mg/3 ml nebulizer Take 3 mL by nebulization 4 (Four) Times a Day. 360 mL 3   • lisinopril-hydrochlorothiazide (Zestoretic) 10-12.5 MG per tablet Take 1 tablet by mouth Daily. 90 tablet 3   • Melatonin 10 MG tablet Take  by mouth Every Night.     • omeprazole (priLOSEC) 40 MG capsule Take 1 capsule by mouth Daily. 90 capsule 1   • pramipexole (MIRAPEX) 0.25 MG tablet Take 1 tablet by mouth 2 (Two) Times a Day. 60 tablet 3   • solifenacin (VESIcare) 5 MG tablet Take 1 tablet by mouth Daily. 30 tablet 3   • traZODone (DESYREL) 50 MG tablet TAKE 1/2 TO 1 TABLET BY MOUTH EVERY NIGHT 1 HOUR BEFORE BEDTIME 30 tablet 1   • vitamin D (ERGOCALCIFEROL) 1.25 MG (34198 UT) capsule capsule TAKE 1 CAPSULE BY MOUTH EVERY 7 DAYS 5 capsule 5   • [DISCONTINUED] Budesonide (ENTOCORT EC) 3 MG 24 hr capsule 3 tablets by mouth daily for 6 weeks, then 2 tablets by mouth daily for 2 weeks 154 capsule 0   • celecoxib (CeleBREX) 200 MG capsule Take 1 capsule by mouth Daily. (Patient not taking: Reported on 3/28/2023)       No current facility-administered medications on file prior to visit.       Review of Systems     Objective   Vital Signs:   /72   Pulse 62   Temp 97.8 °F (36.6 °C)   Ht 161.3 cm (63.5\")   Wt 57.5 kg (126 lb 12.8 oz)   SpO2 94%   BMI 22.11 kg/m²       Physical Exam  Vitals and nursing note reviewed.   Constitutional:       General: She is not in acute distress.     Appearance: Normal appearance. She is not ill-appearing.   HENT:      " Head: Normocephalic and atraumatic.      Right Ear: External ear normal.      Left Ear: External ear normal.   Eyes:      General: No scleral icterus.     Conjunctiva/sclera: Conjunctivae normal.      Pupils: Pupils are equal, round, and reactive to light.   Cardiovascular:      Rate and Rhythm: Normal rate and regular rhythm.      Heart sounds: Normal heart sounds.   Pulmonary:      Effort: Pulmonary effort is normal.      Breath sounds: Normal breath sounds.   Abdominal:      General: Bowel sounds are normal. There is no distension.      Palpations: Abdomen is soft.      Tenderness: There is no abdominal tenderness. There is no guarding or rebound.   Musculoskeletal:      Cervical back: Normal range of motion and neck supple.   Skin:     General: Skin is warm and dry.   Neurological:      Mental Status: She is alert and oriented to person, place, and time.   Psychiatric:         Mood and Affect: Mood normal.         Behavior: Behavior normal.          Result Review :       Common labs    Common Labs 12/17/22 12/17/22 12/21/22 12/21/22 3/15/23    [id number] 0619 0619    Glucose  83  81 102 (A)   BUN  15  17 14   Creatinine  0.64  0.66 0.72   Sodium  136  136 139   Potassium  3.9  3.6 3.5   Chloride  99  100 98   Calcium  9.1  9.7 9.4   Albumin    4.10    Total Bilirubin    0.2    Alkaline Phosphatase    75    AST (SGOT)    18    ALT (SGPT)    19    WBC 8.25  12.70 (A)     Hemoglobin 12.5  12.1     Hematocrit 36.7  37.5     Platelets 344  393     (A) Abnormal value                                Assessment and Plan    Diagnoses and all orders for this visit:    1. Collagenous colitis (Primary)    Other orders  -     Budesonide (ENTOCORT EC) 3 MG 24 hr capsule; Take 2 capsules by mouth Every Morning for 28 days, THEN 1 capsule Every Morning for 42 days.  Dispense: 154 capsule; Refill: 0      · Continue slow taper of the budesonide.  Complete a total of 6 weeks at 2 mg then another 6 weeks at 1 mg.  · Follow-up in 3  months with Dr. Tanner, sooner if necessary.      Follow Up   Return in about 3 months (around 6/28/2023) for Dr. Tanner.    Dragon dictation used throughout this note.     MICHAEL Hou

## 2023-03-29 ENCOUNTER — TELEPHONE (OUTPATIENT)
Dept: GASTROENTEROLOGY | Facility: CLINIC | Age: 83
End: 2023-03-29
Payer: MEDICARE

## 2023-03-29 NOTE — TELEPHONE ENCOUNTER
PA for Budesonide 3MG dr capsules submitted through [facility] and pending.   Key: [id number]

## 2023-03-30 RX ORDER — PRAMIPEXOLE DIHYDROCHLORIDE 0.25 MG/1
TABLET ORAL
Qty: 60 TABLET | Refills: 3 | Status: SHIPPED | OUTPATIENT
Start: 2023-03-30

## 2023-03-31 NOTE — TELEPHONE ENCOUNTER
Per CMM:  Approvedon March 29  PA Case: [id number], Status: Approved, Coverage Starts on: 1/1/2023 12:00:00 AM, Coverage Ends on: 12/31/2023 12:00:00 AM.     Patient notified.

## 2023-04-11 ENCOUNTER — TELEPHONE (OUTPATIENT)
Dept: FAMILY MEDICINE CLINIC | Facility: CLINIC | Age: 83
End: 2023-04-11
Payer: MEDICARE

## 2023-04-11 NOTE — TELEPHONE ENCOUNTER
Please call [facility] , they want to wean Kandace off of the Pramipexole, this medication is causing her memory to be worse ( it is a side effect ) & they think if she is weaned off then her symptoms will improve, no one wanted her on this medication to begin with

## 2023-04-20 ENCOUNTER — OFFICE VISIT (OUTPATIENT)
Dept: FAMILY MEDICINE CLINIC | Facility: CLINIC | Age: 83
End: 2023-04-20
Payer: MEDICARE

## 2023-04-20 VITALS
RESPIRATION RATE: 16 BRPM | TEMPERATURE: 97.7 F | BODY MASS INDEX: 21.78 KG/M2 | WEIGHT: 127.6 LBS | DIASTOLIC BLOOD PRESSURE: 84 MMHG | OXYGEN SATURATION: 96 % | HEART RATE: 66 BPM | SYSTOLIC BLOOD PRESSURE: 118 MMHG | HEIGHT: 64 IN

## 2023-04-20 DIAGNOSIS — E78.2 MIXED HYPERLIPIDEMIA: ICD-10-CM

## 2023-04-20 DIAGNOSIS — R26.89 IMBALANCE: ICD-10-CM

## 2023-04-20 DIAGNOSIS — G25.81 RESTLESS LEG SYNDROME: ICD-10-CM

## 2023-04-20 DIAGNOSIS — R41.3 MEMORY DEFICIT: ICD-10-CM

## 2023-04-20 DIAGNOSIS — E78.00 HYPERCHOLESTEROLEMIA: ICD-10-CM

## 2023-04-20 DIAGNOSIS — E11.9 TYPE 2 DIABETES MELLITUS WITHOUT COMPLICATION, WITHOUT LONG-TERM CURRENT USE OF INSULIN: Primary | ICD-10-CM

## 2023-04-20 DIAGNOSIS — I10 ESSENTIAL HYPERTENSION: ICD-10-CM

## 2023-04-20 NOTE — PROGRESS NOTES
"Chief Complaint  Hypertension    Subjective        Kandace Andrade presents to [facility]  History of Present Illness  Pt presents today with daughter and   She is having memory issues since started the RLS med that was started in hospital.  Few days ago they stopped the med.  She is having some RLS sx again but memory is still not improved.    SM- need to get labs done.    Objective   Vital Signs:  /84 (BP Location: Left arm, Patient Position: Sitting, Cuff Size: Adult)   Pulse 66   Temp 97.7 °F (36.5 °C) (Temporal)   Resp 16   Ht 161.3 cm (63.5\")   Wt 57.9 kg (127 lb 9.6 oz)   SpO2 96%   BMI 22.25 kg/m²   Estimated body mass index is 22.25 kg/m² as calculated from the following:    Height as of this encounter: 161.3 cm (63.5\").    Weight as of this encounter: 57.9 kg (127 lb 9.6 oz).       BMI is within normal parameters. No other follow-up for BMI required.      Physical Exam  Vitals and nursing note reviewed.   Constitutional:       Appearance: Normal appearance. She is well-developed.   HENT:      Right Ear: Tympanic membrane, ear canal and external ear normal. There is no impacted cerumen.      Left Ear: Tympanic membrane, ear canal and external ear normal. There is no impacted cerumen.   Eyes:      General: No scleral icterus.        Right eye: No discharge.         Left eye: No discharge.      Conjunctiva/sclera: Conjunctivae normal.      Pupils: Pupils are equal, round, and reactive to light.   Cardiovascular:      Rate and Rhythm: Normal rate and regular rhythm.      Heart sounds: Normal heart sounds. No murmur heard.  Pulmonary:      Effort: Pulmonary effort is normal. No respiratory distress.      Breath sounds: Normal breath sounds. No stridor. No wheezing or rhonchi.   Neurological:      General: No focal deficit present.      Mental Status: She is alert and oriented to person, place, and time. She is not disoriented.   Psychiatric:         Mood and Affect: " Mood normal.         Behavior: Behavior normal.        Result Review :                   Assessment and Plan   Diagnoses and all orders for this visit:    1. Type 2 diabetes mellitus without complication, without long-term current use of insulin (HCC) (Primary)  -     Microalbumin / Creatinine Urine Ratio - Urine, Clean Catch  -     Hemoglobin A1c    2. Mixed hyperlipidemia  -     Lipid Panel    3. Hypercholesterolemia  -     Lipid Panel    4. Essential hypertension  -     Comprehensive Metabolic Panel    5. Restless leg syndrome    6. Memory deficit  -     TSH Rfx On Abnormal To Free T4  -     Vitamin B12  -     Folate    7. Imbalance             Follow Up   Return in about 3 months (around 7/20/2023).  Patient was given instructions and counseling regarding her condition or for health maintenance advice. Please see specific information pulled into the AVS if appropriate.     Will get SLUMS exam done today.    She scored a 17/30 with a high school education- dementia    Will get labs today.   Based on labs, possible start Aricept.    Daughter and family will continue to monitor memory.    Recommend pt use walker when outside of the house.

## 2023-04-21 LAB
ALBUMIN SERPL-MCNC: 4.5 G/DL (ref 3.5–5.2)
ALBUMIN/CREAT UR: 35 MG/G CREAT (ref 0–29)
ALBUMIN/GLOB SERPL: 2 G/DL
ALP SERPL-CCNC: 73 U/L (ref 39–117)
ALT SERPL-CCNC: 12 U/L (ref 1–33)
AST SERPL-CCNC: 15 U/L (ref 1–32)
BILIRUB SERPL-MCNC: 0.3 MG/DL (ref 0–1.2)
BUN SERPL-MCNC: 17 MG/DL (ref 8–23)
BUN/CREAT SERPL: 20.2 (ref 7–25)
CALCIUM SERPL-MCNC: 10.3 MG/DL (ref 8.6–10.5)
CHLORIDE SERPL-SCNC: 100 MMOL/L (ref 98–107)
CHOLEST SERPL-MCNC: 177 MG/DL (ref 0–200)
CO2 SERPL-SCNC: 27.3 MMOL/L (ref 22–29)
CREAT SERPL-MCNC: 0.84 MG/DL (ref 0.57–1)
CREAT UR-MCNC: 17.9 MG/DL
EGFRCR SERPLBLD CKD-EPI 2021: 68.6 ML/MIN/1.73
FOLATE SERPL-MCNC: >20 NG/ML (ref 4.78–24.2)
GLOBULIN SER CALC-MCNC: 2.3 GM/DL
GLUCOSE SERPL-MCNC: 76 MG/DL (ref 65–99)
HBA1C MFR BLD: 6.4 % (ref 4.8–5.6)
HDLC SERPL-MCNC: 88 MG/DL (ref 40–60)
LDLC SERPL CALC-MCNC: 61 MG/DL (ref 0–100)
MICROALBUMIN UR-MCNC: 6.2 UG/ML
POTASSIUM SERPL-SCNC: 4.2 MMOL/L (ref 3.5–5.2)
PROT SERPL-MCNC: 6.8 G/DL (ref 6–8.5)
SODIUM SERPL-SCNC: 141 MMOL/L (ref 136–145)
TRIGL SERPL-MCNC: 172 MG/DL (ref 0–150)
TSH SERPL DL<=0.005 MIU/L-ACNC: 2.32 UIU/ML (ref 0.27–4.2)
VIT B12 SERPL-MCNC: 345 PG/ML (ref 211–946)
VLDLC SERPL CALC-MCNC: 28 MG/DL (ref 5–40)

## 2023-04-22 DIAGNOSIS — F03.90 DEMENTIA, OLD AGE: Primary | ICD-10-CM

## 2023-04-22 RX ORDER — DONEPEZIL HYDROCHLORIDE 5 MG/1
5 TABLET, FILM COATED ORAL NIGHTLY
Qty: 30 TABLET | Refills: 3 | Status: ON HOLD | OUTPATIENT
Start: 2023-04-22

## 2023-04-23 DIAGNOSIS — F51.01 PRIMARY INSOMNIA: ICD-10-CM

## 2023-04-24 ENCOUNTER — TELEPHONE (OUTPATIENT)
Dept: FAMILY MEDICINE CLINIC | Facility: CLINIC | Age: 83
End: 2023-04-24
Payer: MEDICARE

## 2023-04-24 ENCOUNTER — APPOINTMENT (OUTPATIENT)
Dept: GENERAL RADIOLOGY | Facility: HOSPITAL | Age: 83
DRG: 871 | End: 2023-04-24
Payer: MEDICARE

## 2023-04-24 ENCOUNTER — HOSPITAL ENCOUNTER (INPATIENT)
Facility: HOSPITAL | Age: 83
LOS: 4 days | Discharge: HOME | DRG: 871 | End: 2023-04-28
Attending: EMERGENCY MEDICINE | Admitting: INTERNAL MEDICINE
Payer: MEDICARE

## 2023-04-24 DIAGNOSIS — J96.01 ACUTE HYPOXEMIC RESPIRATORY FAILURE: ICD-10-CM

## 2023-04-24 DIAGNOSIS — R65.21 SEPSIS WITH ACUTE HYPOXIC RESPIRATORY FAILURE AND SEPTIC SHOCK, DUE TO UNSPECIFIED ORGANISM: Primary | ICD-10-CM

## 2023-04-24 DIAGNOSIS — A41.9 SEPSIS WITH ACUTE HYPOXIC RESPIRATORY FAILURE AND SEPTIC SHOCK, DUE TO UNSPECIFIED ORGANISM: Primary | ICD-10-CM

## 2023-04-24 DIAGNOSIS — F03.90 DEMENTIA, OLD AGE: ICD-10-CM

## 2023-04-24 DIAGNOSIS — J96.01 SEPSIS WITH ACUTE HYPOXIC RESPIRATORY FAILURE AND SEPTIC SHOCK, DUE TO UNSPECIFIED ORGANISM: Primary | ICD-10-CM

## 2023-04-24 LAB
ALBUMIN SERPL-MCNC: 4.1 G/DL (ref 3.5–5.2)
ALBUMIN/GLOB SERPL: 1.7 G/DL
ALP SERPL-CCNC: 71 U/L (ref 39–117)
ALT SERPL W P-5'-P-CCNC: 16 U/L (ref 1–33)
ANION GAP SERPL CALCULATED.3IONS-SCNC: 13 MMOL/L (ref 5–15)
AST SERPL-CCNC: 24 U/L (ref 1–32)
B PARAPERT DNA SPEC QL NAA+PROBE: NOT DETECTED
B PERT DNA SPEC QL NAA+PROBE: NOT DETECTED
BACTERIA UR QL AUTO: ABNORMAL /HPF
BASOPHILS # BLD AUTO: 0.07 10*3/MM3 (ref 0–0.2)
BASOPHILS NFR BLD AUTO: 0.3 % (ref 0–1.5)
BILIRUB SERPL-MCNC: 0.2 MG/DL (ref 0–1.2)
BILIRUB UR QL STRIP: NEGATIVE
BUN SERPL-MCNC: 20 MG/DL (ref 8–23)
BUN/CREAT SERPL: 21.1 (ref 7–25)
C PNEUM DNA NPH QL NAA+NON-PROBE: NOT DETECTED
CALCIUM SPEC-SCNC: 9 MG/DL (ref 8.6–10.5)
CHLORIDE SERPL-SCNC: 100 MMOL/L (ref 98–107)
CLARITY UR: CLEAR
CO2 SERPL-SCNC: 22 MMOL/L (ref 22–29)
COLOR UR: YELLOW
CREAT SERPL-MCNC: 0.95 MG/DL (ref 0.57–1)
D-LACTATE SERPL-SCNC: 1.6 MMOL/L (ref 0.5–2)
D-LACTATE SERPL-SCNC: 2.6 MMOL/L (ref 0.5–2)
D-LACTATE SERPL-SCNC: 3.3 MMOL/L (ref 0.5–2)
DEPRECATED RDW RBC AUTO: 42.1 FL (ref 37–54)
EGFRCR SERPLBLD CKD-EPI 2021: 59.2 ML/MIN/1.73
EOSINOPHIL # BLD AUTO: 0.24 10*3/MM3 (ref 0–0.4)
EOSINOPHIL NFR BLD AUTO: 1.1 % (ref 0.3–6.2)
ERYTHROCYTE [DISTWIDTH] IN BLOOD BY AUTOMATED COUNT: 13.1 % (ref 12.3–15.4)
FLUAV SUBTYP SPEC NAA+PROBE: NOT DETECTED
FLUBV RNA ISLT QL NAA+PROBE: NOT DETECTED
GEN 5 2HR TROPONIN T REFLEX: 21 NG/L
GLOBULIN UR ELPH-MCNC: 2.4 GM/DL
GLUCOSE SERPL-MCNC: 104 MG/DL (ref 65–99)
GLUCOSE UR STRIP-MCNC: NEGATIVE MG/DL
HADV DNA SPEC NAA+PROBE: NOT DETECTED
HCOV 229E RNA SPEC QL NAA+PROBE: NOT DETECTED
HCOV HKU1 RNA SPEC QL NAA+PROBE: NOT DETECTED
HCOV NL63 RNA SPEC QL NAA+PROBE: NOT DETECTED
HCOV OC43 RNA SPEC QL NAA+PROBE: NOT DETECTED
HCT VFR BLD AUTO: 39.6 % (ref 34–46.6)
HGB BLD-MCNC: 13.7 G/DL (ref 12–15.9)
HGB UR QL STRIP.AUTO: ABNORMAL
HMPV RNA NPH QL NAA+NON-PROBE: NOT DETECTED
HOLD SPECIMEN: NORMAL
HPIV1 RNA ISLT QL NAA+PROBE: NOT DETECTED
HPIV2 RNA SPEC QL NAA+PROBE: NOT DETECTED
HPIV3 RNA NPH QL NAA+PROBE: NOT DETECTED
HPIV4 P GENE NPH QL NAA+PROBE: NOT DETECTED
HYALINE CASTS UR QL AUTO: ABNORMAL /LPF
IMM GRANULOCYTES # BLD AUTO: 0.21 10*3/MM3 (ref 0–0.05)
IMM GRANULOCYTES NFR BLD AUTO: 1 % (ref 0–0.5)
KETONES UR QL STRIP: NEGATIVE
LEUKOCYTE ESTERASE UR QL STRIP.AUTO: ABNORMAL
LYMPHOCYTES # BLD AUTO: 3.07 10*3/MM3 (ref 0.7–3.1)
LYMPHOCYTES NFR BLD AUTO: 14.2 % (ref 19.6–45.3)
M PNEUMO IGG SER IA-ACNC: NOT DETECTED
MCH RBC QN AUTO: 30.2 PG (ref 26.6–33)
MCHC RBC AUTO-ENTMCNC: 34.6 G/DL (ref 31.5–35.7)
MCV RBC AUTO: 87.4 FL (ref 79–97)
MONOCYTES # BLD AUTO: 1.78 10*3/MM3 (ref 0.1–0.9)
MONOCYTES NFR BLD AUTO: 8.2 % (ref 5–12)
MRSA DNA SPEC QL NAA+PROBE: NORMAL
NEUTROPHILS NFR BLD AUTO: 16.29 10*3/MM3 (ref 1.7–7)
NEUTROPHILS NFR BLD AUTO: 75.2 % (ref 42.7–76)
NITRITE UR QL STRIP: POSITIVE
NRBC BLD AUTO-RTO: 0 /100 WBC (ref 0–0.2)
NT-PROBNP SERPL-MCNC: 270 PG/ML (ref 0–1800)
PH UR STRIP.AUTO: 5.5 [PH] (ref 5–8)
PLATELET # BLD AUTO: 338 10*3/MM3 (ref 140–450)
PMV BLD AUTO: 9.9 FL (ref 6–12)
POTASSIUM SERPL-SCNC: 4 MMOL/L (ref 3.5–5.2)
PROCALCITONIN SERPL-MCNC: 0.1 NG/ML (ref 0–0.25)
PROT SERPL-MCNC: 6.5 G/DL (ref 6–8.5)
PROT UR QL STRIP: NEGATIVE
QT INTERVAL: 393 MS
RBC # BLD AUTO: 4.53 10*6/MM3 (ref 3.77–5.28)
RBC # UR STRIP: ABNORMAL /HPF
REF LAB TEST METHOD: ABNORMAL
RHINOVIRUS RNA SPEC NAA+PROBE: NOT DETECTED
RSV RNA NPH QL NAA+NON-PROBE: NOT DETECTED
SARS-COV-2 RNA NPH QL NAA+NON-PROBE: NOT DETECTED
SODIUM SERPL-SCNC: 135 MMOL/L (ref 136–145)
SP GR UR STRIP: 1.01 (ref 1–1.03)
SQUAMOUS #/AREA URNS HPF: ABNORMAL /HPF
TROPONIN T DELTA: 5 NG/L
TROPONIN T SERPL HS-MCNC: 16 NG/L
UROBILINOGEN UR QL STRIP: ABNORMAL
WBC # UR STRIP: ABNORMAL /HPF
WBC NRBC COR # BLD: 21.66 10*3/MM3 (ref 3.4–10.8)
WHOLE BLOOD HOLD COAG: NORMAL

## 2023-04-24 PROCEDURE — 87040 BLOOD CULTURE FOR BACTERIA: CPT | Performed by: EMERGENCY MEDICINE

## 2023-04-24 PROCEDURE — 87086 URINE CULTURE/COLONY COUNT: CPT | Performed by: EMERGENCY MEDICINE

## 2023-04-24 PROCEDURE — 84484 ASSAY OF TROPONIN QUANT: CPT | Performed by: EMERGENCY MEDICINE

## 2023-04-24 PROCEDURE — 84145 PROCALCITONIN (PCT): CPT | Performed by: EMERGENCY MEDICINE

## 2023-04-24 PROCEDURE — 81001 URINALYSIS AUTO W/SCOPE: CPT | Performed by: EMERGENCY MEDICINE

## 2023-04-24 PROCEDURE — 99285 EMERGENCY DEPT VISIT HI MDM: CPT

## 2023-04-24 PROCEDURE — 93005 ELECTROCARDIOGRAM TRACING: CPT | Performed by: EMERGENCY MEDICINE

## 2023-04-24 PROCEDURE — 83605 ASSAY OF LACTIC ACID: CPT | Performed by: EMERGENCY MEDICINE

## 2023-04-24 PROCEDURE — 71045 X-RAY EXAM CHEST 1 VIEW: CPT

## 2023-04-24 PROCEDURE — 36415 COLL VENOUS BLD VENIPUNCTURE: CPT | Performed by: EMERGENCY MEDICINE

## 2023-04-24 PROCEDURE — 25010000002 ENOXAPARIN PER 10 MG: Performed by: INTERNAL MEDICINE

## 2023-04-24 PROCEDURE — 94640 AIRWAY INHALATION TREATMENT: CPT

## 2023-04-24 PROCEDURE — 85025 COMPLETE CBC W/AUTO DIFF WBC: CPT | Performed by: EMERGENCY MEDICINE

## 2023-04-24 PROCEDURE — 94761 N-INVAS EAR/PLS OXIMETRY MLT: CPT

## 2023-04-24 PROCEDURE — 94799 UNLISTED PULMONARY SVC/PX: CPT

## 2023-04-24 PROCEDURE — 25010000002 CEFTRIAXONE PER 250 MG: Performed by: EMERGENCY MEDICINE

## 2023-04-24 PROCEDURE — 80053 COMPREHEN METABOLIC PANEL: CPT | Performed by: EMERGENCY MEDICINE

## 2023-04-24 PROCEDURE — 87641 MR-STAPH DNA AMP PROBE: CPT | Performed by: EMERGENCY MEDICINE

## 2023-04-24 PROCEDURE — 0202U NFCT DS 22 TRGT SARS-COV-2: CPT | Performed by: EMERGENCY MEDICINE

## 2023-04-24 PROCEDURE — 83880 ASSAY OF NATRIURETIC PEPTIDE: CPT | Performed by: EMERGENCY MEDICINE

## 2023-04-24 PROCEDURE — 93010 ELECTROCARDIOGRAM REPORT: CPT | Performed by: STUDENT IN AN ORGANIZED HEALTH CARE EDUCATION/TRAINING PROGRAM

## 2023-04-24 RX ORDER — TRAZODONE HYDROCHLORIDE 50 MG/1
25 TABLET ORAL NIGHTLY PRN
Status: DISCONTINUED | OUTPATIENT
Start: 2023-04-24 | End: 2023-04-28 | Stop reason: HOSPADM

## 2023-04-24 RX ORDER — POTASSIUM CHLORIDE 750 MG/1
40 TABLET, FILM COATED, EXTENDED RELEASE ORAL AS NEEDED
Status: DISCONTINUED | OUTPATIENT
Start: 2023-04-24 | End: 2023-04-28 | Stop reason: HOSPADM

## 2023-04-24 RX ORDER — ACETAMINOPHEN 500 MG
1000 TABLET ORAL ONCE
Status: COMPLETED | OUTPATIENT
Start: 2023-04-24 | End: 2023-04-24

## 2023-04-24 RX ORDER — PANTOPRAZOLE SODIUM 40 MG/1
40 TABLET, DELAYED RELEASE ORAL
Status: DISCONTINUED | OUTPATIENT
Start: 2023-04-25 | End: 2023-04-28 | Stop reason: HOSPADM

## 2023-04-24 RX ORDER — BUDESONIDE 3 MG/1
3 CAPSULE, COATED PELLETS ORAL EVERY MORNING
COMMUNITY
End: 2023-04-28 | Stop reason: HOSPADM

## 2023-04-24 RX ORDER — SODIUM CHLORIDE 0.9 % (FLUSH) 0.9 %
10 SYRINGE (ML) INJECTION AS NEEDED
Status: DISCONTINUED | OUTPATIENT
Start: 2023-04-24 | End: 2023-04-28 | Stop reason: HOSPADM

## 2023-04-24 RX ORDER — SODIUM CHLORIDE 0.9 % (FLUSH) 0.9 %
10 SYRINGE (ML) INJECTION EVERY 12 HOURS SCHEDULED
Status: DISCONTINUED | OUTPATIENT
Start: 2023-04-24 | End: 2023-04-28 | Stop reason: HOSPADM

## 2023-04-24 RX ORDER — ACETAMINOPHEN 325 MG/1
650 TABLET ORAL EVERY 6 HOURS PRN
Status: DISCONTINUED | OUTPATIENT
Start: 2023-04-24 | End: 2023-04-28 | Stop reason: HOSPADM

## 2023-04-24 RX ORDER — CYCLOBENZAPRINE HCL 10 MG
TABLET ORAL
Qty: 20 TABLET | Refills: 0 | Status: SHIPPED | OUTPATIENT
Start: 2023-04-24

## 2023-04-24 RX ORDER — NOREPINEPHRINE BITARTRATE 0.03 MG/ML
.02-.3 INJECTION, SOLUTION INTRAVENOUS
Status: DISCONTINUED | OUTPATIENT
Start: 2023-04-24 | End: 2023-04-25

## 2023-04-24 RX ORDER — ESCITALOPRAM OXALATE 20 MG/1
20 TABLET ORAL DAILY
Status: DISCONTINUED | OUTPATIENT
Start: 2023-04-24 | End: 2023-04-28 | Stop reason: HOSPADM

## 2023-04-24 RX ORDER — SODIUM CHLORIDE 9 MG/ML
125 INJECTION, SOLUTION INTRAVENOUS CONTINUOUS
Status: DISCONTINUED | OUTPATIENT
Start: 2023-04-24 | End: 2023-04-25

## 2023-04-24 RX ORDER — SODIUM CHLORIDE 9 MG/ML
40 INJECTION, SOLUTION INTRAVENOUS AS NEEDED
Status: DISCONTINUED | OUTPATIENT
Start: 2023-04-24 | End: 2023-04-28 | Stop reason: HOSPADM

## 2023-04-24 RX ORDER — DONEPEZIL HYDROCHLORIDE 5 MG/1
5 TABLET, FILM COATED ORAL NIGHTLY
Status: DISCONTINUED | OUTPATIENT
Start: 2023-04-24 | End: 2023-04-28 | Stop reason: HOSPADM

## 2023-04-24 RX ORDER — PRAMIPEXOLE DIHYDROCHLORIDE 0.25 MG/1
0.25 TABLET ORAL NIGHTLY
Status: DISCONTINUED | OUTPATIENT
Start: 2023-04-24 | End: 2023-04-25

## 2023-04-24 RX ORDER — MORPHINE SULFATE 2 MG/ML
1 INJECTION, SOLUTION INTRAMUSCULAR; INTRAVENOUS EVERY 4 HOURS PRN
Status: DISCONTINUED | OUTPATIENT
Start: 2023-04-24 | End: 2023-04-28 | Stop reason: HOSPADM

## 2023-04-24 RX ORDER — ENOXAPARIN SODIUM 100 MG/ML
40 INJECTION SUBCUTANEOUS EVERY 24 HOURS
Status: DISCONTINUED | OUTPATIENT
Start: 2023-04-24 | End: 2023-04-28 | Stop reason: HOSPADM

## 2023-04-24 RX ORDER — POTASSIUM CHLORIDE 1.5 G/1.77G
40 POWDER, FOR SOLUTION ORAL AS NEEDED
Status: DISCONTINUED | OUTPATIENT
Start: 2023-04-24 | End: 2023-04-28 | Stop reason: HOSPADM

## 2023-04-24 RX ORDER — BUDESONIDE AND FORMOTEROL FUMARATE DIHYDRATE 160; 4.5 UG/1; UG/1
2 AEROSOL RESPIRATORY (INHALATION)
Status: DISCONTINUED | OUTPATIENT
Start: 2023-04-24 | End: 2023-04-28 | Stop reason: HOSPADM

## 2023-04-24 RX ORDER — TRAZODONE HYDROCHLORIDE 50 MG/1
TABLET ORAL
Qty: 30 TABLET | Refills: 1 | Status: SHIPPED | OUTPATIENT
Start: 2023-04-24

## 2023-04-24 RX ORDER — POTASSIUM CHLORIDE 7.45 MG/ML
10 INJECTION INTRAVENOUS
Status: DISCONTINUED | OUTPATIENT
Start: 2023-04-24 | End: 2023-04-28 | Stop reason: HOSPADM

## 2023-04-24 RX ADMIN — Medication 0.04 MCG/KG/MIN: at 13:35

## 2023-04-24 RX ADMIN — BUDESONIDE AND FORMOTEROL FUMARATE DIHYDRATE 2 PUFF: 160; 4.5 AEROSOL RESPIRATORY (INHALATION) at 20:55

## 2023-04-24 RX ADMIN — SODIUM CHLORIDE 125 ML/HR: 9 INJECTION, SOLUTION INTRAVENOUS at 11:33

## 2023-04-24 RX ADMIN — CEFTRIAXONE 2 G: 2 INJECTION, POWDER, FOR SOLUTION INTRAMUSCULAR; INTRAVENOUS at 13:13

## 2023-04-24 RX ADMIN — Medication 10 ML: at 11:26

## 2023-04-24 RX ADMIN — ENOXAPARIN SODIUM 40 MG: 100 INJECTION SUBCUTANEOUS at 21:39

## 2023-04-24 RX ADMIN — SODIUM CHLORIDE 500 ML: 9 INJECTION, SOLUTION INTRAVENOUS at 11:32

## 2023-04-24 RX ADMIN — Medication 10 ML: at 21:39

## 2023-04-24 RX ADMIN — DONEPEZIL HYDROCHLORIDE 5 MG: 5 TABLET, FILM COATED ORAL at 21:39

## 2023-04-24 RX ADMIN — SODIUM CHLORIDE 125 ML/HR: 9 INJECTION, SOLUTION INTRAVENOUS at 18:14

## 2023-04-24 RX ADMIN — PRAMIPEXOLE DIHYDROCHLORIDE 0.25 MG: 0.25 TABLET ORAL at 21:39

## 2023-04-24 RX ADMIN — ESCITALOPRAM OXALATE 20 MG: 10 TABLET, FILM COATED ORAL at 21:39

## 2023-04-24 RX ADMIN — ACETAMINOPHEN 1000 MG: 500 TABLET ORAL at 11:38

## 2023-04-24 NOTE — ED NOTES
Pts family states she wears 2.5 L at home, pt feels a little sleepy. O2 now at 88-89%, needs constant coaching for deep breaths. Placed on 2L nc. Pressure bag on IVF.

## 2023-04-24 NOTE — TELEPHONE ENCOUNTER
Spoke to Pt's daughter, she verbalized understanding.     Pt's daughter also states her mother has increasing muscle spasms with dizziness. /80.Daughter did not want to take Pt to ED/UC.     ----- Message from Cassi Bella MD sent at 4/22/2023 11:27 AM EDT -----  Your labs are all in good range.  I am starting you on Aricept as we discussed.

## 2023-04-24 NOTE — ED NOTES
Nursing report ED to floor  Kandace Andrade  83 y.o.  female    HPI :   Chief Complaint   Patient presents with    Shortness of Breath    Weakness - Generalized       Admitting doctor:   Isidoro Miller MD    Admitting diagnosis:   The primary encounter diagnosis was Sepsis with acute hypoxic respiratory failure and septic shock, due to unspecified organism. A diagnosis of Acute hypoxemic respiratory failure was also pertinent to this visit.    Code status:   Current Code Status       Date Active Code Status Order ID Comments User Context       Prior            Allergies:   Penicillins, Meclizine hcl, Bactrim [sulfamethoxazole-trimethoprim], Flagyl [metronidazole], and Statins    Isolation:   No active isolations    Intake and Output  No intake or output data in the 24 hours ending 04/24/23 1610    Weight:       04/24/23  1116   Weight: 57.2 kg (126 lb)       Most recent vitals:   Vitals:    04/24/23 1413 04/24/23 1438 04/24/23 1452 04/24/23 1545   BP: 117/63 106/67 103/80 112/64   Pulse:  71 66 68   Resp:  16  16   Temp:       TempSrc:       SpO2:  95%  98%   Weight:       Height:           Active LDAs/IV Access:   Lines, Drains & Airways       Active LDAs       Name Placement date Placement time Site Days    Peripheral IV 04/24/23 1125 Anterior;Distal;Left Wrist 04/24/23  1125  Wrist  less than 1    Peripheral IV 04/24/23 1229 Left;Posterior Hand 04/24/23  1229  Hand  less than 1                    Labs (abnormal labs have a star):   Labs Reviewed   COMPREHENSIVE METABOLIC PANEL - Abnormal; Notable for the following components:       Result Value    Glucose 104 (*)     Sodium 135 (*)     eGFR 59.2 (*)     All other components within normal limits    Narrative:     GFR Normal >60  Chronic Kidney Disease <60  Kidney Failure <15    The GFR formula is only valid for adults with stable renal function between ages 18 and 70.   LACTIC ACID, PLASMA - Abnormal; Notable for the following components:    Lactate 3.3 (*)      All other components within normal limits   TROPONIN - Abnormal; Notable for the following components:    HS Troponin T 16 (*)     All other components within normal limits    Narrative:     High Sensitive Troponin T Reference Range:  <10.0 ng/L- Negative Female for AMI  <15.0 ng/L- Negative Male for AMI  >=10 - Abnormal Female indicating possible myocardial injury.  >=15 - Abnormal Male indicating possible myocardial injury.   Clinicians would have to utilize clinical acumen, EKG, Troponin, and serial changes to determine if it is an Acute Myocardial Infarction or myocardial injury due to an underlying chronic condition.        CBC WITH AUTO DIFFERENTIAL - Abnormal; Notable for the following components:    WBC 21.66 (*)     Lymphocyte % 14.2 (*)     Immature Grans % 1.0 (*)     Neutrophils, Absolute 16.29 (*)     Monocytes, Absolute 1.78 (*)     Immature Grans, Absolute 0.21 (*)     All other components within normal limits   LACTIC ACID, REFLEX - Abnormal; Notable for the following components:    Lactate 2.6 (*)     All other components within normal limits   URINALYSIS W/ CULTURE IF INDICATED - Abnormal; Notable for the following components:    Blood, UA Trace (*)     Leuk Esterase, UA Moderate (2+) (*)     Nitrite, UA Positive (*)     All other components within normal limits    Narrative:     In absence of clinical symptoms, the presence of pyuria, bacteria, and/or nitrites on the urinalysis result does not correlate with infection.   HIGH SENSITIVITIY TROPONIN T 2HR - Abnormal; Notable for the following components:    HS Troponin T 21 (*)     Troponin T Delta 5 (*)     All other components within normal limits    Narrative:     High Sensitive Troponin T Reference Range:  <10.0 ng/L- Negative Female for AMI  <15.0 ng/L- Negative Male for AMI  >=10 - Abnormal Female indicating possible myocardial injury.  >=15 - Abnormal Male indicating possible myocardial injury.   Clinicians would have to utilize clinical  "acumen, EKG, Troponin, and serial changes to determine if it is an Acute Myocardial Infarction or myocardial injury due to an underlying chronic condition.        URINALYSIS, MICROSCOPIC ONLY - Abnormal; Notable for the following components:    WBC, UA 21-30 (*)     All other components within normal limits   RESPIRATORY PANEL PCR W/ COVID-19 (SARS-COV-2) NANI/NICK/ESMER/PAD/COR/MAD/SANTINO IN-HOUSE, NP SWAB IN UTM/VTP, 3-4 HR TAT - Normal    Narrative:     In the setting of a positive respiratory panel with a viral infection PLUS a negative procalcitonin without other underlying concern for bacterial infection, consider observing off antibiotics or discontinuation of antibiotics and continue supportive care. If the respiratory panel is positive for atypical bacterial infection (Bordetella pertussis, Chlamydophila pneumoniae, or Mycoplasma pneumoniae), consider antibiotic de-escalation to target atypical bacterial infection.   MRSA SCREEN, PCR - Normal    Narrative:     The negative predictive value of this diagnostic test is high and should only be used to consider de-escalating anti-MRSA therapy. A positive result may indicate colonization with MRSA and must be correlated clinically.   PROCALCITONIN - Normal    Narrative:     As a Marker for Sepsis (Non-Neonates):    1. <0.5 ng/mL represents a low risk of severe sepsis and/or septic shock.  2. >2 ng/mL represents a high risk of severe sepsis and/or septic shock.    As a Marker for Lower Respiratory Tract Infections that require antibiotic therapy:    PCT on Admission    Antibiotic Therapy       6-12 Hrs later    >0.5                Strongly Recommended  >0.25 - <0.5        Recommended   0.1 - 0.25          Discouraged              Remeasure/reassess PCT  <0.1                Strongly Discouraged     Remeasure/reassess PCT    As 28 day mortality risk marker: \"Change in Procalcitonin Result\" (>80% or <=80%) if Day 0 (or Day 1) and Day 4 values are available. Refer to " http://www.[facility]-pct-calculator.com    Change in PCT <=80%  A decrease of PCT levels below or equal to 80% defines a positive change in PCT test result representing a higher risk for 28-day all-cause mortality of patients diagnosed with severe sepsis for septic shock.    Change in PCT >80%  A decrease of PCT levels of more than 80% defines a negative change in PCT result representing a lower risk for 28-day all-cause mortality of patients diagnosed with severe sepsis or septic shock.      BNP (IN-HOUSE) - Normal    Narrative:     Among patients with dyspnea, NT-proBNP is highly sensitive for the detection of acute congestive heart failure. In addition NT-proBNP of <300 pg/ml effectively rules out acute congestive heart failure with 99% negative predictive value.    Results may be falsely decreased if patient taking Biotin.     BLOOD CULTURE   BLOOD CULTURE   URINE CULTURE   RAINBOW DRAW    Narrative:     The following orders were created for panel order [place] Draw.  Procedure                               Abnormality         Status                     ---------                               -----------         ------                     Gold Top - SST[[id number]]                                   Final result               Light Blue Top[[id number]]                                   Final result                 Please view results for these tests on the individual orders.   LACTIC ACID, REFLEX   CBC AND DIFFERENTIAL    Narrative:     The following orders were created for panel order CBC & Differential.  Procedure                               Abnormality         Status                     ---------                               -----------         ------                     CBC Auto Differential[[id number]]        Abnormal            Final result                 Please view results for these tests on the individual orders.   GOLD TOP - SST   LIGHT BLUE TOP       EKG:   ECG 12 Lead Other; weakness   Final Result    HEART RATE= 82  bpm   RR Interval= 732  ms   MO Interval= 159  ms   P Horizontal Axis= -49  deg   P Front Axis= -6  deg   QRSD Interval= 79  ms   QT Interval= 393  ms   QRS Axis= -1  deg   T Wave Axis= -4  deg   - BORDERLINE ECG -   Sinus rhythm   Low voltage, precordial leads   Borderline T abnormalities, anterior leads   When compared with ECG of 08-Dec-2022 14:15:20,   No significant change   Electronically Signed By: Drew Sanchez ([facility]) 2023 12:40:14   Date and Time of Study: 2023 11:40:50          Meds given in ED:   Medications   sodium chloride 0.9 % flush 10 mL (10 mL Intravenous Given 23 1126)   sodium chloride 0.9 % bolus 500 mL (0 mL Intravenous Stopped 23 1219)     Followed by   sodium chloride 0.9 % infusion (0 mL/hr Intravenous Stopped 23 1219)   sodium chloride 0.9 % bolus 1,716 mL (0 mL Intravenous Stopped 23 1314)   norepinephrine (LEVOPHED) 8 mg in 250 mL NS infusion (premix) (0.06 mcg/kg/min × 57.2 kg Intravenous Rate/Dose Change 23 1452)   sodium chloride 0.9 % bolus 500 mL (500 mL Intravenous Not Given 23 1338)   acetaminophen (TYLENOL) tablet 1,000 mg (1,000 mg Oral Given 23 1138)   cefTRIAXone (ROCEPHIN) 2 g in sodium chloride 0.9 % 100 mL IVPB-VTB (0 g Intravenous Stopped 23 1414)       Imaging results:  XR Chest 1 View    Result Date: 2023  Minimal likely atelectasis in the lower lungs. Tortuous aorta. Follow-up as clinically indicated.  This report was finalized on 2023 12:24 PM by Dr. Matthew Her M.D.       Ambulatory status:   - bedrest     Social issues:   Social History     Socioeconomic History    Marital status:    Tobacco Use    Smoking status: Former     Packs/day: 1.00     Years: 25.00     Pack years: 25.00     Types: Cigarettes     Quit date: 2008     Years since quittin.9    Smokeless tobacco: Never    Tobacco comments:     CAFFEINE USE - 1 CUP COFFEE/ DIET COKE   Vaping Use    Vaping Use: Never  used   Substance and Sexual Activity    Alcohol use: No    Drug use: No    Sexual activity: Defer       NIH Stroke Scale:         Karyna Vincent RN  04/24/23 16:10 EDT

## 2023-04-24 NOTE — ED NOTES
Pt to Ed with family from home with c/o cough x 3 days, low grade temp, extremity spasms. States has had these spasms since prior back surgeries, worse in last few days, denies falls at home but felt weaker. + mild HA and sore throat

## 2023-04-24 NOTE — PROGRESS NOTES
Clinical Pharmacy Services: Medication History    Kandace Andrade is a 83 y.o. female presenting to [facility] for   Chief Complaint   Patient presents with   • Shortness of Breath   • Weakness - Generalized       She  has a past medical history of Acute seasonal allergic rhinitis due to pollen, Anxiety and depression, Arthritis, Asthma, C. difficile colitis, COPD (chronic obstructive pulmonary disease), Diabetes mellitus, Diarrhea, Diverticulosis, Fibula fracture, Herpes zoster, History of lumbosacral spine surgery, Hypertension, IBS (irritable bowel syndrome), Mixed hyperlipidemia, KINDRA (obstructive sleep apnea), Osteoporosis, Paroxysmal atrial fibrillation, and Vitamin D deficiency.    Allergies as of 04/24/2023 - Reviewed 04/24/2023   Allergen Reaction Noted   • Penicillins Hives and Other (See Comments) 10/15/2017   • Meclizine hcl Dizziness 09/02/2021   • Bactrim [sulfamethoxazole-trimethoprim] Hives 07/18/2022   • Flagyl [metronidazole] Other (See Comments) 04/01/2021   • Statins Other (See Comments) 10/15/2017       Medication information was obtained from: Pharmacy  Pharmacy and Phone Number:   [id number] - [place], [place] - [address] AT [facility] - [phone]  - [phone]   [address]  [place] [address]  Phone: [phone] Fax: [phone]        Prior to Admission Medications     Prescriptions Last Dose Informant Patient Reported? Taking?    acetaminophen (TYLENOL) 500 MG tablet  Self Yes Yes    Take 2 tablets by mouth Every 6 (Six) Hours As Needed for Mild Pain.    amLODIPine (NORVASC) 5 MG tablet  Pharmacy No Yes    Take 1 tablet by mouth Daily.    Patient taking differently:  Take 2.5 mg by mouth Daily.    aspirin 81 MG chewable tablet  Self Yes Yes    Chew 1 tablet Daily.    atenolol (TENORMIN) 25 MG tablet  Pharmacy No Yes    Take 0.5 tablets by mouth Every Night.    Patient taking differently:  Take 1  tablet by mouth Every Night.    Budesonide (ENTOCORT EC) 3 MG 24 hr capsule  Pharmacy Yes Yes    Take 1 capsule by mouth Every Morning. Ends 6/5/23    budesonide-formoterol (SYMBICORT) 160-4.5 MCG/ACT inhaler  Pharmacy No Yes    Inhale 2 puffs 2 (Two) Times a Day.    cyclobenzaprine (FLEXERIL) 10 MG tablet  Pharmacy No Yes    TAKE 1/2 TO 1 TABLET BY MOUTH TWICE DAILY AS NEEDED FOR MUSCLE SPASMS    Patient taking differently:  Take 5-10 mg by mouth 2 (Two) Times a Day As Needed.    donepezil (Aricept) 5 MG tablet  Pharmacy No Yes    Take 1 tablet by mouth Every Night.    escitalopram (LEXAPRO) 20 MG tablet  Pharmacy No Yes    Take 1 tablet by mouth Daily.    Evolocumab with Infusor (REPATHA) solution cartridge  Pharmacy No Yes    Inject 3.5 mL under the skin into the appropriate area as directed Every 14 (Fourteen) Days.    ezetimibe (ZETIA) 10 MG tablet  Pharmacy No Yes    Take 1 tablet by mouth Daily.    fluticasone (FLONASE) 50 MCG/ACT nasal spray  Pharmacy No Yes    2 sprays into the nostril(s) as directed by provider Daily As Needed for Rhinitis.    ipratropium-albuterol (DUO-NEB) 0.5-2.5 mg/3 ml nebulizer  Pharmacy No Yes    Take 3 mL by nebulization 4 (Four) Times a Day.    lisinopril-hydrochlorothiazide (Zestoretic) 10-12.5 MG per tablet  Pharmacy No Yes    Take 1 tablet by mouth Daily.    Melatonin 10 MG tablet  Self Yes Yes    Take 1 tablet by mouth Every Night.    omeprazole (priLOSEC) 40 MG capsule  Pharmacy No Yes    Take 1 capsule by mouth Daily.    pramipexole (MIRAPEX) 0.25 MG tablet  Pharmacy No Yes    TAKE 1 TABLET BY MOUTH TWICE DAILY    Patient taking differently:  Take 1 tablet by mouth 2 (Two) Times a Day.    solifenacin (VESIcare) 5 MG tablet  Pharmacy No Yes    Take 1 tablet by mouth Daily.    traZODone (DESYREL) 50 MG tablet  Pharmacy No Yes    TAKE 1/2 TO 1 TABLET BY MOUTH EVERY NIGHT 1 HOUR BEFORE BEDTIME    Patient taking differently:  Take 25-50 mg by mouth Every Night.    vitamin D  (ERGOCALCIFEROL) 1.25 MG (33374 UT) capsule capsule  Pharmacy No Yes    TAKE 1 CAPSULE BY MOUTH EVERY 7 DAYS    Patient taking differently:  Take 1 capsule by mouth Every 7 (Seven) Days.            Medication notes:     This medication list is complete to the best of my knowledge as of 4/24/2023    Please call if questions.    Kirk Lange  Medication History Technician  [phone]    4/24/2023 15:11 EDT

## 2023-04-24 NOTE — H&P
Patient Care Team:  Cassi Bella MD as PCP - General (Family Medicine)    Chief complaint:  Leg spasm    History of present illness:  Subjective     This is an 83-year-old female patient who lives alone.    She presented to the hospital today for generalized weakness, difficulty walking and frequent falls associated with cough and worsening back and leg spasm.  This was preceded by flulike symptoms about 3 to 4 days ago consistent with headache, sore throat and decreased appetite.  Cough is nonproductive.    In ED, she had a chhaya BP of 82/50 which improved transiently with fluid resuscitation.  She received a total of 2.7 L IV fluid.  However her blood pressure dropped again later and therefore she was initiated on intravenous pressors.    UA positive for nitrates and 20-30 WBC.  Lactic acid elevated at 3, dropped down to 2 after fluid resuscitation.  WBC 21 K with neutrophils of 75%.    Patient is somewhat poor historian and some of the history was obtained from the chart and after discussion with her daughter    Review of Systems:  Constitutional: No fever or chills.   ENMT: No sinus congestion  Cardiovascular: No chest pain, palpitation or legs swelling.    Respiratory: Mild dry cough.  No difficulty breathing.  Gastrointestinal: Mild diarrhea.  No abdominal pain.  No nausea or vomiting.  Decreased p.o. intake.  Neurology: Headache.  Generalized weakness.  Dizziness.  Musculoskeletal: No joints pain, stiffness or swelling.   Psychiatry: No depression.  Genitourinary: No dysuria but decreased urinary frequency.  Endo: No weight changes. No cold or warm intolerance.  Lymphatic: No swollen glands.  Integumentary: No rash.    History  Past Medical History:   Diagnosis Date   • Acute seasonal allergic rhinitis due to pollen    • Anxiety and depression    • Arthritis    • Asthma    • C. difficile colitis    • COPD (chronic obstructive pulmonary disease)    • Diabetes mellitus     Pre diabetes   •  Diarrhea    • Diverticulosis    • Fibula fracture    • Herpes zoster    • History of lumbosacral spine surgery    • Hypertension    • IBS (irritable bowel syndrome)    • Mixed hyperlipidemia    • KINDRA (obstructive sleep apnea)     NO MACHINE AT THIS TIME   • Osteoporosis    • Paroxysmal atrial fibrillation    • Vitamin D deficiency      Past Surgical History:   Procedure Laterality Date   • APPENDECTOMY     • BACK SURGERY      lower x2   • CATARACT EXTRACTION     • COLONOSCOPY      patient doesn't recall    • COLONOSCOPY N/A 2021    Procedure: COLONOSCOPY to cecum with cecal biopsies;  Surgeon: Jarrod Duggan MD;  Location: [facility] ENDOSCOPY;  Service: Gastroenterology;  Laterality: N/A;  pre: diarhhea  post: diverticulosis, hemorrhoids   • HYSTERECTOMY      partial   • SHOULDER SURGERY Right    • SHOULDER SURGERY     • SIGMOIDOSCOPY N/A 10/19/2022    Procedure: SIGMOIDOSCOPY FLEXIBLE TO SIGMOID COLON WITH BIOPSIES;  Surgeon: Tonja Tanner MD;  Location: [facility] ENDOSCOPY;  Service: Gastroenterology;  Laterality: N/A;  PRE: DIARRHEA  POST: HEMORRHOIDS   • TONSILLECTOMY     • UPPER GASTROINTESTINAL ENDOSCOPY      patient doesn't recall      Family History   Problem Relation Age of Onset   • Heart disease Mother    • Lung cancer Mother    • Diabetes Father    • Liver disease Father    • Heart disease Paternal Uncle    • Malig Hyperthermia Neg Hx      Social History     Tobacco Use   • Smoking status: Former     Packs/day: 1.00     Years: 25.00     Pack years: 25.00     Types: Cigarettes     Quit date: 2008     Years since quittin.9   • Smokeless tobacco: Never   • Tobacco comments:     CAFFEINE USE - 1 CUP COFFEE/ DIET COKE   Vaping Use   • Vaping Use: Never used   Substance Use Topics   • Alcohol use: No   • Drug use: No     E-cigarette/Vaping   • E-cigarette/Vaping Use Never User      E-cigarette/Vaping Substances   • Nicotine No    • THC No    • CBD No    • Flavoring No      Medications Prior to  Admission   Medication Sig Dispense Refill Last Dose   • acetaminophen (TYLENOL) 500 MG tablet Take 2 tablets by mouth Every 6 (Six) Hours As Needed for Mild Pain.      • amLODIPine (NORVASC) 5 MG tablet Take 1 tablet by mouth Daily. (Patient taking differently: Take 2.5 mg by mouth Daily.) 30 tablet 3    • aspirin 81 MG chewable tablet Chew 1 tablet Daily.      • atenolol (TENORMIN) 25 MG tablet Take 0.5 tablets by mouth Every Night. (Patient taking differently: Take 1 tablet by mouth Every Night.) 45 tablet 1    • Budesonide (ENTOCORT EC) 3 MG 24 hr capsule Take 1 capsule by mouth Every Morning. Ends 6/5/23      • budesonide-formoterol (SYMBICORT) 160-4.5 MCG/ACT inhaler Inhale 2 puffs 2 (Two) Times a Day. 1 each 3    • cyclobenzaprine (FLEXERIL) 10 MG tablet TAKE 1/2 TO 1 TABLET BY MOUTH TWICE DAILY AS NEEDED FOR MUSCLE SPASMS (Patient taking differently: Take 5-10 mg by mouth 2 (Two) Times a Day As Needed.) 20 tablet 0    • donepezil (Aricept) 5 MG tablet Take 1 tablet by mouth Every Night. 30 tablet 3    • escitalopram (LEXAPRO) 20 MG tablet Take 1 tablet by mouth Daily. 90 tablet 1    • Evolocumab with Infusor (REPATHA) solution cartridge Inject 3.5 mL under the skin into the appropriate area as directed Every 14 (Fourteen) Days. 10.5 mL 3    • ezetimibe (ZETIA) 10 MG tablet Take 1 tablet by mouth Daily. 30 tablet 3    • fluticasone (FLONASE) 50 MCG/ACT nasal spray 2 sprays into the nostril(s) as directed by provider Daily As Needed for Rhinitis. 18 mL 5    • ipratropium-albuterol (DUO-NEB) 0.5-2.5 mg/3 ml nebulizer Take 3 mL by nebulization 4 (Four) Times a Day. 360 mL 3    • lisinopril-hydrochlorothiazide (Zestoretic) 10-12.5 MG per tablet Take 1 tablet by mouth Daily. 90 tablet 3    • Melatonin 10 MG tablet Take 1 tablet by mouth Every Night.      • omeprazole (priLOSEC) 40 MG capsule Take 1 capsule by mouth Daily. 90 capsule 1    • pramipexole (MIRAPEX) 0.25 MG tablet TAKE 1 TABLET BY MOUTH TWICE DAILY  (Patient taking differently: Take 1 tablet by mouth 2 (Two) Times a Day.) 60 tablet 3    • solifenacin (VESIcare) 5 MG tablet Take 1 tablet by mouth Daily. 30 tablet 3    • traZODone (DESYREL) 50 MG tablet TAKE 1/2 TO 1 TABLET BY MOUTH EVERY NIGHT 1 HOUR BEFORE BEDTIME (Patient taking differently: Take 25-50 mg by mouth Every Night.) 30 tablet 1    • vitamin D (ERGOCALCIFEROL) 1.25 MG (22188 UT) capsule capsule TAKE 1 CAPSULE BY MOUTH EVERY 7 DAYS (Patient taking differently: Take 1 capsule by mouth Every 7 (Seven) Days.) 5 capsule 5      Allergies:  Penicillins, Meclizine hcl, Bactrim [sulfamethoxazole-trimethoprim], Flagyl [metronidazole], and Statins    Objective   Vital Signs  Temp:  [99.2 °F (37.3 °C)-100.2 °F (37.9 °C)] 99.2 °F (37.3 °C)  Heart Rate:  [55-99] 59  Resp:  [16-18] 16  BP: ()/(46-80) 104/60    PPE used per hospital policy    Physical Exam:  Constitutional: Not in acute distress.  Eyes: Injected conjunctivae, EOMI. Pupils equal and reactive to light.   ENMT: Marcus 3. No oral thrush. Tonsils grade  Neck: No thyromegaly.  Trachea midline  Heart: RRR, no murmur.  No pitting edema  Lungs: Good and equal air entry bilaterally.  Nonlabored breathing  Abdomen: Obese. Soft. No tenderness or dullness.  Positive bowel sounds  Extremities: No cyanosis, clubbing. Moves all extremities.  Warm extremities and well-perfused  Neuro: Conscious, alert, oriented x3.  Strength 5/5 overall  Psych: Appropriate mood and affect.    Integumentary: No rash or ecchymosis  Lymphatic: No palpable cervical or supraclavicular lymph nodes.        Diagnostic imaging:  I personally and independently reviewed the following images:   CXR 4/24/2023: Mild atelectasis right lower lobe.      Laboratory workup:  Results from last 7 days   Lab Units 04/24/23  1129 04/20/23  1334   SODIUM mmol/L 135* 141   POTASSIUM mmol/L 4.0 4.2   CHLORIDE mmol/L 100 100   TOTAL CO2 mmol/L  --  27.3   CO2 mmol/L 22.0  --    BUN mg/dL 20 17    CREATININE mg/dL 0.95 0.84   GLUCOSE mg/dL 104* 76   CALCIUM mg/dL 9.0 10.3     Results from last 7 days   Lab Units 04/24/23  1319 04/24/23  1129   HSTROP T ng/L 21* 16*     Results from last 7 days   Lab Units 04/24/23  1129   WBC 10*3/mm3 21.66*   HEMOGLOBIN g/dL 13.7   HEMATOCRIT % 39.6   PLATELETS 10*3/mm3 338         Results from last 7 days   Lab Units 04/24/23  1129   PROBNP pg/mL 270.0       Assessment     1. Septic shock  2. UTI  3. Lactic acidosis,   4. URI    · HTN  · PAF  · Asthma, no current exacerbation  · Anxiety/depression  · History of C. difficile colitis January 2021  · History of E. coli UTI February 2022  · History of microscopic colitis    Plan:  · Antibiotics with Rocephin.  Awaiting urine culture.  · Check RVP  · IV fluid hydration with normal saline 125 mL/H  · Incentive spirometry  · Levophed to keep MAP >65  · Resume anxiety/depression meds.  · Holding BP meds  · Symbicort 2 puffs twice daily and DuoNeb as needed for asthma    Sanjuana Mcqueen MD  04/24/23  19:45 EDT    Time: Critical care 42 min      This note was dictated utilizing Dragon dictation

## 2023-04-24 NOTE — PLAN OF CARE
Goal Outcome Evaluation:  Plan of Care Reviewed With: patient, daughter        Progress: no change   From ED in no acute distress. On Levo BP stable at present.  Pt alert.  Purwick in place

## 2023-04-24 NOTE — ED PROVIDER NOTES
EMERGENCY DEPARTMENT ENCOUNTER    Room Number:  18/18  Date of encounter:  4/24/2023  PCP: Cassi Bella MD  Historian: Patient and family who are at bedside    Patient was placed in face mask during triage process. Patient was wearing facemask when I entered the room and throughout our encounter. I wore full protective equipment throughout this patient encounter including a face mask, eye protection, and gloves. Hand hygiene was performed before donning protective equipment and again following doffing of PPE after leaving the room.    HPI:  Chief Complaint: Generalized malaise  A complete HPI/ROS/PMH/PSH/SH/FH are unobtainable due to: N/A   Context: Kandace Andrade is a 83 y.o. female who presents to the ED c/o flulike symptoms for the last 3 to 4 days with associated headache, sore throat, and diminished appetite.  She has had a fair amount of cough that was nonproductive.  Unsure about any fevers.  No abdominal pain nausea or vomiting.  The patient has had no dysuria but notes she does have to urinate frequently.  No clear exacerbating or relieving factors.  Patient reports that currently she just feels awful.      MEDICAL HISTORY REVIEW  EMR reviewed:    Patient was admitted 12/8/2022 to this facility for evaluation and treatment of acute hypoxemic respiratory failure secondary to pneumonia.  Patient was noted to have sepsis due to pneumonia as well as significant generalized weakness.  Complicating factors in this patient with history include type 2 diabetes, COPD, paroxysmal atrial fibrillation, essential hypertension, sleep apnea and IBS.    PAST MEDICAL HISTORY  Active Ambulatory Problems     Diagnosis Date Noted   • Vitamin D deficiency    • KINDRA (obstructive sleep apnea)    • IBS (irritable bowel syndrome)    • Essential hypertension    • Herpes zoster    • Arthritis    • Anxiety and depression    • Other emphysema 08/30/2019   • Acute seasonal allergic rhinitis due to pollen 08/30/2019   • Paroxysmal  atrial fibrillation ([facility])    • Osteoporosis    • Mixed hyperlipidemia    • Encounter for Medicare annual wellness exam 11/22/2019   • COPD (chronic obstructive pulmonary disease) ([facility]) 11/22/2019   • Mixed stress and urge urinary incontinence 02/25/2020   • Type 2 diabetes mellitus without complication, without long-term current use of insulin ([facility]) 02/26/2020   • Arthritis of both hips 02/27/2020   • Autonomic neuropathy 10/19/2020   • GERD without esophagitis 11/10/2020   • C. difficile colitis 11/25/2020   • Hypokalemia 11/25/2020   • Hospital discharge follow-up 12/07/2020   • Decreased hearing of both ears 04/12/2021   • Dizziness 04/12/2021   • Nausea 04/12/2021   • Hypercholesterolemia 11/23/2021   • Primary insomnia 12/28/2021   • Generalized weakness 02/18/2022   • Other microscopic hematuria 02/18/2022   • Confusion 07/18/2022   • Dehydration 07/19/2022   • Abdominal pain 07/19/2022   • Metabolic encephalopathy 07/20/2022   • Left ankle swelling 08/04/2022   • Diarrhea 09/22/2022   • Overactive bladder 11/03/2022   • Restless leg syndrome 12/19/2022   • Edema of both legs 02/06/2023   • Other fatigue 02/06/2023   • Carotid artery stenosis, asymptomatic, bilateral 03/01/2023   • Memory deficit 04/20/2023   • Imbalance 04/20/2023   • Dementia, old age 04/22/2023     Resolved Ambulatory Problems     Diagnosis Date Noted   • Hyperglycemia    • Hyperlipidemia    • Stage 3 chronic kidney disease    • Atrial fibrillation    • Acute non-recurrent maxillary sinusitis 11/22/2019   • Acute UTI 10/19/2020   • Symptomatic hypotension 10/19/2020   • Impacted cerumen of left ear 11/10/2020   • Pancolitis 11/24/2020   • Diarrhea 03/29/2021   • Acute cystitis with hematuria 07/18/2022   • Acute UTI (urinary tract infection) 07/19/2022   • Chronic diarrhea 07/19/2022   • UTI (urinary tract infection) 07/20/2022   • Pneumonia of right middle lobe due to infectious organism 12/08/2022   • Sepsis due to pneumonia 12/09/2022    • Acute hypoxemic respiratory failure 2022     Past Medical History:   Diagnosis Date   • Asthma    • Diabetes mellitus    • Diverticulosis    • Fibula fracture    • History of lumbosacral spine surgery    • Hypertension          PAST SURGICAL HISTORY  Past Surgical History:   Procedure Laterality Date   • APPENDECTOMY     • BACK SURGERY      lower x2   • CATARACT EXTRACTION     • COLONOSCOPY      patient doesn't recall    • COLONOSCOPY N/A 2021    Procedure: COLONOSCOPY to cecum with cecal biopsies;  Surgeon: Jarrod Duggan MD;  Location:  [facility];  Service: Gastroenterology;  Laterality: N/A;  pre: diarhhea  post: diverticulosis, hemorrhoids   • HYSTERECTOMY      partial   • SHOULDER SURGERY Right    • SHOULDER SURGERY     • SIGMOIDOSCOPY N/A 10/19/2022    Procedure: SIGMOIDOSCOPY FLEXIBLE TO SIGMOID COLON WITH BIOPSIES;  Surgeon: Tonja Tanner MD;  Location:  [facility];  Service: Gastroenterology;  Laterality: N/A;  PRE: DIARRHEA  POST: HEMORRHOIDS   • TONSILLECTOMY     • UPPER GASTROINTESTINAL ENDOSCOPY      patient doesn't recall          FAMILY HISTORY  Family History   Problem Relation Age of Onset   • Heart disease Mother    • Lung cancer Mother    • Diabetes Father    • Liver disease Father    • Heart disease Paternal Uncle    • Malig Hyperthermia Neg Hx          SOCIAL HISTORY  Social History     Socioeconomic History   • Marital status:    Tobacco Use   • Smoking status: Former     Packs/day: 1.00     Years: 25.00     Pack years: 25.00     Types: Cigarettes     Quit date: 2008     Years since quittin.9   • Smokeless tobacco: Never   • Tobacco comments:     CAFFEINE USE - 1 CUP COFFEE/ DIET COKE   Vaping Use   • Vaping Use: Never used   Substance and Sexual Activity   • Alcohol use: No   • Drug use: No   • Sexual activity: Defer         ALLERGIES  Penicillins, Meclizine hcl, Bactrim [sulfamethoxazole-trimethoprim], Flagyl [metronidazole], and  Statins        REVIEW OF SYSTEMS  Review of Systems     All systems reviewed and negative except for those discussed in HPI.       PHYSICAL EXAM    I have reviewed the triage vital signs and nursing notes.    ED Triage Vitals   Temp Heart Rate Resp BP SpO2   04/24/23 1059 04/24/23 1059 04/24/23 1059 04/24/23 1116 04/24/23 1059   100.2 °F (37.9 °C) 99 18 90/61 96 %      Temp src Heart Rate Source Patient Position BP Location FiO2 (%)   04/24/23 1059 04/24/23 1059 -- -- --   Tympanic Monitor            Physical Exam    Physical Exam   Constitutional: No distress.  Uncomfortable and ill-appearing though not overtly toxic.  HENT:  Head: Normocephalic and atraumatic.   Oropharynx: Mucous membranes are moderately dry.  Eyes: No scleral icterus. No conjunctival pallor.  Neck: Painless range of motion noted. Neck supple.   Cardiovascular: Normal rate, regular rhythm and intact distal pulses.  Pulmonary/Chest: No respiratory distress. There are no wheezes, no rhonchi, and no rales.   Abdominal: Soft. There is no tenderness. There is no rebound and no guarding.   Musculoskeletal: Moves all extremities equally. There is no pedal edema or calf tenderness.   Neurological: Alert.  Baseline strength and sensation noted.   Skin: Skin is pink, warm, and dry. No pallor.   Psychiatric: Mood and affect normal.   Nursing note and vitals reviewed.    LAB RESULTS  Recent Results (from the past 24 hour(s))   Comprehensive Metabolic Panel    Collection Time: 04/24/23 11:29 AM    Specimen: Blood   Result Value Ref Range    Glucose 104 (H) 65 - 99 mg/dL    BUN 20 8 - 23 mg/dL    Creatinine 0.95 0.57 - 1.00 mg/dL    Sodium 135 (L) 136 - 145 mmol/L    Potassium 4.0 3.5 - 5.2 mmol/L    Chloride 100 98 - 107 mmol/L    CO2 22.0 22.0 - 29.0 mmol/L    Calcium 9.0 8.6 - 10.5 mg/dL    Total Protein 6.5 6.0 - 8.5 g/dL    Albumin 4.1 3.5 - 5.2 g/dL    ALT (SGPT) 16 1 - 33 U/L    AST (SGOT) 24 1 - 32 U/L    Alkaline Phosphatase 71 39 - 117 U/L    Total  Bilirubin 0.2 0.0 - 1.2 mg/dL    Globulin 2.4 gm/dL    A/G Ratio 1.7 g/dL    BUN/Creatinine Ratio 21.1 7.0 - 25.0    Anion Gap 13.0 5.0 - 15.0 mmol/L    eGFR 59.2 (L) >60.0 mL/min/1.73   Lactic Acid, Plasma    Collection Time: 04/24/23 11:29 AM    Specimen: Blood   Result Value Ref Range    Lactate 3.3 (C) 0.5 - 2.0 mmol/L   Procalcitonin    Collection Time: 04/24/23 11:29 AM    Specimen: Blood   Result Value Ref Range    Procalcitonin 0.10 0.00 - 0.25 ng/mL   BNP    Collection Time: 04/24/23 11:29 AM    Specimen: Blood   Result Value Ref Range    proBNP 270.0 0.0 - 1,800.0 pg/mL   High Sensitivity Troponin T    Collection Time: 04/24/23 11:29 AM    Specimen: Blood   Result Value Ref Range    HS Troponin T 16 (H) <10 ng/L   CBC Auto Differential    Collection Time: 04/24/23 11:29 AM    Specimen: Blood   Result Value Ref Range    WBC 21.66 (H) 3.40 - 10.80 10*3/mm3    RBC 4.53 3.77 - 5.28 10*6/mm3    Hemoglobin 13.7 12.0 - 15.9 g/dL    Hematocrit 39.6 34.0 - 46.6 %    MCV 87.4 79.0 - 97.0 fL    MCH 30.2 26.6 - 33.0 pg    MCHC 34.6 31.5 - 35.7 g/dL    RDW 13.1 12.3 - 15.4 %    RDW-SD 42.1 37.0 - 54.0 fl    MPV 9.9 6.0 - 12.0 fL    Platelets 338 140 - 450 10*3/mm3    Neutrophil % 75.2 42.7 - 76.0 %    Lymphocyte % 14.2 (L) 19.6 - 45.3 %    Monocyte % 8.2 5.0 - 12.0 %    Eosinophil % 1.1 0.3 - 6.2 %    Basophil % 0.3 0.0 - 1.5 %    Immature Grans % 1.0 (H) 0.0 - 0.5 %    Neutrophils, Absolute 16.29 (H) 1.70 - 7.00 10*3/mm3    Lymphocytes, Absolute 3.07 0.70 - 3.10 10*3/mm3    Monocytes, Absolute 1.78 (H) 0.10 - 0.90 10*3/mm3    Eosinophils, Absolute 0.24 0.00 - 0.40 10*3/mm3    Basophils, Absolute 0.07 0.00 - 0.20 10*3/mm3    Immature Grans, Absolute 0.21 (H) 0.00 - 0.05 10*3/mm3    nRBC 0.0 0.0 - 0.2 /100 WBC   Gold Top - SST    Collection Time: 04/24/23 11:29 AM   Result Value Ref Range    Extra Tube Hold for add-ons.    Light Blue Top    Collection Time: 04/24/23 11:29 AM   Result Value Ref Range    Extra Tube Hold  for add-ons.    Respiratory Panel PCR w/COVID-19(SARS-CoV-2) [facility]/NICK/ESMER/PAD/COR/MAD/SANTINO In-House, NP Swab in UTM/VTM, 3-4 HR TAT - Swab, Nasopharynx    Collection Time: 04/24/23 11:37 AM    Specimen: Nasopharynx; Swab   Result Value Ref Range    ADENOVIRUS, PCR Not Detected Not Detected    Coronavirus 229E Not Detected Not Detected    Coronavirus HKU1 Not Detected Not Detected    Coronavirus NL63 Not Detected Not Detected    Coronavirus OC43 Not Detected Not Detected    COVID19 Not Detected Not Detected - Ref. Range    Human Metapneumovirus Not Detected Not Detected    Human Rhinovirus/Enterovirus Not Detected Not Detected    Influenza A PCR Not Detected Not Detected    Influenza B PCR Not Detected Not Detected    Parainfluenza Virus 1 Not Detected Not Detected    Parainfluenza Virus 2 Not Detected Not Detected    Parainfluenza Virus 3 Not Detected Not Detected    Parainfluenza Virus 4 Not Detected Not Detected    RSV, PCR Not Detected Not Detected    Bordetella pertussis pcr Not Detected Not Detected    Bordetella parapertussis PCR Not Detected Not Detected    Chlamydophila pneumoniae PCR Not Detected Not Detected    Mycoplasma pneumo by PCR Not Detected Not Detected   ECG 12 Lead Other; weakness    Collection Time: 04/24/23 11:40 AM   Result Value Ref Range    QT Interval 393 ms   High Sensitivity Troponin T 2Hr    Collection Time: 04/24/23  1:19 PM    Specimen: Blood   Result Value Ref Range    HS Troponin T 21 (H) <10 ng/L    Troponin T Delta 5 (C) >=-4 - <+4 ng/L       Ordered the above labs and independently reviewed the results.        RADIOLOGY  XR Chest 1 View    Result Date: 4/24/2023  XR CHEST 1 VW-  HISTORY: Female who is 83 years-old,  cough  TECHNIQUE: Frontal view the chest  COMPARISON: 12/16/2022  FINDINGS: The heart size is normal. Aorta is tortuous, calcified. Pulmonary vasculature is unremarkable. Minimally likely atelectasis in the lower lungs. No pleural effusion, or pneumothorax. Right  hemidiaphragm remains elevated. No acute osseous process.      Minimal likely atelectasis in the lower lungs. Tortuous aorta. Follow-up as clinically indicated.  This report was finalized on 4/24/2023 12:24 PM by Dr. Matthew Her M.D.        I ordered the above noted radiological studies. Reviewed by me and discussed with radiologist.  See dictation for official radiology interpretation.      PROCEDURES    Procedures        MEDICATIONS GIVEN IN ER    Medications   sodium chloride 0.9 % flush 10 mL (10 mL Intravenous Given 4/24/23 1126)   sodium chloride 0.9 % bolus 500 mL (0 mL Intravenous Stopped 4/24/23 1219)     Followed by   sodium chloride 0.9 % infusion (0 mL/hr Intravenous Stopped 4/24/23 1219)   sodium chloride 0.9 % bolus 1,716 mL (0 mL Intravenous Stopped 4/24/23 1314)   norepinephrine (LEVOPHED) 8 mg in 250 mL NS infusion (premix) (0.04 mcg/kg/min × 57.2 kg Intravenous Rate/Dose Change 4/24/23 1413)   sodium chloride 0.9 % bolus 500 mL (500 mL Intravenous Not Given 4/24/23 1338)   acetaminophen (TYLENOL) tablet 1,000 mg (1,000 mg Oral Given 4/24/23 1138)   cefTRIAXone (ROCEPHIN) 2 g in sodium chloride 0.9 % 100 mL IVPB-VTB (0 g Intravenous Stopped 4/24/23 1414)         PROGRESS, DATA ANALYSIS, CONSULTS, AND MEDICAL DECISION MAKING    My differential diagnosis for cough includes but is not limited to:  Upper respiratory infection, bronchitis, pneumonia, COPD exacerbation, cough variant asthma, cardiac asthma, coronary artery disease, congestive heart failure, bacterial, viral or lung infections, lung cancer, aspiration pneumonitis, aspiration of foreign body and Covid -19    Total critical care time: Approximately 45 minutes    Due to a high probability of clinically significant, life threatening deterioration, the patient required my highest level of preparedness to intervene emergently and I personally spent this critical care time directly and personally managing the patient. This critical care  time included obtaining a history; examining the patient; vital sign monitoring; ordering and review of studies; arranging urgent treatment with development of a management plan; evaluation of patient's response to treatment; frequent reassessment; and, discussions with other providers.    This critical care time was performed to assess and manage the high probability of imminent, life-threatening deterioration that could result in multi-organ failure. It was exclusive of separately billable procedures and treating other patients and teaching time.    Please see MDM section and the rest of the note for further information on patient assessment and treatment.    SEPTIC SHOCK FOCUSED EXAM ATTESTATION    I attest that I have reassessed tissue perfusion after the fluid bolus given.    Bill Anderson MD  04/24/23  14:40 EDT    All labs have been independently reviewed by me.  All radiology studies have been reviewed by me and discussed with radiologist dictating the report.   EKG's independently viewed and interpreted by me.  Discussion below represents my analysis of pertinent findings related to patient's condition, differential diagnosis, treatment plan and final disposition.      ED Course as of 04/24/23 1440   Mon Apr 24, 2023   1151 EKG           EKG time: 1140  Rhythm/Rate: Sinus, 80  P waves and TX: CHINTAN within normal limits  QRS, axis: Narrow complex  ST and T waves: No STEMI; lateral T wave inversions    Interpreted Contemporaneously by me, independently viewed  Comparison: 12/8/2022-new T wave inversions today.   [RS]   1203 WBC(!): 21.66 [RS]   1203 Hemoglobin: 13.7 [RS]   1203 Platelets: 338 [RS]   1203 Immature Grans, Absolute(!): 0.21 [RS]   1203 RADIOLOGY      Study: Single view portable chest  Findings: No pneumothorax.  Haziness right base greater than left  I independently viewed and interpreted these images contemporaneously with treatment.    [RS]   1203 Patient did not tolerate urinary  catheterization.  We will attempt alternate collection. [RS]   1205 Lactate(!!): 3.3  Blood cultures ordered, IV fluids infusing. [RS]   1206 Borderline blood pressure in this patient with evidence of sepsis from history, leukocytosis and lactic acidosis.  EF on most recent echocardiogram winter 2022 was greater than 60 with some impaired diastolic dysfunction of the left ventricle limited to grade 1.  30/kg bolus ordered. [RS]   1209 Glucose(!): 104 [RS]   1210 BUN: 20 [RS]   1210 Creatinine: 0.95 [RS]   1210 Sodium(!): 135 [RS]   1210 Potassium: 4.0 [RS]   1210 ALT (SGPT): 16 [RS]   1210 AST (SGOT): 24 [RS]   1210 Total Bilirubin: 0.2 [RS]   1238 Blood pressure had transiently improved but has now worsening.  IV fluids infusing rapidly. [RS]   1239 proBNP: 270.0 [RS]   1239 HS Troponin T(!): 16 [RS]   1239 COVID19: Not Detected [RS]   1239 Influenza A PCR: Not Detected [RS]   1239 Influenza B PCR: Not Detected [RS]   1240 Procalcitonin: 0.10 [RS]   1241 Patient has also reportedly become hypoxemic but is resting comfortably now on 2 L supplemental oxygen at 95% [RS]   1245 I have discussed current findings with the patient and family.  We discussed her hypotension and new hypoxemia.  We discussed need for admission.  We also discussed CODE STATUS.  The patient would not want to be on a ventilator but would be amenable to the idea of CPR to try to restart her heart. [RS]   1245 CONSULT        Provider: [facility]-clinical pharmacist    Discussion: Reviewed patient history, ED presentation and evaluation as well as allergies.  We discussed need for antibiotics and potential choices.    Agreeable c treatment and planned disposition.         [RS]   1246 Evaluate response to IV fluid boluses to call for admission as patient may respond well and be a candidate for telemetry admission versus ICU. [RS]   1326 BP(!): 82/50  Mild improvement in blood pressure after fluid bolus.  Patient continues to have MAP less than 65.   Continue IV fluids and initiate pressors.  Plan ICU admission.  Patient and family agreeable. [RS]   1427 Troponin T Delta(!!): 5 [RS]   1427 BP: 117/63  BP improved on pressors. [RS]   1439 CONSULT        Provider: Dr. Miller -pulmonary/critical care    Discussion: Reviewed patient history, ED presentation and evaluation as well as response to therapies and pending urinalysis.  He is agreeable to accept the patient to the ICU for further evaluation and treatment.    Agreeable c treatment and planned disposition.         [RS]      ED Course User Index  [RS] Bill Anderson MD       AS OF 14:40 EDT VITALS:    BP - 106/67  HR - 71  TEMP - 99.2 °F (37.3 °C) (Oral)  O2 SATS - 95%        DIAGNOSIS  Final diagnoses:   Sepsis with acute hypoxic respiratory failure and septic shock, due to unspecified organism   Acute hypoxemic respiratory failure         DISPOSITION  ADMISSION    Discussed treatment plan and reason for admission with pt/family and admitting physician.  Pt/family voiced understanding of the plan for admission for further testing/treatment as needed.          Bill Anderson MD  04/24/23 7140

## 2023-04-25 ENCOUNTER — TELEPHONE (OUTPATIENT)
Dept: FAMILY MEDICINE CLINIC | Facility: CLINIC | Age: 83
End: 2023-04-25

## 2023-04-25 LAB
ANION GAP SERPL CALCULATED.3IONS-SCNC: 7 MMOL/L (ref 5–15)
BACTERIA SPEC AEROBE CULT: ABNORMAL
BASOPHILS # BLD AUTO: 0.05 10*3/MM3 (ref 0–0.2)
BASOPHILS NFR BLD AUTO: 0.4 % (ref 0–1.5)
BUN SERPL-MCNC: 11 MG/DL (ref 8–23)
BUN/CREAT SERPL: 21.6 (ref 7–25)
CALCIUM SPEC-SCNC: 7.8 MG/DL (ref 8.6–10.5)
CHLORIDE SERPL-SCNC: 111 MMOL/L (ref 98–107)
CO2 SERPL-SCNC: 22 MMOL/L (ref 22–29)
CREAT SERPL-MCNC: 0.51 MG/DL (ref 0.57–1)
DEPRECATED RDW RBC AUTO: 42.6 FL (ref 37–54)
EGFRCR SERPLBLD CKD-EPI 2021: 92.2 ML/MIN/1.73
EOSINOPHIL # BLD AUTO: 0.21 10*3/MM3 (ref 0–0.4)
EOSINOPHIL NFR BLD AUTO: 1.8 % (ref 0.3–6.2)
ERYTHROCYTE [DISTWIDTH] IN BLOOD BY AUTOMATED COUNT: 13.2 % (ref 12.3–15.4)
GLUCOSE BLDC GLUCOMTR-MCNC: 86 MG/DL (ref 70–130)
GLUCOSE BLDC GLUCOMTR-MCNC: 86 MG/DL (ref 70–130)
GLUCOSE SERPL-MCNC: 84 MG/DL (ref 65–99)
HCT VFR BLD AUTO: 33 % (ref 34–46.6)
HGB BLD-MCNC: 11.2 G/DL (ref 12–15.9)
IMM GRANULOCYTES # BLD AUTO: 0.08 10*3/MM3 (ref 0–0.05)
IMM GRANULOCYTES NFR BLD AUTO: 0.7 % (ref 0–0.5)
LYMPHOCYTES # BLD AUTO: 2.2 10*3/MM3 (ref 0.7–3.1)
LYMPHOCYTES NFR BLD AUTO: 19.3 % (ref 19.6–45.3)
MCH RBC QN AUTO: 29.9 PG (ref 26.6–33)
MCHC RBC AUTO-ENTMCNC: 33.9 G/DL (ref 31.5–35.7)
MCV RBC AUTO: 88.2 FL (ref 79–97)
MONOCYTES # BLD AUTO: 0.72 10*3/MM3 (ref 0.1–0.9)
MONOCYTES NFR BLD AUTO: 6.3 % (ref 5–12)
NEUTROPHILS NFR BLD AUTO: 71.5 % (ref 42.7–76)
NEUTROPHILS NFR BLD AUTO: 8.12 10*3/MM3 (ref 1.7–7)
NRBC BLD AUTO-RTO: 0 /100 WBC (ref 0–0.2)
PLATELET # BLD AUTO: 265 10*3/MM3 (ref 140–450)
PMV BLD AUTO: 10 FL (ref 6–12)
POTASSIUM SERPL-SCNC: 3.8 MMOL/L (ref 3.5–5.2)
RBC # BLD AUTO: 3.74 10*6/MM3 (ref 3.77–5.28)
SODIUM SERPL-SCNC: 140 MMOL/L (ref 136–145)
WBC NRBC COR # BLD: 11.38 10*3/MM3 (ref 3.4–10.8)

## 2023-04-25 PROCEDURE — 80048 BASIC METABOLIC PNL TOTAL CA: CPT | Performed by: INTERNAL MEDICINE

## 2023-04-25 PROCEDURE — 83735 ASSAY OF MAGNESIUM: CPT | Performed by: INTERNAL MEDICINE

## 2023-04-25 PROCEDURE — 94799 UNLISTED PULMONARY SVC/PX: CPT

## 2023-04-25 PROCEDURE — 82962 GLUCOSE BLOOD TEST: CPT

## 2023-04-25 PROCEDURE — 97110 THERAPEUTIC EXERCISES: CPT

## 2023-04-25 PROCEDURE — 94761 N-INVAS EAR/PLS OXIMETRY MLT: CPT

## 2023-04-25 PROCEDURE — 94664 DEMO&/EVAL PT USE INHALER: CPT

## 2023-04-25 PROCEDURE — 97162 PT EVAL MOD COMPLEX 30 MIN: CPT

## 2023-04-25 PROCEDURE — 25010000002 CEFTRIAXONE PER 250 MG: Performed by: INTERNAL MEDICINE

## 2023-04-25 PROCEDURE — 85025 COMPLETE CBC W/AUTO DIFF WBC: CPT | Performed by: INTERNAL MEDICINE

## 2023-04-25 PROCEDURE — 25010000002 ENOXAPARIN PER 10 MG: Performed by: INTERNAL MEDICINE

## 2023-04-25 RX ORDER — ASPIRIN 81 MG/1
81 TABLET ORAL DAILY
Status: DISCONTINUED | OUTPATIENT
Start: 2023-04-25 | End: 2023-04-28 | Stop reason: HOSPADM

## 2023-04-25 RX ORDER — CYCLOBENZAPRINE HCL 10 MG
10 TABLET ORAL 2 TIMES DAILY PRN
Status: DISCONTINUED | OUTPATIENT
Start: 2023-04-25 | End: 2023-04-27

## 2023-04-25 RX ADMIN — PANTOPRAZOLE SODIUM 40 MG: 40 TABLET, DELAYED RELEASE ORAL at 05:40

## 2023-04-25 RX ADMIN — Medication 10 ML: at 08:27

## 2023-04-25 RX ADMIN — BUDESONIDE AND FORMOTEROL FUMARATE DIHYDRATE 2 PUFF: 160; 4.5 AEROSOL RESPIRATORY (INHALATION) at 22:47

## 2023-04-25 RX ADMIN — BUDESONIDE AND FORMOTEROL FUMARATE DIHYDRATE 2 PUFF: 160; 4.5 AEROSOL RESPIRATORY (INHALATION) at 11:53

## 2023-04-25 RX ADMIN — CEFTRIAXONE 2 G: 2 INJECTION, POWDER, FOR SOLUTION INTRAMUSCULAR; INTRAVENOUS at 11:24

## 2023-04-25 RX ADMIN — DONEPEZIL HYDROCHLORIDE 5 MG: 5 TABLET, FILM COATED ORAL at 23:32

## 2023-04-25 RX ADMIN — ENOXAPARIN SODIUM 40 MG: 100 INJECTION SUBCUTANEOUS at 22:15

## 2023-04-25 RX ADMIN — Medication 10 ML: at 22:14

## 2023-04-25 RX ADMIN — SODIUM CHLORIDE 125 ML/HR: 9 INJECTION, SOLUTION INTRAVENOUS at 02:01

## 2023-04-25 RX ADMIN — CYCLOBENZAPRINE 10 MG: 10 TABLET, FILM COATED ORAL at 10:47

## 2023-04-25 RX ADMIN — ASPIRIN 81 MG: 81 TABLET, COATED ORAL at 16:31

## 2023-04-25 NOTE — PLAN OF CARE
Goal Outcome Evaluation:  Plan of Care Reviewed With: patient, daughter           Outcome Evaluation: Pt adm with UTI, sepsis with acute hypoxic resp failure, required ICU admission, is now out of ICU on 2L o2. Pt lives alone, uses O2 only at night, is independent with mobility, has a walker but doesn't use it. Pt presents with weakness, impaired functional mobility and activity tolerance, she will benefit from PT to address. Pt was able to walk 80 ft with CGA with rwx, O2 sats dropped to 87%. PT will follow, anticipate that pt can return home at discharge

## 2023-04-25 NOTE — PLAN OF CARE
Goal Outcome Evaluation:   Pt was transfer from ccu.VSS.Room Air. AOX4. Assistx1. Will continue monitoring

## 2023-04-25 NOTE — DISCHARGE PLACEMENT REQUEST
"Kandace Andrade (83 y.o. Female)     Date of Birth   1940    Social Security Number       Address   [address] [place] [address]    Home Phone   [phone]    MRN   [id number]       [facility]    Marital Status                               Admission Date   4/24/23    Admission Type   Emergency    Admitting Provider   Isidoro Miller MD    Attending Provider   Isidoro Miller MD    Department, Room/Bed   [address], [address]       Discharge Date       Discharge Disposition       Discharge Destination                               Attending Provider: Isidoro Miller MD    Allergies: Penicillins, Meclizine Hcl, Bactrim [Sulfamethoxazole-trimethoprim], Flagyl [Metronidazole], Statins    Isolation: None   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)   Code Status: CPR    Ht: 160 cm (63\")   Wt: 58.5 kg (128 lb 15.5 oz)    Admission Cmt: None   Principal Problem: Sepsis with acute hypoxic respiratory failure and septic shock, due to unspecified organism [A41.9,R65.21,J96.01]                 Active Insurance as of 4/24/2023     Primary Coverage     Payor Plan Insurance Group Employer/Plan Group    HUMANA MEDICARE REPLACEMENT HU[employer]NA MEDICARE REPLACEMENT [id number]     Payor Plan Address Payor Plan Phone Number Payor Plan Fax Number Effective Dates    [address] [address] [phone]  1/1/2018 - None Entered    [facility] [address]       Subscriber Name Subscriber Birth Date Member ID       KANDACE ANDRADE 1940 [id number]                 Emergency Contacts      (Rel.) Home Phone Work Phone Mobile Phone    millicent hernandez (Daughter) [phone] -- [phone]    TAYE LEE (Daughter) [phone] -- [phone]    Brendan Andrade (Son) -- -- [phone]              "

## 2023-04-25 NOTE — PROGRESS NOTES
"[facility] Clinical Pharmacy Services: Enoxaparin Consult    Kandace Andrade has a pharmacy consult to dose prophylactic enoxaparin per Dr. Mcqueen's request.     Indication: VTE Prophylaxis  Home Anticoagulation: none     Relevant clinical data and objective history reviewed:  83 y.o. female 160 cm (63\") 57.2 kg (126 lb)   Body mass index is 22.32 kg/m².   Results from last 7 days   Lab Units 04/24/23  1129   PLATELETS 10*3/mm3 338     Estimated Creatinine Clearance: 39.8 mL/min (by C-G formula based on SCr of 0.95 mg/dL).    Assessment/Plan    Will start patient on 40mg subcutaneous every 24 hours, adjusted for renal function. Consult order will be discontinued but pharmacy will continue to follow.     Angeline Ortiz, [facility]  Clinical Pharmacist   "

## 2023-04-25 NOTE — PROGRESS NOTES
Discharge Planning Assessment  [facility]     Patient Name: Kandace Andrade  MRN: [id number]  Today's Date: 4/25/2023    Admit Date: 4/24/2023    Plan: Home with  Home Health   Discharge Needs Assessment     Row Name 04/25/23 1516       Living Environment    People in Home alone    Current Living Arrangements condominium    Potentially Unsafe Housing Conditions none    Primary Care Provided by self    Provides Primary Care For no one    Family Caregiver if Needed child(chriss), adult    Quality of Family Relationships supportive    Able to Return to Prior Arrangements yes       Resource/Environmental Concerns    Resource/Environmental Concerns none    Transportation Concerns none       Food Insecurity    Within the past 12 months, you worried that your food would run out before you got the money to buy more. Never true    Within the past 12 months, the food you bought just didn't last and you didn't have money to get more. Never true       Transition Planning    Patient/Family Anticipates Transition to home with help/services    Patient/Family Anticipated Services at Transition home health care    Transportation Anticipated family or friend will provide       Discharge Needs Assessment    Current Outpatient/Agency/Support Group homecare agency    Equipment Currently Used at Home walker, standard    Concerns to be Addressed discharge planning    Anticipated Changes Related to Illness none    Equipment Needed After Discharge none               Discharge Plan     Row Name 04/25/23 1519       Plan    Plan Comments IMM noted.  CCP spoke to patient and her daughter Tracey [phone] at bedside.  CCP role explained.  Face sheet verified.  Discharge planning discussed.  Pt lives in a one story condo alone.  She uses a walker if needed  She uses oxygen at home that she obtains from [employer].  She has no history of rehab.  She has had [facility] in the past and would like a referral  [email or web address] will follow.  Plan is home with  .  CCP following    Row Name 04/25/23 1518       Plan    Plan Home with  Home Health              Continued Care and Services - Admitted Since 4/24/2023     Home Medical Care     Service Provider Request Status Selected Services Address Phone Fax Patient Preferred     [facility] Pending - Request Sent N/A [id number] [address] [phone] --                 Demographic Summary    No documentation.                Functional Status    No documentation.                Psychosocial    No documentation.                Abuse/Neglect    No documentation.                Legal    No documentation.                Substance Abuse    No documentation.                Patient Forms    No documentation.                   Lainey Vale RN

## 2023-04-25 NOTE — PROGRESS NOTES
"                                              LOS: 1 day   Patient Care Team:  Cassi Bella MD as PCP - General (Family Medicine)    Chief Complaint:  F/up septic shock and UTI    Subjective   Interval History  She feels better today she said.  She has more energy.  Levophed was weaned off overnight.    REVIEW OF SYSTEMS:   CARDIOVASCULAR: No chest pain, chest pressure or chest discomfort. No palpitations or edema.   RESPIRATORY: No shortness of breath, cough or sputum.   GASTROINTESTINAL: No anorexia, nausea, vomiting or diarrhea. No abdominal pain.  CONSTITUTIONAL: No fever or chills.     Ventilator/Non-Invasive Ventilation Settings (From admission, onward)    None                Physical Exam:     Vital Signs  Temp:  [97.7 °F (36.5 °C)] 97.7 °F (36.5 °C)  Heart Rate:  [48-73] 58  Resp:  [16] 16  BP: ()/(46-80) 127/61    Intake/Output Summary (Last 24 hours) at 4/25/2023 1145  Last data filed at 4/25/2023 0504  Gross per 24 hour   Intake 1980 ml   Output 600 ml   Net 1380 ml     Flowsheet Rows    Flowsheet Row First Filed Value   Admission Height 161.3 cm (63.5\") Documented at 04/24/2023 1059   Admission Weight 57.6 kg (127 lb) Documented at 04/24/2023 1059          PPE used per hospital policy    General Appearance:   Alert, cooperative, in no acute distress   ENMT:  Mallampati score 3, moist mucous membrane   Eyes:  Pupils equal and reactive to light. EOMI   Neck:   Trachea midline. No thyromegaly.   Lungs:    Clear to auscultation,respirations regular, even and nonlabored    Heart:   Regular rhythm and normal rate, normal S1 and S2, no         murmur   Skin:   No rash or ecchymosis   Abdomen:    Obese. Soft. No tenderness. No HSM.   Neuro/psych:  Conscious, alert, oriented x3. Strength 5/5 in upper and lower  ext.  Appropriate mood and affect   Extremities:  No cyanosis, clubbing or edema.  Warm extremities and well-perfused          Results Review:        Results from last 7 days   Lab Units " 04/25/23  0637 04/24/23  1129 04/24/23  1129 04/20/23  1334   SODIUM mmol/L 140  --  135* 141   POTASSIUM mmol/L 3.8  --  4.0 4.2   CHLORIDE mmol/L 111*  --  100 100   TOTAL CO2 mmol/L  --   --   --  27.3   CO2 mmol/L 22.0  --  22.0  --    BUN mg/dL 11  --  20 17   CREATININE mg/dL 0.51*  --  0.95 0.84   GLUCOSE mg/dL 84   < > 104* 76   CALCIUM mg/dL 7.8*  --  9.0 10.3    < > = values in this interval not displayed.     Results from last 7 days   Lab Units 04/24/23  1319 04/24/23  1129   HSTROP T ng/L 21* 16*     Results from last 7 days   Lab Units 04/25/23  0637 04/24/23  1129   WBC 10*3/mm3 11.38* 21.66*   HEMOGLOBIN g/dL 11.2* 13.7   HEMATOCRIT % 33.0* 39.6   PLATELETS 10*3/mm3 265 338         Results from last 7 days   Lab Units 04/24/23  1129   PROBNP pg/mL 270.0                           I reviewed the patient's new clinical results.        Medication Review:   budesonide-formoterol, 2 puff, Inhalation, BID - RT  cefTRIAXone, 2 g, Intravenous, Q24H  donepezil, 5 mg, Oral, Nightly  enoxaparin, 40 mg, Subcutaneous, Q24H  escitalopram, 20 mg, Oral, Daily  pantoprazole, 40 mg, Oral, Q AM  sodium chloride, 30 mL/kg, Intravenous, Once  sodium chloride, 500 mL, Intravenous, Once  sodium chloride, 10 mL, Intravenous, Q12H            Assessment     1. Septic shock, shock resolved  2. UTI  3. Lactic acidosis, resolved  4. URI  5. Muscle spasm  6. Chronic anemia     • HTN  • PAF  • Asthma, no current exacerbation  • Anxiety/depression  • History of C. difficile colitis January 2021  • History of E. coli UTI February 2022  • History of microscopic colitis      Plan   · Rocephin for 7 days for complicated UTI.  Urine culture pending.  · Resume Flexeril for muscle spasm.  DC Mirapex, apparently patient was not taking  · DC IV fluid  · PT OT  · DVT prophylaxis with Lovenox  · Continue maintenance Symbicort for asthma    Discussed with her daughter at bedside.  Dispo: Transfer to telemetry.      Discussed with the critical  care staff during the multidisciplinary round    Sanjuana Mcqueen MD  04/25/23  11:45 EDT        Time: >35 min face to face and on the rodarte >1/2 directed toward counseling and coordination of care        This note was dictated utilizing Dragon dictation

## 2023-04-25 NOTE — TELEPHONE ENCOUNTER
Caller: TAYE LEE    Relationship: Emergency Contact    Best call back number:[phone]     What medications are you currently taking:   Current Facility-Administered Medications on File Prior to Visit   Medication Dose Route Frequency Provider Last Rate Last Admin   • [COMPLETED] acetaminophen (TYLENOL) tablet 1,000 mg  1,000 mg Oral Once Bill Anderson MD   1,000 mg at 04/24/23 1138   • acetaminophen (TYLENOL) tablet 650 mg  650 mg Oral Q6H PRN Sanjuana Mcqueen MD       • budesonide-formoterol (SYMBICORT) 160-4.5 MCG/ACT inhaler 2 puff  2 puff Inhalation BID - RT Sanjuana Mcqueen MD   2 puff at 04/24/23 2055   • [COMPLETED] cefTRIAXone (ROCEPHIN) 2 g in sodium chloride 0.9 % 100 mL IVPB-VTB  2 g Intravenous Once Bill Anderson MD   Stopped at 04/24/23 1414   • cyclobenzaprine (FLEXERIL) tablet 10 mg  10 mg Oral BID PRN Sanjuana Mcqueen MD   10 mg at 04/25/23 1047   • donepezil (ARICEPT) tablet 5 mg  5 mg Oral Nightly Sanjuana Mcqueen MD   5 mg at 04/24/23 2139   • Enoxaparin Sodium (LOVENOX) syringe 40 mg  40 mg Subcutaneous Q24H Isidoro Miller MD   40 mg at 04/24/23 2139   • escitalopram (LEXAPRO) tablet 20 mg  20 mg Oral Daily Sanjuana Mcqueen MD   20 mg at 04/24/23 2139   • morphine injection 1 mg  1 mg Intravenous Q4H PRN Sanjuana Mcqueen MD       • norepinephrine (LEVOPHED) 8 mg in 250 mL NS infusion (premix)  0.02-0.3 mcg/kg/min Intravenous Titrated Bill Anderson MD   Stopped at 04/24/23 2150   • pantoprazole (PROTONIX) EC tablet 40 mg  40 mg Oral Q AM Sanjuana Mcqueen MD   40 mg at 04/25/23 0540   • potassium chloride (K-DUR,KLOR-CON) ER tablet 40 mEq  40 mEq Oral PRN Sanjuana Mcqueen MD        Or   • potassium chloride (KLOR-CON) packet 40 mEq  40 mEq Oral PRN Sanjuana Mcqueen MD        Or   • potassium chloride 10 mEq in 100 mL IVPB  10 mEq Intravenous Q1H PRN Sanjuana Mcqueen MD       • pramipexole (MIRAPEX) tablet 0.25 mg  0.25 mg Oral Nightly Sanjuana Mcqueen MD   0.25 mg at 04/24/23 5762   • sodium chloride  0.9 % bolus 1,716 mL  30 mL/kg Intravenous Once Bill Anderson MD   Stopped at 04/24/23 1314   • [COMPLETED] sodium chloride 0.9 % bolus 500 mL  500 mL Intravenous Once Bill Anderson MD   Stopped at 04/24/23 1219    Followed by   • sodium chloride 0.9 % infusion  125 mL/hr Intravenous Continuous Bill Anderson  mL/hr at 04/25/23 0201 125 mL/hr at 04/25/23 0201   • sodium chloride 0.9 % bolus 500 mL  500 mL Intravenous Once Bill Anderson MD       • sodium chloride 0.9 % flush 10 mL  10 mL Intravenous PRN Bill Anderson MD   10 mL at 04/24/23 1126   • sodium chloride 0.9 % flush 10 mL  10 mL Intravenous Q12H Sanjuana Mcqueen MD   10 mL at 04/25/23 0827   • sodium chloride 0.9 % flush 10 mL  10 mL Intravenous PRN Sanjuana Mcqueen MD       • sodium chloride 0.9 % infusion 40 mL  40 mL Intravenous PRN Sanjuana Mcqueen MD       • traZODone (DESYREL) tablet 25 mg  25 mg Oral Nightly PRN Sanjuana Mcqueen MD       • [DISCONTINUED] Pharmacy to Dose enoxaparin (LOVENOX)   Does not apply Continuous PRN Sanjuana Mcqueen MD         Current Outpatient Medications on File Prior to Visit   Medication Sig Dispense Refill   • acetaminophen (TYLENOL) 500 MG tablet Take 2 tablets by mouth Every 6 (Six) Hours As Needed for Mild Pain.     • amLODIPine (NORVASC) 5 MG tablet Take 1 tablet by mouth Daily. (Patient taking differently: Take 2.5 mg by mouth Daily.) 30 tablet 3   • aspirin 81 MG chewable tablet Chew 1 tablet Daily.     • atenolol (TENORMIN) 25 MG tablet Take 0.5 tablets by mouth Every Night. (Patient taking differently: Take 1 tablet by mouth Every Night.) 45 tablet 1   • Budesonide (ENTOCORT EC) 3 MG 24 hr capsule Take 1 capsule by mouth Every Morning. Ends 6/5/23     • budesonide-formoterol (SYMBICORT) 160-4.5 MCG/ACT inhaler Inhale 2 puffs 2 (Two) Times a Day. 1 each 3   • cyclobenzaprine (FLEXERIL) 10 MG tablet TAKE 1/2 TO 1 TABLET BY MOUTH TWICE DAILY AS NEEDED FOR MUSCLE SPASMS (Patient taking differently: Take 5-10 mg by  mouth 2 (Two) Times a Day As Needed.) 20 tablet 0   • donepezil (Aricept) 5 MG tablet Take 1 tablet by mouth Every Night. 30 tablet 3   • escitalopram (LEXAPRO) 20 MG tablet Take 1 tablet by mouth Daily. 90 tablet 1   • Evolocumab with Infusor (REPATHA) solution cartridge Inject 3.5 mL under the skin into the appropriate area as directed Every 14 (Fourteen) Days. 10.5 mL 3   • ezetimibe (ZETIA) 10 MG tablet Take 1 tablet by mouth Daily. 30 tablet 3   • fluticasone (FLONASE) 50 MCG/ACT nasal spray 2 sprays into the nostril(s) as directed by provider Daily As Needed for Rhinitis. 18 mL 5   • ipratropium-albuterol (DUO-NEB) 0.5-2.5 mg/3 ml nebulizer Take 3 mL by nebulization 4 (Four) Times a Day. 360 mL 3   • lisinopril-hydrochlorothiazide (Zestoretic) 10-12.5 MG per tablet Take 1 tablet by mouth Daily. 90 tablet 3   • Melatonin 10 MG tablet Take 1 tablet by mouth Every Night.     • omeprazole (priLOSEC) 40 MG capsule Take 1 capsule by mouth Daily. 90 capsule 1   • pramipexole (MIRAPEX) 0.25 MG tablet TAKE 1 TABLET BY MOUTH TWICE DAILY (Patient taking differently: Take 1 tablet by mouth 2 (Two) Times a Day.) 60 tablet 3   • solifenacin (VESIcare) 5 MG tablet Take 1 tablet by mouth Daily. 30 tablet 3   • traZODone (DESYREL) 50 MG tablet TAKE 1/2 TO 1 TABLET BY MOUTH EVERY NIGHT 1 HOUR BEFORE BEDTIME (Patient taking differently: Take 25-50 mg by mouth Every Night.) 30 tablet 1   • vitamin D (ERGOCALCIFEROL) 1.25 MG (38649 UT) capsule capsule TAKE 1 CAPSULE BY MOUTH EVERY 7 DAYS (Patient taking differently: Take 1 capsule by mouth Every 7 (Seven) Days.) 5 capsule 5            What are your concerns: PATIENT'S DAUGHTER CALLED AND STATED THAT THE PATIENT WAS ADMITTED IN THE HOSPITAL FOR SPASMS AND SEPSIS 04/24. PATIENT'S DAUGHTER STATES THAT SHE IS IN THE ICU TODAY AT [facility]. PATIENT'S DAUGHTER STATES THAT DR. PEDERSEN HAD TAKEN THE PATIENT OFF OF PRAMIPEXOLE BUT THE HOSPITAL PLACED HER BACK ON IT DUE TO HER HAVING  MUSCLE SPASMS, AND THIS WAS MISTAKENLY ON HER CHART STILL. PATIENT'S DAUGHTER ALSO STATED THAT THE MEDICATION WAS FILLED BY THE HOSPITAL LAST NIGHT. SHE HAS BEEN TOLD THAT SHE NEEDS TO TAKE IT BY SOME DOCTORS AND OTHER DOCTORS HAVE TOLD HER NOT TO TAKE IT. PATIENT IS ALSO TAKING FLEXARIL PRN. PATIENT'S DAUGHTER WOULD LIKE TO KNOW IF THE PATIENT SHOULD STOP TAKING PRAMIPEXOLE OR IF SHE SHOULD CONTINUE TO TAKE IT. WHEN SHE WAS ADMITTED IN THE HOSPITAL, HER BLOOD PRESSURE DROPPED REALLY LOW. SHE WAS IN CARDIAC ICU. SHE HAS IMPROVED TODAY AND THEY WILL MOVE HER OUT OF THE ICU.     PLEASE ADVISE PATIENT'S DAUGHTER ASAP.

## 2023-04-25 NOTE — PLAN OF CARE
Goal Outcome Evaluation:  Plan of Care Reviewed With: patient        Progress: improving     Pt remain in CICU A&O x4. Levo is off since 2100 on 4/24 and pt BP has remain stable. Still on 2 L NC of O2 and maintaining sat in the high 90s. VSS will continue to monitor.

## 2023-04-25 NOTE — THERAPY EVALUATION
Patient Name: Kandace Andrade  : 1940    MRN: [id number]                              Today's Date: 2023       Admit Date: 2023    Visit Dx:     ICD-10-CM ICD-9-CM   1. Sepsis with acute hypoxic respiratory failure and septic shock, due to unspecified organism  A41.9 038.9    R65.21 995.92    J96.01 785.52     518.81   2. Acute hypoxemic respiratory failure  J96.01 518.81     Patient Active Problem List   Diagnosis   • Vitamin D deficiency   • KINDRA (obstructive sleep apnea)   • IBS (irritable bowel syndrome)   • Essential hypertension   • Herpes zoster   • Arthritis   • Anxiety and depression   • Other emphysema   • Acute seasonal allergic rhinitis due to pollen   • Paroxysmal atrial fibrillation ([facility])   • Osteoporosis   • Mixed hyperlipidemia   • Encounter for Medicare annual wellness exam   • COPD (chronic obstructive pulmonary disease) ([facility])   • Mixed stress and urge urinary incontinence   • Type 2 diabetes mellitus without complication, without long-term current use of insulin ([facility])   • Arthritis of both hips   • Autonomic neuropathy   • GERD without esophagitis   • C. difficile colitis   • Hypokalemia   • Hospital discharge follow-up   • Decreased hearing of both ears   • Dizziness   • Nausea   • Hypercholesterolemia   • Primary insomnia   • Generalized weakness   • Other microscopic hematuria   • Confusion   • Dehydration   • Abdominal pain   • Metabolic encephalopathy   • Left ankle swelling   • Diarrhea   • Overactive bladder   • Restless leg syndrome   • Edema of both legs   • Other fatigue   • Carotid artery stenosis, asymptomatic, bilateral   • Memory deficit   • Imbalance   • Dementia, old age   • Sepsis with acute hypoxic respiratory failure and septic shock, due to unspecified organism     Past Medical History:   Diagnosis Date   • Acute seasonal allergic rhinitis due to pollen    • Anxiety and depression    • Arthritis    • Asthma    • C. difficile colitis    • COPD (chronic  obstructive pulmonary disease)    • Diabetes mellitus     Pre diabetes   • Diarrhea    • Diverticulosis    • Fibula fracture    • Herpes zoster    • History of lumbosacral spine surgery    • Hypertension    • IBS (irritable bowel syndrome)    • Mixed hyperlipidemia    • KINDRA (obstructive sleep apnea)     NO MACHINE AT THIS TIME   • Osteoporosis    • Paroxysmal atrial fibrillation    • Vitamin D deficiency      Past Surgical History:   Procedure Laterality Date   • APPENDECTOMY     • BACK SURGERY      lower x2   • CATARACT EXTRACTION     • COLONOSCOPY      patient doesn't recall    • COLONOSCOPY N/A 4/2/2021    Procedure: COLONOSCOPY to cecum with cecal biopsies;  Surgeon: Jarrod Duggan MD;  Location: [facility] ENDOSCOPY;  Service: Gastroenterology;  Laterality: N/A;  pre: diarhhea  post: diverticulosis, hemorrhoids   • HYSTERECTOMY      partial   • SHOULDER SURGERY Right    • SHOULDER SURGERY     • SIGMOIDOSCOPY N/A 10/19/2022    Procedure: SIGMOIDOSCOPY FLEXIBLE TO SIGMOID COLON WITH BIOPSIES;  Surgeon: Tonja Tanner MD;  Location: [facility] ENDOSCOPY;  Service: Gastroenterology;  Laterality: N/A;  PRE: DIARRHEA  POST: HEMORRHOIDS   • TONSILLECTOMY     • UPPER GASTROINTESTINAL ENDOSCOPY      patient doesn't recall       General Information     Row Name 04/25/23 1545          Physical Therapy Time and Intention    Document Type evaluation  -PC     Mode of Treatment physical therapy  -PC     Row Name 04/25/23 1545          General Information    Patient Profile Reviewed yes  -PC     Prior Level of Function independent:  -PC     Existing Precautions/Restrictions fall;oxygen therapy device and L/min  -PC     Barriers to Rehab medically complex  -PC     Row Name 04/25/23 1545          Living Environment    People in Home alone  -PC     Row Name 04/25/23 1545          Home Main Entrance    Number of Stairs, Main Entrance none  -PC     Row Name 04/25/23 1545          Stairs Within Home, Primary    Number of Stairs, Within  Home, Primary none  -PC     Row Name 04/25/23 1545          Cognition    Orientation Status (Cognition) oriented x 4  -PC     Row Name 04/25/23 1545          Safety Issues, Functional Mobility    Impairments Affecting Function (Mobility) endurance/activity tolerance;strength  -PC           User Key  (r) = Recorded By, (t) = Taken By, (c) = Cosigned By    Initials Name Provider Type    PC Mari Taylor, PT Physical Therapist               Mobility     Row Name 04/25/23 1546          Bed Mobility    Bed Mobility supine-sit;sit-supine  -PC     Supine-Sit [place] (Bed Mobility) standby assist  -PC     Sit-Supine [place] (Bed Mobility) standby assist  -PC     Row Name 04/25/23 1546          Sit-Stand Transfer    Sit-Stand [place] (Transfers) standby assist  -PC     Assistive Device (Sit-Stand Transfers) walker, front-wheeled  -PC     Row Name 04/25/23 1546          Gait/Stairs (Locomotion)    [place] Level (Gait) contact guard  -PC     Assistive Device (Gait) walker, front-wheeled  -PC     Distance in Feet (Gait) 80 ft  -PC     Deviations/Abnormal Patterns (Gait) gait speed decreased  -PC           User Key  (r) = Recorded By, (t) = Taken By, (c) = Cosigned By    Initials Name Provider Type    PC Mari Taylor, PT Physical Therapist               Obj/Interventions     Row Name 04/25/23 1550          Range of Motion Comprehensive    General Range of Motion no range of motion deficits identified  -     Row Name 04/25/23 1550          Strength Comprehensive (MMT)    Comment, General Manual Muscle Testing (MMT) Assessment no focal deficits, general weakness present  -PC     Row Name 04/25/23 1550          Balance    Balance Assessment sitting static balance;sitting dynamic balance;standing static balance;standing dynamic balance  -PC     Static Sitting Balance modified independence  -PC     Dynamic Sitting Balance standby assist  -PC     Position, Sitting Balance sitting edge of bed  -PC     Static  Standing Balance supervision  -PC     Dynamic Standing Balance contact guard  -PC     Position/Device Used, Standing Balance walker, rolling  -PC           User Key  (r) = Recorded By, (t) = Taken By, (c) = Cosigned By    Initials Name Provider Type    PC Mari Taylor, PT Physical Therapist               Goals/Plan     Row Name 04/25/23 160          Bed Mobility Goal 1 (PT)    Activity/Assistive Device (Bed Mobility Goal 1, PT) sit to supine/supine to sit  -PC     [place] Level/Cues Needed (Bed Mobility Goal 1, PT) modified independence  -PC     Time Frame (Bed Mobility Goal 1, PT) 1 week  -PC     Row Name 04/25/23 1602          Transfer Goal 1 (PT)    Activity/Assistive Device (Transfer Goal 1, PT) sit-to-stand/stand-to-sit  -PC     [place] Level/Cues Needed (Transfer Goal 1, PT) modified independence  -PC     Time Frame (Transfer Goal 1, PT) 1 week  -PC     Row Name 04/25/23 1605          Gait Training Goal 1 (PT)    Activity/Assistive Device (Gait Training Goal 1, PT) gait (walking locomotion);assistive device use  -PC     [place] Level (Gait Training Goal 1, PT) standby assist  -PC     Distance (Gait Training Goal 1, PT) 100 ft, assistive device if needed  -PC     Time Frame (Gait Training Goal 1, PT) 1 week  -PC     Row Name 04/25/23 1608          Therapy Assessment/Plan (PT)    Planned Therapy Interventions (PT) bed mobility training;gait training;transfer training;strengthening  -PC           User Key  (r) = Recorded By, (t) = Taken By, (c) = Cosigned By    Initials Name Provider Type    PC Mari Taylor PT Physical Therapist               Clinical Impression     Row Name 04/25/23 7448          Pain    Pre/Posttreatment Pain Comment no pain currently, has been having some back spasms per daughter  -PC     Row Name 04/25/23 4972          Plan of Care Review    Plan of Care Reviewed With patient;daughter  -PC     Outcome Evaluation Pt adm with UTI, sepsis with acute hypoxic resp failure,  required ICU admission, is now out of ICU on 2L o2. Pt lives alone, uses O2 only at night, is independent with mobility, has a walker but doesn't use it. Pt presents with weakness, impaired functional mobility and activity tolerance, she will benefit from PT to address. Pt was able to walk 80 ft with CGA with rwx, O2 sats dropped to 87%. PT will follow, anticipate that pt can return home at discharge  -PC     Row Name 04/25/23 2395          Therapy Assessment/Plan (PT)    Rehab Potential (PT) good, to achieve stated therapy goals  -PC     Criteria for Skilled Interventions Met (PT) yes;meets criteria  -PC     Therapy Frequency (PT) 6 times/wk  -PC     Row Name 04/25/23 1721          Vital Signs    Pre SpO2 (%) 97  -PC     O2 Delivery Pre Treatment supplemental O2  -PC     Intra SpO2 (%) 87  -PC     O2 Delivery Intra Treatment room air  -PC     Post SpO2 (%) 97  -PC     O2 Delivery Post Treatment supplemental O2  -PC     Row Name 04/25/23 2578          Positioning and Restraints    Pre-Treatment Position in bed  -PC     Post Treatment Position bed  -PC     In Bed supine;call light within reach;encouraged to call for assist;with family/caregiver  -PC           User Key  (r) = Recorded By, (t) = Taken By, (c) = Cosigned By    Initials Name Provider Type    PC Mari Taylor, PT Physical Therapist               Outcome Measures     Row Name 04/25/23 3408          How much help from another person do you currently need...    Turning from your back to your side while in flat bed without using bedrails? 4  -PC     Moving from lying on back to sitting on the side of a flat bed without bedrails? 4  -PC     Moving to and from a bed to a chair (including a wheelchair)? 3  -PC     Standing up from a chair using your arms (e.g., wheelchair, bedside chair)? 3  -PC     Climbing 3-5 steps with a railing? 3  -PC     To walk in hospital room? 3  -PC     AM-PAC 6 Clicks Score (PT) 20  -PC     Highest level of mobility 6 --> Walked 10  steps or more  -PC     Row Name 04/25/23 1604          Functional Assessment    Outcome Measure Options AM-PAC 6 Clicks Basic Mobility (PT)  -PC           User Key  (r) = Recorded By, (t) = Taken By, (c) = Cosigned By    Initials Name Provider Type    PC Mari Taylor PT Physical Therapist                             Physical Therapy Education     Title: PT OT SLP Therapies (Done)     Topic: Physical Therapy (Done)     Point: Mobility training (Done)     Learning Progress Summary           Patient Acceptance, E,D, DU by PC at 4/25/2023 1604                   Point: Home exercise program (Done)     Learning Progress Summary           Patient Acceptance, E,D, DU by PC at 4/25/2023 1604                   Point: Body mechanics (Done)     Learning Progress Summary           Patient Acceptance, E,D, DU by PC at 4/25/2023 1604                   Point: Precautions (Done)     Learning Progress Summary           Patient Acceptance, E,D, DU by PC at 4/25/2023 1604                               User Key     Initials Effective Dates Name Provider Type Discipline     06/16/21 -  Mari Taylor PT Physical Therapist PT              PT Recommendation and Plan  Planned Therapy Interventions (PT): bed mobility training, gait training, transfer training, strengthening  Plan of Care Reviewed With: patient, daughter  Outcome Evaluation: Pt adm with UTI, sepsis with acute hypoxic resp failure, required ICU admission, is now out of ICU on 2L o2. Pt lives alone, uses O2 only at night, is independent with mobility, has a walker but doesn't use it. Pt presents with weakness, impaired functional mobility and activity tolerance, she will benefit from PT to address. Pt was able to walk 80 ft with CGA with rwx, O2 sats dropped to 87%. PT will follow, anticipate that pt can return home at discharge     Time Calculation:    PT Charges     Row Name 04/25/23 1606             Time Calculation    Start Time 1530  -PC      Stop Time 1542  -PC       Time Calculation (min) 12 min  -PC      PT Received On 04/25/23  -PC      PT - Next Appointment 04/26/23  -PC      PT Goal Re-Cert Due Date 05/02/23  -PC            User Key  (r) = Recorded By, (t) = Taken By, (c) = Cosigned By    Initials Name Provider Type    PC Mari Taylor, PT Physical Therapist              Therapy Charges for Today     Code Description Service Date Service Provider Modifiers Qty    99402121339 HC PT EVAL MOD COMPLEXITY 2 4/25/2023 Mari Taylor, PT GP 1    [id number]  PT THER PROC EA 15 MIN 4/25/2023 Mari Taylor, PT GP 1          PT G-Codes  Outcome Measure Options: AM-PAC 6 Clicks Basic Mobility (PT)  AM-PAC 6 Clicks Score (PT): 20  PT Discharge Summary  Anticipated Discharge Disposition (PT): home    Mari Taylor PT  4/25/2023

## 2023-04-25 NOTE — CONSULTS
Nutrition Services    Patient Name:  Kandace Andrade  YOB: 1940  MRN: [id number]  Admit Date:  4/24/2023    Assessment Date:  04/25/23    Comment: Consult     Pt adm for acute hypoxemic respiratory failure secondary to pneumonia. Pt has generalized weakness and is septic d/t pneumonia.  Levo off since 2100 on 4/24. Nutrition assessment completed. Visited pt at bedside. Pt reports good appetite. 100% PO of breakfast. Wt hx reviewed and appears stable. No intervention warranted at this time.    REC:   1. Continue to encourage PO intake.      Will continue to monitor per protocol.    CLINICAL NUTRITION ASSESSMENT      Reason for Assessment Nurse Admission Screen, Physician Consult     Diagnosis/Problem   Sepsis with acute hypoxic respiratory failure and septic shock, UTI, lactic acidosis, URI; h/o T2DM, COPD, a-fib, HTN, sleep apnea, IBS    Medical/Surgical History Past Medical History:   Diagnosis Date   • Acute seasonal allergic rhinitis due to pollen    • Anxiety and depression    • Arthritis    • Asthma    • C. difficile colitis    • COPD (chronic obstructive pulmonary disease)    • Diabetes mellitus     Pre diabetes   • Diarrhea    • Diverticulosis    • Fibula fracture    • Herpes zoster    • History of lumbosacral spine surgery    • Hypertension    • IBS (irritable bowel syndrome)    • Mixed hyperlipidemia    • KINDRA (obstructive sleep apnea)     NO MACHINE AT THIS TIME   • Osteoporosis    • Paroxysmal atrial fibrillation    • Vitamin D deficiency        Past Surgical History:   Procedure Laterality Date   • APPENDECTOMY     • BACK SURGERY      lower x2   • CATARACT EXTRACTION     • COLONOSCOPY      patient doesn't recall    • COLONOSCOPY N/A 4/2/2021    Procedure: COLONOSCOPY to cecum with cecal biopsies;  Surgeon: Jarrod Duggan MD;  Location: [facility] ENDOSCOPY;  Service: Gastroenterology;  Laterality: N/A;  pre: diarhhea  post: diverticulosis, hemorrhoids   • HYSTERECTOMY      partial   • SHOULDER  "SURGERY Right    • SHOULDER SURGERY     • SIGMOIDOSCOPY N/A 10/19/2022    Procedure: SIGMOIDOSCOPY FLEXIBLE TO SIGMOID COLON WITH BIOPSIES;  Surgeon: Tonja Tanner MD;  Location: [facility] ENDOSCOPY;  Service: Gastroenterology;  Laterality: N/A;  PRE: DIARRHEA  POST: HEMORRHOIDS   • TONSILLECTOMY     • UPPER GASTROINTESTINAL ENDOSCOPY      patient doesn't recall         Encounter Information        Nutrition History:     Food Preferences:    Supplements:    Factors Affecting Intake: altered respiratory status, weakness     Anthropometrics        Current Height  Current Weight  BMI kg/m2 Height: 160 cm (63\")  Weight: 58.5 kg (128 lb 15.5 oz) (04/25/23 0504)  Body mass index is 22.85 kg/m².   Adjusted BMI (if applicable)        Admission Weight 58.5 kg (128 lb 15.5 oz)       Ideal Body Weight (IBW) 52.4 kg (115 lb 8.3 oz)   Adjusted IBW (if applicable)        Usual Body Weight (UBW) 120-125 lb    Weight Change/Trend Stable       Weight History Wt Readings from Last 30 Encounters:   04/25/23 0504 58.5 kg (128 lb 15.5 oz)   04/25/23 0000 58.5 kg (128 lb 15.5 oz)   04/24/23 2005 58.5 kg (128 lb 15.5 oz)   04/24/23 1116 57.2 kg (126 lb)   04/24/23 1059 57.6 kg (127 lb)   04/20/23 1302 57.9 kg (127 lb 9.6 oz)   03/28/23 1106 57.5 kg (126 lb 12.8 oz)   03/01/23 1232 56.2 kg (123 lb 12.8 oz)   02/23/23 1406 57.2 kg (126 lb)   01/10/23 1353 57.2 kg (126 lb 3.2 oz)   12/21/22 0557 56.7 kg (125 lb)   12/20/22 0501 56.9 kg (125 lb 8 oz)   12/19/22 0612 55.3 kg (121 lb 14.6 oz)   12/18/22 0549 56.7 kg (125 lb)   12/17/22 0420 55 kg (121 lb 4.1 oz)   12/16/22 0449 55.8 kg (123 lb 0.3 oz)   12/15/22 0701 58.2 kg (128 lb 4.9 oz)   12/14/22 0500 57.5 kg (126 lb 12.2 oz)   12/13/22 0950 58.1 kg (128 lb)   12/13/22 0623 58.1 kg (128 lb 1.4 oz)   12/12/22 0400 57.9 kg (127 lb 10.3 oz)   12/08/22 2314 61.4 kg (135 lb 5.8 oz)   11/03/22 1303 57.7 kg (127 lb 3.2 oz)   10/19/22 0826 56.5 kg (124 lb 9.6 oz)   09/12/22 1259 56.5 kg (124 lb " 9.6 oz)   08/19/22 0901 57.1 kg (125 lb 12.8 oz)   08/04/22 1553 58.3 kg (128 lb 9.6 oz)   07/22/22 0622 62.1 kg (136 lb 14.5 oz)   07/21/22 0539 62.2 kg (137 lb 2 oz)   07/20/22 0625 59.6 kg (131 lb 4.8 oz)   07/19/22 1245 59.4 kg (130 lb 15.3 oz)   07/07/22 1101 59 kg (130 lb)   05/19/22 1334 61.1 kg (134 lb 12.8 oz)   03/15/22 1249 62.1 kg (137 lb)   03/03/22 1000 61.2 kg (135 lb)   02/18/22 1309 62.1 kg (137 lb)   11/23/21 1237 61.7 kg (136 lb)   11/11/21 1531 60.1 kg (132 lb 9.6 oz)   09/02/21 1113 60.3 kg (133 lb)   08/19/21 1145 60.5 kg (133 lb 6.4 oz)   05/19/21 1106 59.2 kg (130 lb 9.6 oz)   04/12/21 1132 56.2 kg (124 lb)   03/03/21 1301 58.9 kg (129 lb 12.8 oz)   01/12/21 1154 57.6 kg (127 lb)   12/07/20 1008 57 kg (125 lb 9.6 oz)   11/24/20 1256 56.7 kg (125 lb)   11/10/20 1400 57.5 kg (126 lb 12.8 oz)   10/19/20 1748 55.8 kg (123 lb)           --  Tests/Procedures        Tests/Procedures X-Ray chest     Labs       Pertinent Labs    Results from last 7 days   Lab Units 04/25/23  0637 04/24/23  1129 04/20/23  1334   SODIUM mmol/L 140 135* 141   POTASSIUM mmol/L 3.8 4.0 4.2   CHLORIDE mmol/L 111* 100 100   TOTAL CO2 mmol/L  --   --  27.3   CO2 mmol/L 22.0 22.0  --    BUN mg/dL 11 20 17   CREATININE mg/dL 0.51* 0.95 0.84   CALCIUM mg/dL 7.8* 9.0 10.3   BILIRUBIN mg/dL  --  0.2 0.3   ALK PHOS U/L  --  71 73   ALT (SGPT) U/L  --  16 12   AST (SGOT) U/L  --  24 15   GLUCOSE mg/dL 84 104* 76     Results from last 7 days   Lab Units 04/25/23  0637 04/24/23  1129 04/24/23  1129 04/20/23  1334   HEMOGLOBIN g/dL 11.2*   < > 13.7  --    HEMATOCRIT % 33.0*   < > 39.6  --    WBC 10*3/mm3 11.38*   < > 21.66*  --    TRIGLYCERIDES mg/dL  --   --   --  172*   ALBUMIN g/dL  --   --  4.1 4.5    < > = values in this interval not displayed.     Results from last 7 days   Lab Units 04/25/23  0637 04/24/23  1129   PLATELETS 10*3/mm3 265 338     COVID19   Date Value Ref Range Status   04/24/2023 Not Detected Not Detected - Ref.  Range Final     Lab Results   Component Value Date    HGBA1C 6.40 (H) 04/20/2023          Medications           Scheduled Medications budesonide-formoterol, 2 puff, Inhalation, BID - RT  donepezil, 5 mg, Oral, Nightly  enoxaparin, 40 mg, Subcutaneous, Q24H  escitalopram, 20 mg, Oral, Daily  pantoprazole, 40 mg, Oral, Q AM  pramipexole, 0.25 mg, Oral, Nightly  sodium chloride, 30 mL/kg, Intravenous, Once  sodium chloride, 500 mL, Intravenous, Once  sodium chloride, 10 mL, Intravenous, Q12H       Infusions norepinephrine, 0.02-0.3 mcg/kg/min, Last Rate: Stopped (04/24/23 2150)  sodium chloride, 125 mL/hr, Last Rate: 125 mL/hr (04/25/23 0201)       PRN Medications •  acetaminophen  •  Morphine  •  potassium chloride **OR** potassium chloride **OR** potassium chloride  •  [COMPLETED] Insert Peripheral IV **AND** sodium chloride  •  sodium chloride  •  sodium chloride  •  traZODone     Physical Findings          Physical Appearance alert, oriented, on oxygen therapy   Oral/Mouth Cavity dentures   Edema  no edema   Gastrointestinal last bowel movement: not documented    Skin  skin intact   Tubes/Drains none   NFPE Not applicable at this time   --  Current Nutrition Orders & Evaluation of Intake       Oral Nutrition     Food Allergies NKFA   Current PO Diet Diet: Regular/House Diet; Texture: Regular Texture (IDDSI 7); Fluid Consistency: Thin (IDDSI 0)   Supplement n/a   PO Evaluation     % PO Intake 100% breakfast    # of Days Evaluated 1   --  PES STATEMENT / NUTRITION DIAGNOSIS      Nutrition Dx Problem  Problem: No Nutrition Diagnosis at this Time   --  NUTRITION INTERVENTION / PLAN OF CARE      Intervention Goal(s) Maintain nutrition status, Reduce/improve symptoms, Maintain intake, Maintain weight and No significant weight loss         RD Intervention/Action Encourage intake, Follow Tx Progress and Care plan reviewed         Prescription/Orders:       PO Diet       Supplements       Snacks       Enteral Nutrition        Parenteral Nutrition    New Prescription Ordered? No changes at this time   --      Monitor/Evaluation Per protocol, PO intake, Pertinent labs, Weight, Symptoms   Discharge Plan/Needs Pending clinical course   Education Will instruct as appropriate   --    RD to follow per protocol.      Electronically signed by:  Chani Swain, Dietetic Intern   04/25/23 09:39 EDT

## 2023-04-26 LAB
BASOPHILS # BLD AUTO: 0.05 10*3/MM3 (ref 0–0.2)
BASOPHILS NFR BLD AUTO: 0.5 % (ref 0–1.5)
DEPRECATED RDW RBC AUTO: 42.5 FL (ref 37–54)
EOSINOPHIL # BLD AUTO: 0.3 10*3/MM3 (ref 0–0.4)
EOSINOPHIL NFR BLD AUTO: 3 % (ref 0.3–6.2)
ERYTHROCYTE [DISTWIDTH] IN BLOOD BY AUTOMATED COUNT: 13.4 % (ref 12.3–15.4)
HCT VFR BLD AUTO: 36.9 % (ref 34–46.6)
HGB BLD-MCNC: 12.4 G/DL (ref 12–15.9)
IMM GRANULOCYTES # BLD AUTO: 0.09 10*3/MM3 (ref 0–0.05)
IMM GRANULOCYTES NFR BLD AUTO: 0.9 % (ref 0–0.5)
LYMPHOCYTES # BLD AUTO: 2.28 10*3/MM3 (ref 0.7–3.1)
LYMPHOCYTES NFR BLD AUTO: 22.5 % (ref 19.6–45.3)
MAGNESIUM SERPL-MCNC: 1.9 MG/DL (ref 1.6–2.4)
MCH RBC QN AUTO: 29.5 PG (ref 26.6–33)
MCHC RBC AUTO-ENTMCNC: 33.6 G/DL (ref 31.5–35.7)
MCV RBC AUTO: 87.6 FL (ref 79–97)
MONOCYTES # BLD AUTO: 0.81 10*3/MM3 (ref 0.1–0.9)
MONOCYTES NFR BLD AUTO: 8 % (ref 5–12)
NEUTROPHILS NFR BLD AUTO: 6.6 10*3/MM3 (ref 1.7–7)
NEUTROPHILS NFR BLD AUTO: 65.1 % (ref 42.7–76)
NRBC BLD AUTO-RTO: 0 /100 WBC (ref 0–0.2)
PLATELET # BLD AUTO: 293 10*3/MM3 (ref 140–450)
PMV BLD AUTO: 10 FL (ref 6–12)
RBC # BLD AUTO: 4.21 10*6/MM3 (ref 3.77–5.28)
WBC NRBC COR # BLD: 10.13 10*3/MM3 (ref 3.4–10.8)

## 2023-04-26 PROCEDURE — 97530 THERAPEUTIC ACTIVITIES: CPT

## 2023-04-26 PROCEDURE — 94799 UNLISTED PULMONARY SVC/PX: CPT

## 2023-04-26 PROCEDURE — 85025 COMPLETE CBC W/AUTO DIFF WBC: CPT | Performed by: INTERNAL MEDICINE

## 2023-04-26 PROCEDURE — 25010000002 ENOXAPARIN PER 10 MG: Performed by: INTERNAL MEDICINE

## 2023-04-26 PROCEDURE — 94761 N-INVAS EAR/PLS OXIMETRY MLT: CPT

## 2023-04-26 PROCEDURE — 97110 THERAPEUTIC EXERCISES: CPT

## 2023-04-26 PROCEDURE — 94664 DEMO&/EVAL PT USE INHALER: CPT

## 2023-04-26 PROCEDURE — 25010000002 CEFTRIAXONE PER 250 MG: Performed by: INTERNAL MEDICINE

## 2023-04-26 RX ADMIN — CEFTRIAXONE 2 G: 2 INJECTION, POWDER, FOR SOLUTION INTRAMUSCULAR; INTRAVENOUS at 12:02

## 2023-04-26 RX ADMIN — MAGNESIUM OXIDE 400 MG (241.3 MG MAGNESIUM) TABLET 400 MG: TABLET at 17:40

## 2023-04-26 RX ADMIN — BUDESONIDE AND FORMOTEROL FUMARATE DIHYDRATE 2 PUFF: 160; 4.5 AEROSOL RESPIRATORY (INHALATION) at 10:44

## 2023-04-26 RX ADMIN — BUDESONIDE AND FORMOTEROL FUMARATE DIHYDRATE 2 PUFF: 160; 4.5 AEROSOL RESPIRATORY (INHALATION) at 21:35

## 2023-04-26 RX ADMIN — ASPIRIN 81 MG: 81 TABLET, COATED ORAL at 09:40

## 2023-04-26 RX ADMIN — ESCITALOPRAM OXALATE 20 MG: 10 TABLET, FILM COATED ORAL at 09:40

## 2023-04-26 RX ADMIN — CYCLOBENZAPRINE 10 MG: 10 TABLET, FILM COATED ORAL at 20:36

## 2023-04-26 RX ADMIN — DONEPEZIL HYDROCHLORIDE 5 MG: 5 TABLET, FILM COATED ORAL at 20:36

## 2023-04-26 RX ADMIN — Medication 10 ML: at 20:36

## 2023-04-26 RX ADMIN — PANTOPRAZOLE SODIUM 40 MG: 40 TABLET, DELAYED RELEASE ORAL at 05:44

## 2023-04-26 RX ADMIN — Medication 10 ML: at 09:40

## 2023-04-26 RX ADMIN — ENOXAPARIN SODIUM 40 MG: 100 INJECTION SUBCUTANEOUS at 20:36

## 2023-04-26 RX ADMIN — CYCLOBENZAPRINE 10 MG: 10 TABLET, FILM COATED ORAL at 03:39

## 2023-04-26 RX ADMIN — TRAZODONE HYDROCHLORIDE 25 MG: 50 TABLET ORAL at 22:17

## 2023-04-26 NOTE — PROGRESS NOTES
"                                              LOS: 2 days   Patient Care Team:  Cassi Bella MD as PCP - General (Family Medicine)    Chief Complaint:  F/up septic shock and UTI    Subjective   Interval History  She feels well with exception of leg cramps which does not seem to be worse at night and she can actually sleep     REVIEW OF SYSTEMS:   CARDIOVASCULAR: No chest pain, chest pressure or chest discomfort. No palpitations or edema.   RESPIRATORY: No shortness of breath, cough or sputum.   GASTROINTESTINAL: No anorexia, nausea, vomiting or diarrhea. No abdominal pain.  CONSTITUTIONAL: No fever or chills.     Ventilator/Non-Invasive Ventilation Settings (From admission, onward)    None                Physical Exam:     Vital Signs  Temp:  [98 °F (36.7 °C)-98.7 °F (37.1 °C)] 98.6 °F (37 °C)  Heart Rate:  [82-93] 83  Resp:  [20] 20  BP: (127-148)/(81-95) 127/84    Intake/Output Summary (Last 24 hours) at 4/26/2023 1559  Last data filed at 4/26/2023 1335  Gross per 24 hour   Intake 780 ml   Output 1700 ml   Net -920 ml     Flowsheet Rows    Flowsheet Row First Filed Value   Admission Height 161.3 cm (63.5\") Documented at 04/24/2023 1059   Admission Weight 57.6 kg (127 lb) Documented at 04/24/2023 1059          PPE used per hospital policy    General Appearance:   Alert, cooperative, in no acute distress   ENMT:  Mallampati score 3, moist mucous membrane   Eyes:  Pupils equal and reactive to light. EOMI   Neck:   Trachea midline. No thyromegaly.   Lungs:    Clear to auscultation,respirations regular, even and nonlabored    Heart:   Regular rhythm and normal rate, normal S1 and S2, no         murmur   Skin:   No rash or ecchymosis   Abdomen:    Obese. Soft. No tenderness. No HSM.   Neuro/psych:  Conscious, alert, oriented x3. Strength 5/5 in upper and lower  ext.  Appropriate mood and affect   Extremities:  No cyanosis, clubbing or edema.  Warm extremities and well-perfused          Results Review:  "       Results from last 7 days   Lab Units 04/25/23  0637 04/24/23  1129 04/24/23  1129 04/20/23  1334   SODIUM mmol/L 140  --  135* 141   POTASSIUM mmol/L 3.8  --  4.0 4.2   CHLORIDE mmol/L 111*  --  100 100   TOTAL CO2 mmol/L  --   --   --  27.3   CO2 mmol/L 22.0  --  22.0  --    BUN mg/dL 11  --  20 17   CREATININE mg/dL 0.51*  --  0.95 0.84   GLUCOSE mg/dL 84   < > 104* 76   CALCIUM mg/dL 7.8*  --  9.0 10.3    < > = values in this interval not displayed.     Results from last 7 days   Lab Units 04/24/23  1319 04/24/23  1129   HSTROP T ng/L 21* 16*     Results from last 7 days   Lab Units 04/26/23  0552 04/25/23  0637 04/24/23  1129   WBC 10*3/mm3 10.13 11.38* 21.66*   HEMOGLOBIN g/dL 12.4 11.2* 13.7   HEMATOCRIT % 36.9 33.0* 39.6   PLATELETS 10*3/mm3 293 265 338         Results from last 7 days   Lab Units 04/24/23  1129   PROBNP pg/mL 270.0                           I reviewed the patient's new clinical results.        Medication Review:   aspirin, 81 mg, Oral, Daily  budesonide-formoterol, 2 puff, Inhalation, BID - RT  cefTRIAXone, 2 g, Intravenous, Q24H  donepezil, 5 mg, Oral, Nightly  enoxaparin, 40 mg, Subcutaneous, Q24H  escitalopram, 20 mg, Oral, Daily  pantoprazole, 40 mg, Oral, Q AM  sodium chloride, 30 mL/kg, Intravenous, Once  sodium chloride, 500 mL, Intravenous, Once  sodium chloride, 10 mL, Intravenous, Q12H            Assessment     1. Septic shock, shock resolved  2. UTI  3. Lactic acidosis, resolved  4. URI  5. Muscle spasm  6. Chronic anemia     • HTN  • PAF  • Asthma, no current exacerbation  • Anxiety/depression  • History of C. difficile colitis January 2021  • History of E. coli UTI February 2022  • History of microscopic colitis      Plan   · Continue Rocephin today.  Awaiting urine culture which is consistent with gram-negative bacilli.  · Check magnesium level and start her on magnesium supplementation due to leg cramps.  Continue Flexeril as needed  · PT OT  · DVT prophylaxis with  Lovenox  · Continue maintenance Symbicort for asthma    Can probably be discharged tomorrow.  She does not wish to go to any facility and would like to go back home.    Sanjuana Mcqueen MD  04/26/23  15:59 EDT              This note was dictated utilizing Dragon dictation

## 2023-04-26 NOTE — PLAN OF CARE
Goal Outcome Evaluation:  Plan of Care Reviewed With: patient        Progress: improving  Outcome Evaluation: Pt seen by PT for treatment this AM. Pt removed external catheter when this PTA entered room w/ bed noted to be soaked. Pt sat up to EOB w/ SBA. Pt then stood and amb to BR then around nsg station w/ both STS and gait req SBA and ue of fww. Pt cued a few times to remain w/ fww as she turned to sit down. No overt LOB noted. Pt performed ther ex for strengthening before brief donned on pt. Pt w/ breakfast and current needs met at end of session. PT will progress as pt luis carlos.   Prednisone Pregnancy And Lactation Text: This medication is Pregnancy Category C and it isn't know if it is safe during pregnancy. This medication is excreted in breast milk.

## 2023-04-26 NOTE — PLAN OF CARE
Goal Outcome Evaluation:              Outcome Evaluation: resting comfortably.  Intermittent c/o leg cramps.  Slight SOA reported today.  No resp distress.  O2 at 2LNC

## 2023-04-26 NOTE — THERAPY TREATMENT NOTE
Patient Name: Kandace Andrade  : 1940    MRN: [id number]                              Today's Date: 2023       Admit Date: 2023    Visit Dx:     ICD-10-CM ICD-9-CM   1. Sepsis with acute hypoxic respiratory failure and septic shock, due to unspecified organism  A41.9 038.9    R65.21 995.92    J96.01 785.52     518.81   2. Acute hypoxemic respiratory failure  J96.01 518.81     Patient Active Problem List   Diagnosis   • Vitamin D deficiency   • KINDRA (obstructive sleep apnea)   • IBS (irritable bowel syndrome)   • Essential hypertension   • Herpes zoster   • Arthritis   • Anxiety and depression   • Other emphysema   • Acute seasonal allergic rhinitis due to pollen   • Paroxysmal atrial fibrillation ([facility])   • Osteoporosis   • Mixed hyperlipidemia   • Encounter for Medicare annual wellness exam   • COPD (chronic obstructive pulmonary disease) ([facility])   • Mixed stress and urge urinary incontinence   • Type 2 diabetes mellitus without complication, without long-term current use of insulin ([facility])   • Arthritis of both hips   • Autonomic neuropathy   • GERD without esophagitis   • C. difficile colitis   • Hypokalemia   • Hospital discharge follow-up   • Decreased hearing of both ears   • Dizziness   • Nausea   • Hypercholesterolemia   • Primary insomnia   • Generalized weakness   • Other microscopic hematuria   • Confusion   • Dehydration   • Abdominal pain   • Metabolic encephalopathy   • Left ankle swelling   • Diarrhea   • Overactive bladder   • Restless leg syndrome   • Edema of both legs   • Other fatigue   • Carotid artery stenosis, asymptomatic, bilateral   • Memory deficit   • Imbalance   • Dementia, old age   • Sepsis with acute hypoxic respiratory failure and septic shock, due to unspecified organism     Past Medical History:   Diagnosis Date   • Acute seasonal allergic rhinitis due to pollen    • Anxiety and depression    • Arthritis    • Asthma    • C. difficile colitis    • COPD (chronic  obstructive pulmonary disease)    • Diabetes mellitus     Pre diabetes   • Diarrhea    • Diverticulosis    • Fibula fracture    • Herpes zoster    • History of lumbosacral spine surgery    • Hypertension    • IBS (irritable bowel syndrome)    • Mixed hyperlipidemia    • KINDRA (obstructive sleep apnea)     NO MACHINE AT THIS TIME   • Osteoporosis    • Paroxysmal atrial fibrillation    • Vitamin D deficiency      Past Surgical History:   Procedure Laterality Date   • APPENDECTOMY     • BACK SURGERY      lower x2   • CATARACT EXTRACTION     • COLONOSCOPY      patient doesn't recall    • COLONOSCOPY N/A 4/2/2021    Procedure: COLONOSCOPY to cecum with cecal biopsies;  Surgeon: Jarrod Duggan MD;  Location: [facility] ENDOSCOPY;  Service: Gastroenterology;  Laterality: N/A;  pre: diarhhea  post: diverticulosis, hemorrhoids   • HYSTERECTOMY      partial   • SHOULDER SURGERY Right    • SHOULDER SURGERY     • SIGMOIDOSCOPY N/A 10/19/2022    Procedure: SIGMOIDOSCOPY FLEXIBLE TO SIGMOID COLON WITH BIOPSIES;  Surgeon: Tonja Tanner MD;  Location: [facility] ENDOSCOPY;  Service: Gastroenterology;  Laterality: N/A;  PRE: DIARRHEA  POST: HEMORRHOIDS   • TONSILLECTOMY     • UPPER GASTROINTESTINAL ENDOSCOPY      patient doesn't recall       General Information     Row Name 04/26/23 0829          Physical Therapy Time and Intention    Document Type therapy note (daily note)  -PH     Mode of Treatment physical therapy  -     Row Name 04/26/23 0829          General Information    Existing Precautions/Restrictions oxygen therapy device and L/min;fall  -PH     Row Name 04/26/23 0829          Cognition    Orientation Status (Cognition) oriented x 4  -PH     Row Name 04/26/23 0829          Safety Issues, Functional Mobility    Impairments Affecting Function (Mobility) endurance/activity tolerance;shortness of breath  -PH     Comment, Safety Issues/Impairments (Mobility) gt belt and non skid socks donned  -PH           User Key  (r) = Recorded  By, (t) = Taken By, (c) = Cosigned By    Initials Name Provider Type    PH Elsi Mendoza PTA Physical Therapist Assistant               Mobility     Row Name 04/26/23 0830          Bed Mobility    Bed Mobility supine-sit  -PH     Supine-Sit [place] (Bed Mobility) standby assist  -PH     Assistive Device (Bed Mobility) bed rails;head of bed elevated  -PH     Comment, (Bed Mobility) pt pulled off pure wick before bed w/ bed noted to be soaked w/ urine.  -PH     Row Name 04/26/23 0830          Sit-Stand Transfer    Sit-Stand [place] (Transfers) standby assist  -PH     Assistive Device (Sit-Stand Transfers) walker, front-wheeled  -PH     Comment, (Sit-Stand Transfer) 3x; from EOB, from toilet seat, from chair  -PH     Row Name 04/26/23 0830          Gait/Stairs (Locomotion)    [place] Level (Gait) standby assist;contact guard  -PH     Assistive Device (Gait) walker, front-wheeled  -PH     Distance in Feet (Gait) 15' to BR then 135'  -PH     Comment, (Gait/Stairs) mildly slow w/ no overt LOB; cueing 2x to remain w/ fww until sitting w/ pt letting before turning to sit; pt amb  on RA w/ sats on 87% although dominik to mid 90's w/in approx 20 sec  -PH           User Key  (r) = Recorded By, (t) = Taken By, (c) = Cosigned By    Initials Name Provider Type    PH Elsi Mendoza PTA Physical Therapist Assistant               Obj/Interventions     Row Name 04/26/23 0883          Motor Skills    Therapeutic Exercise other (see comments)  BAP, LAQ, seated march, punches, shldr flexion; x 10-15 reps w/ AROM  -PH     Row Name 04/26/23 0813          Balance    Balance Assessment sitting static balance;standing static balance  -PH     Static Sitting Balance modified independence  -PH     Static Standing Balance standby assist  -PH     Position/Device Used, Standing Balance walker, front-wheeled  -PH           User Key  (r) = Recorded By, (t) = Taken By, (c) = Cosigned By    Initials Name Provider Type     " Elsi Mendoza PTA Physical Therapist Assistant               Goals/Plan    No documentation.                Clinical Impression     Row Name 04/26/23 0834          Pain    Posttreatment Pain Rating 1/10  -PH     Pain Location - Side/Orientation Bilateral  -PH     Pain Location lower  -PH     Pain Location - extremity  -PH     Pre/Posttreatment Pain Comment pt reports \"minor\" spasms in B LE  -PH     Pain Intervention(s) Repositioned;Rest;Ambulation/increased activity  -PH     Row Name 04/26/23 0834          Plan of Care Review    Plan of Care Reviewed With patient  -PH     Progress improving  -PH     Outcome Evaluation Pt seen by PT for treatment this AM. Pt removed external catheter when this PTA entered room w/ bed noted to be soaked. Pt sat up to EOB w/ SBA. Pt then stood and amb to BR then around nsg station w/ both STS and gait req SBA and ue of fww. Pt cued a few times to remain w/ fww as she turned to sit down. No overt LOB noted. Pt performed ther ex for strengthening before brief donned on pt. Pt w/ breakfast and current needs met at end of session. PT will progress as pt luis carlos.  -PH     Row Name 04/26/23 0834          Vital Signs    Pre SpO2 (%) 96  -PH     O2 Delivery Pre Treatment supplemental O2  -PH     Intra SpO2 (%) 87  -PH     O2 Delivery Intra Treatment room air  -PH     Post SpO2 (%) 96  -PH     O2 Delivery Post Treatment supplemental O2  -PH     Row Name 04/26/23 0834          Positioning and Restraints    Pre-Treatment Position in bed  -PH     Post Treatment Position chair  -PH     In Chair reclined;notified nsg;call light within reach;encouraged to call for assist;exit alarm on  -PH           User Key  (r) = Recorded By, (t) = Taken By, (c) = Cosigned By    Initials Name Provider Type    PH Elsi Mendoza PTA Physical Therapist Assistant               Outcome Measures     Row Name 04/26/23 0840          How much help from another person do you currently need...    Turning " from your back to your side while in flat bed without using bedrails? 4  -PH     Moving from lying on back to sitting on the side of a flat bed without bedrails? 4  -PH     Moving to and from a bed to a chair (including a wheelchair)? 3  -PH     Standing up from a chair using your arms (e.g., wheelchair, bedside chair)? 4  -PH     Climbing 3-5 steps with a railing? 3  -PH     To walk in hospital room? 3  -PH     AM-PAC 6 Clicks Score (PT) 21  -PH     Highest level of mobility 6 --> Walked 10 steps or more  -PH     Row Name 04/26/23 0838          Functional Assessment    Outcome Measure Options AM-PAC 6 Clicks Basic Mobility (PT)  -PH           User Key  (r) = Recorded By, (t) = Taken By, (c) = Cosigned By    Initials Name Provider Type     Elsi Mendoza PTA Physical Therapist Assistant                             Physical Therapy Education     Title: PT OT SLP Therapies (Done)     Topic: Physical Therapy (Done)     Point: Mobility training (Done)     Learning Progress Summary           Patient Acceptance, E,TB,D, VU,DU,NR by  at 4/26/2023 0838    Acceptance, E,D, DU by  at 4/25/2023 1604                   Point: Home exercise program (Done)     Learning Progress Summary           Patient Acceptance, E,TB,D, VU,DU,NR by  at 4/26/2023 0838    Acceptance, E,D, DU by  at 4/25/2023 1604                   Point: Body mechanics (Done)     Learning Progress Summary           Patient Acceptance, E,TB,D, VU,DU,NR by  at 4/26/2023 0838    Acceptance, E,D, DU by  at 4/25/2023 1604                   Point: Precautions (Done)     Learning Progress Summary           Patient Acceptance, E,TB,D, VU,DU,NR by  at 4/26/2023 0838    Acceptance, E,D, DU by  at 4/25/2023 1604                               User Key     Initials Effective Dates Name Provider Type Discipline     06/16/21 -  Mari Taylor PT Physical Therapist PT     06/16/21 -  Elsi Mendoza PTA Physical Therapist Assistant PT               PT Recommendation and Plan     Plan of Care Reviewed With: patient  Progress: improving  Outcome Evaluation: Pt seen by PT for treatment this AM. Pt removed external catheter when this PTA entered room w/ bed noted to be soaked. Pt sat up to EOB w/ SBA. Pt then stood and amb to BR then around nsg station w/ both STS and gait req SBA and ue of fww. Pt cued a few times to remain w/ fww as she turned to sit down. No overt LOB noted. Pt performed ther ex for strengthening before brief donned on pt. Pt w/ breakfast and current needs met at end of session. PT will progress as pt luis carlos.     Time Calculation:    PT Charges     Row Name 04/26/23 0839             Time Calculation    Start Time 0807  -PH      Stop Time 0830  -PH      Time Calculation (min) 23 min  -PH      PT Received On 04/26/23  -PH      PT - Next Appointment 04/27/23  -PH         Timed Charges    32976 - PT Therapeutic Exercise Minutes 6  -PH      34152 - PT Therapeutic Activity Minutes 17  -PH         Total Minutes    Timed Charges Total Minutes 23  -PH       Total Minutes 23  -PH            User Key  (r) = Recorded By, (t) = Taken By, (c) = Cosigned By    Initials Name Provider Type    PH Elsi Mendoza, PTA Physical Therapist Assistant              Therapy Charges for Today     Code Description Service Date Service Provider Modifiers Qty    96370398925 HC PT THER PROC EA 15 MIN 4/26/2023 Elsi Mendoza PTA GP 1    34059681062 HC PT THERAPEUTIC ACT EA 15 MIN 4/26/2023 Elsi Mendoza PTA GP 1          PT G-Codes  Outcome Measure Options: AM-PAC 6 Clicks Basic Mobility (PT)  AM-PAC 6 Clicks Score (PT): 21  PT Discharge Summary  Anticipated Discharge Disposition (PT): home    Elsi Mendoza PTA  4/26/2023

## 2023-04-26 NOTE — PLAN OF CARE
"Goal Outcome Evaluation:  Plan of Care Reviewed With: patient        Progress: no change  Outcome Evaluation:     A&O x4. VSS. SR on telemetry. Room air.     External catheter in use.     C/O \"muscle spasms\" in legs.   PRN Muscle relaxer given with relief.     Daily weights.       Will continue to monitor  "

## 2023-04-27 ENCOUNTER — HOME HEALTH ADMISSION (OUTPATIENT)
Dept: HOME HEALTH SERVICES | Facility: HOME HEALTHCARE | Age: 83
End: 2023-04-27
Payer: MEDICARE

## 2023-04-27 LAB
BASOPHILS # BLD AUTO: 0.05 10*3/MM3 (ref 0–0.2)
BASOPHILS NFR BLD AUTO: 0.6 % (ref 0–1.5)
DEPRECATED RDW RBC AUTO: 42.6 FL (ref 37–54)
EOSINOPHIL # BLD AUTO: 0.26 10*3/MM3 (ref 0–0.4)
EOSINOPHIL NFR BLD AUTO: 3.3 % (ref 0.3–6.2)
ERYTHROCYTE [DISTWIDTH] IN BLOOD BY AUTOMATED COUNT: 13.3 % (ref 12.3–15.4)
HCT VFR BLD AUTO: 35.3 % (ref 34–46.6)
HGB BLD-MCNC: 12.2 G/DL (ref 12–15.9)
IMM GRANULOCYTES # BLD AUTO: 0.09 10*3/MM3 (ref 0–0.05)
IMM GRANULOCYTES NFR BLD AUTO: 1.1 % (ref 0–0.5)
LYMPHOCYTES # BLD AUTO: 1.81 10*3/MM3 (ref 0.7–3.1)
LYMPHOCYTES NFR BLD AUTO: 22.7 % (ref 19.6–45.3)
MCH RBC QN AUTO: 30.3 PG (ref 26.6–33)
MCHC RBC AUTO-ENTMCNC: 34.6 G/DL (ref 31.5–35.7)
MCV RBC AUTO: 87.8 FL (ref 79–97)
MONOCYTES # BLD AUTO: 0.79 10*3/MM3 (ref 0.1–0.9)
MONOCYTES NFR BLD AUTO: 9.9 % (ref 5–12)
NEUTROPHILS NFR BLD AUTO: 4.98 10*3/MM3 (ref 1.7–7)
NEUTROPHILS NFR BLD AUTO: 62.4 % (ref 42.7–76)
NRBC BLD AUTO-RTO: 0 /100 WBC (ref 0–0.2)
PLATELET # BLD AUTO: 275 10*3/MM3 (ref 140–450)
PMV BLD AUTO: 10.1 FL (ref 6–12)
RBC # BLD AUTO: 4.02 10*6/MM3 (ref 3.77–5.28)
WBC NRBC COR # BLD: 7.98 10*3/MM3 (ref 3.4–10.8)

## 2023-04-27 PROCEDURE — 94799 UNLISTED PULMONARY SVC/PX: CPT

## 2023-04-27 PROCEDURE — 94664 DEMO&/EVAL PT USE INHALER: CPT

## 2023-04-27 PROCEDURE — 85025 COMPLETE CBC W/AUTO DIFF WBC: CPT | Performed by: INTERNAL MEDICINE

## 2023-04-27 PROCEDURE — 94761 N-INVAS EAR/PLS OXIMETRY MLT: CPT

## 2023-04-27 PROCEDURE — 25010000002 CEFTRIAXONE PER 250 MG: Performed by: INTERNAL MEDICINE

## 2023-04-27 PROCEDURE — 25010000002 ENOXAPARIN PER 10 MG: Performed by: INTERNAL MEDICINE

## 2023-04-27 RX ORDER — CYCLOBENZAPRINE HCL 10 MG
5 TABLET ORAL 2 TIMES DAILY PRN
Status: DISCONTINUED | OUTPATIENT
Start: 2023-04-27 | End: 2023-04-28 | Stop reason: HOSPADM

## 2023-04-27 RX ORDER — CEFDINIR 300 MG/1
300 CAPSULE ORAL EVERY 12 HOURS SCHEDULED
Status: DISCONTINUED | OUTPATIENT
Start: 2023-04-28 | End: 2023-04-28 | Stop reason: HOSPADM

## 2023-04-27 RX ADMIN — PANTOPRAZOLE SODIUM 40 MG: 40 TABLET, DELAYED RELEASE ORAL at 05:47

## 2023-04-27 RX ADMIN — MAGNESIUM OXIDE 400 MG (241.3 MG MAGNESIUM) TABLET 400 MG: TABLET at 08:46

## 2023-04-27 RX ADMIN — ESCITALOPRAM OXALATE 20 MG: 10 TABLET, FILM COATED ORAL at 08:48

## 2023-04-27 RX ADMIN — BUDESONIDE AND FORMOTEROL FUMARATE DIHYDRATE 2 PUFF: 160; 4.5 AEROSOL RESPIRATORY (INHALATION) at 08:06

## 2023-04-27 RX ADMIN — ENOXAPARIN SODIUM 40 MG: 100 INJECTION SUBCUTANEOUS at 21:55

## 2023-04-27 RX ADMIN — Medication 10 ML: at 21:59

## 2023-04-27 RX ADMIN — CEFTRIAXONE 2 G: 2 INJECTION, POWDER, FOR SOLUTION INTRAMUSCULAR; INTRAVENOUS at 11:33

## 2023-04-27 RX ADMIN — DONEPEZIL HYDROCHLORIDE 5 MG: 5 TABLET, FILM COATED ORAL at 21:55

## 2023-04-27 RX ADMIN — ASPIRIN 81 MG: 81 TABLET, COATED ORAL at 08:46

## 2023-04-27 RX ADMIN — Medication 10 ML: at 08:47

## 2023-04-27 RX ADMIN — BUDESONIDE AND FORMOTEROL FUMARATE DIHYDRATE 2 PUFF: 160; 4.5 AEROSOL RESPIRATORY (INHALATION) at 20:22

## 2023-04-27 RX ADMIN — CYCLOBENZAPRINE 5 MG: 10 TABLET, FILM COATED ORAL at 21:55

## 2023-04-27 NOTE — SIGNIFICANT NOTE
04/27/23 1255   OTHER   Discipline physical therapy assistant   Rehab Time/Intention   Session Not Performed patient/family declined treatment  (Refusal by pt who states she is dc'ing to home. Notfied RN. Will follow up tomorrow if pt still in hospital.)   Recommendation   PT - Next Appointment 04/28/23

## 2023-04-27 NOTE — NURSING NOTE
Alert and confused, medicated for leg cramps/spasm, fluid encouraged, appears congested,but in no distress, pt needed to be redirected a few times due to memory deficit.

## 2023-04-27 NOTE — PROGRESS NOTES
"                                              LOS: 3 days   Patient Care Team:  Cassi Bella MD as PCP - General (Family Medicine)    Chief Complaint:  F/up septic shock and UTI    Subjective   Interval History  Leg cramps improved.  Blood pressure remained stable.    Staff reported mild intermittent episodes of confusion/forgetfulness.    REVIEW OF SYSTEMS:   CARDIOVASCULAR: No chest pain, chest pressure or chest discomfort. No palpitations or edema.   RESPIRATORY: No shortness of breath, cough or sputum.   GASTROINTESTINAL: No anorexia, nausea, vomiting or diarrhea. No abdominal pain.  CONSTITUTIONAL: No fever or chills.     Ventilator/Non-Invasive Ventilation Settings (From admission, onward)    None                Physical Exam:     Vital Signs  Temp:  [97.4 °F (36.3 °C)-98.2 °F (36.8 °C)] 97.8 °F (36.6 °C)  Heart Rate:  [65-87] 65  Resp:  [18] 18  BP: (120-144)/(67-95) 129/95    Intake/Output Summary (Last 24 hours) at 4/27/2023 1658  Last data filed at 4/27/2023 1300  Gross per 24 hour   Intake 360 ml   Output 300 ml   Net 60 ml     Flowsheet Rows    Flowsheet Row First Filed Value   Admission Height 161.3 cm (63.5\") Documented at 04/24/2023 1059   Admission Weight 57.6 kg (127 lb) Documented at 04/24/2023 1059          PPE used per hospital policy    General Appearance:   Alert, cooperative, in no acute distress   ENMT:  Mallampati score 3, moist mucous membrane   Eyes:  Pupils equal and reactive to light. EOMI   Neck:   Trachea midline. No thyromegaly.   Lungs:    Clear to auscultation,respirations regular, even and nonlabored    Heart:   Regular rhythm and normal rate, normal S1 and S2, no         murmur   Skin:   No rash or ecchymosis   Abdomen:    Obese. Soft. No tenderness. No HSM.   Neuro/psych:  Conscious, alert, oriented x3. Strength 5/5 in upper and lower  ext.  Appropriate mood and affect   Extremities:  No cyanosis, clubbing or edema.  Warm extremities and well-perfused          Results " Review:        Results from last 7 days   Lab Units 04/25/23  0637 04/24/23  1129   SODIUM mmol/L 140 135*   POTASSIUM mmol/L 3.8 4.0   CHLORIDE mmol/L 111* 100   CO2 mmol/L 22.0 22.0   BUN mg/dL 11 20   CREATININE mg/dL 0.51* 0.95   GLUCOSE mg/dL 84 104*   CALCIUM mg/dL 7.8* 9.0     Results from last 7 days   Lab Units 04/24/23  1319 04/24/23  1129   HSTROP T ng/L 21* 16*     Results from last 7 days   Lab Units 04/27/23  0700 04/26/23  0552 04/25/23  0637   WBC 10*3/mm3 7.98 10.13 11.38*   HEMOGLOBIN g/dL 12.2 12.4 11.2*   HEMATOCRIT % 35.3 36.9 33.0*   PLATELETS 10*3/mm3 275 293 265         Results from last 7 days   Lab Units 04/24/23  1129   PROBNP pg/mL 270.0                           I reviewed the patient's new clinical results.        Medication Review:   aspirin, 81 mg, Oral, Daily  budesonide-formoterol, 2 puff, Inhalation, BID - RT  cefTRIAXone, 2 g, Intravenous, Q24H  donepezil, 5 mg, Oral, Nightly  enoxaparin, 40 mg, Subcutaneous, Q24H  escitalopram, 20 mg, Oral, Daily  magnesium oxide, 400 mg, Oral, Daily  pantoprazole, 40 mg, Oral, Q AM  sodium chloride, 30 mL/kg, Intravenous, Once  sodium chloride, 500 mL, Intravenous, Once  sodium chloride, 10 mL, Intravenous, Q12H            Assessment     1. Septic shock, shock resolved  2. UTI  3. Lactic acidosis, resolved  4. URI  5. Muscle spasm  6. Chronic anemia     • HTN  • PAF  • Asthma, no current exacerbation  • Anxiety/depression  • History of C. difficile colitis January 2021  • History of E. coli UTI February 2022  • History of microscopic colitis      Plan   · DC Rocephin and start cefdinir twice daily  · Continue magnesium oxide daily for muscle spasm.  Try to avoid Flexeril.  · Consult case management for home health  · DVT prophylaxis with Lovenox  · Continue maintenance Symbicort for asthma    Discharge tomorrow after arranging for home health and ensuring that her UTI is covered by oral antibiotics.  Discussed with her daughter at bedside.   Patient was offered placement but she declined and she would like to go home with assistance.    Sanjuana Mcqueen MD  04/27/23  16:58 EDT              This note was dictated utilizing Dragon dictation

## 2023-04-27 NOTE — NURSING NOTE
Spoke to JIMY Tyler; patient has been accepted by [facility] for [facility] health; plan to dc tomorrow with home health per Dr. Mcqueen.

## 2023-04-27 NOTE — TELEPHONE ENCOUNTER
Recommending you talk to the hospitalist in charge of her.  I would not be able to make any kind of changes while patient is in the hospital.  Hopefully she is starting to do better.  Keep me updated.  I will be praying for her.

## 2023-04-27 NOTE — PLAN OF CARE
Goal Outcome Evaluation:   VSS; pt weaned to 1L NC; transitioned to PO abx; plan to titrate off O2 and go home with home health tomorrow.

## 2023-04-28 ENCOUNTER — READMISSION MANAGEMENT (OUTPATIENT)
Dept: CALL CENTER | Facility: HOSPITAL | Age: 83
End: 2023-04-28
Payer: MEDICARE

## 2023-04-28 VITALS
BODY MASS INDEX: 22.75 KG/M2 | HEIGHT: 63 IN | HEART RATE: 90 BPM | OXYGEN SATURATION: 91 % | DIASTOLIC BLOOD PRESSURE: 66 MMHG | RESPIRATION RATE: 18 BRPM | SYSTOLIC BLOOD PRESSURE: 102 MMHG | TEMPERATURE: 97.9 F | WEIGHT: 128.4 LBS

## 2023-04-28 LAB
BASOPHILS # BLD AUTO: 0.07 10*3/MM3 (ref 0–0.2)
BASOPHILS NFR BLD AUTO: 0.8 % (ref 0–1.5)
DEPRECATED RDW RBC AUTO: 43.7 FL (ref 37–54)
EOSINOPHIL # BLD AUTO: 0.33 10*3/MM3 (ref 0–0.4)
EOSINOPHIL NFR BLD AUTO: 3.8 % (ref 0.3–6.2)
ERYTHROCYTE [DISTWIDTH] IN BLOOD BY AUTOMATED COUNT: 13.6 % (ref 12.3–15.4)
HCT VFR BLD AUTO: 37.6 % (ref 34–46.6)
HGB BLD-MCNC: 12.5 G/DL (ref 12–15.9)
IMM GRANULOCYTES # BLD AUTO: 0.16 10*3/MM3 (ref 0–0.05)
IMM GRANULOCYTES NFR BLD AUTO: 1.9 % (ref 0–0.5)
LYMPHOCYTES # BLD AUTO: 2.39 10*3/MM3 (ref 0.7–3.1)
LYMPHOCYTES NFR BLD AUTO: 27.9 % (ref 19.6–45.3)
MCH RBC QN AUTO: 29.6 PG (ref 26.6–33)
MCHC RBC AUTO-ENTMCNC: 33.2 G/DL (ref 31.5–35.7)
MCV RBC AUTO: 88.9 FL (ref 79–97)
MONOCYTES # BLD AUTO: 0.83 10*3/MM3 (ref 0.1–0.9)
MONOCYTES NFR BLD AUTO: 9.7 % (ref 5–12)
NEUTROPHILS NFR BLD AUTO: 4.8 10*3/MM3 (ref 1.7–7)
NEUTROPHILS NFR BLD AUTO: 55.9 % (ref 42.7–76)
NRBC BLD AUTO-RTO: 0 /100 WBC (ref 0–0.2)
PLATELET # BLD AUTO: 308 10*3/MM3 (ref 140–450)
PMV BLD AUTO: 10.2 FL (ref 6–12)
RBC # BLD AUTO: 4.23 10*6/MM3 (ref 3.77–5.28)
WBC NRBC COR # BLD: 8.58 10*3/MM3 (ref 3.4–10.8)

## 2023-04-28 PROCEDURE — 94799 UNLISTED PULMONARY SVC/PX: CPT

## 2023-04-28 PROCEDURE — 94664 DEMO&/EVAL PT USE INHALER: CPT

## 2023-04-28 PROCEDURE — 94761 N-INVAS EAR/PLS OXIMETRY MLT: CPT

## 2023-04-28 PROCEDURE — 85025 COMPLETE CBC W/AUTO DIFF WBC: CPT | Performed by: INTERNAL MEDICINE

## 2023-04-28 RX ORDER — CEFDINIR 300 MG/1
300 CAPSULE ORAL EVERY 12 HOURS SCHEDULED
Qty: 7 CAPSULE | Refills: 0 | Status: SHIPPED | OUTPATIENT
Start: 2023-04-28 | End: 2023-05-02

## 2023-04-28 RX ORDER — MAGNESIUM OXIDE 400 MG/1
400 TABLET ORAL DAILY
Qty: 30 TABLET | Refills: 0 | Status: SHIPPED | OUTPATIENT
Start: 2023-04-28

## 2023-04-28 RX ADMIN — ESCITALOPRAM OXALATE 20 MG: 10 TABLET, FILM COATED ORAL at 08:55

## 2023-04-28 RX ADMIN — MAGNESIUM OXIDE 400 MG (241.3 MG MAGNESIUM) TABLET 400 MG: TABLET at 08:55

## 2023-04-28 RX ADMIN — PANTOPRAZOLE SODIUM 40 MG: 40 TABLET, DELAYED RELEASE ORAL at 06:35

## 2023-04-28 RX ADMIN — BUDESONIDE AND FORMOTEROL FUMARATE DIHYDRATE 2 PUFF: 160; 4.5 AEROSOL RESPIRATORY (INHALATION) at 08:22

## 2023-04-28 RX ADMIN — ASPIRIN 81 MG: 81 TABLET, COATED ORAL at 08:55

## 2023-04-28 RX ADMIN — CEFDINIR 300 MG: 300 CAPSULE ORAL at 08:55

## 2023-04-28 RX ADMIN — Medication 10 ML: at 08:55

## 2023-04-28 NOTE — DISCHARGE SUMMARY
DISCHARGE SUMMARY    Patient Name: Kandace Andrade  Age/Sex: 83 y.o. female  : 1940  MRN: [id number]  Patient Care Team:  Cassi Bella MD as PCP - General (Family Medicine)       Date of Admit: 2023  Date of Discharge:  23  Discharge Condition: Good    Discharge Diagnoses:  1. Septic shock, shock resolved  2. UTI  3. Lactic acidosis, resolved  4. URI  5. Muscle spasm  6. Chronic anemia     • HTN  • PAF  • Asthma, no current exacerbation  • Anxiety/depression  • History of C. difficile colitis 2021  • History of E. coli UTI 2022  • History of microscopic colitis    History of present illness from H&P from 2023:   This is an 83-year-old female patient who lives alone.     She presented to the hospital today for generalized weakness, difficulty walking and frequent falls associated with cough and worsening back and leg spasm.  This was preceded by flulike symptoms about 3 to 4 days ago consistent with headache, sore throat and decreased appetite.  Cough is nonproductive.     In ED, she had a chhaya BP of 82/50 which improved transiently with fluid resuscitation.  She received a total of 2.7 L IV fluid.  However her blood pressure dropped again later and therefore she was initiated on intravenous pressors.     UA positive for nitrates and 20-30 WBC.  Lactic acid elevated at 3, dropped down to 2 after fluid resuscitation.  WBC 21 K with neutrophils of 75%.     Patient is somewhat poor historian and some of the history was obtained from the chart and after discussion with her daughter    Hospital Course:   Patient was admitted to the intensive care unit and was resuscitated with IV fluid and received intravenous pressors.  She was weaned off Levophed within 10 hours of admission.  It was suspected that her hypotension was related to volume depletion, blood pressure meds along with sepsis and not just on the sepsis.    Her antihypertensive including amlodipine and  lisinopril/HCTZ were stopped and on discharge her blood pressure was normal.    Urinalysis was positive for gram-negative bacilli less than 10,000 CFU and she had WBC in the urine suspicious for UTI and therefore she was treated with Rocephin.  She received 4 days of treatment with antibiotics in the hospital, mostly Rocephin and she will be discharged on 3 more days of cefdinir.    Finally patient was noted to have memory issues perhaps beginning of dementia and it was recommended that she would go to an assisted living facility.  However she declined and she wanted to go home.  Her daughter was in agreement with that.  We arranged for home health care.  In addition we have stopped many of her home medications that were felt to be unnecessary at this time and perhaps causing side effects.  I placed her on magnesium sulfate for leg cramps and it seems like it helped along with the low-dose paroxetine which she was taking at home.    Consults:   IP CONSULT TO PULMONOLOGY  IP CONSULT TO CASE MANAGEMENT   IP CONSULT TO NUTRITION SERVICES  IP CONSULT TO CASE MANAGEMENT   IP CONSULT TO CASE MANAGEMENT     Significant Discharge Diagnostics   Procedures Performed:         Pertinent Lab Results:  Results from last 7 days   Lab Units 04/25/23  0637 04/24/23  1129   SODIUM mmol/L 140 135*   POTASSIUM mmol/L 3.8 4.0   CHLORIDE mmol/L 111* 100   CO2 mmol/L 22.0 22.0   BUN mg/dL 11 20   CREATININE mg/dL 0.51* 0.95   GLUCOSE mg/dL 84 104*   CALCIUM mg/dL 7.8* 9.0   AST (SGOT) U/L  --  24   ALT (SGPT) U/L  --  16     Results from last 7 days   Lab Units 04/24/23  1319 04/24/23  1129   HSTROP T ng/L 21* 16*     Results from last 7 days   Lab Units 04/28/23  0721 04/27/23  0700 04/26/23  0552 04/25/23  0637 04/24/23  1129   WBC 10*3/mm3 8.58 7.98 10.13 11.38* 21.66*   HEMOGLOBIN g/dL 12.5 12.2 12.4 11.2* 13.7   HEMATOCRIT % 37.6 35.3 36.9 33.0* 39.6   PLATELETS 10*3/mm3 308 275 293 265  338   MCV fL 88.9 87.8 87.6 88.2 87.4   MCH pg 29.6 30.3 29.5 29.9 30.2   MCHC g/dL 33.2 34.6 33.6 33.9 34.6   RDW % 13.6 13.3 13.4 13.2 13.1   RDW-SD fl 43.7 42.6 42.5 42.6 42.1   MPV fL 10.2 10.1 10.0 10.0 9.9   NEUTROPHIL % % 55.9 62.4 65.1 71.5 75.2   LYMPHOCYTE % % 27.9 22.7 22.5 19.3* 14.2*   MONOCYTES % % 9.7 9.9 8.0 6.3 8.2   EOSINOPHIL % % 3.8 3.3 3.0 1.8 1.1   BASOPHIL % % 0.8 0.6 0.5 0.4 0.3   IMM GRAN % % 1.9* 1.1* 0.9* 0.7* 1.0*   NEUTROS ABS 10*3/mm3 4.80 4.98 6.60 8.12* 16.29*   LYMPHS ABS 10*3/mm3 2.39 1.81 2.28 2.20 3.07   MONOS ABS 10*3/mm3 0.83 0.79 0.81 0.72 1.78*   EOS ABS 10*3/mm3 0.33 0.26 0.30 0.21 0.24   BASOS ABS 10*3/mm3 0.07 0.05 0.05 0.05 0.07   IMMATURE GRANS (ABS) 10*3/mm3 0.16* 0.09* 0.09* 0.08* 0.21*   NRBC /100 WBC 0.0 0.0 0.0 0.0 0.0         Results from last 7 days   Lab Units 04/25/23  0637   MAGNESIUM mg/dL 1.9         Results from last 7 days   Lab Units 04/24/23  1129   PROBNP pg/mL 270.0             Results from last 7 days   Lab Units 04/24/23 1954 04/24/23 1452 04/24/23  1129   PROCALCITONIN ng/mL  --   --  0.10   LACTATE mmol/L 1.6 2.6* 3.3*             Results from last 7 days   Lab Units 04/24/23 1453 04/24/23  1237 04/24/23  1222   BLOODCX   --  No growth at 4 days No growth at 4 days   URINECX  <10,000 CFU/mL Gram Negative Bacilli*  --   --      Results from last 7 days   Lab Units 04/24/23  1453   NITRITE UA  Positive*   WBC UA /HPF 21-30*   BACTERIA UA /HPF None Seen   SQUAM EPITHEL UA /HPF 0-2   URINECX  <10,000 CFU/mL Gram Negative Bacilli*     Results from last 7 days   Lab Units 04/24/23  1137   ADENOVIRUS DETECTION BY PCR  Not Detected   CORONAVIRUS 229E  Not Detected   CORONAVIRUS HKU1  Not Detected   CORONAVIRUS NL63  Not Detected   CORONAVIRUS OC43  Not Detected   HUMAN METAPNEUMOVIRUS  Not Detected   HUMAN RHINOVIRUS/ENTEROVIRUS  Not Detected   INFLUENZA B PCR  Not Detected   PARAINFLUENZA 1  Not Detected   PARAINFLUENZA VIRUS 2  Not Detected    PARAINFLUENZA VIRUS 3  Not Detected   PARAINFLUENZA VIRUS 4  Not Detected   BORDETELLA PERTUSSIS PCR  Not Detected   BORDETELLA PARAPERTUSSIS PCR  Not Detected   CHLAMYDOPHILA PNEUMONIAE PCR  Not Detected   MYCOPLAMA PNEUMO PCR  Not Detected   RSV, PCR  Not Detected           Imaging Results:  Imaging Results (All)     Procedure Component Value Units Date/Time    XR Chest 1 View [[id number]] Collected: 04/24/23 1223     Updated: 04/24/23 1227    Narrative:      XR CHEST 1 VW-     HISTORY: Female who is 83 years-old,  cough     TECHNIQUE: Frontal view the chest     COMPARISON: 12/16/2022     FINDINGS: The heart size is normal. Aorta is tortuous, calcified.  Pulmonary vasculature is unremarkable. Minimally likely atelectasis in  the lower lungs. No pleural effusion, or pneumothorax. Right  hemidiaphragm remains elevated. No acute osseous process.       Impression:      Minimal likely atelectasis in the lower lungs. Tortuous  aorta. Follow-up as clinically indicated.     This report was finalized on 4/24/2023 12:24 PM by Dr. Matthew Her M.D.             Objective:   Temp:  [97.5 °F (36.4 °C)-98.8 °F (37.1 °C)] 97.9 °F (36.6 °C)  Heart Rate:  [78-93] 78  Resp:  [18] 18  BP: (116-127)/(71-85) 116/71   SpO2:  [92 %-94 %] 94 %  on  Flow (L/min):  [1] 1 Device (Oxygen Therapy): nasal cannula    Intake/Output Summary (Last 24 hours) at 4/28/2023 1413  Last data filed at 4/28/2023 1410  Gross per 24 hour   Intake 840 ml   Output 300 ml   Net 540 ml     Body mass index is 22.75 kg/m².      04/26/23  0500 04/27/23  0547 04/28/23  0544   Weight: 60.3 kg (132 lb 15 oz) 60.4 kg (133 lb 2.5 oz) 58.2 kg (128 lb 6.4 oz)     Weight change: -2.158 kg (-4 lb 12.1 oz)    Physical Exam:  General Appearance:   Alert, cooperative, in no acute distress   ENMT:  Mallampati score 3, moist mucous membrane   Eyes:  Pupils equal and reactive to light. EOMI   Neck:   Trachea midline. No thyromegaly.   Lungs:    Clear to  auscultation,respirations regular, even and nonlabored    Heart:   Regular rhythm and normal rate, normal S1 and S2, no         murmur   Skin:   No rash or ecchymosis   Abdomen:    Obese. Soft. No tenderness. No HSM.   Neuro/psych:  Conscious, alert, oriented x3. Strength 5/5 in upper and lower  ext.  Appropriate mood and affect   Extremities:  No cyanosis, clubbing or edema.  Warm extremities and well-perfused         Discharge Medications and Instructions:     Discharge Medications     Discharge Medications      New Medications      Instructions Start Date   cefdinir 300 MG capsule  Commonly known as: OMNICEF   300 mg, Oral, Every 12 Hours Scheduled      magnesium oxide 400 tablet tablet  Commonly known as: MAG-OX   400 mg, Oral, Daily   Start Date: April 29, 2023        Changes to Medications      Instructions Start Date   cyclobenzaprine 10 MG tablet  Commonly known as: FLEXERIL  What changed:   · how much to take  · how to take this  · when to take this  · reasons to take this  · additional instructions   TAKE 1/2 TO 1 TABLET BY MOUTH TWICE DAILY AS NEEDED FOR MUSCLE SPASMS      traZODone 50 MG tablet  Commonly known as: DESYREL  What changed: See the new instructions.   TAKE 1/2 TO 1 TABLET BY MOUTH EVERY NIGHT 1 HOUR BEFORE BEDTIME         Continue These Medications      Instructions Start Date   aspirin 81 MG chewable tablet   81 mg, Oral, Daily      budesonide-formoterol 160-4.5 MCG/ACT inhaler  Commonly known as: SYMBICORT   2 puffs, Inhalation, 2 Times Daily - RT      donepezil 5 MG tablet  Commonly known as: Aricept   5 mg, Oral, Nightly      escitalopram 20 MG tablet  Commonly known as: LEXAPRO   20 mg, Oral, Daily      ipratropium-albuterol 0.5-2.5 mg/3 ml nebulizer  Commonly known as: DUO-NEB   3 mL, Nebulization, 4 Times Daily - RT      omeprazole 40 MG capsule  Commonly known as: priLOSEC   40 mg, Oral, Daily         Stop These Medications    acetaminophen 500 MG tablet  Commonly known as: TYLENOL      amLODIPine 5 MG tablet  Commonly known as: NORVASC     atenolol 25 MG tablet  Commonly known as: TENORMIN     Budesonide 3 MG 24 hr capsule  Commonly known as: ENTOCORT EC     Evolocumab with Infusor solution cartridge  Commonly known as: REPATHA     ezetimibe 10 MG tablet  Commonly known as: ZETIA     fluticasone 50 MCG/ACT nasal spray  Commonly known as: FLONASE     lisinopril-hydrochlorothiazide 10-12.5 MG per tablet  Commonly known as: Zestoretic     Melatonin 10 MG tablet     solifenacin 5 MG tablet  Commonly known as: VESIcare     vitamin D 1.25 MG (21925 UT) capsule capsule  Commonly known as: ERGOCALCIFEROL            Discharge Diet:    Dietary Orders (From admission, onward)     Start     Ordered    04/24/23 2005  Diet: Regular/House Diet; Texture: Regular Texture (IDDSI 7); Fluid Consistency: Thin (IDDSI 0)  Diet Effective Now        References:    Diet Order [facility]   Question Answer Comment   Diets: Regular/House Diet    Texture: Regular Texture (IDDSI 7)    Fluid Consistency: Thin (IDDSI 0)        04/24/23 2004                Activity at Discharge:   as tolerated    Discharge disposition: Home    Discharge Instructions and Follow ups:     Contact information for follow-up providers     Cassi Bella MD .    Specialties: Family Medicine, Emergency Medicine, Urgent Care  Contact information:  [address] [place] [address] [phone]                   Contact information for after-discharge care     Home Medical Care     [facility] .    Service: Home Rehabilitation  Contact information:  [address] [facility], [address]  [facility] [address] [phone]                           Future Appointments   Date Time Provider Department Center   5/4/2023  2:00 PM Cassi Bella MD [id number]   8/7/2023 10:30 AM Tonja Tanner MD [facility]        Medication Reconciliation: Please see electronically completed Med Rec.    Total time  spent discharging patient including evaluation, medication reconciliation, arranging follow up, and post hospitalization instructions and education total time exceeds 30 minutes.     Sanjuana Mcqueen MD  04/28/23  14:13 EDT      Dictated utilizing Dragon dictation

## 2023-04-28 NOTE — OUTREACH NOTE
Prep Survey    Flowsheet Row Responses   [facility] facility patient discharged from? [facility]   Is LACE score < 7 ? No   Eligibility [facility]   Date of Admission 04/24/23   Date of Discharge 04/28/23   Discharge Disposition Home-Health Care Svc   Discharge diagnosis sepsis with septic shock, acute hypoxic resp failure, UTI   Does the patient have one of the following disease processes/diagnoses(primary or secondary)? Sepsis   Does the patient have Home health ordered? Yes   What is the Home health agency?  [facility]   Is there a DME ordered? No   Prep survey completed? Yes          Karen GAO - Registered Nurse

## 2023-04-28 NOTE — CASE MANAGEMENT/SOCIAL WORK
Case Management Discharge Note      Final Note: home with hh via pv         Selected Continued Care - Admitted Since 4/24/2023     Destination    No services have been selected for the patient.              Durable Medical Equipment    No services have been selected for the patient.              Dialysis/Infusion    No services have been selected for the patient.              Home Medical Care     Service Provider Selected Services Address Phone Fax Patient Preferred    [facility] [address] [place] [address], [address] [id number] [phone] --          Therapy    No services have been selected for the patient.              Community Resources    No services have been selected for the patient.              Community & DME    No services have been selected for the patient.                  Transportation Services  Private: Car    Final Discharge Disposition Code: 06 - home with home health care

## 2023-04-28 NOTE — CASE MANAGEMENT/SOCIAL WORK
Continued Stay Note  [facility]     Patient Name: Kandace Andrade  MRN: [id number]  Today's Date: 4/28/2023    Admit Date: 4/24/2023    Plan: Home with [facility]   Discharge Plan     Row Name 04/28/23 1500       Plan    Plan Home with [facility]    Patient/Family in Agreement with Plan yes    Plan Comments Orders noted for d/c, spoke with Natasha NAVARRO, she will get orders from pt PCP and they will call pt to schedule visit. Based on interdisciplinary assessments, the recommended discharge plan is home with HH. -Tonja DON    Final Discharge Disposition Code 06 - home with home health care    Final Note home with hh via pv               Discharge Codes    No documentation.               Expected Discharge Date and Time     Expected Discharge Date Expected Discharge Time    Apr 28, 2023             Tonja Santamaria RN

## 2023-04-28 NOTE — PLAN OF CARE
Goal Outcome Evaluation:   VSS; pt weaned to room air prior to dc; son transported pt home w/ her personal portable O2 tank; DC paperwork reviewed with pt; pt had no further questions; Ivs removed.

## 2023-04-29 LAB
BACTERIA SPEC AEROBE CULT: NORMAL
BACTERIA SPEC AEROBE CULT: NORMAL

## 2023-05-01 ENCOUNTER — TELEPHONE (OUTPATIENT)
Dept: FAMILY MEDICINE CLINIC | Facility: CLINIC | Age: 83
End: 2023-05-01
Payer: MEDICARE

## 2023-05-01 ENCOUNTER — TRANSITIONAL CARE MANAGEMENT TELEPHONE ENCOUNTER (OUTPATIENT)
Dept: CALL CENTER | Facility: HOSPITAL | Age: 83
End: 2023-05-01
Payer: MEDICARE

## 2023-05-01 ENCOUNTER — TELEPHONE (OUTPATIENT)
Dept: GASTROENTEROLOGY | Facility: CLINIC | Age: 83
End: 2023-05-01
Payer: MEDICARE

## 2023-05-01 NOTE — OUTREACH NOTE
Call Center TCM Note    Flowsheet Row Responses   List of hospitals in [place] patient discharged from? [facility]   Does the patient have one of the following disease processes/diagnoses(primary or secondary)? Sepsis   TCM attempt successful? Yes   Call start time 0956   Call end time 1000   Discharge diagnosis sepsis with septic shock, acute hypoxic resp failure, UTI   Is patient permission given to speak with other caregiver? Yes   List who call center can speak with daughter   Person spoke with today (if not patient) and relationship patient and daughter   Meds reviewed with patient/caregiver? Yes   Is the patient having any side effects they believe may be caused by any medication additions or changes? No   Does the patient have all medications related to this admission filled (includes all antibiotics, inhalers, nebulizers,steroids,etc.) Yes   Is the patient taking all medications as directed (includes completed medication regime)? Yes   Comments Appt with Dr. Bella on 5/4   Does the patient have an appointment with their PCP within 7 days of discharge? Yes   What is the Home health agency?  [facility]   Has home health visited the patient within 72 hours of discharge? Call prior to 72 hours   Psychosocial issues? No   Did the patient receive a copy of their discharge instructions? Yes   Nursing interventions Reviewed instructions with patient   What is the patient's perception of their health status since discharge? Improving   Nursing interventions Nurse provided patient education   Is the patient/caregiver able to teach back TIME? T emperature - higher or lower than normal, I nfection - may have signs and symptoms of an infection, M ental Decline - confused, sleepy, difficult to arouse, E xtremely Ill - severe pain, discomfort, shortness of breath   Nursing interventions Nurse provided reassurance to patient   Is patient/caregiver able to teach back steps to recovery at home? Set small, achievable goals for return to  baseline health, Rest and regain strength, Eat a balanced diet, Make a list of questions for PCP appoinment   Is the patient/caregiver able to teach back signs and symptoms of worsening condition: Fever, Rapid heart rate (>90), Hyperthermia   If the patient is a current smoker, are they able to teach back resources for cessation? Not a smoker   Is the patient/caregiver able to teach back the hierarchy of who to call/visit for symptoms/problems? PCP, Specialist, Home health nurse, Urgent Care, ED, 911 Yes   Additional teach back comments States she is still weak and they are waiting to hear from home health.    TCM call completed? Yes   Wrap up additional comments Denies questions or needs at this time.   Call end time 1000          Freda Zuniga LPN    5/1/2023, 10:01 EDT

## 2023-05-01 NOTE — TELEPHONE ENCOUNTER
Patient's daughter called and stated the hospital took the patient off her blood pressure medicines but now her blood pressure is high and going up. Would like a callback for advise. Patient has a hospital follow up on 5/4.

## 2023-05-01 NOTE — TELEPHONE ENCOUNTER
Patient recently admitted to the ICU with UTI and sepsis requiring pressors.  Multiple medications were discontinued by the admitting team upon discharge with budesonide being 1 of those.  She has not experienced a recurrence in diarrhea according to her daughter, Tracey.  We will keep her off the Entocort for now.  She is to contact if she has any questions or concerns prior to her next follow-up appointment.

## 2023-05-01 NOTE — TELEPHONE ENCOUNTER
Patient called back and stated that her blood pressure calmed down after her health care worker left, she stated that she thinks that the health care worker got her worked up.  Patient was encouraged to please keep appt for 5/4 and I would add this message.

## 2023-05-01 NOTE — TELEPHONE ENCOUNTER
----- Message from Kandace Andrade sent at 4/29/2023  2:28 PM EDT -----  Regarding: Budesonide   Contact: [phone]  Mom was just released from [facility]. Hospitalist d/c’d Budesonide. She was on one per day for about the next month. Do you agree?  ThanksTracey

## 2023-05-03 ENCOUNTER — TELEPHONE (OUTPATIENT)
Dept: GASTROENTEROLOGY | Facility: CLINIC | Age: 83
End: 2023-05-03
Payer: MEDICARE

## 2023-05-03 NOTE — TELEPHONE ENCOUNTER
BEST CALL BACK NUMBER: [phone]     CALL REGARDING: Called Patient/Daughter to inform that Dr. Tanner is out of office on 8/7/2023 at 10:30am  and need to reschedule appointment.    New Appointment is scheduled for Mon. 9/21/2023 at 10:30am with Dr. Tanner

## 2023-05-04 ENCOUNTER — OFFICE VISIT (OUTPATIENT)
Dept: FAMILY MEDICINE CLINIC | Facility: CLINIC | Age: 83
End: 2023-05-04
Payer: MEDICARE

## 2023-05-04 VITALS
HEART RATE: 83 BPM | WEIGHT: 127.2 LBS | RESPIRATION RATE: 12 BRPM | OXYGEN SATURATION: 96 % | BODY MASS INDEX: 22.54 KG/M2 | HEIGHT: 63 IN | DIASTOLIC BLOOD PRESSURE: 72 MMHG | TEMPERATURE: 98 F | SYSTOLIC BLOOD PRESSURE: 110 MMHG

## 2023-05-04 DIAGNOSIS — R42 VERTIGO: ICD-10-CM

## 2023-05-04 DIAGNOSIS — I48.0 PAROXYSMAL ATRIAL FIBRILLATION: ICD-10-CM

## 2023-05-04 DIAGNOSIS — E55.9 VITAMIN D DEFICIENCY: ICD-10-CM

## 2023-05-04 DIAGNOSIS — I10 ESSENTIAL HYPERTENSION: ICD-10-CM

## 2023-05-04 DIAGNOSIS — Z09 HOSPITAL DISCHARGE FOLLOW-UP: Primary | ICD-10-CM

## 2023-05-04 RX ORDER — AMLODIPINE BESYLATE 2.5 MG/1
2.5 TABLET ORAL DAILY
Qty: 30 TABLET | Refills: 3 | Status: SHIPPED | OUTPATIENT
Start: 2023-05-04

## 2023-05-04 NOTE — PROGRESS NOTES
Transitional Care Follow Up Visit  Subjective     Kandace Andrade is a 83 y.o. female who presents for a transitional care management visit.    Within 48 business hours after discharge our office contacted her via telephone to coordinate her care and needs.      I reviewed and discussed the details of that call along with the discharge summary, hospital problems, inpatient lab results, inpatient diagnostic studies, and consultation reports with Kandace.     Current outpatient and discharge medications have been reconciled for the patient.  Reviewed by: Cassi Bella MD          4/28/2023     5:02 PM   Date of TCM Phone Call   [facility]   Date of Admission 4/24/2023   Date of Discharge 4/28/2023   Discharge Disposition Home-Health Care [facility]     Risk for Readmission (LACE) Score: 14 (4/28/2023  6:00 AM)    Discharge Diagnoses:  1. Septic shock, shock resolved  2. UTI  3. Lactic acidosis, resolved  4. URI  5. Muscle spasm  6. Chronic anemia     • HTN  • PAF  • Asthma, no current exacerbation  • Anxiety/depression  • History of C. difficile colitis January 2021  • History of E. coli UTI February 2022  • History of microscopic colitis  History of Present Illness   Pt presents today with daughter for a hospital follow up and daughter states pts BP has been everywhere since leaving hospital.    She has been dizzy and not sure why.  This has been going on a long time.  One of her home health will be doing vestibular therapy for her.      Course During Hospital Stay:    Hospital Course:   Patient was admitted to the intensive care unit and was resuscitated with IV fluid and received intravenous pressors.  She was weaned off Levophed within 10 hours of admission.  It was suspected that her hypotension was related to volume depletion, blood pressure meds along with sepsis and not just on the sepsis.     Her antihypertensive including amlodipine and lisinopril/HCTZ were stopped and on discharge her blood pressure was  normal.     Urinalysis was positive for gram-negative bacilli less than 10,000 CFU and she had WBC in the urine suspicious for UTI and therefore she was treated with Rocephin.  She received 4 days of treatment with antibiotics in the hospital, mostly Rocephin and she will be discharged on 3 more days of cefdinir.     Finally patient was noted to have memory issues perhaps beginning of dementia and it was recommended that she would go to an assisted living facility.  However she declined and she wanted to go home.  Her daughter was in agreement with that.  We arranged for home health care.  In addition we have stopped many of her home medications that were felt to be unnecessary at this time and perhaps causing side effects.  I placed her on magnesium sulfate for leg cramps and it seems like it helped along with the low-dose paroxetine which she was taking at home.       The following portions of the patient's history were reviewed and updated as appropriate: allergies, current medications, past family history, past medical history, past social history, past surgical history and problem list.    Review of Systems    Objective   Physical Exam  Vitals and nursing note reviewed.   Constitutional:       Appearance: Normal appearance. She is well-developed.   Cardiovascular:      Rate and Rhythm: Normal rate and regular rhythm.      Heart sounds: Normal heart sounds. No murmur heard.  Pulmonary:      Effort: Pulmonary effort is normal. No respiratory distress.      Breath sounds: Normal breath sounds. No stridor. No wheezing or rhonchi.   Neurological:      General: No focal deficit present.      Mental Status: She is alert and oriented to person, place, and time. She is not disoriented.   Psychiatric:         Mood and Affect: Mood normal.         Behavior: Behavior normal.         Assessment & Plan   Diagnoses and all orders for this visit:    1. Hospital discharge follow-up (Primary)    2. Vertigo    3. Essential  hypertension  -     amLODIPine (NORVASC) 2.5 MG tablet; Take 1 tablet by mouth Daily.  Dispense: 30 tablet; Refill: 3    4. Paroxysmal atrial fibrillation (HCC)    5. Vitamin D deficiency             I requested and reviewed all records, labs, and diagnostics from the hospital with patient and family.  Patient is to follow-up with specialists as discussed and directed.    Restart amlodipine 2.5 mg and still hold the lisin/hctz.

## 2023-05-08 ENCOUNTER — TELEPHONE (OUTPATIENT)
Dept: FAMILY MEDICINE CLINIC | Facility: CLINIC | Age: 83
End: 2023-05-08

## 2023-05-08 NOTE — TELEPHONE ENCOUNTER
Provider: DR. PEDERSEN    Caller: YUNG    Relationship to Patient: FRANK     Phone Number: [phone]    Reason for Call: YUNG WANTED TO LET PCP KNOW THEY COMPLETED A VESTIBULAR ASSESSMENT AND FOUND NO PHYSICIAN VERTIGO. PATIENT IS STILL HAVING GENERALIZED WEAKNESS. PATIENT IS CONTINUING WITH PT AT THIS TIME, A CAREGIVER WILL COME 3 TIMES A WEAK, AND PT WILL HAVE NO OT.

## 2023-05-16 ENCOUNTER — READMISSION MANAGEMENT (OUTPATIENT)
Dept: CALL CENTER | Facility: HOSPITAL | Age: 83
End: 2023-05-16
Payer: MEDICARE

## 2023-05-16 NOTE — OUTREACH NOTE
Sepsis Week 3 Survey    Flowsheet Row Responses   [facility] patient discharged from? [place]   Does the patient have one of the following disease processes/diagnoses(primary or secondary)? Sepsis   Week 3 attempt successful? Yes   Call start time [phone]   Call end time 0848   Discharge diagnosis sepsis with septic shock, acute hypoxic resp failure, UTI   Meds reviewed with patient/caregiver? Yes   Is the patient taking all medications as directed (includes completed medication regime)? Yes   Does the patient have a primary care provider?  Yes   Has the patient kept scheduled appointments due by today? Yes  [Apt with PCP ]   What is the Home health agency?  [facility]    Home health comments  still visits   Psychosocial issues? No   What is the patient's perception of their health status since discharge? Same   Is the patient/caregiver able to teach back the hierarchy of who to call/visit for symptoms/problems? PCP, Specialist, Home health nurse, Urgent Care, ED, 911 Yes   Week 3 call completed? Yes   Graduated/Revoked comments No issues or concerns during call          Aliyah H - Registered Nurse

## 2023-05-30 RX ORDER — MAGNESIUM OXIDE 400 MG/1
400 TABLET ORAL DAILY
Qty: 90 TABLET | Refills: 3 | Status: SHIPPED | OUTPATIENT
Start: 2023-05-30

## 2023-05-30 NOTE — TELEPHONE ENCOUNTER
Caller: millicent hernandez    Relationship: Emergency Contact    Best call back number: [phone]    Requested Prescriptions:   Requested Prescriptions     Pending Prescriptions Disp Refills   • magnesium oxide (MAG-OX) 400 tablet tablet 30 tablet 0     Sig: Take 1 tablet by mouth Daily for 30 days.        Pharmacy where request should be sent: [id number] [id number] AT [facility] [phone] [facility] [phone] FX     Last office visit with prescribing clinician: 5/4/2023   Last telemedicine visit with prescribing clinician: 2/6/2023   Next office visit with prescribing clinician: 8/3/2023     Additional details provided by patient:PREVIOUSLY PRESCRIBED BY HOSPITAL. REQUESTING DR PEDERSEN TAKE OVER SCRIPT    Does the patient have less than a 3 day supply:  [x] Yes  [] No      Rodney Le Rep   05/30/23 11:23 EDT

## 2023-06-19 RX ORDER — BACLOFEN 10 MG/1
10 TABLET ORAL 3 TIMES DAILY
Qty: 30 TABLET | Refills: 3 | Status: SHIPPED | OUTPATIENT
Start: 2023-06-19

## 2023-07-22 DIAGNOSIS — I10 ESSENTIAL HYPERTENSION: ICD-10-CM

## 2023-07-24 RX ORDER — LISINOPRIL 20 MG/1
TABLET ORAL
Qty: 60 TABLET | Refills: 5 | OUTPATIENT
Start: 2023-07-24

## 2023-07-27 ENCOUNTER — EXTERNAL PBMM DATA (OUTPATIENT)
Dept: PHARMACY | Facility: OTHER | Age: 83
End: 2023-07-27
Payer: MEDICARE

## 2023-08-03 ENCOUNTER — OFFICE VISIT (OUTPATIENT)
Dept: FAMILY MEDICINE CLINIC | Facility: CLINIC | Age: 83
End: 2023-08-03
Payer: MEDICARE

## 2023-08-03 VITALS
BODY MASS INDEX: 22.68 KG/M2 | WEIGHT: 128 LBS | SYSTOLIC BLOOD PRESSURE: 130 MMHG | HEIGHT: 63 IN | TEMPERATURE: 98.6 F | HEART RATE: 75 BPM | OXYGEN SATURATION: 98 % | DIASTOLIC BLOOD PRESSURE: 80 MMHG

## 2023-08-03 DIAGNOSIS — E78.2 MIXED HYPERLIPIDEMIA: ICD-10-CM

## 2023-08-03 DIAGNOSIS — E78.00 HYPERCHOLESTEROLEMIA: ICD-10-CM

## 2023-08-03 DIAGNOSIS — E11.9 TYPE 2 DIABETES MELLITUS WITHOUT COMPLICATION, WITHOUT LONG-TERM CURRENT USE OF INSULIN: Primary | ICD-10-CM

## 2023-08-03 PROBLEM — R11.0 NAUSEA: Status: RESOLVED | Noted: 2021-04-12 | Resolved: 2023-08-03

## 2023-08-03 PROBLEM — R10.9 ABDOMINAL PAIN: Status: RESOLVED | Noted: 2022-07-19 | Resolved: 2023-08-03

## 2023-08-03 PROBLEM — R19.7 DIARRHEA: Status: RESOLVED | Noted: 2022-09-22 | Resolved: 2023-08-03

## 2023-08-03 RX ORDER — MIRTAZAPINE 15 MG/1
15 TABLET, FILM COATED ORAL
COMMUNITY
Start: 2023-08-01

## 2023-08-03 RX ORDER — SOLIFENACIN SUCCINATE 5 MG/1
5 TABLET, FILM COATED ORAL DAILY
COMMUNITY
Start: 2023-05-23

## 2023-08-03 RX ORDER — EVOLOCUMAB 420 MG/3.5
420 KIT SUBCUTANEOUS
COMMUNITY
Start: 2023-07-12

## 2023-08-03 RX ORDER — ERGOCALCIFEROL 1.25 MG/1
1 CAPSULE ORAL WEEKLY
COMMUNITY
Start: 2023-06-19

## 2023-08-03 RX ORDER — OXYCODONE HYDROCHLORIDE AND ACETAMINOPHEN 5; 325 MG/1; MG/1
1 TABLET ORAL EVERY 6 HOURS PRN
COMMUNITY

## 2023-08-04 LAB
ALBUMIN SERPL-MCNC: 4.4 G/DL (ref 3.5–5.2)
ALBUMIN/GLOB SERPL: 2 G/DL
ALP SERPL-CCNC: 99 U/L (ref 39–117)
ALT SERPL-CCNC: 22 U/L (ref 1–33)
AST SERPL-CCNC: 23 U/L (ref 1–32)
BILIRUB SERPL-MCNC: <0.2 MG/DL (ref 0–1.2)
BUN SERPL-MCNC: 11 MG/DL (ref 8–23)
BUN/CREAT SERPL: 15.5 (ref 7–25)
CALCIUM SERPL-MCNC: 10.4 MG/DL (ref 8.6–10.5)
CHLORIDE SERPL-SCNC: 103 MMOL/L (ref 98–107)
CHOLEST SERPL-MCNC: 177 MG/DL (ref 0–200)
CO2 SERPL-SCNC: 29.2 MMOL/L (ref 22–29)
CREAT SERPL-MCNC: 0.71 MG/DL (ref 0.57–1)
EGFRCR SERPLBLD CKD-EPI 2021: 84 ML/MIN/1.73
GLOBULIN SER CALC-MCNC: 2.2 GM/DL
GLUCOSE SERPL-MCNC: 97 MG/DL (ref 65–99)
HBA1C MFR BLD: 5.8 % (ref 4.8–5.6)
HDLC SERPL-MCNC: 77 MG/DL (ref 40–60)
LDLC SERPL CALC-MCNC: 70 MG/DL (ref 0–100)
POTASSIUM SERPL-SCNC: 5.2 MMOL/L (ref 3.5–5.2)
PROT SERPL-MCNC: 6.6 G/DL (ref 6–8.5)
SODIUM SERPL-SCNC: 143 MMOL/L (ref 136–145)
TRIGL SERPL-MCNC: 187 MG/DL (ref 0–150)
VLDLC SERPL CALC-MCNC: 30 MG/DL (ref 5–40)

## 2023-08-10 RX ORDER — ICOSAPENT ETHYL 1000 MG/1
2 CAPSULE ORAL 2 TIMES DAILY WITH MEALS
Qty: 120 CAPSULE | Refills: 3 | Status: SHIPPED | OUTPATIENT
Start: 2023-08-10

## 2023-08-14 DIAGNOSIS — F03.90 DEMENTIA, OLD AGE: ICD-10-CM

## 2023-08-15 RX ORDER — DONEPEZIL HYDROCHLORIDE 5 MG/1
5 TABLET, FILM COATED ORAL NIGHTLY
Qty: 30 TABLET | Refills: 3 | Status: SHIPPED | OUTPATIENT
Start: 2023-08-15

## 2023-09-05 DIAGNOSIS — N32.81 OVERACTIVE BLADDER: Primary | ICD-10-CM

## 2023-09-05 RX ORDER — SOLIFENACIN SUCCINATE 5 MG/1
5 TABLET, FILM COATED ORAL DAILY
Qty: 90 TABLET | Refills: 1 | Status: SHIPPED | OUTPATIENT
Start: 2023-09-05

## 2023-09-20 RX ORDER — ESCITALOPRAM OXALATE 20 MG/1
20 TABLET ORAL DAILY
Qty: 30 TABLET | Refills: 1 | Status: SHIPPED | OUTPATIENT
Start: 2023-09-20

## 2023-10-11 RX ORDER — DULOXETIN HYDROCHLORIDE 60 MG/1
60 CAPSULE, DELAYED RELEASE ORAL DAILY
COMMUNITY
Start: 2023-10-10

## 2023-10-11 RX ORDER — HYDROCODONE BITARTRATE AND ACETAMINOPHEN 7.5; 325 MG/1; MG/1
1 TABLET ORAL EVERY 6 HOURS PRN
COMMUNITY
Start: 2023-08-29

## 2023-10-11 RX ORDER — ERGOCALCIFEROL 1.25 MG/1
50000 CAPSULE ORAL
Qty: 5 CAPSULE | Refills: 2 | Status: SHIPPED | OUTPATIENT
Start: 2023-10-11

## 2023-10-11 NOTE — TELEPHONE ENCOUNTER
LOV             8/3/2023   NOV            12/5/2023   Last RF       6/19/23  Protocol       met    JEANCARLOS Wise/JADE

## 2023-10-20 DIAGNOSIS — I10 ESSENTIAL HYPERTENSION: ICD-10-CM

## 2023-10-20 RX ORDER — AMLODIPINE BESYLATE 2.5 MG/1
2.5 TABLET ORAL DAILY
Qty: 30 TABLET | Refills: 3 | Status: SHIPPED | OUTPATIENT
Start: 2023-10-20

## 2023-12-05 ENCOUNTER — TELEPHONE (OUTPATIENT)
Dept: FAMILY MEDICINE CLINIC | Facility: CLINIC | Age: 83
End: 2023-12-05

## 2023-12-05 NOTE — TELEPHONE ENCOUNTER
Patient's daughter, Khang Bynum, called to inform Dr. Bella that [facility] would be sending a form for him to fill out to allow the patient to be admitted to assisted living. Khang Bynum stated the form should be sent today and they would like to move the patient into assisted living by next Thursday, 12/14.

## 2023-12-11 ENCOUNTER — TELEPHONE (OUTPATIENT)
Dept: GASTROENTEROLOGY | Facility: CLINIC | Age: 83
End: 2023-12-11
Payer: MEDICARE

## 2023-12-11 NOTE — TELEPHONE ENCOUNTER
Returned call to dtr.      They were not aware that they were out of network with us.  Advised to call her PCP or go to urgent care/Er for further evaluation.

## 2023-12-12 DIAGNOSIS — F03.90 DEMENTIA, OLD AGE: ICD-10-CM

## 2023-12-12 RX ORDER — DONEPEZIL HYDROCHLORIDE 5 MG/1
5 TABLET, FILM COATED ORAL NIGHTLY
Qty: 30 TABLET | Refills: 3 | Status: SHIPPED | OUTPATIENT
Start: 2023-12-12

## 2023-12-13 ENCOUNTER — TELEMEDICINE (OUTPATIENT)
Dept: FAMILY MEDICINE CLINIC | Facility: CLINIC | Age: 83
End: 2023-12-13
Payer: MEDICARE

## 2023-12-13 ENCOUNTER — TELEPHONE (OUTPATIENT)
Dept: FAMILY MEDICINE CLINIC | Facility: CLINIC | Age: 83
End: 2023-12-13

## 2023-12-13 VITALS
BODY MASS INDEX: 22.68 KG/M2 | HEIGHT: 63 IN | DIASTOLIC BLOOD PRESSURE: 80 MMHG | TEMPERATURE: 98.7 F | RESPIRATION RATE: 16 BRPM | SYSTOLIC BLOOD PRESSURE: 130 MMHG | WEIGHT: 128 LBS | HEART RATE: 75 BPM

## 2023-12-13 DIAGNOSIS — E78.2 MIXED HYPERLIPIDEMIA: ICD-10-CM

## 2023-12-13 DIAGNOSIS — E78.00 HYPERCHOLESTEROLEMIA: ICD-10-CM

## 2023-12-13 DIAGNOSIS — I48.0 PAROXYSMAL ATRIAL FIBRILLATION: ICD-10-CM

## 2023-12-13 DIAGNOSIS — E11.9 TYPE 2 DIABETES MELLITUS WITHOUT COMPLICATION, WITHOUT LONG-TERM CURRENT USE OF INSULIN: Primary | ICD-10-CM

## 2023-12-13 DIAGNOSIS — E55.9 VITAMIN D DEFICIENCY: ICD-10-CM

## 2023-12-13 PROBLEM — Z09 HOSPITAL DISCHARGE FOLLOW-UP: Status: RESOLVED | Noted: 2020-12-07 | Resolved: 2023-12-13

## 2023-12-13 NOTE — PROGRESS NOTES
CHIEF COMPLAINT:DM, HTN, HLD    Subjective   Kandace Andrade is a 83 y.o. female.     History of Present Illness   Patient presents during the Covid-19 pandemic/federally declared [facility] of public health emergency.  This service was conducted via video visit  PT is at home and I am at my desk at my office.    Patient presents today for labs, refills, and  Diabetes- stable and no med.  Last A1c was 5.8%  Hypertension-no chest pains or headaches  Hyperlipidemia-no medication currently;  not very active  A fib- stable and no SOB  Vit D deficiency stable.   Dementia- on med and stable    The following portions of the patient's history were reviewed and updated as appropriate: allergies, current medications, past family history, past medical history, past social history, past surgical history and problem list.    Review of Systems    Patient Active Problem List   Diagnosis    Vitamin D deficiency    KINDRA (obstructive sleep apnea)    IBS (irritable bowel syndrome)    Essential hypertension    Herpes zoster    Arthritis    Anxiety and depression    Other emphysema    Acute seasonal allergic rhinitis due to pollen    Paroxysmal atrial fibrillation    Osteoporosis    Mixed hyperlipidemia    Encounter for Medicare annual wellness exam    COPD (chronic obstructive pulmonary disease)    Mixed stress and urge urinary incontinence    Type 2 diabetes mellitus without complication, without long-term current use of insulin    Arthritis of both hips    Autonomic neuropathy    GERD without esophagitis    C. difficile colitis    Hypokalemia    Decreased hearing of both ears    Vertigo    Hypercholesterolemia    Primary insomnia    Generalized weakness    Other microscopic hematuria    Confusion    Dehydration    Metabolic encephalopathy    Left ankle swelling    Overactive bladder    Restless leg syndrome    Edema of both legs    Other fatigue    Carotid artery stenosis, asymptomatic, bilateral    Memory deficit    Imbalance     Dementia, old age    Sepsis with acute hypoxic respiratory failure and septic shock, due to unspecified organism       Allergies   Allergen Reactions    Penicillins Hives and Other (See Comments)     Elevated BP... tolerated ceftriaxone 10/2020 admission    Meclizine Hcl Dizziness    Bactrim [Sulfamethoxazole-Trimethoprim] Hives    Flagyl [Metronidazole] Other (See Comments)     HYPERACTIVITY.. INSOMNIA     Meclizine Unknown - High Severity    Statins Other (See Comments)     Headache, elevated liver enzymes, blurred vision         Current Outpatient Medications:     amLODIPine (NORVASC) 2.5 MG tablet, TAKE 1 TABLET BY MOUTH DAILY, Disp: 30 tablet, Rfl: 3    aspirin 81 MG chewable tablet, Chew 1 tablet Daily., Disp: , Rfl:     baclofen (LIORESAL) 10 MG tablet, Take 1 tablet by mouth 3 (Three) Times a Day., Disp: 30 tablet, Rfl: 3    budesonide-formoterol (SYMBICORT) 160-4.5 MCG/ACT inhaler, Inhale 2 puffs 2 (Two) Times a Day., Disp: 1 each, Rfl: 3    donepezil (ARICEPT) 5 MG tablet, TAKE 1 TABLET BY MOUTH EVERY NIGHT, Disp: 30 tablet, Rfl: 3    DULoxetine (CYMBALTA) 60 MG capsule, Take 1 capsule by mouth Daily., Disp: , Rfl:     escitalopram (LEXAPRO) 20 MG tablet, TAKE 1 TABLET BY MOUTH DAILY, Disp: 30 tablet, Rfl: 1    HYDROcodone-acetaminophen (NORCO) 7.5-325 MG per tablet, Take 1 tablet by mouth Every 6 (Six) Hours As Needed. for pain, Disp: , Rfl:     ipratropium-albuterol (DUO-NEB) 0.5-2.5 mg/3 ml nebulizer, Take 3 mL by nebulization 4 (Four) Times a Day., Disp: 360 mL, Rfl: 3    magnesium oxide (MAG-OX) 400 MG tablet, Take 1 tablet by mouth Daily., Disp: 90 tablet, Rfl: 3    mirtazapine (REMERON) 15 MG tablet, Take 1 tablet by mouth every night at bedtime., Disp: , Rfl:     omeprazole (priLOSEC) 40 MG capsule, TAKE 1 CAPSULE BY MOUTH DAILY, Disp: 90 capsule, Rfl: 1    oxyCODONE-acetaminophen (PERCOCET) 5-325 MG per tablet, Take 1 tablet by mouth Every 6 (Six) Hours As Needed., Disp: , Rfl:     Repatha  Pushtronex System solution cartridge, Inject 3.5 mL under the skin into the appropriate area as directed Every 30 (Thirty) Days., Disp: , Rfl:     solifenacin (VESICARE) 5 MG tablet, Take 1 tablet by mouth Daily., Disp: 90 tablet, Rfl: 1    vitamin D (ERGOCALCIFEROL) 1.25 MG (40846 UT) capsule capsule, TAKE 1 CAPSULE BY MOUTH EVERY 7 DAYS, Disp: 5 capsule, Rfl: 2    Past Medical History:   Diagnosis Date    Acute seasonal allergic rhinitis due to pollen     Anxiety and depression     Arthritis     Asthma     C. difficile colitis     COPD (chronic obstructive pulmonary disease)     Diabetes mellitus     Pre diabetes    Diarrhea     Diverticulosis     Fibula fracture     Herpes zoster     History of lumbosacral spine surgery     Hypertension     IBS (irritable bowel syndrome)     Mixed hyperlipidemia     KINDRA (obstructive sleep apnea)     NO MACHINE AT THIS TIME    Osteoporosis     Paroxysmal atrial fibrillation     Vitamin D deficiency        Past Surgical History:   Procedure Laterality Date    APPENDECTOMY      BACK SURGERY      lower x2    CATARACT EXTRACTION      COLONOSCOPY      patient doesn't recall     COLONOSCOPY N/A 4/2/2021    Procedure: COLONOSCOPY to cecum with cecal biopsies;  Surgeon: Jarrod Duggan MD;  Location: [facility] ENDOSCOPY;  Service: Gastroenterology;  Laterality: N/A;  pre: diarhhea  post: diverticulosis, hemorrhoids    HYSTERECTOMY      partial    SHOULDER SURGERY Right     SHOULDER SURGERY      SIGMOIDOSCOPY N/A 10/19/2022    Procedure: SIGMOIDOSCOPY FLEXIBLE TO SIGMOID COLON WITH BIOPSIES;  Surgeon: Tonja Tanner MD;  Location: [facility] ENDOSCOPY;  Service: Gastroenterology;  Laterality: N/A;  PRE: DIARRHEA  POST: HEMORRHOIDS    TONSILLECTOMY      UPPER GASTROINTESTINAL ENDOSCOPY      patient doesn't recall        Family History   Problem Relation Age of Onset    Heart disease Mother     Lung cancer Mother     Diabetes Father     Liver disease Father     Heart disease Paternal Uncle      "Malig Hyperthermia Neg Hx        Social History     Tobacco Use    Smoking status: Former     Packs/day: 1.00     Years: 25.00     Additional pack years: 0.00     Total pack years: 25.00     Types: Cigarettes     Quit date: 5/4/2008     Years since quitting: 15.6    Smokeless tobacco: Never    Tobacco comments:     CAFFEINE USE - 1 CUP COFFEE/ DIET COKE   Substance Use Topics    Alcohol use: No            Objective     Visit Vitals  /80   Pulse 75   Temp 98.7 °F (37.1 °C)   Resp 16   Ht 160 cm (62.99\")   Wt 58.1 kg (128 lb)   BMI 22.68 kg/m²    Video Visit    Physical Exam  NAD  AAOx3  No labored breathing.  EOMI   PERRLA  CTA B  Abd Nontender or distended.  No hernia  HEENT NL  S1S2      Lab Results   Component Value Date    GLUCOSE 97 08/03/2023    BUN 11 08/03/2023    CREATININE 0.71 08/03/2023    EGFRIFNONA 87 02/18/2022    EGFRIFAFRI 100 02/18/2022    BCR 15.5 08/03/2023    K 5.2 08/03/2023    CO2 29.2 (H) 08/03/2023    CALCIUM 10.4 08/03/2023    PROTENTOTREF 6.6 08/03/2023    ALBUMIN 4.4 08/03/2023    LABIL2 2.0 08/03/2023    AST 23 08/03/2023    ALT 22 08/03/2023       WBC   Date Value Ref Range Status   04/28/2023 8.58 3.40 - 10.80 10*3/mm3 Final   08/19/2022 10.3 3.4 - 10.8 x10E3/uL Final     RBC   Date Value Ref Range Status   04/28/2023 4.23 3.77 - 5.28 10*6/mm3 Final   08/19/2022 4.78 3.77 - 5.28 x10E6/uL Final     Hemoglobin   Date Value Ref Range Status   04/28/2023 12.5 12.0 - 15.9 g/dL Final     Hematocrit   Date Value Ref Range Status   04/28/2023 37.6 34.0 - 46.6 % Final     MCV   Date Value Ref Range Status   04/28/2023 88.9 79.0 - 97.0 fL Final     MCH   Date Value Ref Range Status   04/28/2023 29.6 26.6 - 33.0 pg Final     MCHC   Date Value Ref Range Status   04/28/2023 33.2 31.5 - 35.7 g/dL Final     RDW   Date Value Ref Range Status   04/28/2023 13.6 12.3 - 15.4 % Final     RDW-SD   Date Value Ref Range Status   04/28/2023 43.7 37.0 - 54.0 fl Final     MPV   Date Value Ref Range Status " "  04/28/2023 10.2 6.0 - 12.0 fL Final     Platelets   Date Value Ref Range Status   04/28/2023 308 140 - 450 10*3/mm3 Final     Neutrophil %   Date Value Ref Range Status   04/28/2023 55.9 42.7 - 76.0 % Final     Lymphocyte %   Date Value Ref Range Status   04/28/2023 27.9 19.6 - 45.3 % Final     Monocyte %   Date Value Ref Range Status   04/28/2023 9.7 5.0 - 12.0 % Final     Eosinophil %   Date Value Ref Range Status   04/28/2023 3.8 0.3 - 6.2 % Final     Basophil %   Date Value Ref Range Status   04/28/2023 0.8 0.0 - 1.5 % Final     Immature Grans %   Date Value Ref Range Status   04/28/2023 1.9 (H) 0.0 - 0.5 % Final     Neutrophils, Absolute   Date Value Ref Range Status   04/28/2023 4.80 1.70 - 7.00 10*3/mm3 Final     Lymphocytes, Absolute   Date Value Ref Range Status   04/28/2023 2.39 0.70 - 3.10 10*3/mm3 Final     Monocytes, Absolute   Date Value Ref Range Status   04/28/2023 0.83 0.10 - 0.90 10*3/mm3 Final     Eosinophils, Absolute   Date Value Ref Range Status   04/28/2023 0.33 0.00 - 0.40 10*3/mm3 Final     Basophils, Absolute   Date Value Ref Range Status   04/28/2023 0.07 0.00 - 0.20 10*3/mm3 Final     Immature Grans, Absolute   Date Value Ref Range Status   04/28/2023 0.16 (H) 0.00 - 0.05 10*3/mm3 Final     nRBC   Date Value Ref Range Status   04/28/2023 0.0 0.0 - 0.2 /100 WBC Final       Lab Results   Component Value Date    HGBA1C 5.80 (H) 08/03/2023       Lab Results   Component Value Date    OEOEHCJU63 345 04/20/2023       TSH   Date Value Ref Range Status   04/20/2023 2.320 0.270 - 4.200 uIU/mL Final       No results found for: \"CHOL\"  Lab Results   Component Value Date    TRIG 187 (H) 08/03/2023     Lab Results   Component Value Date    HDL 77 (H) 08/03/2023     Lab Results   Component Value Date    LDL 70 08/03/2023     Lab Results   Component Value Date    VLDL 30 08/03/2023     No results found for: \"LDLHDL\"      Procedures    Assessment & Plan   Problems Addressed this Visit       Vitamin D " deficiency    Mixed hyperlipidemia    Hypercholesterolemia    Paroxysmal atrial fibrillation    Type 2 diabetes mellitus without complication, without long-term current use of insulin - Primary     Diagnoses         Codes Comments    Type 2 diabetes mellitus without complication, without long-term current use of insulin    -  Primary ICD-10-CM: E11.9  ICD-9-CM: 250.00     Mixed hyperlipidemia     ICD-10-CM: E78.2  ICD-9-CM: 272.2     Hypercholesterolemia     ICD-10-CM: E78.00  ICD-9-CM: 272.0     Paroxysmal atrial fibrillation     ICD-10-CM: I48.0  ICD-9-CM: 427.31     Vitamin D deficiency     ICD-10-CM: E55.9  ICD-9-CM: 268.9             No orders of the defined types were placed in this encounter.      Current Outpatient Medications   Medication Sig Dispense Refill    amLODIPine (NORVASC) 2.5 MG tablet TAKE 1 TABLET BY MOUTH DAILY 30 tablet 3    aspirin 81 MG chewable tablet Chew 1 tablet Daily.      baclofen (LIORESAL) 10 MG tablet Take 1 tablet by mouth 3 (Three) Times a Day. 30 tablet 3    budesonide-formoterol (SYMBICORT) 160-4.5 MCG/ACT inhaler Inhale 2 puffs 2 (Two) Times a Day. 1 each 3    donepezil (ARICEPT) 5 MG tablet TAKE 1 TABLET BY MOUTH EVERY NIGHT 30 tablet 3    DULoxetine (CYMBALTA) 60 MG capsule Take 1 capsule by mouth Daily.      escitalopram (LEXAPRO) 20 MG tablet TAKE 1 TABLET BY MOUTH DAILY 30 tablet 1    HYDROcodone-acetaminophen (NORCO) 7.5-325 MG per tablet Take 1 tablet by mouth Every 6 (Six) Hours As Needed. for pain      ipratropium-albuterol (DUO-NEB) 0.5-2.5 mg/3 ml nebulizer Take 3 mL by nebulization 4 (Four) Times a Day. 360 mL 3    magnesium oxide (MAG-OX) 400 MG tablet Take 1 tablet by mouth Daily. 90 tablet 3    mirtazapine (REMERON) 15 MG tablet Take 1 tablet by mouth every night at bedtime.      omeprazole (priLOSEC) 40 MG capsule TAKE 1 CAPSULE BY MOUTH DAILY 90 capsule 1    oxyCODONE-acetaminophen (PERCOCET) 5-325 MG per tablet Take 1 tablet by mouth Every 6 (Six) Hours As  Needed.      Repatha Pushtronex System solution cartridge Inject 3.5 mL under the skin into the appropriate area as directed Every 30 (Thirty) Days.      solifenacin (VESICARE) 5 MG tablet Take 1 tablet by mouth Daily. 90 tablet 1    vitamin D (ERGOCALCIFEROL) 1.25 MG (53254 UT) capsule capsule TAKE 1 CAPSULE BY MOUTH EVERY 7 DAYS 5 capsule 2     No current facility-administered medications for this visit.       No follow-ups on file.    There are no Patient Instructions on file for this visit.         Time spent on visit:  35   minutes.  This patient has consented to a telehealth visit via video. The visit was scheduled as a video visit to comply with patient safety concerns in accordance with CDC recommendations.  All vitals recorded within this visit are reported by the patient.  Paperwork filled out for assisted living.  Continue diet, exercise.  Labs in near future.  Refills and continue med.

## 2023-12-19 DIAGNOSIS — K21.9 GERD WITHOUT ESOPHAGITIS: ICD-10-CM

## 2023-12-19 RX ORDER — OMEPRAZOLE 40 MG/1
40 CAPSULE, DELAYED RELEASE ORAL DAILY
Qty: 90 CAPSULE | Refills: 1 | Status: SHIPPED | OUTPATIENT
Start: 2023-12-19

## 2024-01-03 ENCOUNTER — TELEPHONE (OUTPATIENT)
Dept: FAMILY MEDICINE CLINIC | Facility: CLINIC | Age: 84
End: 2024-01-03
Payer: MEDICARE

## 2024-01-03 NOTE — TELEPHONE ENCOUNTER
Caller: OTHER    Relationship: Other    Best call back number: [phone]    What is the best time to reach you: ANYTIME     Who are you requesting to speak with (clinical staff, provider,  specific staff member): CLINICAL STAFF     Do you know the name of the person who called:  NURSE BATISTA FROM [facility]     What was the call regarding: PATIENT NAD PATIENTS DAUGHTER WOULD LIKE TO SEE AN INCREASE IN THE MEDICATION BACLOFEN TO 3 X A DAY. PLEASE CALL     Is it okay if the provider responds through MyChart: N/A

## 2024-01-04 LAB — HBA1C MFR BLD: 5.8 % (ref 4.8–5.6)

## 2024-01-11 ENCOUNTER — HOSPITAL ENCOUNTER (EMERGENCY)
Facility: HOSPITAL | Age: 84
Discharge: HOME | End: 2024-01-12
Attending: EMERGENCY MEDICINE
Payer: MEDICARE

## 2024-01-11 DIAGNOSIS — M62.838 MUSCLE SPASM OF BOTH LOWER LEGS: ICD-10-CM

## 2024-01-11 DIAGNOSIS — M62.830 MUSCLE SPASM OF BACK: Primary | ICD-10-CM

## 2024-01-11 LAB
ALBUMIN SERPL-MCNC: 4.1 G/DL (ref 3.5–5.2)
ALBUMIN/GLOB SERPL: 1.6 G/DL
ALP SERPL-CCNC: 88 U/L (ref 39–117)
ALT SERPL W P-5'-P-CCNC: 9 U/L (ref 1–33)
ANION GAP SERPL CALCULATED.3IONS-SCNC: 14.9 MMOL/L (ref 5–15)
AST SERPL-CCNC: 15 U/L (ref 1–32)
BASOPHILS # BLD AUTO: 0.07 10*3/MM3 (ref 0–0.2)
BASOPHILS NFR BLD AUTO: 0.6 % (ref 0–1.5)
BILIRUB SERPL-MCNC: 0.3 MG/DL (ref 0–1.2)
BUN SERPL-MCNC: 19 MG/DL (ref 8–23)
BUN/CREAT SERPL: 27.1 (ref 7–25)
CALCIUM SPEC-SCNC: 9.7 MG/DL (ref 8.6–10.5)
CHLORIDE SERPL-SCNC: 100 MMOL/L (ref 98–107)
CK SERPL-CCNC: 160 U/L (ref 20–180)
CO2 SERPL-SCNC: 22.1 MMOL/L (ref 22–29)
CREAT SERPL-MCNC: 0.7 MG/DL (ref 0.57–1)
DEPRECATED RDW RBC AUTO: 40.3 FL (ref 37–54)
EGFRCR SERPLBLD CKD-EPI 2021: 85.4 ML/MIN/1.73
EOSINOPHIL # BLD AUTO: 0.1 10*3/MM3 (ref 0–0.4)
EOSINOPHIL NFR BLD AUTO: 0.9 % (ref 0.3–6.2)
ERYTHROCYTE [DISTWIDTH] IN BLOOD BY AUTOMATED COUNT: 13.1 % (ref 12.3–15.4)
GLOBULIN UR ELPH-MCNC: 2.6 GM/DL
GLUCOSE SERPL-MCNC: 106 MG/DL (ref 65–99)
HCT VFR BLD AUTO: 41.7 % (ref 34–46.6)
HGB BLD-MCNC: 14.6 G/DL (ref 12–15.9)
IMM GRANULOCYTES # BLD AUTO: 0.06 10*3/MM3 (ref 0–0.05)
IMM GRANULOCYTES NFR BLD AUTO: 0.5 % (ref 0–0.5)
LYMPHOCYTES # BLD AUTO: 2.68 10*3/MM3 (ref 0.7–3.1)
LYMPHOCYTES NFR BLD AUTO: 24 % (ref 19.6–45.3)
MAGNESIUM SERPL-MCNC: 2.1 MG/DL (ref 1.6–2.4)
MCH RBC QN AUTO: 30.2 PG (ref 26.6–33)
MCHC RBC AUTO-ENTMCNC: 35 G/DL (ref 31.5–35.7)
MCV RBC AUTO: 86.2 FL (ref 79–97)
MONOCYTES # BLD AUTO: 0.83 10*3/MM3 (ref 0.1–0.9)
MONOCYTES NFR BLD AUTO: 7.4 % (ref 5–12)
NEUTROPHILS NFR BLD AUTO: 66.6 % (ref 42.7–76)
NEUTROPHILS NFR BLD AUTO: 7.43 10*3/MM3 (ref 1.7–7)
NRBC BLD AUTO-RTO: 0 /100 WBC (ref 0–0.2)
PLATELET # BLD AUTO: 297 10*3/MM3 (ref 140–450)
PMV BLD AUTO: 10.1 FL (ref 6–12)
POTASSIUM SERPL-SCNC: 4.1 MMOL/L (ref 3.5–5.2)
PROT SERPL-MCNC: 6.7 G/DL (ref 6–8.5)
RBC # BLD AUTO: 4.84 10*6/MM3 (ref 3.77–5.28)
SODIUM SERPL-SCNC: 137 MMOL/L (ref 136–145)
WBC NRBC COR # BLD AUTO: 11.17 10*3/MM3 (ref 3.4–10.8)

## 2024-01-11 PROCEDURE — 99283 EMERGENCY DEPT VISIT LOW MDM: CPT

## 2024-01-11 PROCEDURE — 25810000003 SODIUM CHLORIDE 0.9 % SOLUTION: Performed by: EMERGENCY MEDICINE

## 2024-01-11 PROCEDURE — 96375 TX/PRO/DX INJ NEW DRUG ADDON: CPT

## 2024-01-11 PROCEDURE — 80053 COMPREHEN METABOLIC PANEL: CPT | Performed by: EMERGENCY MEDICINE

## 2024-01-11 PROCEDURE — 82550 ASSAY OF CK (CPK): CPT | Performed by: EMERGENCY MEDICINE

## 2024-01-11 PROCEDURE — 85025 COMPLETE CBC W/AUTO DIFF WBC: CPT | Performed by: EMERGENCY MEDICINE

## 2024-01-11 PROCEDURE — 25010000002 ONDANSETRON PER 1 MG: Performed by: EMERGENCY MEDICINE

## 2024-01-11 PROCEDURE — 25010000002 KETOROLAC TROMETHAMINE PER 15 MG: Performed by: EMERGENCY MEDICINE

## 2024-01-11 PROCEDURE — 83735 ASSAY OF MAGNESIUM: CPT | Performed by: EMERGENCY MEDICINE

## 2024-01-11 PROCEDURE — 96374 THER/PROPH/DIAG INJ IV PUSH: CPT

## 2024-01-11 RX ORDER — SODIUM CHLORIDE 0.9 % (FLUSH) 0.9 %
10 SYRINGE (ML) INJECTION AS NEEDED
Status: DISCONTINUED | OUTPATIENT
Start: 2024-01-11 | End: 2024-01-12 | Stop reason: HOSPADM

## 2024-01-11 RX ORDER — LORAZEPAM 1 MG/1
0.5 TABLET ORAL ONCE
Status: COMPLETED | OUTPATIENT
Start: 2024-01-11 | End: 2024-01-11

## 2024-01-11 RX ORDER — ONDANSETRON 2 MG/ML
4 INJECTION INTRAMUSCULAR; INTRAVENOUS ONCE
Status: COMPLETED | OUTPATIENT
Start: 2024-01-11 | End: 2024-01-11

## 2024-01-11 RX ORDER — KETOROLAC TROMETHAMINE 15 MG/ML
10 INJECTION, SOLUTION INTRAMUSCULAR; INTRAVENOUS ONCE
Status: COMPLETED | OUTPATIENT
Start: 2024-01-11 | End: 2024-01-11

## 2024-01-11 RX ADMIN — ONDANSETRON HYDROCHLORIDE 4 MG: 2 INJECTION, SOLUTION INTRAMUSCULAR; INTRAVENOUS at 21:59

## 2024-01-11 RX ADMIN — KETOROLAC TROMETHAMINE 10 MG: 15 INJECTION, SOLUTION INTRAMUSCULAR; INTRAVENOUS at 21:58

## 2024-01-11 RX ADMIN — SODIUM CHLORIDE 500 ML: 9 INJECTION, SOLUTION INTRAVENOUS at 21:53

## 2024-01-11 RX ADMIN — LORAZEPAM 0.5 MG: 1 TABLET ORAL at 22:00

## 2024-01-12 VITALS
HEART RATE: 67 BPM | SYSTOLIC BLOOD PRESSURE: 122 MMHG | OXYGEN SATURATION: 98 % | WEIGHT: 128 LBS | BODY MASS INDEX: 22.68 KG/M2 | RESPIRATION RATE: 18 BRPM | TEMPERATURE: 97.8 F | HEIGHT: 63 IN | DIASTOLIC BLOOD PRESSURE: 66 MMHG

## 2024-01-12 LAB
BACTERIA UR QL AUTO: ABNORMAL /HPF
BILIRUB UR QL STRIP: NEGATIVE
CLARITY UR: CLEAR
COLOR UR: YELLOW
GLUCOSE UR STRIP-MCNC: NEGATIVE MG/DL
HGB UR QL STRIP.AUTO: NEGATIVE
HYALINE CASTS UR QL AUTO: ABNORMAL /LPF
KETONES UR QL STRIP: NEGATIVE
LEUKOCYTE ESTERASE UR QL STRIP.AUTO: ABNORMAL
NITRITE UR QL STRIP: NEGATIVE
PH UR STRIP.AUTO: 6 [PH] (ref 5–8)
PROT UR QL STRIP: NEGATIVE
RBC # UR STRIP: ABNORMAL /HPF
REF LAB TEST METHOD: ABNORMAL
SP GR UR STRIP: 1.02 (ref 1–1.03)
SQUAMOUS #/AREA URNS HPF: ABNORMAL /HPF
UROBILINOGEN UR QL STRIP: ABNORMAL
WBC # UR STRIP: ABNORMAL /HPF

## 2024-01-12 PROCEDURE — 81001 URINALYSIS AUTO W/SCOPE: CPT | Performed by: EMERGENCY MEDICINE

## 2024-01-12 RX ORDER — LORAZEPAM 1 MG/1
0.5 TABLET ORAL 2 TIMES DAILY PRN
Qty: 6 TABLET | Refills: 0 | Status: SHIPPED | OUTPATIENT
Start: 2024-01-12

## 2024-01-12 NOTE — ED NOTES
"Per Nursing home report, patient has recently been on abx for a UTI. Patient has a history of \"jerking/twitching motions\", was recently increased to baclofen TID instead of BID. Patient also takes hydrocodone at night and PRN throughout the day. Family is concerned because patient has been less active since she has been at [facility].   "

## 2024-01-12 NOTE — DISCHARGE INSTRUCTIONS
Take medication as prescribed.  Return to the emergency department for worsening/persistent symptoms or other concern.

## 2024-01-12 NOTE — ED NOTES
Patient to ED via EMS from [facility] c/o lumbar back spasms x a few months worsening today. Patient has been placed on several medications for this without relief.

## 2024-01-12 NOTE — ED PROVIDER NOTES
" EMERGENCY DEPARTMENT ENCOUNTER    Room Number:  NATHAN/NATHAN  PCP: Cassi Bella MD  Historian: Patient, daughter      HPI:  Chief Complaint: Back spasm  A complete HPI/ROS/PMH/PSH/SH/FH are unobtainable due to: Nothing  Context: Kandace Andrade is a 83 y.o. female who presents to the ED from assisted living by EMS c/o back spasms.  Patient has had intermittent low back spasms and leg \"jerking\" since having back surgery about 20 years ago.  These symptoms have been worse for the past few weeks.  Patient denies recent injury.  Her baclofen dose was recently increased from twice daily to 3 times daily.  Patient was on antibiotics for UTI about 1 month ago.  She denies fever, chills, chest pain, abdominal pain, hematuria, or dysuria.  She does complain of nausea and decreased appetite.  Daughter reports that the patient did not eat anything yesterday.  Patient moved into assisted living about 1 month ago.  She is able to ambulate with a walker.            PAST MEDICAL HISTORY  Active Ambulatory Problems     Diagnosis Date Noted    Vitamin D deficiency     KINDRA (obstructive sleep apnea)     IBS (irritable bowel syndrome)     Essential hypertension     Herpes zoster     Arthritis     Anxiety and depression     Other emphysema 08/30/2019    Acute seasonal allergic rhinitis due to pollen 08/30/2019    Paroxysmal atrial fibrillation     Osteoporosis     Mixed hyperlipidemia     Encounter for Medicare annual wellness exam 11/22/2019    COPD (chronic obstructive pulmonary disease) 11/22/2019    Mixed stress and urge urinary incontinence 02/25/2020    Type 2 diabetes mellitus without complication, without long-term current use of insulin 02/26/2020    Arthritis of both hips 02/27/2020    Autonomic neuropathy 10/19/2020    GERD without esophagitis 11/10/2020    C. difficile colitis 11/25/2020    Hypokalemia 11/25/2020    Decreased hearing of both ears 04/12/2021    Vertigo 04/12/2021    Hypercholesterolemia 11/23/2021    Primary " insomnia 12/28/2021    Generalized weakness 02/18/2022    Other microscopic hematuria 02/18/2022    Confusion 07/18/2022    Dehydration 07/19/2022    Metabolic encephalopathy 07/20/2022    Left ankle swelling 08/04/2022    Overactive bladder 11/03/2022    Restless leg syndrome 12/19/2022    Edema of both legs 02/06/2023    Other fatigue 02/06/2023    Carotid artery stenosis, asymptomatic, bilateral 03/01/2023    Memory deficit 04/20/2023    Imbalance 04/20/2023    Dementia, old age 04/22/2023    Sepsis with acute hypoxic respiratory failure and septic shock, due to unspecified organism 04/24/2023     Resolved Ambulatory Problems     Diagnosis Date Noted    Hyperglycemia     Hyperlipidemia     Stage 3 chronic kidney disease     Atrial fibrillation     Acute non-recurrent maxillary sinusitis 11/22/2019    Acute UTI 10/19/2020    Symptomatic hypotension 10/19/2020    Impacted cerumen of left ear 11/10/2020    Pancolitis 11/24/2020    Hospital discharge follow-up 12/07/2020    Diarrhea 03/29/2021    Nausea 04/12/2021    Acute cystitis with hematuria 07/18/2022    Acute UTI (urinary tract infection) 07/19/2022    Abdominal pain 07/19/2022    Chronic diarrhea 07/19/2022    UTI (urinary tract infection) 07/20/2022    Diarrhea 09/22/2022    Pneumonia of right middle lobe due to infectious organism 12/08/2022    Sepsis due to pneumonia 12/09/2022    Acute hypoxemic respiratory failure 12/21/2022     Past Medical History:   Diagnosis Date    Asthma     Diabetes mellitus     Diverticulosis     Fibula fracture     History of lumbosacral spine surgery     Hypertension          PAST SURGICAL HISTORY  Past Surgical History:   Procedure Laterality Date    APPENDECTOMY      BACK SURGERY      lower x2    CATARACT EXTRACTION      COLONOSCOPY      patient doesn't recall     COLONOSCOPY N/A 4/2/2021    Procedure: COLONOSCOPY to cecum with cecal biopsies;  Surgeon: Jarrod Duggan MD;  Location: [facility] ENDOSCOPY;  Service:  Gastroenterology;  Laterality: N/A;  pre: diarhhea  post: diverticulosis, hemorrhoids    HYSTERECTOMY      partial    SHOULDER SURGERY Right     SHOULDER SURGERY      SIGMOIDOSCOPY N/A 10/19/2022    Procedure: SIGMOIDOSCOPY FLEXIBLE TO SIGMOID COLON WITH BIOPSIES;  Surgeon: Tonja Tanner MD;  Location: [facility] ENDOSCOPY;  Service: Gastroenterology;  Laterality: N/A;  PRE: DIARRHEA  POST: HEMORRHOIDS    TONSILLECTOMY      UPPER GASTROINTESTINAL ENDOSCOPY      patient doesn't recall          FAMILY HISTORY  Family History   Problem Relation Age of Onset    Heart disease Mother     Lung cancer Mother     Diabetes Father     Liver disease Father     Heart disease Paternal Uncle     Malig Hyperthermia Neg Hx          SOCIAL HISTORY  Social History     Socioeconomic History    Marital status:    Tobacco Use    Smoking status: Former     Packs/day: 1.00     Years: 25.00     Additional pack years: 0.00     Total pack years: 25.00     Types: Cigarettes     Quit date: 5/4/2008     Years since quitting: 15.7    Smokeless tobacco: Never    Tobacco comments:     CAFFEINE USE - 1 CUP COFFEE/ DIET COKE   Vaping Use    Vaping Use: Never used   Substance and Sexual Activity    Alcohol use: No    Drug use: No    Sexual activity: Defer         ALLERGIES  Penicillins, Meclizine hcl, Bactrim [sulfamethoxazole-trimethoprim], Flagyl [metronidazole], Meclizine, and Statins    REVIEW OF SYSTEMS  All systems have been reviewed and are negative except for those listed in the HPI      PHYSICAL EXAM  ED Triage Vitals [01/11/24 2004]   Temp Heart Rate Resp BP SpO2   97.8 °F (36.6 °C) 84 18 137/60 92 %      Temp src Heart Rate Source Patient Position BP Location FiO2 (%)   Tympanic -- -- -- --       Physical Exam      GENERAL: Awake, alert, oriented x 3.  Nontoxic-appearing elderly female.  She has intermittent jerking movements of both legs  HENT: NCAT, nares patent  EYES: no scleral icterus  CV: regular rhythm, normal  rate  RESPIRATORY: normal effort, clear to auscultation bilaterally  ABDOMEN: soft, nondistended, nontender, no CVA tenderness  MUSCULOSKELETAL: Extremities are nontender with full range of motion.  T/L-spine are nontender.  There is mild tenderness to the bilateral lumbar paraspinal muscles  NEURO: Speech is normal.  No facial droop.  Normal strength and light touch sensation both lower extremities.  PSYCH:  calm, cooperative  SKIN: warm, dry    Vital signs and nursing notes reviewed.          LAB RESULTS  Recent Results (from the past 24 hour(s))   Comprehensive Metabolic Panel    Collection Time: 01/11/24  9:51 PM    Specimen: Blood   Result Value Ref Range    Glucose 106 (H) 65 - 99 mg/dL    BUN 19 8 - 23 mg/dL    Creatinine 0.70 0.57 - 1.00 mg/dL    Sodium 137 136 - 145 mmol/L    Potassium 4.1 3.5 - 5.2 mmol/L    Chloride 100 98 - 107 mmol/L    CO2 22.1 22.0 - 29.0 mmol/L    Calcium 9.7 8.6 - 10.5 mg/dL    Total Protein 6.7 6.0 - 8.5 g/dL    Albumin 4.1 3.5 - 5.2 g/dL    ALT (SGPT) 9 1 - 33 U/L    AST (SGOT) 15 1 - 32 U/L    Alkaline Phosphatase 88 39 - 117 U/L    Total Bilirubin 0.3 0.0 - 1.2 mg/dL    Globulin 2.6 gm/dL    A/G Ratio 1.6 g/dL    BUN/Creatinine Ratio 27.1 (H) 7.0 - 25.0    Anion Gap 14.9 5.0 - 15.0 mmol/L    eGFR 85.4 >60.0 mL/min/1.73   CK    Collection Time: 01/11/24  9:51 PM    Specimen: Blood   Result Value Ref Range    Creatine Kinase 160 20 - 180 U/L   CBC Auto Differential    Collection Time: 01/11/24  9:51 PM    Specimen: Blood   Result Value Ref Range    WBC 11.17 (H) 3.40 - 10.80 10*3/mm3    RBC 4.84 3.77 - 5.28 10*6/mm3    Hemoglobin 14.6 12.0 - 15.9 g/dL    Hematocrit 41.7 34.0 - 46.6 %    MCV 86.2 79.0 - 97.0 fL    MCH 30.2 26.6 - 33.0 pg    MCHC 35.0 31.5 - 35.7 g/dL    RDW 13.1 12.3 - 15.4 %    RDW-SD 40.3 37.0 - 54.0 fl    MPV 10.1 6.0 - 12.0 fL    Platelets 297 140 - 450 10*3/mm3    Neutrophil % 66.6 42.7 - 76.0 %    Lymphocyte % 24.0 19.6 - 45.3 %    Monocyte % 7.4 5.0 - 12.0 %     Eosinophil % 0.9 0.3 - 6.2 %    Basophil % 0.6 0.0 - 1.5 %    Immature Grans % 0.5 0.0 - 0.5 %    Neutrophils, Absolute 7.43 (H) 1.70 - 7.00 10*3/mm3    Lymphocytes, Absolute 2.68 0.70 - 3.10 10*3/mm3    Monocytes, Absolute 0.83 0.10 - 0.90 10*3/mm3    Eosinophils, Absolute 0.10 0.00 - 0.40 10*3/mm3    Basophils, Absolute 0.07 0.00 - 0.20 10*3/mm3    Immature Grans, Absolute 0.06 (H) 0.00 - 0.05 10*3/mm3    nRBC 0.0 0.0 - 0.2 /100 WBC   Magnesium    Collection Time: 01/11/24  9:51 PM    Specimen: Blood   Result Value Ref Range    Magnesium 2.1 1.6 - 2.4 mg/dL   Urinalysis With Microscopic If Indicated (No Culture) - Urine, Clean Catch    Collection Time: 01/12/24 12:31 AM    Specimen: Urine, Clean Catch   Result Value Ref Range    Color, UA Yellow Yellow, Straw    Appearance, UA Clear Clear    pH, UA 6.0 5.0 - 8.0    Specific Gravity, UA 1.021 1.005 - 1.030    Glucose, UA Negative Negative    Ketones, UA Negative Negative    Bilirubin, UA Negative Negative    Blood, UA Negative Negative    Protein, UA Negative Negative    Leuk Esterase, UA Trace (A) Negative    Nitrite, UA Negative Negative    Urobilinogen, UA 0.2 E.U./dL 0.2 - 1.0 E.U./dL   Urinalysis, Microscopic Only - Urine, Clean Catch    Collection Time: 01/12/24 12:31 AM    Specimen: Urine, Clean Catch   Result Value Ref Range    RBC, UA 0-2 None Seen, 0-2 /HPF    WBC, UA 6-10 (A) None Seen, 0-2 /HPF    Bacteria, UA None Seen None Seen /HPF    Squamous Epithelial Cells, UA 0-2 None Seen, 0-2 /HPF    Hyaline Casts, UA 3-6 None Seen /LPF    Methodology Automated Microscopy        Ordered the above labs and reviewed the results.        RADIOLOGY  No Radiology Exams Resulted Within Past 24 Hours    Ordered the above noted radiological studies. Reviewed by me in PACS.            PROCEDURES  Procedures              MEDICATIONS GIVEN IN ER  Medications   sodium chloride 0.9 % flush 10 mL (has no administration in time range)   sodium chloride 0.9 % bolus 500 mL  (0 mL Intravenous Stopped 1/11/24 2318)   ondansetron (ZOFRAN) injection 4 mg (4 mg Intravenous Given 1/11/24 2159)   LORazepam (ATIVAN) tablet 0.5 mg (0.5 mg Oral Given 1/11/24 2200)   ketorolac (TORADOL) injection 10 mg (10 mg Intravenous Given 1/11/24 2158)                   MEDICAL DECISION MAKING, PROGRESS, and CONSULTS    All labs have been independently reviewed by me.  All radiology studies have been reviewed by me and I have also reviewed the radiology report.   EKG's independently viewed and interpreted by me.  Discussion below represents my analysis of pertinent findings related to patient's condition, differential diagnosis, treatment plan and final disposition.      Additional sources:  - Discussed/ obtained information from independent historians: Daughter at bedside    - External (non-ED) record review: Patient was last admitted here in April 2023 for septic shock and urinary tract infection.  She had a telemedicine visit with her PCP last month for follow-up for hyperlipidemia, hypercholesterolemia, paroxysmal A-fib, and type 2 diabetes.    - Chronic or social conditions impacting care: Patient lives in assisted living          Orders placed during this visit:  Orders Placed This Encounter   Procedures    Comprehensive Metabolic Panel    Urinalysis With Microscopic If Indicated (No Culture) - Urine, Clean Catch    CK    CBC Auto Differential    Magnesium    Urinalysis, Microscopic Only - Urine, Clean Catch    Insert Peripheral IV    CBC & Differential         Additional orders considered but not ordered:  None        Differential diagnosis:    Electrolyte abnormality, muscle spasms, restless leg syndrome, dehydration, UTI      Independent interpretation of labs, radiology studies, and discussions with consultants:  ED Course as of 01/12/24 0150   u Jan 11, 2024 2128 BP: 137/60 [WH]   2128 Temp: 97.8 °F (36.6 °C) [WH]   2128 Heart Rate: 84 [WH]   2128 Resp: 18 [WH]   2128 SpO2: 92 % [WH]   2128  Device (Oxygen Therapy): room air []   2206 WBC(!): 11.17 [WH]   2206 Hemoglobin: 14.6 [WH]   2236 Creatine Kinase: 160 [WH]   2236 Glucose(!): 106 [WH]   2236 BUN: 19 [WH]   2236 Creatinine: 0.70 [WH]   2303 Magnesium: 2.1 []   Fri Jan 12, 2024   0052 Leukocytes, UA(!): Trace []   0052 Nitrite, UA: Negative []   0052 RBC, UA: 0-2 []   0052 WBC, UA(!): 6-10 []   0052 Bacteria, UA: None Seen []   0113 Test results discussed with the patient and her daughter.  She is feeling better after IV Ativan, Zofran, and Toradol.  Her muscle spasms have improved.  Shared decision making was discussed concerning whether or not to prescribe the patient low-dose Ativan to use on an as-needed basis.  Patient and daughter are agreeable.  I did discuss with them the risk that the medication could make her drowsy and dizzy.  I recommended she take it only at night before she goes to bed.  She is currently in the process of changing her primary care doctor.  I recommended she follow-up with her new PCP as soon as possible.  Return precautions were discussed []   0144 MDM: Patient presented to ED complaining of worsening spasms in her back and legs.  She has had these symptoms intermittently since having back surgery 20 years ago.  Her workup is unremarkable.  She did not have a UTI.  Electrolytes were normal.  Her symptoms improved with IV medications.  She will be given a prescription for a short course of Ativan to use only as needed for severe muscle spasms. [WH]      ED Course User Index  [] Rene Bennett MD               DIAGNOSIS  Final diagnoses:   Muscle spasm of back   Muscle spasm of both lower legs         DISPOSITION  DISCHARGE    Patient discharged in stable condition.    Reviewed implications of results, diagnosis, meds, responsibility to follow up, warning signs and symptoms of possible worsening, potential complications and reasons to return to ER, including worsening/persistent symptoms, fever,  chest pain, shortness of breath, abdominal pain, vomiting, or other concern.    Patient/Family voiced understanding of above instructions.    Discussed plan for discharge, as there is no emergent indication for admission. Patient referred to primary care provider for BP management due to today's BP. Pt/family is agreeable and understands need for follow up and repeat testing.  Pt is aware that discharge does not mean that nothing is wrong but it indicates no emergency is present that requires admission and they must continue care with follow-up as given below or physician of their choice.     FOLLOW-UP  Your new primary care provider    Schedule an appointment as soon as possible for a visit            Medication List        New Prescriptions      LORazepam 1 MG tablet  Commonly known as: ATIVAN  Take 0.5 tablets by mouth 2 (Two) Times a Day As Needed (Muscle spasms).               Where to Get Your Medications        These medications were sent to [id number] - [place], [place] - [address] AT [facility] - [phone]  - [phone]   [address], [facility] [address]      Phone: [phone]   LORazepam 1 MG tablet                   Latest Documented Vital Signs:  As of 01:50 EST  BP- 122/66 HR- 67 Temp- 97.8 °F (36.6 °C) (Tympanic) O2 sat- 98%      MARGARET reviewed by Rene Bennett MD       --    Please note that portions of this were completed with a voice recognition program.       Note Disclaimer: At [facility], we believe that sharing information builds trust and better relationships. You are receiving this note because you are receiving care at [facility] or recently visited. It is possible you will see health information before a provider has talked with you about it. This kind of information can be easy to misunderstand. To help you fully understand what it means for your health, we urge you to discuss this note with your provider.              Rene Bennett MD  01/12/24 0150

## 2024-01-17 NOTE — TELEPHONE ENCOUNTER
Patient's daughter called and wants to know when Kandace is due to come in next?  Also, she is having some diaherra issues for the last couple weeks which is a on going issues she has had for years.  Her phone number is [phone].   Cardiology Nephrology

## 2024-01-18 DIAGNOSIS — I10 ESSENTIAL HYPERTENSION: ICD-10-CM

## 2024-01-18 RX ORDER — AMLODIPINE BESYLATE 2.5 MG/1
2.5 TABLET ORAL DAILY
Qty: 90 TABLET | Refills: 3 | Status: SHIPPED | OUTPATIENT
Start: 2024-01-18

## 2024-03-07 DIAGNOSIS — N32.81 OVERACTIVE BLADDER: ICD-10-CM

## 2024-03-07 RX ORDER — SOLIFENACIN SUCCINATE 5 MG/1
5 TABLET, FILM COATED ORAL DAILY
Qty: 90 TABLET | Refills: 1 | Status: SHIPPED | OUTPATIENT
Start: 2024-03-07

## 2024-03-13 ENCOUNTER — HOSPITAL ENCOUNTER (EMERGENCY)
Facility: HOSPITAL | Age: 84
Discharge: HOME | End: 2024-03-14
Attending: EMERGENCY MEDICINE | Admitting: EMERGENCY MEDICINE
Payer: MEDICARE

## 2024-03-13 DIAGNOSIS — S39.012A STRAIN OF LUMBAR REGION, INITIAL ENCOUNTER: ICD-10-CM

## 2024-03-13 DIAGNOSIS — M54.6 ACUTE MIDLINE THORACIC BACK PAIN: ICD-10-CM

## 2024-03-13 DIAGNOSIS — S09.90XA CLOSED HEAD INJURY, INITIAL ENCOUNTER: ICD-10-CM

## 2024-03-13 DIAGNOSIS — S20.219A CONTUSION OF CHEST WALL, UNSPECIFIED LATERALITY, INITIAL ENCOUNTER: Primary | ICD-10-CM
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
## 2024-03-13 DIAGNOSIS — S16.1XXA STRAIN OF NECK MUSCLE, INITIAL ENCOUNTER: ICD-10-CM

## 2024-03-13 PROCEDURE — 99285 EMERGENCY DEPT VISIT HI MDM: CPT

## 2024-03-13 PROCEDURE — 93010 ELECTROCARDIOGRAM REPORT: CPT | Performed by: INTERNAL MEDICINE

## 2024-03-13 PROCEDURE — 93005 ELECTROCARDIOGRAM TRACING: CPT | Performed by: EMERGENCY MEDICINE

## 2024-03-13 RX ORDER — ONDANSETRON 2 MG/ML
4 INJECTION INTRAMUSCULAR; INTRAVENOUS ONCE
Status: COMPLETED | OUTPATIENT
Start: 2024-03-14 | End: 2024-03-14

## 2024-03-13 RX ORDER — MORPHINE SULFATE 2 MG/ML
2 INJECTION, SOLUTION INTRAMUSCULAR; INTRAVENOUS ONCE
Status: COMPLETED | OUTPATIENT
Start: 2024-03-14 | End: 2024-03-14

## 2024-03-14 ENCOUNTER — APPOINTMENT (OUTPATIENT)
Dept: CT IMAGING | Facility: HOSPITAL | Age: 84
End: 2024-03-14
Payer: MEDICARE

## 2024-03-14 VITALS
OXYGEN SATURATION: 91 % | HEIGHT: 63 IN | WEIGHT: 120 LBS | TEMPERATURE: 98.1 F | RESPIRATION RATE: 18 BRPM | HEART RATE: 95 BPM | DIASTOLIC BLOOD PRESSURE: 85 MMHG | BODY MASS INDEX: 21.26 KG/M2 | SYSTOLIC BLOOD PRESSURE: 137 MMHG

## 2024-03-14 LAB
ALBUMIN SERPL-MCNC: 4.5 G/DL (ref 3.5–5.2)
ALBUMIN/GLOB SERPL: 2 G/DL
ALP SERPL-CCNC: 91 U/L (ref 39–117)
ALT SERPL W P-5'-P-CCNC: 23 U/L (ref 1–33)
ANION GAP SERPL CALCULATED.3IONS-SCNC: 13 MMOL/L (ref 5–15)
APTT PPP: 26.2 SECONDS (ref 22.7–35.4)
AST SERPL-CCNC: 18 U/L (ref 1–32)
BASOPHILS # BLD AUTO: 0.11 10*3/MM3 (ref 0–0.2)
BASOPHILS NFR BLD AUTO: 0.5 % (ref 0–1.5)
BILIRUB SERPL-MCNC: 0.4 MG/DL (ref 0–1.2)
BUN SERPL-MCNC: 16 MG/DL (ref 8–23)
BUN/CREAT SERPL: 18.4 (ref 7–25)
CALCIUM SPEC-SCNC: 9.8 MG/DL (ref 8.6–10.5)
CHLORIDE SERPL-SCNC: 101 MMOL/L (ref 98–107)
CO2 SERPL-SCNC: 24 MMOL/L (ref 22–29)
CREAT SERPL-MCNC: 0.87 MG/DL (ref 0.57–1)
DEPRECATED RDW RBC AUTO: 43.6 FL (ref 37–54)
EGFRCR SERPLBLD CKD-EPI 2021: 65.8 ML/MIN/1.73
EOSINOPHIL # BLD AUTO: 0.03 10*3/MM3 (ref 0–0.4)
EOSINOPHIL NFR BLD AUTO: 0.1 % (ref 0.3–6.2)
ERYTHROCYTE [DISTWIDTH] IN BLOOD BY AUTOMATED COUNT: 13.8 % (ref 12.3–15.4)
GLOBULIN UR ELPH-MCNC: 2.3 GM/DL
GLUCOSE SERPL-MCNC: 127 MG/DL (ref 65–99)
HCT VFR BLD AUTO: 43.6 % (ref 34–46.6)
HGB BLD-MCNC: 14.9 G/DL (ref 12–15.9)
IMM GRANULOCYTES # BLD AUTO: 0.29 10*3/MM3 (ref 0–0.05)
IMM GRANULOCYTES NFR BLD AUTO: 1.2 % (ref 0–0.5)
INR PPP: 1.1 (ref 0.9–1.1)
LYMPHOCYTES # BLD AUTO: 1.78 10*3/MM3 (ref 0.7–3.1)
LYMPHOCYTES NFR BLD AUTO: 7.6 % (ref 19.6–45.3)
MCH RBC QN AUTO: 30.2 PG (ref 26.6–33)
MCHC RBC AUTO-ENTMCNC: 34.2 G/DL (ref 31.5–35.7)
MCV RBC AUTO: 88.3 FL (ref 79–97)
MONOCYTES # BLD AUTO: 1.68 10*3/MM3 (ref 0.1–0.9)
MONOCYTES NFR BLD AUTO: 7.2 % (ref 5–12)
NEUTROPHILS NFR BLD AUTO: 19.58 10*3/MM3 (ref 1.7–7)
NEUTROPHILS NFR BLD AUTO: 83.4 % (ref 42.7–76)
NRBC BLD AUTO-RTO: 0 /100 WBC (ref 0–0.2)
PLATELET # BLD AUTO: 366 10*3/MM3 (ref 140–450)
PMV BLD AUTO: 9.9 FL (ref 6–12)
POTASSIUM SERPL-SCNC: 3.8 MMOL/L (ref 3.5–5.2)
PROT SERPL-MCNC: 6.8 G/DL (ref 6–8.5)
PROTHROMBIN TIME: 14.5 SECONDS (ref 11.7–14.2)
QT INTERVAL: 332 MS
QTC INTERVAL: 426 MS
RBC # BLD AUTO: 4.94 10*6/MM3 (ref 3.77–5.28)
SODIUM SERPL-SCNC: 138 MMOL/L (ref 136–145)
TROPONIN T SERPL HS-MCNC: 17 NG/L
WBC NRBC COR # BLD AUTO: 23.47 10*3/MM3 (ref 3.4–10.8)

## 2024-03-14 PROCEDURE — 80053 COMPREHEN METABOLIC PANEL: CPT | Performed by: EMERGENCY MEDICINE

## 2024-03-14 PROCEDURE — 25010000002 ONDANSETRON PER 1 MG: Performed by: PHYSICIAN ASSISTANT

## 2024-03-14 PROCEDURE — 96374 THER/PROPH/DIAG INJ IV PUSH: CPT

## 2024-03-14 PROCEDURE — 25510000001 IOPAMIDOL 61 % SOLUTION: Performed by: EMERGENCY MEDICINE

## 2024-03-14 PROCEDURE — 85730 THROMBOPLASTIN TIME PARTIAL: CPT | Performed by: EMERGENCY MEDICINE

## 2024-03-14 PROCEDURE — 25010000002 MORPHINE PER 10 MG: Performed by: EMERGENCY MEDICINE

## 2024-03-14 PROCEDURE — 84484 ASSAY OF TROPONIN QUANT: CPT | Performed by: EMERGENCY MEDICINE

## 2024-03-14 PROCEDURE — 71260 CT THORAX DX C+: CPT

## 2024-03-14 PROCEDURE — 85025 COMPLETE CBC W/AUTO DIFF WBC: CPT | Performed by: EMERGENCY MEDICINE

## 2024-03-14 PROCEDURE — 72128 CT CHEST SPINE W/O DYE: CPT

## 2024-03-14 PROCEDURE — 72131 CT LUMBAR SPINE W/O DYE: CPT

## 2024-03-14 PROCEDURE — 85610 PROTHROMBIN TIME: CPT | Performed by: EMERGENCY MEDICINE

## 2024-03-14 PROCEDURE — 70450 CT HEAD/BRAIN W/O DYE: CPT

## 2024-03-14 PROCEDURE — 72125 CT NECK SPINE W/O DYE: CPT

## 2024-03-14 PROCEDURE — 96375 TX/PRO/DX INJ NEW DRUG ADDON: CPT

## 2024-03-14 RX ORDER — ACETAMINOPHEN 500 MG
1000 TABLET ORAL ONCE
Status: COMPLETED | OUTPATIENT
Start: 2024-03-14 | End: 2024-03-14

## 2024-03-14 RX ORDER — METHOCARBAMOL 500 MG/1
500 TABLET, FILM COATED ORAL 4 TIMES DAILY
Qty: 40 TABLET | Refills: 0 | Status: SHIPPED | OUTPATIENT
Start: 2024-03-14

## 2024-03-14 RX ORDER — METHOCARBAMOL 500 MG/1
500 TABLET, FILM COATED ORAL ONCE
Status: COMPLETED | OUTPATIENT
Start: 2024-03-14 | End: 2024-03-14

## 2024-03-14 RX ADMIN — IOPAMIDOL 85 ML: 612 INJECTION, SOLUTION INTRAVENOUS at 00:48

## 2024-03-14 RX ADMIN — ONDANSETRON 4 MG: 2 INJECTION INTRAMUSCULAR; INTRAVENOUS at 01:05

## 2024-03-14 RX ADMIN — MORPHINE SULFATE 2 MG: 2 INJECTION, SOLUTION INTRAMUSCULAR; INTRAVENOUS at 01:04

## 2024-03-14 RX ADMIN — METHOCARBAMOL TABLETS 500 MG: 500 TABLET, COATED ORAL at 02:19

## 2024-03-14 RX ADMIN — ACETAMINOPHEN 1000 MG: 500 TABLET ORAL at 02:15

## 2024-03-14 NOTE — DISCHARGE INSTRUCTIONS
Please follow-up with your PCP.    Rest.  Increase fluid intake.  Ice as needed.      Although you are being discharged in the ED today, I encouraged her to return for worsening symptoms.  Things can, and do, change such a treatment at home with medication may not be adequate.  Specifically I recommend returning for chest pain or discomfort, difficulty breathing, persistent vomiting or difficulty holding down liquids or medications, fever > 102.0 F,  or any other worsening or alarming symptoms.     Take meds as prescribed.     You have been evaluated in the emergency department for your presenting symptoms and deemed appropriate for discharge home.  Understand that your health care does not end here today.  It is important that your primary care physician be made aware of your visit.  I recommend calling your primary care provider in the next 48 hours to arrange follow-up care.  Your primary care provider needs to review your treatment and recovery to ensure that no further treatment is necessary.  Additional testing or procedures may be necessary as an outpatient at the discretion of your primary care provider.    I also recommend following up with your PCP for recheck of your blood pressure and treatment as needed.
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
## 2024-04-12 ENCOUNTER — TELEPHONE (OUTPATIENT)
Dept: NEUROSURGERY | Facility: CLINIC | Age: 84
End: 2024-04-12
Payer: MEDICARE

## 2024-04-12 NOTE — TELEPHONE ENCOUNTER
Received medical records request via fax from [facility].    Called number on request ([phone]) and talk to Chani, informed her since the last visit was 11/2010 they would need to call medical records at [phone]

## 2024-05-01 ENCOUNTER — TELEPHONE (OUTPATIENT)
Dept: NEUROSURGERY | Facility: CLINIC | Age: 84
End: 2024-05-01

## 2024-05-01 NOTE — TELEPHONE ENCOUNTER
The [facility] received a fax that requires your attention. The document has been indexed to the patient’s chart for your review.      Reason for sending: FOR REVIEW    Documents Description: PROGRESS NOTE 04/29/24    Name of Sender: [facility] [place]     Date Indexed: 05/01/24    Notes (if needed): PLEASE REVIEW

## 2024-08-09 NOTE — PROGRESS NOTES
Enter Query Response Below      Query Response: Bacterial pneumonia organism unspecified             If applicable, please update the problem list.       Patient: Kandace Andrade        : 1940  Account: [id number]           Admit Date: 2022        How to Respond to this query:       a. Click New Note     b. Answer query within the yellow box.                c. Update the Problem List, if applicable.      If you have any questions about this query contact me at:  [phone]    Dr. Fuller:     82 year old patient with history of diabetes mellitus and emphysema was admitted on 2022 with sepsis and pneumonia. Patient received Rocephin x 7 days. After study, can the pneumonia be further specified as:    Gram negative pneumonia, excluding Haemophilus influenzae  Bacterial pneumonia, unspecified organism  Other, please specify: _____________  Unable to clinically determine     By submitting this query, we are merely seeking further clarification of documentation to accurately reflect all conditions that you are monitoring, evaluating, treating or that extend the hospitalization or utilize additional resources of care. Please utilize your independent clinical judgment when addressing the question(s) above.     This query and your response, once completed, will be entered into the legal medical record.    Sincerely,  Judith Wiley RN, BSN, CCDS  Clinical Documentation Integrity Program   [email or web address]    For information on Fall & Injury Prevention, visit: [email or web address] OR  [email or web address] OR  https://www.cdc.gov/steadi/patient.html

## 2024-08-19 RX ORDER — HYDROCODONE BITARTRATE AND ACETAMINOPHEN 7.5; 325 MG/1; MG/1
TABLET ORAL
Qty: 240 TABLET | OUTPATIENT
Start: 2024-08-19

## 2024-09-19 NOTE — TELEPHONE ENCOUNTER
I called and spoke with Tracey (Patients daughter) and was told that the patient requested to switch to [facility] because she would feel more comfortable with a female provider. Per office protocol patients can switch providers only if it is male to female or female to male. I offered patient an appointment with Jermaine tomorrow and the patient accepted the appointment. Patient and her daughter both confirmed that they spoke with [facility] in the ER and was told the switch would be ok since they are requesting to switch from a male to a female provider.    3 = A little assistance

## 2024-12-21 ENCOUNTER — HOSPITAL ENCOUNTER (OUTPATIENT)
Facility: HOSPITAL | Age: 84
Setting detail: OBSERVATION
Discharge: HOME | End: 2024-12-23
Attending: EMERGENCY MEDICINE | Admitting: STUDENT IN AN ORGANIZED HEALTH CARE EDUCATION/TRAINING PROGRAM
Payer: MEDICARE

## 2024-12-21 ENCOUNTER — APPOINTMENT (OUTPATIENT)
Dept: GENERAL RADIOLOGY | Facility: HOSPITAL | Age: 84
End: 2024-12-21
Payer: MEDICARE

## 2024-12-21 DIAGNOSIS — R09.02 HYPOXIA: ICD-10-CM

## 2024-12-21 DIAGNOSIS — J44.1 COPD WITH ACUTE EXACERBATION: ICD-10-CM

## 2024-12-21 DIAGNOSIS — J20.5 ACUTE BRONCHITIS DUE TO RESPIRATORY SYNCYTIAL VIRUS (RSV): Primary | ICD-10-CM

## 2024-12-21 LAB
ALBUMIN SERPL-MCNC: 4.3 G/DL (ref 3.5–5.2)
ALBUMIN/GLOB SERPL: 1.5 G/DL
ALP SERPL-CCNC: 104 U/L (ref 39–117)
ALT SERPL W P-5'-P-CCNC: 11 U/L (ref 1–33)
ANION GAP SERPL CALCULATED.3IONS-SCNC: 15.7 MMOL/L (ref 5–15)
AST SERPL-CCNC: 22 U/L (ref 1–32)
B PARAPERT DNA SPEC QL NAA+PROBE: NOT DETECTED
B PERT DNA SPEC QL NAA+PROBE: NOT DETECTED
BASOPHILS # BLD AUTO: 0.07 10*3/MM3 (ref 0–0.2)
BASOPHILS NFR BLD AUTO: 0.8 % (ref 0–1.5)
BILIRUB SERPL-MCNC: 0.3 MG/DL (ref 0–1.2)
BUN SERPL-MCNC: 14 MG/DL (ref 8–23)
BUN/CREAT SERPL: 16.9 (ref 7–25)
C PNEUM DNA NPH QL NAA+NON-PROBE: NOT DETECTED
CALCIUM SPEC-SCNC: 9.3 MG/DL (ref 8.6–10.5)
CHLORIDE SERPL-SCNC: 98 MMOL/L (ref 98–107)
CO2 SERPL-SCNC: 23.3 MMOL/L (ref 22–29)
CREAT SERPL-MCNC: 0.83 MG/DL (ref 0.57–1)
D-LACTATE SERPL-SCNC: 2 MMOL/L (ref 0.5–2)
DEPRECATED RDW RBC AUTO: 40.3 FL (ref 37–54)
EGFRCR SERPLBLD CKD-EPI 2021: 69.2 ML/MIN/1.73
EOSINOPHIL # BLD AUTO: 0.29 10*3/MM3 (ref 0–0.4)
EOSINOPHIL NFR BLD AUTO: 3.2 % (ref 0.3–6.2)
ERYTHROCYTE [DISTWIDTH] IN BLOOD BY AUTOMATED COUNT: 13.1 % (ref 12.3–15.4)
FLUAV SUBTYP SPEC NAA+PROBE: NOT DETECTED
FLUBV RNA ISLT QL NAA+PROBE: NOT DETECTED
GLOBULIN UR ELPH-MCNC: 2.8 GM/DL
GLUCOSE SERPL-MCNC: 100 MG/DL (ref 65–99)
HADV DNA SPEC NAA+PROBE: NOT DETECTED
HCOV 229E RNA SPEC QL NAA+PROBE: NOT DETECTED
HCOV HKU1 RNA SPEC QL NAA+PROBE: NOT DETECTED
HCOV NL63 RNA SPEC QL NAA+PROBE: NOT DETECTED
HCOV OC43 RNA SPEC QL NAA+PROBE: NOT DETECTED
HCT VFR BLD AUTO: 40.3 % (ref 34–46.6)
HGB BLD-MCNC: 13.6 G/DL (ref 12–15.9)
HMPV RNA NPH QL NAA+NON-PROBE: NOT DETECTED
HPIV1 RNA ISLT QL NAA+PROBE: NOT DETECTED
HPIV2 RNA SPEC QL NAA+PROBE: NOT DETECTED
HPIV3 RNA NPH QL NAA+PROBE: NOT DETECTED
HPIV4 P GENE NPH QL NAA+PROBE: NOT DETECTED
IMM GRANULOCYTES # BLD AUTO: 0.05 10*3/MM3 (ref 0–0.05)
IMM GRANULOCYTES NFR BLD AUTO: 0.5 % (ref 0–0.5)
LYMPHOCYTES # BLD AUTO: 3.1 10*3/MM3 (ref 0.7–3.1)
LYMPHOCYTES NFR BLD AUTO: 34 % (ref 19.6–45.3)
M PNEUMO IGG SER IA-ACNC: NOT DETECTED
MCH RBC QN AUTO: 29.1 PG (ref 26.6–33)
MCHC RBC AUTO-ENTMCNC: 33.7 G/DL (ref 31.5–35.7)
MCV RBC AUTO: 86.1 FL (ref 79–97)
MONOCYTES # BLD AUTO: 0.81 10*3/MM3 (ref 0.1–0.9)
MONOCYTES NFR BLD AUTO: 8.9 % (ref 5–12)
NEUTROPHILS NFR BLD AUTO: 4.8 10*3/MM3 (ref 1.7–7)
NEUTROPHILS NFR BLD AUTO: 52.6 % (ref 42.7–76)
NRBC BLD AUTO-RTO: 0 /100 WBC (ref 0–0.2)
NT-PROBNP SERPL-MCNC: <36 PG/ML (ref 0–1800)
PLATELET # BLD AUTO: 307 10*3/MM3 (ref 140–450)
PMV BLD AUTO: 10 FL (ref 6–12)
POTASSIUM SERPL-SCNC: 3.4 MMOL/L (ref 3.5–5.2)
PROCALCITONIN SERPL-MCNC: 0.05 NG/ML (ref 0–0.25)
PROT SERPL-MCNC: 7.1 G/DL (ref 6–8.5)
RBC # BLD AUTO: 4.68 10*6/MM3 (ref 3.77–5.28)
RHINOVIRUS RNA SPEC NAA+PROBE: NOT DETECTED
RSV RNA NPH QL NAA+NON-PROBE: DETECTED
SARS-COV-2 RNA NPH QL NAA+NON-PROBE: NOT DETECTED
SODIUM SERPL-SCNC: 137 MMOL/L (ref 136–145)
WBC NRBC COR # BLD AUTO: 9.12 10*3/MM3 (ref 3.4–10.8)

## 2024-12-21 PROCEDURE — 85025 COMPLETE CBC W/AUTO DIFF WBC: CPT | Performed by: EMERGENCY MEDICINE

## 2024-12-21 PROCEDURE — 84145 PROCALCITONIN (PCT): CPT | Performed by: EMERGENCY MEDICINE

## 2024-12-21 PROCEDURE — 83880 ASSAY OF NATRIURETIC PEPTIDE: CPT | Performed by: EMERGENCY MEDICINE

## 2024-12-21 PROCEDURE — 80053 COMPREHEN METABOLIC PANEL: CPT | Performed by: EMERGENCY MEDICINE

## 2024-12-21 PROCEDURE — 25010000002 METHYLPREDNISOLONE PER 40 MG: Performed by: INTERNAL MEDICINE

## 2024-12-21 PROCEDURE — 94640 AIRWAY INHALATION TREATMENT: CPT

## 2024-12-21 PROCEDURE — G0378 HOSPITAL OBSERVATION PER HR: HCPCS

## 2024-12-21 PROCEDURE — 71045 X-RAY EXAM CHEST 1 VIEW: CPT

## 2024-12-21 PROCEDURE — 0202U NFCT DS 22 TRGT SARS-COV-2: CPT | Performed by: EMERGENCY MEDICINE

## 2024-12-21 PROCEDURE — 96376 TX/PRO/DX INJ SAME DRUG ADON: CPT

## 2024-12-21 PROCEDURE — 99285 EMERGENCY DEPT VISIT HI MDM: CPT

## 2024-12-21 PROCEDURE — 83605 ASSAY OF LACTIC ACID: CPT | Performed by: EMERGENCY MEDICINE

## 2024-12-21 PROCEDURE — 96374 THER/PROPH/DIAG INJ IV PUSH: CPT

## 2024-12-21 PROCEDURE — 94799 UNLISTED PULMONARY SVC/PX: CPT

## 2024-12-21 PROCEDURE — 93005 ELECTROCARDIOGRAM TRACING: CPT | Performed by: EMERGENCY MEDICINE

## 2024-12-21 PROCEDURE — 36415 COLL VENOUS BLD VENIPUNCTURE: CPT

## 2024-12-21 PROCEDURE — 87040 BLOOD CULTURE FOR BACTERIA: CPT | Performed by: EMERGENCY MEDICINE

## 2024-12-21 PROCEDURE — 99291 CRITICAL CARE FIRST HOUR: CPT

## 2024-12-21 RX ORDER — ONDANSETRON 4 MG/1
4 TABLET, ORALLY DISINTEGRATING ORAL EVERY 6 HOURS PRN
Status: DISCONTINUED | OUTPATIENT
Start: 2024-12-21 | End: 2024-12-23 | Stop reason: HOSPADM

## 2024-12-21 RX ORDER — BISACODYL 10 MG
10 SUPPOSITORY, RECTAL RECTAL DAILY PRN
Status: DISCONTINUED | OUTPATIENT
Start: 2024-12-21 | End: 2024-12-23 | Stop reason: HOSPADM

## 2024-12-21 RX ORDER — ONDANSETRON 4 MG/1
4 TABLET, FILM COATED ORAL EVERY 8 HOURS PRN
COMMUNITY

## 2024-12-21 RX ORDER — VENLAFAXINE HYDROCHLORIDE 75 MG/1
75 CAPSULE, EXTENDED RELEASE ORAL DAILY
COMMUNITY

## 2024-12-21 RX ORDER — NICOTINE POLACRILEX 4 MG
15 LOZENGE BUCCAL
Status: DISCONTINUED | OUTPATIENT
Start: 2024-12-21 | End: 2024-12-23 | Stop reason: HOSPADM

## 2024-12-21 RX ORDER — INSULIN LISPRO 100 [IU]/ML
2-7 INJECTION, SOLUTION INTRAVENOUS; SUBCUTANEOUS
Status: DISCONTINUED | OUTPATIENT
Start: 2024-12-22 | End: 2024-12-23 | Stop reason: HOSPADM

## 2024-12-21 RX ORDER — POLYETHYLENE GLYCOL 3350 17 G/17G
17 POWDER, FOR SOLUTION ORAL DAILY PRN
COMMUNITY

## 2024-12-21 RX ORDER — METHYLPREDNISOLONE SODIUM SUCCINATE 125 MG/2ML
125 INJECTION, POWDER, LYOPHILIZED, FOR SOLUTION INTRAMUSCULAR; INTRAVENOUS ONCE
Status: DISCONTINUED | OUTPATIENT
Start: 2024-12-21 | End: 2024-12-21

## 2024-12-21 RX ORDER — BISACODYL 5 MG/1
5 TABLET, DELAYED RELEASE ORAL DAILY PRN
Status: DISCONTINUED | OUTPATIENT
Start: 2024-12-21 | End: 2024-12-23 | Stop reason: HOSPADM

## 2024-12-21 RX ORDER — POLYETHYLENE GLYCOL 3350 17 G/17G
17 POWDER, FOR SOLUTION ORAL DAILY PRN
Status: DISCONTINUED | OUTPATIENT
Start: 2024-12-21 | End: 2024-12-23 | Stop reason: HOSPADM

## 2024-12-21 RX ORDER — ASPIRIN 81 MG/1
81 TABLET, CHEWABLE ORAL NIGHTLY
Status: DISCONTINUED | OUTPATIENT
Start: 2024-12-21 | End: 2024-12-23 | Stop reason: HOSPADM

## 2024-12-21 RX ORDER — DOCUSATE SODIUM 100 MG/1
100 CAPSULE, LIQUID FILLED ORAL ONCE AS NEEDED
COMMUNITY

## 2024-12-21 RX ORDER — TRAZODONE HYDROCHLORIDE 50 MG/1
50 TABLET, FILM COATED ORAL NIGHTLY
Status: DISCONTINUED | OUTPATIENT
Start: 2024-12-21 | End: 2024-12-23 | Stop reason: HOSPADM

## 2024-12-21 RX ORDER — AMOXICILLIN 250 MG
2 CAPSULE ORAL 2 TIMES DAILY PRN
Status: DISCONTINUED | OUTPATIENT
Start: 2024-12-21 | End: 2024-12-23 | Stop reason: HOSPADM

## 2024-12-21 RX ORDER — ACETAMINOPHEN 325 MG/1
650 TABLET ORAL EVERY 4 HOURS PRN
Status: DISCONTINUED | OUTPATIENT
Start: 2024-12-21 | End: 2024-12-23 | Stop reason: HOSPADM

## 2024-12-21 RX ORDER — MIRTAZAPINE 15 MG/1
15 TABLET, FILM COATED ORAL NIGHTLY
Status: DISCONTINUED | OUTPATIENT
Start: 2024-12-21 | End: 2024-12-23 | Stop reason: HOSPADM

## 2024-12-21 RX ORDER — PANTOPRAZOLE SODIUM 40 MG/1
40 TABLET, DELAYED RELEASE ORAL
Status: DISCONTINUED | OUTPATIENT
Start: 2024-12-22 | End: 2024-12-23 | Stop reason: HOSPADM

## 2024-12-21 RX ORDER — IPRATROPIUM BROMIDE AND ALBUTEROL SULFATE 2.5; .5 MG/3ML; MG/3ML
3 SOLUTION RESPIRATORY (INHALATION) ONCE
Status: COMPLETED | OUTPATIENT
Start: 2024-12-21 | End: 2024-12-21

## 2024-12-21 RX ORDER — DONEPEZIL HYDROCHLORIDE 10 MG/1
5 TABLET, FILM COATED ORAL NIGHTLY
Status: DISCONTINUED | OUTPATIENT
Start: 2024-12-21 | End: 2024-12-23 | Stop reason: HOSPADM

## 2024-12-21 RX ORDER — OXYBUTYNIN CHLORIDE 5 MG/1
5 TABLET, EXTENDED RELEASE ORAL DAILY
Status: DISCONTINUED | OUTPATIENT
Start: 2024-12-22 | End: 2024-12-23 | Stop reason: HOSPADM

## 2024-12-21 RX ORDER — ACETAMINOPHEN 650 MG/1
650 SUPPOSITORY RECTAL EVERY 4 HOURS PRN
Status: DISCONTINUED | OUTPATIENT
Start: 2024-12-21 | End: 2024-12-23 | Stop reason: HOSPADM

## 2024-12-21 RX ORDER — VENLAFAXINE HYDROCHLORIDE 75 MG/1
75 CAPSULE, EXTENDED RELEASE ORAL DAILY
Status: DISCONTINUED | OUTPATIENT
Start: 2024-12-22 | End: 2024-12-23 | Stop reason: HOSPADM

## 2024-12-21 RX ORDER — ONDANSETRON 2 MG/ML
4 INJECTION INTRAMUSCULAR; INTRAVENOUS EVERY 6 HOURS PRN
Status: DISCONTINUED | OUTPATIENT
Start: 2024-12-21 | End: 2024-12-23 | Stop reason: HOSPADM

## 2024-12-21 RX ORDER — HYDROCODONE BITARTRATE AND ACETAMINOPHEN 5; 325 MG/1; MG/1
1 TABLET ORAL EVERY 6 HOURS PRN
Status: DISCONTINUED | OUTPATIENT
Start: 2024-12-21 | End: 2024-12-23 | Stop reason: HOSPADM

## 2024-12-21 RX ORDER — IPRATROPIUM BROMIDE AND ALBUTEROL SULFATE 2.5; .5 MG/3ML; MG/3ML
3 SOLUTION RESPIRATORY (INHALATION)
Status: DISCONTINUED | OUTPATIENT
Start: 2024-12-21 | End: 2024-12-23 | Stop reason: HOSPADM

## 2024-12-21 RX ORDER — TRAZODONE HYDROCHLORIDE 50 MG/1
50 TABLET, FILM COATED ORAL NIGHTLY
COMMUNITY

## 2024-12-21 RX ORDER — IBUPROFEN 400 MG/1
400 TABLET, FILM COATED ORAL EVERY 6 HOURS PRN
COMMUNITY

## 2024-12-21 RX ORDER — BUDESONIDE AND FORMOTEROL FUMARATE DIHYDRATE 160; 4.5 UG/1; UG/1
2 AEROSOL RESPIRATORY (INHALATION)
Status: DISCONTINUED | OUTPATIENT
Start: 2024-12-21 | End: 2024-12-23 | Stop reason: HOSPADM

## 2024-12-21 RX ORDER — DOCUSATE SODIUM 100 MG/1
100 CAPSULE, LIQUID FILLED ORAL ONCE AS NEEDED
Status: DISCONTINUED | OUTPATIENT
Start: 2024-12-21 | End: 2024-12-23 | Stop reason: HOSPADM

## 2024-12-21 RX ORDER — ALBUTEROL SULFATE 0.83 MG/ML
2.5 SOLUTION RESPIRATORY (INHALATION) EVERY 6 HOURS PRN
Status: DISCONTINUED | OUTPATIENT
Start: 2024-12-21 | End: 2024-12-23 | Stop reason: HOSPADM

## 2024-12-21 RX ORDER — METHYLPREDNISOLONE SODIUM SUCCINATE 40 MG/ML
40 INJECTION, POWDER, LYOPHILIZED, FOR SOLUTION INTRAMUSCULAR; INTRAVENOUS EVERY 8 HOURS
Status: DISCONTINUED | OUTPATIENT
Start: 2024-12-21 | End: 2024-12-22

## 2024-12-21 RX ORDER — DEXTROSE MONOHYDRATE 25 G/50ML
25 INJECTION, SOLUTION INTRAVENOUS
Status: DISCONTINUED | OUTPATIENT
Start: 2024-12-21 | End: 2024-12-23 | Stop reason: HOSPADM

## 2024-12-21 RX ORDER — IBUPROFEN 600 MG/1
1 TABLET ORAL
Status: DISCONTINUED | OUTPATIENT
Start: 2024-12-21 | End: 2024-12-23 | Stop reason: HOSPADM

## 2024-12-21 RX ORDER — AMLODIPINE BESYLATE 2.5 MG/1
2.5 TABLET ORAL NIGHTLY
Status: DISCONTINUED | OUTPATIENT
Start: 2024-12-21 | End: 2024-12-23 | Stop reason: HOSPADM

## 2024-12-21 RX ORDER — SODIUM CHLORIDE 0.9 % (FLUSH) 0.9 %
10 SYRINGE (ML) INJECTION AS NEEDED
Status: DISCONTINUED | OUTPATIENT
Start: 2024-12-21 | End: 2024-12-23 | Stop reason: HOSPADM

## 2024-12-21 RX ORDER — ACETAMINOPHEN 160 MG/5ML
650 SOLUTION ORAL EVERY 4 HOURS PRN
Status: DISCONTINUED | OUTPATIENT
Start: 2024-12-21 | End: 2024-12-23 | Stop reason: HOSPADM

## 2024-12-21 RX ADMIN — METHYLPREDNISOLONE SODIUM SUCCINATE 40 MG: 40 INJECTION, POWDER, FOR SOLUTION INTRAMUSCULAR; INTRAVENOUS at 20:00

## 2024-12-21 RX ADMIN — ACETAMINOPHEN 650 MG: 325 TABLET ORAL at 22:35

## 2024-12-21 RX ADMIN — IPRATROPIUM BROMIDE AND ALBUTEROL SULFATE 3 ML: .5; 3 SOLUTION RESPIRATORY (INHALATION) at 18:33

## 2024-12-21 NOTE — ED PROVIDER NOTES
EMERGENCY DEPARTMENT ENCOUNTER  Room Number:  25/25  PCP: Provider, No Known  Independent Historians: Patient, Family, and EMS      HPI:  Chief Complaint: had concerns including Cough and Shortness of Breath.     A complete HPI/ROS/PMH/PSH/SH/FH are unobtainable due to: None    Chronic or social conditions impacting patient care (Social Determinants of Health): None      Context: Kandace Andrade is a 84 y.o. female with a medical history of arthritis, asthma, COPD, IBS and diabetes who presents to the ED c/o acute cough, congestion and malaise for the past 3 days.  Today she was feeling much more short of breath with her normal activities.  She denies fevers.  She denies chest pain.  She has not had any vomiting or diarrhea symptoms.  She denies any leg swelling.  The staff at her assisted living facility had ordered a chest x-ray.  However the results would not be available for quite some time due to a backlog.  So they sent her here for further consultation.      Review of prior external notes (non-ED) -and- Review of prior external test results outside of this encounter: Independently reviewed the discharge summary from April 28, 2023.  At that time, patient was admitted for flulike symptoms and ultimately diagnosed with UTI sepsis.    Prescription drug monitoring program review: MARGARET reviewed by Lacey Jean MD, Masood Carty MD   N/A and MARGARET query complete and reviewed. Patient receives regular prescriptions for controlled substances.    PAST MEDICAL HISTORY  Active Ambulatory Problems     Diagnosis Date Noted    Vitamin D deficiency     KINDRA (obstructive sleep apnea)     IBS (irritable bowel syndrome)     Essential hypertension     Herpes zoster     Arthritis     Anxiety and depression     Other emphysema 08/30/2019    Acute seasonal allergic rhinitis due to pollen 08/30/2019    Paroxysmal atrial fibrillation     Osteoporosis     Mixed hyperlipidemia     Encounter for Medicare annual wellness  exam 11/22/2019    COPD (chronic obstructive pulmonary disease) 11/22/2019    Mixed stress and urge urinary incontinence 02/25/2020    Type 2 diabetes mellitus without complication, without long-term current use of insulin 02/26/2020    Arthritis of both hips 02/27/2020    Autonomic neuropathy 10/19/2020    GERD without esophagitis 11/10/2020    C. difficile colitis 11/25/2020    Hypokalemia 11/25/2020    Decreased hearing of both ears 04/12/2021    Vertigo 04/12/2021    Hypercholesterolemia 11/23/2021    Primary insomnia 12/28/2021    Generalized weakness 02/18/2022    Other microscopic hematuria 02/18/2022    Confusion 07/18/2022    Dehydration 07/19/2022    Metabolic encephalopathy 07/20/2022    Left ankle swelling 08/04/2022    Overactive bladder 11/03/2022    Restless leg syndrome 12/19/2022    Edema of both legs 02/06/2023    Other fatigue 02/06/2023    Carotid artery stenosis, asymptomatic, bilateral 03/01/2023    Memory deficit 04/20/2023    Imbalance 04/20/2023    Dementia, old age 04/22/2023    Sepsis with acute hypoxic respiratory failure and septic shock, due to unspecified organism 04/24/2023     Resolved Ambulatory Problems     Diagnosis Date Noted    Hyperglycemia     Hyperlipidemia     Stage 3 chronic kidney disease     Atrial fibrillation     Acute non-recurrent maxillary sinusitis 11/22/2019    Acute UTI 10/19/2020    Symptomatic hypotension 10/19/2020    Impacted cerumen of left ear 11/10/2020    Pancolitis 11/24/2020    Hospital discharge follow-up 12/07/2020    Diarrhea 03/29/2021    Nausea 04/12/2021    Acute cystitis with hematuria 07/18/2022    Acute UTI (urinary tract infection) 07/19/2022    Abdominal pain 07/19/2022    Chronic diarrhea 07/19/2022    UTI (urinary tract infection) 07/20/2022    Diarrhea 09/22/2022    Pneumonia of right middle lobe due to infectious organism 12/08/2022    Sepsis due to pneumonia 12/09/2022    Acute hypoxemic respiratory failure 12/21/2022     Past Medical  History:   Diagnosis Date    Asthma     Diabetes mellitus     Diverticulosis     Fibula fracture     History of lumbosacral spine surgery     Hypertension          PAST SURGICAL HISTORY  Past Surgical History:   Procedure Laterality Date    APPENDECTOMY      BACK SURGERY      lower x2    CATARACT EXTRACTION      COLONOSCOPY      patient doesn't recall     COLONOSCOPY N/A 2021    Procedure: COLONOSCOPY to cecum with cecal biopsies;  Surgeon: Jarrod Duggan MD;  Location:  [facility];  Service: Gastroenterology;  Laterality: N/A;  pre: diarhhea  post: diverticulosis, hemorrhoids    HYSTERECTOMY      partial    SHOULDER SURGERY Right     SHOULDER SURGERY      SIGMOIDOSCOPY N/A 10/19/2022    Procedure: SIGMOIDOSCOPY FLEXIBLE TO SIGMOID COLON WITH BIOPSIES;  Surgeon: Tonja Tanner MD;  Location:  [facility];  Service: Gastroenterology;  Laterality: N/A;  PRE: DIARRHEA  POST: HEMORRHOIDS    TONSILLECTOMY      UPPER GASTROINTESTINAL ENDOSCOPY      patient doesn't recall          FAMILY HISTORY  Family History   Problem Relation Age of Onset    Heart disease Mother     Lung cancer Mother     Diabetes Father     Liver disease Father     Heart disease Paternal Uncle     Malig Hyperthermia Neg Hx          SOCIAL HISTORY  Social History     Socioeconomic History    Marital status:    Tobacco Use    Smoking status: Former     Current packs/day: 0.00     Average packs/day: 1 pack/day for 25.0 years (25.0 ttl pk-yrs)     Types: Cigarettes     Start date: 1983     Quit date: 2008     Years since quittin.6    Smokeless tobacco: Never    Tobacco comments:     CAFFEINE USE - 1 CUP COFFEE/ DIET COKE   Vaping Use    Vaping status: Never Used   Substance and Sexual Activity    Alcohol use: No    Drug use: No    Sexual activity: Defer         ALLERGIES  Penicillins, Meclizine hcl, Bactrim [sulfamethoxazole-trimethoprim], Flagyl [metronidazole], Meclizine, and Statins      REVIEW OF SYSTEMS  Review of  Systems  Included in HPI  All systems reviewed and negative except for those discussed in HPI.      PHYSICAL EXAM    I have reviewed the triage vital signs and nursing notes.    ED Triage Vitals [12/21/24 1718]   Temp Heart Rate Resp BP SpO2   98.2 °F (36.8 °C) 94 21 136/68 (!) 88 %      Temp src Heart Rate Source Patient Position BP Location FiO2 (%)   Tympanic Monitor Sitting Left arm --       Physical Exam  GENERAL: alert, appears fatigued but no acute distress  SKIN: Warm, dry rashes  HENT: Normocephalic, atraumatic  EYES: no scleral icterus, normal conjunctivae  CV: regular rhythm, regular rate, normal pulses  RESPIRATORY: No stridor.  Mild accessory muscle use is noted during conversation.  Breath sounds are diminished at the bilateral bases.  There are very few wheezes and rhonchi noted bilaterally.  Occasional nonproductive cough is noted.  ABDOMEN: soft, nondistended, nontender  MUSCULOSKELETAL: no deformity, no edema of the ankles or feet  NEURO: alert, moves all extremities, follows commands      LAB RESULTS  Recent Results (from the past 24 hours)   ECG 12 Lead Dyspnea    Collection Time: 12/21/24  5:36 PM   Result Value Ref Range    QT Interval 354 ms    QTC Interval 419 ms   Comprehensive Metabolic Panel    Collection Time: 12/21/24  6:20 PM    Specimen: Hand, Right; Blood   Result Value Ref Range    Glucose 100 (H) 65 - 99 mg/dL    BUN 14 8 - 23 mg/dL    Creatinine 0.83 0.57 - 1.00 mg/dL    Sodium 137 136 - 145 mmol/L    Potassium 3.4 (L) 3.5 - 5.2 mmol/L    Chloride 98 98 - 107 mmol/L    CO2 23.3 22.0 - 29.0 mmol/L    Calcium 9.3 8.6 - 10.5 mg/dL    Total Protein 7.1 6.0 - 8.5 g/dL    Albumin 4.3 3.5 - 5.2 g/dL    ALT (SGPT) 11 1 - 33 U/L    AST (SGOT) 22 1 - 32 U/L    Alkaline Phosphatase 104 39 - 117 U/L    Total Bilirubin 0.3 0.0 - 1.2 mg/dL    Globulin 2.8 gm/dL    A/G Ratio 1.5 g/dL    BUN/Creatinine Ratio 16.9 7.0 - 25.0    Anion Gap 15.7 (H) 5.0 - 15.0 mmol/L    eGFR 69.2 >60.0 mL/min/1.73    Procalcitonin    Collection Time: 12/21/24  6:20 PM    Specimen: Hand, Right; Blood   Result Value Ref Range    Procalcitonin 0.05 0.00 - 0.25 ng/mL   Lactic Acid, Plasma    Collection Time: 12/21/24  6:20 PM    Specimen: Hand, Right; Blood   Result Value Ref Range    Lactate 2.0 0.5 - 2.0 mmol/L   BNP    Collection Time: 12/21/24  6:20 PM    Specimen: Hand, Right; Blood   Result Value Ref Range    proBNP <36.0 0.0 - 1,800.0 pg/mL   CBC Auto Differential    Collection Time: 12/21/24  6:20 PM    Specimen: Hand, Right; Blood   Result Value Ref Range    WBC 9.12 3.40 - 10.80 10*3/mm3    RBC 4.68 3.77 - 5.28 10*6/mm3    Hemoglobin 13.6 12.0 - 15.9 g/dL    Hematocrit 40.3 34.0 - 46.6 %    MCV 86.1 79.0 - 97.0 fL    MCH 29.1 26.6 - 33.0 pg    MCHC 33.7 31.5 - 35.7 g/dL    RDW 13.1 12.3 - 15.4 %    RDW-SD 40.3 37.0 - 54.0 fl    MPV 10.0 6.0 - 12.0 fL    Platelets 307 140 - 450 10*3/mm3    Neutrophil % 52.6 42.7 - 76.0 %    Lymphocyte % 34.0 19.6 - 45.3 %    Monocyte % 8.9 5.0 - 12.0 %    Eosinophil % 3.2 0.3 - 6.2 %    Basophil % 0.8 0.0 - 1.5 %    Immature Grans % 0.5 0.0 - 0.5 %    Neutrophils, Absolute 4.80 1.70 - 7.00 10*3/mm3    Lymphocytes, Absolute 3.10 0.70 - 3.10 10*3/mm3    Monocytes, Absolute 0.81 0.10 - 0.90 10*3/mm3    Eosinophils, Absolute 0.29 0.00 - 0.40 10*3/mm3    Basophils, Absolute 0.07 0.00 - 0.20 10*3/mm3    Immature Grans, Absolute 0.05 0.00 - 0.05 10*3/mm3    nRBC 0.0 0.0 - 0.2 /100 WBC   Respiratory Panel PCR w/COVID-19(SARS-CoV-2) [facility]/NICK/ESMER/[facility] In-House, NP Swab in UTM/VTM, 2 HR TAT - Swab, Nasopharynx    Collection Time: 12/21/24  6:25 PM    Specimen: Nasopharynx; Swab   Result Value Ref Range    ADENOVIRUS, PCR Not Detected Not Detected    Coronavirus 229E Not Detected Not Detected    Coronavirus HKU1 Not Detected Not Detected    Coronavirus NL63 Not Detected Not Detected    Coronavirus OC43 Not Detected Not Detected    COVID19 Not Detected Not Detected - Ref. Range    Human  Metapneumovirus Not Detected Not Detected    Human Rhinovirus/Enterovirus Not Detected Not Detected    Influenza A PCR Not Detected Not Detected    Influenza B PCR Not Detected Not Detected    Parainfluenza Virus 1 Not Detected Not Detected    Parainfluenza Virus 2 Not Detected Not Detected    Parainfluenza Virus 3 Not Detected Not Detected    Parainfluenza Virus 4 Not Detected Not Detected    RSV, PCR Detected (A) Not Detected    Bordetella pertussis pcr Not Detected Not Detected    Bordetella parapertussis PCR Not Detected Not Detected    Chlamydophila pneumoniae PCR Not Detected Not Detected    Mycoplasma pneumo by PCR Not Detected Not Detected         RADIOLOGY  XR Chest 1 View    Result Date: 12/21/2024  XR CHEST 1 VW-  INDICATION: Cough  COMPARISON: Chest radiographs dating back to 10/19/2020      No focal consolidation. No pleural effusion or pneumothorax.  Normal size cardiomediastinal silhouette.  No focal osseous abnormality.   This report was finalized on 12/21/2024 6:15 PM by Dr. Hector Kaplan M.D on Workstation: [id number]         MEDICATIONS GIVEN IN ER  Medications   sodium chloride 0.9 % flush 10 mL (has no administration in time range)   acetaminophen (TYLENOL) tablet 650 mg (has no administration in time range)     Or   acetaminophen (TYLENOL) 160 MG/5ML oral solution 650 mg (has no administration in time range)     Or   acetaminophen (TYLENOL) suppository 650 mg (has no administration in time range)   ondansetron ODT (ZOFRAN-ODT) disintegrating tablet 4 mg (has no administration in time range)     Or   ondansetron (ZOFRAN) injection 4 mg (has no administration in time range)   melatonin tablet 2.5 mg (has no administration in time range)   methylPREDNISolone sodium succinate (SOLU-Medrol) injection 40 mg (has no administration in time range)   ipratropium-albuterol (DUO-NEB) nebulizer solution 3 mL (has no administration in time range)   albuterol (PROVENTIL) nebulizer solution 0.083% 2.5 mg/3mL  (has no administration in time range)   sennosides-docusate (PERICOLACE) 8.6-50 MG per tablet 2 tablet (has no administration in time range)     And   polyethylene glycol (MIRALAX) packet 17 g (has no administration in time range)     And   bisacodyl (DULCOLAX) EC tablet 5 mg (has no administration in time range)     And   bisacodyl (DULCOLAX) suppository 10 mg (has no administration in time range)   ipratropium-albuterol (DUO-NEB) nebulizer solution 3 mL (3 mL Nebulization Given 12/21/24 1833)         ORDERS PLACED DURING THIS VISIT:  Orders Placed This Encounter   Procedures    Respiratory Panel PCR w/COVID-19(SARS-CoV-2) [facility] In-House, NP Swab in UTM/VTM, 2 HR TAT - Swab, Nasopharynx    Blood Culture - Blood,    Blood Culture - Blood,    XR Chest 1 View    Comprehensive Metabolic Panel    Procalcitonin    Lactic Acid, Plasma    BNP    CBC Auto Differential    Basic Metabolic Panel    CBC (No Diff)    Diet: Cardiac; Healthy Heart (2-3 Na+); Fluid Consistency: Thin (IDDSI 0)    Monitor Blood Pressure    Continuous Pulse Oximetry    Vital Signs    Up with assistance    Daily Weights    Strict Intake & Output    Oral Care    Place Sequential Compression Device    Maintain Sequential Compression Device    Code Status and Medical Interventions: CPR (Attempt to Resuscitate); Full    ECG 12 Lead Dyspnea    Telemetry Scan    Telemetry Scan    Insert Peripheral IV    Initiate Observation Status    CBC & Differential         OUTPATIENT MEDICATION MANAGEMENT:  Current Facility-Administered Medications Ordered in Epic   Medication Dose Route Frequency Provider Last Rate Last Admin    acetaminophen (TYLENOL) tablet 650 mg  650 mg Oral Q4H PRN Lacey Jean MD        Or    acetaminophen (TYLENOL) 160 MG/5ML oral solution 650 mg  650 mg Oral Q4H PRN Lacey Jean MD        Or    acetaminophen (TYLENOL) suppository 650 mg  650 mg Rectal Q4H PRN Lacey Jean MD        albuterol  (PROVENTIL) nebulizer solution 0.083% 2.5 mg/3mL  2.5 mg Nebulization Q6H PRN Ted, Lacey Flowers MD        sennosides-docusate (PERICOLACE) 8.6-50 MG per tablet 2 tablet  2 tablet Oral BID PRN Ted, Lacey Flowers MD        And    polyethylene glycol (MIRALAX) packet 17 g  17 g Oral Daily PRN Stingrossana, Lacey Flowers MD        And    bisacodyl (DULCOLAX) EC tablet 5 mg  5 mg Oral Daily PRN Stingrossana, Lacey Flowers MD        And    bisacodyl (DULCOLAX) suppository 10 mg  10 mg Rectal Daily PRN Stingrossana, Lacey Flowers MD        ipratropium-albuterol (DUO-NEB) nebulizer solution 3 mL  3 mL Nebulization Q6H While Awake - RT Lacey Jean MD        melatonin tablet 2.5 mg  2.5 mg Oral Nightly PRN Stingrossana, Lacey Flowers MD        methylPREDNISolone sodium succinate (SOLU-Medrol) injection 40 mg  40 mg Intravenous Q8H Lacey Jean MD        ondansetron ODT (ZOFRAN-ODT) disintegrating tablet 4 mg  4 mg Oral Q6H PRN Ted, Lacey Flowers MD        Or    ondansetron (ZOFRAN) injection 4 mg  4 mg Intravenous Q6H PRN Ted, Lacey Flowers MD        sodium chloride 0.9 % flush 10 mL  10 mL Intravenous PRN Masood Carty MD         Current Outpatient Medications Ordered in Epic   Medication Sig Dispense Refill    amLODIPine (NORVASC) 2.5 MG tablet TAKE 1 TABLET BY MOUTH DAILY 90 tablet 3    aspirin 81 MG chewable tablet Chew 1 tablet Daily.      baclofen (LIORESAL) 10 MG tablet Take 1 tablet by mouth 3 (Three) Times a Day. 30 tablet 3    budesonide-formoterol (SYMBICORT) 160-4.5 MCG/ACT inhaler Inhale 2 puffs 2 (Two) Times a Day. 1 each 3    donepezil (ARICEPT) 5 MG tablet TAKE 1 TABLET BY MOUTH EVERY NIGHT 30 tablet 3    DULoxetine (CYMBALTA) 60 MG capsule Take 1 capsule by mouth Daily.      escitalopram (LEXAPRO) 20 MG tablet TAKE 1 TABLET BY MOUTH DAILY 30 tablet 1    HYDROcodone-acetaminophen (NORCO) 7.5-325 MG per tablet Take 1 tablet by mouth Every 6 (Six) Hours As Needed. for pain       ipratropium-albuterol (DUO-NEB) 0.5-2.5 mg/3 ml nebulizer Take 3 mL by nebulization 4 (Four) Times a Day. 360 mL 3    LORazepam (ATIVAN) 1 MG tablet Take 0.5 tablets by mouth 2 (Two) Times a Day As Needed (Muscle spasms). 6 tablet 0    magnesium oxide (MAG-OX) 400 MG tablet Take 1 tablet by mouth Daily. 90 tablet 3    methocarbamol (ROBAXIN) 500 MG tablet Take 1 tablet by mouth 4 (Four) Times a Day. 40 tablet 0    mirtazapine (REMERON) 15 MG tablet Take 1 tablet by mouth every night at bedtime.      omeprazole (priLOSEC) 40 MG capsule TAKE 1 CAPSULE BY MOUTH DAILY 90 capsule 1    oxyCODONE-acetaminophen (PERCOCET) 5-325 MG per tablet Take 1 tablet by mouth Every 6 (Six) Hours As Needed.      [employer] Pusht[employer]x System solution cartridge Inject 3.5 mL under the skin into the appropriate area as directed Every 30 (Thirty) Days.      solifenacin (VESICARE) 5 MG tablet TAKE 1 TABLET BY MOUTH DAILY 90 tablet 1    vitamin D (ERGOCALCIFEROL) 1.25 MG (66898 UT) capsule capsule TAKE 1 CAPSULE BY MOUTH EVERY 7 DAYS 5 capsule 2         PROCEDURES  Procedures      Critical care provider statement:    Critical care time (minutes): 31.   Critical care time was exclusive of:  Separately billable procedures and treating other patients   Critical care was necessary to treat or prevent imminent or life-threatening deterioration of the following conditions:  Respiratory Failure   Critical care was time spent personally by me on the following activities:  Development of treatment plan with patient or surrogate, discussions with consultants, evaluation of patient's response to treatment, examination of patient, obtaining history from patient or surrogate, ordering and performing treatments and interventions, ordering and review of laboratory studies, ordering and review of radiographic studies, pulse oximetry, re-evaluation of patient's condition and review of old charts. Critical Care indicators: Hypoxia / hypoxemia       PROGRESS, DATA  ANALYSIS, CONSULTS, AND MEDICAL DECISION MAKING  All labs have been independently interpreted by me.  All radiology studies have been reviewed by me. All EKG's have been independently viewed and interpreted by me.  Discussion below represents my analysis of pertinent findings related to patient's condition, differential diagnosis, treatment plan and final disposition.    Differential diagnosis includes but is not limited to CHF exacerbation, pneumonia, pneumothorax, COPD exacerbation, COVID-19, influenza, URI, pleural effusion.    Clinical Scores:                   ED Course as of 12/21/24 1959   Sat Dec 21, 2024   1757 EKG         EKG time/Interp time: 1736/1742  Rhythm/Rate: Sinus rhythm, 84 bpm  P waves and [place]: Present, normal interval, 149 ms  QRS, axis: 89 ms, narrow complex, normal axis  ST and T waves: No acute ischemic changes are present.  Independently interpreted by me contemporaneously with treatment   [MAUREEN]   1758 SpO2(!): 88 % [MAUREEN]   1758 Flow (L/min) (Oxygen Therapy): 6 [MAUREEN]   1908 I independently interpreted the chest x-ray and my findings are: No pneumothorax, no effusion and no consolidation [MAUREEN]   1938 I discussed with Dr. Jean from [facility] about the patient.  She agrees to admit her to the hospitalist service for further care tonight. [MAUREEN]   1958 Oxygenation improved after DuoNeb therapy administered.  We were able to titrate her nasal cannula oxygen down from 6 L to 3 L and she is maintaining her sats in the low 90s at this time.  Given her age and comorbidities, I certainly think she needs to be admitted for further pulmonary supportive measures tonight in the context of this RSV infection.  She is agreeable with that plan as recommended.. [MAUREEN]      ED Course User Index  [MAUREEN] Masood Carty MD             AS OF 19:59 EST VITALS:    BP - 130/74  HR - 88  TEMP - 98.2 °F (36.8 °C) (Tympanic)  O2 SATS - 94%    COMPLEXITY OF CARE  The patient requires admission.      DIAGNOSIS  Final diagnoses:    Acute bronchitis due to respiratory syncytial virus (RSV)   COPD with acute exacerbation   Hypoxia         DISPOSITION  ED Disposition       ED Disposition   Decision to Admit    Condition   --    Comment   Level of Care: Telemetry [5]  Diagnosis: Acute bronchitis due to respiratory syncytial virus (RSV) [[id number]]  Admitting Physician: ZORA ADAM [[id number]]  Attending Physician: ZORA ADAM [[id number]]                  Please note that portions of this document were completed with a voice recognition program.    Note Disclaimer: At [facility], we believe that sharing information builds trust and better relationships. You are receiving this note because you recently visited [facility]. It is possible you will see health information before a provider has talked with you about it. This kind of information can be easy to misunderstand. To help you fully understand what it means for your health, we urge you to discuss this note with your provider.         Masood Carty MD  12/21/24 2000

## 2024-12-21 NOTE — Clinical Note
Level of Care: Telemetry [5]   Diagnosis: Acute bronchitis due to respiratory syncytial virus (RSV) [[id number]]   Admitting Physician: ZORA ADAM [[id number]]   Attending Physician: ZORA ADAM [[id number]]

## 2024-12-22 LAB
ANION GAP SERPL CALCULATED.3IONS-SCNC: 15.7 MMOL/L (ref 5–15)
BUN SERPL-MCNC: 17 MG/DL (ref 8–23)
BUN/CREAT SERPL: 21 (ref 7–25)
CALCIUM SPEC-SCNC: 9.3 MG/DL (ref 8.6–10.5)
CHLORIDE SERPL-SCNC: 99 MMOL/L (ref 98–107)
CO2 SERPL-SCNC: 22.3 MMOL/L (ref 22–29)
CREAT SERPL-MCNC: 0.81 MG/DL (ref 0.57–1)
DEPRECATED RDW RBC AUTO: 39.1 FL (ref 37–54)
EGFRCR SERPLBLD CKD-EPI 2021: 71.2 ML/MIN/1.73
ERYTHROCYTE [DISTWIDTH] IN BLOOD BY AUTOMATED COUNT: 13 % (ref 12.3–15.4)
GLUCOSE BLDC GLUCOMTR-MCNC: 159 MG/DL (ref 70–130)
GLUCOSE BLDC GLUCOMTR-MCNC: 178 MG/DL (ref 70–130)
GLUCOSE BLDC GLUCOMTR-MCNC: 189 MG/DL (ref 70–130)
GLUCOSE BLDC GLUCOMTR-MCNC: 250 MG/DL (ref 70–130)
GLUCOSE SERPL-MCNC: 209 MG/DL (ref 65–99)
HCT VFR BLD AUTO: 38.3 % (ref 34–46.6)
HGB BLD-MCNC: 13 G/DL (ref 12–15.9)
MCH RBC QN AUTO: 28.3 PG (ref 26.6–33)
MCHC RBC AUTO-ENTMCNC: 33.9 G/DL (ref 31.5–35.7)
MCV RBC AUTO: 83.4 FL (ref 79–97)
PLATELET # BLD AUTO: 317 10*3/MM3 (ref 140–450)
PMV BLD AUTO: 10.2 FL (ref 6–12)
POTASSIUM SERPL-SCNC: 4.3 MMOL/L (ref 3.5–5.2)
QT INTERVAL: 354 MS
QTC INTERVAL: 419 MS
RBC # BLD AUTO: 4.59 10*6/MM3 (ref 3.77–5.28)
SODIUM SERPL-SCNC: 137 MMOL/L (ref 136–145)
WBC NRBC COR # BLD AUTO: 5.11 10*3/MM3 (ref 3.4–10.8)

## 2024-12-22 PROCEDURE — 82948 REAGENT STRIP/BLOOD GLUCOSE: CPT

## 2024-12-22 PROCEDURE — 25010000002 METHYLPREDNISOLONE PER 40 MG: Performed by: INTERNAL MEDICINE

## 2024-12-22 PROCEDURE — 97161 PT EVAL LOW COMPLEX 20 MIN: CPT

## 2024-12-22 PROCEDURE — 63710000001 INSULIN LISPRO (HUMAN) PER 5 UNITS: Performed by: INTERNAL MEDICINE

## 2024-12-22 PROCEDURE — 94799 UNLISTED PULMONARY SVC/PX: CPT

## 2024-12-22 PROCEDURE — G0378 HOSPITAL OBSERVATION PER HR: HCPCS

## 2024-12-22 PROCEDURE — 63710000001 PREDNISONE PER 1 MG: Performed by: INTERNAL MEDICINE

## 2024-12-22 PROCEDURE — 94664 DEMO&/EVAL PT USE INHALER: CPT

## 2024-12-22 PROCEDURE — 36415 COLL VENOUS BLD VENIPUNCTURE: CPT | Performed by: INTERNAL MEDICINE

## 2024-12-22 PROCEDURE — 94761 N-INVAS EAR/PLS OXIMETRY MLT: CPT

## 2024-12-22 PROCEDURE — 80048 BASIC METABOLIC PNL TOTAL CA: CPT | Performed by: INTERNAL MEDICINE

## 2024-12-22 PROCEDURE — 85027 COMPLETE CBC AUTOMATED: CPT | Performed by: INTERNAL MEDICINE

## 2024-12-22 RX ORDER — GUAIFENESIN 200 MG/10ML
100 LIQUID ORAL EVERY 4 HOURS PRN
Status: DISCONTINUED | OUTPATIENT
Start: 2024-12-22 | End: 2024-12-23 | Stop reason: HOSPADM

## 2024-12-22 RX ORDER — GUAIFENESIN 600 MG/1
1200 TABLET, EXTENDED RELEASE ORAL EVERY 12 HOURS SCHEDULED
Status: DISCONTINUED | OUTPATIENT
Start: 2024-12-22 | End: 2024-12-23 | Stop reason: HOSPADM

## 2024-12-22 RX ORDER — PREDNISONE 20 MG/1
40 TABLET ORAL
Status: DISCONTINUED | OUTPATIENT
Start: 2024-12-22 | End: 2024-12-23 | Stop reason: HOSPADM

## 2024-12-22 RX ADMIN — HYDROCODONE BITARTRATE AND ACETAMINOPHEN 1 TABLET: 5; 325 TABLET ORAL at 00:21

## 2024-12-22 RX ADMIN — OXYBUTYNIN CHLORIDE 5 MG: 5 TABLET, EXTENDED RELEASE ORAL at 08:38

## 2024-12-22 RX ADMIN — INSULIN LISPRO 3 UNITS: 100 INJECTION, SOLUTION INTRAVENOUS; SUBCUTANEOUS at 16:42

## 2024-12-22 RX ADMIN — BUDESONIDE AND FORMOTEROL FUMARATE DIHYDRATE 2 PUFF: 160; 4.5 AEROSOL RESPIRATORY (INHALATION) at 20:23

## 2024-12-22 RX ADMIN — DONEPEZIL HYDROCHLORIDE 5 MG: 10 TABLET, FILM COATED ORAL at 22:19

## 2024-12-22 RX ADMIN — PREDNISONE 40 MG: 20 TABLET ORAL at 11:46

## 2024-12-22 RX ADMIN — MIRTAZAPINE 15 MG: 15 TABLET, FILM COATED ORAL at 00:15

## 2024-12-22 RX ADMIN — INSULIN LISPRO 2 UNITS: 100 INJECTION, SOLUTION INTRAVENOUS; SUBCUTANEOUS at 08:38

## 2024-12-22 RX ADMIN — AMLODIPINE BESYLATE 2.5 MG: 2.5 TABLET ORAL at 22:19

## 2024-12-22 RX ADMIN — INSULIN LISPRO 2 UNITS: 100 INJECTION, SOLUTION INTRAVENOUS; SUBCUTANEOUS at 11:46

## 2024-12-22 RX ADMIN — IPRATROPIUM BROMIDE AND ALBUTEROL SULFATE 3 ML: 2.5; .5 SOLUTION RESPIRATORY (INHALATION) at 07:05

## 2024-12-22 RX ADMIN — TRAZODONE HYDROCHLORIDE 50 MG: 50 TABLET ORAL at 22:19

## 2024-12-22 RX ADMIN — GUAIFENESIN 100 MG: 100 SOLUTION ORAL at 00:26

## 2024-12-22 RX ADMIN — IPRATROPIUM BROMIDE AND ALBUTEROL SULFATE 3 ML: 2.5; .5 SOLUTION RESPIRATORY (INHALATION) at 12:00

## 2024-12-22 RX ADMIN — GUAIFENESIN 1200 MG: 600 TABLET, EXTENDED RELEASE ORAL at 11:47

## 2024-12-22 RX ADMIN — IPRATROPIUM BROMIDE AND ALBUTEROL SULFATE 3 ML: 2.5; .5 SOLUTION RESPIRATORY (INHALATION) at 20:23

## 2024-12-22 RX ADMIN — ASPIRIN 81 MG: 81 TABLET, CHEWABLE ORAL at 22:20

## 2024-12-22 RX ADMIN — GUAIFENESIN 1200 MG: 600 TABLET, EXTENDED RELEASE ORAL at 22:19

## 2024-12-22 RX ADMIN — TRAZODONE HYDROCHLORIDE 50 MG: 50 TABLET ORAL at 00:15

## 2024-12-22 RX ADMIN — PANTOPRAZOLE SODIUM 40 MG: 40 TABLET, DELAYED RELEASE ORAL at 06:10

## 2024-12-22 RX ADMIN — METHYLPREDNISOLONE SODIUM SUCCINATE 40 MG: 40 INJECTION, POWDER, FOR SOLUTION INTRAMUSCULAR; INTRAVENOUS at 06:48

## 2024-12-22 RX ADMIN — AMLODIPINE BESYLATE 2.5 MG: 2.5 TABLET ORAL at 00:15

## 2024-12-22 RX ADMIN — DONEPEZIL HYDROCHLORIDE 5 MG: 10 TABLET, FILM COATED ORAL at 00:15

## 2024-12-22 RX ADMIN — INSULIN LISPRO 2 UNITS: 100 INJECTION, SOLUTION INTRAVENOUS; SUBCUTANEOUS at 22:19

## 2024-12-22 RX ADMIN — ASPIRIN 81 MG: 81 TABLET, CHEWABLE ORAL at 00:15

## 2024-12-22 RX ADMIN — MIRTAZAPINE 15 MG: 15 TABLET, FILM COATED ORAL at 22:19

## 2024-12-22 RX ADMIN — GUAIFENESIN 100 MG: 100 SOLUTION ORAL at 23:59

## 2024-12-22 RX ADMIN — VENLAFAXINE HYDROCHLORIDE 75 MG: 75 CAPSULE, EXTENDED RELEASE ORAL at 08:38

## 2024-12-22 NOTE — PROGRESS NOTES
" LOS: 0 days     Name: Kandace Andrade  Age: 84 y.o.  Sex: female  :  1940  MRN: [id number]         Primary Care Physician: Provider, No Known    Subjective   Subjective  States that she feels better.  Less short of breath.  On 2 L which is her baseline.  No new complaints or acute overnight events.    Objective   Vital Signs  Temp:  [97.3 °F (36.3 °C)-98.2 °F (36.8 °C)] 97.3 °F (36.3 °C)  Heart Rate:  [67-95] 67  Resp:  [18-21] 18  BP: (105-136)/(68-92) 124/73  Body mass index is 23.96 kg/m².    Objective:  General Appearance:  Comfortable and in no acute distress.    Vital signs: (most recent): Blood pressure 124/73, pulse 67, temperature 97.3 °F (36.3 °C), temperature source Oral, resp. rate 18, height 160 cm (62.99\"), weight 61.3 kg (135 lb 3.2 oz), SpO2 92%.    Lungs:  Normal effort and normal respiratory rate.  There are decreased breath sounds.    Heart: Normal rate.  Regular rhythm.    Abdomen: Abdomen is soft.  Bowel sounds are normal.   There is no abdominal tenderness.     Extremities: There is no dependent edema or local swelling.    Neurological: Patient is alert and oriented to person, place and time.    Skin:  Warm and dry.                Results Review:       I reviewed the patient's new clinical results.    Results from last 7 days   Lab Units 24  0452 24  1820   WBC 10*3/mm3 5.11 9.12   HEMOGLOBIN g/dL 13.0 13.6   PLATELETS 10*3/mm3 317 307     Results from last 7 days   Lab Units 24  0452 24  1820   SODIUM mmol/L 137 137   POTASSIUM mmol/L 4.3 3.4*   CHLORIDE mmol/L 99 98   CO2 mmol/L 22.3 23.3   BUN mg/dL 17 14   CREATININE mg/dL 0.81 0.83   CALCIUM mg/dL 9.3 9.3   GLUCOSE mg/dL 209* 100*                 Scheduled Meds:   amLODIPine, 2.5 mg, Oral, Nightly  aspirin, 81 mg, Oral, Nightly  budesonide-formoterol, 2 puff, Inhalation, BID - RT  donepezil, 5 mg, Oral, Nightly  guaiFENesin, 1,200 mg, Oral, Q12H  insulin lispro, 2-7 Units, Subcutaneous, 4x Daily AC & at " Bedtime  ipratropium-albuterol, 3 mL, Nebulization, Q6H While Awake - RT  mirtazapine, 15 mg, Oral, Nightly  oxybutynin XL, 5 mg, Oral, Daily  pantoprazole, 40 mg, Oral, Q AM  predniSONE, 40 mg, Oral, Daily With Breakfast  traZODone, 50 mg, Oral, Nightly  venlafaxine XR, 75 mg, Oral, Daily      PRN Meds:     acetaminophen **OR** acetaminophen **OR** acetaminophen    albuterol    senna-docusate sodium **AND** polyethylene glycol **AND** bisacodyl **AND** bisacodyl    dextrose    dextrose    docusate sodium    glucagon (human recombinant)    guaifenesin    HYDROcodone-acetaminophen    melatonin    ondansetron ODT **OR** ondansetron    [COMPLETED] Insert Peripheral IV **AND** sodium chloride  Continuous Infusions:       Assessment & Plan   Active Hospital Problems    Diagnosis  POA    **Acute bronchitis due to respiratory syncytial virus (RSV) [J20.5]  Yes    Generalized weakness [R53.1]  Yes    Type 2 diabetes mellitus without complication, without long-term current use of insulin [E11.9]  Yes    COPD (chronic obstructive pulmonary disease) [J44.9]  Yes    Paroxysmal atrial fibrillation [I48.0]  Yes    KINDRA (obstructive sleep apnea) [G47.33]  Yes    Essential hypertension [I10]  Yes      Resolved Hospital Problems   No resolved problems to display.       Assessment & Plan    -She is not wheezing and reports improvement of her respiratory symptoms.  Switch IV Solu-Medrol to prednisone.  Continue bronchodilators and aggressive pulmonary hygiene measures  -Supportive care for RSV bronchiolitis  -Blood sugars look reasonably controlled at present.  Continue sliding scale  -Blood pressure reasonably controlled  -At her baseline oxygen requirement of 2 L    Dispo  May be ready for discharge tomorrow if she continues to do well.    Expected discharge date/ time has not been documented.     Phong Adan MD  [facility]  12/22/24  10:57 EST

## 2024-12-22 NOTE — H&P
HISTORY AND PHYSICAL   [facility]        Date of Admission: 2024  Patient Identification:  Name: Kandace Andrade  Age: 84 y.o.  Sex: female  :  1940  MRN: [id number]                     Primary Care Physician: Provider, No Known    Chief Complaint:  84 year old female presented to the emergency room with shortness of breath, wheezing and cough as well as malaise which started 3 days ago; she has a history of copd and is on home oxygen; she felt much worse today; she lives in assisted living; she is not sure about sick contacts there; she denies chest pain; no nausea or vomiting    History of Present Illness:   As above    Past Medical History:  Past Medical History:   Diagnosis Date    Acute seasonal allergic rhinitis due to pollen     Anxiety and depression     Arthritis     Asthma     C. difficile colitis     COPD (chronic obstructive pulmonary disease)     Diabetes mellitus     Pre diabetes    Diarrhea     Diverticulosis     Fibula fracture     Herpes zoster     History of lumbosacral spine surgery     Hypertension     IBS (irritable bowel syndrome)     Mixed hyperlipidemia     KINDRA (obstructive sleep apnea)     NO MACHINE AT THIS TIME    Osteoporosis     Paroxysmal atrial fibrillation     Vitamin D deficiency      Past Surgical History:  Past Surgical History:   Procedure Laterality Date    APPENDECTOMY      BACK SURGERY      lower x2    CATARACT EXTRACTION      COLONOSCOPY      patient doesn't recall     COLONOSCOPY N/A 2021    Procedure: COLONOSCOPY to cecum with cecal biopsies;  Surgeon: Jarrod Duggan MD;  Location: [facility] ENDOSCOPY;  Service: Gastroenterology;  Laterality: N/A;  pre: diarhhea  post: diverticulosis, hemorrhoids    HYSTERECTOMY      partial    SHOULDER SURGERY Right     SHOULDER SURGERY      SIGMOIDOSCOPY N/A 10/19/2022    Procedure: SIGMOIDOSCOPY FLEXIBLE TO SIGMOID COLON WITH BIOPSIES;  Surgeon: Tonja Tanner MD;  Location: [facility] ENDOSCOPY;  Service:  Gastroenterology;  Laterality: N/A;  PRE: DIARRHEA  POST: HEMORRHOIDS    TONSILLECTOMY      UPPER GASTROINTESTINAL ENDOSCOPY      patient doesn't recall       Home Meds:  Medications Prior to Admission   Medication Sig Dispense Refill Last Dose/Taking    amLODIPine (NORVASC) 2.5 MG tablet TAKE 1 TABLET BY MOUTH DAILY 90 tablet 3     aspirin 81 MG chewable tablet Chew 1 tablet Daily.       baclofen (LIORESAL) 10 MG tablet Take 1 tablet by mouth 3 (Three) Times a Day. 30 tablet 3     budesonide-formoterol (SYMBICORT) 160-4.5 MCG/ACT inhaler Inhale 2 puffs 2 (Two) Times a Day. 1 each 3     donepezil (ARICEPT) 5 MG tablet TAKE 1 TABLET BY MOUTH EVERY NIGHT 30 tablet 3     DULoxetine (CYMBALTA) 60 MG capsule Take 1 capsule by mouth Daily.       escitalopram (LEXAPRO) 20 MG tablet TAKE 1 TABLET BY MOUTH DAILY 30 tablet 1     HYDROcodone-acetaminophen (NORCO) 7.5-325 MG per tablet Take 1 tablet by mouth Every 6 (Six) Hours As Needed. for pain       ipratropium-albuterol (DUO-NEB) 0.5-2.5 mg/3 ml nebulizer Take 3 mL by nebulization 4 (Four) Times a Day. 360 mL 3     LORazepam (ATIVAN) 1 MG tablet Take 0.5 tablets by mouth 2 (Two) Times a Day As Needed (Muscle spasms). 6 tablet 0     magnesium oxide (MAG-OX) 400 MG tablet Take 1 tablet by mouth Daily. 90 tablet 3     methocarbamol (ROBAXIN) 500 MG tablet Take 1 tablet by mouth 4 (Four) Times a Day. 40 tablet 0     mirtazapine (REMERON) 15 MG tablet Take 1 tablet by mouth every night at bedtime.       omeprazole (priLOSEC) 40 MG capsule TAKE 1 CAPSULE BY MOUTH DAILY 90 capsule 1     oxyCODONE-acetaminophen (PERCOCET) 5-325 MG per tablet Take 1 tablet by mouth Every 6 (Six) Hours As Needed.       Repatha Pushtronex System solution cartridge Inject 3.5 mL under the skin into the appropriate area as directed Every 30 (Thirty) Days.       solifenacin (VESICARE) 5 MG tablet TAKE 1 TABLET BY MOUTH DAILY 90 tablet 1     vitamin D (ERGOCALCIFEROL) 1.25 MG (67522 UT) capsule capsule  TAKE 1 CAPSULE BY MOUTH EVERY 7 DAYS 5 capsule 2        Allergies:  Allergies   Allergen Reactions    Penicillins Hives and Other (See Comments)     Elevated BP... tolerated ceftriaxone 10/2020 admission    Meclizine Hcl Dizziness    Bactrim [Sulfamethoxazole-Trimethoprim] Hives    Flagyl [Metronidazole] Other (See Comments)     HYPERACTIVITY.. INSOMNIA     Meclizine Unknown - High Severity    Statins Other (See Comments)     Headache, elevated liver enzymes, blurred vision     Immunizations:  Immunization History   Administered Date(s) Administered    COVID-19 (PFIZER) 12YRS+ (COMIRNATY) 10/01/2024    COVID-19 (PFIZER) Purple Cap Monovalent 2021, 2021, 10/27/2021    Covid-19 (Pfizer) Gray Cap Monovalent 2022    Fluad Quad 65+ 11/10/2020    Fluzone High-Dose 65+YRS 10/22/2018, 2019    Fluzone High-Dose 65+yrs 10/05/2021, 2022    Pneumococcal Conjugate 13-Valent (PCV13) 2019    Pneumococcal Polysaccharide (PPSV23) 11/10/2020    Tdap 2020     Social History:   Social History     Social History Narrative    Not on file     Social History     Socioeconomic History    Marital status:    Tobacco Use    Smoking status: Former     Current packs/day: 0.00     Average packs/day: 1 pack/day for 25.0 years (25.0 ttl pk-yrs)     Types: Cigarettes     Start date: 1983     Quit date: 2008     Years since quittin.6    Smokeless tobacco: Never    Tobacco comments:     CAFFEINE USE - 1 CUP COFFEE/ DIET COKE   Vaping Use    Vaping status: Never Used   Substance and Sexual Activity    Alcohol use: No    Drug use: No    Sexual activity: Defer       Family History:  Family History   Problem Relation Age of Onset    Heart disease Mother     Lung cancer Mother     Diabetes Father     Liver disease Father     Heart disease Paternal Uncle     Malig Hyperthermia Neg Hx         Review of Systems  See history of present illness and past medical history.  Patient denies headache,  "dizziness, syncope, falls, trauma, change in vision, change in hearing, change in taste, changes in weight, changes in appetite, focal weakness, numbness, or paresthesia.  Patient denies chest pain, palpitations, , sinus pressure, rhinorrhea, epistaxis, hemoptysis, nausea, vomiting,hematemesis, diarrhea, constipation or hematochezia.  Denies cold or heat intolerance, polydipsia, polyuria, polyphagia. Denies hematuria, pyuria, dysuria, hesitancy, frequency or urgency. Denies consumption of raw and under cooked meats foods or change in water source.  Denies fever, chills, sweats, night sweats.  Denies missing any routine medications.   Objective:  T Max 24 hrs: Temp (24hrs), Av.9 °F (36.6 °C), Min:97.5 °F (36.4 °C), Max:98.2 °F (36.8 °C)    Vitals Ranges:   Temp:  [97.5 °F (36.4 °C)-98.2 °F (36.8 °C)] 97.5 °F (36.4 °C)  Heart Rate:  [84-95] 84  Resp:  [18-21] 18  BP: (105-136)/(68-92) 123/73      Exam:  /73 (BP Location: Right arm, Patient Position: Lying)   Pulse 84   Temp 97.5 °F (36.4 °C) (Oral)   Resp 18   Ht 160 cm (62.99\")   Wt 54.4 kg (119 lb 14.9 oz)   SpO2 92%   BMI 21.25 kg/m²     General Appearance:    Alert, cooperative, no distress, appears stated age   Head:    Normocephalic, without obvious abnormality, atraumatic   Eyes:    PERRL, conjunctivae/corneas clear, EOM's intact, both eyes   Ears:    Normal external ear canals, both ears   Nose:   Nares normal, septum midline, mucosa normal, no drainage    or sinus tenderness   Throat:   Lips, mucosa, and tongue normal   Neck:   Supple, symmetrical, trachea midline, no adenopathy;     thyroid:  no enlargement/tenderness/nodules; no carotid    bruit or JVD   Back:     Symmetric, no curvature, ROM normal, no CVA tenderness   Lungs:     Decreased breath sounds bilaterally, respirations unlabored   Chest Wall:    No tenderness or deformity    Heart:    Regular rate and rhythm, S1 and S2 normal, no murmur, rub   or gallop   Abdomen:     Soft, " nontender, bowel sounds active all four quadrants,     no masses, no hepatomegaly, no splenomegaly   Extremities:   Extremities normal, atraumatic, no cyanosis or edema      .    Data Review:  Labs in chart were reviewed.  WBC   Date Value Ref Range Status   12/21/2024 9.12 3.40 - 10.80 10*3/mm3 Final     Hemoglobin   Date Value Ref Range Status   12/21/2024 13.6 12.0 - 15.9 g/dL Final     Hematocrit   Date Value Ref Range Status   12/21/2024 40.3 34.0 - 46.6 % Final     Platelets   Date Value Ref Range Status   12/21/2024 307 140 - 450 10*3/mm3 Final     Sodium   Date Value Ref Range Status   12/21/2024 137 136 - 145 mmol/L Final     Potassium   Date Value Ref Range Status   12/21/2024 3.4 (L) 3.5 - 5.2 mmol/L Final     Chloride   Date Value Ref Range Status   12/21/2024 98 98 - 107 mmol/L Final     CO2   Date Value Ref Range Status   12/21/2024 23.3 22.0 - 29.0 mmol/L Final     BUN   Date Value Ref Range Status   12/21/2024 14 8 - 23 mg/dL Final     Creatinine   Date Value Ref Range Status   12/21/2024 0.83 0.57 - 1.00 mg/dL Final     Glucose   Date Value Ref Range Status   12/21/2024 100 (H) 65 - 99 mg/dL Final     Calcium   Date Value Ref Range Status   12/21/2024 9.3 8.6 - 10.5 mg/dL Final                Imaging Results (All)       Procedure Component Value Units Date/Time    XR Chest 1 View [[id number]] Collected: 12/21/24 1813     Updated: 12/21/24 1818    Narrative:      XR CHEST 1 VW-     INDICATION: Cough     COMPARISON: Chest radiographs dating back to 10/19/2020       Impression:      No focal consolidation. No pleural effusion or pneumothorax.   Normal size cardiomediastinal silhouette.  No focal osseous  abnormality.       This report was finalized on 12/21/2024 6:15 PM by Dr. Hector Kaplan M.D on Workstation: [id number]                 Assessment:  Active Hospital Problems    Diagnosis  POA    **Acute bronchitis due to respiratory syncytial virus (RSV) [J20.5]  Yes      Resolved Hospital Problems    No resolved problems to display.   Chronic hypoxic respiratory failure  Copd  Hypertension  Diabetes  Sleep apnea  Paf  hypokalemia    Plan:  Will ask pulmonary to see her  Steroids, mininebs  Monitor on telemetry  Accu checks, sliding scale  Dw patient, family and ed provider    Lacey Jean MD  12/21/2024  22:21 EST

## 2024-12-22 NOTE — PLAN OF CARE
Goal Outcome Evaluation:           Progress: improving  Outcome Evaluation: no c/o pain or discomfort. alert and oriented. pt states she has chronic SOB, infrequent cough. switched to p.o prednisone today.

## 2024-12-22 NOTE — PLAN OF CARE
Goal Outcome Evaluation:  Plan of Care Reviewed With: patient, family           Outcome Evaluation: Pt is an 85 yo female with PMH including COPD on home O2, chronic hypoxic RF, DM, sleep apnea, PAF, and hypokalemia who was admitted with acute bronchitis secondary to RSV. She resides at an [facility] and uses a RW for I mobility at BL. Today she was able to perform bed mobility Mod I; transfers and ambulation x 300 ft with RW SBA. No overt unsteadiness or LOB, or c/o SOA. She appears to be at BL with mobility with no further acute PT needs indicated at this time.    Anticipated Discharge Disposition (PT): assisted living

## 2024-12-22 NOTE — PLAN OF CARE
Goal Outcome Evaluation:  Plan of Care Reviewed With: patient        Progress: improving  Outcome Evaluation: Pt breathing improved. Continues on Solumedrol and breathing tx. New IV placed. C/o cough, PRN Robitussin ordered and given with improvement. On 2L NC which is pt baseline. Bed alarm in place.

## 2024-12-22 NOTE — THERAPY DISCHARGE NOTE
Patient Name: Kandace Andrade  : 1940    MRN: [id number]                              Today's Date: 2024       Admit Date: 2024    Visit Dx:     ICD-10-CM ICD-9-CM   1. Acute bronchitis due to respiratory syncytial virus (RSV)  J20.5 466.0     079.6   2. COPD with acute exacerbation  J44.1 491.21   3. Hypoxia  R09.02 799.02     Patient Active Problem List   Diagnosis    Vitamin D deficiency    KINDRA (obstructive sleep apnea)    IBS (irritable bowel syndrome)    Essential hypertension    Herpes zoster    Arthritis    Anxiety and depression    Other emphysema    Acute seasonal allergic rhinitis due to pollen    Paroxysmal atrial fibrillation    Osteoporosis    Mixed hyperlipidemia    Encounter for Medicare annual wellness exam    COPD (chronic obstructive pulmonary disease)    Mixed stress and urge urinary incontinence    Type 2 diabetes mellitus without complication, without long-term current use of insulin    Arthritis of both hips    Autonomic neuropathy    GERD without esophagitis    C. difficile colitis    Hypokalemia    Decreased hearing of both ears    Vertigo    Hypercholesterolemia    Primary insomnia    Generalized weakness    Other microscopic hematuria    Confusion    Dehydration    Metabolic encephalopathy    Left ankle swelling    Overactive bladder    Restless leg syndrome    Edema of both legs    Other fatigue    Carotid artery stenosis, asymptomatic, bilateral    Memory deficit    Imbalance    Dementia, old age    Sepsis with acute hypoxic respiratory failure and septic shock, due to unspecified organism    Acute bronchitis due to respiratory syncytial virus (RSV)     Past Medical History:   Diagnosis Date    Acute seasonal allergic rhinitis due to pollen     Anxiety and depression     Arthritis     Asthma     C. difficile colitis     COPD (chronic obstructive pulmonary disease)     Diabetes mellitus     Pre diabetes    Diarrhea     Diverticulosis     Fibula fracture     Herpes zoster      History of lumbosacral spine surgery     Hypertension     IBS (irritable bowel syndrome)     Mixed hyperlipidemia     KINDRA (obstructive sleep apnea)     NO MACHINE AT THIS TIME    Osteoporosis     Paroxysmal atrial fibrillation     Vitamin D deficiency      Past Surgical History:   Procedure Laterality Date    APPENDECTOMY      BACK SURGERY      lower x2    CATARACT EXTRACTION      COLONOSCOPY      patient doesn't recall     COLONOSCOPY N/A 4/2/2021    Procedure: COLONOSCOPY to cecum with cecal biopsies;  Surgeon: Jarrod Duggan MD;  Location:  [facility];  Service: Gastroenterology;  Laterality: N/A;  pre: diarhhea  post: diverticulosis, hemorrhoids    HYSTERECTOMY      partial    SHOULDER SURGERY Right     SHOULDER SURGERY      SIGMOIDOSCOPY N/A 10/19/2022    Procedure: SIGMOIDOSCOPY FLEXIBLE TO SIGMOID COLON WITH BIOPSIES;  Surgeon: Tonja Tanner MD;  Location:  [facility];  Service: Gastroenterology;  Laterality: N/A;  PRE: DIARRHEA  POST: HEMORRHOIDS    TONSILLECTOMY      UPPER GASTROINTESTINAL ENDOSCOPY      patient doesn't recall       General Information       Row Name 12/22/24 1439          Physical Therapy Time and Intention    Document Type evaluation;discharge evaluation/summary  -MW     Mode of Treatment physical therapy  -MW       Row Name 12/22/24 1439          General Information    Patient Profile Reviewed yes  -MW     Prior Level of Function independent:;gait;all household mobility  RW  -MW     Existing Precautions/Restrictions oxygen therapy device and L/min;fall  2L O2 NC  -MW     Barriers to Rehab none identified  -MW       Row Name 12/22/24 1439          Home Main Entrance    Number of Stairs, Main Entrance none  -MW       Row Name 12/22/24 1439          Cognition    Orientation Status (Cognition) oriented x 4  -MW       Row Name 12/22/24 1439          Safety Issues/Impairments Affecting Functional Mobility    Impairments Affecting Function (Mobility) shortness of breath  -MW      Comment, Safety Issues/Impairments (Mobility) Gait belt and nonslip socks on for safety  -MW               User Key  (r) = Recorded By, (t) = Taken By, (c) = Cosigned By      Initials Name Provider Type    Farrah Hodge PT Physical Therapist                   Mobility       Row Name 12/22/24 1505          Bed Mobility    Bed Mobility bed mobility (all) activities  -MW     All Activities, [place] (Bed Mobility) modified independence  -       Row Name 12/22/24 1505          Sit-Stand Transfer    Sit-Stand [place] (Transfers) standby assist  -     Assistive Device (Sit-Stand Transfers) walker, front-wheeled  -MW       Row Name 12/22/24 1505          Gait/Stairs (Locomotion)    [place] Level (Gait) standby assist  -MW     Assistive Device (Gait) walker, front-wheeled  -MW     Patient was able to Ambulate yes  -MW     Distance in Feet (Gait) 300  -MW     Bilateral Gait Deviations forward flexed posture  -MW     Comment, (Gait/Stairs) no unsteadiness or LOB  -MW               User Key  (r) = Recorded By, (t) = Taken By, (c) = Cosigned By      Initials Name Provider Type    Farrah Hodge PT Physical Therapist                   Obj/Interventions       Row Name 12/22/24 1506          Range of Motion Comprehensive    General Range of Motion no range of motion deficits identified  -       Row Name 12/22/24 1506          Strength Comprehensive (MMT)    General Manual Muscle Testing (MMT) Assessment no strength deficits identified  -     Comment, General Manual Muscle Testing (MMT) Assessment BLE grossly WFL  -       Row Name 12/22/24 1506          Balance    Balance Assessment sitting static balance;standing static balance;standing dynamic balance  -MW     Static Sitting Balance modified independence  -MW     Position, Sitting Balance unsupported;sitting edge of bed  -MW     Static Standing Balance standby assist  -MW     Dynamic Standing Balance standby assist  -MW     Position/Device Used,  Standing Balance supported;walker, rolling  -MW     Balance Interventions sitting;standing;sit to stand;supported;static;dynamic  -MW               User Key  (r) = Recorded By, (t) = Taken By, (c) = Cosigned By      Initials Name Provider Type    Farrah Hodge, PT Physical Therapist                   Goals/Plan    No documentation.                  Clinical Impression       Row Name 12/22/24 1502          Pain    Pretreatment Pain Rating 0/10 - no pain  -MW     Posttreatment Pain Rating 0/10 - no pain  -MW       Row Name 12/22/24 1503          Plan of Care Review    Plan of Care Reviewed With patient;family  -MW     Outcome Evaluation Pt is an 85 yo female with PMH including COPD on home O2, chronic hypoxic RF, DM, sleep apnea, PAF, and hypokalemia who was admitted with acute bronchitis secondary to RSV. She resides at an [facility] and uses a RW for I mobility at [facility]. Today she was able to perform bed mobility Mod I; transfers and ambulation x 300 ft with RW SBA. No overt unsteadiness or LOB, or c/o SOA. She appears to be at [facility] with mobility with no further acute PT needs indicated at this time.  -MW       Row Name 12/22/24 1509          Therapy Assessment/Plan (PT)    Criteria for Skilled Interventions Met (PT) no;no problems identified which require skilled intervention  -MW     Therapy Frequency (PT) evaluation only  -       Row Name 12/22/24 1501          Vital Signs    O2 Delivery Pre Treatment nasal cannula  -MW     O2 Delivery Intra Treatment room air  -MW     O2 Delivery Post Treatment nasal cannula  -MW     Pre Patient Position Supine  -MW     Intra Patient Position Standing  -MW     Post Patient Position Sitting  -MW       Row Name 12/22/24 1505          Positioning and Restraints    Pre-Treatment Position in bed  -MW     Post Treatment Position chair  -MW     In Chair reclined;sitting;call light within reach;encouraged to call for assist;with family/caregiver  Needs met, lines intact  -MW                User Key  (r) = Recorded By, (t) = Taken By, (c) = Cosigned By      Initials Name Provider Type     Farrah Gramajo PT Physical Therapist                   Outcome Measures       Row Name 12/22/24 1512          How much help from another person do you currently need...    Turning from your back to your side while in flat bed without using bedrails? 4  -MW     Moving from lying on back to sitting on the side of a flat bed without bedrails? 4  -MW     Moving to and from a bed to a chair (including a wheelchair)? 4  -MW     Standing up from a chair using your arms (e.g., wheelchair, bedside chair)? 4  -MW     Climbing 3-5 steps with a railing? 3  -MW     To walk in hospital room? 4  -MW     AM-PAC 6 Clicks Score (PT) 23  -MW     Highest Level of Mobility Goal 7 --> Walk 25 feet or more  -MW       Row Name 12/22/24 1512          Functional Assessment    Outcome Measure Options AM-PAC 6 Clicks Basic Mobility (PT)  -               User Key  (r) = Recorded By, (t) = Taken By, (c) = Cosigned By      Initials Name Provider Type    Farrah Hodge PT Physical Therapist                  Physical Therapy Education       Title: PT OT SLP Therapies (In Progress)       Topic: Physical Therapy (In Progress)       Point: Mobility training (Done)       Learning Progress Summary            Patient Acceptance, E, VU by  at 12/22/2024 1513                      Point: Home exercise program (Not Started)       Learner Progress:  Not documented in this visit.              Point: Body mechanics (Not Started)       Learner Progress:  Not documented in this visit.              Point: Precautions (Not Started)       Learner Progress:  Not documented in this visit.                              User Key       Initials Effective Dates Name Provider Type Discipline     06/16/21 -  Farrah Gramajo PT Physical Therapist PT                  PT Recommendation and Plan     Outcome Evaluation: Pt is an 85 yo female with PMH including COPD on  home O2, chronic hypoxic RF, DM, sleep apnea, PAF, and hypokalemia who was admitted with acute bronchitis secondary to RSV. She resides at an [facility] and uses a RW for I mobility at [facility]. Today she was able to perform bed mobility Mod I; transfers and ambulation x 300 ft with RW SBA. No overt unsteadiness or LOB, or c/o SOA. She appears to be at [facility] with mobility with no further acute PT needs indicated at this time.     Time Calculation:   PT Evaluation Complexity  History, PT Evaluation Complexity: 3 or more personal factors and/or comorbidities  Examination of Body Systems (PT Eval Complexity): 1-2 elements  Clinical Presentation (PT Evaluation Complexity): stable  Clinical Decision Making (PT Evaluation Complexity): low complexity  Overall Complexity (PT Evaluation Complexity): low complexity     PT Charges       Row Name 12/22/24 1514             Time Calculation    Start Time 1434  -MW      Stop Time 1454  -MW      Time Calculation (min) 20 min  -MW      PT Received On 12/22/24  -MW         Time Calculation- PT    Total Timed Code Minutes- PT 19 minute(s)  -MW                User Key  (r) = Recorded By, (t) = Taken By, (c) = Cosigned By      Initials Name Provider Type    Farrah Hodge, PT Physical Therapist                  Therapy Charges for Today       Code Description Service Date Service Provider Modifiers Qty    [id number]  PT EVAL LOW COMPLEXITY 2 12/22/2024 Farrah Gramajo, PT GP 1            PT G-Codes  Outcome Measure Options: AM-PAC 6 Clicks Basic Mobility (PT)  AM-PAC 6 Clicks Score (PT): 23    PT Discharge Summary  Anticipated Discharge Disposition (PT): assisted living    Farrah Gramajo PT  12/22/2024

## 2024-12-22 NOTE — ED NOTES
Nursing report ED to floor  Kandace Andrade  84 y.o.  female    HPI :  HPI  Stated Reason for Visit: cough and soa    Chief Complaint  Chief Complaint   Patient presents with    Cough    Shortness of Breath       Admitting doctor:   Lacey Jean MD    Admitting diagnosis:       Code status:   Current Code Status       Date Active Code Status Order ID Comments User Context       12/21/2024 1945 CPR (Attempt to Resuscitate) [id number]  Lacey Jean MD ED        Question Answer    Code Status (Patient has no pulse and is not breathing) CPR (Attempt to Resuscitate)    Medical Interventions (Patient has pulse or is breathing) Full                    Allergies:   Penicillins, Meclizine hcl, Bactrim [sulfamethoxazole-trimethoprim], Flagyl [metronidazole], Meclizine, and Statins    Isolation:   No active isolations    Intake and Output  No intake or output data in the 24 hours ending 12/21/24 1956    Weight:       12/21/24 1757   Weight: 54.4 kg (119 lb 14.9 oz)       Most recent vitals:   Vitals:    12/21/24 1841 12/21/24 1848 12/21/24 1850 12/21/24 1901   BP:    130/74   BP Location:       Patient Position:       Pulse: 88   88   Resp: 20      Temp:       TempSrc:       SpO2:  96% 95% 94%   Weight:       Height:           Active LDAs/IV Access:   Lines, Drains & Airways       Active LDAs       Name Placement date Placement time Site Days    Peripheral IV 12/21/24 1719 Anterior;Distal;Left Forearm 12/21/24 1719  Forearm  less than 1                    Labs (abnormal labs have a star):   Labs Reviewed   RESPIRATORY PANEL PCR W/ COVID-19 (SARS-COV-2), NP SWAB IN UTM/VTP, 2 HR TAT - Abnormal; Notable for the following components:       Result Value    RSV, PCR Detected (*)     All other components within normal limits    Narrative:     In the setting of a positive respiratory panel with a viral infection PLUS a negative procalcitonin without other underlying concern for bacterial infection, consider  "observing off antibiotics or discontinuation of antibiotics and continue supportive care. If the respiratory panel is positive for atypical bacterial infection (Bordetella pertussis, Chlamydophila pneumoniae, or Mycoplasma pneumoniae), consider antibiotic de-escalation to target atypical bacterial infection.   COMPREHENSIVE METABOLIC PANEL - Abnormal; Notable for the following components:    Glucose 100 (*)     Potassium 3.4 (*)     Anion Gap 15.7 (*)     All other components within normal limits    Narrative:     GFR Categories in Chronic Kidney Disease (CKD)      GFR Category          GFR (mL/min/1.73)    Interpretation  G1                     90 or greater         Normal or high (1)  G2                      60-89                Mild decrease (1)  G3a                   45-59                Mild to moderate decrease  G3b                   30-44                Moderate to severe decrease  G4                    15-29                Severe decrease  G5                    14 or less           Kidney failure          (1)In the absence of evidence of kidney disease, neither GFR category G1 or G2 fulfill the criteria for CKD.    eGFR calculation 2021 CKD-EPI creatinine equation, which does not include race as a factor   PROCALCITONIN - Normal    Narrative:     As a Marker for Sepsis (Non-Neonates):    1. <0.5 ng/mL represents a low risk of severe sepsis and/or septic shock.  2. >2 ng/mL represents a high risk of severe sepsis and/or septic shock.    As a Marker for Lower Respiratory Tract Infections that require antibiotic therapy:    PCT on Admission    Antibiotic Therapy       6-12 Hrs later    >0.5                Strongly Recommended  >0.25 - <0.5        Recommended   0.1 - 0.25          Discouraged              Remeasure/reassess PCT  <0.1                Strongly Discouraged     Remeasure/reassess PCT    As 28 day mortality risk marker: \"Change in Procalcitonin Result\" (>80% or <=80%) if Day 0 (or Day 1) and Day 4 " values are available. Refer to http://[email or web address]-pct-calculator.com    Change in PCT <=80%  A decrease of PCT levels below or equal to 80% defines a positive change in PCT test result representing a higher risk for 28-day all-cause mortality of patients diagnosed with severe sepsis for septic shock.    Change in PCT >80%  A decrease of PCT levels of more than 80% defines a negative change in PCT result representing a lower risk for 28-day all-cause mortality of patients diagnosed with severe sepsis or septic shock.      LACTIC ACID, PLASMA - Normal   BNP (IN-HOUSE) - Normal    Narrative:     This assay is used as an aid in the diagnosis of individuals suspected of having heart failure. It can be used as an aid in the diagnosis of acute decompensated heart failure (ADHF) in patients presenting with signs and symptoms of ADHF to the emergency department (ED). In addition, NT-proBNP of <300 pg/mL indicates ADHF is not likely.    Age Range Result Interpretation  NT-proBNP Concentration (pg/mL:      <50             Positive            >450                   Gray                 300-450                    Negative             <300    50-75           Positive            >900                  Gray                300-900                  Negative            <300      >75             Positive            >1800                  Gray                300-1800                  Negative            <300   CBC WITH AUTO DIFFERENTIAL - Normal   BLOOD CULTURE   BLOOD CULTURE   CBC AND DIFFERENTIAL    Narrative:     The following orders were created for panel order CBC & Differential.  Procedure                               Abnormality         Status                     ---------                               -----------         ------                     CBC Auto Differential[[id number]]        Normal              Final result                 Please view results for these tests on the individual orders.       EKG:   ECG 12 Lead Dyspnea    Preliminary Result   HEART RATE=84  bpm   RR Jyerknjx=316  ms   VA Oqlhlcmc=884  ms   P Horizontal Axis=-4  deg   P Front Axis=-4  deg   QRSD Interval=89  ms   QT Gfhpnczt=369  ms   TVqP=344  ms   QRS Axis=5  deg   T Wave Axis=-67  deg   - ABNORMAL ECG -   Sinus rhythm   Inferior infarct, age indeterminate   Date and Time of Study:2024-12-21 17:36:17      Telemetry Scan   Final Result      Telemetry Scan   Final Result          Meds given in ED:   Medications   sodium chloride 0.9 % flush 10 mL (has no administration in time range)   acetaminophen (TYLENOL) tablet 650 mg (has no administration in time range)     Or   acetaminophen (TYLENOL) 160 MG/5ML oral solution 650 mg (has no administration in time range)     Or   acetaminophen (TYLENOL) suppository 650 mg (has no administration in time range)   ondansetron ODT (ZOFRAN-ODT) disintegrating tablet 4 mg (has no administration in time range)     Or   ondansetron (ZOFRAN) injection 4 mg (has no administration in time range)   melatonin tablet 2.5 mg (has no administration in time range)   methylPREDNISolone sodium succinate (SOLU-Medrol) injection 40 mg (has no administration in time range)   ipratropium-albuterol (DUO-NEB) nebulizer solution 3 mL (has no administration in time range)   albuterol (PROVENTIL) nebulizer solution 0.083% 2.5 mg/3mL (has no administration in time range)   sennosides-docusate (PERICOLACE) 8.6-50 MG per tablet 2 tablet (has no administration in time range)     And   polyethylene glycol (MIRALAX) packet 17 g (has no administration in time range)     And   bisacodyl (DULCOLAX) EC tablet 5 mg (has no administration in time range)     And   bisacodyl (DULCOLAX) suppository 10 mg (has no administration in time range)   ipratropium-albuterol (DUO-NEB) nebulizer solution 3 mL (3 mL Nebulization Given 12/21/24 1833)       Imaging results:  XR Chest 1 View    Result Date: 12/21/2024  No focal consolidation. No pleural effusion or pneumothorax.   Normal size cardiomediastinal silhouette.  No focal osseous abnormality.   This report was finalized on 2024 6:15 PM by Dr. Hector Kaplan M.D on Workstation: [id number]       Ambulatory status:   - bedrest     Social issues:   Social History     Socioeconomic History    Marital status:    Tobacco Use    Smoking status: Former     Current packs/day: 0.00     Average packs/day: 1 pack/day for 25.0 years (25.0 ttl pk-yrs)     Types: Cigarettes     Start date: 1983     Quit date: 2008     Years since quittin.6    Smokeless tobacco: Never    Tobacco comments:     CAFFEINE USE - 1 CUP COFFEE/ DIET COKE   Vaping Use    Vaping status: Never Used   Substance and Sexual Activity    Alcohol use: No    Drug use: No    Sexual activity: Defer       Peripheral Neurovascular  Peripheral Neurovascular (Adult)  Peripheral Neurovascular WDL: WDL    Neuro Cognitive  Neuro Cognitive (Adult)  Cognitive/Neuro/Behavioral WDL: .WDL except, all  Level of Consciousness: Alert  Orientation: oriented x 4  Speech: spontaneous, clear, logical  Additional Documentation: Headache Assessment (Group)  Headache Assessment  Headache Location: temporal, frontal  (0-10) Headache Severity Ratin  Description/Character: dull  Associated Signs/Symptoms: dizziness, weakness, fatigue    Learning       Respiratory  Respiratory WDL  Respiratory WDL: .WDL except, all, cough  Rhythm/Pattern, Respiratory: shortness of breath, labored  Expansion/Accessory Muscles/Retractions: abdominal muscle use  Cough Type: good, congested, nonproductive (first pedro cough then dry)  Breath Sounds  All Lung Fields Breath Sounds: diminished  Breath Sounds Post-Respiratory Treatment  Breath Sounds Posttreatment All Fields: diminished    Abdominal Pain       Pain Assessments  Pain (Adult)  (0-10) Pain Rating: Rest: 0  (0-10) Pain Rating: Activity: 0    NIH Stroke Scale       Jose Anderson RN  24 19:56 EST

## 2024-12-23 ENCOUNTER — READMISSION MANAGEMENT (OUTPATIENT)
Dept: CALL CENTER | Facility: HOSPITAL | Age: 84
End: 2024-12-23
Payer: MEDICARE

## 2024-12-23 VITALS
BODY MASS INDEX: 24.29 KG/M2 | OXYGEN SATURATION: 92 % | SYSTOLIC BLOOD PRESSURE: 110 MMHG | DIASTOLIC BLOOD PRESSURE: 59 MMHG | RESPIRATION RATE: 20 BRPM | TEMPERATURE: 97.3 F | HEIGHT: 63 IN | HEART RATE: 71 BPM | WEIGHT: 137.1 LBS

## 2024-12-23 LAB — GLUCOSE BLDC GLUCOMTR-MCNC: 107 MG/DL (ref 70–130)

## 2024-12-23 PROCEDURE — 94761 N-INVAS EAR/PLS OXIMETRY MLT: CPT

## 2024-12-23 PROCEDURE — 94664 DEMO&/EVAL PT USE INHALER: CPT

## 2024-12-23 PROCEDURE — 82948 REAGENT STRIP/BLOOD GLUCOSE: CPT

## 2024-12-23 PROCEDURE — 94799 UNLISTED PULMONARY SVC/PX: CPT

## 2024-12-23 PROCEDURE — G0378 HOSPITAL OBSERVATION PER HR: HCPCS

## 2024-12-23 PROCEDURE — 63710000001 PREDNISONE PER 1 MG: Performed by: INTERNAL MEDICINE

## 2024-12-23 RX ORDER — GUAIFENESIN 200 MG/10ML
100 LIQUID ORAL EVERY 4 HOURS PRN
Qty: 236 ML | Refills: 0 | Status: SHIPPED | OUTPATIENT
Start: 2024-12-23 | End: 2024-12-23 | Stop reason: HOSPADM

## 2024-12-23 RX ORDER — PREDNISONE 10 MG/1
TABLET ORAL
Qty: 20 TABLET | Refills: 0 | Status: SHIPPED | OUTPATIENT
Start: 2024-12-23 | End: 2024-12-31

## 2024-12-23 RX ORDER — GUAIFENESIN 600 MG/1
1200 TABLET, EXTENDED RELEASE ORAL EVERY 12 HOURS SCHEDULED
Qty: 20 TABLET | Refills: 0 | Status: SHIPPED | OUTPATIENT
Start: 2024-12-23

## 2024-12-23 RX ADMIN — GUAIFENESIN 1200 MG: 600 TABLET, EXTENDED RELEASE ORAL at 08:38

## 2024-12-23 RX ADMIN — SENNOSIDES AND DOCUSATE SODIUM 2 TABLET: 50; 8.6 TABLET ORAL at 08:53

## 2024-12-23 RX ADMIN — BUDESONIDE AND FORMOTEROL FUMARATE DIHYDRATE 2 PUFF: 160; 4.5 AEROSOL RESPIRATORY (INHALATION) at 07:23

## 2024-12-23 RX ADMIN — PREDNISONE 40 MG: 20 TABLET ORAL at 08:38

## 2024-12-23 RX ADMIN — OXYBUTYNIN CHLORIDE 5 MG: 5 TABLET, EXTENDED RELEASE ORAL at 08:38

## 2024-12-23 RX ADMIN — IPRATROPIUM BROMIDE AND ALBUTEROL SULFATE 3 ML: 2.5; .5 SOLUTION RESPIRATORY (INHALATION) at 11:25

## 2024-12-23 RX ADMIN — VENLAFAXINE HYDROCHLORIDE 75 MG: 75 CAPSULE, EXTENDED RELEASE ORAL at 08:38

## 2024-12-23 RX ADMIN — PANTOPRAZOLE SODIUM 40 MG: 40 TABLET, DELAYED RELEASE ORAL at 08:38

## 2024-12-23 RX ADMIN — IPRATROPIUM BROMIDE AND ALBUTEROL SULFATE 3 ML: 2.5; .5 SOLUTION RESPIRATORY (INHALATION) at 07:22

## 2024-12-23 NOTE — DISCHARGE INSTRUCTIONS
- Follow up with PCP in 3-5 days  - Take Mucinex 1200mg PO q12h x 5 days  - Take prednisone taper as prescribed  - Continue home oxygen

## 2024-12-23 NOTE — OUTREACH NOTE
Prep Survey      Flowsheet Row Responses   [facility] patient discharged from? [facility]   Is LACE score < 7 ? No   Eligibility [facility]   Date of Admission 12/21/24   Date of Discharge 12/23/24   Discharge Disposition Home or Self Care   Discharge diagnosis Acute bronchitis due to resp syncytial virus   Does the patient have one of the following disease processes/diagnoses(primary or secondary)? Other   Does the patient have Home health ordered? No   Is there a DME ordered? No   Prep survey completed? Yes            MERYL SANTORO - Registered Nurse

## 2024-12-23 NOTE — PLAN OF CARE
Goal Outcome Evaluation:  Plan of Care Reviewed With: patient        Progress: improving  Outcome Evaluation: Pt with no c/o pain. Medicated with PRN Robitussin for cough. Continues on 2L NC. Possible D/C today.

## 2024-12-23 NOTE — CASE MANAGEMENT/SOCIAL WORK
Case Management Discharge Note      Final Note: Return to assisted living at [facility], family to transport         Selected Continued Care - Discharged on 12/23/2024 Admission date: 12/21/2024 - Discharge disposition: Home or Self Care      Destination    No services have been selected for the patient.                Durable Medical Equipment    No services have been selected for the patient.                Dialysis/Infusion    No services have been selected for the patient.                Home Medical Care    No services have been selected for the patient.                Therapy    No services have been selected for the patient.                Community Resources    No services have been selected for the patient.                Community & DME    No services have been selected for the patient.                    Transportation Services  Private: Car    Final Discharge Disposition Code: 01 - home or self-care

## 2024-12-23 NOTE — NURSING NOTE
Pt being discharged. She normally wears oxygen at home but does not have a tank available for transport. [facility] is going to send patient with a tank and arrange for tank  at a later time.

## 2024-12-23 NOTE — CASE MANAGEMENT/SOCIAL WORK
Discharge Planning Assessment  [facility]     Patient Name: Kandace Andrade  MRN: [id number]  Today's Date: 12/23/2024    Admit Date: 12/21/2024    Plan: Home, family tp  transport   Discharge Needs Assessment       Row Name 12/23/24 1137       Living Environment    People in Home alone    Current Living Arrangements assisted living facility    Potentially Unsafe Housing Conditions none    In the past 12 months has the electric, gas, oil, or water company threatened to shut off services in your home? No    Primary Care Provided by self    Provides Primary Care For no one    Family Caregiver if Needed child(chriss), adult    Family Caregiver Names Violeta and Tracey    Quality of Family Relationships helpful;involved    Able to Return to Prior Arrangements yes       Resource/Environmental Concerns    Resource/Environmental Concerns none    Transportation Concerns none       Transportation Needs    In the past 12 months, has lack of transportation kept you from medical appointments or from getting medications? no    In the past 12 months, has lack of transportation kept you from meetings, work, or from getting things needed for daily living? No       Food Insecurity    Within the past 12 months, you worried that your food would run out before you got the money to buy more. Never true    Within the past 12 months, the food you bought just didn't last and you didn't have money to get more. Never true       Transition Planning    Patient/Family Anticipates Transition to home    Patient/Family Anticipated Services at Transition none    Transportation Anticipated family or friend will provide       Discharge Needs Assessment    Equipment Currently Used at Home walker, rolling;oxygen  [employer] is o2 supplier    Concerns to be Addressed no discharge needs identified;denies needs/concerns at this time    Do you want help finding or keeping work or a job? I do not need or want help    Do you want help with school or training? For  example, starting or completing job training or getting a high school diploma, GED or equivalent No    Anticipated Changes Related to Illness none    Equipment Needed After Discharge none                   Discharge Plan       Row Name 12/23/24 1138       Plan    Plan Home, family tp  transport    Plan Comments Met with pt at bedside. Introduced self and explained role of . Face sheet verified, PCP is Dr. Bella. Pt denies any difficulty paying for medications , and she obtains her medications from [facility]. Pt lives in assisted living at [facility], however she stated that her daughters would assist with any care needs that may arise. Pt is able to perform her ADL's, she just takes her time. Pt uses a walker for mobility, and she wears o2, [employer] is her o2 provider. Pt has never had home health and she had never been to SNF/Rehab. She intends to return home and her family will transport. Explained that CCP would follow to assess for discharge needs.                  Continued Care and Services - Admitted Since 12/21/2024       Durable Medical Equipment       Service Provider Request Status Services Address Phone Fax Patient Preferred    [facility] Pending - Request Sent -- [address] [address] [phone] --                  Expected Discharge Date and Time       Expected Discharge Date Expected Discharge Time    Dec 23, 2024            Demographic Summary    No documentation.                  Functional Status    No documentation.                  Psychosocial    No documentation.                  Abuse/Neglect    No documentation.                  Legal    No documentation.                  Substance Abuse    No documentation.                  Patient Forms    No documentation.                     Praveena Mancini RN

## 2024-12-23 NOTE — DISCHARGE SUMMARY
Patient Name: Kandace Andrade  : 1940  MRN: [id number]    Date of Admission: 2024  Date of Discharge:  2024  Primary Care Physician: Provider, No Known      Chief Complaint:   Cough and Shortness of Breath      Discharge Diagnoses     Active Hospital Problems    Diagnosis  POA    **Acute bronchitis due to respiratory syncytial virus (RSV) [J20.5]  Yes    Generalized weakness [R53.1]  Yes    Type 2 diabetes mellitus without complication, without long-term current use of insulin [E11.9]  Yes    COPD (chronic obstructive pulmonary disease) [J44.9]  Yes    Paroxysmal atrial fibrillation [I48.0]  Yes    KINDRA (obstructive sleep apnea) [G47.33]  Yes    Essential hypertension [I10]  Yes      Resolved Hospital Problems   No resolved problems to display.        Hospital Course     Ms. Andrade is a 84 y.o. female with a history of HTN, KINDRA, DM, a. Fib, COPD who presented to [facility] initially complaining of dyspnea on 24.  Please see the admitting history and physical for further details.  She was found to have AeCOPD 2/2 RSV and was admitted to the hospital for further evaluation and treatment.  She was started on IV steroids and aerosols.  Was weaned to home 2L NC the next day and IV steroids de-escalated to PO prednisone.  She felt at baseline on 24 and was on home 2L NC, she was eager and agreeable for discharge.  Patient discharged home with family to transport, declined oxygen at time of discharge for transport.        #Discharge Plan  - Follow up with PCP in 3-5 days  - Take Mucinex 1200mg PO q12h x 5 days  - Take prednisone taper as prescribed  - Continue home oxygen    RSV bronchiolitis with a touch of COPD.  Significantly improved today.  Changed her to prednisone.  Might be ready for discharge tomorrow if she continues to improve rapidly.  Edited by: Phong Adan MD at 2024 1155    Day of Discharge     Subjective:  No overnight events, stable on  home 2L NC.  Eager and agreeable for discharge.     Physical Exam:  Temp:  [97.3 °F (36.3 °C)-97.7 °F (36.5 °C)] 97.3 °F (36.3 °C)  Heart Rate:  [] 81  Resp:  [16-20] 18  BP: (104-130)/(55-71) 110/59  Body mass index is 24.29 kg/m².  Physical Exam  Constitutional:       General: She is not in acute distress.     Appearance: Normal appearance.   HENT:      Head: Normocephalic and atraumatic.      Nose: Nose normal. No congestion.      Mouth/Throat:      Pharynx: No oropharyngeal exudate.   Eyes:      General: No scleral icterus.  Cardiovascular:      Rate and Rhythm: Normal rate and regular rhythm.      Heart sounds: No murmur heard.     No friction rub. No gallop.   Pulmonary:      Effort: No respiratory distress.      Breath sounds: No wheezing or rales.      Comments: On home 2L NC  Abdominal:      General: There is no distension.      Tenderness: There is no abdominal tenderness.   Musculoskeletal:         General: No swelling or deformity.      Cervical back: Normal range of motion. No rigidity.      Right lower leg: No edema.      Left lower leg: No edema.   Skin:     Coloration: Skin is not jaundiced.      Findings: No bruising or lesion.   Neurological:      General: No focal deficit present.      Mental Status: She is alert and oriented to person, place, and time.      Motor: No weakness.         Consultants     Consult Orders (all) (From admission, onward)       Start     Ordered    12/21/24 2153  Inpatient Case Management  Consult  Once        Provider:  (Not yet assigned)    12/21/24 2153                  Procedures     * Surgery not found *    Imaging Results (All)       Procedure Component Value Units Date/Time    XR Chest 1 View [[id number]] Collected: 12/21/24 1813     Updated: 12/21/24 1818    Narrative:      XR CHEST 1 VW-     INDICATION: Cough     COMPARISON: Chest radiographs dating back to 10/19/2020       Impression:      No focal consolidation. No pleural effusion or  "pneumothorax.   Normal size cardiomediastinal silhouette.  No focal osseous  abnormality.       This report was finalized on 12/21/2024 6:15 PM by Dr. Hector Kaplan M.D on Workstation: [id number]             Results for orders placed during the hospital encounter of 03/03/22    Duplex Carotid Ultrasound CAR    Interpretation Summary  · Proximal right internal carotid artery mild stenosis.  · Proximal left internal carotid artery mild stenosis.  · Mid left internal carotid artery mild stenosis.  · Mid right internal carotid artery mild stenosis.    Results for orders placed during the hospital encounter of 12/08/22    Adult Transthoracic Echo Complete W/ Cont if Necessary Per Protocol    Interpretation Summary    Left ventricular ejection fraction appears to be 66 - 70%.    Left ventricular diastolic function is consistent with (grade I) impaired relaxation.    Estimated right ventricular systolic pressure from tricuspid regurgitation is normal (<35 mmHg).    Pertinent Labs     Results from last 7 days   Lab Units 12/22/24  0452 12/21/24  1820   WBC 10*3/mm3 5.11 9.12   HEMOGLOBIN g/dL 13.0 13.6   PLATELETS 10*3/mm3 317 307     Results from last 7 days   Lab Units 12/22/24  0452 12/21/24  1820   SODIUM mmol/L 137 137   POTASSIUM mmol/L 4.3 3.4*   CHLORIDE mmol/L 99 98   CO2 mmol/L 22.3 23.3   BUN mg/dL 17 14   CREATININE mg/dL 0.81 0.83   GLUCOSE mg/dL 209* 100*   EGFR mL/min/1.73 71.2 69.2     Results from last 7 days   Lab Units 12/21/24  1820   ALBUMIN g/dL 4.3   BILIRUBIN mg/dL 0.3   ALK PHOS U/L 104   AST (SGOT) U/L 22   ALT (SGPT) U/L 11     Results from last 7 days   Lab Units 12/22/24  0452 12/21/24  1820   CALCIUM mg/dL 9.3 9.3   ALBUMIN g/dL  --  4.3       Results from last 7 days   Lab Units 12/21/24  1820   PROBNP pg/mL <36.0           Invalid input(s): \"LDLCALC\"  Results from last 7 days   Lab Units 12/21/24  1841 12/21/24  1820   BLOODCX  No growth at 24 hours No growth at 24 hours     Results from " last 7 days   Lab Units 12/21/24  1825   COVID19  Not Detected       Test Results Pending at Discharge     Pending Results       None              Discharge Details        Discharge Medications        New Medications        Instructions Start Date   guaiFENesin 600 MG 12 hr tablet  Commonly known as: MUCINEX   1,200 mg, Oral, Every 12 Hours Scheduled      guaifenesin 100 MG/5ML liquid  Commonly known as: ROBITUSSIN   100 mg, Oral, Every 4 Hours PRN      predniSONE 10 MG tablet  Commonly known as: DELTASONE   Take 4 tablets by mouth Daily for 2 days, THEN 3 tablets Daily for 2 days, THEN 2 tablets Daily for 2 days, THEN 1 tablet Daily for 2 days.   Start Date: December 23, 2024            Changes to Medications        Instructions Start Date   amLODIPine 2.5 MG tablet  Commonly known as: NORVASC  What changed: when to take this   2.5 mg, Oral, Daily      solifenacin 5 MG tablet  Commonly known as: VESICARE  What changed: when to take this   5 mg, Oral, Daily             Continue These Medications        Instructions Start Date   aspirin 81 MG chewable tablet   81 mg, Nightly      budesonide-formoterol 160-4.5 MCG/ACT inhaler  Commonly known as: SYMBICORT   2 puffs, Inhalation, 2 Times Daily - RT      docusate sodium 100 MG capsule  Commonly known as: COLACE   100 mg, Oral, Once As Needed      donepezil 5 MG tablet  Commonly known as: ARICEPT   5 mg, Oral, Nightly      HYDROcodone-acetaminophen 5-325 MG per tablet  Commonly known as: NORCO   1 tablet, Oral, Every 6 Hours PRN, for pain      ibuprofen 400 MG tablet  Commonly known as: ADVIL,MOTRIN   400 mg, Oral, Every 6 Hours PRN      ipratropium-albuterol 0.5-2.5 mg/3 ml nebulizer  Commonly known as: DUO-NEB   3 mL, Nebulization, 4 Times Daily - RT      LORazepam 1 MG tablet  Commonly known as: ATIVAN   0.5 mg, Oral, 2 Times Daily PRN      magnesium oxide 400 MG tablet  Commonly known as: MAG-OX   400 mg, Oral, Daily      mirtazapine 15 MG tablet  Commonly known as:  REMERON   15 mg, Every Night at Bedtime      omeprazole 40 MG capsule  Commonly known as: priLOSEC   40 mg, Oral, Daily      ondansetron 4 MG tablet  Commonly known as: ZOFRAN   4 mg, Oral, Every 8 Hours PRN      oxyCODONE-acetaminophen 5-325 MG per tablet  Commonly known as: PERCOCET   1 tablet, Oral, Every 6 Hours PRN      polyethylene glycol 17 g packet  Commonly known as: MIRALAX   17 g, Oral, Daily PRN      traZODone 50 MG tablet  Commonly known as: DESYREL   50 mg, Nightly      venlafaxine XR 75 MG 24 hr capsule  Commonly known as: EFFEXOR-XR   75 mg, Daily      vitamin D 1.25 MG (56653 UT) capsule capsule  Commonly known as: ERGOCALCIFEROL   50,000 Units, Oral, Every 7 Days             Stop These Medications      baclofen 10 MG tablet  Commonly known as: LIORESAL     DULoxetine 60 MG capsule  Commonly known as: CYMBALTA     escitalopram 20 MG tablet  Commonly known as: LEXAPRO     methocarbamol 500 MG tablet  Commonly known as: ROBAXIN     Repatha Pushtronex System solution cartridge  Generic drug: Evolocumab with Infusor              Allergies   Allergen Reactions    Penicillins Hives and Other (See Comments)     Elevated BP... tolerated ceftriaxone 10/2020 admission    Meclizine Hcl Dizziness    Bactrim [Sulfamethoxazole-Trimethoprim] Hives    Flagyl [Metronidazole] Other (See Comments)     HYPERACTIVITY.. INSOMNIA     Meclizine Unknown - High Severity    Statins Other (See Comments)     Headache, elevated liver enzymes, blurred vision       Discharge Disposition:  Home or Self Care      Discharge Diet:  Diet Order   Procedures    Diet: Cardiac; Healthy Heart (2-3 Na+); Fluid Consistency: Thin (IDDSI 0)       Discharge Activity:       CODE STATUS:    Code Status and Medical Interventions: CPR (Attempt to Resuscitate); Full   Ordered at: 12/21/24 1945     Code Status (Patient has no pulse and is not breathing):    CPR (Attempt to Resuscitate)     Medical Interventions (Patient has pulse or is breathing):     Full       No future appointments.   Follow-up Information       Provider, No Known .    Contact information:  [facility] [address] [phone]                             Time Spent on Discharge:  32 minutes      Malgorzata Begum DO  [facility]  12/23/24  10:36 EST

## 2024-12-23 NOTE — DISCHARGE PLACEMENT REQUEST
"Kandace Andrade (84 y.o. Female)       Date of Birth   1940    Social Security Number       Address   [facility] [address] [place] [address]    Home Phone   [phone]    MRN   [id number]       [facility]    Marital Status                               Admission Date   12/21/24    Admission Type   Emergency    Admitting Provider   Lacey Jean MD    Attending Provider   Malgorzata Begum DO    Department, Room/Bed   [address], [address]       Discharge Date       Discharge Disposition   Home or Self Care    Discharge Destination                                 Attending Provider: Malgorzata Begum DO    Allergies: Penicillins, Meclizine Hcl, Bactrim [Sulfamethoxazole-trimethoprim], Flagyl [Metronidazole], Meclizine, Statins    Isolation: Contact   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), 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/24)   Code Status: CPR    Ht: 160 cm (62.99\")   Wt: 62.2 kg (137 lb 1.6 oz)    Admission Cmt: None   Principal Problem: Acute bronchitis due to respiratory syncytial virus (RSV) [J20.5]                   Active Insurance as of 12/21/2024       Primary Coverage       Payor Plan Insurance Group Employer/Plan Group    HUMANA MEDICARE REPLACEMENT HUMANA MED ADV PPO [id number]       Payor Plan Address Payor Plan Phone Number Payor Plan Fax Number Effective Dates    [address] [address] [phone]  1/1/2022 - None Entered    [facility] [id number]         Subscriber Name Subscriber Birth Date Member ID       KANDACE ANDRADE 1940 [id number]                     Emergency Contacts        (Rel.) Home Phone Work Phone Mobile Phone    millicent hernandez (Daughter) [phone] -- [phone]    TAYE LEE (Daughter) [phone] -- [phone]    Brendan Andrade (Son) -- -- [phone]                "

## 2024-12-24 ENCOUNTER — TRANSITIONAL CARE MANAGEMENT TELEPHONE ENCOUNTER (OUTPATIENT)
Dept: CALL CENTER | Facility: HOSPITAL | Age: 84
End: 2024-12-24
Payer: MEDICARE

## 2024-12-24 NOTE — OUTREACH NOTE
Call Center TCM Note      Flowsheet Row Responses   [facility] patient discharged from? [facility]   Does the patient have one of the following disease processes/diagnoses(primary or secondary)? Other   TCM attempt successful? Yes   Call start time 1233   Call end time 1237   Discharge diagnosis Acute bronchitis due to resp syncytial virus   Meds reviewed with patient/caregiver? Yes   Is the patient having any side effects they believe may be caused by any medication additions or changes? No   Does the patient have all medications ordered at discharge? Yes   Is the patient taking all medications as directed (includes completed medication regime)? Yes   Comments Patient currently in assisted living and is followed by PCP there.   Does the patient have an appointment with their PCP within 7-14 days of discharge? No   Nursing Interventions Patient declined scheduling/rescheduling appointment at this time   Has home health visited the patient within 72 hours of discharge? N/A   Psychosocial issues? No   Did the patient receive a copy of their discharge instructions? Yes   Nursing interventions Reviewed instructions with patient   What is the patient's perception of their health status since discharge? Improving   Is the patient/caregiver able to teach back signs and symptoms related to disease process for when to call PCP? Yes   Is the patient/caregiver able to teach back signs and symptoms related to disease process for when to call 911? Yes   Is the patient/caregiver able to teach back the hierarchy of who to call/visit for symptoms/problems? PCP, Specialist, Home health nurse, Urgent Care, ED, 911 Yes   If the patient is a current smoker, are they able to teach back resources for cessation? Not a smoker   TCM call completed? Yes   Wrap up additional comments Patient doing well.   Call end time 1237   Would this patient benefit from a Referral to [facility]? No   Is the patient interested in additional  calls from an ambulatory ? No            Cindy Johnson RN    12/24/2024, 12:37 EST

## 2024-12-26 LAB
BACTERIA SPEC AEROBE CULT: NORMAL
BACTERIA SPEC AEROBE CULT: NORMAL

## 2025-07-07 RX ORDER — IBUPROFEN 400 MG/1
TABLET, FILM COATED ORAL
Refills: 0 | OUTPATIENT
Start: 2025-07-07

## 2025-07-07 NOTE — TELEPHONE ENCOUNTER
LOV                12/13/2023                    NOV                  Visit date not found-needs ov   LAST REFILL       PROTOCOL

## (undated) DEVICE — TUBING, SUCTION, 1/4" X 10', STRAIGHT: Brand: MEDLINE

## (undated) DEVICE — SENSR O2 OXIMAX FNGR A/ 18IN NONSTR

## (undated) DEVICE — ADAPT CLN BIOGUARD AIR/H2O DISP

## (undated) DEVICE — LN SMPL CO2 SHTRM SD STREAM W/M LUER

## (undated) DEVICE — KT ORCA ORCAPOD DISP STRL

## (undated) DEVICE — CANN O2 ETCO2 FITS ALL CONN CO2 SMPL A/ 7IN DISP LF

## (undated) DEVICE — SINGLE-USE BIOPSY FORCEPS: Brand: RADIAL JAW 4

## (undated) DEVICE — THE TORRENT IRRIGATION SCOPE CONNECTOR IS USED WITH THE TORRENT IRRIGATION TUBING TO PROVIDE IRRIGATION FLUIDS SUCH AS STERILE WATER DURING GASTROINTESTINAL ENDOSCOPIC PROCEDURES WHEN USED IN CONJUNCTION WITH AN IRRIGATION PUMP (OR ELECTROSURGICAL UNIT).: Brand: TORRENT